# Patient Record
Sex: FEMALE | Race: WHITE | NOT HISPANIC OR LATINO | Employment: OTHER | URBAN - METROPOLITAN AREA
[De-identification: names, ages, dates, MRNs, and addresses within clinical notes are randomized per-mention and may not be internally consistent; named-entity substitution may affect disease eponyms.]

---

## 2017-04-28 ENCOUNTER — TELEPHONE (OUTPATIENT)
Dept: OBSTETRICS AND GYNECOLOGY | Facility: CLINIC | Age: 72
End: 2017-04-28

## 2017-04-28 DIAGNOSIS — Z12.31 SCREENING MAMMOGRAM, ENCOUNTER FOR: Primary | ICD-10-CM

## 2017-04-28 NOTE — TELEPHONE ENCOUNTER
----- Message from Dorie Clemons sent at 4/28/2017 10:40 AM CDT -----  Contact: SAI LUCIANO [[id number]]  x_  1st Request  _  2nd Request  _  3rd Request        Who: SAI LUCIANO [[id number]]    Why: Pt states she would like to get her MMG orders in. Thanks.     What Number to Call Back: [phone]    When to Expect a call back: (Before the end of the day)   -- if call after 3:00 call back will be tomorrow.

## 2017-05-01 ENCOUNTER — TELEPHONE (OUTPATIENT)
Dept: OBSTETRICS AND GYNECOLOGY | Facility: CLINIC | Age: 72
End: 2017-05-01

## 2017-05-01 NOTE — TELEPHONE ENCOUNTER
Rescheduled pt's 6-9-2017 appt from 9:45 AM to 2:15 PM due to changes in Dr. Gaviria's surgery schedule. Pt communicated understanding.

## 2017-05-29 ENCOUNTER — HOSPITAL ENCOUNTER (OUTPATIENT)
Dept: RADIOLOGY | Facility: OTHER | Age: 72
Discharge: HOME | End: 2017-05-29
Attending: OBSTETRICS & GYNECOLOGY
Payer: MEDICARE

## 2017-05-29 DIAGNOSIS — Z12.31 SCREENING MAMMOGRAM, ENCOUNTER FOR: ICD-10-CM

## 2017-05-29 PROCEDURE — 77067 SCR MAMMO BI INCL CAD: CPT | Mod: 26,,, | Performed by: RADIOLOGY

## 2017-05-29 PROCEDURE — 77067 SCR MAMMO BI INCL CAD: CPT | Mod: TC

## 2017-06-09 ENCOUNTER — OFFICE VISIT (OUTPATIENT)
Dept: OBSTETRICS AND GYNECOLOGY | Facility: CLINIC | Age: 72
End: 2017-06-09
Attending: OBSTETRICS & GYNECOLOGY
Payer: MEDICARE

## 2017-06-09 VITALS
HEIGHT: 65 IN | BODY MASS INDEX: 20.83 KG/M2 | SYSTOLIC BLOOD PRESSURE: 110 MMHG | DIASTOLIC BLOOD PRESSURE: 70 MMHG | WEIGHT: 125 LBS

## 2017-06-09 DIAGNOSIS — Z01.419 ENCOUNTER FOR GYNECOLOGICAL EXAMINATION WITHOUT ABNORMAL FINDING: Primary | ICD-10-CM

## 2017-06-09 DIAGNOSIS — E89.40 POSTSURGICAL MENOPAUSE: ICD-10-CM

## 2017-06-09 DIAGNOSIS — N95.2 POSTMENOPAUSAL ATROPHIC VAGINITIS: ICD-10-CM

## 2017-06-09 DIAGNOSIS — Z12.31 SCREENING MAMMOGRAM FOR HIGH-RISK PATIENT: ICD-10-CM

## 2017-06-09 DIAGNOSIS — Z13.820 SCREENING FOR OSTEOPOROSIS: ICD-10-CM

## 2017-06-09 PROCEDURE — G0101 CA SCREEN;PELVIC/BREAST EXAM: HCPCS | Mod: S$GLB,,, | Performed by: OBSTETRICS & GYNECOLOGY

## 2017-06-09 PROCEDURE — 99999 PR PBB SHADOW E&M-EST. PATIENT-LVL III: CPT | Mod: PBBFAC,,, | Performed by: OBSTETRICS & GYNECOLOGY

## 2017-06-09 RX ORDER — GLUCOSAMINE/CHONDRO SU A 500-400 MG
1 TABLET ORAL 3 TIMES DAILY
COMMUNITY

## 2017-06-09 RX ORDER — ESTRADIOL 0.1 MG/G
0.5 CREAM VAGINAL
Qty: 42 G | Refills: 4 | Status: SHIPPED | OUTPATIENT
Start: 2017-06-12 | End: 2018-07-23

## 2017-06-09 NOTE — PROGRESS NOTES
Subjective:       Patient ID: Marian Durham is a 71 y.o. female.    Chief Complaint:  Annual Exam      History of Present Illness  HPI  Marian Durham is a 71 y.o. female  here for her annual GYN exam.    She describes her periods as stopped with Hysterectomy in   Denies break through bleeding.   Denies vaginal itching or irritation.  Denies vaginal discharge.  She is not sexually active. She has been  since .     History of abnormal pap: No  Last Pap: was normal  Last MMG: normal--routine follow-up in 12 months  Last Colonoscopy:  colonoscopy 6 years ago without abnormalities.  denies domestic violence. She does feel safe at home.     Past Medical History:   Diagnosis Date    Hyperlipidemia     Osteoarthritis     Shingles 2002    Spinal stenosis      Past Surgical History:   Procedure Laterality Date    ANKLE FRACTURE SURGERY      Right ankle    HYSTERECTOMY      HEATHER    KNEE ARTHROSCOPY W/ DEBRIDEMENT      Bilateral    KNEE ARTHROSCOPY W/ DEBRIDEMENT      Left knee    OPEN PATELLA REEFING PROCEDURE  age 19    Left knee    Tonsilectomy  as a child    TUBAL LIGATION       Social History     Social History    Marital status:      Spouse name: N/A    Number of children: N/A    Years of education: N/A     Occupational History    Not on file.     Social History Main Topics    Smoking status: Never Smoker    Smokeless tobacco: Never Used    Alcohol use 1.8 oz/week     3 Glasses of wine per week      Comment: Drinks a glass of wine 3 times weekly    Drug use: No    Sexual activity: Not Currently     Birth control/ protection: Post-menopausal      Comment: Spouse  of kidney cancer : 2015     Other Topics Concern    Not on file     Social History Narrative    No narrative on file     Family History   Problem Relation Age of Onset    Hypertension Father     Hypertension Mother     Diabetes Brother     Colon cancer Paternal Aunt      "Breast cancer Paternal Aunt      75    Coronary artery disease Brother     Ovarian cancer Neg Hx      OB History      Para Term  AB Living    2 2 2   2    SAB TAB Ectopic Multiple Live Births        2          /70   Ht 5' 5" (1.651 m)   Wt 56.7 kg (125 lb)   BMI 20.80 kg/m²         GYN & OB History  No LMP recorded. Patient has had a hysterectomy.   Date of Last Pap: No result found    OB History    Para Term  AB Living   2 2 2   2   SAB TAB Ectopic Multiple Live Births       2      # Outcome Date GA Lbr Otto/2nd Weight Sex Delivery Anes PTL Lv   2 Term         KIRA   1 Term         KIRA          Review of Systems  Review of Systems   Constitutional: Negative for activity change, appetite change, fatigue and unexpected weight change.   HENT: Negative.    Eyes: Negative for visual disturbance.   Respiratory: Negative for shortness of breath and wheezing.    Cardiovascular: Negative for chest pain, palpitations and leg swelling.   Gastrointestinal: Positive for bloating. Negative for abdominal pain and blood in stool.   Endocrine: Negative for diabetes and hair loss.   Genitourinary: Negative for decreased libido, dyspareunia and postmenopausal bleeding.   Musculoskeletal: Negative for back pain and joint swelling.   Skin:  Negative for no acne and hair changes.   Neurological: Negative for headaches.   Hematological: Does not bruise/bleed easily.   Psychiatric/Behavioral: Positive for depression. The patient is not nervous/anxious.    Breast: Negative for breast pain and nipple discharge          Objective:    Physical Exam:   Constitutional: She is oriented to person, place, and time. She appears well-developed and well-nourished.    HENT:   Head: Normocephalic and atraumatic.    Eyes: EOM are normal. Pupils are equal, round, and reactive to light.    Neck: Normal range of motion. Neck supple.    Cardiovascular: Normal rate and regular rhythm.     Pulmonary/Chest: Effort normal " and breath sounds normal.   BREASTS: Symmetrical, no skin changes or visible lesions.  No palpable masses, nipple discharge bilaterally.          Abdominal: Soft. Bowel sounds are normal.     Genitourinary: Pelvic exam was performed with patient supine.   Genitourinary Comments: PELVIC: Normal external female genitalia without lesions. Normal hair distribution. Adequate perineal body, normal urethral meatus. Vagina atrophic without lesions or discharge. No significant cystocele or rectocele. Bimanual exam shows uterus and cervix to be surgically absent. Adnexa without masses or tenderness.  RECTAL: Deferred             Musculoskeletal: Normal range of motion and moves all extremeties.       Neurological: She is alert and oriented to person, place, and time.    Skin: Skin is warm and dry.    Psychiatric: She has a normal mood and affect.          Assessment:        1. Encounter for gynecological examination without abnormal finding    2. Postsurgical menopause    3. Screening for osteoporosis    4. Postmenopausal atrophic vaginitis    5. Screening mammogram for high-risk patient                Plan:      1. Encounter for gynecological examination without abnormal finding  COUNSELING:  The patient was counseled today on osteoporosis prevention, calcium supplementation, and regular weight bearing exercise. The patient was also counseled today on ACS PAP guidelines, with recommendations for yearly pelvic exams unless their uterus, cervix, and ovaries were removed for benign reasons; in that case, examinations every 3-5 years are recommended. The patient was also counseled regarding monthly breast self-examination, routine STD screening for at-risk populations, prophylactic immunizations for transmitted infections such as HPV, Pertussis, or Influenza as appropriate, and yearly mammograms when indicated by ACS guidelines. She was advised to see her primary care physician for all other health maintenance.   FOLLOW-UP with  me for next routine visit.         2. Postsurgical menopause    - DXA Bone Density Spine And Hip; Future    3. Screening for osteoporosis    - DXA Bone Density Spine And Hip; Future    4. Postmenopausal atrophic vaginitis    - estradiol (ESTRACE) 0.01 % (0.1 mg/gram) vaginal cream; Place 0.5 g vaginally twice a week. Insert 0.5grams intravaginally twice weekly  Dispense: 42 g; Refill: 4    5. Screening mammogram for high-risk patient    - Mammo Digital Screening Bilat with Tomosynthesis CAD; Future       Return in about 1 year (around 6/9/2018).

## 2017-08-01 ENCOUNTER — HOSPITAL ENCOUNTER (OUTPATIENT)
Dept: RADIOLOGY | Facility: OTHER | Age: 72
Discharge: HOME | End: 2017-08-01
Attending: OBSTETRICS & GYNECOLOGY
Payer: MEDICARE

## 2017-08-01 DIAGNOSIS — Z13.820 SCREENING FOR OSTEOPOROSIS: ICD-10-CM

## 2017-08-01 DIAGNOSIS — E89.40 POSTSURGICAL MENOPAUSE: ICD-10-CM

## 2017-08-01 PROCEDURE — 77080 DXA BONE DENSITY AXIAL: CPT | Mod: TC

## 2017-08-01 PROCEDURE — 77080 DXA BONE DENSITY AXIAL: CPT | Mod: 26,,, | Performed by: RADIOLOGY

## 2018-05-10 ENCOUNTER — HOSPITAL ENCOUNTER (OUTPATIENT)
Dept: RADIOLOGY | Facility: OTHER | Age: 73
Discharge: HOME | End: 2018-05-10
Attending: INTERNAL MEDICINE
Payer: MEDICARE

## 2018-05-10 DIAGNOSIS — M79.674 PAIN IN TOE OF RIGHT FOOT: ICD-10-CM

## 2018-05-10 DIAGNOSIS — M79.674 PAIN IN TOE OF RIGHT FOOT: Primary | ICD-10-CM

## 2018-05-10 DIAGNOSIS — Z12.31 ENCOUNTER FOR SCREENING MAMMOGRAM FOR BREAST CANCER: ICD-10-CM

## 2018-05-10 PROCEDURE — 73660 X-RAY EXAM OF TOE(S): CPT | Mod: TC,FY,RT

## 2018-05-10 PROCEDURE — 73660 X-RAY EXAM OF TOE(S): CPT | Mod: 26,RT,, | Performed by: RADIOLOGY

## 2018-06-06 ENCOUNTER — HOSPITAL ENCOUNTER (OUTPATIENT)
Dept: RADIOLOGY | Facility: OTHER | Age: 73
Discharge: HOME | End: 2018-06-06
Attending: INTERNAL MEDICINE
Payer: MEDICARE

## 2018-06-06 VITALS — HEIGHT: 65 IN | WEIGHT: 125 LBS | BODY MASS INDEX: 20.83 KG/M2

## 2018-06-06 DIAGNOSIS — Z12.31 ENCOUNTER FOR SCREENING MAMMOGRAM FOR BREAST CANCER: ICD-10-CM

## 2018-06-06 PROCEDURE — 77067 SCR MAMMO BI INCL CAD: CPT | Mod: TC

## 2018-06-06 PROCEDURE — 77063 BREAST TOMOSYNTHESIS BI: CPT | Mod: 26,,, | Performed by: RADIOLOGY

## 2018-06-06 PROCEDURE — 77067 SCR MAMMO BI INCL CAD: CPT | Mod: 26,,, | Performed by: RADIOLOGY

## 2018-07-23 ENCOUNTER — OFFICE VISIT (OUTPATIENT)
Dept: OBSTETRICS AND GYNECOLOGY | Facility: CLINIC | Age: 73
End: 2018-07-23
Attending: OBSTETRICS & GYNECOLOGY
Payer: MEDICARE

## 2018-07-23 VITALS
DIASTOLIC BLOOD PRESSURE: 70 MMHG | SYSTOLIC BLOOD PRESSURE: 120 MMHG | HEIGHT: 65 IN | WEIGHT: 123.44 LBS | BODY MASS INDEX: 20.57 KG/M2

## 2018-07-23 DIAGNOSIS — Z12.31 SCREENING MAMMOGRAM, ENCOUNTER FOR: ICD-10-CM

## 2018-07-23 DIAGNOSIS — Z01.411 ENCOUNTER FOR GYNECOLOGICAL EXAMINATION WITH ABNORMAL FINDING: Primary | ICD-10-CM

## 2018-07-23 DIAGNOSIS — N95.2 POSTMENOPAUSAL ATROPHIC VAGINITIS: ICD-10-CM

## 2018-07-23 PROCEDURE — G0101 CA SCREEN;PELVIC/BREAST EXAM: HCPCS | Mod: S$GLB,,, | Performed by: OBSTETRICS & GYNECOLOGY

## 2018-07-23 PROCEDURE — 99999 PR PBB SHADOW E&M-EST. PATIENT-LVL III: CPT | Mod: PBBFAC,,, | Performed by: OBSTETRICS & GYNECOLOGY

## 2018-07-23 NOTE — PROGRESS NOTES
Subjective:       Patient ID: Marian Durham is a 73 y.o. female.    Chief Complaint:  Well Woman      History of Present Illness  HPI  Marian Durham is a 73 y.o. female  here for her annual GYN exam.     denies vaginal itching or irritation.  Denies vaginal discharge.  She is not sexually active. She has been  for several years   History of abnormal pap: No  Last Pap: was normal  Last MMG: normal--routine follow-up in 12 months  Last Colonoscopy:  colonoscopy several years ago without abnormalities.  denies domestic violence. She does feel safe at home.     Past Medical History:   Diagnosis Date    Hyperlipidemia     Osteoarthritis     Shingles 2002    Spinal stenosis      Past Surgical History:   Procedure Laterality Date    ANKLE FRACTURE SURGERY  2007    Right ankle    HYSTERECTOMY  1985    HEATHER    KNEE ARTHROSCOPY W/ DEBRIDEMENT      Bilateral    KNEE ARTHROSCOPY W/ DEBRIDEMENT      Left knee    OPEN PATELLA REEFING PROCEDURE  age 19    Left knee    Tonsilectomy  as a child    TUBAL LIGATION       Social History     Social History    Marital status:      Spouse name: N/A    Number of children: N/A    Years of education: N/A     Occupational History    Not on file.     Social History Main Topics    Smoking status: Never Smoker    Smokeless tobacco: Never Used    Alcohol use 1.8 oz/week     3 Glasses of wine per week      Comment: Drinks a glass of wine 3 times weekly    Drug use: No    Sexual activity: Not Currently     Birth control/ protection: Post-menopausal      Comment: Spouse  of kidney cancer : 2015     Other Topics Concern    Not on file     Social History Narrative    No narrative on file     Family History   Problem Relation Age of Onset    Hypertension Father     Hypertension Mother     Diabetes Brother     Colon cancer Paternal Aunt     Breast cancer Paternal Aunt         75    Coronary artery disease Brother     Ovarian  "cancer Neg Hx      OB History      Para Term  AB Living    2 2 2     2    SAB TAB Ectopic Multiple Live Births            2          /70   Ht 5' 5" (1.651 m)   Wt 56 kg (123 lb 7.3 oz)   BMI 20.54 kg/m²         GYN & OB History    Date of Last Pap: No result found    OB History    Para Term  AB Living   2 2 2     2   SAB TAB Ectopic Multiple Live Births           2      # Outcome Date GA Lbr Otto/2nd Weight Sex Delivery Anes PTL Lv   2 Term         KIRA   1 Term         KIRA          Review of Systems  Review of Systems   Constitutional: Negative for activity change, appetite change, fatigue and unexpected weight change.   HENT: Negative.    Eyes: Negative for visual disturbance.   Respiratory: Negative for shortness of breath and wheezing.    Cardiovascular: Negative for chest pain, palpitations and leg swelling.   Gastrointestinal: Negative for abdominal pain, bloating and blood in stool.   Endocrine: Negative for diabetes, hair loss and hot flashes.   Genitourinary: Positive for frequency. Negative for decreased libido and dyspareunia.   Musculoskeletal: Negative for back pain and joint swelling.   Skin:  Negative for no acne and hair changes.   Neurological: Negative for headaches.   Hematological: Does not bruise/bleed easily.   Psychiatric/Behavioral: Negative for depression and sleep disturbance. The patient is not nervous/anxious.    Breast: Negative for breast pain and nipple discharge          Objective:    Physical Exam:   Constitutional: She is oriented to person, place, and time. She appears well-developed and well-nourished.    HENT:   Head: Normocephalic and atraumatic.    Eyes: EOM are normal. Pupils are equal, round, and reactive to light.    Neck: Normal range of motion. Neck supple.    Cardiovascular: Normal rate and regular rhythm.     Pulmonary/Chest: Effort normal and breath sounds normal.   BREASTS:  no mass, no tenderness, no deformity and no retraction. " Right breast exhibits no inverted nipple, no mass, no nipple discharge, no skin change, no tenderness, no bleeding and no swelling. Left breast exhibits no inverted nipple, no mass, no nipple discharge, no skin change, no tenderness, no bleeding and no swelling. Breasts are symmetrical.              Abdominal: Soft. Bowel sounds are normal.     Genitourinary: Pelvic exam was performed with patient supine.   Genitourinary Comments: PELVIC: Normal external female genitalia without lesions. Normal hair distribution. Adequate perineal body, normal urethral meatus. Vagina atrophic without lesions or discharge. No significant cystocele or rectocele. Bimanual exam shows uterus and cervix to be surgically absent. Adnexa without masses or tenderness.  RECTAL: Deferred             Musculoskeletal: Normal range of motion and moves all extremeties.       Neurological: She is alert and oriented to person, place, and time.    Skin: Skin is warm and dry.    Psychiatric: She has a normal mood and affect.          Assessment:        1. Encounter for gynecological examination with abnormal finding    2. Postmenopausal atrophic vaginitis    3. Screening mammogram, encounter for                Plan:      1. Encounter for gynecological examination with abnormal finding  COUNSELING:  The patient was counseled today on osteoporosis prevention, calcium supplementation, and regular weight bearing exercise. The patient was also counseled today on ACS PAP guidelines, with recommendations for yearly pelvic exams unless their uterus, cervix, and ovaries were removed for benign reasons; in that case, examinations every 3-5 years are recommended. The patient was also counseled regarding monthly breast self-examination, routine STD screening for at-risk populations, prophylactic immunizations for transmitted infections such as HPV, Pertussis, or Influenza as appropriate, and yearly mammograms when indicated by ACS guidelines. She was advised to  see her primary care physician for all other health maintenance.   FOLLOW-UP with me for next routine visit.         2. Postmenopausal atrophic vaginitis    - conjugated estrogens (PREMARIN) vaginal cream; Place 0.5 g vaginally twice a week. Insert into vagina as directed  Dispense: 30 g; Refill: 6    3. Screening mammogram, encounter for    - Mammo Digital Screening Bilat with Tomosynthesis CAD; Future       Follow-up in about 2 years (around 7/23/2020).

## 2018-11-08 ENCOUNTER — HOSPITAL ENCOUNTER (OUTPATIENT)
Dept: PREADMISSION TESTING | Facility: OTHER | Age: 73
Discharge: HOME | End: 2018-11-08
Attending: SPECIALIST
Payer: MEDICARE

## 2018-11-08 ENCOUNTER — ANESTHESIA EVENT (OUTPATIENT)
Dept: SURGERY | Facility: OTHER | Age: 73
End: 2018-11-08
Payer: MEDICARE

## 2018-11-08 VITALS
DIASTOLIC BLOOD PRESSURE: 57 MMHG | OXYGEN SATURATION: 95 % | HEIGHT: 65 IN | HEART RATE: 80 BPM | WEIGHT: 122 LBS | SYSTOLIC BLOOD PRESSURE: 123 MMHG | TEMPERATURE: 99 F | BODY MASS INDEX: 20.33 KG/M2

## 2018-11-08 RX ORDER — LIDOCAINE HYDROCHLORIDE 10 MG/ML
0.5 INJECTION, SOLUTION EPIDURAL; INFILTRATION; INTRACAUDAL; PERINEURAL ONCE
Status: CANCELLED | OUTPATIENT
Start: 2018-11-08 | End: 2018-11-08

## 2018-11-08 RX ORDER — SODIUM CHLORIDE, SODIUM LACTATE, POTASSIUM CHLORIDE, CALCIUM CHLORIDE 600; 310; 30; 20 MG/100ML; MG/100ML; MG/100ML; MG/100ML
INJECTION, SOLUTION INTRAVENOUS CONTINUOUS
Status: CANCELLED | OUTPATIENT
Start: 2018-11-08

## 2018-11-08 NOTE — DISCHARGE INSTRUCTIONS
PRE-ADMIT TESTING -  [phone]    [address]  [place] [facility]          Your surgery has been scheduled at [facility]. We are pleased to have the opportunity to serve you. For Further Information please call [phone].    On the day of surgery please report to the Information Desk on the 1st floor.    · CONTACT YOUR PHYSICIAN'S OFFICE THE DAY PRIOR TO YOUR SURGERY TO OBTAIN YOUR ARRIVAL TIME.     · The evening before surgery do not eat anything after 9 p.m. ( this includes hard candy, chewing gum and mints).  You may only have GATORADE, POWERADE AND WATER  from 9 p.m. until you leave your home.   DO NOT DRINK ANY LIQUIDS ON THE WAY TO THE HOSPITAL.      SPECIAL MEDICATION INSTRUCTIONS: TAKE medications checked off by the Anesthesiologist on your Medication List.    Angiogram Patients: Take medications as instructed by your physician, including aspirin.     Surgery Patients:    If you take ASPIRIN - Your PHYSICIAN/SURGEON will need to inform you IF/OR when you need to stop taking aspirin prior to your surgery.     Do Not take any medications containing IBUPROFEN.  Do Not Wear any make-up or dark nail polish   (especially eye make-up) to surgery. If you come to surgery with makeup on you will be required to remove the makeup or nail polish.    Do not shave your surgical area at least 5 days prior to your surgery. The surgical prep will be performed at the hospital according to Infection Control regulations.    Leave all valuables at home.   Do Not wear any jewelry or watches, including any metal in body piercings.  Contact Lens must be removed before surgery. Either do not wear the contact lens or bring a case and solution for storage.  Please bring a container for eyeglasses or dentures as required.  Bring any paperwork your physician has provided, such as consent forms,  history and physicals, doctor's orders, etc.   Bring comfortable clothes that are loose fitting to wear upon  discharge. Take into consideration the type of surgery being performed.  Maintain your diet as advised per your physician the day prior to surgery.      Adequate rest the night before surgery is advised.   Park in the Parking lot behind the hospital or in the [place] [facility] across the street from the parking lot. Parking is complimentary.  If you will be discharged the same day as your procedure, please arrange for a responsible adult to drive you home or to accompany you if traveling by taxi.   YOU WILL NOT BE PERMITTED TO DRIVE OR TO LEAVE THE HOSPITAL ALONE AFTER SURGERY.   It is strongly recommended that you arrange for someone to remain with you for the first 24 hrs following your surgery.       Thank you for your cooperation.  The Staff of [facility].        Bathing Instructions                                                                 Please shower the evening before and morning of your procedure with    ANTIBACTERIAL SOAP. ( DIAL, etc )  Concentrate on the surgical area   for at least 3 minutes and rinse completely. Dry off as usual.   Do not use any deodorant, powder, body lotions, perfume, after shave or    cologne.

## 2018-11-08 NOTE — ANESTHESIA PREPROCEDURE EVALUATION
11/08/2018  Marian Durham is a 73 y.o., female.    Anesthesia Evaluation    I have reviewed the Patient Summary Reports.    I have reviewed the Nursing Notes.   I have reviewed the Medications.     Review of Systems  Anesthesia Hx:  Denies Family Hx of Anesthesia complications.   Denies Personal Hx of Anesthesia complications.   Hematology/Oncology:  Hematology Normal   Oncology Normal     EENT/Dental:EENT/Dental Normal   Cardiovascular:   Exercise tolerance: good hyperlipidemia    Pulmonary:  Pulmonary Normal    Renal/:  Renal/ Normal     Hepatic/GI:  Hepatic/GI Normal    Musculoskeletal:   Arthritis     Neurological:  Neurology Normal    Endocrine:  Endocrine Normal    Dermatological:  Skin Normal    Psych:  Psychiatric Normal           Physical Exam  General:  Well nourished    Airway/Jaw/Neck:  Airway Findings: Mouth Opening: Normal Tongue: Normal  General Airway Assessment: Adult  Mallampati: II  TM Distance: Normal, at least 6 cm      Dental:  Dental Findings: molar caps, In tact        Mental Status:  Mental Status Findings:  Alert and Oriented, Cooperative         Anesthesia Plan  Type of Anesthesia, risks & benefits discussed:  Anesthesia Type:  general  Patient's Preference:   Intra-op Monitoring Plan: standard ASA monitors  Intra-op Monitoring Plan Comments:   Post Op Pain Control Plan: multimodal analgesia  Post Op Pain Control Plan Comments:   Induction:   IV  Beta Blocker:         Informed Consent: Patient understands risks and agrees with Anesthesia plan.  Questions answered. Anesthesia consent signed with patient.  ASA Score: 2     Day of Surgery Review of History & Physical:    H&P update referred to the surgeon.         Ready For Surgery From Anesthesia Perspective.

## 2018-11-16 ENCOUNTER — HOSPITAL ENCOUNTER (OUTPATIENT)
Facility: OTHER | Age: 73
Discharge: HOME | End: 2018-11-16
Attending: SPECIALIST | Admitting: SPECIALIST
Payer: MEDICARE

## 2018-11-16 ENCOUNTER — ANESTHESIA (OUTPATIENT)
Dept: SURGERY | Facility: OTHER | Age: 73
End: 2018-11-16
Payer: MEDICARE

## 2018-11-16 VITALS
TEMPERATURE: 98 F | DIASTOLIC BLOOD PRESSURE: 68 MMHG | HEIGHT: 65 IN | SYSTOLIC BLOOD PRESSURE: 128 MMHG | OXYGEN SATURATION: 97 % | BODY MASS INDEX: 20.33 KG/M2 | HEART RATE: 76 BPM | RESPIRATION RATE: 18 BRPM | WEIGHT: 122 LBS

## 2018-11-16 DIAGNOSIS — D17.79 LIPOMA OF OTHER SPECIFIED SITES: Primary | ICD-10-CM

## 2018-11-16 PROBLEM — D17.9 LIPOMA: Status: ACTIVE | Noted: 2018-11-16

## 2018-11-16 PROCEDURE — 63600175 PHARM REV CODE 636 W HCPCS: Performed by: NURSE ANESTHETIST, CERTIFIED REGISTERED

## 2018-11-16 PROCEDURE — 88304 TISSUE EXAM BY PATHOLOGIST: CPT | Mod: 26,,, | Performed by: PATHOLOGY

## 2018-11-16 PROCEDURE — 37000009 HC ANESTHESIA EA ADD 15 MINS: Performed by: SPECIALIST

## 2018-11-16 PROCEDURE — 25000242 PHARM REV CODE 250 ALT 637 W/ HCPCS: Performed by: SPECIALIST

## 2018-11-16 PROCEDURE — 25000003 PHARM REV CODE 250: Performed by: ANESTHESIOLOGY

## 2018-11-16 PROCEDURE — 63600175 PHARM REV CODE 636 W HCPCS: Performed by: SPECIALIST

## 2018-11-16 PROCEDURE — 36000706: Performed by: SPECIALIST

## 2018-11-16 PROCEDURE — 36000707: Performed by: SPECIALIST

## 2018-11-16 PROCEDURE — 71000039 HC RECOVERY, EACH ADD'L HOUR: Performed by: SPECIALIST

## 2018-11-16 PROCEDURE — 37000008 HC ANESTHESIA 1ST 15 MINUTES: Performed by: SPECIALIST

## 2018-11-16 PROCEDURE — 71000033 HC RECOVERY, INTIAL HOUR: Performed by: SPECIALIST

## 2018-11-16 PROCEDURE — 71000015 HC POSTOP RECOV 1ST HR: Performed by: SPECIALIST

## 2018-11-16 PROCEDURE — 88304 TISSUE EXAM BY PATHOLOGIST: CPT | Performed by: PATHOLOGY

## 2018-11-16 PROCEDURE — 71000016 HC POSTOP RECOV ADDL HR: Performed by: SPECIALIST

## 2018-11-16 PROCEDURE — 25000003 PHARM REV CODE 250: Performed by: NURSE ANESTHETIST, CERTIFIED REGISTERED

## 2018-11-16 RX ORDER — ROCURONIUM BROMIDE 10 MG/ML
INJECTION, SOLUTION INTRAVENOUS
Status: DISCONTINUED | OUTPATIENT
Start: 2018-11-16 | End: 2018-11-16

## 2018-11-16 RX ORDER — NEOSTIGMINE METHYLSULFATE 1 MG/ML
INJECTION, SOLUTION INTRAVENOUS
Status: DISCONTINUED | OUTPATIENT
Start: 2018-11-16 | End: 2018-11-16

## 2018-11-16 RX ORDER — SODIUM CHLORIDE 9 MG/ML
INJECTION, SOLUTION INTRAVENOUS CONTINUOUS
Status: DISCONTINUED | OUTPATIENT
Start: 2018-11-16 | End: 2018-11-16 | Stop reason: HOSPADM

## 2018-11-16 RX ORDER — SODIUM CHLORIDE 0.9 % (FLUSH) 0.9 %
3 SYRINGE (ML) INJECTION
Status: DISCONTINUED | OUTPATIENT
Start: 2018-11-16 | End: 2018-11-16 | Stop reason: HOSPADM

## 2018-11-16 RX ORDER — LIDOCAINE HYDROCHLORIDE 10 MG/ML
0.5 INJECTION, SOLUTION EPIDURAL; INFILTRATION; INTRACAUDAL; PERINEURAL ONCE
Status: DISCONTINUED | OUTPATIENT
Start: 2018-11-16 | End: 2018-11-16 | Stop reason: HOSPADM

## 2018-11-16 RX ORDER — OXYCODONE HYDROCHLORIDE 5 MG/1
5 TABLET ORAL
Status: DISCONTINUED | OUTPATIENT
Start: 2018-11-16 | End: 2018-11-16 | Stop reason: HOSPADM

## 2018-11-16 RX ORDER — ACETAMINOPHEN 10 MG/ML
INJECTION, SOLUTION INTRAVENOUS
Status: DISCONTINUED | OUTPATIENT
Start: 2018-11-16 | End: 2018-11-16

## 2018-11-16 RX ORDER — LIDOCAINE HCL/PF 100 MG/5ML
SYRINGE (ML) INTRAVENOUS
Status: DISCONTINUED | OUTPATIENT
Start: 2018-11-16 | End: 2018-11-16

## 2018-11-16 RX ORDER — ONDANSETRON 2 MG/ML
4 INJECTION INTRAMUSCULAR; INTRAVENOUS DAILY PRN
Status: DISCONTINUED | OUTPATIENT
Start: 2018-11-16 | End: 2018-11-16 | Stop reason: HOSPADM

## 2018-11-16 RX ORDER — HYDROCODONE BITARTRATE AND ACETAMINOPHEN 5; 325 MG/1; MG/1
TABLET ORAL
Qty: 20 TABLET | Refills: 0 | Status: SHIPPED | OUTPATIENT
Start: 2018-11-16 | End: 2021-08-16

## 2018-11-16 RX ORDER — CEFAZOLIN SODIUM 1 G/3ML
1 INJECTION, POWDER, FOR SOLUTION INTRAMUSCULAR; INTRAVENOUS
Status: COMPLETED | OUTPATIENT
Start: 2018-11-16 | End: 2018-11-16

## 2018-11-16 RX ORDER — DEXAMETHASONE SODIUM PHOSPHATE 4 MG/ML
INJECTION, SOLUTION INTRA-ARTICULAR; INTRALESIONAL; INTRAMUSCULAR; INTRAVENOUS; SOFT TISSUE
Status: DISCONTINUED | OUTPATIENT
Start: 2018-11-16 | End: 2018-11-16

## 2018-11-16 RX ORDER — FENTANYL CITRATE 50 UG/ML
INJECTION, SOLUTION INTRAMUSCULAR; INTRAVENOUS
Status: DISCONTINUED | OUTPATIENT
Start: 2018-11-16 | End: 2018-11-16

## 2018-11-16 RX ORDER — MEPERIDINE HYDROCHLORIDE 50 MG/ML
12.5 INJECTION INTRAMUSCULAR; INTRAVENOUS; SUBCUTANEOUS ONCE AS NEEDED
Status: DISCONTINUED | OUTPATIENT
Start: 2018-11-16 | End: 2018-11-16 | Stop reason: HOSPADM

## 2018-11-16 RX ORDER — GLYCOPYRROLATE 0.2 MG/ML
INJECTION INTRAMUSCULAR; INTRAVENOUS
Status: DISCONTINUED | OUTPATIENT
Start: 2018-11-16 | End: 2018-11-16

## 2018-11-16 RX ORDER — FENTANYL CITRATE 50 UG/ML
25 INJECTION, SOLUTION INTRAMUSCULAR; INTRAVENOUS EVERY 5 MIN PRN
Status: DISCONTINUED | OUTPATIENT
Start: 2018-11-16 | End: 2018-11-16 | Stop reason: HOSPADM

## 2018-11-16 RX ORDER — ONDANSETRON HYDROCHLORIDE 2 MG/ML
INJECTION, SOLUTION INTRAMUSCULAR; INTRAVENOUS
Status: DISCONTINUED | OUTPATIENT
Start: 2018-11-16 | End: 2018-11-16

## 2018-11-16 RX ORDER — SODIUM CHLORIDE, SODIUM LACTATE, POTASSIUM CHLORIDE, CALCIUM CHLORIDE 600; 310; 30; 20 MG/100ML; MG/100ML; MG/100ML; MG/100ML
INJECTION, SOLUTION INTRAVENOUS CONTINUOUS
Status: DISCONTINUED | OUTPATIENT
Start: 2018-11-16 | End: 2018-11-16 | Stop reason: HOSPADM

## 2018-11-16 RX ORDER — PROPOFOL 10 MG/ML
VIAL (ML) INTRAVENOUS
Status: DISCONTINUED | OUTPATIENT
Start: 2018-11-16 | End: 2018-11-16

## 2018-11-16 RX ORDER — ALBUTEROL SULFATE 0.83 MG/ML
2.5 SOLUTION RESPIRATORY (INHALATION) ONCE
Status: COMPLETED | OUTPATIENT
Start: 2018-11-16 | End: 2018-11-16

## 2018-11-16 RX ADMIN — PROPOFOL 50 MG: 10 INJECTION, EMULSION INTRAVENOUS at 10:11

## 2018-11-16 RX ADMIN — FENTANYL CITRATE 50 MCG: 50 INJECTION, SOLUTION INTRAMUSCULAR; INTRAVENOUS at 10:11

## 2018-11-16 RX ADMIN — ROCURONIUM BROMIDE 30 MG: 10 INJECTION, SOLUTION INTRAVENOUS at 10:11

## 2018-11-16 RX ADMIN — ONDANSETRON 4 MG: 2 INJECTION, SOLUTION INTRAMUSCULAR; INTRAVENOUS at 10:11

## 2018-11-16 RX ADMIN — SODIUM CHLORIDE, SODIUM LACTATE, POTASSIUM CHLORIDE, AND CALCIUM CHLORIDE: 600; 310; 30; 20 INJECTION, SOLUTION INTRAVENOUS at 10:11

## 2018-11-16 RX ADMIN — PROPOFOL 150 MG: 10 INJECTION, EMULSION INTRAVENOUS at 10:11

## 2018-11-16 RX ADMIN — PROPOFOL 20 MG: 10 INJECTION, EMULSION INTRAVENOUS at 10:11

## 2018-11-16 RX ADMIN — NEOSTIGMINE METHYLSULFATE 5 MG: 1 INJECTION INTRAVENOUS at 11:11

## 2018-11-16 RX ADMIN — GLYCOPYRROLATE 0.8 MG: 0.2 INJECTION, SOLUTION INTRAMUSCULAR; INTRAVENOUS at 11:11

## 2018-11-16 RX ADMIN — LIDOCAINE HYDROCHLORIDE 50 MG: 20 INJECTION, SOLUTION INTRAVENOUS at 10:11

## 2018-11-16 RX ADMIN — OXYCODONE HYDROCHLORIDE 5 MG: 5 TABLET ORAL at 11:11

## 2018-11-16 RX ADMIN — ACETAMINOPHEN 1000 MG: 10 INJECTION, SOLUTION INTRAVENOUS at 10:11

## 2018-11-16 RX ADMIN — ALBUTEROL SULFATE 2.5 MG: 2.5 SOLUTION RESPIRATORY (INHALATION) at 11:11

## 2018-11-16 RX ADMIN — DEXAMETHASONE SODIUM PHOSPHATE 4 MG: 4 INJECTION, SOLUTION INTRAMUSCULAR; INTRAVENOUS at 10:11

## 2018-11-16 RX ADMIN — CEFAZOLIN 1 G: 330 INJECTION, POWDER, FOR SOLUTION INTRAMUSCULAR; INTRAVENOUS at 10:11

## 2018-11-16 RX ADMIN — CARBOXYMETHYLCELLULOSE SODIUM 2 DROP: 2.5 SOLUTION/ DROPS OPHTHALMIC at 10:11

## 2018-11-16 NOTE — OR NURSING
Assisted up to bathroom, voided without difficulty, assisted to dress for discharge, tolerated well, awaiting pharmacy to deliver take home medication for discharge.

## 2018-11-16 NOTE — BRIEF OP NOTE
[facility]  Brief Operative Note     SUMMARY     Surgery Date: 11/16/2018     Surgeon(s) and Role:     * Fran Magaña MD - Primary    Assisting Surgeon: None    Pre-op Diagnosis:  Lipoma of other specified sites [D17.79]    Post-op Diagnosis:  Post-Op Diagnosis Codes:     * Lipoma of other specified sites [D17.79]    Procedure(s) (LRB):  EXCISION, MASS, BACK (N/A)    Anesthesia: General    Description of the findings of the procedure: Large lipoma 7 x5 cm    Findings/Key Components:     Estimated Blood Loss: * No values recorded between 11/16/2018 10:43 AM and 11/16/2018 11:05 AM *min       Specimens:   Specimen (12h ago, onward)    Start     Ordered    11/16/18 1049  Specimen to Pathology - Surgery  Once     Comments:  1. LIPOMA-BACK     Start Status   11/16/18 1049 Collected (11/16/18 1058)       11/16/18 1058          Discharge Note    SUMMARY     Admit Date: 11/16/2018    Discharge Date and Time:  11/16/2018 11:06 AM    Hospital Course (synopsis of major diagnoses, care, treatment, and services provided during the course of the hospital stay): treated     Final Diagnosis: Post-Op Diagnosis Codes:     * Lipoma of other specified sites [D17.79]    Disposition: Home or Self Care    Follow Up/Patient Instructions:     Medications:  Reconciled Home Medications:      Medication List      START taking these medications    HYDROcodone-acetaminophen 5-325 mg per tablet  Commonly known as:  NORCO  Take 1 or 2 tabs every 4 hours as needed.        CONTINUE taking these medications    CALCIUM 500 + D 500 mg(1,250mg) -200 unit per tablet  Generic drug:  calcium-vitamin D3  Take 1 tablet by mouth 2 (two) times daily with meals.     celecoxib 200 MG capsule  Commonly known as:  CeleBREX  take 1 capsule (200MG) by oral route every day as needed     conjugated estrogens vaginal cream  Commonly known as:  PREMARIN  Place 0.5 g vaginally twice a week. Insert into vagina as directed      glucosamine-chondroitin 500-400 mg tablet  Take 1 tablet by mouth 3 (three) times daily.     MIRALAX ORAL  Take by mouth.     OSTEO BI-FLEX ORAL  Take 2 tablets by mouth once daily.     simvastatin 40 MG tablet  Commonly known as:  ZOCOR          Discharge Procedure Orders   Diet general     Call MD for:  redness, tenderness, or signs of infection (pain, swelling, redness, odor or green/yellow discharge around incision site)     Remove dressing in 48 hours     Shower on day dressing removed (No bath)   Order Comments: You may shower on the 2nd day following your surgery.     Follow-up Information     Fran Magaña MD In 2 weeks.    Specialty:  General Surgery  Contact information:  [address]  [facility] [address] [phone]

## 2018-11-16 NOTE — OP NOTE
DATE OF PROCEDURE:  11/16/2018.    PREOPERATIVE DIAGNOSIS:  Back lipoma.    POSTOPERATIVE DIAGNOSIS:  Back lipoma.    PROCEDURE:  Excision of back lipoma.    PROCEDURE IN DETAIL:  The patient was taken to the Operating Room, placed on the   operating table in the supine position.  After smooth induction with general   anesthesia, the patient was placed in the prone position.  This lipoma was   located off of the midline to the right side.  This was easily palpated.  An   incision was made over the lipoma and carried down to the mass.  The lipoma was   then carefully dissected out circumferentially, had multiple pods off of the   main lesion, which were carefully dissected out.  Hemostasis was obtained using   electrocautery.  The entire mass was removed in toto without difficulty.    Specimen was sent to pathology for appropriate studies.  Hemostasis was obtained   using electrocautery.  The wound was irrigated.  The wound was then closed   using 3-0 Vicryl followed by 4-0 Vicryl.  Glue and Steri-Strips were applied.    Sterile dressing was placed.  Blood loss was minimal.  The patient tolerated the   procedure and left the Operating Room in stable condition.      JENNIFER/TAYLOR  dd: 11/16/2018 11:09:44 (CST)  td: 11/16/2018 11:27:17 (CST)  Doc ID   #[id number]  Job ID #[id number]    CC:

## 2018-11-16 NOTE — OR NURSING
Pt coughing  Incessantly upon arrival to pacu. Non productive. Breath sounds  Very diminished throughout. Dr owens here pt exam. Albuterol neb treatment ordered

## 2018-11-16 NOTE — TRANSFER OF CARE
"Anesthesia Transfer of Care Note    Patient: Marian Durham    Procedure(s) Performed: Procedure(s) (LRB):  EXCISION, MASS, BACK (N/A)    Patient location: PACU    Anesthesia Type: general    Transport from OR: Transported from OR on 2-3 L/min O2 by NC with adequate spontaneous ventilation    Post pain: adequate analgesia    Post assessment: no apparent anesthetic complications    Post vital signs: stable    Level of consciousness: awake    Nausea/Vomiting: no nausea/vomiting    Complications: none    Transfer of care protocol was followed      Last vitals:   Visit Vitals  /71 (BP Location: Left arm, Patient Position: Lying)   Pulse 75   Temp 36.5 °C (97.7 °F) (Oral)   Resp 16   Ht 5' 5" (1.651 m)   Wt 55.3 kg (122 lb 0 oz)   SpO2 100%   Breastfeeding? No   BMI 20.30 kg/m²     "

## 2018-11-16 NOTE — ANESTHESIA POSTPROCEDURE EVALUATION
"Anesthesia Post Evaluation    Patient: Marian Durham    Procedure(s) Performed: Procedure(s) (LRB):  EXCISION, MASS, BACK (N/A)    Final Anesthesia Type: general  Patient location during evaluation: PACU  Patient participation: Yes- Able to Participate  Level of consciousness: awake and alert  Post-procedure vital signs: reviewed and stable  Pain management: adequate  Airway patency: patent  PONV status at discharge: No PONV  Anesthetic complications: no      Cardiovascular status: blood pressure returned to baseline  Respiratory status: unassisted, spontaneous ventilation and room air  Hydration status: euvolemic  Follow-up not needed.        Visit Vitals  BP (!) 145/63   Pulse 61   Temp 36.6 °C (97.8 °F) (Oral)   Resp 18   Ht 5' 5" (1.651 m)   Wt 55.3 kg (122 lb 0 oz)   SpO2 100%   Breastfeeding? No   BMI 20.30 kg/m²       Pain/Renee Score: Pain Assessment Performed: Yes (11/16/2018 11:45 AM)  Presence of Pain: complains of pain/discomfort (11/16/2018 11:42 AM)  Pain Rating Prior to Med Admin: 3 (11/16/2018 11:42 AM)  Renee Score: 10 (11/16/2018 12:00 PM)        "

## 2018-11-16 NOTE — INTERVAL H&P NOTE
The patient has been examined and the H&P has been reviewed:    I concur with the findings and no changes have occurred since H&P was written.    Anesthesia/Surgery risks, benefits and alternative options discussed and understood by patient/family.          Active Hospital Problems    Diagnosis  POA    Lipoma [D17.9]  Yes      Resolved Hospital Problems   No resolved problems to display.

## 2019-06-07 ENCOUNTER — HOSPITAL ENCOUNTER (OUTPATIENT)
Dept: RADIOLOGY | Facility: OTHER | Age: 74
Discharge: HOME | End: 2019-06-07
Attending: OBSTETRICS & GYNECOLOGY
Payer: MEDICARE

## 2019-06-07 VITALS — BODY MASS INDEX: 20.33 KG/M2 | WEIGHT: 122 LBS | HEIGHT: 65 IN

## 2019-06-07 DIAGNOSIS — Z12.31 SCREENING MAMMOGRAM, ENCOUNTER FOR: ICD-10-CM

## 2019-06-07 PROCEDURE — 77067 MAMMO DIGITAL SCREENING BILAT WITH TOMOSYNTHESIS_CAD: ICD-10-PCS | Mod: 26,,, | Performed by: RADIOLOGY

## 2019-06-07 PROCEDURE — 77063 BREAST TOMOSYNTHESIS BI: CPT | Mod: 26,,, | Performed by: RADIOLOGY

## 2019-06-07 PROCEDURE — 77067 SCR MAMMO BI INCL CAD: CPT | Mod: 26,,, | Performed by: RADIOLOGY

## 2019-06-07 PROCEDURE — 77067 SCR MAMMO BI INCL CAD: CPT | Mod: TC

## 2019-06-07 PROCEDURE — 77063 MAMMO DIGITAL SCREENING BILAT WITH TOMOSYNTHESIS_CAD: ICD-10-PCS | Mod: 26,,, | Performed by: RADIOLOGY

## 2019-07-29 ENCOUNTER — OFFICE VISIT (OUTPATIENT)
Dept: OBSTETRICS AND GYNECOLOGY | Facility: CLINIC | Age: 74
End: 2019-07-29
Attending: OBSTETRICS & GYNECOLOGY
Payer: MEDICARE

## 2019-07-29 VITALS
SYSTOLIC BLOOD PRESSURE: 120 MMHG | WEIGHT: 122.13 LBS | DIASTOLIC BLOOD PRESSURE: 76 MMHG | HEIGHT: 65 IN | BODY MASS INDEX: 20.35 KG/M2

## 2019-07-29 DIAGNOSIS — Z12.31 SCREENING MAMMOGRAM, ENCOUNTER FOR: ICD-10-CM

## 2019-07-29 DIAGNOSIS — Z13.820 SCREENING FOR OSTEOPOROSIS: ICD-10-CM

## 2019-07-29 DIAGNOSIS — Z01.419 ENCOUNTER FOR GYNECOLOGICAL EXAMINATION WITHOUT ABNORMAL FINDING: Primary | ICD-10-CM

## 2019-07-29 DIAGNOSIS — N95.2 POSTMENOPAUSAL ATROPHIC VAGINITIS: ICD-10-CM

## 2019-07-29 DIAGNOSIS — E89.40 POSTSURGICAL MENOPAUSE: ICD-10-CM

## 2019-07-29 PROCEDURE — G0101 PR CA SCREEN;PELVIC/BREAST EXAM: ICD-10-PCS | Mod: S$GLB,,, | Performed by: OBSTETRICS & GYNECOLOGY

## 2019-07-29 PROCEDURE — 99999 PR PBB SHADOW E&M-EST. PATIENT-LVL III: CPT | Mod: PBBFAC,,, | Performed by: OBSTETRICS & GYNECOLOGY

## 2019-07-29 PROCEDURE — 99999 PR PBB SHADOW E&M-EST. PATIENT-LVL III: ICD-10-PCS | Mod: PBBFAC,,, | Performed by: OBSTETRICS & GYNECOLOGY

## 2019-07-29 PROCEDURE — G0101 CA SCREEN;PELVIC/BREAST EXAM: HCPCS | Mod: S$GLB,,, | Performed by: OBSTETRICS & GYNECOLOGY

## 2019-07-29 RX ORDER — ESTRADIOL 0.1 MG/G
1 CREAM VAGINAL
Qty: 42.5 G | Refills: 3 | Status: SHIPPED | OUTPATIENT
Start: 2019-07-29 | End: 2020-08-11 | Stop reason: SDUPTHER

## 2019-07-29 NOTE — PROGRESS NOTES
Subjective:       Patient ID: Marian Durham is a 74 y.o. female.    Chief Complaint:  Well Woman (pt wants to discuss estrace meds, pt had fall on May 10, landed on her entire right side)      History of Present Illness  HPI  Marian Durham is a 74 y.o. female  here for her annual GYN exam.     denies vaginal itching or irritation.  Denies vaginal discharge.  She is not sexually active.    History of abnormal pap: No  Last Pap: was normal  Last MMG: normal--routine follow-up in 12 months  Last Colonoscopy:  colonoscopy 1 year ago without abnormalities.  denies domestic violence. She does feel safe at home.     Past Medical History:   Diagnosis Date    Hyperlipidemia     Osteoarthritis     Shingles 2002    Spinal stenosis      Past Surgical History:   Procedure Laterality Date    ANKLE FRACTURE SURGERY  2007    Right ankle    EXCISION, MASS, BACK N/A 2018    Performed by Fran [facility] at [facility] [place]    EYE SURGERY Bilateral 2016    [facility]    KNEE ARTHROSCOPY W/ DEBRIDEMENT      Bilateral    KNEE ARTHROSCOPY W/ DEBRIDEMENT      Left knee    OPEN PATELLA REEFING PROCEDURE  age 19    Left knee    Tonsilectomy  as a child    TONSILLECTOMY      TUBAL LIGATION       Social History     Socioeconomic History    Marital status:      Spouse name: Not on file    Number of children: Not on file    Years of education: Not on file    Highest education level: Not on file   Occupational History    Not on file   Social Needs    Financial resource strain: Not on file    Food insecurity:     Worry: Not on file     Inability: Not on file    Transportation needs:     Medical: Not on file     Non-medical: Not on file   Tobacco Use    Smoking status: Never Smoker    Smokeless tobacco: Never Used   Substance and Sexual Activity    Alcohol use: Yes     Alcohol/week: 1.8 oz     Types: 3 Glasses of wine per week     Comment: Drinks a glass of wine 3 times  "weekly    Drug use: No    Sexual activity: Not Currently     Birth control/protection: Post-menopausal     Comment: Spouse  of kidney cancer : 2015   Lifestyle    Physical activity:     Days per week: Not on file     Minutes per session: Not on file    Stress: Not on file   Relationships    Social connections:     Talks on phone: Not on file     Gets together: Not on file     Attends Yarsani service: Not on file     Active member of club or organization: Not on file     Attends meetings of clubs or organizations: Not on file     Relationship status: Not on file   Other Topics Concern    Not on file   Social History Narrative    Not on file     Family History   Problem Relation Age of Onset    Hypertension Father     Hypertension Mother     Diabetes Brother     Colon cancer Paternal Aunt     Breast cancer Paternal Aunt         75    Coronary artery disease Brother     Ovarian cancer Neg Hx      OB History        2    Para   2    Term   2            AB        Living   2       SAB        TAB        Ectopic        Multiple        Live Births   2                 /76   Ht 5' 5" (1.651 m)   Wt 55.4 kg (122 lb 2.2 oz)   BMI 20.32 kg/m²         GYN & OB History    Date of Last Pap: No result found    OB History    Para Term  AB Living   2 2 2     2   SAB TAB Ectopic Multiple Live Births           2      # Outcome Date GA Lbr Otto/2nd Weight Sex Delivery Anes PTL Lv   2 Term         KIRA   1 Term         KIRA       Review of Systems  Review of Systems   Constitutional: Negative for activity change, appetite change, fatigue and unexpected weight change.   HENT: Negative.    Eyes: Negative for visual disturbance.   Respiratory: Negative for shortness of breath and wheezing.    Cardiovascular: Negative for chest pain, palpitations and leg swelling.   Gastrointestinal: Negative for abdominal pain, bloating and blood in stool.   Endocrine: Negative for diabetes and " hair loss.   Genitourinary: Positive for vaginal dryness. Negative for decreased libido and dyspareunia.   Musculoskeletal: Negative for back pain and joint swelling.   Integumentary:  Negative for acne, hair changes and nipple discharge.   Neurological: Negative for headaches.   Hematological: Does not bruise/bleed easily.   Psychiatric/Behavioral: Negative for depression and sleep disturbance. The patient is not nervous/anxious.    Breast: Negative for mastodynia and nipple discharge          Objective:      Physical Exam:   Constitutional: She is oriented to person, place, and time. She appears well-developed and well-nourished.    HENT:   Head: Normocephalic and atraumatic.    Eyes: Pupils are equal, round, and reactive to light. EOM are normal.    Neck: Normal range of motion. Neck supple.    Cardiovascular: Normal rate and regular rhythm.     Pulmonary/Chest: Effort normal and breath sounds normal.   BREASTS:  no mass, no tenderness, no deformity and no retraction. Right breast exhibits no inverted nipple, no mass, no nipple discharge, no skin change, no tenderness, no bleeding and no swelling. Left breast exhibits no inverted nipple, no mass, no nipple discharge, no skin change, no tenderness, no bleeding and no swelling. Breasts are symmetrical.              Abdominal: Soft. Bowel sounds are normal.     Genitourinary: Pelvic exam was performed with patient supine.   Genitourinary Comments: PELVIC: Normal external female genitalia without lesions. Normal hair distribution. Adequate perineal body, normal urethral meatus. Vagina atrophic without lesions or discharge. No significant cystocele or rectocele. Bimanual exam shows uterus and cervix to be surgically absent. Adnexa without masses or tenderness.  RECTAL: Deferred             Musculoskeletal: Normal range of motion and moves all extremeties.       Neurological: She is alert and oriented to person, place, and time.    Skin: Skin is warm and dry.     Psychiatric: She has a normal mood and affect.              Assessment:        1. Encounter for gynecological examination without abnormal finding    2. Postsurgical menopause    3. Screening mammogram, encounter for    4. Screening for osteoporosis    5. Postmenopausal atrophic vaginitis                Plan:      1. Encounter for gynecological examination without abnormal finding    COUNSELING:  The patient was counseled today on regular weight bearing exercise. Patient was counseled today on the new ACS guidelines for cervical cytology screening as well as the current recommendations for breast cancer screening. Counseling session lasted approximately 10 minutes, and all her questions were answered. She was advised to see her primary care physician for all other health maintenance.   FOLLOW-UP with me for next routine visit.     2. Postsurgical menopause    - DXA Bone Density Spine And Hip; Future    3. Screening mammogram, encounter for    - Mammo Digital Screening Bilat w/ Tom; Future    4. Screening for osteoporosis    - DXA Bone Density Spine And Hip; Future       Follow up in about 2 years (around 7/29/2021).

## 2019-08-22 ENCOUNTER — HOSPITAL ENCOUNTER (OUTPATIENT)
Dept: RADIOLOGY | Facility: OTHER | Age: 74
Discharge: HOME | End: 2019-08-22
Attending: OBSTETRICS & GYNECOLOGY
Payer: MEDICARE

## 2019-08-22 DIAGNOSIS — Z13.820 SCREENING FOR OSTEOPOROSIS: ICD-10-CM

## 2019-08-22 DIAGNOSIS — E89.40 POSTSURGICAL MENOPAUSE: ICD-10-CM

## 2019-08-22 PROCEDURE — 77080 DXA BONE DENSITY AXIAL: CPT | Mod: TC

## 2019-08-22 PROCEDURE — 77080 DXA BONE DENSITY AXIAL: CPT | Mod: 26,,, | Performed by: RADIOLOGY

## 2019-08-22 PROCEDURE — 77080 DEXA BONE DENSITY SPINE HIP: ICD-10-PCS | Mod: 26,,, | Performed by: RADIOLOGY

## 2020-06-09 ENCOUNTER — TELEPHONE (OUTPATIENT)
Dept: OBSTETRICS AND GYNECOLOGY | Facility: CLINIC | Age: 75
End: 2020-06-09

## 2020-06-09 DIAGNOSIS — Z12.31 SCREENING MAMMOGRAM, ENCOUNTER FOR: Primary | ICD-10-CM

## 2020-06-09 NOTE — TELEPHONE ENCOUNTER
----- Message from Monica Kelly sent at 6/9/2020  2:23 PM CDT -----  Contact: pt  Pt is requesting for a mammogram order to be placed as she is due for one    Pt call back- [phone]

## 2020-06-19 ENCOUNTER — HOSPITAL ENCOUNTER (OUTPATIENT)
Dept: RADIOLOGY | Facility: OTHER | Age: 75
Discharge: HOME | End: 2020-06-19
Attending: OBSTETRICS & GYNECOLOGY
Payer: MEDICARE

## 2020-06-19 DIAGNOSIS — Z12.31 SCREENING MAMMOGRAM, ENCOUNTER FOR: ICD-10-CM

## 2020-06-19 PROCEDURE — 77067 MAMMO DIGITAL SCREENING BILAT WITH TOMOSYNTHESIS_CAD: ICD-10-PCS | Mod: 26,,, | Performed by: RADIOLOGY

## 2020-06-19 PROCEDURE — 77063 MAMMO DIGITAL SCREENING BILAT WITH TOMOSYNTHESIS_CAD: ICD-10-PCS | Mod: 26,,, | Performed by: RADIOLOGY

## 2020-06-19 PROCEDURE — 77063 BREAST TOMOSYNTHESIS BI: CPT | Mod: 26,,, | Performed by: RADIOLOGY

## 2020-06-19 PROCEDURE — 77067 SCR MAMMO BI INCL CAD: CPT | Mod: 26,,, | Performed by: RADIOLOGY

## 2020-06-19 PROCEDURE — 77067 SCR MAMMO BI INCL CAD: CPT | Mod: TC

## 2020-08-11 ENCOUNTER — OFFICE VISIT (OUTPATIENT)
Dept: OBSTETRICS AND GYNECOLOGY | Facility: CLINIC | Age: 75
End: 2020-08-11
Attending: OBSTETRICS & GYNECOLOGY
Payer: MEDICARE

## 2020-08-11 VITALS
SYSTOLIC BLOOD PRESSURE: 122 MMHG | BODY MASS INDEX: 20.79 KG/M2 | HEIGHT: 65 IN | WEIGHT: 124.75 LBS | DIASTOLIC BLOOD PRESSURE: 64 MMHG

## 2020-08-11 DIAGNOSIS — Z12.31 OTHER SCREENING MAMMOGRAM: ICD-10-CM

## 2020-08-11 DIAGNOSIS — Z01.419 ENCOUNTER FOR GYNECOLOGICAL EXAMINATION WITHOUT ABNORMAL FINDING: Primary | ICD-10-CM

## 2020-08-11 DIAGNOSIS — N95.2 POSTMENOPAUSAL ATROPHIC VAGINITIS: ICD-10-CM

## 2020-08-11 PROCEDURE — 99999 PR PBB SHADOW E&M-EST. PATIENT-LVL III: CPT | Mod: PBBFAC,,, | Performed by: OBSTETRICS & GYNECOLOGY

## 2020-08-11 PROCEDURE — G0101 CA SCREEN;PELVIC/BREAST EXAM: HCPCS | Mod: S$GLB,,, | Performed by: OBSTETRICS & GYNECOLOGY

## 2020-08-11 PROCEDURE — G0101 PR CA SCREEN;PELVIC/BREAST EXAM: ICD-10-PCS | Mod: S$GLB,,, | Performed by: OBSTETRICS & GYNECOLOGY

## 2020-08-11 PROCEDURE — 99999 PR PBB SHADOW E&M-EST. PATIENT-LVL III: ICD-10-PCS | Mod: PBBFAC,,, | Performed by: OBSTETRICS & GYNECOLOGY

## 2020-08-11 RX ORDER — ESTRADIOL 0.1 MG/G
1 CREAM VAGINAL
Qty: 42.5 G | Refills: 3 | Status: SHIPPED | OUTPATIENT
Start: 2020-08-13 | End: 2021-08-16 | Stop reason: SDUPTHER

## 2020-08-11 NOTE — PROGRESS NOTES
Subjective:       Patient ID: Marian Durham is a 75 y.o. female.    Chief Complaint:  Well Woman      History of Present Illness  HPI  Marian Durham is a 75 y.o. female  here for her annual GYN exam.   She needs a refill on her estrogen vaginal cream.  denies vaginal itching or irritation.  Denies vaginal discharge.  She is not sexually active.    History of abnormal pap: No  Last Pap: was normal  Last MMG: normal--routine follow-up in 12 months  Last Colonoscopy:  colonoscopy 2 years ago without abnormalities.  denies domestic violence. She does feel safe at home.     Past Medical History:   Diagnosis Date    Hyperlipidemia     Osteoarthritis     Shingles 2002    Spinal stenosis      Past Surgical History:   Procedure Laterality Date    ANKLE FRACTURE SURGERY      Right ankle    EXCISION OF MASS OF BACK N/A 2018    Procedure: EXCISION, MASS, BACK;  Surgeon: Fran Magaña MD;  Location: [facility];  Service: General;  Laterality: N/A;    EYE SURGERY Bilateral 2016    HYSTERECTOMY  1985    [facility]    KNEE ARTHROSCOPY W/ DEBRIDEMENT      Bilateral    KNEE ARTHROSCOPY W/ DEBRIDEMENT      Left knee    OPEN PATELLA REEFING PROCEDURE  age 19    Left knee    Tonsilectomy  as a child    TONSILLECTOMY      TUBAL LIGATION       Social History     Socioeconomic History    Marital status:      Spouse name: Not on file    Number of children: Not on file    Years of education: Not on file    Highest education level: Not on file   Occupational History    Not on file   Social Needs    Financial resource strain: Not on file    Food insecurity     Worry: Not on file     Inability: Not on file    Transportation needs     Medical: Not on file     Non-medical: Not on file   Tobacco Use    Smoking status: Never Smoker    Smokeless tobacco: Never Used   Substance and Sexual Activity    Alcohol use: Yes     Alcohol/week: 3.0 standard drinks     Types: 3 Glasses of wine per  "week     Comment: Drinks a glass of wine 3 times weekly    Drug use: No    Sexual activity: Not Currently     Birth control/protection: Post-menopausal     Comment: Spouse  of kidney cancer : 2015   Lifestyle    Physical activity     Days per week: Not on file     Minutes per session: Not on file    Stress: Not on file   Relationships    Social connections     Talks on phone: Not on file     Gets together: Not on file     Attends Jainism service: Not on file     Active member of club or organization: Not on file     Attends meetings of clubs or organizations: Not on file     Relationship status: Not on file   Other Topics Concern    Not on file   Social History Narrative    Not on file     Family History   Problem Relation Age of Onset    Hypertension Father     Hypertension Mother     Diabetes Brother     Colon cancer Paternal Aunt     Breast cancer Paternal Aunt         75    Coronary artery disease Brother     Ovarian cancer Neg Hx      OB History        2    Para   2    Term   2            AB        Living   2       SAB        TAB        Ectopic        Multiple        Live Births   2                 /64   Ht 5' 5" (1.651 m)   Wt 56.6 kg (124 lb 12.5 oz)   BMI 20.76 kg/m²         GYN & OB History    Date of Last Pap: No result found    OB History    Para Term  AB Living   2 2 2     2   SAB TAB Ectopic Multiple Live Births           2      # Outcome Date GA Lbr Otto/2nd Weight Sex Delivery Anes PTL Lv   2 Term         KIRA   1 Term         KIRA       Review of Systems  Review of Systems   Constitutional: Negative for activity change, appetite change, fatigue and unexpected weight change.   HENT: Negative.    Eyes: Negative for visual disturbance.   Respiratory: Negative for shortness of breath and wheezing.    Cardiovascular: Negative for chest pain, palpitations and leg swelling.   Gastrointestinal: Negative for abdominal pain, bloating and blood in " stool.   Endocrine: Negative for diabetes and hair loss.   Genitourinary: Positive for urgency and vaginal dryness. Negative for decreased libido, dyspareunia and pelvic pain.   Musculoskeletal: Negative for back pain and joint swelling.   Integumentary:  Negative for acne, hair changes and nipple discharge.   Neurological: Negative for headaches.   Hematological: Does not bruise/bleed easily.   Psychiatric/Behavioral: Negative for depression and sleep disturbance. The patient is not nervous/anxious.    Breast: Negative for mastodynia and nipple discharge          Objective:      Physical Exam:   Constitutional: She is oriented to person, place, and time. She appears well-developed and well-nourished.    HENT:   Head: Normocephalic and atraumatic.    Eyes: Pupils are equal, round, and reactive to light. EOM are normal.    Neck: Normal range of motion. Neck supple.    Cardiovascular: Normal rate and regular rhythm.     Pulmonary/Chest: Effort normal and breath sounds normal.   BREASTS:  no mass, no tenderness, no deformity and no retraction. Right breast exhibits + inverted nipple, no mass, no nipple discharge, no skin change, no tenderness, no bleeding and no swelling. Left breast exhibits +inverted nipple, no mass, no nipple discharge, no skin change, no tenderness, no bleeding and no swelling. Breasts are symmetrical.              Abdominal: Soft. Bowel sounds are normal.             Musculoskeletal: Normal range of motion and moves all extremeties.       Neurological: She is alert and oriented to person, place, and time.    Skin: Skin is warm and dry.    Psychiatric: She has a normal mood and affect.              Assessment:        1. Encounter for gynecological examination without abnormal finding    2. Postmenopausal atrophic vaginitis    3. Other screening mammogram                Plan:      1. Encounter for gynecological examination without abnormal finding  COUNSELING:  The patient was counseled today on  regular weight bearing exercise. Patient was counseled today on the new ACS guidelines for cervical cytology screening as well as the current recommendations for breast cancer screening. Counseling session lasted approximately 10 minutes, and all her questions were answered. She was advised to see her primary care physician for all other health maintenance.   FOLLOW-UP with me for next routine visit.         2. Postmenopausal atrophic vaginitis      - estradioL (ESTRACE) 0.01 % (0.1 mg/gram) vaginal cream; Place 1 g vaginally twice a week.  Dispense: 42.5 g; Refill: 3    3. Other screening mammogram           Follow up in about 1 year (around 8/11/2021).

## 2021-06-25 ENCOUNTER — HOSPITAL ENCOUNTER (OUTPATIENT)
Dept: RADIOLOGY | Facility: OTHER | Age: 76
Discharge: HOME | End: 2021-06-25
Attending: OBSTETRICS & GYNECOLOGY
Payer: MEDICARE

## 2021-06-25 DIAGNOSIS — Z12.31 OTHER SCREENING MAMMOGRAM: ICD-10-CM

## 2021-06-25 PROCEDURE — 77063 BREAST TOMOSYNTHESIS BI: CPT | Mod: 26,,, | Performed by: RADIOLOGY

## 2021-06-25 PROCEDURE — 77067 MAMMO DIGITAL SCREENING BILAT WITH TOMOSYNTHESIS_CAD: ICD-10-PCS | Mod: 26,,, | Performed by: RADIOLOGY

## 2021-06-25 PROCEDURE — 77067 SCR MAMMO BI INCL CAD: CPT | Mod: TC

## 2021-06-25 PROCEDURE — 77067 SCR MAMMO BI INCL CAD: CPT | Mod: 26,,, | Performed by: RADIOLOGY

## 2021-06-25 PROCEDURE — 77063 MAMMO DIGITAL SCREENING BILAT WITH TOMOSYNTHESIS_CAD: ICD-10-PCS | Mod: 26,,, | Performed by: RADIOLOGY

## 2021-07-12 ENCOUNTER — HOSPITAL ENCOUNTER (OUTPATIENT)
Dept: RADIOLOGY | Facility: OTHER | Age: 76
Discharge: HOME | End: 2021-07-12
Attending: OBSTETRICS & GYNECOLOGY
Payer: MEDICARE

## 2021-07-12 DIAGNOSIS — R92.8 ABNORMAL MAMMOGRAM: ICD-10-CM

## 2021-07-12 PROCEDURE — 77061 MAMMO DIGITAL DIAGNOSTIC LEFT WITH TOMO: ICD-10-PCS | Mod: 26,LT,, | Performed by: RADIOLOGY

## 2021-07-12 PROCEDURE — 77065 DX MAMMO INCL CAD UNI: CPT | Mod: 26,LT,, | Performed by: RADIOLOGY

## 2021-07-12 PROCEDURE — 77061 BREAST TOMOSYNTHESIS UNI: CPT | Mod: 26,LT,, | Performed by: RADIOLOGY

## 2021-07-12 PROCEDURE — 77065 MAMMO DIGITAL DIAGNOSTIC LEFT WITH TOMO: ICD-10-PCS | Mod: 26,LT,, | Performed by: RADIOLOGY

## 2021-07-12 PROCEDURE — 77061 BREAST TOMOSYNTHESIS UNI: CPT | Mod: TC,LT

## 2021-08-16 ENCOUNTER — OFFICE VISIT (OUTPATIENT)
Dept: OBSTETRICS AND GYNECOLOGY | Facility: CLINIC | Age: 76
End: 2021-08-16
Attending: OBSTETRICS & GYNECOLOGY
Payer: MEDICARE

## 2021-08-16 VITALS — HEIGHT: 65 IN | WEIGHT: 125 LBS | BODY MASS INDEX: 20.83 KG/M2

## 2021-08-16 DIAGNOSIS — N95.2 POSTMENOPAUSAL ATROPHIC VAGINITIS: ICD-10-CM

## 2021-08-16 DIAGNOSIS — Z12.31 SCREENING MAMMOGRAM, ENCOUNTER FOR: ICD-10-CM

## 2021-08-16 DIAGNOSIS — Z01.419 ENCOUNTER FOR GYNECOLOGICAL EXAMINATION WITHOUT ABNORMAL FINDING: Primary | ICD-10-CM

## 2021-08-16 PROCEDURE — G0101 CA SCREEN;PELVIC/BREAST EXAM: HCPCS | Mod: S$GLB,,, | Performed by: OBSTETRICS & GYNECOLOGY

## 2021-08-16 PROCEDURE — 1101F PT FALLS ASSESS-DOCD LE1/YR: CPT | Mod: CPTII,S$GLB,, | Performed by: OBSTETRICS & GYNECOLOGY

## 2021-08-16 PROCEDURE — 1160F PR REVIEW ALL MEDS BY PRESCRIBER/CLIN PHARMACIST DOCUMENTED: ICD-10-PCS | Mod: CPTII,S$GLB,, | Performed by: OBSTETRICS & GYNECOLOGY

## 2021-08-16 PROCEDURE — 1126F AMNT PAIN NOTED NONE PRSNT: CPT | Mod: CPTII,S$GLB,, | Performed by: OBSTETRICS & GYNECOLOGY

## 2021-08-16 PROCEDURE — 1101F PR PT FALLS ASSESS DOC 0-1 FALLS W/OUT INJ PAST YR: ICD-10-PCS | Mod: CPTII,S$GLB,, | Performed by: OBSTETRICS & GYNECOLOGY

## 2021-08-16 PROCEDURE — 1159F MED LIST DOCD IN RCRD: CPT | Mod: CPTII,S$GLB,, | Performed by: OBSTETRICS & GYNECOLOGY

## 2021-08-16 PROCEDURE — 3288F PR FALLS RISK ASSESSMENT DOCUMENTED: ICD-10-PCS | Mod: CPTII,S$GLB,, | Performed by: OBSTETRICS & GYNECOLOGY

## 2021-08-16 PROCEDURE — 1160F RVW MEDS BY RX/DR IN RCRD: CPT | Mod: CPTII,S$GLB,, | Performed by: OBSTETRICS & GYNECOLOGY

## 2021-08-16 PROCEDURE — G0101 PR CA SCREEN;PELVIC/BREAST EXAM: ICD-10-PCS | Mod: S$GLB,,, | Performed by: OBSTETRICS & GYNECOLOGY

## 2021-08-16 PROCEDURE — 1157F PR ADVANCE CARE PLAN OR EQUIV PRESENT IN MEDICAL RECORD: ICD-10-PCS | Mod: CPTII,S$GLB,, | Performed by: OBSTETRICS & GYNECOLOGY

## 2021-08-16 PROCEDURE — 3288F FALL RISK ASSESSMENT DOCD: CPT | Mod: CPTII,S$GLB,, | Performed by: OBSTETRICS & GYNECOLOGY

## 2021-08-16 PROCEDURE — 1126F PR PAIN SEVERITY QUANTIFIED, NO PAIN PRESENT: ICD-10-PCS | Mod: CPTII,S$GLB,, | Performed by: OBSTETRICS & GYNECOLOGY

## 2021-08-16 PROCEDURE — 1157F ADVNC CARE PLAN IN RCRD: CPT | Mod: CPTII,S$GLB,, | Performed by: OBSTETRICS & GYNECOLOGY

## 2021-08-16 PROCEDURE — 1159F PR MEDICATION LIST DOCUMENTED IN MEDICAL RECORD: ICD-10-PCS | Mod: CPTII,S$GLB,, | Performed by: OBSTETRICS & GYNECOLOGY

## 2021-08-16 RX ORDER — ESTRADIOL 0.1 MG/G
1 CREAM VAGINAL
Qty: 42.5 G | Refills: 3 | Status: SHIPPED | OUTPATIENT
Start: 2021-08-16 | End: 2022-08-29 | Stop reason: SDUPTHER

## 2022-04-22 ENCOUNTER — HOSPITAL ENCOUNTER (INPATIENT)
Facility: OTHER | Age: 77
LOS: 7 days | Discharge: HOME | DRG: 389 | End: 2022-04-29
Attending: EMERGENCY MEDICINE | Admitting: INTERNAL MEDICINE
Payer: MEDICARE

## 2022-04-22 DIAGNOSIS — K56.609 SBO (SMALL BOWEL OBSTRUCTION): Primary | ICD-10-CM

## 2022-04-22 DIAGNOSIS — R14.0 ABDOMINAL DISTENSION: ICD-10-CM

## 2022-04-22 LAB
ALBUMIN SERPL BCP-MCNC: 4.8 G/DL (ref 3.5–5.2)
ALP SERPL-CCNC: 80 U/L (ref 55–135)
ALT SERPL W/O P-5'-P-CCNC: 18 U/L (ref 10–44)
ANION GAP SERPL CALC-SCNC: 17 MMOL/L (ref 8–16)
AST SERPL-CCNC: 20 U/L (ref 10–40)
BASOPHILS # BLD AUTO: 0.06 K/UL (ref 0–0.2)
BASOPHILS NFR BLD: 0.3 % (ref 0–1.9)
BILIRUB SERPL-MCNC: 1.1 MG/DL (ref 0.1–1)
BILIRUB UR QL STRIP: NEGATIVE
BUN SERPL-MCNC: 25 MG/DL (ref 8–23)
CALCIUM SERPL-MCNC: 11.2 MG/DL (ref 8.7–10.5)
CHLORIDE SERPL-SCNC: 101 MMOL/L (ref 95–110)
CLARITY UR: CLEAR
CO2 SERPL-SCNC: 20 MMOL/L (ref 23–29)
COLOR UR: YELLOW
CREAT SERPL-MCNC: 1 MG/DL (ref 0.5–1.4)
CREAT SERPL-MCNC: 1.1 MG/DL (ref 0.5–1.4)
DIFFERENTIAL METHOD: ABNORMAL
EOSINOPHIL # BLD AUTO: 0 K/UL (ref 0–0.5)
EOSINOPHIL NFR BLD: 0 % (ref 0–8)
ERYTHROCYTE [DISTWIDTH] IN BLOOD BY AUTOMATED COUNT: 12.2 % (ref 11.5–14.5)
EST. GFR  (AFRICAN AMERICAN): 56 ML/MIN/1.73 M^2
EST. GFR  (NON AFRICAN AMERICAN): 49 ML/MIN/1.73 M^2
GLUCOSE SERPL-MCNC: 142 MG/DL (ref 70–110)
GLUCOSE UR QL STRIP: NEGATIVE
HCT VFR BLD AUTO: 50.7 % (ref 37–48.5)
HGB BLD-MCNC: 17.3 G/DL (ref 12–16)
HGB UR QL STRIP: NEGATIVE
IMM GRANULOCYTES # BLD AUTO: 0.12 K/UL (ref 0–0.04)
IMM GRANULOCYTES NFR BLD AUTO: 0.6 % (ref 0–0.5)
KETONES UR QL STRIP: ABNORMAL
LACTATE SERPL-SCNC: 2.7 MMOL/L (ref 0.5–2.2)
LEUKOCYTE ESTERASE UR QL STRIP: NEGATIVE
LIPASE SERPL-CCNC: 12 U/L (ref 4–60)
LYMPHOCYTES # BLD AUTO: 1.4 K/UL (ref 1–4.8)
LYMPHOCYTES NFR BLD: 6.6 % (ref 18–48)
MCH RBC QN AUTO: 30.5 PG (ref 27–31)
MCHC RBC AUTO-ENTMCNC: 34.1 G/DL (ref 32–36)
MCV RBC AUTO: 89 FL (ref 82–98)
MONOCYTES # BLD AUTO: 1 K/UL (ref 0.3–1)
MONOCYTES NFR BLD: 4.7 % (ref 4–15)
NEUTROPHILS # BLD AUTO: 18.3 K/UL (ref 1.8–7.7)
NEUTROPHILS NFR BLD: 87.8 % (ref 38–73)
NITRITE UR QL STRIP: NEGATIVE
NRBC BLD-RTO: 0 /100 WBC
PH UR STRIP: 8.5 [PH] (ref 5–8)
PLATELET # BLD AUTO: 354 K/UL (ref 150–450)
PMV BLD AUTO: 8.9 FL (ref 9.2–12.9)
POTASSIUM SERPL-SCNC: 4.1 MMOL/L (ref 3.5–5.1)
PROT SERPL-MCNC: 7.6 G/DL (ref 6–8.4)
PROT UR QL STRIP: NEGATIVE
RBC # BLD AUTO: 5.67 M/UL (ref 4–5.4)
SAMPLE: NORMAL
SODIUM SERPL-SCNC: 138 MMOL/L (ref 136–145)
SP GR UR STRIP: <=1.005 (ref 1–1.03)
TROPONIN I SERPL DL<=0.01 NG/ML-MCNC: <0.006 NG/ML (ref 0–0.03)
URN SPEC COLLECT METH UR: ABNORMAL
UROBILINOGEN UR STRIP-ACNC: NEGATIVE EU/DL
WBC # BLD AUTO: 20.87 K/UL (ref 3.9–12.7)

## 2022-04-22 PROCEDURE — 85025 COMPLETE CBC W/AUTO DIFF WBC: CPT | Performed by: NURSE PRACTITIONER

## 2022-04-22 PROCEDURE — 93010 EKG 12-LEAD: ICD-10-PCS | Mod: ,,, | Performed by: INTERNAL MEDICINE

## 2022-04-22 PROCEDURE — 84484 ASSAY OF TROPONIN QUANT: CPT | Performed by: NURSE PRACTITIONER

## 2022-04-22 PROCEDURE — 83690 ASSAY OF LIPASE: CPT | Performed by: NURSE PRACTITIONER

## 2022-04-22 PROCEDURE — S5010 5% DEXTROSE AND 0.45% SALINE: HCPCS | Performed by: EMERGENCY MEDICINE

## 2022-04-22 PROCEDURE — 80053 COMPREHEN METABOLIC PANEL: CPT | Performed by: NURSE PRACTITIONER

## 2022-04-22 PROCEDURE — 99285 EMERGENCY DEPT VISIT HI MDM: CPT | Mod: 25

## 2022-04-22 PROCEDURE — 99223 1ST HOSP IP/OBS HIGH 75: CPT | Mod: GC,,, | Performed by: STUDENT IN AN ORGANIZED HEALTH CARE EDUCATION/TRAINING PROGRAM

## 2022-04-22 PROCEDURE — 99223 1ST HOSP IP/OBS HIGH 75: CPT | Mod: ,,, | Performed by: NURSE PRACTITIONER

## 2022-04-22 PROCEDURE — 25500020 PHARM REV CODE 255: Performed by: EMERGENCY MEDICINE

## 2022-04-22 PROCEDURE — 99223 PR INITIAL HOSPITAL CARE,LEVL III: ICD-10-PCS | Mod: ,,, | Performed by: NURSE PRACTITIONER

## 2022-04-22 PROCEDURE — 11000001 HC ACUTE MED/SURG PRIVATE ROOM

## 2022-04-22 PROCEDURE — 25000003 PHARM REV CODE 250: Performed by: EMERGENCY MEDICINE

## 2022-04-22 PROCEDURE — 25000003 PHARM REV CODE 250: Performed by: NURSE PRACTITIONER

## 2022-04-22 PROCEDURE — 99900035 HC TECH TIME PER 15 MIN (STAT)

## 2022-04-22 PROCEDURE — 36415 COLL VENOUS BLD VENIPUNCTURE: CPT | Performed by: EMERGENCY MEDICINE

## 2022-04-22 PROCEDURE — 63600175 PHARM REV CODE 636 W HCPCS: Performed by: NURSE PRACTITIONER

## 2022-04-22 PROCEDURE — 81003 URINALYSIS AUTO W/O SCOPE: CPT | Performed by: NURSE PRACTITIONER

## 2022-04-22 PROCEDURE — 82565 ASSAY OF CREATININE: CPT

## 2022-04-22 PROCEDURE — 99223 PR INITIAL HOSPITAL CARE,LEVL III: ICD-10-PCS | Mod: GC,,, | Performed by: STUDENT IN AN ORGANIZED HEALTH CARE EDUCATION/TRAINING PROGRAM

## 2022-04-22 PROCEDURE — 93005 ELECTROCARDIOGRAM TRACING: CPT

## 2022-04-22 PROCEDURE — 96374 THER/PROPH/DIAG INJ IV PUSH: CPT

## 2022-04-22 PROCEDURE — 93010 ELECTROCARDIOGRAM REPORT: CPT | Mod: ,,, | Performed by: INTERNAL MEDICINE

## 2022-04-22 PROCEDURE — 96361 HYDRATE IV INFUSION ADD-ON: CPT

## 2022-04-22 PROCEDURE — 83605 ASSAY OF LACTIC ACID: CPT | Performed by: EMERGENCY MEDICINE

## 2022-04-22 RX ORDER — DEXTROSE MONOHYDRATE AND SODIUM CHLORIDE 5; .45 G/100ML; G/100ML
1000 INJECTION, SOLUTION INTRAVENOUS
Status: COMPLETED | OUTPATIENT
Start: 2022-04-22 | End: 2022-04-22

## 2022-04-22 RX ORDER — MORPHINE SULFATE 4 MG/ML
2 INJECTION, SOLUTION INTRAMUSCULAR; INTRAVENOUS EVERY 4 HOURS PRN
Status: DISCONTINUED | OUTPATIENT
Start: 2022-04-22 | End: 2022-04-23

## 2022-04-22 RX ORDER — ONDANSETRON 2 MG/ML
4 INJECTION INTRAMUSCULAR; INTRAVENOUS EVERY 8 HOURS PRN
Status: DISCONTINUED | OUTPATIENT
Start: 2022-04-22 | End: 2022-04-23

## 2022-04-22 RX ORDER — ONDANSETRON 8 MG/1
8 TABLET, ORALLY DISINTEGRATING ORAL EVERY 8 HOURS PRN
Status: DISCONTINUED | OUTPATIENT
Start: 2022-04-22 | End: 2022-04-22

## 2022-04-22 RX ORDER — SODIUM CHLORIDE 0.9 % (FLUSH) 0.9 %
10 SYRINGE (ML) INJECTION EVERY 8 HOURS PRN
Status: DISCONTINUED | OUTPATIENT
Start: 2022-04-22 | End: 2022-04-29 | Stop reason: HOSPADM

## 2022-04-22 RX ORDER — SODIUM CHLORIDE, SODIUM LACTATE, POTASSIUM CHLORIDE, CALCIUM CHLORIDE 600; 310; 30; 20 MG/100ML; MG/100ML; MG/100ML; MG/100ML
INJECTION, SOLUTION INTRAVENOUS CONTINUOUS
Status: DISCONTINUED | OUTPATIENT
Start: 2022-04-22 | End: 2022-04-28

## 2022-04-22 RX ORDER — NALOXONE HCL 0.4 MG/ML
0.02 VIAL (ML) INJECTION
Status: DISCONTINUED | OUTPATIENT
Start: 2022-04-22 | End: 2022-04-29 | Stop reason: HOSPADM

## 2022-04-22 RX ADMIN — DEXTROSE AND SODIUM CHLORIDE 1000 ML: 5; .45 INJECTION, SOLUTION INTRAVENOUS at 08:04

## 2022-04-22 RX ADMIN — SODIUM CHLORIDE 1000 ML: 0.9 INJECTION, SOLUTION INTRAVENOUS at 06:04

## 2022-04-22 RX ADMIN — PIPERACILLIN AND TAZOBACTAM 4.5 G: 4; .5 INJECTION, POWDER, LYOPHILIZED, FOR SOLUTION INTRAVENOUS; PARENTERAL at 11:04

## 2022-04-22 RX ADMIN — ONDANSETRON 4 MG: 2 INJECTION INTRAMUSCULAR; INTRAVENOUS at 09:04

## 2022-04-22 RX ADMIN — IOHEXOL 75 ML: 350 INJECTION, SOLUTION INTRAVENOUS at 05:04

## 2022-04-22 RX ADMIN — SODIUM CHLORIDE, SODIUM LACTATE, POTASSIUM CHLORIDE, AND CALCIUM CHLORIDE: .6; .31; .03; .02 INJECTION, SOLUTION INTRAVENOUS at 11:04

## 2022-04-22 RX ADMIN — MORPHINE SULFATE 2 MG: 4 INJECTION INTRAVENOUS at 10:04

## 2022-04-22 NOTE — ED PROVIDER NOTES
"Encounter Date: 4/22/2022    SCRIBE #1 NOTE: I, Sylwia Horton, am scribing for, and in the presence of, Leo Vasquez MD .       History     Chief Complaint   Patient presents with    Bloated     Pt presents to the ER with complaints of abdominal bloating with nausea, belching, weakness, clamminess, and lightheadedness since this morning. Pt reports three normal bowel movements this morning.       Time seen by provider: 5:10 PM    This is a 76 y.o. female with a PMHx of high cholesterol and osteoarthritis who presents with complaint of bloating that began 2 days ago. She notes that she had COVID-19 2 weeks ago and had fever, cough, postnasal drip and fatigue at the time. She explains that her symptoms resolved by this week except for mild postnasal drip.The patient reports that today at 10 AM the bloating worsened and was followed by increased belching, gagging, some DEWITT. She states that she denies any vomiting, fever, or blood in stool.  She is chronically constipated and takes MiraLax daily for BMs, and had 3 BMs today.  This is the extent of the patient's complaints at this time.    The history is provided by the patient.     Review of patient's allergies indicates:   Allergen Reactions    Demerol [meperidine] Other (See Comments)     "Becomes Agitated and Combative"     Past Medical History:   Diagnosis Date    Hyperlipidemia     Osteoarthritis     Shingles 2002    Spinal stenosis      Past Surgical History:   Procedure Laterality Date    ANKLE FRACTURE SURGERY  2007    Right ankle    EXCISION OF MASS OF BACK N/A 11/16/2018    Procedure: EXCISION, MASS, BACK;  Surgeon: Fran Magaña MD;  Location: [facility];  Service: General;  Laterality: N/A;    EYE SURGERY Bilateral 2016    HYSTERECTOMY  1985    HEATHER    KNEE ARTHROSCOPY W/ DEBRIDEMENT  1994    Bilateral    KNEE ARTHROSCOPY W/ DEBRIDEMENT  2003    Left knee    OPEN PATELLA REEFING PROCEDURE  age 19    Left knee    Tonsilectomy  as a " child    TONSILLECTOMY      TUBAL LIGATION       Family History   Problem Relation Age of Onset    Hypertension Father     Hypertension Mother     Diabetes Brother     Colon cancer Paternal Aunt     Breast cancer Paternal Aunt         75    Coronary artery disease Brother     Ovarian cancer Neg Hx      Social History     Tobacco Use    Smoking status: Never Smoker    Smokeless tobacco: Never Used   Substance Use Topics    Alcohol use: Yes     Alcohol/week: 3.0 standard drinks     Types: 3 Glasses of wine per week     Comment: Drinks a glass of wine 3 times weekly    Drug use: No     Review of Systems   Constitutional: Negative for fever.   HENT: Positive for postnasal drip. Negative for sore throat.    Respiratory: Positive for shortness of breath.    Cardiovascular: Negative for chest pain.   Gastrointestinal: Positive for abdominal distention and abdominal pain. Negative for nausea and vomiting.        Positive for gagging.   Genitourinary: Negative for dysuria.   Musculoskeletal: Negative for back pain.   Skin: Negative for rash.   Neurological: Negative for weakness.   Hematological: Does not bruise/bleed easily.       Physical Exam     Initial Vitals [04/22/22 1551]   BP Pulse Resp Temp SpO2   (!) 197/75 88 16 97.7 °F (36.5 °C) 96 %      MAP       --         Physical Exam    Nursing note and vitals reviewed.  Constitutional: She is not diaphoretic. No distress.   HENT:   Head: Normocephalic and atraumatic.   Eyes: Conjunctivae and EOM are normal.   Neck: Neck supple.   Normal range of motion.  Cardiovascular: Normal rate, regular rhythm, normal heart sounds and intact distal pulses.   No murmur heard.  Pulmonary/Chest: Breath sounds normal. No respiratory distress. She has no wheezes. She has no rhonchi. She has no rales.   Abdominal: Abdomen is soft. She exhibits distension.   Diffuse abdominal distention and non focal discomfort with palpation. There is no rebound and no guarding.    Musculoskeletal:         General: No edema. Normal range of motion.      Cervical back: Normal range of motion and neck supple.     Neurological: She is alert and oriented to person, place, and time.   Skin: Skin is warm and dry.         ED Course   Procedures  Labs Reviewed   CBC W/ AUTO DIFFERENTIAL - Abnormal; Notable for the following components:       Result Value    WBC 20.87 (*)     RBC 5.67 (*)     Hemoglobin 17.3 (*)     Hematocrit 50.7 (*)     MPV 8.9 (*)     Immature Granulocytes 0.6 (*)     Gran # (ANC) 18.3 (*)     Immature Grans (Abs) 0.12 (*)     Gran % 87.8 (*)     Lymph % 6.6 (*)     All other components within normal limits   COMPREHENSIVE METABOLIC PANEL - Abnormal; Notable for the following components:    CO2 20 (*)     Glucose 142 (*)     BUN 25 (*)     Calcium 11.2 (*)     Total Bilirubin 1.1 (*)     Anion Gap 17 (*)     eGFR if  56 (*)     eGFR if non  49 (*)     All other components within normal limits   URINALYSIS, REFLEX TO URINE CULTURE - Abnormal; Notable for the following components:    Specific Gravity, UA <=1.005 (*)     Ketones, UA 1+ (*)     All other components within normal limits    Narrative:     Specimen Source->Urine   LIPASE   TROPONIN I   LACTIC ACID, PLASMA   ISTAT CREATININE     EKG Readings: (Independently Interpreted)   Rhythm: Normal Sinus Rhythm. Heart Rate: 85.   No acute ischemia.       Imaging Results           CT Abdomen Pelvis With Contrast (Final result)  Result time 04/22/22 18:39:47    Final result by Jerzy Krishna MD (04/22/22 18:39:47)                 Impression:      1. Small-bowel obstruction with transition point at the midline, mid abdomen at the level the umbilicus.  See above comments.  This could be associated with an internal hernia, mild volvulus or adhesions.  Recommend surgical consultation and follow-up.  2. Mild hepatomegaly.  3. Trace pericardial effusion.  4. Mild wall thickening of the colon most prominent  the transverse colon could be associated with mild inflammatory, infectious or early ischemic process.  Surgical follow-up recommended.  5. Diverticulosis of the colon.  6.  This report was flagged in Epic as abnormal.      Electronically signed by: Jerzy Vences  Date:    04/22/2022  Time:    18:39             Narrative:    EXAMINATION:  CT ABDOMEN PELVIS WITH CONTRAST    CLINICAL HISTORY:  Abdominal abscess/infection suspected;Abdominal bloating, no focal tenderness, WBC 20;    TECHNIQUE:  Low dose axial images, sagittal and coronal reformations were obtained from the lung bases to the pubic symphysis following the IV administration of 75 mL of Omnipaque 350 .  Oral contrast was not administered.    COMPARISON:  None.    FINDINGS:  Abdomen:    - Lower thorax:Trace pericardial effusion.    - Lung bases: No infiltrates and no nodules.    - Liver: The liver is mildly enlarged.  Probable tiny subcentimeter cyst in the hepatic dome left lobe on axial 15.  Slight elongation of the right lobe may be Riedel's lobe configuration.    Ovoid hyperdense stomach contents may be associated with medication.  Recommend clinical correlation.  Air-fluid level in the stomach.    - Gallbladder: Probable noncalcified gallstone present.  No wall thickening or pericholecystic fluid.    - Bile Ducts: No evidence of intra or extra hepatic biliary ductal dilation.    - Spleen: Negative.    - Kidneys: No mass or hydronephrosis.    - Adrenals: Unremarkable.    - Pancreas: No mass or peripancreatic fat stranding.    - Retroperitoneum:  No significant adenopathy.    - Vascular: No abdominal aortic aneurysm.    - Abdominal wall:  Unremarkable.    Pelvis:    No focal abnormality of the urinary bladder.    Status post hysterectomy.    Bowel/Mesentery:    There multiple dilated loops of small bowel with air-fluid levels consistent with a small bowel obstruction.  Mild small bowel wall thickening.  Transition point is mid abdomen at the midline at  the level of the umbilicus.  Slight distortion and minimal twisting of the small bowel and mesentery with slight generalized anterior protrusion of bowel contents in the mid abdomen.  This is seen on axial 82-86, coronal 39-41 and sagittal 77-84.  The findings could be associated with an internal hernia, mild volvulus or adhesions.  Recommend surgical consultation and follow-up.    Distal small bowel and colon are normal in caliber.    There is mild wall thickening noted to the colon most prominent in the transverse colon.  There is diverticulosis of the colon.    Bones:  No acute osseous abnormality and no suspicious lytic or blastic lesion.                                 Medications   sodium chloride 0.9% flush 10 mL (has no administration in time range)   naloxone 0.4 mg/mL injection 0.02 mg (has no administration in time range)   lactated ringers infusion (has no administration in time range)   morphine injection 2 mg (has no administration in time range)   ondansetron injection 4 mg (4 mg Intravenous Given 4/22/22 2103)   piperacillin-tazobactam 4.5 g in dextrose 5 % 100 mL IVPB (ready to mix system) (has no administration in time range)   iohexoL (OMNIPAQUE 350) injection 75 mL (75 mLs Intravenous Given 4/22/22 1750)   sodium chloride 0.9% bolus 1,000 mL (0 mLs Intravenous Stopped 4/22/22 1920)   dextrose 5 % and 0.45 % NaCl infusion (1,000 mLs Intravenous New Bag 4/22/22 2001)     Medical Decision Making:   History:   Old Medical Records: I decided to obtain old medical records.  Initial Assessment:       76-year-old female with history of high cholesterol, osteoarthritis presents for evaluation of abdominal distension and discomfort.  Patient states she was diagnosed with COVID about 2 weeks ago, and her URI symptoms resolved in the past few days.  However she developed some mild abdominal bloating and increased belching in the past 3 days, but became significantly worse today.  She is chronically  constipated and takes MiraLax daily for it with 3 BMs today, no blood or pain associated.  She has not eat anything since breakfast due to associated bloating and mild nausea, has spit up mucus a few times.  She denies any fevers, cough, SOB, or other symptoms.  No history of similar previous episodes.  She went to urgent care and was given ODT Zofran, sent here for further workup.  On exam patient with moderate abdominal distension but no focal tenderness.  She does have surgical scar that she states is from hysterectomy, and is unsure but thinks they removed her appendix as well.  She has no focal tenderness or other concerning exam findings.  Differential includes SBO, ileus, gastritis, cholecystitis.    Initial labs concerning for WBC 20.9, with signs of dehydration and slightly elevated anion gap on CMP, normal lipase and troponin.  CT done for further evaluation and shows SBO with transition point at midline, which could be from internal hernia, mild volvulus or adhesions.  Patient has had hysterectomy over 30 years ago, no other known surgical history.  CT also shows mild colon wall thickening which could be associated with mild inflammatory or infectious process.  Will give antibiotics, send lactate cover this finding.  On reassessment patient remains resting comfortably, required no pain medications while in ED, afebrile with no focal tenderness.  Surgery Dr. Alvarez consulted and will see patient and determine whether she needs to be taken to OR for management, patient admitted to hospitalist.       Independently Interpreted Test(s):   I have ordered and independently interpreted EKG Reading(s) - see prior notes  Clinical Tests:   Lab Tests: Reviewed and Ordered  Radiological Study: Ordered and Reviewed  Medical Tests: Ordered and Reviewed          Scribe Attestation:   Scribe #1: I performed the above scribed service and the documentation accurately describes the services I performed. I attest to the  accuracy of the note.               I, Dr. Leo Vasquez, personally performed the services described in this documentation. All medical record entries made by the scribe were at my direction and in my presence.  I have reviewed the chart and agree that the record reflects my personal performance and is accurate and complete. Leo Vasquez MD.       Clinical Impression:   Final diagnoses:  [K56.609] SBO (small bowel obstruction) (Primary)  [R14.0] Abdominal distension          ED Disposition Condition    Admit               Leo Vasquez MD  04/22/22 7456

## 2022-04-22 NOTE — FIRST PROVIDER EVALUATION
" Emergency Department TeleTriage Encounter Note      CHIEF COMPLAINT    Chief Complaint   Patient presents with    Bloated     Pt presents to the ER with complaints of abdominal bloating with nausea, belching, weakness, clamminess, and lightheadedness since this morning. Pt reports three normal bowel movements this morning.         VITAL SIGNS   Initial Vitals [04/22/22 1551]   BP Pulse Resp Temp SpO2   (!) 197/75 88 16 97.7 °F (36.5 °C) 96 %      MAP       --            ALLERGIES    Review of patient's allergies indicates:   Allergen Reactions    Demerol [meperidine] Other (See Comments)     "Becomes Agitated and Combative"       PROVIDER TRIAGE NOTE  This is a teletriage evaluation of a 76 y.o. female presenting to the ED complaining of abd "bloating" with nausea today. Reports dx with COVID about 2 weeks ago.  Reports feeling "clammy and light-headed today."  Pt has had several BMs today.     Initial orders will be placed and care will be transferred to an alternate provider when patient is roomed for a full evaluation. Any additional orders and the final disposition will be determined by that provider.           ORDERS  Labs Reviewed   CBC W/ AUTO DIFFERENTIAL   COMPREHENSIVE METABOLIC PANEL   LIPASE   URINALYSIS, REFLEX TO URINE CULTURE   TROPONIN I       ED Orders (720h ago, onward)    Start Ordered     Status Ordering Provider    04/22/22 1558 04/22/22 1557  Vital signs  Every 2 hours         Ordered CODEY SINGH N.    04/22/22 1558 04/22/22 1557  Diet NPO  Diet effective now         Ordered CODEY SINGH N.    04/22/22 1558 04/22/22 1557  Insert peripheral IV  Once         Ordered CODEY SINGH N.    04/22/22 1558 04/22/22 1557  CBC W/ AUTO DIFFERENTIAL  STAT         Ordered CODEY SINGH N.    04/22/22 1558 04/22/22 1557  Comp. Metabolic Panel  STAT         Ordered CODEY SINGH N.    04/22/22 1558 04/22/22 1557  POCT Venous Blood Gas Once  Once      "   Comments: This test should be used for VBGs.  If using this order for other tests (K, creatinine, HCT, PT/INR, lactate etc)  ONLY do so in the case of an emergency or rapid response.Notify Physician if: see parameters below.      Ordered CODEY SINGH N.    04/22/22 1558 04/22/22 1557  Lipase  STAT         Ordered CODEY SINGH N.    04/22/22 1558 04/22/22 1557  Urinalysis, Reflex to Urine Culture Urine, Clean Catch  STAT         Ordered CODEY SINGH N.    04/22/22 1558 04/22/22 1557  EKG 12-lead  Once         Ordered CODEY SINGH N.    04/22/22 1558 04/22/22 1557  Troponin I  STAT         Ordered CODEY SINGH.            Virtual Visit Note: The provider triage portion of this emergency department evaluation and documentation was performed via [employer], a HIPAA-compliant telemedicine application, in concert with a tele-presenter in the room. A face to face patient evaluation with one of my colleagues will occur once the patient is placed in an emergency department room.      DISCLAIMER: This note was prepared with [employer] voice recognition transcription software. Garbled syntax, mangled pronouns, and other bizarre constructions may be attributed to that software system.

## 2022-04-22 NOTE — ED NOTES
Pt. Complains of being bloated making her uncomfortable with no pain. Pt. States she is lightheaded from not eating all day.

## 2022-04-23 PROBLEM — M19.91 PRIMARY OSTEOARTHRITIS: Status: ACTIVE | Noted: 2022-04-23

## 2022-04-23 PROBLEM — E87.20 LACTIC ACIDOSIS: Status: ACTIVE | Noted: 2022-04-23

## 2022-04-23 PROBLEM — E86.9 VOLUME DEPLETION: Status: ACTIVE | Noted: 2022-04-23

## 2022-04-23 LAB
ALBUMIN SERPL BCP-MCNC: 3.6 G/DL (ref 3.5–5.2)
ALP SERPL-CCNC: 58 U/L (ref 55–135)
ALT SERPL W/O P-5'-P-CCNC: 10 U/L (ref 10–44)
ANION GAP SERPL CALC-SCNC: 12 MMOL/L (ref 8–16)
AST SERPL-CCNC: 16 U/L (ref 10–40)
BASOPHILS # BLD AUTO: 0.03 K/UL (ref 0–0.2)
BASOPHILS NFR BLD: 0.2 % (ref 0–1.9)
BILIRUB SERPL-MCNC: 0.9 MG/DL (ref 0.1–1)
BUN SERPL-MCNC: 23 MG/DL (ref 8–23)
CALCIUM SERPL-MCNC: 9.2 MG/DL (ref 8.7–10.5)
CHLORIDE SERPL-SCNC: 106 MMOL/L (ref 95–110)
CO2 SERPL-SCNC: 21 MMOL/L (ref 23–29)
CREAT SERPL-MCNC: 1 MG/DL (ref 0.5–1.4)
DIFFERENTIAL METHOD: ABNORMAL
EOSINOPHIL # BLD AUTO: 0 K/UL (ref 0–0.5)
EOSINOPHIL NFR BLD: 0.1 % (ref 0–8)
ERYTHROCYTE [DISTWIDTH] IN BLOOD BY AUTOMATED COUNT: 12.4 % (ref 11.5–14.5)
EST. GFR  (AFRICAN AMERICAN): >60 ML/MIN/1.73 M^2
EST. GFR  (NON AFRICAN AMERICAN): 55 ML/MIN/1.73 M^2
GLUCOSE SERPL-MCNC: 126 MG/DL (ref 70–110)
HCT VFR BLD AUTO: 43.6 % (ref 37–48.5)
HGB BLD-MCNC: 14.8 G/DL (ref 12–16)
IMM GRANULOCYTES # BLD AUTO: 0.05 K/UL (ref 0–0.04)
IMM GRANULOCYTES NFR BLD AUTO: 0.4 % (ref 0–0.5)
LACTATE SERPL-SCNC: 0.9 MMOL/L (ref 0.5–2.2)
LYMPHOCYTES # BLD AUTO: 1.6 K/UL (ref 1–4.8)
LYMPHOCYTES NFR BLD: 11.5 % (ref 18–48)
MAGNESIUM SERPL-MCNC: 2.1 MG/DL (ref 1.6–2.6)
MCH RBC QN AUTO: 30.4 PG (ref 27–31)
MCHC RBC AUTO-ENTMCNC: 33.9 G/DL (ref 32–36)
MCV RBC AUTO: 90 FL (ref 82–98)
MONOCYTES # BLD AUTO: 1.3 K/UL (ref 0.3–1)
MONOCYTES NFR BLD: 9.1 % (ref 4–15)
NEUTROPHILS # BLD AUTO: 11.1 K/UL (ref 1.8–7.7)
NEUTROPHILS NFR BLD: 78.7 % (ref 38–73)
NRBC BLD-RTO: 0 /100 WBC
PHOSPHATE SERPL-MCNC: 4.1 MG/DL (ref 2.7–4.5)
PLATELET # BLD AUTO: 305 K/UL (ref 150–450)
PMV BLD AUTO: 9.2 FL (ref 9.2–12.9)
POTASSIUM SERPL-SCNC: 3.9 MMOL/L (ref 3.5–5.1)
PROT SERPL-MCNC: 5.7 G/DL (ref 6–8.4)
RBC # BLD AUTO: 4.87 M/UL (ref 4–5.4)
SODIUM SERPL-SCNC: 139 MMOL/L (ref 136–145)
WBC # BLD AUTO: 14.12 K/UL (ref 3.9–12.7)

## 2022-04-23 PROCEDURE — 25000003 PHARM REV CODE 250: Performed by: NURSE PRACTITIONER

## 2022-04-23 PROCEDURE — 97162 PT EVAL MOD COMPLEX 30 MIN: CPT

## 2022-04-23 PROCEDURE — 63600175 PHARM REV CODE 636 W HCPCS: Performed by: NURSE PRACTITIONER

## 2022-04-23 PROCEDURE — 83605 ASSAY OF LACTIC ACID: CPT | Performed by: NURSE PRACTITIONER

## 2022-04-23 PROCEDURE — 63600175 PHARM REV CODE 636 W HCPCS: Performed by: INTERNAL MEDICINE

## 2022-04-23 PROCEDURE — 84100 ASSAY OF PHOSPHORUS: CPT | Performed by: NURSE PRACTITIONER

## 2022-04-23 PROCEDURE — 85025 COMPLETE CBC W/AUTO DIFF WBC: CPT | Performed by: NURSE PRACTITIONER

## 2022-04-23 PROCEDURE — 11000001 HC ACUTE MED/SURG PRIVATE ROOM

## 2022-04-23 PROCEDURE — 97116 GAIT TRAINING THERAPY: CPT

## 2022-04-23 PROCEDURE — 99232 PR SUBSEQUENT HOSPITAL CARE,LEVL II: ICD-10-PCS | Mod: ,,, | Performed by: INTERNAL MEDICINE

## 2022-04-23 PROCEDURE — 80053 COMPREHEN METABOLIC PANEL: CPT | Performed by: NURSE PRACTITIONER

## 2022-04-23 PROCEDURE — 83735 ASSAY OF MAGNESIUM: CPT | Performed by: NURSE PRACTITIONER

## 2022-04-23 PROCEDURE — 99232 SBSQ HOSP IP/OBS MODERATE 35: CPT | Mod: ,,, | Performed by: INTERNAL MEDICINE

## 2022-04-23 RX ORDER — MORPHINE SULFATE 4 MG/ML
2 INJECTION, SOLUTION INTRAMUSCULAR; INTRAVENOUS EVERY 4 HOURS PRN
Status: DISCONTINUED | OUTPATIENT
Start: 2022-04-23 | End: 2022-04-29 | Stop reason: HOSPADM

## 2022-04-23 RX ORDER — ONDANSETRON 2 MG/ML
4 INJECTION INTRAMUSCULAR; INTRAVENOUS EVERY 6 HOURS PRN
Status: DISCONTINUED | OUTPATIENT
Start: 2022-04-23 | End: 2022-04-29 | Stop reason: HOSPADM

## 2022-04-23 RX ORDER — MORPHINE SULFATE 4 MG/ML
4 INJECTION, SOLUTION INTRAMUSCULAR; INTRAVENOUS EVERY 6 HOURS PRN
Status: DISCONTINUED | OUTPATIENT
Start: 2022-04-23 | End: 2022-04-24

## 2022-04-23 RX ADMIN — MORPHINE SULFATE 2 MG: 4 INJECTION INTRAVENOUS at 08:04

## 2022-04-23 RX ADMIN — MORPHINE SULFATE 4 MG: 4 INJECTION, SOLUTION INTRAMUSCULAR; INTRAVENOUS at 03:04

## 2022-04-23 RX ADMIN — ONDANSETRON 4 MG: 2 INJECTION INTRAMUSCULAR; INTRAVENOUS at 03:04

## 2022-04-23 RX ADMIN — MORPHINE SULFATE 2 MG: 4 INJECTION, SOLUTION INTRAMUSCULAR; INTRAVENOUS at 09:04

## 2022-04-23 RX ADMIN — ONDANSETRON 4 MG: 2 INJECTION INTRAMUSCULAR; INTRAVENOUS at 08:04

## 2022-04-23 RX ADMIN — SODIUM CHLORIDE, SODIUM LACTATE, POTASSIUM CHLORIDE, AND CALCIUM CHLORIDE: .6; .31; .03; .02 INJECTION, SOLUTION INTRAVENOUS at 10:04

## 2022-04-23 RX ADMIN — SODIUM CHLORIDE, SODIUM LACTATE, POTASSIUM CHLORIDE, AND CALCIUM CHLORIDE: .6; .31; .03; .02 INJECTION, SOLUTION INTRAVENOUS at 06:04

## 2022-04-23 RX ADMIN — PIPERACILLIN AND TAZOBACTAM 4.5 G: 4; .5 INJECTION, POWDER, LYOPHILIZED, FOR SOLUTION INTRAVENOUS; PARENTERAL at 02:04

## 2022-04-23 RX ADMIN — MORPHINE SULFATE 2 MG: 4 INJECTION INTRAVENOUS at 02:04

## 2022-04-23 RX ADMIN — SODIUM CHLORIDE, SODIUM LACTATE, POTASSIUM CHLORIDE, AND CALCIUM CHLORIDE: .6; .31; .03; .02 INJECTION, SOLUTION INTRAVENOUS at 02:04

## 2022-04-23 RX ADMIN — PIPERACILLIN AND TAZOBACTAM 4.5 G: 4; .5 INJECTION, POWDER, LYOPHILIZED, FOR SOLUTION INTRAVENOUS; PARENTERAL at 11:04

## 2022-04-23 RX ADMIN — PIPERACILLIN AND TAZOBACTAM 4.5 G: 4; .5 INJECTION, POWDER, LYOPHILIZED, FOR SOLUTION INTRAVENOUS; PARENTERAL at 06:04

## 2022-04-23 NOTE — SUBJECTIVE & OBJECTIVE
"Interval History: Feeling better after NG tube placement.  Abdominal distention improved.  Still having abdominal discomfort but no pain.  Belching still.  No nausea or vomiting, no fevers, chills. No flatus or bowel movements. Voiding.     Medications:  Continuous Infusions:   lactated ringers 125 mL/hr at 04/23/22 0616     Scheduled Meds:   piperacillin-tazobactam (ZOSYN) IVPB  4.5 g Intravenous Q8H     PRN Meds:morphine, morphine, naloxone, ondansetron, promethazine (PHENERGAN) IVPB, sodium chloride 0.9%     Review of patient's allergies indicates:   Allergen Reactions    Demerol [meperidine] Other (See Comments)     "Becomes Agitated and Combative"     Objective:     Vital Signs (Most Recent):  Temp: 98.1 °F (36.7 °C) (04/23/22 1139)  Pulse: 81 (04/23/22 1139)  Resp: 18 (04/23/22 1139)  BP: 136/64 (04/23/22 1139)  SpO2: 96 % (04/23/22 1139) Vital Signs (24h Range):  Temp:  [97 °F (36.1 °C)-98.8 °F (37.1 °C)] 98.1 °F (36.7 °C)  Pulse:  [81-96] 81  Resp:  [16-20] 18  SpO2:  [96 %-98 %] 96 %  BP: (129-197)/(64-84) 136/64     Weight: 54.4 kg (119 lb 14.9 oz)  Body mass index is 19.96 kg/m².    Intake/Output - Last 3 Shifts         04/21 0700  04/22 0659 04/22 0700  04/23 0659 04/23 0700  04/24 0659    P.O.  0     I.V. (mL/kg)  671 (12.3)     IV Piggyback  7.3     Total Intake(mL/kg)  678.3 (12.5)     Urine (mL/kg/hr)  0     Drains  150     Total Output  150     Net  +528.3            Urine Occurrence  2 x             Physical Exam  Constitutional:       General: She is not in acute distress.  HENT:      Nose:      Comments: NG tube to low wall suction, clear output  Cardiovascular:      Rate and Rhythm: Normal rate.   Pulmonary:      Effort: Pulmonary effort is normal. No respiratory distress.   Abdominal:      General: There is distension.      Palpations: Abdomen is soft.      Comments: Mild diffuse tenderness; tympany; no rebound, guarding or peritonitis   Neurological:      Mental Status: She is alert. "       Significant Labs:  I have reviewed all pertinent lab results within the past 24 hours.  CBC:   Recent Labs   Lab 04/23/22  0520   WBC 14.12*   RBC 4.87   HGB 14.8   HCT 43.6      MCV 90   MCH 30.4   MCHC 33.9     CMP:   Recent Labs   Lab 04/23/22  0520   *   CALCIUM 9.2   ALBUMIN 3.6   PROT 5.7*      K 3.9   CO2 21*      BUN 23   CREATININE 1.0   ALKPHOS 58   ALT 10   AST 16   BILITOT 0.9     Recent Labs   Lab 04/22/22  1904   COLORU Yellow   SPECGRAV <=1.005*   PHUR 8.5   PROTEINUA Negative   NITRITE Negative   LEUKOCYTESUR Negative   UROBILINOGEN Negative       Significant Diagnostics:  I have reviewed all pertinent imaging results/findings within the past 24 hours.  CXR: I have reviewed all pertinent results/findings within the past 24 hours and my personal findings are:  NG tube in adequate position

## 2022-04-23 NOTE — PLAN OF CARE
Problem: Physical Therapy  Goal: Physical Therapy Goal  Outcome: Met     Patient evaluated and no acute care PT goals identified. Gait training performed with RW and no AD including cueing for line management/safety with NG tube to suction. Pt demo's appropriate progress with awareness and safe to ambulate with family or staff supervision in [place] if allowed for brief periods off suction.     During this hospitalization, patient does not require further acute PT services.  Please re-consult if situation changes.  Recommendation for d/c to home with no further PT indicated.

## 2022-04-23 NOTE — ASSESSMENT & PLAN NOTE
Small bowel obstruction associated with nausea, belching and abdominal distention.  Abdomen soft and distended without peritonitis or tenderness.      - NPO, strict  - NG tube for decompression, keep to intermittent low wall suction; monitor and record output  - Strict I/O  - IV fluid resuscitation   - Close monitoring of abdominal examination  - Low threshold for operative intervention if no improvement with decompression and bowel rest  - Ambulate, out of bed  - Optimize and replete electrolytes; goal K >4, Phos >3, Mg >2

## 2022-04-23 NOTE — PROGRESS NOTES
[facility] Surg [facility] Medicine  Progress Note    Patient Name: Marian Durham  MRN: [id number]  Patient Class: IP- Inpatient   Admission Date: 4/22/2022  Length of Stay: 1 days  Attending Physician: Keshia Garcia MD  Primary Care Provider: Suzanna Duran MD        Subjective:     Principal Problem:SBO (small bowel obstruction)        HPI:  The patient is a 76 y.o. female with a past medical history of hyperlipidemia who presents with complaint of bloating that began 2 days ago. She notes that she had COVID-19 1.5 weeks ago and had fever, cough, postnasal drip and fatigue at the time. She explains that she has gone to urgent care 3 times this week for treatment.The patient reports that today at 10 AM the bloating worsened and was followed by cramping, gagging, SOB and postnasal drip. She states that she denies any vomiting, fever, or diarrhea.  On CT, the patient has a small bowel obstruction.  She will be admitted for further management of her SBO and General Surgery consult.      Overview/Hospital Course:  No notes on file    Interval History: No acute events overnight. Pt feels somewhat improved after NGT placement but with continued abdominal distension, belching, and discomfort. No flatus    Review of Systems   Constitutional:  Negative for chills and fever.   HENT:  Positive for sore throat (due to NGT).    Respiratory:  Negative for shortness of breath.    Cardiovascular:  Negative for leg swelling.   Gastrointestinal:  Positive for abdominal distention and abdominal pain. Negative for nausea and vomiting.   Objective:     Vital Signs (Most Recent):  Temp: 98.2 °F (36.8 °C) (04/23/22 0723)  Pulse: 92 (04/23/22 0723)  Resp: 16 (04/23/22 0826)  BP: 129/68 (04/23/22 0723)  SpO2: 97 % (04/23/22 0723) Vital Signs (24h Range):  Temp:  [97 °F (36.1 °C)-98.8 °F (37.1 °C)] 98.2 °F (36.8 °C)  Pulse:  [88-96] 92  Resp:  [16-20] 16  SpO2:  [96 %-98 %] 97 %  BP: (129-197)/(68-84) 129/68      Weight: 54.4 kg (119 lb 14.9 oz)  Body mass index is 19.96 kg/m².    Intake/Output Summary (Last 24 hours) at 4/23/2022 1112  Last data filed at 4/23/2022 0630  Gross per 24 hour   Intake 678.27 ml   Output 150 ml   Net 528.27 ml      Physical Exam  Vitals and nursing note reviewed.   Constitutional:       General: She is not in acute distress.     Appearance: Normal appearance. She is well-developed.   HENT:      Nose:      Comments: NG tube in place  Cardiovascular:      Rate and Rhythm: Normal rate and regular rhythm.      Pulses: Normal pulses.   Pulmonary:      Effort: Pulmonary effort is normal.      Breath sounds: Normal breath sounds.   Abdominal:      General: There is distension.      Tenderness: There is abdominal tenderness.      Comments: No bowel sounds auscultated. Abdomen moderately distended with tenderness   Musculoskeletal:      Right lower leg: No edema.      Left lower leg: No edema.   Skin:     General: Skin is warm and dry.   Neurological:      Mental Status: She is alert and oriented to person, place, and time. Mental status is at baseline.   Psychiatric:         Behavior: Behavior normal.       Significant Labs: All pertinent labs within the past 24 hours have been reviewed.  BMP:   Recent Labs   Lab 04/23/22  0520   *      K 3.9      CO2 21*   BUN 23   CREATININE 1.0   CALCIUM 9.2   MG 2.1     CBC:   Recent Labs   Lab 04/22/22  1624 04/23/22  0520   WBC 20.87* 14.12*   HGB 17.3* 14.8   HCT 50.7* 43.6    305       Significant Imaging: I have reviewed all pertinent imaging results/findings within the past 24 hours.      Assessment/Plan:      * SBO (small bowel obstruction)  Volume depletion / Lactic acidosis / AGMA  - With decreased PO intake, hemoconcentration noted on labs with Hgb 17  - CT abd shows SBO with transition point at level at umbilicus, mild wall thickening of transverse colon  - NGT placed. Continue LIWS  - Lactic acidosis improved with IVF  -  General surgery following. Discussed with Dr. Alvarez  - Continue IVF, NPO, pain control  - Given possible colitis and leukocytosis, continue empiric Zosyn for now    Primary osteoarthritis  - Having increased joint pain without celebrex  - PT ordered    VTE Risk Mitigation (From admission, onward)         Ordered     Reason for No Pharmacological VTE Prophylaxis  Once        Question:  Reasons:  Answer:  Risk of Bleeding    04/22/22 2041     IP VTE HIGH RISK PATIENT  Once         04/22/22 2041     Place sequential compression device  Until discontinued         04/22/22 2041                  Keshia Garcia MD  Department of Hospital Medicine   [facility] Surg ([facility])

## 2022-04-23 NOTE — SUBJECTIVE & OBJECTIVE
"No current facility-administered medications on file prior to encounter.     Current Outpatient Medications on File Prior to Encounter   Medication Sig    calcium-vitamin D3 (CALCIUM 500 + D) 500 mg(1,250mg) -200 unit per tablet Take 1 tablet by mouth 2 (two) times daily with meals.    celecoxib (CELEBREX) 200 MG capsule take 1 capsule (200MG) by oral route every day as needed    estradioL (ESTRACE) 0.01 % (0.1 mg/gram) vaginal cream Place 1 g vaginally twice a week.    glucosamine-chondroitin 500-400 mg tablet Take 1 tablet by mouth 3 (three) times daily.    neomycin-polymyxin-dexamethasone (MAXITROL) 3.5mg/mL-10,000 unit/mL-0.1 % DrpS Place 1 drop into the right eye 3 (three) times daily.    polyethylene glycol 3350 (MIRALAX ORAL) Take by mouth as needed.     simvastatin (ZOCOR) 40 MG tablet Take 40 mg by mouth every evening.        Review of patient's allergies indicates:   Allergen Reactions    Demerol [meperidine] Other (See Comments)     "Becomes Agitated and Combative"       Past Medical History:   Diagnosis Date    Hyperlipidemia     Osteoarthritis     Shingles 2002    Spinal stenosis      Past Surgical History:   Procedure Laterality Date    ANKLE FRACTURE SURGERY  2007    Right ankle    EXCISION OF MASS OF BACK N/A 11/16/2018    Procedure: EXCISION, MASS, BACK;  Surgeon: Fran Magaña MD;  Location: [facility];  Service: General;  Laterality: N/A;    EYE SURGERY Bilateral 2016    HYSTERECTOMY  1985    HEATHER    KNEE ARTHROSCOPY W/ DEBRIDEMENT  1994    Bilateral    KNEE ARTHROSCOPY W/ DEBRIDEMENT  2003    Left knee    OPEN PATELLA REEFING PROCEDURE  age 19    Left knee    Tonsilectomy  as a child    TONSILLECTOMY      TUBAL LIGATION       Family History       Problem Relation (Age of Onset)    Breast cancer Paternal Aunt    Colon cancer Paternal Aunt    Coronary artery disease Brother    Diabetes Brother    Hypertension Father, Mother          Tobacco Use    Smoking status: Never Smoker    Smokeless " tobacco: Never Used   Substance and Sexual Activity    Alcohol use: Yes     Alcohol/week: 3.0 standard drinks     Types: 3 Glasses of wine per week     Comment: Drinks a glass of wine 3 times weekly    Drug use: No    Sexual activity: Not Currently     Birth control/protection: Post-menopausal     Comment: Spouse  of kidney cancer : 2015     Review of Systems   Constitutional:  Positive for appetite change and fatigue.   Gastrointestinal:  Positive for abdominal distention. Negative for nausea and vomiting.        Abdominal discomfort, bloating, dry heaving, belching   Neurological:  Positive for weakness.   All other systems reviewed and are negative.  Objective:     Vital Signs (Most Recent):  Temp: 97 °F (36.1 °C) (22)  Pulse: 88 (22)  Resp: 20 (22)  BP: (!) 173/81 (22)  SpO2: 98 % (22)   Vital Signs (24h Range):  Temp:  [97 °F (36.1 °C)-98.7 °F (37.1 °C)] 97 °F (36.1 °C)  Pulse:  [88-89] 88  Resp:  [16-20] 20  SpO2:  [96 %-98 %] 98 %  BP: (130-197)/(75-84) 173/81     Weight: 54.4 kg (120 lb)  Body mass index is 19.97 kg/m².    Physical Exam  Constitutional:       General: She is not in acute distress.     Comments: uncomfortable   HENT:      Head: Normocephalic.      Nose: No rhinorrhea.      Mouth/Throat:      Mouth: Mucous membranes are moist.   Eyes:      General: No scleral icterus.  Cardiovascular:      Rate and Rhythm: Normal rate.   Pulmonary:      Effort: Pulmonary effort is normal. No respiratory distress.   Abdominal:      General: There is distension.      Palpations: Abdomen is soft.      Tenderness: There is no abdominal tenderness. There is no guarding or rebound.   Musculoskeletal:         General: No swelling.   Skin:     General: Skin is warm and dry.   Neurological:      General: No focal deficit present.      Mental Status: She is alert.   Psychiatric:         Mood and Affect: Mood normal.       Significant Labs:  I have  reviewed all pertinent lab results within the past 24 hours.  CBC:   Recent Labs   Lab 04/22/22  1624   WBC 20.87*   RBC 5.67*   HGB 17.3*   HCT 50.7*      MCV 89   MCH 30.5   MCHC 34.1     CMP:   Recent Labs   Lab 04/22/22  1624   *   CALCIUM 11.2*   ALBUMIN 4.8   PROT 7.6      K 4.1   CO2 20*      BUN 25*   CREATININE 1.1   ALKPHOS 80   ALT 18   AST 20   BILITOT 1.1*     LFTs:   Recent Labs   Lab 04/22/22  1624   ALT 18   AST 20   ALKPHOS 80   BILITOT 1.1*   PROT 7.6   ALBUMIN 4.8     Recent Labs   Lab 04/22/22  1904   COLORU Yellow   SPECGRAV <=1.005*   PHUR 8.5   PROTEINUA Negative   NITRITE Negative   LEUKOCYTESUR Negative   UROBILINOGEN Negative       Significant Diagnostics:  I have reviewed all pertinent imaging results/findings within the past 24 hours.  I have reviewed and interpreted all pertinent imaging results/findings within the past 24 hours.  CT: I have reviewed all pertinent results/findings within the past 24 hours and my personal findings are:  distended loops of proximal small bowel with a transition point and decompressed distal small bowel loops, partial mesenteric twist without closed loop obstruction, no free air or free fluid, no pneumatosis

## 2022-04-23 NOTE — HPI
The patient is a 76 y.o. female with a past medical history of hyperlipidemia who presents with complaint of bloating that began 2 days ago. She notes that she had COVID-19 1.5 weeks ago and had fever, cough, postnasal drip and fatigue at the time. She explains that she has gone to urgent care 3 times this week for treatment.The patient reports that today at 10 AM the bloating worsened and was followed by cramping, gagging, SOB and postnasal drip. She states that she denies any vomiting, fever, or diarrhea.  On CT, the patient has a small bowel obstruction.  She will be admitted for further management of her SBO and General Surgery consult.

## 2022-04-23 NOTE — HPI
76 y.o. woman with a history of constipation and a prior hysterectomy and tubal ligation in the 80s presenting with abdominal discomfort associated with belching, abdominal distention, abdominal discomfort and weakness.  CT scan demonstrates dilated loops of small intestine with a possible transition point in the mid abdomen without pneumoperitoneum, free fluid or pneumatosis.  NG tube placed on day of admission.

## 2022-04-23 NOTE — ASSESSMENT & PLAN NOTE
Small bowel obstruction associated with nausea, belching and abdominal distention.  Abdomen soft and distended without peritonitis or tenderness.  Leukocytosis improving, lactate normalized.     - NPO, strict  - NG tube for decompression, keep to intermittent low wall suction; monitor and record output  - Strict I/O  - IV fluid resuscitation   - Close monitoring of abdominal examination  - Low threshold for operative intervention if no improvement with decompression and bowel rest; will consider gastrografin SBO challenge protocol after 24h decompression  - Ambulate, out of bed with assistance as needed; PT consultation for evaluation and treatment appropriate   - Optimize and replete electrolytes; goal K >4, Phos >3, Mg >2

## 2022-04-23 NOTE — H&P
[facility], [place], operated by [facility] Medicine  History & Physical    Patient Name: Marian Durham  MRN: [id number]  Patient Class: IP- Inpatient  Admission Date: 4/22/2022  Attending Physician: Keshia Garcia MD   Primary Care Provider: Suzanna Duran MD         Patient information was obtained from patient, past medical records and ER records.     Subjective:     Principal Problem:SBO (small bowel obstruction)    Chief Complaint:   Chief Complaint   Patient presents with    Bloated     Pt presents to the ER with complaints of abdominal bloating with nausea, belching, weakness, clamminess, and lightheadedness since this morning. Pt reports three normal bowel movements this morning.          HPI: The patient is a 76 y.o. female with a past medical history of hyperlipidemia who presents with complaint of bloating that began 2 days ago. She notes that she had COVID-19 1.5 weeks ago and had fever, cough, postnasal drip and fatigue at the time. She explains that she has gone to urgent care 3 times this week for treatment.The patient reports that today at 10 AM the bloating worsened and was followed by cramping, gagging, SOB and postnasal drip. She states that she denies any vomiting, fever, or diarrhea.  On CT, the patient has a small bowel obstruction.  She will be admitted for further management of her SBO and General Surgery consult.      Past Medical History:   Diagnosis Date    Hyperlipidemia     Osteoarthritis     Shingles 2002    Spinal stenosis        Past Surgical History:   Procedure Laterality Date    ANKLE FRACTURE SURGERY  2007    Right ankle    EXCISION OF MASS OF BACK N/A 11/16/2018    Procedure: EXCISION, MASS, BACK;  Surgeon: Fran Magaña MD;  Location: [facility];  Service: General;  Laterality: N/A;    EYE SURGERY Bilateral 2016    HYSTERECTOMY  1985    HEATHER    KNEE ARTHROSCOPY W/ DEBRIDEMENT  1994    Bilateral    KNEE ARTHROSCOPY W/ DEBRIDEMENT  2003    Left knee    OPEN  "PATELLA REEFING PROCEDURE  age 19    Left knee    Tonsilectomy  as a child    TONSILLECTOMY      TUBAL LIGATION         Review of patient's allergies indicates:   Allergen Reactions    Demerol [meperidine] Other (See Comments)     "Becomes Agitated and Combative"       No current facility-administered medications on file prior to encounter.     Current Outpatient Medications on File Prior to Encounter   Medication Sig    calcium-vitamin D3 (CALCIUM 500 + D) 500 mg(1,250mg) -200 unit per tablet Take 1 tablet by mouth 2 (two) times daily with meals.    celecoxib (CELEBREX) 200 MG capsule take 1 capsule (200MG) by oral route every day as needed    estradioL (ESTRACE) 0.01 % (0.1 mg/gram) vaginal cream Place 1 g vaginally twice a week.    glucosamine-chondroitin 500-400 mg tablet Take 1 tablet by mouth 3 (three) times daily.    neomycin-polymyxin-dexamethasone (MAXITROL) 3.5mg/mL-10,000 unit/mL-0.1 % DrpS Place 1 drop into the right eye 3 (three) times daily.    polyethylene glycol 3350 (MIRALAX ORAL) Take by mouth as needed.     simvastatin (ZOCOR) 40 MG tablet Take 40 mg by mouth every evening.      Family History       Problem Relation (Age of Onset)    Breast cancer Paternal Aunt    Colon cancer Paternal Aunt    Coronary artery disease Brother    Diabetes Brother    Hypertension Father, Mother          Tobacco Use    Smoking status: Never Smoker    Smokeless tobacco: Never Used   Substance and Sexual Activity    Alcohol use: Yes     Alcohol/week: 3.0 standard drinks     Types: 3 Glasses of wine per week     Comment: Drinks a glass of wine 3 times weekly    Drug use: No    Sexual activity: Not Currently     Birth control/protection: Post-menopausal     Comment: Spouse  of kidney cancer : 2015     Review of Systems   Constitutional:  Positive for activity change, appetite change and fatigue. Negative for fever.   HENT:  Negative for congestion, ear pain, rhinorrhea and sinus pressure.  "   Eyes:  Negative for pain and discharge.   Respiratory:  Negative for cough, chest tightness, shortness of breath and wheezing.    Cardiovascular:  Negative for chest pain and leg swelling.   Gastrointestinal:  Positive for abdominal distention, abdominal pain, nausea and vomiting. Negative for diarrhea.   Endocrine: Negative for cold intolerance and heat intolerance.   Genitourinary:  Negative for difficulty urinating, flank pain, frequency, hematuria and urgency.   Musculoskeletal:  Negative for arthralgias, joint swelling and myalgias.   Allergic/Immunologic: Negative for environmental allergies and food allergies.   Neurological:  Negative for dizziness, weakness, light-headedness and headaches.   Hematological:  Does not bruise/bleed easily.   Psychiatric/Behavioral:  Negative for agitation, behavioral problems and decreased concentration.    Objective:     Vital Signs (Most Recent):  Temp: 97 °F (36.1 °C) (04/22/22 2142)  Pulse: 88 (04/22/22 2142)  Resp: 20 (04/22/22 2238)  BP: (!) 173/81 (04/22/22 2142)  SpO2: 98 % (04/22/22 2142)   Vital Signs (24h Range):  Temp:  [97 °F (36.1 °C)-98.7 °F (37.1 °C)] 97 °F (36.1 °C)  Pulse:  [88-89] 88  Resp:  [16-20] 20  SpO2:  [96 %-98 %] 98 %  BP: (130-197)/(75-84) 173/81     Weight: 54.4 kg (120 lb)  Body mass index is 19.97 kg/m².    Physical Exam  Constitutional:       Appearance: Normal appearance. She is well-developed.   HENT:      Head: Normocephalic.   Eyes:      General:         Right eye: No discharge.         Left eye: No discharge.      Conjunctiva/sclera: Conjunctivae normal.   Cardiovascular:      Rate and Rhythm: Regular rhythm. Tachycardia present.      Pulses:           Radial pulses are 2+ on the right side and 2+ on the left side.      Heart sounds: Normal heart sounds.   Pulmonary:      Effort: Pulmonary effort is normal. Tachypnea present. No respiratory distress.      Breath sounds: Normal breath sounds.   Abdominal:      General: Bowel sounds are  normal. There is no distension.      Palpations: Abdomen is soft.      Tenderness: There is no abdominal tenderness.   Musculoskeletal:         General: Normal range of motion.      Cervical back: Normal range of motion and neck supple.   Skin:     General: Skin is warm and dry.   Neurological:      Mental Status: She is alert and oriented to person, place, and time.      GCS: GCS eye subscore is 4. GCS verbal subscore is 5. GCS motor subscore is 6.   Psychiatric:         Mood and Affect: Mood normal.         Speech: Speech normal.         Behavior: Behavior normal.           Significant Labs: All pertinent labs within the past 24 hours have been reviewed.  CBC:   Recent Labs   Lab 04/22/22  1624   WBC 20.87*   HGB 17.3*   HCT 50.7*        CMP:   Recent Labs   Lab 04/22/22  1624      K 4.1      CO2 20*   *   BUN 25*   CREATININE 1.1   CALCIUM 11.2*   PROT 7.6   ALBUMIN 4.8   BILITOT 1.1*   ALKPHOS 80   AST 20   ALT 18   ANIONGAP 17*   EGFRNONAA 49*       Significant Imaging: I have reviewed all pertinent imaging results/findings within the past 24 hours.    Assessment/Plan:     * SBO (small bowel obstruction)  CT-  Small-bowel obstruction with transition point at the midline, mid abdomen at the level the umbilicus.  See above comments.  This could be associated with an internal hernia, mild volvulus or adhesions.  Recommend surgical consultation and follow-up.  Mild hepatomegaly.  Trace pericardial effusion.  Mild wall thickening of the colon most prominent the transverse colon could be associated with mild inflammatory, infectious or early ischemic process.  Surgical follow-up recommended.  Diverticulosis of the colon.    IVF  NPO  Morphine  Zofran/Phenergan  Consult General Surgery  NGT      VTE Risk Mitigation (From admission, onward)         Ordered     Reason for No Pharmacological VTE Prophylaxis  Once        Question:  Reasons:  Answer:  Risk of Bleeding    04/22/22 2041     IP VTE  HIGH RISK PATIENT  Once         04/22/22 2041     Place sequential compression device  Until discontinued         04/22/22 2041                   Johnathan Johnson NP  Department of Hospital Medicine   [facility] S[facility]

## 2022-04-23 NOTE — CONSULTS
[facility]  General Surgery  Consult Note    Patient Name: Marian Durham  MRN: [id number]  Code Status: Full Code  Admission Date: 4/22/2022  Hospital Length of Stay: 0 days  Attending Physician: Keshia Garcia MD  Primary Care Provider: Suzanna Duran MD    Patient information was obtained from patient, caregiver / friend and ER records.     Inpatient consult to General surgery  Consult performed by: Latonia Alvarez MD  Consult ordered by: Johnathan Johnson NP  Reason for consult: small bowel obstruction        Subjective:     Principal Problem: SBO (small bowel obstruction)    History of Present Illness: 76 y.o. woman with a history of constipation and a prior hysterectomy and tubal ligation in the 80s presenting with abdominal discomfort associated with belching, distention and weakness.  She describes having bloating for the past few days and had been passing gas and flatus up until this morning. She has a history of constipation and takes miralax which usually keeps her quite regular.  She had breakfast this morning and had the urge to have a bowel movement or pass flatus but ended up having three large bowel movements.  She had worsening symptoms this morning including abdominal distention and discomfort.  She began heaving but denies nausea and felt weak, clammy and bloated and in the afternoon presented to urgent care and was told she needed to go to the ER.      No current facility-administered medications on file prior to encounter.     Current Outpatient Medications on File Prior to Encounter   Medication Sig    calcium-vitamin D3 (CALCIUM 500 + D) 500 mg(1,250mg) -200 unit per tablet Take 1 tablet by mouth 2 (two) times daily with meals.    celecoxib (CELEBREX) 200 MG capsule take 1 capsule (200MG) by oral route every day as needed    estradioL (ESTRACE) 0.01 % (0.1 mg/gram) vaginal cream Place 1 g vaginally twice a week.    glucosamine-chondroitin 500-400 mg tablet  "Take 1 tablet by mouth 3 (three) times daily.    neomycin-polymyxin-dexamethasone (MAXITROL) 3.5mg/mL-10,000 unit/mL-0.1 % DrpS Place 1 drop into the right eye 3 (three) times daily.    polyethylene glycol 3350 (MIRALAX ORAL) Take by mouth as needed.     simvastatin (ZOCOR) 40 MG tablet Take 40 mg by mouth every evening.        Review of patient's allergies indicates:   Allergen Reactions    Demerol [meperidine] Other (See Comments)     "Becomes Agitated and Combative"       Past Medical History:   Diagnosis Date    Hyperlipidemia     Osteoarthritis     Shingles 2002    Spinal stenosis      Past Surgical History:   Procedure Laterality Date    ANKLE FRACTURE SURGERY      Right ankle    EXCISION OF MASS OF BACK N/A 2018    Procedure: EXCISION, MASS, BACK;  Surgeon: Fran Magaña MD;  Location: [facility];  Service: General;  Laterality: N/A;    EYE SURGERY Bilateral 2016    HYSTERECTOMY  1985    HEATHER    KNEE ARTHROSCOPY W/ DEBRIDEMENT      Bilateral    KNEE ARTHROSCOPY W/ DEBRIDEMENT      Left knee    OPEN PATELLA REEFING PROCEDURE  age 19    Left knee    Tonsilectomy  as a child    TONSILLECTOMY      TUBAL LIGATION       Family History       Problem Relation (Age of Onset)    Breast cancer Paternal Aunt    Colon cancer Paternal Aunt    Coronary artery disease Brother    Diabetes Brother    Hypertension Father, Mother          Tobacco Use    Smoking status: Never Smoker    Smokeless tobacco: Never Used   Substance and Sexual Activity    Alcohol use: Yes     Alcohol/week: 3.0 standard drinks     Types: 3 Glasses of wine per week     Comment: Drinks a glass of wine 3 times weekly    Drug use: No    Sexual activity: Not Currently     Birth control/protection: Post-menopausal     Comment: Spouse  of kidney cancer : 2015     Review of Systems   Constitutional:  Positive for appetite change and fatigue.   Gastrointestinal:  Positive for abdominal distention. " Negative for nausea and vomiting.        Abdominal discomfort, bloating, dry heaving, belching   Neurological:  Positive for weakness.   All other systems reviewed and are negative.  Objective:     Vital Signs (Most Recent):  Temp: 97 °F (36.1 °C) (04/22/22 2142)  Pulse: 88 (04/22/22 2142)  Resp: 20 (04/22/22 2238)  BP: (!) 173/81 (04/22/22 2142)  SpO2: 98 % (04/22/22 2142)   Vital Signs (24h Range):  Temp:  [97 °F (36.1 °C)-98.7 °F (37.1 °C)] 97 °F (36.1 °C)  Pulse:  [88-89] 88  Resp:  [16-20] 20  SpO2:  [96 %-98 %] 98 %  BP: (130-197)/(75-84) 173/81     Weight: 54.4 kg (120 lb)  Body mass index is 19.97 kg/m².    Physical Exam  Constitutional:       General: She is not in acute distress.     Comments: uncomfortable   HENT:      Head: Normocephalic.      Nose: No rhinorrhea.      Mouth/Throat:      Mouth: Mucous membranes are moist.   Eyes:      General: No scleral icterus.  Cardiovascular:      Rate and Rhythm: Normal rate.   Pulmonary:      Effort: Pulmonary effort is normal. No respiratory distress.   Abdominal:      General: There is distension.      Palpations: Abdomen is soft.      Tenderness: There is no abdominal tenderness. There is no guarding or rebound.   Musculoskeletal:         General: No swelling.   Skin:     General: Skin is warm and dry.   Neurological:      General: No focal deficit present.      Mental Status: She is alert.   Psychiatric:         Mood and Affect: Mood normal.       Significant Labs:  I have reviewed all pertinent lab results within the past 24 hours.  CBC:   Recent Labs   Lab 04/22/22  1624   WBC 20.87*   RBC 5.67*   HGB 17.3*   HCT 50.7*      MCV 89   MCH 30.5   MCHC 34.1     CMP:   Recent Labs   Lab 04/22/22  1624   *   CALCIUM 11.2*   ALBUMIN 4.8   PROT 7.6      K 4.1   CO2 20*      BUN 25*   CREATININE 1.1   ALKPHOS 80   ALT 18   AST 20   BILITOT 1.1*     LFTs:   Recent Labs   Lab 04/22/22  1624   ALT 18   AST 20   ALKPHOS 80   BILITOT 1.1*   PROT  7.6   ALBUMIN 4.8     Recent Labs   Lab 04/22/22  1904   COLORU Yellow   SPECGRAV <=1.005*   PHUR 8.5   PROTEINUA Negative   NITRITE Negative   LEUKOCYTESUR Negative   UROBILINOGEN Negative       Significant Diagnostics:  I have reviewed all pertinent imaging results/findings within the past 24 hours.  I have reviewed and interpreted all pertinent imaging results/findings within the past 24 hours.  CT: I have reviewed all pertinent results/findings within the past 24 hours and my personal findings are:  distended loops of proximal small bowel with a transition point and decompressed distal small bowel loops, partial mesenteric twist without closed loop obstruction, no free air or free fluid, no pneumatosis       Assessment/Plan:     * SBO (small bowel obstruction)  Small bowel obstruction associated with nausea, belching and abdominal distention.  Abdomen soft and distended without peritonitis or tenderness.      - NPO, strict  - NG tube for decompression, keep to intermittent low wall suction; monitor and record output  - Strict I/O  - IV fluid resuscitation   - Close monitoring of abdominal examination  - Low threshold for operative intervention if no improvement with decompression and bowel rest  - Ambulate, out of bed  - Optimize and replete electrolytes; goal K >4, Phos >3, Mg >2      VTE Risk Mitigation (From admission, onward)         Ordered     Reason for No Pharmacological VTE Prophylaxis  Once        Question:  Reasons:  Answer:  Risk of Bleeding    04/22/22 2041     IP VTE HIGH RISK PATIENT  Once         04/22/22 2041     Place sequential compression device  Until discontinued         04/22/22 2041                Thank you for your consult.      Latonia Alvarez MD  General Surgery  [facility]

## 2022-04-23 NOTE — SUBJECTIVE & OBJECTIVE
"Past Medical History:   Diagnosis Date    Hyperlipidemia     Osteoarthritis     Shingles 2002    Spinal stenosis        Past Surgical History:   Procedure Laterality Date    ANKLE FRACTURE SURGERY  2007    Right ankle    EXCISION OF MASS OF BACK N/A 11/16/2018    Procedure: EXCISION, MASS, BACK;  Surgeon: Fran Magaña MD;  Location: [facility];  Service: General;  Laterality: N/A;    EYE SURGERY Bilateral 2016    HYSTERECTOMY  1985    HEATHER    KNEE ARTHROSCOPY W/ DEBRIDEMENT  1994    Bilateral    KNEE ARTHROSCOPY W/ DEBRIDEMENT  2003    Left knee    OPEN PATELLA REEFING PROCEDURE  age 19    Left knee    Tonsilectomy  as a child    TONSILLECTOMY      TUBAL LIGATION         Review of patient's allergies indicates:   Allergen Reactions    Demerol [meperidine] Other (See Comments)     "Becomes Agitated and Combative"       No current facility-administered medications on file prior to encounter.     Current Outpatient Medications on File Prior to Encounter   Medication Sig    calcium-vitamin D3 (CALCIUM 500 + D) 500 mg(1,250mg) -200 unit per tablet Take 1 tablet by mouth 2 (two) times daily with meals.    celecoxib (CELEBREX) 200 MG capsule take 1 capsule (200MG) by oral route every day as needed    estradioL (ESTRACE) 0.01 % (0.1 mg/gram) vaginal cream Place 1 g vaginally twice a week.    glucosamine-chondroitin 500-400 mg tablet Take 1 tablet by mouth 3 (three) times daily.    neomycin-polymyxin-dexamethasone (MAXITROL) 3.5mg/mL-10,000 unit/mL-0.1 % DrpS Place 1 drop into the right eye 3 (three) times daily.    polyethylene glycol 3350 (MIRALAX ORAL) Take by mouth as needed.     simvastatin (ZOCOR) 40 MG tablet Take 40 mg by mouth every evening.      Family History       Problem Relation (Age of Onset)    Breast cancer Paternal Aunt    Colon cancer Paternal Aunt    Coronary artery disease Brother    Diabetes Brother    Hypertension Father, Mother          Tobacco Use    Smoking status: Never Smoker    Smokeless " tobacco: Never Used   Substance and Sexual Activity    Alcohol use: Yes     Alcohol/week: 3.0 standard drinks     Types: 3 Glasses of wine per week     Comment: Drinks a glass of wine 3 times weekly    Drug use: No    Sexual activity: Not Currently     Birth control/protection: Post-menopausal     Comment: Spouse  of kidney cancer : 2015     Review of Systems   Constitutional:  Positive for activity change, appetite change and fatigue. Negative for fever.   HENT:  Negative for congestion, ear pain, rhinorrhea and sinus pressure.    Eyes:  Negative for pain and discharge.   Respiratory:  Negative for cough, chest tightness, shortness of breath and wheezing.    Cardiovascular:  Negative for chest pain and leg swelling.   Gastrointestinal:  Positive for abdominal distention, abdominal pain, nausea and vomiting. Negative for diarrhea.   Endocrine: Negative for cold intolerance and heat intolerance.   Genitourinary:  Negative for difficulty urinating, flank pain, frequency, hematuria and urgency.   Musculoskeletal:  Negative for arthralgias, joint swelling and myalgias.   Allergic/Immunologic: Negative for environmental allergies and food allergies.   Neurological:  Negative for dizziness, weakness, light-headedness and headaches.   Hematological:  Does not bruise/bleed easily.   Psychiatric/Behavioral:  Negative for agitation, behavioral problems and decreased concentration.    Objective:     Vital Signs (Most Recent):  Temp: 97 °F (36.1 °C) (22)  Pulse: 88 (22)  Resp: 20 (22)  BP: (!) 173/81 (22)  SpO2: 98 % (22)   Vital Signs (24h Range):  Temp:  [97 °F (36.1 °C)-98.7 °F (37.1 °C)] 97 °F (36.1 °C)  Pulse:  [88-89] 88  Resp:  [16-20] 20  SpO2:  [96 %-98 %] 98 %  BP: (130-197)/(75-84) 173/81     Weight: 54.4 kg (120 lb)  Body mass index is 19.97 kg/m².    Physical Exam  Constitutional:       Appearance: Normal appearance. She is well-developed.   HENT:       Head: Normocephalic.   Eyes:      General:         Right eye: No discharge.         Left eye: No discharge.      Conjunctiva/sclera: Conjunctivae normal.   Cardiovascular:      Rate and Rhythm: Regular rhythm. Tachycardia present.      Pulses:           Radial pulses are 2+ on the right side and 2+ on the left side.      Heart sounds: Normal heart sounds.   Pulmonary:      Effort: Pulmonary effort is normal. Tachypnea present. No respiratory distress.      Breath sounds: Normal breath sounds.   Abdominal:      General: Bowel sounds are normal. There is no distension.      Palpations: Abdomen is soft.      Tenderness: There is no abdominal tenderness.   Musculoskeletal:         General: Normal range of motion.      Cervical back: Normal range of motion and neck supple.   Skin:     General: Skin is warm and dry.   Neurological:      Mental Status: She is alert and oriented to person, place, and time.      GCS: GCS eye subscore is 4. GCS verbal subscore is 5. GCS motor subscore is 6.   Psychiatric:         Mood and Affect: Mood normal.         Speech: Speech normal.         Behavior: Behavior normal.           Significant Labs: All pertinent labs within the past 24 hours have been reviewed.  CBC:   Recent Labs   Lab 04/22/22  1624   WBC 20.87*   HGB 17.3*   HCT 50.7*        CMP:   Recent Labs   Lab 04/22/22  1624      K 4.1      CO2 20*   *   BUN 25*   CREATININE 1.1   CALCIUM 11.2*   PROT 7.6   ALBUMIN 4.8   BILITOT 1.1*   ALKPHOS 80   AST 20   ALT 18   ANIONGAP 17*   EGFRNONAA 49*       Significant Imaging: I have reviewed all pertinent imaging results/findings within the past 24 hours.

## 2022-04-23 NOTE — PT/OT/SLP EVAL
Physical Therapy Evaluation, Treatment, and Discharge Note    Patient Name:  Marian Durham   MRN:  [id number]    Recommendations:     Discharge Recommendations:  home (discussed rec for hallway amb 2x per day if able to be off suction for brief periods per MD)   Discharge Equipment Recommendations: none (has RW if needed)   Barriers to discharge: None    Assessment:     Marian Durham is a 76 y.o. female admitted with a medical diagnosis of SBO (small bowel obstruction). She presents with the following impairments/functional limitations:  impaired endurance, impaired balance, decreased coordination.    Patient evaluated and no acute care PT goals identified. Gait training performed with RW and no AD including cueing for line management/safety with NG tube to suction. Pt demo's appropriate progress with awareness and safe to ambulate with family or staff supervision in [place] if allowed for brief periods off suction.      During this hospitalization, patient does not require further acute PT services.  Please re-consult if situation changes.  Recommendation for d/c to home with no further PT indicated.    Recent Surgery: * No surgery found *      Plan:     During this hospitalization, patient does not require further acute PT services.  Please re-consult if situation changes.      Subjective     Chief Complaint: Increased pain in joints d/t not being able to have celebrex (PO for arthritis)  Patient/Family Comments/goals: Goal to keep her OA from being out of control; Patient agreeable to evaluation.  Pain/Comfort:  · Pain Rating 1: 0/10  · Pain Rating Post-Intervention 1: 0/10    Patients cultural, spiritual, Y[employer]ism conflicts given the current situation: no    Living Environment:  · Pt lives alone in a single story home with flight steps to enter and chair lift available.   · Upon discharge, patient will have assistance from her family PRN.  Prior level of function:  · Ambulation: Indep  · ADL's:  Indep  · IADLs: Indep  · Falls: None reported  · Equipment used at home: none.  DME owned (not currently used): rolling walker.     Objective:     Attempted to communicate with RN prior to session.  Patient found HOB elevated with peripheral IV, NG tube upon PT entry to room.    General Precautions: Standard, NPO   Orthopedic Precautions:N/A   Braces: N/A   Respiratory Status: Room air    Patient donned shoes for OOB mobility.     Exams:  · Cognition:   · Patient is oriented x4.  · Pt follows approximately 100% of one and two step commands.    · Mood: Pleasant and cooperative.   · Safety Awareness: Good  · Musculoskeletal:  · BMI: 19.96 - notable decreased muscle mass of L thigh vs R thigh (knee surgery when pt was 18 yo)  · Posture:  Forward head  · LE ROM/Strength:   · R ROM: WFL  · L ROM: WFL, catch in knee with extension/flexion  · R Strength: WFL  · L Strength: WFL, knee extension 4/5  · Neuromuscular:  · Coordination/Tone/Reflexes: No impairments identified with functional mobility. No formal testing performed.  · Balance:   · Static sitting: Good  · Static standing: Good  · Dynamic standing: Good  · Visual-vestibular: No impairments identified with functional mobility. No formal testing performed.  · Integument: Visible skin intact  · Cardiopulmonary:  · Edema: None noted    Functional Mobility:  · Bed Mobility:     · Supine to Sit: modified independence  · Transfers:     · Sit to Stand:  independence and modified independence with no AD and rolling walker  · Gait: 1x15 ft with no AD and SBA, 2x20 ft with IV pole for uni UE support and supervision, 1x40 ft with RW and supervision. No overt LOB or knee buckling noted.     AM-PAC 6 CLICK MOBILITY  Total Score:22       Therapeutic Activities and Exercises:  ·  Gait:  · Discussed indication for RW trina if knee pain increases   · Encouraged gait bouts in room and in hallway (if allowed off suction for brief periods) both to limit stiffness of knees and to  facilitate bowel function  · Cueing for line management    PT educated patient and son re:   PT plan of care/role of PT  Safety with OOB mobility  Use of RW vs no AD  Discharge disposition    Pt verbalized understanding       AM-PAC 6 CLICK MOBILITY  Total Score:22     Patient left up in chair with all lines intact, call button in reach, RN notified, son present and white board updated.    GOALS:   Multidisciplinary Problems     Physical Therapy Goals     Not on file          Multidisciplinary Problems (Resolved)        Problem: Physical Therapy    Goal Priority Disciplines Outcome Goal Variances Interventions   Physical Therapy Goal   (Resolved)     PT, PT/OT Met                     History:     Past Medical History:   Diagnosis Date    Hyperlipidemia     Osteoarthritis     Shingles 2002    Spinal stenosis        Past Surgical History:   Procedure Laterality Date    ANKLE FRACTURE SURGERY  2007    Right ankle    EXCISION OF MASS OF BACK N/A 11/16/2018    Procedure: EXCISION, MASS, BACK;  Surgeon: Fran Magaña MD;  Location: [facility];  Service: General;  Laterality: N/A;    EYE SURGERY Bilateral 2016    HYSTERECTOMY  1985    HEATHER    KNEE ARTHROSCOPY W/ DEBRIDEMENT  1994    Bilateral    KNEE ARTHROSCOPY W/ DEBRIDEMENT  2003    Left knee    OPEN PATELLA REEFING PROCEDURE  age 19    Left knee    Tonsilectomy  as a child    TONSILLECTOMY      TUBAL LIGATION         Time Tracking:     PT Received On: 04/23/22  PT Start Time: 1050     PT Stop Time: 1117  PT Total Time (min): 27 min     Billable Minutes: Evaluation 15 and Gait Training 12      04/23/2022

## 2022-04-23 NOTE — ASSESSMENT & PLAN NOTE
CT-  Small-bowel obstruction with transition point at the midline, mid abdomen at the level the umbilicus.  See above comments.  This could be associated with an internal hernia, mild volvulus or adhesions.  Recommend surgical consultation and follow-up.  Mild hepatomegaly.  Trace pericardial effusion.  Mild wall thickening of the colon most prominent the transverse colon could be associated with mild inflammatory, infectious or early ischemic process.  Surgical follow-up recommended.  Diverticulosis of the colon.    IVF  NPO  Morphine  Zofran/Phenergan  Consult General Surgery  NGT

## 2022-04-23 NOTE — PROGRESS NOTES
"[facility]  General Surgery  Progress Note    Subjective:     History of Present Illness:  76 y.o. woman with a history of constipation and a prior hysterectomy and tubal ligation in the 80s presenting with abdominal discomfort associated with belching, abdominal distention, abdominal discomfort and weakness.  CT scan demonstrates dilated loops of small intestine with a possible transition point in the mid abdomen without pneumoperitoneum, free fluid or pneumatosis.  NG tube placed on day of admission.        Post-Op Info:  * No surgery found *         Interval History: Feeling better after NG tube placement.  Abdominal distention improved.  Still having abdominal discomfort but no pain.  Belching still.  No nausea or vomiting, no fevers, chills. No flatus or bowel movements. Voiding.     Medications:  Continuous Infusions:   lactated ringers 125 mL/hr at 04/23/22 0616     Scheduled Meds:   piperacillin-tazobactam (ZOSYN) IVPB  4.5 g Intravenous Q8H     PRN Meds:morphine, morphine, naloxone, ondansetron, promethazine (PHENERGAN) IVPB, sodium chloride 0.9%     Review of patient's allergies indicates:   Allergen Reactions    Demerol [meperidine] Other (See Comments)     "Becomes Agitated and Combative"     Objective:     Vital Signs (Most Recent):  Temp: 98.1 °F (36.7 °C) (04/23/22 1139)  Pulse: 81 (04/23/22 1139)  Resp: 18 (04/23/22 1139)  BP: 136/64 (04/23/22 1139)  SpO2: 96 % (04/23/22 1139) Vital Signs (24h Range):  Temp:  [97 °F (36.1 °C)-98.8 °F (37.1 °C)] 98.1 °F (36.7 °C)  Pulse:  [81-96] 81  Resp:  [16-20] 18  SpO2:  [96 %-98 %] 96 %  BP: (129-197)/(64-84) 136/64     Weight: 54.4 kg (119 lb 14.9 oz)  Body mass index is 19.96 kg/m².    Intake/Output - Last 3 Shifts         04/21 0700  04/22 0659 04/22 0700  04/23 0659 04/23 0700 04/24 0659    P.O.  0     I.V. (mL/kg)  671 (12.3)     IV Piggyback  7.3     Total Intake(mL/kg)  678.3 (12.5)     Urine (mL/kg/hr)  0     Drains  150     " Total Output  150     Net  +528.3            Urine Occurrence  2 x             Physical Exam  Constitutional:       General: She is not in acute distress.  HENT:      Nose:      Comments: NG tube to low wall suction, clear output  Cardiovascular:      Rate and Rhythm: Normal rate.   Pulmonary:      Effort: Pulmonary effort is normal. No respiratory distress.   Abdominal:      General: There is distension.      Palpations: Abdomen is soft.      Comments: Mild diffuse tenderness; tympany; no rebound, guarding or peritonitis   Neurological:      Mental Status: She is alert.       Significant Labs:  I have reviewed all pertinent lab results within the past 24 hours.  CBC:   Recent Labs   Lab 04/23/22  0520   WBC 14.12*   RBC 4.87   HGB 14.8   HCT 43.6      MCV 90   MCH 30.4   MCHC 33.9     CMP:   Recent Labs   Lab 04/23/22  0520   *   CALCIUM 9.2   ALBUMIN 3.6   PROT 5.7*      K 3.9   CO2 21*      BUN 23   CREATININE 1.0   ALKPHOS 58   ALT 10   AST 16   BILITOT 0.9     Recent Labs   Lab 04/22/22  1904   COLORU Yellow   SPECGRAV <=1.005*   PHUR 8.5   PROTEINUA Negative   NITRITE Negative   LEUKOCYTESUR Negative   UROBILINOGEN Negative       Significant Diagnostics:  I have reviewed all pertinent imaging results/findings within the past 24 hours.  CXR: I have reviewed all pertinent results/findings within the past 24 hours and my personal findings are:  NG tube in adequate position    Assessment/Plan:     * SBO (small bowel obstruction)  Small bowel obstruction associated with nausea, belching and abdominal distention.  Abdomen soft and distended without peritonitis or tenderness.  Leukocytosis improving, lactate normalized.     - NPO, strict  - NG tube for decompression, keep to intermittent low wall suction; monitor and record output  - Strict I/O  - IV fluid resuscitation   - Close monitoring of abdominal examination  - Low threshold for operative intervention if no improvement with  decompression and bowel rest; will consider gastrografin SBO challenge protocol after 24h decompression  - Ambulate, out of bed with assistance as needed; PT consultation for evaluation and treatment appropriate   - Optimize and replete electrolytes; goal K >4, Phos >3, Mg >2        Latonia Alvarez MD  General Surgery  [facility] ([facility])

## 2022-04-23 NOTE — ASSESSMENT & PLAN NOTE
Volume depletion / Lactic acidosis / AGMA  - With decreased PO intake, hemoconcentration noted on labs with Hgb 17  - CT abd shows SBO with transition point at level at umbilicus, mild wall thickening of transverse colon  - NGT placed. Continue LIWS  - Lactic acidosis improved with IVF  - General surgery following. Discussed with Dr. Alvarez  - Continue IVF, NPO, pain control  - Given possible colitis and leukocytosis, continue empiric Zosyn for now

## 2022-04-23 NOTE — SUBJECTIVE & OBJECTIVE
Interval History: No acute events overnight. Pt feels somewhat improved after NGT placement but with continued abdominal distension, belching, and discomfort. No flatus    Review of Systems   Constitutional:  Negative for chills and fever.   HENT:  Positive for sore throat (due to NGT).    Respiratory:  Negative for shortness of breath.    Cardiovascular:  Negative for leg swelling.   Gastrointestinal:  Positive for abdominal distention and abdominal pain. Negative for nausea and vomiting.   Objective:     Vital Signs (Most Recent):  Temp: 98.2 °F (36.8 °C) (04/23/22 0723)  Pulse: 92 (04/23/22 0723)  Resp: 16 (04/23/22 0826)  BP: 129/68 (04/23/22 0723)  SpO2: 97 % (04/23/22 0723) Vital Signs (24h Range):  Temp:  [97 °F (36.1 °C)-98.8 °F (37.1 °C)] 98.2 °F (36.8 °C)  Pulse:  [88-96] 92  Resp:  [16-20] 16  SpO2:  [96 %-98 %] 97 %  BP: (129-197)/(68-84) 129/68     Weight: 54.4 kg (119 lb 14.9 oz)  Body mass index is 19.96 kg/m².    Intake/Output Summary (Last 24 hours) at 4/23/2022 1112  Last data filed at 4/23/2022 0630  Gross per 24 hour   Intake 678.27 ml   Output 150 ml   Net 528.27 ml      Physical Exam  Vitals and nursing note reviewed.   Constitutional:       General: She is not in acute distress.     Appearance: Normal appearance. She is well-developed.   HENT:      Nose:      Comments: NG tube in place  Cardiovascular:      Rate and Rhythm: Normal rate and regular rhythm.      Pulses: Normal pulses.   Pulmonary:      Effort: Pulmonary effort is normal.      Breath sounds: Normal breath sounds.   Abdominal:      General: There is distension.      Tenderness: There is abdominal tenderness.      Comments: No bowel sounds auscultated. Abdomen moderately distended with tenderness   Musculoskeletal:      Right lower leg: No edema.      Left lower leg: No edema.   Skin:     General: Skin is warm and dry.   Neurological:      Mental Status: She is alert and oriented to person, place, and time. Mental status is at  baseline.   Psychiatric:         Behavior: Behavior normal.       Significant Labs: All pertinent labs within the past 24 hours have been reviewed.  BMP:   Recent Labs   Lab 04/23/22  0520   *      K 3.9      CO2 21*   BUN 23   CREATININE 1.0   CALCIUM 9.2   MG 2.1     CBC:   Recent Labs   Lab 04/22/22  1624 04/23/22  0520   WBC 20.87* 14.12*   HGB 17.3* 14.8   HCT 50.7* 43.6    305       Significant Imaging: I have reviewed all pertinent imaging results/findings within the past 24 hours.

## 2022-04-24 LAB
ALBUMIN SERPL BCP-MCNC: 3.4 G/DL (ref 3.5–5.2)
ALP SERPL-CCNC: 56 U/L (ref 55–135)
ALT SERPL W/O P-5'-P-CCNC: 16 U/L (ref 10–44)
ANION GAP SERPL CALC-SCNC: 11 MMOL/L (ref 8–16)
AST SERPL-CCNC: 20 U/L (ref 10–40)
BASOPHILS # BLD AUTO: 0.02 K/UL (ref 0–0.2)
BASOPHILS NFR BLD: 0.3 % (ref 0–1.9)
BILIRUB SERPL-MCNC: 1.3 MG/DL (ref 0.1–1)
BUN SERPL-MCNC: 15 MG/DL (ref 8–23)
CALCIUM SERPL-MCNC: 9.2 MG/DL (ref 8.7–10.5)
CHLORIDE SERPL-SCNC: 106 MMOL/L (ref 95–110)
CO2 SERPL-SCNC: 24 MMOL/L (ref 23–29)
CREAT SERPL-MCNC: 1 MG/DL (ref 0.5–1.4)
DIFFERENTIAL METHOD: ABNORMAL
EOSINOPHIL # BLD AUTO: 0.1 K/UL (ref 0–0.5)
EOSINOPHIL NFR BLD: 0.9 % (ref 0–8)
ERYTHROCYTE [DISTWIDTH] IN BLOOD BY AUTOMATED COUNT: 12.8 % (ref 11.5–14.5)
EST. GFR  (AFRICAN AMERICAN): >60 ML/MIN/1.73 M^2
EST. GFR  (NON AFRICAN AMERICAN): 55 ML/MIN/1.73 M^2
GLUCOSE SERPL-MCNC: 112 MG/DL (ref 70–110)
HCT VFR BLD AUTO: 44.4 % (ref 37–48.5)
HGB BLD-MCNC: 14.5 G/DL (ref 12–16)
IMM GRANULOCYTES # BLD AUTO: 0.01 K/UL (ref 0–0.04)
IMM GRANULOCYTES NFR BLD AUTO: 0.2 % (ref 0–0.5)
LYMPHOCYTES # BLD AUTO: 1.2 K/UL (ref 1–4.8)
LYMPHOCYTES NFR BLD: 19.4 % (ref 18–48)
MAGNESIUM SERPL-MCNC: 2 MG/DL (ref 1.6–2.6)
MCH RBC QN AUTO: 30.5 PG (ref 27–31)
MCHC RBC AUTO-ENTMCNC: 32.7 G/DL (ref 32–36)
MCV RBC AUTO: 93 FL (ref 82–98)
MONOCYTES # BLD AUTO: 0.9 K/UL (ref 0.3–1)
MONOCYTES NFR BLD: 13.3 % (ref 4–15)
NEUTROPHILS # BLD AUTO: 4.2 K/UL (ref 1.8–7.7)
NEUTROPHILS NFR BLD: 65.9 % (ref 38–73)
NRBC BLD-RTO: 0 /100 WBC
PHOSPHATE SERPL-MCNC: 2.9 MG/DL (ref 2.7–4.5)
PLATELET # BLD AUTO: 240 K/UL (ref 150–450)
PMV BLD AUTO: 8.9 FL (ref 9.2–12.9)
POTASSIUM SERPL-SCNC: 3.6 MMOL/L (ref 3.5–5.1)
PROT SERPL-MCNC: 5.9 G/DL (ref 6–8.4)
RBC # BLD AUTO: 4.76 M/UL (ref 4–5.4)
SODIUM SERPL-SCNC: 141 MMOL/L (ref 136–145)
WBC # BLD AUTO: 6.38 K/UL (ref 3.9–12.7)

## 2022-04-24 PROCEDURE — 84100 ASSAY OF PHOSPHORUS: CPT | Performed by: NURSE PRACTITIONER

## 2022-04-24 PROCEDURE — 63600175 PHARM REV CODE 636 W HCPCS: Performed by: NURSE PRACTITIONER

## 2022-04-24 PROCEDURE — 63600175 PHARM REV CODE 636 W HCPCS: Performed by: INTERNAL MEDICINE

## 2022-04-24 PROCEDURE — 25000003 PHARM REV CODE 250: Performed by: NURSE PRACTITIONER

## 2022-04-24 PROCEDURE — 99233 SBSQ HOSP IP/OBS HIGH 50: CPT | Mod: ,,, | Performed by: STUDENT IN AN ORGANIZED HEALTH CARE EDUCATION/TRAINING PROGRAM

## 2022-04-24 PROCEDURE — 11000001 HC ACUTE MED/SURG PRIVATE ROOM

## 2022-04-24 PROCEDURE — 99233 PR SUBSEQUENT HOSPITAL CARE,LEVL III: ICD-10-PCS | Mod: ,,, | Performed by: STUDENT IN AN ORGANIZED HEALTH CARE EDUCATION/TRAINING PROGRAM

## 2022-04-24 PROCEDURE — 99232 SBSQ HOSP IP/OBS MODERATE 35: CPT | Mod: ,,, | Performed by: INTERNAL MEDICINE

## 2022-04-24 PROCEDURE — 36415 COLL VENOUS BLD VENIPUNCTURE: CPT | Performed by: NURSE PRACTITIONER

## 2022-04-24 PROCEDURE — 80053 COMPREHEN METABOLIC PANEL: CPT | Performed by: NURSE PRACTITIONER

## 2022-04-24 PROCEDURE — 99232 PR SUBSEQUENT HOSPITAL CARE,LEVL II: ICD-10-PCS | Mod: ,,, | Performed by: INTERNAL MEDICINE

## 2022-04-24 PROCEDURE — 85025 COMPLETE CBC W/AUTO DIFF WBC: CPT | Performed by: NURSE PRACTITIONER

## 2022-04-24 PROCEDURE — 83735 ASSAY OF MAGNESIUM: CPT | Performed by: NURSE PRACTITIONER

## 2022-04-24 PROCEDURE — 25500020 PHARM REV CODE 255: Performed by: STUDENT IN AN ORGANIZED HEALTH CARE EDUCATION/TRAINING PROGRAM

## 2022-04-24 RX ORDER — MORPHINE SULFATE 4 MG/ML
3 INJECTION, SOLUTION INTRAMUSCULAR; INTRAVENOUS EVERY 6 HOURS PRN
Status: DISCONTINUED | OUTPATIENT
Start: 2022-04-24 | End: 2022-04-29 | Stop reason: HOSPADM

## 2022-04-24 RX ADMIN — PIPERACILLIN AND TAZOBACTAM 4.5 G: 4; .5 INJECTION, POWDER, LYOPHILIZED, FOR SOLUTION INTRAVENOUS; PARENTERAL at 10:04

## 2022-04-24 RX ADMIN — SODIUM CHLORIDE, SODIUM LACTATE, POTASSIUM CHLORIDE, AND CALCIUM CHLORIDE: .6; .31; .03; .02 INJECTION, SOLUTION INTRAVENOUS at 06:04

## 2022-04-24 RX ADMIN — PIPERACILLIN AND TAZOBACTAM 4.5 G: 4; .5 INJECTION, POWDER, LYOPHILIZED, FOR SOLUTION INTRAVENOUS; PARENTERAL at 02:04

## 2022-04-24 RX ADMIN — SODIUM CHLORIDE, SODIUM LACTATE, POTASSIUM CHLORIDE, AND CALCIUM CHLORIDE: .6; .31; .03; .02 INJECTION, SOLUTION INTRAVENOUS at 08:04

## 2022-04-24 RX ADMIN — MORPHINE SULFATE 2 MG: 4 INJECTION, SOLUTION INTRAMUSCULAR; INTRAVENOUS at 10:04

## 2022-04-24 RX ADMIN — PIPERACILLIN AND TAZOBACTAM 4.5 G: 4; .5 INJECTION, POWDER, LYOPHILIZED, FOR SOLUTION INTRAVENOUS; PARENTERAL at 06:04

## 2022-04-24 RX ADMIN — DIATRIZOATE MEGLUMINE AND DIATRIZOATE SODIUM 90 ML: 660; 100 LIQUID ORAL; RECTAL at 04:04

## 2022-04-24 RX ADMIN — ONDANSETRON 4 MG: 2 INJECTION INTRAMUSCULAR; INTRAVENOUS at 10:04

## 2022-04-24 RX ADMIN — MORPHINE SULFATE 2 MG: 4 INJECTION, SOLUTION INTRAMUSCULAR; INTRAVENOUS at 08:04

## 2022-04-24 NOTE — SUBJECTIVE & OBJECTIVE
Interval History: No acute events overnight. Pt complains of poor sleep, post-nasal drip, and joint pains since she cannot take Celebrex. + Flatus x 4 and small BM. Abdomen still distended.    Review of Systems   Constitutional:  Negative for chills and fever.   HENT:  Positive for postnasal drip and sore throat (due to NGT).    Respiratory:  Negative for shortness of breath.    Cardiovascular:  Negative for leg swelling.   Gastrointestinal:  Positive for abdominal distention and abdominal pain. Negative for nausea and vomiting.   Musculoskeletal:  Positive for arthralgias.   Objective:     Vital Signs (Most Recent):  Temp: 98 °F (36.7 °C) (04/24/22 0717)  Pulse: 90 (04/24/22 0717)  Resp: 16 (04/24/22 0717)  BP: (!) 162/79 (04/24/22 0717)  SpO2: 96 % (04/24/22 0717) Vital Signs (24h Range):  Temp:  [97.9 °F (36.6 °C)-98.9 °F (37.2 °C)] 98 °F (36.7 °C)  Pulse:  [] 90  Resp:  [16-20] 16  SpO2:  [96 %-98 %] 96 %  BP: (136-181)/(64-81) 162/79     Weight: 54.4 kg (119 lb 14.9 oz)  Body mass index is 19.96 kg/m².    Intake/Output Summary (Last 24 hours) at 4/24/2022 1015  Last data filed at 4/24/2022 0609  Gross per 24 hour   Intake 3418.98 ml   Output 50 ml   Net 3368.98 ml        Physical Exam  Vitals and nursing note reviewed.   Constitutional:       General: She is not in acute distress.     Appearance: Normal appearance. She is well-developed.   HENT:      Nose:      Comments: NG tube in place  Cardiovascular:      Rate and Rhythm: Normal rate and regular rhythm.      Pulses: Normal pulses.   Pulmonary:      Effort: Pulmonary effort is normal.      Breath sounds: Normal breath sounds.   Abdominal:      General: Bowel sounds are normal. There is distension.      Comments: Bowel sounds present today. Abdomen remains distended but less tenderness   Musculoskeletal:      Right lower leg: No edema.      Left lower leg: No edema.   Skin:     General: Skin is warm and dry.   Neurological:      Mental Status: She is  alert and oriented to person, place, and time. Mental status is at baseline.   Psychiatric:         Behavior: Behavior normal.       Significant Labs: All pertinent labs within the past 24 hours have been reviewed.  BMP:   Recent Labs   Lab 04/24/22  0500   *      K 3.6      CO2 24   BUN 15   CREATININE 1.0   CALCIUM 9.2   MG 2.0       CBC:   Recent Labs   Lab 04/22/22  1624 04/23/22  0520 04/24/22  0500   WBC 20.87* 14.12* 6.38   HGB 17.3* 14.8 14.5   HCT 50.7* 43.6 44.4    305 240         Significant Imaging: I have reviewed all pertinent imaging results/findings within the past 24 hours.

## 2022-04-24 NOTE — SUBJECTIVE & OBJECTIVE
"Interval History: Passing flatus, had diarrhea yesterday and today.  Some improvement of distention.  Denies abdominal pain.  Tolerating the NG tube with minimal discomfort and belching, no nausea, though there was leaking via the sump port and this was clamped off with white end of antireflux valve.     Medications:  Continuous Infusions:   lactated ringers 125 mL/hr at 04/24/22 0609     Scheduled Meds:   diatrizoate meglumineand-diatrizoate sodium  100 mL Per NG tube Once    piperacillin-tazobactam (ZOSYN) IVPB  4.5 g Intravenous Q8H     PRN Meds:morphine, morphine, naloxone, ondansetron, promethazine (PHENERGAN) IVPB, sodium chloride 0.9%     Review of patient's allergies indicates:   Allergen Reactions    Demerol [meperidine] Other (See Comments)     "Becomes Agitated and Combative"     Objective:     Vital Signs (Most Recent):  Temp: 98 °F (36.7 °C) (04/24/22 1221)  Pulse: 96 (04/24/22 1221)  Resp: 19 (04/24/22 1221)  BP: (!) 181/84 (04/24/22 1221)  SpO2: 97 % (04/24/22 1221)   Vital Signs (24h Range):  Temp:  [97.9 °F (36.6 °C)-98.9 °F (37.2 °C)] 98 °F (36.7 °C)  Pulse:  [] 96  Resp:  [16-20] 19  SpO2:  [96 %-98 %] 97 %  BP: (139-181)/(67-84) 181/84     Weight: 54.4 kg (119 lb 14.9 oz)  Body mass index is 19.96 kg/m².    Intake/Output - Last 3 Shifts         04/22 0700 04/23 0659 04/23 0700 04/24 0659 04/24 0700 04/25 0659    P.O. 0 0     I.V. (mL/kg) 671 (12.3) 3116.4 (57.3)     IV Piggyback 7.3 302.6     Total Intake(mL/kg) 678.3 (12.5) 3419 (62.8)     Urine (mL/kg/hr) 0 0 (0)     Drains 150 50     Total Output 150 50     Net +528.3 +3369            Urine Occurrence 2 x 8 x             Physical Exam  Constitutional:       General: She is not in acute distress.  HENT:      Nose:      Comments: NGT to intermittent low wall suction. Antireflux valve adjusted to properly vent sump port.  Thin serous output.  Eyes:      General: No scleral icterus.  Cardiovascular:      Rate and Rhythm: Normal rate. "   Pulmonary:      Effort: Pulmonary effort is normal. No respiratory distress.   Abdominal:      General: There is distension.      Palpations: Abdomen is soft.      Tenderness: There is no abdominal tenderness.   Skin:     General: Skin is warm and dry.   Neurological:      Mental Status: She is alert.   Psychiatric:         Mood and Affect: Mood normal.         Behavior: Behavior normal.       Significant Labs:  I have reviewed all pertinent lab results within the past 24 hours.  CBC:   Recent Labs   Lab 04/24/22  0500   WBC 6.38   RBC 4.76   HGB 14.5   HCT 44.4      MCV 93   MCH 30.5   MCHC 32.7     CMP:   Recent Labs   Lab 04/24/22  0500   *   CALCIUM 9.2   ALBUMIN 3.4*   PROT 5.9*      K 3.6   CO2 24      BUN 15   CREATININE 1.0   ALKPHOS 56   ALT 16   AST 20   BILITOT 1.3*       Significant Diagnostics:  I have reviewed all pertinent imaging results/findings within the past 24 hours.

## 2022-04-24 NOTE — HOSPITAL COURSE
Patient admitted with small bowel obstruction. CT abd showed SBO with transition point at the level the umbilicus and mild wall thickening of the colon, most prominent the transverse colon, could be associated with mild inflammatory, infectious or early ischemic process. She had leukocytosis as well. Started on empiric zosyn. General surgery consulted. NGT placed for decompression and she was treated with IVF, NPO and pain control.

## 2022-04-24 NOTE — PLAN OF CARE
Pt AAO x 4. HOB elevated 45 degrees. Ng tube patent to LIWS with tan drainage noted. Pt tolerated gastrographin to n g tube well. No c/o nausea noted. Ng tube clamped off x 2hrs and then will put back to suction. Pain controlled with pain medications as ordered. Pt up with assist. Pt free from falls or injury. Voiding clear yellow urine. Safety maintained. Call light in reach.   Problem: Adult Inpatient Plan of Care  Goal: Plan of Care Review  Outcome: Ongoing, Progressing  Goal: Patient-Specific Goal (Individualized)  Outcome: Ongoing, Progressing  Goal: Absence of Hospital-Acquired Illness or Injury  Outcome: Ongoing, Progressing  Goal: Optimal Comfort and Wellbeing  Outcome: Ongoing, Progressing  Goal: Readiness for Transition of Care  Outcome: Ongoing, Progressing     Problem: Fluid Deficit (Intestinal Obstruction)  Goal: Fluid Balance  Outcome: Ongoing, Progressing     Problem: Infection (Intestinal Obstruction)  Goal: Absence of Infection Signs and Symptoms  Outcome: Ongoing, Progressing     Problem: Nausea and Vomiting (Intestinal Obstruction)  Goal: Nausea and Vomiting Relief  Outcome: Ongoing, Progressing     Problem: Pain (Intestinal Obstruction)  Goal: Acceptable Pain Control  Outcome: Ongoing, Progressing     Problem: Fall Injury Risk  Goal: Absence of Fall and Fall-Related Injury  Outcome: Ongoing, Progressing     Problem: Bariatric Environmental Safety  Goal: Safety Maintained with Care  Outcome: Ongoing, Progressing

## 2022-04-24 NOTE — PLAN OF CARE
AAOX4. VSS. NPO. NGT @ right nare. Low intermittent wall suctioning. Able to make needs known. One person assist per adls and transfers, voiding per bsc. Tolerating medications and IVF well. HOB 30 degrees with call bell and bedside table within reach, bed in lowest position with hourly purposeful rounding. Will continue to monitor.   Problem: Adult Inpatient Plan of Care  Goal: Plan of Care Review  Outcome: Ongoing, Progressing  Goal: Patient-Specific Goal (Individualized)  Outcome: Ongoing, Progressing  Goal: Absence of Hospital-Acquired Illness or Injury  Outcome: Ongoing, Progressing  Goal: Optimal Comfort and Wellbeing  Outcome: Ongoing, Progressing  Goal: Readiness for Transition of Care  Outcome: Ongoing, Progressing     Problem: Fluid Deficit (Intestinal Obstruction)  Goal: Fluid Balance  Outcome: Ongoing, Progressing     Problem: Infection (Intestinal Obstruction)  Goal: Absence of Infection Signs and Symptoms  Outcome: Ongoing, Progressing     Problem: Nausea and Vomiting (Intestinal Obstruction)  Goal: Nausea and Vomiting Relief  Outcome: Ongoing, Progressing     Problem: Pain (Intestinal Obstruction)  Goal: Acceptable Pain Control  Outcome: Ongoing, Progressing     Problem: Fall Injury Risk  Goal: Absence of Fall and Fall-Related Injury  Outcome: Ongoing, Progressing     Problem: Bariatric Environmental Safety  Goal: Safety Maintained with Care  Outcome: Ongoing, Progressing

## 2022-04-24 NOTE — PROGRESS NOTES
[facility] Medicine  Progress Note    Patient Name: Marian Durham  MRN: [id number]  Patient Class: IP- Inpatient   Admission Date: 4/22/2022  Length of Stay: 2 days  Attending Physician: Keshia Garcia MD  Primary Care Provider: Suzanna Duran MD        Subjective:     Principal Problem:SBO (small bowel obstruction)        HPI:  The patient is a 76 y.o. female with a past medical history of hyperlipidemia who presents with complaint of bloating that began 2 days ago. She notes that she had COVID-19 1.5 weeks ago and had fever, cough, postnasal drip and fatigue at the time. She explains that she has gone to urgent care 3 times this week for treatment.The patient reports that today at 10 AM the bloating worsened and was followed by cramping, gagging, SOB and postnasal drip. She states that she denies any vomiting, fever, or diarrhea.  On CT, the patient has a small bowel obstruction.  She will be admitted for further management of her SBO and General Surgery consult.      Overview/Hospital Course:  Patient admitted with small bowel obstruction. CT abd showed SBO with transition point at the level the umbilicus and mild wall thickening of the colon, most prominent the transverse colon, could be associated with mild inflammatory, infectious or early ischemic process. She had leukocytosis as well. Started on empiric zosyn. General surgery consulted. NGT placed for decompression and she was treated with IVF, NPO and pain control.      Interval History: No acute events overnight. Pt complains of poor sleep, post-nasal drip, and joint pains since she cannot take Celebrex. + Flatus x 4 and small BM. Abdomen still distended.    Review of Systems   Constitutional:  Negative for chills and fever.   HENT:  Positive for postnasal drip and sore throat (due to NGT).    Respiratory:  Negative for shortness of breath.    Cardiovascular:  Negative for leg swelling.   Gastrointestinal:  Positive  for abdominal distention and abdominal pain. Negative for nausea and vomiting.   Musculoskeletal:  Positive for arthralgias.   Objective:     Vital Signs (Most Recent):  Temp: 98 °F (36.7 °C) (04/24/22 0717)  Pulse: 90 (04/24/22 0717)  Resp: 16 (04/24/22 0717)  BP: (!) 162/79 (04/24/22 0717)  SpO2: 96 % (04/24/22 0717) Vital Signs (24h Range):  Temp:  [97.9 °F (36.6 °C)-98.9 °F (37.2 °C)] 98 °F (36.7 °C)  Pulse:  [] 90  Resp:  [16-20] 16  SpO2:  [96 %-98 %] 96 %  BP: (136-181)/(64-81) 162/79     Weight: 54.4 kg (119 lb 14.9 oz)  Body mass index is 19.96 kg/m².    Intake/Output Summary (Last 24 hours) at 4/24/2022 1015  Last data filed at 4/24/2022 0609  Gross per 24 hour   Intake 3418.98 ml   Output 50 ml   Net 3368.98 ml        Physical Exam  Vitals and nursing note reviewed.   Constitutional:       General: She is not in acute distress.     Appearance: Normal appearance. She is well-developed.   HENT:      Nose:      Comments: NG tube in place  Cardiovascular:      Rate and Rhythm: Normal rate and regular rhythm.      Pulses: Normal pulses.   Pulmonary:      Effort: Pulmonary effort is normal.      Breath sounds: Normal breath sounds.   Abdominal:      General: Bowel sounds are normal. There is distension.      Comments: Bowel sounds present today. Abdomen remains distended but less tenderness   Musculoskeletal:      Right lower leg: No edema.      Left lower leg: No edema.   Skin:     General: Skin is warm and dry.   Neurological:      Mental Status: She is alert and oriented to person, place, and time. Mental status is at baseline.   Psychiatric:         Behavior: Behavior normal.       Significant Labs: All pertinent labs within the past 24 hours have been reviewed.  BMP:   Recent Labs   Lab 04/24/22  0500   *      K 3.6      CO2 24   BUN 15   CREATININE 1.0   CALCIUM 9.2   MG 2.0       CBC:   Recent Labs   Lab 04/22/22  1624 04/23/22  0520 04/24/22  0500   WBC 20.87* 14.12* 6.38   HGB  17.3* 14.8 14.5   HCT 50.7* 43.6 44.4    305 240         Significant Imaging: I have reviewed all pertinent imaging results/findings within the past 24 hours.      Assessment/Plan:      * SBO (small bowel obstruction)  Volume depletion / Lactic acidosis / AGMA - resolved  - With decreased PO intake, hemoconcentration noted on labs with Hgb 17  - CT abd shows SBO with transition point at level at umbilicus, mild wall thickening of transverse colon  - NGT placed. Continue LIWS  - Flatus and small BM today but remains distended  - General surgery following. Discussed with Dr. Alvarez  - Continue IVF, strict NPO, pain control  - Given possible colitis, continue empiric Zosyn for now. Leukocytosis resolved    Primary osteoarthritis  - Having increased joint pain without celebrex  - PT eval complete    VTE Risk Mitigation (From admission, onward)         Ordered     Reason for No Pharmacological VTE Prophylaxis  Once        Question:  Reasons:  Answer:  Risk of Bleeding    04/22/22 2041     IP VTE HIGH RISK PATIENT  Once         04/22/22 2041     Place sequential compression device  Until discontinued         04/22/22 2041                    Keshia Garcia MD  Department of Hospital Medicine   [facility])

## 2022-04-24 NOTE — ASSESSMENT & PLAN NOTE
Volume depletion / Lactic acidosis / AGMA - resolved  - With decreased PO intake, hemoconcentration noted on labs with Hgb 17  - CT abd shows SBO with transition point at level at umbilicus, mild wall thickening of transverse colon  - NGT placed. Continue LIWS  - Flatus and small BM today but remains distended  - General surgery following. Discussed with Dr. Alvarez  - Continue IVF, strict NPO, pain control  - Given possible colitis, continue empiric Zosyn for now. Leukocytosis resolved

## 2022-04-24 NOTE — ASSESSMENT & PLAN NOTE
Small bowel obstruction associated with nausea, belching and abdominal distention.  Abdomen soft and distended without peritonitis or tenderness.  Leukocytosis and lactate normalized.  Passing some flatus and has had some improvement of the distention.  Will proceed with gastrografin small bowel obstruction protocol    - NPO, strict  - NGT to low wall suction, ensure patency by gently instilling water into clear NG suction port and gently pushing air into blue sump port.  If a reflux valve is used, ensure the blue side is inserted into the blue sump port.  It is normal and a sign of a properly functioning NG tube to have a whooshing air sound coming from the blue sump port.  Suction should be intermittent low wall suction.  Please avoid haleigh valves (opening is small and can obstruct easily)  - Gastrografin SBO protocol as below.  - Strict I/O  - IV fluid resuscitation   - Close monitoring of abdominal examination  - Ambulate, out of bed with assistance as needed; PT consultation for evaluation and treatment appropriate   - Optimize and replete electrolytes; goal K >4, Phos >3, Mg >2    Gastrografin Small Bowel Obstruction Protocol:   - Obtain 100 mL Gastrografin (ordered)  - Mix Gastrografin with 50 mL sterile water and administer via NG tube  - Clamp NG tube for two hours after administration of Gastrografin   - Place NG tube back to suction prior to the one hour clamp if patient experiences intractable nausea not controlled by anti-emetics or if patient vomits  - Notify MD if NG tube is placed back to suction early  - Obtain KUB studies at 8 hours and again at 24 hours after administration of Gastrografin (xrays ordered)  - Please coordinate with radiology to obtain Gastrografin and time KUB studies appropriately

## 2022-04-24 NOTE — PROGRESS NOTES
"[facility]  General Surgery  Progress Note    Subjective:     History of Present Illness:  76 y.o. woman with a history of constipation and a prior hysterectomy and tubal ligation in the 80s presenting with abdominal discomfort associated with belching, abdominal distention, abdominal discomfort and weakness.  CT scan demonstrates dilated loops of small intestine with a possible transition point in the mid abdomen without pneumoperitoneum, free fluid or pneumatosis.  NG tube placed on day of admission.        Post-Op Info:  * No surgery found *         Interval History: Passing flatus, had diarrhea yesterday and today.  Some improvement of distention.  Denies abdominal pain.  Tolerating the NG tube with minimal discomfort and belching, no nausea, though there was leaking via the sump port and this was clamped off with white end of antireflux valve.     Medications:  Continuous Infusions:   lactated ringers 125 mL/hr at 04/24/22 0609     Scheduled Meds:   diatrizoate meglumineand-diatrizoate sodium  100 mL Per NG tube Once    piperacillin-tazobactam (ZOSYN) IVPB  4.5 g Intravenous Q8H     PRN Meds:morphine, morphine, naloxone, ondansetron, promethazine (PHENERGAN) IVPB, sodium chloride 0.9%     Review of patient's allergies indicates:   Allergen Reactions    Demerol [meperidine] Other (See Comments)     "Becomes Agitated and Combative"     Objective:     Vital Signs (Most Recent):  Temp: 98 °F (36.7 °C) (04/24/22 1221)  Pulse: 96 (04/24/22 1221)  Resp: 19 (04/24/22 1221)  BP: (!) 181/84 (04/24/22 1221)  SpO2: 97 % (04/24/22 1221)   Vital Signs (24h Range):  Temp:  [97.9 °F (36.6 °C)-98.9 °F (37.2 °C)] 98 °F (36.7 °C)  Pulse:  [] 96  Resp:  [16-20] 19  SpO2:  [96 %-98 %] 97 %  BP: (139-181)/(67-84) 181/84     Weight: 54.4 kg (119 lb 14.9 oz)  Body mass index is 19.96 kg/m².    Intake/Output - Last 3 Shifts         04/22 0700 04/23 0659 04/23 0700 04/24 0659 04/24 0700 04/25 0659    " P.O. 0 0     I.V. (mL/kg) 671 (12.3) 3116.4 (57.3)     IV Piggyback 7.3 302.6     Total Intake(mL/kg) 678.3 (12.5) 3419 (62.8)     Urine (mL/kg/hr) 0 0 (0)     Drains 150 50     Total Output 150 50     Net +528.3 +3369            Urine Occurrence 2 x 8 x             Physical Exam  Constitutional:       General: She is not in acute distress.  HENT:      Nose:      Comments: NGT to intermittent low wall suction. Antireflux valve adjusted to properly vent sump port.  Thin serous output.  Eyes:      General: No scleral icterus.  Cardiovascular:      Rate and Rhythm: Normal rate.   Pulmonary:      Effort: Pulmonary effort is normal. No respiratory distress.   Abdominal:      General: There is distension.      Palpations: Abdomen is soft.      Tenderness: There is no abdominal tenderness.   Skin:     General: Skin is warm and dry.   Neurological:      Mental Status: She is alert.   Psychiatric:         Mood and Affect: Mood normal.         Behavior: Behavior normal.       Significant Labs:  I have reviewed all pertinent lab results within the past 24 hours.  CBC:   Recent Labs   Lab 04/24/22  0500   WBC 6.38   RBC 4.76   HGB 14.5   HCT 44.4      MCV 93   MCH 30.5   MCHC 32.7     CMP:   Recent Labs   Lab 04/24/22  0500   *   CALCIUM 9.2   ALBUMIN 3.4*   PROT 5.9*      K 3.6   CO2 24      BUN 15   CREATININE 1.0   ALKPHOS 56   ALT 16   AST 20   BILITOT 1.3*       Significant Diagnostics:  I have reviewed all pertinent imaging results/findings within the past 24 hours.    Assessment/Plan:     * SBO (small bowel obstruction)  Small bowel obstruction associated with nausea, belching and abdominal distention.  Abdomen soft and distended without peritonitis or tenderness.  Leukocytosis and lactate normalized.  Passing some flatus and has had some improvement of the distention.  Will proceed with gastrografin small bowel obstruction protocol    - NPO, strict  - NGT to low wall suction, ensure patency by  gently instilling water into clear NG suction port and gently pushing air into blue sump port.  If a reflux valve is used, ensure the blue side is inserted into the blue sump port.  It is normal and a sign of a properly functioning NG tube to have a whooshing air sound coming from the blue sump port.  Suction should be intermittent low wall suction.  Please avoid haleigh valves (opening is small and can obstruct easily)  - Gastrografin SBO protocol as below.  - Strict I/O  - IV fluid resuscitation   - Close monitoring of abdominal examination  - Ambulate, out of bed with assistance as needed; PT consultation for evaluation and treatment appropriate   - Optimize and replete electrolytes; goal K >4, Phos >3, Mg >2    Gastrografin Small Bowel Obstruction Protocol:   - Obtain 100 mL Gastrografin (ordered)  - Mix Gastrografin with 50 mL sterile water and administer via NG tube  - Clamp NG tube for two hours after administration of Gastrografin   - Place NG tube back to suction prior to the one hour clamp if patient experiences intractable nausea not controlled by anti-emetics or if patient vomits  - Notify MD if NG tube is placed back to suction early  - Obtain KUB studies at 8 hours and again at 24 hours after administration of Gastrografin (xrays ordered)  - Please coordinate with radiology to obtain Gastrografin and time KUB studies appropriately          Latonia Alvarez MD  General Surgery  [facility] ([facility])

## 2022-04-24 NOTE — PLAN OF CARE
"   04/23/22 1117   Discharge Assessment   Confirmed/corrected address, phone number and insurance Yes   Confirmed Demographics Correct on Facesheet   Source of Information patient   Lives With alone   Do you expect to return to your current living situation? Yes   Do you have help at home or someone to help you manage your care at home? No   Prior to hospitilization cognitive status: Alert/Oriented   Current cognitive status: Alert/Oriented   Walking or Climbing Stairs Difficulty none   Dressing/Bathing Difficulty none   Stairs, Within Home, Primary Patient has a "stair chair" left from her late .   Stairs Comment, Within Home, Primary See above   Equipment Currently Used at Home none   Readmission within 30 days? No   Patient currently being followed by outpatient case management? No   Do you currently have service(s) that help you manage your care at home? No   Do you have prescription coverage? Yes   Coverage HU[employer] MANAGED MEDICARE - HU[employer]NA MEDICARE HMO   Do you have any problems affording any of your prescribed medications? No   Is the patient taking medications as prescribed? yes   Who is going to help you get home at discharge? Bryon Durham [facility] - [phone]   How do you get to doctors appointments? car, drives self   Are you on dialysis? No   Do you take coumadin? No   Discharge Plan A Home   DME Needed Upon Discharge    (TBD)   Discharge Plan discussed with: Patient   Discharge Barriers Identified None   [facility] ([facility])  Initial Discharge Assessment       Primary Care Provider: Suzanna Duran MD    Admission Diagnosis: Abdominal distension [R14.0]  SBO (small bowel obstruction) [K56.609]    Admission Date: 4/22/2022  Expected Discharge Date:     Discharge Barriers Identified: None    Payor: [employer] MEDICARE / Plan: HUMANA MEDICARE HMO / Product Type: Capitation /     Extended Emergency Contact Information  Primary Emergency Contact: Blaine Giles  Address: " "[address] [place] [address] [facility]  Home Phone: [phone]  Work Phone: [phone]  Mobile Phone: [phone]  Relation: Spouse  Secondary Emergency Contact: Duane Durham   [facility]  Home Phone: [phone]  Relation: Son    Discharge Plan A: Home         [facility] - [place], [place] - [address] [place] [address]  [address] [place] [address]  Phone: [phone] Fax: [phone]      Initial Assessment (most recent)       Adult Discharge Assessment - 04/23/22 1855          Discharge Assessment    Assessment Type Discharge Planning Assessment     Confirmed/corrected address, phone number and insurance Yes     Source of Information patient     Communicated WILLIAN with patient/caregiver Yes     Reason For Admission SBO (small bowel obstruction)     Lives With alone     Do you expect to return to your current living situation? Yes     Do you have help at home or someone to help you manage your care at home? No     Prior to hospitilization cognitive status: Alert/Oriented     Current cognitive status: Alert/Oriented     Walking or Climbing Stairs Difficulty none     Dressing/Bathing Difficulty none     Home Accessibility stairs within home     Stairs, Within Home, Primary She has a "stairs chair" from when her late  was ill.     Stairs Comment, Within Home, Primary See above     Equipment Currently Used at Home none     Readmission within 30 days? No     Patient currently being followed by outpatient case management? No     Do you currently have service(s) that help you manage your care at home? No     Do you take prescription medications? Yes     Do you have prescription coverage? Yes     Coverage HU[employer] MANAGED MEDICARE - HUMANA MEDICARE O     Do you have any problems affording any of your prescribed medications? No     Is the patient taking medications as prescribed? yes     Who is going to help you get home at discharge? Bill " Herminio briscoe-[phone]     How do you get to doctors appointments? car, drives self     Are you on dialysis? No     Do you take coumadin? No     Discharge Plan A Home     DME Needed Upon Discharge  --   TBD    Discharge Plan discussed with: Patient     Discharge Barriers Identified None

## 2022-04-25 LAB
ALBUMIN SERPL BCP-MCNC: 3 G/DL (ref 3.5–5.2)
ALP SERPL-CCNC: 55 U/L (ref 55–135)
ALT SERPL W/O P-5'-P-CCNC: 9 U/L (ref 10–44)
ANION GAP SERPL CALC-SCNC: 14 MMOL/L (ref 8–16)
AST SERPL-CCNC: 19 U/L (ref 10–40)
BILIRUB SERPL-MCNC: 1.1 MG/DL (ref 0.1–1)
BUN SERPL-MCNC: 9 MG/DL (ref 8–23)
CALCIUM SERPL-MCNC: 9 MG/DL (ref 8.7–10.5)
CHLORIDE SERPL-SCNC: 101 MMOL/L (ref 95–110)
CO2 SERPL-SCNC: 25 MMOL/L (ref 23–29)
CREAT SERPL-MCNC: 0.8 MG/DL (ref 0.5–1.4)
EST. GFR  (AFRICAN AMERICAN): >60 ML/MIN/1.73 M^2
EST. GFR  (NON AFRICAN AMERICAN): >60 ML/MIN/1.73 M^2
GLUCOSE SERPL-MCNC: 101 MG/DL (ref 70–110)
MAGNESIUM SERPL-MCNC: 1.9 MG/DL (ref 1.6–2.6)
PHOSPHATE SERPL-MCNC: 2.8 MG/DL (ref 2.7–4.5)
POTASSIUM SERPL-SCNC: 3.4 MMOL/L (ref 3.5–5.1)
PROT SERPL-MCNC: 5.4 G/DL (ref 6–8.4)
SODIUM SERPL-SCNC: 140 MMOL/L (ref 136–145)

## 2022-04-25 PROCEDURE — 99232 SBSQ HOSP IP/OBS MODERATE 35: CPT | Mod: ,,, | Performed by: INTERNAL MEDICINE

## 2022-04-25 PROCEDURE — 99232 PR SUBSEQUENT HOSPITAL CARE,LEVL II: ICD-10-PCS | Mod: ,,, | Performed by: INTERNAL MEDICINE

## 2022-04-25 PROCEDURE — 84100 ASSAY OF PHOSPHORUS: CPT | Performed by: NURSE PRACTITIONER

## 2022-04-25 PROCEDURE — 25000003 PHARM REV CODE 250: Performed by: NURSE PRACTITIONER

## 2022-04-25 PROCEDURE — 99233 PR SUBSEQUENT HOSPITAL CARE,LEVL III: ICD-10-PCS | Mod: ,,, | Performed by: STUDENT IN AN ORGANIZED HEALTH CARE EDUCATION/TRAINING PROGRAM

## 2022-04-25 PROCEDURE — 99233 SBSQ HOSP IP/OBS HIGH 50: CPT | Mod: ,,, | Performed by: STUDENT IN AN ORGANIZED HEALTH CARE EDUCATION/TRAINING PROGRAM

## 2022-04-25 PROCEDURE — 80053 COMPREHEN METABOLIC PANEL: CPT | Performed by: NURSE PRACTITIONER

## 2022-04-25 PROCEDURE — 11000001 HC ACUTE MED/SURG PRIVATE ROOM

## 2022-04-25 PROCEDURE — 36415 COLL VENOUS BLD VENIPUNCTURE: CPT | Performed by: NURSE PRACTITIONER

## 2022-04-25 PROCEDURE — 83735 ASSAY OF MAGNESIUM: CPT | Performed by: NURSE PRACTITIONER

## 2022-04-25 PROCEDURE — 25500020 PHARM REV CODE 255: Performed by: INTERNAL MEDICINE

## 2022-04-25 PROCEDURE — 63600175 PHARM REV CODE 636 W HCPCS: Performed by: NURSE PRACTITIONER

## 2022-04-25 PROCEDURE — 63600175 PHARM REV CODE 636 W HCPCS: Performed by: INTERNAL MEDICINE

## 2022-04-25 RX ORDER — POTASSIUM CHLORIDE 7.45 MG/ML
10 INJECTION INTRAVENOUS ONCE
Status: COMPLETED | OUTPATIENT
Start: 2022-04-25 | End: 2022-04-25

## 2022-04-25 RX ADMIN — POTASSIUM CHLORIDE 10 MEQ: 10 INJECTION, SOLUTION INTRAVENOUS at 11:04

## 2022-04-25 RX ADMIN — PIPERACILLIN AND TAZOBACTAM 4.5 G: 4; .5 INJECTION, POWDER, LYOPHILIZED, FOR SOLUTION INTRAVENOUS; PARENTERAL at 03:04

## 2022-04-25 RX ADMIN — PIPERACILLIN AND TAZOBACTAM 4.5 G: 4; .5 INJECTION, POWDER, LYOPHILIZED, FOR SOLUTION INTRAVENOUS; PARENTERAL at 06:04

## 2022-04-25 RX ADMIN — DIATRIZOATE MEGLUMINE AND DIATRIZOATE SODIUM 90 ML: 660; 100 LIQUID ORAL; RECTAL at 02:04

## 2022-04-25 RX ADMIN — PIPERACILLIN AND TAZOBACTAM 4.5 G: 4; .5 INJECTION, POWDER, LYOPHILIZED, FOR SOLUTION INTRAVENOUS; PARENTERAL at 11:04

## 2022-04-25 RX ADMIN — SODIUM CHLORIDE, SODIUM LACTATE, POTASSIUM CHLORIDE, AND CALCIUM CHLORIDE: .6; .31; .03; .02 INJECTION, SOLUTION INTRAVENOUS at 04:04

## 2022-04-25 RX ADMIN — SODIUM CHLORIDE, SODIUM LACTATE, POTASSIUM CHLORIDE, AND CALCIUM CHLORIDE: .6; .31; .03; .02 INJECTION, SOLUTION INTRAVENOUS at 12:04

## 2022-04-25 RX ADMIN — ONDANSETRON 4 MG: 2 INJECTION INTRAMUSCULAR; INTRAVENOUS at 01:04

## 2022-04-25 NOTE — PROGRESS NOTES
[facility] Medicine  Progress Note    Patient Name: Marian Durham  MRN: [id number]  Patient Class: IP- Inpatient   Admission Date: 4/22/2022  Length of Stay: 3 days  Attending Physician: Keshia Garcia MD  Primary Care Provider: Suzanna Duran MD        Subjective:     Principal Problem:SBO (small bowel obstruction)        HPI:  The patient is a 76 y.o. female with a past medical history of hyperlipidemia who presents with complaint of bloating that began 2 days ago. She notes that she had COVID-19 1.5 weeks ago and had fever, cough, postnasal drip and fatigue at the time. She explains that she has gone to urgent care 3 times this week for treatment.The patient reports that today at 10 AM the bloating worsened and was followed by cramping, gagging, SOB and postnasal drip. She states that she denies any vomiting, fever, or diarrhea.  On CT, the patient has a small bowel obstruction.  She will be admitted for further management of her SBO and General Surgery consult.      Overview/Hospital Course:  Patient admitted with small bowel obstruction. CT abd showed SBO with transition point at the level the umbilicus and mild wall thickening of the colon, most prominent the transverse colon, could be associated with mild inflammatory, infectious or early ischemic process. She had leukocytosis as well. Started on empiric zosyn. General surgery consulted. NGT placed for decompression and she was treated with IVF, NPO and pain control.      Interval History: No acute events overnight. Pt with continued improvement in symptoms. She has less abdominal pain and distension. + flatus and BMs    Review of Systems   Constitutional:  Negative for chills and fever.   Respiratory:  Negative for shortness of breath.    Cardiovascular:  Negative for leg swelling.   Gastrointestinal:  Positive for abdominal distention and abdominal pain. Negative for nausea and vomiting.   Musculoskeletal:  Positive for  arthralgias.   Objective:     Vital Signs (Most Recent):  Temp: 98.7 °F (37.1 °C) (04/25/22 1148)  Pulse: 82 (04/25/22 1148)  Resp: 18 (04/25/22 1148)  BP: (!) 159/72 (04/25/22 1148)  SpO2: (!) 94 % (04/25/22 1148) Vital Signs (24h Range):  Temp:  [97.9 °F (36.6 °C)-99.1 °F (37.3 °C)] 98.7 °F (37.1 °C)  Pulse:  [] 82  Resp:  [16-20] 18  SpO2:  [94 %-97 %] 94 %  BP: (147-181)/(69-86) 159/72     Weight: 54.4 kg (119 lb 14.9 oz)  Body mass index is 19.96 kg/m².    Intake/Output Summary (Last 24 hours) at 4/25/2022 1157  Last data filed at 4/25/2022 0900  Gross per 24 hour   Intake 3284.62 ml   Output 1053 ml   Net 2231.62 ml        Physical Exam  Vitals and nursing note reviewed.   Constitutional:       General: She is not in acute distress.     Appearance: Normal appearance. She is well-developed.   HENT:      Nose:      Comments: NG tube in place  Cardiovascular:      Rate and Rhythm: Normal rate and regular rhythm.      Pulses: Normal pulses.   Pulmonary:      Effort: Pulmonary effort is normal.      Breath sounds: Normal breath sounds.   Abdominal:      General: Bowel sounds are normal. There is distension.      Comments: Bowel sounds present today. Abdomen remains distended but less tenderness   Musculoskeletal:      Right lower leg: No edema.      Left lower leg: No edema.   Skin:     General: Skin is warm and dry.   Neurological:      Mental Status: She is alert and oriented to person, place, and time. Mental status is at baseline.   Psychiatric:         Behavior: Behavior normal.       Significant Labs: All pertinent labs within the past 24 hours have been reviewed.  BMP:   Recent Labs   Lab 04/25/22  0434         K 3.4*      CO2 25   BUN 9   CREATININE 0.8   CALCIUM 9.0   MG 1.9       CBC:   Recent Labs   Lab 04/24/22  0500   WBC 6.38   HGB 14.5   HCT 44.4            Significant Imaging: I have reviewed all pertinent imaging results/findings within the past 24  hours.      Assessment/Plan:      * SBO (small bowel obstruction)  Hypokalemia  - With decreased PO intake, hemoconcentration noted on labs with Hgb 17  - CT abd shows SBO with transition point at level at umbilicus, mild wall thickening of transverse colon  - NGT placed. Continue LIWS  - General surgery following. Discussed with Dr. Alvarez  - Continue IVF, strict NPO, pain control  - Given possible colitis, continue empiric Zosyn for now. Leukocytosis resolved  - X-ray abd shows contrast progression to colon but small bowel remains dilated suggestive of partial SBO vs ileus  - Repeat x-ray today  - Monitor electrolytes, replace as needed  - Continue ambulation    Primary osteoarthritis  - Having increased joint pain without celebrex  - PT eval complete    VTE Risk Mitigation (From admission, onward)         Ordered     Reason for No Pharmacological VTE Prophylaxis  Once        Question:  Reasons:  Answer:  Risk of Bleeding    04/22/22 2041     IP VTE HIGH RISK PATIENT  Once         04/22/22 2041     Place sequential compression device  Until discontinued         04/22/22 2041                    Keshia Garcia MD  Department of Hospital Medicine   [facility]

## 2022-04-25 NOTE — SUBJECTIVE & OBJECTIVE
"Interval History: Reports small volume diarrhea but denies flatus.  Small bowel obstruction gastrografin challenge performed and demonstrates contrast in colon and rectum.  Patient denies nausea, vomiting, fevers, chills.    Medications:  Continuous Infusions:   lactated ringers 125 mL/hr at 04/25/22 0620     Scheduled Meds:   piperacillin-tazobactam (ZOSYN) IVPB  4.5 g Intravenous Q8H    potassium chloride  10 mEq Intravenous Once     PRN Meds:morphine, morphine, naloxone, ondansetron, promethazine (PHENERGAN) IVPB, sodium chloride 0.9%     Review of patient's allergies indicates:   Allergen Reactions    Demerol [meperidine] Other (See Comments)     "Becomes Agitated and Combative"     Objective:     Vital Signs (Most Recent):  Temp: 98.9 °F (37.2 °C) (04/25/22 0822)  Pulse: 92 (04/25/22 0822)  Resp: 20 (04/25/22 0822)  BP: (!) 153/69 (04/25/22 0822)  SpO2: 97 % (04/25/22 0822)   Vital Signs (24h Range):  Temp:  [97.9 °F (36.6 °C)-99.1 °F (37.3 °C)] 98.9 °F (37.2 °C)  Pulse:  [] 92  Resp:  [16-20] 20  SpO2:  [96 %-97 %] 97 %  BP: (147-181)/(69-86) 153/69     Weight: 54.4 kg (119 lb 14.9 oz)  Body mass index is 19.96 kg/m².    Intake/Output - Last 3 Shifts         04/23 0700 04/24 0659 04/24 0700 04/25 0659 04/25 0700 04/26 0659    P.O. 0 0     I.V. (mL/kg) 3116.4 (57.3) 2987.7 (54.9)     IV Piggyback 302.6 297     Total Intake(mL/kg) 3419 (62.8) 3284.6 (60.4)     Urine (mL/kg/hr) 0 (0) 0 (0)     Drains 50 1050     Stool  0 3    Total Output 50 1050 3    Net +3369 +2234.6 -3           Urine Occurrence 8 x 8 x     Stool Occurrence  2 x             Physical Exam  Constitutional:       General: She is not in acute distress.  HENT:      Nose:      Comments: NG tube to intermittent low wall suction, sumping well, thin brown output  Cardiovascular:      Rate and Rhythm: Normal rate.   Pulmonary:      Effort: Pulmonary effort is normal. No respiratory distress.   Abdominal:      Palpations: Abdomen is soft.      " Tenderness: There is no abdominal tenderness.      Comments: Typanic, distended but improved    Skin:     General: Skin is warm and dry.   Neurological:      Mental Status: She is alert.       Significant Labs:  I have reviewed all pertinent lab results within the past 24 hours.  CBC:   Recent Labs   Lab 04/24/22  0500   WBC 6.38   RBC 4.76   HGB 14.5   HCT 44.4      MCV 93   MCH 30.5   MCHC 32.7     CMP:   Recent Labs   Lab 04/25/22  0434      CALCIUM 9.0   ALBUMIN 3.0*   PROT 5.4*      K 3.4*   CO2 25      BUN 9   CREATININE 0.8   ALKPHOS 55   ALT 9*   AST 19   BILITOT 1.1*       Significant Diagnostics:  I have reviewed all pertinent imaging results/findings within the past 24 hours.  I have reviewed and interpreted all pertinent imaging results/findings within the past 24 hours. The abdominal xrays completed after gastrografin administration via the NG tube demonstrate contrast in colon, there is some persistent dilation of the small bowel.

## 2022-04-25 NOTE — SUBJECTIVE & OBJECTIVE
Interval History: No acute events overnight. Pt with continued improvement in symptoms. She has less abdominal pain and distension. + flatus and BMs    Review of Systems   Constitutional:  Negative for chills and fever.   Respiratory:  Negative for shortness of breath.    Cardiovascular:  Negative for leg swelling.   Gastrointestinal:  Positive for abdominal distention and abdominal pain. Negative for nausea and vomiting.   Musculoskeletal:  Positive for arthralgias.   Objective:     Vital Signs (Most Recent):  Temp: 98.7 °F (37.1 °C) (04/25/22 1148)  Pulse: 82 (04/25/22 1148)  Resp: 18 (04/25/22 1148)  BP: (!) 159/72 (04/25/22 1148)  SpO2: (!) 94 % (04/25/22 1148) Vital Signs (24h Range):  Temp:  [97.9 °F (36.6 °C)-99.1 °F (37.3 °C)] 98.7 °F (37.1 °C)  Pulse:  [] 82  Resp:  [16-20] 18  SpO2:  [94 %-97 %] 94 %  BP: (147-181)/(69-86) 159/72     Weight: 54.4 kg (119 lb 14.9 oz)  Body mass index is 19.96 kg/m².    Intake/Output Summary (Last 24 hours) at 4/25/2022 1157  Last data filed at 4/25/2022 0900  Gross per 24 hour   Intake 3284.62 ml   Output 1053 ml   Net 2231.62 ml        Physical Exam  Vitals and nursing note reviewed.   Constitutional:       General: She is not in acute distress.     Appearance: Normal appearance. She is well-developed.   HENT:      Nose:      Comments: NG tube in place  Cardiovascular:      Rate and Rhythm: Normal rate and regular rhythm.      Pulses: Normal pulses.   Pulmonary:      Effort: Pulmonary effort is normal.      Breath sounds: Normal breath sounds.   Abdominal:      General: Bowel sounds are normal. There is distension.      Comments: Bowel sounds present today. Abdomen remains distended but less tenderness   Musculoskeletal:      Right lower leg: No edema.      Left lower leg: No edema.   Skin:     General: Skin is warm and dry.   Neurological:      Mental Status: She is alert and oriented to person, place, and time. Mental status is at baseline.   Psychiatric:          Behavior: Behavior normal.       Significant Labs: All pertinent labs within the past 24 hours have been reviewed.  BMP:   Recent Labs   Lab 04/25/22  0434         K 3.4*      CO2 25   BUN 9   CREATININE 0.8   CALCIUM 9.0   MG 1.9       CBC:   Recent Labs   Lab 04/24/22  0500   WBC 6.38   HGB 14.5   HCT 44.4            Significant Imaging: I have reviewed all pertinent imaging results/findings within the past 24 hours.

## 2022-04-25 NOTE — PLAN OF CARE
AAOX4. VSS. NPO. NGT @ low wall suction intermittent. Producing brown liquid, approximately 400 mL. Patient is having bowel sounds, voiding clear yellow urine and produced semi-soft light brown stool around 2000. Patient is able to ambulate. Skin warm to touch with no skin break down noted. Tolerating medications and IVF well. Able to make needs known. HOB 30 degrees with call bell and bedside table within reach, bed in lowest position with hourly purposeful rounding. Will continue to monitor.   Problem: Adult Inpatient Plan of Care  Goal: Plan of Care Review  Outcome: Ongoing, Progressing  Goal: Patient-Specific Goal (Individualized)  Outcome: Ongoing, Progressing  Goal: Absence of Hospital-Acquired Illness or Injury  Outcome: Ongoing, Progressing  Goal: Optimal Comfort and Wellbeing  Outcome: Ongoing, Progressing  Goal: Readiness for Transition of Care  Outcome: Ongoing, Progressing     Problem: Fluid Deficit (Intestinal Obstruction)  Goal: Fluid Balance  Outcome: Ongoing, Progressing     Problem: Infection (Intestinal Obstruction)  Goal: Absence of Infection Signs and Symptoms  Outcome: Ongoing, Progressing     Problem: Nausea and Vomiting (Intestinal Obstruction)  Goal: Nausea and Vomiting Relief  Outcome: Ongoing, Progressing     Problem: Pain (Intestinal Obstruction)  Goal: Acceptable Pain Control  Outcome: Ongoing, Progressing     Problem: Fall Injury Risk  Goal: Absence of Fall and Fall-Related Injury  Outcome: Ongoing, Progressing     Problem: Bariatric Environmental Safety  Goal: Safety Maintained with Care  Outcome: Ongoing, Progressing

## 2022-04-25 NOTE — PROGRESS NOTES
"[facility] Surg [facility]  General Surgery  Progress Note    Subjective:     History of Present Illness:  76 y.o. woman with a history of constipation and a prior hysterectomy and tubal ligation in the 80s presenting with abdominal discomfort associated with belching, abdominal distention, abdominal discomfort and weakness.  CT scan demonstrates dilated loops of small intestine with a possible transition point in the mid abdomen without pneumoperitoneum, free fluid or pneumatosis.  NG tube placed on day of admission.        Post-Op Info:  * No surgery found *         Interval History: Reports small volume diarrhea but denies flatus.  Small bowel obstruction gastrografin challenge performed and demonstrates contrast in colon and rectum.  Patient denies nausea, vomiting, fevers, chills.    Medications:  Continuous Infusions:   lactated ringers 125 mL/hr at 04/25/22 0620     Scheduled Meds:   piperacillin-tazobactam (ZOSYN) IVPB  4.5 g Intravenous Q8H    potassium chloride  10 mEq Intravenous Once     PRN Meds:morphine, morphine, naloxone, ondansetron, promethazine (PHENERGAN) IVPB, sodium chloride 0.9%     Review of patient's allergies indicates:   Allergen Reactions    Demerol [meperidine] Other (See Comments)     "Becomes Agitated and Combative"     Objective:     Vital Signs (Most Recent):  Temp: 98.9 °F (37.2 °C) (04/25/22 0822)  Pulse: 92 (04/25/22 0822)  Resp: 20 (04/25/22 0822)  BP: (!) 153/69 (04/25/22 0822)  SpO2: 97 % (04/25/22 0822)   Vital Signs (24h Range):  Temp:  [97.9 °F (36.6 °C)-99.1 °F (37.3 °C)] 98.9 °F (37.2 °C)  Pulse:  [] 92  Resp:  [16-20] 20  SpO2:  [96 %-97 %] 97 %  BP: (147-181)/(69-86) 153/69     Weight: 54.4 kg (119 lb 14.9 oz)  Body mass index is 19.96 kg/m².    Intake/Output - Last 3 Shifts         04/23 0700 04/24 0659 04/24 0700 04/25 0659 04/25 0700 04/26 0659    P.O. 0 0     I.V. (mL/kg) 3116.4 (57.3) 2987.7 (54.9)     IV Piggyback 302.6 297     Total " Intake(mL/kg) 3419 (62.8) 3284.6 (60.4)     Urine (mL/kg/hr) 0 (0) 0 (0)     Drains 50 1050     Stool  0 3    Total Output 50 1050 3    Net +3369 +2234.6 -3           Urine Occurrence 8 x 8 x     Stool Occurrence  2 x             Physical Exam  Constitutional:       General: She is not in acute distress.  HENT:      Nose:      Comments: NG tube to intermittent low wall suction, sumping well, thin brown output  Cardiovascular:      Rate and Rhythm: Normal rate.   Pulmonary:      Effort: Pulmonary effort is normal. No respiratory distress.   Abdominal:      Palpations: Abdomen is soft.      Tenderness: There is no abdominal tenderness.      Comments: Typanic, distended but improved    Skin:     General: Skin is warm and dry.   Neurological:      Mental Status: She is alert.       Significant Labs:  I have reviewed all pertinent lab results within the past 24 hours.  CBC:   Recent Labs   Lab 04/24/22  0500   WBC 6.38   RBC 4.76   HGB 14.5   HCT 44.4      MCV 93   MCH 30.5   MCHC 32.7     CMP:   Recent Labs   Lab 04/25/22  0434      CALCIUM 9.0   ALBUMIN 3.0*   PROT 5.4*      K 3.4*   CO2 25      BUN 9   CREATININE 0.8   ALKPHOS 55   ALT 9*   AST 19   BILITOT 1.1*       Significant Diagnostics:  I have reviewed all pertinent imaging results/findings within the past 24 hours.  I have reviewed and interpreted all pertinent imaging results/findings within the past 24 hours. The abdominal xrays completed after gastrografin administration via the NG tube demonstrate contrast in colon, there is some persistent dilation of the small bowel.    Assessment/Plan:     * SBO (small bowel obstruction)  Small bowel obstruction associated with nausea, belching and abdominal distention.  Improvement of distention, minimal NG tube output, contrast progression to colon/rectum though dilated loops of small bowel suggestive of ileus vs partial SBO.  Will obtain another KUB today to assess.    - NPO, strict  - NGT  to low wall suction, ensure patency by gently instilling water into clear NG suction port and gently pushing air into blue sump port.  If a reflux valve is used, ensure the blue side is inserted into the blue sump port.  It is normal and a sign of a properly functioning NG tube to have a whooshing air sound coming from the blue sump port.  Suction should be intermittent low wall suction.  Please avoid haleigh valves (opening is small and can obstruct easily)  - Repeat AXR this afternoon for Gastrografin SBO protocol to assess small bowel dilation and contrast progression  - Strict I/O  - IV fluid resuscitation   - Close monitoring of abdominal examination  - Ambulate, out of bed with assistance as needed; PT consultation for evaluation and treatment appropriate   - Optimize and replete electrolytes; goal K >4, Phos >3, Mg >2        Latonia Alvarez MD  General Surgery  [facility] ([facility]

## 2022-04-25 NOTE — ASSESSMENT & PLAN NOTE
Hypokalemia  - With decreased PO intake, hemoconcentration noted on labs with Hgb 17  - CT abd shows SBO with transition point at level at umbilicus, mild wall thickening of transverse colon  - NGT placed. Continue LIWS  - General surgery following. Discussed with Dr. Alvarez  - Continue IVF, strict NPO, pain control  - Given possible colitis, continue empiric Zosyn for now. Leukocytosis resolved  - X-ray abd shows contrast progression to colon but small bowel remains dilated suggestive of partial SBO vs ileus  - Repeat x-ray today  - Monitor electrolytes, replace as needed  - Continue ambulation

## 2022-04-25 NOTE — ASSESSMENT & PLAN NOTE
Small bowel obstruction associated with nausea, belching and abdominal distention.  Improvement of distention, minimal NG tube output, contrast progression to colon/rectum though dilated loops of small bowel suggestive of ileus vs partial SBO.  Will obtain another KUB today to assess.    - NPO, strict  - NGT to low wall suction, ensure patency by gently instilling water into clear NG suction port and gently pushing air into blue sump port.  If a reflux valve is used, ensure the blue side is inserted into the blue sump port.  It is normal and a sign of a properly functioning NG tube to have a whooshing air sound coming from the blue sump port.  Suction should be intermittent low wall suction.  Please avoid haleigh valves (opening is small and can obstruct easily)  - Repeat AXR this afternoon for Gastrografin SBO protocol to assess small bowel dilation and contrast progression  - Strict I/O  - IV fluid resuscitation   - Close monitoring of abdominal examination  - Ambulate, out of bed with assistance as needed; PT consultation for evaluation and treatment appropriate   - Optimize and replete electrolytes; goal K >4, Phos >3, Mg >2

## 2022-04-25 NOTE — PROGRESS NOTES
Surgery Update Note    24h post gastrografin administration KUB reviewed personally and my findings are of contrast in the colon and rectum and improvement of dilated intestinal loops.    Patient having bowel function, diarrhea, flatus.    NG tube removed at bedside.   Diet advanced to clear liquids.  RN and primary team updated.    Latonia Alvarez MD  04/25/2022  6:09 PM

## 2022-04-26 LAB
ANION GAP SERPL CALC-SCNC: 13 MMOL/L (ref 8–16)
BUN SERPL-MCNC: 11 MG/DL (ref 8–23)
CALCIUM SERPL-MCNC: 8.9 MG/DL (ref 8.7–10.5)
CHLORIDE SERPL-SCNC: 98 MMOL/L (ref 95–110)
CO2 SERPL-SCNC: 25 MMOL/L (ref 23–29)
CREAT SERPL-MCNC: 0.9 MG/DL (ref 0.5–1.4)
EST. GFR  (AFRICAN AMERICAN): >60 ML/MIN/1.73 M^2
EST. GFR  (NON AFRICAN AMERICAN): >60 ML/MIN/1.73 M^2
GLUCOSE SERPL-MCNC: 212 MG/DL (ref 70–110)
POTASSIUM SERPL-SCNC: 3.5 MMOL/L (ref 3.5–5.1)
SODIUM SERPL-SCNC: 136 MMOL/L (ref 136–145)

## 2022-04-26 PROCEDURE — 99233 SBSQ HOSP IP/OBS HIGH 50: CPT | Mod: ,,, | Performed by: INTERNAL MEDICINE

## 2022-04-26 PROCEDURE — 80048 BASIC METABOLIC PNL TOTAL CA: CPT | Performed by: INTERNAL MEDICINE

## 2022-04-26 PROCEDURE — 63600175 PHARM REV CODE 636 W HCPCS: Performed by: INTERNAL MEDICINE

## 2022-04-26 PROCEDURE — 63600175 PHARM REV CODE 636 W HCPCS: Performed by: NURSE PRACTITIONER

## 2022-04-26 PROCEDURE — 99233 PR SUBSEQUENT HOSPITAL CARE,LEVL III: ICD-10-PCS | Mod: ,,, | Performed by: INTERNAL MEDICINE

## 2022-04-26 PROCEDURE — 11000001 HC ACUTE MED/SURG PRIVATE ROOM

## 2022-04-26 PROCEDURE — 94761 N-INVAS EAR/PLS OXIMETRY MLT: CPT

## 2022-04-26 PROCEDURE — 25000003 PHARM REV CODE 250: Performed by: INTERNAL MEDICINE

## 2022-04-26 PROCEDURE — 36415 COLL VENOUS BLD VENIPUNCTURE: CPT | Performed by: INTERNAL MEDICINE

## 2022-04-26 RX ADMIN — SODIUM CHLORIDE, SODIUM LACTATE, POTASSIUM CHLORIDE, AND CALCIUM CHLORIDE: .6; .31; .03; .02 INJECTION, SOLUTION INTRAVENOUS at 01:04

## 2022-04-26 RX ADMIN — PIPERACILLIN AND TAZOBACTAM 4.5 G: 4; .5 INJECTION, POWDER, LYOPHILIZED, FOR SOLUTION INTRAVENOUS; PARENTERAL at 07:04

## 2022-04-26 RX ADMIN — PIPERACILLIN AND TAZOBACTAM 4.5 G: 4; .5 INJECTION, POWDER, LYOPHILIZED, FOR SOLUTION INTRAVENOUS; PARENTERAL at 04:04

## 2022-04-26 RX ADMIN — PIPERACILLIN AND TAZOBACTAM 4.5 G: 4; .5 INJECTION, POWDER, LYOPHILIZED, FOR SOLUTION INTRAVENOUS; PARENTERAL at 11:04

## 2022-04-26 NOTE — PROGRESS NOTES
[facility] Medicine  Progress Note    Patient Name: Marian Durham  MRN: [id number]  Patient Class: IP- Inpatient   Admission Date: 4/22/2022  Length of Stay: 4 days  Attending Physician: SHERIN Pearson MD  Primary Care Provider: Suzanna Duran MD        Subjective:     Principal Problem:SBO (small bowel obstruction)        HPI:  The patient is a 76 y.o. female with a past medical history of hyperlipidemia who presents with complaint of bloating that began 2 days ago. She notes that she had COVID-19 1.5 weeks ago and had fever, cough, postnasal drip and fatigue at the time. She explains that she has gone to urgent care 3 times this week for treatment.The patient reports that today at 10 AM the bloating worsened and was followed by cramping, gagging, SOB and postnasal drip. She states that she denies any vomiting, fever, or diarrhea.  On CT, the patient has a small bowel obstruction.  She will be admitted for further management of her SBO and General Surgery consult.      Overview/Hospital Course:  Patient admitted with small bowel obstruction. CT abd showed SBO with transition point at the level the umbilicus and mild wall thickening of the colon, most prominent the transverse colon, could be associated with mild inflammatory, infectious or early ischemic process. She had leukocytosis as well. Started on empiric zosyn. General surgery consulted. NGT placed for decompression and she was treated with IVF, NPO and pain control.      Interval History: No acute events overnight. Improved distention and discomfort. +flatus. No other concerns at this time.    Review of Systems   Constitutional:  Negative for chills and fever.   Respiratory:  Negative for cough and shortness of breath.    Cardiovascular:  Negative for chest pain and palpitations.   Gastrointestinal:  Positive for abdominal distention. Negative for abdominal pain, nausea and vomiting.   Objective:     Vital Signs (Most  Recent):  Temp: 98.3 °F (36.8 °C) (04/26/22 1930)  Pulse: 77 (04/26/22 1930)  Resp: 18 (04/26/22 1930)  BP: (!) 177/74 (04/26/22 1930)  SpO2: 97 % (04/26/22 1930)   Vital Signs (24h Range):  Temp:  [96.4 °F (35.8 °C)-99.5 °F (37.5 °C)] 98.3 °F (36.8 °C)  Pulse:  [71-85] 77  Resp:  [16-20] 18  SpO2:  [96 %-97 %] 97 %  BP: (142-177)/(55-74) 177/74     Weight: 54.4 kg (119 lb 14.9 oz)  Body mass index is 19.96 kg/m².  No intake or output data in the 24 hours ending 04/26/22 2059   Physical Exam  Vitals and nursing note reviewed.   Constitutional:       General: She is not in acute distress.     Appearance: She is well-developed.   HENT:      Head: Normocephalic and atraumatic.   Eyes:      General:         Right eye: No discharge.         Left eye: No discharge.      Conjunctiva/sclera: Conjunctivae normal.   Cardiovascular:      Rate and Rhythm: Normal rate.      Pulses: Normal pulses.   Pulmonary:      Effort: Pulmonary effort is normal. No respiratory distress.   Abdominal:      Palpations: Abdomen is soft.      Tenderness: There is abdominal tenderness.   Musculoskeletal:         General: Normal range of motion.      Right lower leg: No edema.      Left lower leg: No edema.   Skin:     General: Skin is warm and dry.   Neurological:      Mental Status: She is alert and oriented to person, place, and time.       Significant Labs:   CBC:  Recent Labs   Lab 04/24/22  0500   WBC 6.38   HGB 14.5   HCT 44.4      GRAN 65.9  4.2   LYMPH 19.4  1.2   MONO 13.3  0.9   EOS 0.1   BASO 0.02   BMP:  Recent Labs   Lab 04/24/22  0500 04/25/22  0434 04/26/22  0853    140 136   K 3.6 3.4* 3.5    101 98   CO2 24 25 25   BUN 15 9 11   CREATININE 1.0 0.8 0.9   * 101 212*   CALCIUM 9.2 9.0 8.9   MG 2.0 1.9  --    PHOS 2.9 2.8  --        Significant Imaging:   EXAMINATION:  XR ABDOMEN AP 1 VIEW     CLINICAL HISTORY:  Small bowel obstruction;     TECHNIQUE:  AP View(s) of the abdomen was performed.      COMPARISON:  04/24/2022.     FINDINGS:  Two radiographs were obtained, 1 labeled  and 1 labeled 0 minutes.     On the  radiograph, there is an enteric tube with the tip within the proximal stomach.  There appears to be contrast material within the bladder and distal colon and rectum.  There are mildly dilated air-filled loops of small bowel present with normal caliber colon.  Findings could be related to either ileus or partial small-bowel obstruction.     On the radiograph labeled 0 minutes, there is contrast material identified within the stomach and proximal small bowel.  The visualized small bowel appears normal in caliber with a normal mucosal pattern.     Impression:     Enteric tube with tip within the proximal stomach.     Mildly dilated air-filled loops of small bowel are noted on the  radiograph suggesting either partial small bowel obstruction or ileus.  However, the mild small bowel dilatation is not clearly delineated on the radiograph labeled 0 minutes.        Electronically signed by: Hector Enrique MD  Date:                                            04/26/2022  Time:                                           08:16      Assessment/Plan:      * SBO (small bowel obstruction)  Hypokalemia  - With decreased PO intake, hemoconcentration noted on labs with Hgb 17  - CT abd shows SBO with transition point at level at umbilicus, mild wall thickening of transverse colon  - General surgery following. Discussed with Dr. Alvarez  - Continue IVF, strict NPO, pain control  - Given possible colitis, complete 5 day course of empiric Zosyn.  - X-ray abd shows contrast progression to colon but small bowel remains dilated suggestive of partial SBO vs ileus  - Advance diet as tolerated. Monitor for flatus.  - Monitor electrolytes, replace as needed  - Continue ambulation    Primary osteoarthritis  - Having increased joint pain without celebrex  - PT eval complete    VTE Risk Mitigation (From admission,  onward)         Ordered     Reason for No Pharmacological VTE Prophylaxis  Once        Question:  Reasons:  Answer:  Risk of Bleeding    04/22/22 2041     IP VTE HIGH RISK PATIENT  Once         04/22/22 2041     Place sequential compression device  Until discontinued         04/22/22 2041                Discharge Planning   WILLIAN:      Code Status: Full Code   Is the patient medically ready for discharge?:     Reason for patient still in hospital (select all that apply): Treatment  Discharge Plan A: Home                  D Favian Pearson MD  Department of Hospital Medicine   [facility]

## 2022-04-26 NOTE — PLAN OF CARE
Problem: Adult Inpatient Plan of Care  Goal: Plan of Care Review  Outcome: Ongoing, Progressing  Goal: Patient-Specific Goal (Individualized)  Outcome: Ongoing, Progressing  Goal: Absence of Hospital-Acquired Illness or Injury  Outcome: Ongoing, Progressing  Goal: Optimal Comfort and Wellbeing  Outcome: Ongoing, Progressing  Goal: Readiness for Transition of Care  Outcome: Ongoing, Progressing     Problem: Fluid Deficit (Intestinal Obstruction)  Goal: Fluid Balance  Outcome: Ongoing, Progressing     Problem: Infection (Intestinal Obstruction)  Goal: Absence of Infection Signs and Symptoms  Outcome: Ongoing, Progressing     Problem: Fall Injury Risk  Goal: Absence of Fall and Fall-Related Injury  Outcome: Ongoing, Progressing     Problem: Bariatric Environmental Safety  Goal: Safety Maintained with Care  Outcome: Ongoing, Progressing

## 2022-04-26 NOTE — PROGRESS NOTES
"[facility] Surg [facility]  General Surgery  Progress Note    Subjective:     History of Present Illness:  76 y.o. woman with a history of constipation and a prior hysterectomy and tubal ligation in the 80s presenting with abdominal discomfort associated with belching, abdominal distention, abdominal discomfort and weakness.  CT scan demonstrates dilated loops of small intestine with a possible transition point in the mid abdomen without pneumoperitoneum, free fluid or pneumatosis.  NG tube placed on day of admission.        Post-Op Info:  * No surgery found *         Interval History: Having several liquid bowel movements and passing significant volume of flatus.  No nausea, vomiting, fevers, chills.  Still having some distention but it is improved.     Medications:  Continuous Infusions:   lactated ringers 125 mL/hr at 04/26/22 1319     Scheduled Meds:   piperacillin-tazobactam (ZOSYN) IVPB  4.5 g Intravenous Q8H     PRN Meds:morphine, morphine, naloxone, ondansetron, promethazine (PHENERGAN) IVPB, sodium chloride 0.9%     Review of patient's allergies indicates:   Allergen Reactions    Demerol [meperidine] Other (See Comments)     "Becomes Agitated and Combative"     Objective:     Vital Signs (Most Recent):  Temp: 96.4 °F (35.8 °C) (04/26/22 1603)  Pulse: 71 (04/26/22 1603)  Resp: 16 (04/26/22 1603)  BP: (!) 150/55 (04/26/22 1603)  SpO2: 96 % (04/26/22 1603)   Vital Signs (24h Range):  Temp:  [96.4 °F (35.8 °C)-99.5 °F (37.5 °C)] 96.4 °F (35.8 °C)  Pulse:  [71-85] 71  Resp:  [16-20] 16  SpO2:  [96 %-98 %] 96 %  BP: (142-163)/(55-74) 150/55     Weight: 54.4 kg (119 lb 14.9 oz)  Body mass index is 19.96 kg/m².    Intake/Output - Last 3 Shifts         04/24 0700 04/25 0659 04/25 0700 04/26 0659 04/26 0700 04/27 0659    P.O. 0      I.V. (mL/kg) 2987.7 (54.9) 1709.7 (31.4)     IV Piggyback 297 296.6     Total Intake(mL/kg) 3284.6 (60.4) 2006.3 (36.9)     Urine (mL/kg/hr) 0 (0) 0 (0)     Drains 8199 615  "    Stool 0 4     Total Output 1050 629     Net +2234.6 +1377.3            Urine Occurrence 8 x 7 x     Stool Occurrence 2 x 11 x             Physical Exam  Constitutional:       General: She is not in acute distress.  HENT:      Mouth/Throat:      Mouth: Mucous membranes are moist.   Cardiovascular:      Rate and Rhythm: Normal rate.   Pulmonary:      Effort: Pulmonary effort is normal. No respiratory distress.   Abdominal:      Palpations: Abdomen is soft.      Tenderness: There is no abdominal tenderness. There is no rebound.      Comments: Mild distention   Skin:     General: Skin is warm and dry.   Neurological:      Mental Status: She is alert.       Significant Labs:  I have reviewed all pertinent lab results within the past 24 hours.  CBC:   Recent Labs   Lab 04/24/22  0500   WBC 6.38   RBC 4.76   HGB 14.5   HCT 44.4      MCV 93   MCH 30.5   MCHC 32.7     CMP:   Recent Labs   Lab 04/25/22  0434 04/26/22  0853    212*   CALCIUM 9.0 8.9   ALBUMIN 3.0*  --    PROT 5.4*  --     136   K 3.4* 3.5   CO2 25 25    98   BUN 9 11   CREATININE 0.8 0.9   ALKPHOS 55  --    ALT 9*  --    AST 19  --    BILITOT 1.1*  --        Significant Diagnostics:  I have reviewed all pertinent imaging results/findings within the past 24 hours.    Assessment/Plan:     * SBO (small bowel obstruction)  Small bowel obstruction associated with nausea, belching and abdominal distention.  Has had return of bowel function after passing gastrografin SBO challenge.    - Full liquids, advance as tolerated  - IV fluid hydration until tolerating adequate PO  - Close monitoring of abdominal examination  - Ambulate, out of bed with assistance as needed; PT consultation for evaluation and treatment appropriate   - Optimize and replete electrolytes; goal K >4, Phos >3, Mg >2          Latonia Alvarez MD  General Surgery  [facility] Surg ([facility])

## 2022-04-26 NOTE — SUBJECTIVE & OBJECTIVE
"Interval History: Having several liquid bowel movements and passing significant volume of flatus.  No nausea, vomiting, fevers, chills.  Still having some distention but it is improved.     Medications:  Continuous Infusions:   lactated ringers 125 mL/hr at 04/26/22 1319     Scheduled Meds:   piperacillin-tazobactam (ZOSYN) IVPB  4.5 g Intravenous Q8H     PRN Meds:morphine, morphine, naloxone, ondansetron, promethazine (PHENERGAN) IVPB, sodium chloride 0.9%     Review of patient's allergies indicates:   Allergen Reactions    Demerol [meperidine] Other (See Comments)     "Becomes Agitated and Combative"     Objective:     Vital Signs (Most Recent):  Temp: 96.4 °F (35.8 °C) (04/26/22 1603)  Pulse: 71 (04/26/22 1603)  Resp: 16 (04/26/22 1603)  BP: (!) 150/55 (04/26/22 1603)  SpO2: 96 % (04/26/22 1603)   Vital Signs (24h Range):  Temp:  [96.4 °F (35.8 °C)-99.5 °F (37.5 °C)] 96.4 °F (35.8 °C)  Pulse:  [71-85] 71  Resp:  [16-20] 16  SpO2:  [96 %-98 %] 96 %  BP: (142-163)/(55-74) 150/55     Weight: 54.4 kg (119 lb 14.9 oz)  Body mass index is 19.96 kg/m².    Intake/Output - Last 3 Shifts         04/24 0700 04/25 0659 04/25 0700 04/26 0659 04/26 0700 04/27 0659    P.O. 0      I.V. (mL/kg) 2987.7 (54.9) 1709.7 (31.4)     IV Piggyback 297 296.6     Total Intake(mL/kg) 3284.6 (60.4) 2006.3 (36.9)     Urine (mL/kg/hr) 0 (0) 0 (0)     Drains 1050 625     Stool 0 4     Total Output 1050 629     Net +2234.6 +1377.3            Urine Occurrence 8 x 7 x     Stool Occurrence 2 x 11 x             Physical Exam  Constitutional:       General: She is not in acute distress.  HENT:      Mouth/Throat:      Mouth: Mucous membranes are moist.   Cardiovascular:      Rate and Rhythm: Normal rate.   Pulmonary:      Effort: Pulmonary effort is normal. No respiratory distress.   Abdominal:      Palpations: Abdomen is soft.      Tenderness: There is no abdominal tenderness. There is no rebound.      Comments: Mild distention   Skin:     General: " Skin is warm and dry.   Neurological:      Mental Status: She is alert.       Significant Labs:  I have reviewed all pertinent lab results within the past 24 hours.  CBC:   Recent Labs   Lab 04/24/22  0500   WBC 6.38   RBC 4.76   HGB 14.5   HCT 44.4      MCV 93   MCH 30.5   MCHC 32.7     CMP:   Recent Labs   Lab 04/25/22  0434 04/26/22  0853    212*   CALCIUM 9.0 8.9   ALBUMIN 3.0*  --    PROT 5.4*  --     136   K 3.4* 3.5   CO2 25 25    98   BUN 9 11   CREATININE 0.8 0.9   ALKPHOS 55  --    ALT 9*  --    AST 19  --    BILITOT 1.1*  --        Significant Diagnostics:  I have reviewed all pertinent imaging results/findings within the past 24 hours.

## 2022-04-26 NOTE — PHYSICIAN QUERY
PT Name: Marian Durham  MR #: [id number]     DOCUMENTATION CLARIFICATION     CDS: Salina MONTALVO RN  Contact information: [email or web address]    This form is a permanent document in the medical record.     Query Date: April 26, 2022    By submitting this query, we are merely seeking further clarification of documentation to reflect the severity of illness of your patient. Please utilize your independent clinical judgment when addressing the question(s) below.    The medical record reflects the following:     Indicators   Supporting Clinical Findings Location in Medical Record   X Bowel obstruction, intestinal obstruction, LBO or SBO documented Principal Problem: SBO (small bowel obstruction)  History of Present Illness: 76 y.o. woman with a history of constipation and a prior hysterectomy and tubal ligation in the 80s presenting with abdominal discomfort associated with belching, distention and weakness.      SBO (small bowel obstruction)  Small bowel obstruction associated with nausea, belching and abdominal distention.  Improvement of distention, minimal NG tube output, contrast progression to colon/rectum though dilated loops of small bowel suggestive of ileus vs partial SBO.  Will obtain another KUB today to assess. Consult Gen Surg 4/22/2022          PN Gen Surg 4/25/2022   X Radiology findings 1. Small-bowel obstruction with transition point at the midline, mid abdomen at the level the umbilicus.  See above comments.  This could be associated with an internal hernia, mild volvulus or adhesions.  Recommend surgical consultation and follow-up.  2. Mild hepatomegaly.  3. Trace pericardial effusion.  4. Mild wall thickening of the colon most prominent the transverse colon could be associated with mild inflammatory, infectious or early ischemic process.  Surgical follow-up recommended.  5. Diverticulosis of the colon.    Mildly dilated air-filled loops of small bowel are noted on the  radiograph  suggesting either partial small bowel obstruction or ileus. CT Abd 4/22/2022                      XR Abd 4/25/2022   X Treatment/Medication - Repeat AXR this afternoon for Gastrografin SBO protocol to assess small bowel dilation and contrast progression  - Strict I/O  - IV fluid resuscitation   - Close monitoring of abdominal examination   PN Gen Surg 4/25/2022    Procedure/Surgery      Other       Provider, please further specify the bowel obstruction diagnosis:    [   ] Partial or incomplete intestinal obstruction, due to adhesions   [   ] Partial or incomplete intestinal obstruction, due to other (please specify): ____________   [   ] Partial or incomplete intestinal obstruction, unknown or unspecified etiology   [   ] Other intestinal condition (please specify): _____________________   [ x  ]  Clinically Undetermined         Please document in your progress notes daily for the duration of treatment until resolved, and include in your discharge summary.

## 2022-04-26 NOTE — ASSESSMENT & PLAN NOTE
Small bowel obstruction associated with nausea, belching and abdominal distention.  Has had return of bowel function after passing gastrografin SBO challenge.    - Full liquids, advance as tolerated  - IV fluid hydration until tolerating adequate PO  - Close monitoring of abdominal examination  - Ambulate, out of bed with assistance as needed; PT consultation for evaluation and treatment appropriate   - Optimize and replete electrolytes; goal K >4, Phos >3, Mg >2

## 2022-04-27 LAB
ANION GAP SERPL CALC-SCNC: 11 MMOL/L (ref 8–16)
BUN SERPL-MCNC: 7 MG/DL (ref 8–23)
CALCIUM SERPL-MCNC: 9.5 MG/DL (ref 8.7–10.5)
CHLORIDE SERPL-SCNC: 101 MMOL/L (ref 95–110)
CO2 SERPL-SCNC: 27 MMOL/L (ref 23–29)
CREAT SERPL-MCNC: 0.8 MG/DL (ref 0.5–1.4)
EST. GFR  (AFRICAN AMERICAN): >60 ML/MIN/1.73 M^2
EST. GFR  (NON AFRICAN AMERICAN): >60 ML/MIN/1.73 M^2
GLUCOSE SERPL-MCNC: 119 MG/DL (ref 70–110)
POTASSIUM SERPL-SCNC: 3 MMOL/L (ref 3.5–5.1)
SODIUM SERPL-SCNC: 139 MMOL/L (ref 136–145)

## 2022-04-27 PROCEDURE — 80048 BASIC METABOLIC PNL TOTAL CA: CPT | Performed by: INTERNAL MEDICINE

## 2022-04-27 PROCEDURE — 94761 N-INVAS EAR/PLS OXIMETRY MLT: CPT

## 2022-04-27 PROCEDURE — 99233 SBSQ HOSP IP/OBS HIGH 50: CPT | Mod: ,,, | Performed by: INTERNAL MEDICINE

## 2022-04-27 PROCEDURE — 99233 PR SUBSEQUENT HOSPITAL CARE,LEVL III: ICD-10-PCS | Mod: ,,, | Performed by: INTERNAL MEDICINE

## 2022-04-27 PROCEDURE — 11000001 HC ACUTE MED/SURG PRIVATE ROOM

## 2022-04-27 PROCEDURE — 36415 COLL VENOUS BLD VENIPUNCTURE: CPT | Performed by: INTERNAL MEDICINE

## 2022-04-27 PROCEDURE — 25000003 PHARM REV CODE 250: Performed by: INTERNAL MEDICINE

## 2022-04-27 PROCEDURE — 63600175 PHARM REV CODE 636 W HCPCS: Performed by: NURSE PRACTITIONER

## 2022-04-27 RX ORDER — POTASSIUM CHLORIDE 20 MEQ/1
40 TABLET, EXTENDED RELEASE ORAL ONCE
Status: COMPLETED | OUTPATIENT
Start: 2022-04-27 | End: 2022-04-27

## 2022-04-27 RX ADMIN — POTASSIUM CHLORIDE 40 MEQ: 1500 TABLET, EXTENDED RELEASE ORAL at 05:04

## 2022-04-27 RX ADMIN — SODIUM CHLORIDE, SODIUM LACTATE, POTASSIUM CHLORIDE, AND CALCIUM CHLORIDE: .6; .31; .03; .02 INJECTION, SOLUTION INTRAVENOUS at 06:04

## 2022-04-27 RX ADMIN — POTASSIUM BICARBONATE 40 MEQ: 391 TABLET, EFFERVESCENT ORAL at 10:04

## 2022-04-27 RX ADMIN — SODIUM CHLORIDE, SODIUM LACTATE, POTASSIUM CHLORIDE, AND CALCIUM CHLORIDE: .6; .31; .03; .02 INJECTION, SOLUTION INTRAVENOUS at 05:04

## 2022-04-27 NOTE — PROGRESS NOTES
[facility] Medicine  Progress Note    Patient Name: Marian Durham  MRN: [id number]  Patient Class: IP- Inpatient   Admission Date: 4/22/2022  Length of Stay: 5 days  Attending Physician: SHERIN Pearson MD  Primary Care Provider: Suzanna Duran MD        Subjective:     Principal Problem:SBO (small bowel obstruction)    HPI:  The patient is a 76 y.o. female with a past medical history of hyperlipidemia who presents with complaint of bloating that began 2 days ago. She notes that she had COVID-19 1.5 weeks ago and had fever, cough, postnasal drip and fatigue at the time. She explains that she has gone to urgent care 3 times this week for treatment.The patient reports that today at 10 AM the bloating worsened and was followed by cramping, gagging, SOB and postnasal drip. She states that she denies any vomiting, fever, or diarrhea.  On CT, the patient has a small bowel obstruction.  She will be admitted for further management of her SBO and General Surgery consult.      Overview/Hospital Course:  Patient admitted with small bowel obstruction. CT abd showed SBO with transition point at the level the umbilicus and mild wall thickening of the colon, most prominent the transverse colon, could be associated with mild inflammatory, infectious or early ischemic process. She had leukocytosis as well. Started on empiric zosyn. General surgery consulted. NGT placed for decompression and she was treated with IVF, NPO and pain control.      Interval History: No acute events overnight. Loose stool overnight and slept poorly, but otherwise feeling improved. No other concerns at this time.    Review of Systems   Constitutional:  Negative for chills and fever.   Respiratory:  Negative for cough and shortness of breath.    Cardiovascular:  Negative for chest pain and palpitations.   Gastrointestinal:  Negative for abdominal pain, nausea and vomiting.   Objective:     Vital Signs (Most  Recent):  Temp: 98.3 °F (36.8 °C) (04/27/22 2001)  Pulse: 89 (04/27/22 2001)  Resp: 18 (04/27/22 2001)  BP: (!) 147/66 (04/27/22 1729)  SpO2: 96 % (04/27/22 2001)   Vital Signs (24h Range):  Temp:  [97.7 °F (36.5 °C)-98.5 °F (36.9 °C)] 98.3 °F (36.8 °C)  Pulse:  [73-89] 89  Resp:  [15-18] 18  SpO2:  [95 %-99 %] 96 %  BP: (147-176)/(66-81) 147/66     Weight: 54.4 kg (119 lb 14.9 oz)  Body mass index is 19.96 kg/m².    Intake/Output Summary (Last 24 hours) at 4/27/2022 2034  Last data filed at 4/27/2022 0500  Gross per 24 hour   Intake --   Output 1 ml   Net -1 ml      Physical Exam  Vitals and nursing note reviewed.   Constitutional:       General: She is not in acute distress.     Appearance: She is well-developed.   HENT:      Head: Normocephalic and atraumatic.   Eyes:      General:         Right eye: No discharge.         Left eye: No discharge.      Conjunctiva/sclera: Conjunctivae normal.   Cardiovascular:      Rate and Rhythm: Normal rate.      Pulses: Normal pulses.   Pulmonary:      Effort: Pulmonary effort is normal. No respiratory distress.   Abdominal:      Palpations: Abdomen is soft.      Tenderness: There is abdominal tenderness.   Musculoskeletal:         General: Normal range of motion.      Right lower leg: No edema.      Left lower leg: No edema.   Skin:     General: Skin is warm and dry.   Neurological:      Mental Status: She is alert and oriented to person, place, and time.     Significant Labs:   BMP:  Recent Labs   Lab 04/25/22  0434 04/26/22  0853 04/27/22  0856    136 139   K 3.4* 3.5 3.0*    98 101   CO2 25 25 27   BUN 9 11 7*   CREATININE 0.8 0.9 0.8    212* 119*   CALCIUM 9.0 8.9 9.5   MG 1.9  --   --    PHOS 2.8  --   --      Significant Imaging:   No new imaging this morning.      Assessment/Plan:      * SBO (small bowel obstruction)  Hypokalemia  - With decreased PO intake, hemoconcentration noted on labs with Hgb 17  - CT abd shows SBO with transition point at level  at umbilicus, mild wall thickening of transverse colon  - General surgery following. Discussed with Dr. Alvarez  - Continue IVF, strict NPO, pain control  - Given possible colitis, complete 5 day course of empiric Zosyn.  - X-ray abd shows contrast progression to colon but small bowel remains dilated suggestive of partial SBO vs ileus  - Continue to advance diet as tolerated.  - Monitor electrolytes, replace as needed   - Continue ambulation    Primary osteoarthritis  - Having increased joint pain without celebrex  - PT    VTE Risk Mitigation (From admission, onward)         Ordered     Reason for No Pharmacological VTE Prophylaxis  Once        Question:  Reasons:  Answer:  Risk of Bleeding    04/22/22 2041     IP VTE HIGH RISK PATIENT  Once         04/22/22 2041     Place sequential compression device  Until discontinued         04/22/22 2041                Discharge Planning   WILLIAN:      Code Status: Full Code   Is the patient medically ready for discharge?:     Reason for patient still in hospital (select all that apply): Treatment  Discharge Plan A: Home                  D Favian Pearson MD  Department of Hospital Medicine   [facility]

## 2022-04-27 NOTE — SUBJECTIVE & OBJECTIVE
Interval History: No acute events overnight. Improved distention and discomfort. +flatus. No other concerns at this time.    Review of Systems   Constitutional:  Negative for chills and fever.   Respiratory:  Negative for cough and shortness of breath.    Cardiovascular:  Negative for chest pain and palpitations.   Gastrointestinal:  Positive for abdominal distention. Negative for abdominal pain, nausea and vomiting.   Objective:     Vital Signs (Most Recent):  Temp: 98.3 °F (36.8 °C) (04/26/22 1930)  Pulse: 77 (04/26/22 1930)  Resp: 18 (04/26/22 1930)  BP: (!) 177/74 (04/26/22 1930)  SpO2: 97 % (04/26/22 1930)   Vital Signs (24h Range):  Temp:  [96.4 °F (35.8 °C)-99.5 °F (37.5 °C)] 98.3 °F (36.8 °C)  Pulse:  [71-85] 77  Resp:  [16-20] 18  SpO2:  [96 %-97 %] 97 %  BP: (142-177)/(55-74) 177/74     Weight: 54.4 kg (119 lb 14.9 oz)  Body mass index is 19.96 kg/m².  No intake or output data in the 24 hours ending 04/26/22 2059   Physical Exam  Vitals and nursing note reviewed.   Constitutional:       General: She is not in acute distress.     Appearance: She is well-developed.   HENT:      Head: Normocephalic and atraumatic.   Eyes:      General:         Right eye: No discharge.         Left eye: No discharge.      Conjunctiva/sclera: Conjunctivae normal.   Cardiovascular:      Rate and Rhythm: Normal rate.      Pulses: Normal pulses.   Pulmonary:      Effort: Pulmonary effort is normal. No respiratory distress.   Abdominal:      Palpations: Abdomen is soft.      Tenderness: There is abdominal tenderness.   Musculoskeletal:         General: Normal range of motion.      Right lower leg: No edema.      Left lower leg: No edema.   Skin:     General: Skin is warm and dry.   Neurological:      Mental Status: She is alert and oriented to person, place, and time.       Significant Labs:   CBC:  Recent Labs   Lab 04/24/22  0500   WBC 6.38   HGB 14.5   HCT 44.4      GRAN 65.9  4.2   LYMPH 19.4  1.2   MONO 13.3  0.9    EOS 0.1   BASO 0.02   BMP:  Recent Labs   Lab 04/24/22  0500 04/25/22  0434 04/26/22  0853    140 136   K 3.6 3.4* 3.5    101 98   CO2 24 25 25   BUN 15 9 11   CREATININE 1.0 0.8 0.9   * 101 212*   CALCIUM 9.2 9.0 8.9   MG 2.0 1.9  --    PHOS 2.9 2.8  --        Significant Imaging:   EXAMINATION:  XR ABDOMEN AP 1 VIEW     CLINICAL HISTORY:  Small bowel obstruction;     TECHNIQUE:  AP View(s) of the abdomen was performed.     COMPARISON:  04/24/2022.     FINDINGS:  Two radiographs were obtained, 1 labeled  and 1 labeled 0 minutes.     On the  radiograph, there is an enteric tube with the tip within the proximal stomach.  There appears to be contrast material within the bladder and distal colon and rectum.  There are mildly dilated air-filled loops of small bowel present with normal caliber colon.  Findings could be related to either ileus or partial small-bowel obstruction.     On the radiograph labeled 0 minutes, there is contrast material identified within the stomach and proximal small bowel.  The visualized small bowel appears normal in caliber with a normal mucosal pattern.     Impression:     Enteric tube with tip within the proximal stomach.     Mildly dilated air-filled loops of small bowel are noted on the  radiograph suggesting either partial small bowel obstruction or ileus.  However, the mild small bowel dilatation is not clearly delineated on the radiograph labeled 0 minutes.        Electronically signed by: Hector Enrique MD  Date:                                            04/26/2022  Time:                                           08:16

## 2022-04-27 NOTE — PLAN OF CARE
Patient A&Ox4. Patient without complaints of pain. Patient up to BSC tolerated well. Patient ambulated in room and [place] tolerated well. Purposeful rounding completed. Call light with in reach. Side railsX2.       Problem: Adult Inpatient Plan of Care  Goal: Plan of Care Review  Outcome: Ongoing, Progressing  Goal: Patient-Specific Goal (Individualized)  Outcome: Ongoing, Progressing     Problem: Nausea and Vomiting (Intestinal Obstruction)  Goal: Nausea and Vomiting Relief  Outcome: Ongoing, Progressing     Problem: Pain (Intestinal Obstruction)  Goal: Acceptable Pain Control  Outcome: Ongoing, Progressing

## 2022-04-27 NOTE — NURSING
VSS and afebrile, AAOx4. No pain reported by patient. Ordered diet tolerated well. IV fluids intact. No significant events occurred. Plan of care reviewed with patient. Purposeful rounding done, call light at bed side, bed at lowest position, brakes on, non-skid socks on, will continue to monitor.

## 2022-04-27 NOTE — ASSESSMENT & PLAN NOTE
Hypokalemia  - With decreased PO intake, hemoconcentration noted on labs with Hgb 17  - CT abd shows SBO with transition point at level at umbilicus, mild wall thickening of transverse colon  - General surgery following. Discussed with Dr. Alvarez  - Continue IVF, strict NPO, pain control  - Given possible colitis, complete 5 day course of empiric Zosyn.  - X-ray abd shows contrast progression to colon but small bowel remains dilated suggestive of partial SBO vs ileus  - Advance diet as tolerated. Monitor for flatus.  - Monitor electrolytes, replace as needed  - Continue ambulation

## 2022-04-28 LAB
ANION GAP SERPL CALC-SCNC: 10 MMOL/L (ref 8–16)
BUN SERPL-MCNC: 5 MG/DL (ref 8–23)
CALCIUM SERPL-MCNC: 9.1 MG/DL (ref 8.7–10.5)
CHLORIDE SERPL-SCNC: 106 MMOL/L (ref 95–110)
CO2 SERPL-SCNC: 26 MMOL/L (ref 23–29)
CREAT SERPL-MCNC: 0.7 MG/DL (ref 0.5–1.4)
EST. GFR  (AFRICAN AMERICAN): >60 ML/MIN/1.73 M^2
EST. GFR  (NON AFRICAN AMERICAN): >60 ML/MIN/1.73 M^2
GLUCOSE SERPL-MCNC: 100 MG/DL (ref 70–110)
POTASSIUM SERPL-SCNC: 4 MMOL/L (ref 3.5–5.1)
SODIUM SERPL-SCNC: 142 MMOL/L (ref 136–145)

## 2022-04-28 PROCEDURE — 36415 COLL VENOUS BLD VENIPUNCTURE: CPT | Performed by: INTERNAL MEDICINE

## 2022-04-28 PROCEDURE — 99231 SBSQ HOSP IP/OBS SF/LOW 25: CPT | Mod: ,,, | Performed by: STUDENT IN AN ORGANIZED HEALTH CARE EDUCATION/TRAINING PROGRAM

## 2022-04-28 PROCEDURE — 99231 PR SUBSEQUENT HOSPITAL CARE,LEVL I: ICD-10-PCS | Mod: ,,, | Performed by: STUDENT IN AN ORGANIZED HEALTH CARE EDUCATION/TRAINING PROGRAM

## 2022-04-28 PROCEDURE — 99232 PR SUBSEQUENT HOSPITAL CARE,LEVL II: ICD-10-PCS | Mod: ,,, | Performed by: INTERNAL MEDICINE

## 2022-04-28 PROCEDURE — 11000001 HC ACUTE MED/SURG PRIVATE ROOM

## 2022-04-28 PROCEDURE — 80048 BASIC METABOLIC PNL TOTAL CA: CPT | Performed by: INTERNAL MEDICINE

## 2022-04-28 PROCEDURE — 99232 SBSQ HOSP IP/OBS MODERATE 35: CPT | Mod: ,,, | Performed by: INTERNAL MEDICINE

## 2022-04-28 NOTE — SUBJECTIVE & OBJECTIVE
Interval History: No acute events overnight. Loose stool overnight and slept poorly, but otherwise feeling improved. No other concerns at this time.    Review of Systems   Constitutional:  Negative for chills and fever.   Respiratory:  Negative for cough and shortness of breath.    Cardiovascular:  Negative for chest pain and palpitations.   Gastrointestinal:  Negative for abdominal pain, nausea and vomiting.   Objective:     Vital Signs (Most Recent):  Temp: 98.3 °F (36.8 °C) (04/27/22 2001)  Pulse: 89 (04/27/22 2001)  Resp: 18 (04/27/22 2001)  BP: (!) 147/66 (04/27/22 1729)  SpO2: 96 % (04/27/22 2001)   Vital Signs (24h Range):  Temp:  [97.7 °F (36.5 °C)-98.5 °F (36.9 °C)] 98.3 °F (36.8 °C)  Pulse:  [73-89] 89  Resp:  [15-18] 18  SpO2:  [95 %-99 %] 96 %  BP: (147-176)/(66-81) 147/66     Weight: 54.4 kg (119 lb 14.9 oz)  Body mass index is 19.96 kg/m².    Intake/Output Summary (Last 24 hours) at 4/27/2022 2034  Last data filed at 4/27/2022 0500  Gross per 24 hour   Intake --   Output 1 ml   Net -1 ml      Physical Exam  Vitals and nursing note reviewed.   Constitutional:       General: She is not in acute distress.     Appearance: She is well-developed.   HENT:      Head: Normocephalic and atraumatic.   Eyes:      General:         Right eye: No discharge.         Left eye: No discharge.      Conjunctiva/sclera: Conjunctivae normal.   Cardiovascular:      Rate and Rhythm: Normal rate.      Pulses: Normal pulses.   Pulmonary:      Effort: Pulmonary effort is normal. No respiratory distress.   Abdominal:      Palpations: Abdomen is soft.      Tenderness: There is abdominal tenderness.   Musculoskeletal:         General: Normal range of motion.      Right lower leg: No edema.      Left lower leg: No edema.   Skin:     General: Skin is warm and dry.   Neurological:      Mental Status: She is alert and oriented to person, place, and time.     Significant Labs:   BMP:  Recent Labs   Lab 04/25/22  0434 04/26/22  0833  04/27/22  0856    136 139   K 3.4* 3.5 3.0*    98 101   CO2 25 25 27   BUN 9 11 7*   CREATININE 0.8 0.9 0.8    212* 119*   CALCIUM 9.0 8.9 9.5   MG 1.9  --   --    PHOS 2.8  --   --      Significant Imaging:   No new imaging this morning.

## 2022-04-28 NOTE — SUBJECTIVE & OBJECTIVE
"Interval History: Tolerating some solid food, diarrhea subsided. Reports some abdominal distention that persists.  No nausea or vomiting.  Passing minimal flatus.    Medications:  Continuous Infusions:   lactated ringers 125 mL/hr at 04/27/22 1743     Scheduled Meds:  PRN Meds:morphine, morphine, naloxone, ondansetron, promethazine (PHENERGAN) IVPB, sodium chloride 0.9%     Review of patient's allergies indicates:   Allergen Reactions    Demerol [meperidine] Other (See Comments)     "Becomes Agitated and Combative"     Objective:     Vital Signs (Most Recent):  Temp: 98.2 °F (36.8 °C) (04/27/22 2345)  Pulse: 79 (04/27/22 2345)  Resp: 17 (04/27/22 2345)  BP: 135/62 (04/27/22 2345)  SpO2: 97 % (04/27/22 2345) Vital Signs (24h Range):  Temp:  [97.7 °F (36.5 °C)-98.5 °F (36.9 °C)] 98.2 °F (36.8 °C)  Pulse:  [73-89] 79  Resp:  [15-18] 17  SpO2:  [95 %-99 %] 97 %  BP: (135-176)/(62-81) 135/62     Weight: 54.4 kg (119 lb 14.9 oz)  Body mass index is 19.96 kg/m².    Intake/Output - Last 3 Shifts         04/26 0700  04/27 0659 04/27 0700  04/28 0659    I.V. (mL/kg)      IV Piggyback      Total Intake(mL/kg)      Urine (mL/kg/hr)      Drains      Stool 1     Total Output 1     Net -1           Urine Occurrence 1 x     Stool Occurrence 1 x 3 x            Physical Exam  Constitutional:       General: She is not in acute distress.  Cardiovascular:      Rate and Rhythm: Normal rate.   Pulmonary:      Effort: Pulmonary effort is normal. No respiratory distress.   Abdominal:      Palpations: Abdomen is soft.      Tenderness: There is no abdominal tenderness. There is no guarding or rebound.      Comments: Mild distention, tympanitic   Skin:     General: Skin is warm and dry.   Neurological:      Mental Status: She is alert.   Psychiatric:         Mood and Affect: Mood normal.         Behavior: Behavior normal.       Significant Labs:  I have reviewed all pertinent lab results within the past 24 hours.  CBC:   Recent Labs   Lab " 04/24/22  0500   WBC 6.38   RBC 4.76   HGB 14.5   HCT 44.4      MCV 93   MCH 30.5   MCHC 32.7     CMP:   Recent Labs   Lab 04/25/22  0434 04/26/22  0853 04/27/22  0856      < > 119*   CALCIUM 9.0   < > 9.5   ALBUMIN 3.0*  --   --    PROT 5.4*  --   --       < > 139   K 3.4*   < > 3.0*   CO2 25   < > 27      < > 101   BUN 9   < > 7*   CREATININE 0.8   < > 0.8   ALKPHOS 55  --   --    ALT 9*  --   --    AST 19  --   --    BILITOT 1.1*  --   --     < > = values in this interval not displayed.       Significant Diagnostics:  I have reviewed all pertinent imaging results/findings within the past 24 hours.

## 2022-04-28 NOTE — ASSESSMENT & PLAN NOTE
Small bowel obstruction associated with nausea, belching and abdominal distention.  Has had return of bowel function after passing gastrografin SBO challenge.  Residual distention likely due to ileus, she is passing flatus and is not persistently obstructed.    - Diet as tolerated  - Ambulate, out of bed with assistance as needed  - Expect discharge within 24 hours pending tolerance of diet

## 2022-04-28 NOTE — PROGRESS NOTES
"[facility]  General Surgery  Progress Note    Subjective:     History of Present Illness:  76 y.o. woman with a history of constipation and a prior hysterectomy and tubal ligation in the 80s presenting with abdominal discomfort associated with belching, abdominal distention, abdominal discomfort and weakness.  CT scan demonstrates dilated loops of small intestine with a possible transition point in the mid abdomen without pneumoperitoneum, free fluid or pneumatosis.  NG tube placed on day of admission.        Post-Op Info:  * No surgery found *         Interval History: Tolerating some solid food, diarrhea subsided. Reports some abdominal distention that persists.  No nausea or vomiting.  Passing minimal flatus.    Medications:  Continuous Infusions:   lactated ringers 125 mL/hr at 04/27/22 1743     Scheduled Meds:  PRN Meds:morphine, morphine, naloxone, ondansetron, promethazine (PHENERGAN) IVPB, sodium chloride 0.9%     Review of patient's allergies indicates:   Allergen Reactions    Demerol [meperidine] Other (See Comments)     "Becomes Agitated and Combative"     Objective:     Vital Signs (Most Recent):  Temp: 98.2 °F (36.8 °C) (04/27/22 2345)  Pulse: 79 (04/27/22 2345)  Resp: 17 (04/27/22 2345)  BP: 135/62 (04/27/22 2345)  SpO2: 97 % (04/27/22 2345) Vital Signs (24h Range):  Temp:  [97.7 °F (36.5 °C)-98.5 °F (36.9 °C)] 98.2 °F (36.8 °C)  Pulse:  [73-89] 79  Resp:  [15-18] 17  SpO2:  [95 %-99 %] 97 %  BP: (135-176)/(62-81) 135/62     Weight: 54.4 kg (119 lb 14.9 oz)  Body mass index is 19.96 kg/m².    Intake/Output - Last 3 Shifts         04/26 0700  04/27 0659 04/27 0700  04/28 0659    I.V. (mL/kg)      IV Piggyback      Total Intake(mL/kg)      Urine (mL/kg/hr)      Drains      Stool 1     Total Output 1     Net -1           Urine Occurrence 1 x     Stool Occurrence 1 x 3 x            Physical Exam  Constitutional:       General: She is not in acute distress.  Cardiovascular:      Rate " and Rhythm: Normal rate.   Pulmonary:      Effort: Pulmonary effort is normal. No respiratory distress.   Abdominal:      Palpations: Abdomen is soft.      Tenderness: There is no abdominal tenderness. There is no guarding or rebound.      Comments: Mild distention, tympanitic   Skin:     General: Skin is warm and dry.   Neurological:      Mental Status: She is alert.   Psychiatric:         Mood and Affect: Mood normal.         Behavior: Behavior normal.       Significant Labs:  I have reviewed all pertinent lab results within the past 24 hours.  CBC:   Recent Labs   Lab 04/24/22  0500   WBC 6.38   RBC 4.76   HGB 14.5   HCT 44.4      MCV 93   MCH 30.5   MCHC 32.7     CMP:   Recent Labs   Lab 04/25/22  0434 04/26/22  0853 04/27/22  0856      < > 119*   CALCIUM 9.0   < > 9.5   ALBUMIN 3.0*  --   --    PROT 5.4*  --   --       < > 139   K 3.4*   < > 3.0*   CO2 25   < > 27      < > 101   BUN 9   < > 7*   CREATININE 0.8   < > 0.8   ALKPHOS 55  --   --    ALT 9*  --   --    AST 19  --   --    BILITOT 1.1*  --   --     < > = values in this interval not displayed.       Significant Diagnostics:  I have reviewed all pertinent imaging results/findings within the past 24 hours.    Assessment/Plan:     * SBO (small bowel obstruction)  Small bowel obstruction associated with nausea, belching and abdominal distention.  Has had return of bowel function after passing gastrografin SBO challenge.    - Diet as tolerated  - IV fluid hydration until tolerating adequate PO  - Close monitoring of abdominal examination - monitor distention  - Ambulate, out of bed with assistance as needed; PT consultation for evaluation and treatment appropriate   - Optimize and replete electrolytes; goal K >4, Phos >3, Mg >2          Latonia Alvarez MD  General Surgery  [facility] ([facility])

## 2022-04-28 NOTE — ASSESSMENT & PLAN NOTE
Small bowel obstruction associated with nausea, belching and abdominal distention.  Has had return of bowel function after passing gastrografin SBO challenge.    - Diet as tolerated  - IV fluid hydration until tolerating adequate PO  - Close monitoring of abdominal examination - monitor distention  - Ambulate, out of bed with assistance as needed; PT consultation for evaluation and treatment appropriate   - Optimize and replete electrolytes; goal K >4, Phos >3, Mg >2

## 2022-04-28 NOTE — ASSESSMENT & PLAN NOTE
Hypokalemia  - With decreased PO intake, hemoconcentration noted on labs with Hgb 17  - CT abd shows SBO with transition point at level at umbilicus, mild wall thickening of transverse colon  - General surgery following. Discussed with Dr. Alvarez  - Continue IVF, strict NPO, pain control  - Given possible colitis, complete 5 day course of empiric Zosyn.  - X-ray abd shows contrast progression to colon but small bowel remains dilated suggestive of partial SBO vs ileus  - Continue to advance diet as tolerated.  - Monitor electrolytes, replace as needed   - Continue ambulation

## 2022-04-28 NOTE — PLAN OF CARE
Patient A&Ox4. Patient without complaint of pain. Patient ambulated in [address] way tolerated well. Purposeful rounding completed .Call light within reach. Side rails X2.   Problem: Adult Inpatient Plan of Care  Goal: Plan of Care Review  Outcome: Ongoing, Progressing  Goal: Patient-Specific Goal (Individualized)  Outcome: Ongoing, Progressing     Problem: Fluid Deficit (Intestinal Obstruction)  Goal: Fluid Balance  Outcome: Ongoing, Progressing     Problem: Pain (Intestinal Obstruction)  Goal: Acceptable Pain Control  Outcome: Ongoing, Progressing

## 2022-04-28 NOTE — PROGRESS NOTES
"[facility], [place], operated by [facility] Surg [facility]  General Surgery  Progress Note    Subjective:     History of Present Illness:  76 y.o. woman with a history of constipation and a prior hysterectomy and tubal ligation in the 80s presenting with abdominal discomfort associated with belching, abdominal distention, abdominal discomfort and weakness.  CT scan demonstrates dilated loops of small intestine with a possible transition point in the mid abdomen without pneumoperitoneum, free fluid or pneumatosis.  NG tube placed on day of admission.        Post-Op Info:  * No surgery found *         Interval History: Tolerating solid food, though limiting her intake.  Denies nausea, vomiting.  Denies abdominal pain or discomfort.  Still having some bloating.    Medications:  Continuous Infusions:  Scheduled Meds:  PRN Meds:morphine, morphine, naloxone, ondansetron, promethazine (PHENERGAN) IVPB, sodium chloride 0.9%     Review of patient's allergies indicates:   Allergen Reactions    Demerol [meperidine] Other (See Comments)     "Becomes Agitated and Combative"     Objective:     Vital Signs (Most Recent):  Temp: 98.2 °F (36.8 °C) (04/28/22 1634)  Pulse: 91 (04/28/22 1634)  Resp: 18 (04/28/22 1634)  BP: (!) 159/73 (04/28/22 1634)  SpO2: (!) 94 % (04/28/22 1634)   Vital Signs (24h Range):  Temp:  [97.8 °F (36.6 °C)-98.7 °F (37.1 °C)] 98.2 °F (36.8 °C)  Pulse:  [74-91] 91  Resp:  [17-18] 18  SpO2:  [93 %-98 %] 94 %  BP: (128-172)/(61-78) 159/73     Weight: 54.4 kg (119 lb 14.9 oz)  Body mass index is 19.96 kg/m².    Intake/Output - Last 3 Shifts         04/26 0700 04/27 0659 04/27 0700 04/28 0659 04/28 0700 04/29 0659    P.O.   360    I.V. (mL/kg)  1551 (28.5)     IV Piggyback       Total Intake(mL/kg)  1551 (28.5) 360 (6.6)    Urine (mL/kg/hr)       Drains       Stool 1      Total Output 1      Net -1 +1551 +360           Urine Occurrence 1 x 4 x 5 x    Stool Occurrence 1 x 4 x             Physical Exam  Constitutional:       General: She " is not in acute distress.  Cardiovascular:      Rate and Rhythm: Normal rate.   Pulmonary:      Effort: Pulmonary effort is normal. No respiratory distress.   Abdominal:      Palpations: Abdomen is soft.      Tenderness: There is no abdominal tenderness. There is no guarding.      Comments: Mild distention   Musculoskeletal:         General: No swelling.   Skin:     General: Skin is warm and dry.   Neurological:      Mental Status: She is alert.       Significant Labs:  I have reviewed all pertinent lab results within the past 24 hours.  CBC:   Recent Labs   Lab 04/24/22  0500   WBC 6.38   RBC 4.76   HGB 14.5   HCT 44.4      MCV 93   MCH 30.5   MCHC 32.7     CMP:   Recent Labs   Lab 04/25/22  0434 04/26/22  0853 04/28/22  0430      < > 100   CALCIUM 9.0   < > 9.1   ALBUMIN 3.0*  --   --    PROT 5.4*  --   --       < > 142   K 3.4*   < > 4.0   CO2 25   < > 26      < > 106   BUN 9   < > 5*   CREATININE 0.8   < > 0.7   ALKPHOS 55  --   --    ALT 9*  --   --    AST 19  --   --    BILITOT 1.1*  --   --     < > = values in this interval not displayed.       Significant Diagnostics:  I have reviewed all pertinent imaging results/findings within the past 24 hours.    Assessment/Plan:     * SBO (small bowel obstruction)  Small bowel obstruction associated with nausea, belching and abdominal distention.  Has had return of bowel function after passing gastrografin SBO challenge.  Residual distention likely due to ileus, she is passing flatus and is not persistently obstructed.    - Diet as tolerated  - Ambulate, out of bed with assistance as needed  - Expect discharge within 24 hours pending tolerance of diet        Latonia Alvarez MD  General Surgery  [facility] ([facility])

## 2022-04-28 NOTE — ASSESSMENT & PLAN NOTE
Hypokalemia  - With decreased PO intake, hemoconcentration noted on admit labs with Hgb 17.  - CT abd shows SBO with transition point at level at umbilicus, mild wall thickening of transverse colon.  - Hold further IVFs. Continue regular diet. Close to discharge- likely can leave tomorrow AM if tolerating and no pain med requirements.

## 2022-04-28 NOTE — SUBJECTIVE & OBJECTIVE
Interval History: No acute events overnight. Tolerating diet. Reports scant flatus, no BM overnight. No other concerns at this time.    Review of Systems   Constitutional:  Negative for chills and fever.   Respiratory:  Negative for cough and shortness of breath.    Cardiovascular:  Negative for chest pain and palpitations.   Gastrointestinal:  Negative for abdominal pain, nausea and vomiting.   Objective:     Vital Signs (Most Recent):  Temp: 98.4 °F (36.9 °C) (04/28/22 1234)  Pulse: 87 (04/28/22 1234)  Resp: 18 (04/28/22 1234)  BP: 128/67 (04/28/22 1234)  SpO2: (!) 93 % (04/28/22 1234)   Vital Signs (24h Range):  Temp:  [97.8 °F (36.6 °C)-98.7 °F (37.1 °C)] 98.4 °F (36.9 °C)  Pulse:  [74-89] 87  Resp:  [17-18] 18  SpO2:  [93 %-98 %] 93 %  BP: (128-172)/(61-78) 128/67     Weight: 54.4 kg (119 lb 14.9 oz)  Body mass index is 19.96 kg/m².    Intake/Output Summary (Last 24 hours) at 4/28/2022 1611  Last data filed at 4/28/2022 1000  Gross per 24 hour   Intake 1911 ml   Output --   Net 1911 ml      Physical Exam  Vitals and nursing note reviewed.   Constitutional:       General: She is not in acute distress.     Appearance: She is well-developed.   HENT:      Head: Normocephalic and atraumatic.   Eyes:      General:         Right eye: No discharge.         Left eye: No discharge.      Conjunctiva/sclera: Conjunctivae normal.   Cardiovascular:      Rate and Rhythm: Normal rate.      Pulses: Normal pulses.   Pulmonary:      Effort: Pulmonary effort is normal. No respiratory distress.   Abdominal:      General: There is distension (mild).      Palpations: Abdomen is soft.      Tenderness: There is no abdominal tenderness.   Musculoskeletal:         General: Normal range of motion.      Right lower leg: No edema.      Left lower leg: No edema.   Skin:     General: Skin is warm and dry.   Neurological:      Mental Status: She is alert and oriented to person, place, and time.       Significant Labs:   BMP:  Recent Labs   Lab  04/26/22  0853 04/27/22  0856 04/28/22  0430    139 142   K 3.5 3.0* 4.0   CL 98 101 106   CO2 25 27 26   BUN 11 7* 5*   CREATININE 0.9 0.8 0.7   * 119* 100   CALCIUM 8.9 9.5 9.1       Significant Imaging:   No new imaging this morning.

## 2022-04-28 NOTE — SUBJECTIVE & OBJECTIVE
"Interval History: Tolerating solid food, though limiting her intake.  Denies nausea, vomiting.  Denies abdominal pain or discomfort.  Still having some bloating.    Medications:  Continuous Infusions:  Scheduled Meds:  PRN Meds:morphine, morphine, naloxone, ondansetron, promethazine (PHENERGAN) IVPB, sodium chloride 0.9%     Review of patient's allergies indicates:   Allergen Reactions    Demerol [meperidine] Other (See Comments)     "Becomes Agitated and Combative"     Objective:     Vital Signs (Most Recent):  Temp: 98.2 °F (36.8 °C) (04/28/22 1634)  Pulse: 91 (04/28/22 1634)  Resp: 18 (04/28/22 1634)  BP: (!) 159/73 (04/28/22 1634)  SpO2: (!) 94 % (04/28/22 1634)   Vital Signs (24h Range):  Temp:  [97.8 °F (36.6 °C)-98.7 °F (37.1 °C)] 98.2 °F (36.8 °C)  Pulse:  [74-91] 91  Resp:  [17-18] 18  SpO2:  [93 %-98 %] 94 %  BP: (128-172)/(61-78) 159/73     Weight: 54.4 kg (119 lb 14.9 oz)  Body mass index is 19.96 kg/m².    Intake/Output - Last 3 Shifts         04/26 0700  04/27 0659 04/27 0700  04/28 0659 04/28 0700  04/29 0659    P.O.   360    I.V. (mL/kg)  1551 (28.5)     IV Piggyback       Total Intake(mL/kg)  1551 (28.5) 360 (6.6)    Urine (mL/kg/hr)       Drains       Stool 1      Total Output 1      Net -1 +1551 +360           Urine Occurrence 1 x 4 x 5 x    Stool Occurrence 1 x 4 x             Physical Exam  Constitutional:       General: She is not in acute distress.  Cardiovascular:      Rate and Rhythm: Normal rate.   Pulmonary:      Effort: Pulmonary effort is normal. No respiratory distress.   Abdominal:      Palpations: Abdomen is soft.      Tenderness: There is no abdominal tenderness. There is no guarding.      Comments: Mild distention   Musculoskeletal:         General: No swelling.   Skin:     General: Skin is warm and dry.   Neurological:      Mental Status: She is alert.       Significant Labs:  I have reviewed all pertinent lab results within the past 24 hours.  CBC:   Recent Labs   Lab " 04/24/22  0500   WBC 6.38   RBC 4.76   HGB 14.5   HCT 44.4      MCV 93   MCH 30.5   MCHC 32.7     CMP:   Recent Labs   Lab 04/25/22  0434 04/26/22  0853 04/28/22  0430      < > 100   CALCIUM 9.0   < > 9.1   ALBUMIN 3.0*  --   --    PROT 5.4*  --   --       < > 142   K 3.4*   < > 4.0   CO2 25   < > 26      < > 106   BUN 9   < > 5*   CREATININE 0.8   < > 0.7   ALKPHOS 55  --   --    ALT 9*  --   --    AST 19  --   --    BILITOT 1.1*  --   --     < > = values in this interval not displayed.       Significant Diagnostics:  I have reviewed all pertinent imaging results/findings within the past 24 hours.

## 2022-04-29 VITALS
HEIGHT: 65 IN | WEIGHT: 119.94 LBS | DIASTOLIC BLOOD PRESSURE: 66 MMHG | TEMPERATURE: 98 F | SYSTOLIC BLOOD PRESSURE: 140 MMHG | BODY MASS INDEX: 19.98 KG/M2 | OXYGEN SATURATION: 96 % | HEART RATE: 82 BPM | RESPIRATION RATE: 18 BRPM

## 2022-04-29 LAB
ANION GAP SERPL CALC-SCNC: 9 MMOL/L (ref 8–16)
BUN SERPL-MCNC: 7 MG/DL (ref 8–23)
CALCIUM SERPL-MCNC: 9.7 MG/DL (ref 8.7–10.5)
CHLORIDE SERPL-SCNC: 107 MMOL/L (ref 95–110)
CO2 SERPL-SCNC: 26 MMOL/L (ref 23–29)
CREAT SERPL-MCNC: 0.8 MG/DL (ref 0.5–1.4)
EST. GFR  (AFRICAN AMERICAN): >60 ML/MIN/1.73 M^2
EST. GFR  (NON AFRICAN AMERICAN): >60 ML/MIN/1.73 M^2
GLUCOSE SERPL-MCNC: 101 MG/DL (ref 70–110)
POTASSIUM SERPL-SCNC: 4.6 MMOL/L (ref 3.5–5.1)
SODIUM SERPL-SCNC: 142 MMOL/L (ref 136–145)

## 2022-04-29 PROCEDURE — 80048 BASIC METABOLIC PNL TOTAL CA: CPT | Performed by: INTERNAL MEDICINE

## 2022-04-29 PROCEDURE — 99231 SBSQ HOSP IP/OBS SF/LOW 25: CPT | Mod: ,,, | Performed by: STUDENT IN AN ORGANIZED HEALTH CARE EDUCATION/TRAINING PROGRAM

## 2022-04-29 PROCEDURE — 1111F PR DISCHARGE MEDS RECONCILED W/ CURRENT OUTPATIENT MED LIST: ICD-10-PCS | Mod: CPTII,,, | Performed by: INTERNAL MEDICINE

## 2022-04-29 PROCEDURE — 99239 PR HOSPITAL DISCHARGE DAY,>30 MIN: ICD-10-PCS | Mod: ,,, | Performed by: INTERNAL MEDICINE

## 2022-04-29 PROCEDURE — 99239 HOSP IP/OBS DSCHRG MGMT >30: CPT | Mod: ,,, | Performed by: INTERNAL MEDICINE

## 2022-04-29 PROCEDURE — 99231 PR SUBSEQUENT HOSPITAL CARE,LEVL I: ICD-10-PCS | Mod: ,,, | Performed by: STUDENT IN AN ORGANIZED HEALTH CARE EDUCATION/TRAINING PROGRAM

## 2022-04-29 PROCEDURE — 36415 COLL VENOUS BLD VENIPUNCTURE: CPT | Performed by: INTERNAL MEDICINE

## 2022-04-29 PROCEDURE — 1111F DSCHRG MED/CURRENT MED MERGE: CPT | Mod: CPTII,,, | Performed by: INTERNAL MEDICINE

## 2022-04-29 RX ORDER — ONDANSETRON 4 MG/1
4 TABLET, FILM COATED ORAL EVERY 6 HOURS PRN
Qty: 30 TABLET | Refills: 0 | Status: SHIPPED | OUTPATIENT
Start: 2022-04-29 | End: 2022-08-29

## 2022-04-29 NOTE — DISCHARGE SUMMARY
[facility] Medicine  Discharge Summary      Patient Name: Marian Durham  MRN: [id number]  Patient Class: IP- Inpatient  Admission Date: 4/22/2022  Hospital Length of Stay: 7 days  Discharge Date and Time: 4/29/2022 11:44 AM  Attending Physician: No att. providers found   Discharging Provider: ADILENE Pearson MD  Primary Care Provider: Suzanna Duran MD      HPI:   The patient is a 76 y.o. female with a past medical history of hyperlipidemia who presents with complaint of bloating that began 2 days ago. She notes that she had COVID-19 1.5 weeks ago and had fever, cough, postnasal drip and fatigue at the time. She explains that she has gone to urgent care 3 times this week for treatment.The patient reports that today at 10 AM the bloating worsened and was followed by cramping, gagging, SOB and postnasal drip. She states that she denies any vomiting, fever, or diarrhea.  On CT, the patient has a small bowel obstruction.  She will be admitted for further management of her SBO and General Surgery consult.      * No surgery found *      Hospital Course:   Patient admitted with small bowel obstruction. CT abd showed SBO with transition point at the level the umbilicus and mild wall thickening of the colon, most prominent the transverse colon, could be associated with mild inflammatory, infectious or early ischemic process. She had leukocytosis as well. Started on empiric zosyn. General surgery consulted. NGT placed for decompression and she was treated with IVF, NPO and pain control. After upper GI series began passing stool and symptoms improved. Diet advanced and tolerated well. With clinical improvement and vital stability, she was prepared for discharge home.       Goals of Care Treatment Preferences:  Code Status: Full Code    Living Will: Yes              Consults:   Consults (From admission, onward)        Status Ordering Provider     Inpatient consult to General surgery  Once         Provider:  Latonia Alvarez MD    Completed YOUSIF LEBRON          No new Assessment & Plan notes have been filed under this hospital service since the last note was generated.  Service: Hospital Medicine    Final Active Diagnoses:    Diagnosis Date Noted POA    PRINCIPAL PROBLEM:  SBO (small bowel obstruction) [K56.609] 04/22/2022 Yes    Volume depletion [E86.9] 04/23/2022 Yes    Lactic acidosis [E87.2] 04/23/2022 Yes    Primary osteoarthritis [M19.91] 04/23/2022 Yes      Problems Resolved During this Admission:       Discharged Condition: good    Disposition: Home or Self Care    Follow Up:   Follow-up Information     Suzanna Duran MD Follow up on 5/4/2022.    Specialty: Internal Medicine  Why: post-hospital follow-up 1:30 pm  Contact information:  [facility] [facility] [address]  [phone]                       Patient Instructions:      Diet Adult Regular     Notify your health care provider if you experience any of the following:  persistent nausea and vomiting or diarrhea     Notify your health care provider if you experience any of the following:  severe uncontrolled pain     Notify your health care provider if you experience any of the following:  increased confusion or weakness     Notify your health care provider if you experience any of the following:  persistent dizziness, light-headedness, or visual disturbances     Notify your health care provider if you experience any of the following:  temperature >100.4     Activity as tolerated       Significant Diagnostic Studies:   CBC:  Recent Labs   Lab 04/22/22  1624 04/23/22  0520 04/24/22  0500   WBC 20.87* 14.12* 6.38   HGB 17.3* 14.8 14.5   HCT 50.7* 43.6 44.4    305 240   GRAN 87.8*  18.3* 78.7*  11.1* 65.9  4.2   LYMPH 6.6*  1.4 11.5*  1.6 19.4  1.2   MONO 4.7  1.0 9.1  1.3* 13.3  0.9   EOS 0.0 0.0 0.1   BASO 0.06 0.03 0.02     CMP:  Recent Labs   Lab 04/23/22  0520 04/24/22  0500 04/25/22  0434  04/26/22  0853 04/27/22  0856 04/28/22  0430 04/29/22  0431    141 140   < > 139 142 142   K 3.9 3.6 3.4*   < > 3.0* 4.0 4.6    106 101   < > 101 106 107   CO2 21* 24 25   < > 27 26 26   BUN 23 15 9   < > 7* 5* 7*   CREATININE 1.0 1.0 0.8   < > 0.8 0.7 0.8   * 112* 101   < > 119* 100 101   CALCIUM 9.2 9.2 9.0   < > 9.5 9.1 9.7   MG 2.1 2.0 1.9  --   --   --   --    PHOS 4.1 2.9 2.8  --   --   --   --    ALKPHOS 58 56 55  --   --   --   --    AST 16 20 19  --   --   --   --    ALT 10 16 9*  --   --   --   --    BILITOT 0.9 1.3* 1.1*  --   --   --   --    PROT 5.7* 5.9* 5.4*  --   --   --   --    ALBUMIN 3.6 3.4* 3.0*  --   --   --   --    ANIONGAP 12 11 14   < > 11 10 9    < > = values in this interval not displayed.     Imaging Results           CT Abdomen Pelvis With Contrast (Final result)  Result time 04/22/22 18:39:47    Final result by Jerzy Krishna MD (04/22/22 18:39:47)                 Impression:      1. Small-bowel obstruction with transition point at the midline, mid abdomen at the level the umbilicus.  See above comments.  This could be associated with an internal hernia, mild volvulus or adhesions.  Recommend surgical consultation and follow-up.  2. Mild hepatomegaly.  3. Trace pericardial effusion.  4. Mild wall thickening of the colon most prominent the transverse colon could be associated with mild inflammatory, infectious or early ischemic process.  Surgical follow-up recommended.  5. Diverticulosis of the colon.  6.  This report was flagged in Epic as abnormal.      Electronically signed by: Jerzy Krishna  Date:    04/22/2022  Time:    18:39             Narrative:    EXAMINATION:  CT ABDOMEN PELVIS WITH CONTRAST    CLINICAL HISTORY:  Abdominal abscess/infection suspected;Abdominal bloating, no focal tenderness, WBC 20;    TECHNIQUE:  Low dose axial images, sagittal and coronal reformations were obtained from the lung bases to the pubic symphysis following the IV administration  of 75 mL of Omnipaque 350 .  Oral contrast was not administered.    COMPARISON:  None.    FINDINGS:  Abdomen:    - Lower thorax:Trace pericardial effusion.    - Lung bases: No infiltrates and no nodules.    - Liver: The liver is mildly enlarged.  Probable tiny subcentimeter cyst in the hepatic dome left lobe on axial 15.  Slight elongation of the right lobe may be Riedel's lobe configuration.    Ovoid hyperdense stomach contents may be associated with medication.  Recommend clinical correlation.  Air-fluid level in the stomach.    - Gallbladder: Probable noncalcified gallstone present.  No wall thickening or pericholecystic fluid.    - Bile Ducts: No evidence of intra or extra hepatic biliary ductal dilation.    - Spleen: Negative.    - Kidneys: No mass or hydronephrosis.    - Adrenals: Unremarkable.    - Pancreas: No mass or peripancreatic fat stranding.    - Retroperitoneum:  No significant adenopathy.    - Vascular: No abdominal aortic aneurysm.    - Abdominal wall:  Unremarkable.    Pelvis:    No focal abnormality of the urinary bladder.    Status post hysterectomy.    Bowel/Mesentery:    There multiple dilated loops of small bowel with air-fluid levels consistent with a small bowel obstruction.  Mild small bowel wall thickening.  Transition point is mid abdomen at the midline at the level of the umbilicus.  Slight distortion and minimal twisting of the small bowel and mesentery with slight generalized anterior protrusion of bowel contents in the mid abdomen.  This is seen on axial 82-86, coronal 39-41 and sagittal 77-84.  The findings could be associated with an internal hernia, mild volvulus or adhesions.  Recommend surgical consultation and follow-up.    Distal small bowel and colon are normal in caliber.    There is mild wall thickening noted to the colon most prominent in the transverse colon.  There is diverticulosis of the colon.    Bones:  No acute osseous abnormality and no suspicious lytic or blastic  lesion.                                Pending Diagnostic Studies:     None         Medications:  Reconciled Home Medications:      Medication List      START taking these medications    ondansetron 4 MG tablet  Commonly known as: ZOFRAN  Take 1 tablet (4 mg total) by mouth every 6 (six) hours as needed for Nausea.        CONTINUE taking these medications    CALCIUM 500 + D 500 mg-5 mcg (200 unit) per tablet  Generic drug: calcium-vitamin D3  Take 1 tablet by mouth 2 (two) times daily with meals.     celecoxib 200 MG capsule  Commonly known as: CeleBREX  take 1 capsule (200MG) by oral route every day as needed     estradioL 0.01 % (0.1 mg/gram) vaginal cream  Commonly known as: ESTRACE  Place 1 g vaginally twice a week.     glucosamine-chondroitin 500-400 mg tablet  Take 1 tablet by mouth 3 (three) times daily.     MIRALAX ORAL  Take by mouth as needed.     neomycin-polymyxin-dexamethasone 3.5mg/mL-10,000 unit/mL-0.1 % Drps  Commonly known as: MAXITROL  Place 1 drop into the right eye 3 (three) times daily.     simvastatin 40 MG tablet  Commonly known as: ZOCOR  Take 40 mg by mouth every evening.            Indwelling Lines/Drains at time of discharge:   Lines/Drains/Airways     Drain  Duration                NG/OG Tube 04/22/22 2230 Right nostril 6 days                Time spent on the discharge of patient: 35 minutes         ADILENE Pearson MD  Department of Hospital Medicine  [facility]

## 2022-04-29 NOTE — DISCHARGE INSTRUCTIONS
AVOID sitting on toilet for >5 minutes at a time.  Do not read or use your phone on the toilet  Drink at least 8 glasses of water per day  Get at least 30min of exercise three times per day    Start a daily fiber supplement:  [facility]  High Fiber Diet    15 grams of fiber per day is recommended  Fiber cereal = 5 grams (Raisin Bran, Shredded Wheat, Grape Nuts)  Konsyl 1 teaspoon = 6 grams  Metamucil 1 tablespoon = 3 grams  Citrucel 1 tablespoon = 2 grams  Fiber Choice = 3-4 per day    This diet furnishes adequate amounts of all the essential nutrients needed by the body and a very liberal fiber or roughage content. Roughage is indigestible fiber found in fruits, vegetables and whole grain cereals. It provides bulk to the large intestine and, accompanied by an adequate fluid intake, is a stimulant to elimination. Regular eating and elimination habits are vital to good health.     How to Increase Your Fiber Intake    Drink at least 4-5 glasses of liquids per day or the fiber can be constipating rather then stimulating to your gut.  Boil and bake potatoes in their skin. Eat the skin, too.  Include fresh fruits and raw vegetables in your daily diet. Raw fruits and vegetables have more useful fiber than those that have been peeled, cooked, pureed, or otherwise processed.  Eat a wide variety of fibrous foods in reasonable amounts. Increase fiber intake slowly especially if you have been on a low-fiber diet.  Eat more legumes-peas, beans, soybeans.  For snacks, try dried fruit, whole wheat and rye crackers.  Avoid instant-cook hot cereals. Use the longer cooking cereals.  Use bran whenever possible. Sprinkle it on top of cereal, mix it into mashed potatoes or hamburger meat, or use it in combination dishes such as meat loaf.   Substitute whole grain, whole wheat and bran products for white flour products.  Eat slowly and chew your food thoroughly.      If you are still straining despite the fiber, you can  start MiraLax 1 capful daily in the morning, and increase to twice per day if needed.

## 2022-04-29 NOTE — PLAN OF CARE
Free from falls, injury, or skin breakdown this hospital admission.  Pt eager & in agreement w/ DC. VU of DC instructions and the need to attend follow-up appointments--paperwork  passed & explained. Per pt, Zofran to be filled via [facility].IV removed w/ cath tip intact, WNL. Voiding, ambulating, & tolerating PO well. To be DCd home --will be escorted downstairs via  transport team once dressed, ready & ride arrives.

## 2022-04-29 NOTE — SUBJECTIVE & OBJECTIVE
"Interval History: Feeling much better this morning, had several bowel movements overnight and bloating is significantly improved.  Looking forward to pancakes this morning for breakfast.    Medications:  Continuous Infusions:  Scheduled Meds:  PRN Meds:morphine, morphine, naloxone, ondansetron, promethazine (PHENERGAN) IVPB, sodium chloride 0.9%     Review of patient's allergies indicates:   Allergen Reactions    Demerol [meperidine] Other (See Comments)     "Becomes Agitated and Combative"     Objective:     Vital Signs (Most Recent):  Temp: 98.4 °F (36.9 °C) (04/29/22 0745)  Pulse: 82 (04/29/22 0745)  Resp: 18 (04/29/22 0745)  BP: (!) 140/66 (04/29/22 0745)  SpO2: 96 % (04/29/22 0745)   Vital Signs (24h Range):  Temp:  [97.9 °F (36.6 °C)-99.5 °F (37.5 °C)] 98.4 °F (36.9 °C)  Pulse:  [81-91] 82  Resp:  [18] 18  SpO2:  [93 %-98 %] 96 %  BP: (128-176)/(61-79) 140/66     Weight: 54.4 kg (119 lb 14.9 oz)  Body mass index is 19.96 kg/m².    Intake/Output - Last 3 Shifts         04/27 0700  04/28 0659 04/28 0700  04/29 0659 04/29 0700  04/30 0659    P.O.  840     I.V. (mL/kg) 1551 (28.5)      Total Intake(mL/kg) 1551 (28.5) 840 (15.4)     Stool       Total Output       Net +1551 +840            Urine Occurrence 4 x 9 x     Stool Occurrence 4 x              Physical Exam  Constitutional:       General: She is not in acute distress.  Cardiovascular:      Rate and Rhythm: Normal rate.   Pulmonary:      Effort: Pulmonary effort is normal. No respiratory distress.   Abdominal:      General: There is no distension.      Palpations: Abdomen is soft.      Tenderness: There is no abdominal tenderness.   Skin:     General: Skin is warm and dry.   Neurological:      Mental Status: She is alert.   Psychiatric:         Mood and Affect: Mood normal.         Behavior: Behavior normal.       Significant Labs:  I have reviewed all pertinent lab results within the past 24 hours.  CMP:   Recent Labs   Lab 04/25/22  0434 04/26/22  0853 " 04/29/22  0431      < > 101   CALCIUM 9.0   < > 9.7   ALBUMIN 3.0*  --   --    PROT 5.4*  --   --       < > 142   K 3.4*   < > 4.6   CO2 25   < > 26      < > 107   BUN 9   < > 7*   CREATININE 0.8   < > 0.8   ALKPHOS 55  --   --    ALT 9*  --   --    AST 19  --   --    BILITOT 1.1*  --   --     < > = values in this interval not displayed.       Significant Diagnostics:  I have reviewed all pertinent imaging results/findings within the past 24 hours.

## 2022-04-29 NOTE — ASSESSMENT & PLAN NOTE
Small bowel obstruction associated with nausea, belching and abdominal distention.  Has had return of bowel function after passing gastrografin SBO challenge.  Resolved obstruction with medical management.    - Diet as tolerated  - Ambulate, out of bed with assistance as needed  - OK for discharge from surgery perspective  - Discussed use of miralax vs fiber supplement, patient would like to trial daily fiber supplement - have added recommendations for fiber supplementation to the discharge instructions

## 2022-04-29 NOTE — PLAN OF CARE
Patient is AAOx4. Ambulates independently. Patient denied pain overnight. VSS on RA. Patient resting at present. Bed locked in lowest position with side rails up x 2. Call light within reach of patient. Will continue purposeful rounding.

## 2022-08-04 ENCOUNTER — HOSPITAL ENCOUNTER (OUTPATIENT)
Dept: RADIOLOGY | Facility: OTHER | Age: 77
Discharge: HOME | End: 2022-08-04
Attending: OBSTETRICS & GYNECOLOGY
Payer: MEDICARE

## 2022-08-04 DIAGNOSIS — Z12.31 SCREENING MAMMOGRAM, ENCOUNTER FOR: ICD-10-CM

## 2022-08-04 PROCEDURE — 77067 MAMMO DIGITAL SCREENING BILAT WITH TOMO: ICD-10-PCS | Mod: 26,,, | Performed by: RADIOLOGY

## 2022-08-04 PROCEDURE — 77067 SCR MAMMO BI INCL CAD: CPT | Mod: 26,,, | Performed by: RADIOLOGY

## 2022-08-04 PROCEDURE — 77063 BREAST TOMOSYNTHESIS BI: CPT | Mod: 26,,, | Performed by: RADIOLOGY

## 2022-08-04 PROCEDURE — 77063 MAMMO DIGITAL SCREENING BILAT WITH TOMO: ICD-10-PCS | Mod: 26,,, | Performed by: RADIOLOGY

## 2022-08-04 PROCEDURE — 77063 BREAST TOMOSYNTHESIS BI: CPT | Mod: TC

## 2022-08-29 ENCOUNTER — OFFICE VISIT (OUTPATIENT)
Dept: OBSTETRICS AND GYNECOLOGY | Facility: CLINIC | Age: 77
End: 2022-08-29
Attending: OBSTETRICS & GYNECOLOGY
Payer: MEDICARE

## 2022-08-29 VITALS
WEIGHT: 118.38 LBS | HEIGHT: 65 IN | BODY MASS INDEX: 19.72 KG/M2 | DIASTOLIC BLOOD PRESSURE: 72 MMHG | SYSTOLIC BLOOD PRESSURE: 117 MMHG

## 2022-08-29 DIAGNOSIS — Z01.419 ENCOUNTER FOR GYNECOLOGICAL EXAMINATION WITHOUT ABNORMAL FINDING: Primary | ICD-10-CM

## 2022-08-29 DIAGNOSIS — N95.2 POSTMENOPAUSAL ATROPHIC VAGINITIS: ICD-10-CM

## 2022-08-29 DIAGNOSIS — Z12.31 SCREENING MAMMOGRAM, ENCOUNTER FOR: ICD-10-CM

## 2022-08-29 DIAGNOSIS — Z78.0 MENOPAUSE PRESENT: ICD-10-CM

## 2022-08-29 PROCEDURE — G0101 PR CA SCREEN;PELVIC/BREAST EXAM: ICD-10-PCS | Mod: S$GLB,,, | Performed by: OBSTETRICS & GYNECOLOGY

## 2022-08-29 PROCEDURE — 1157F ADVNC CARE PLAN IN RCRD: CPT | Mod: CPTII,S$GLB,, | Performed by: OBSTETRICS & GYNECOLOGY

## 2022-08-29 PROCEDURE — 1126F AMNT PAIN NOTED NONE PRSNT: CPT | Mod: CPTII,S$GLB,, | Performed by: OBSTETRICS & GYNECOLOGY

## 2022-08-29 PROCEDURE — 1101F PR PT FALLS ASSESS DOC 0-1 FALLS W/OUT INJ PAST YR: ICD-10-PCS | Mod: CPTII,S$GLB,, | Performed by: OBSTETRICS & GYNECOLOGY

## 2022-08-29 PROCEDURE — 1101F PT FALLS ASSESS-DOCD LE1/YR: CPT | Mod: CPTII,S$GLB,, | Performed by: OBSTETRICS & GYNECOLOGY

## 2022-08-29 PROCEDURE — 1159F PR MEDICATION LIST DOCUMENTED IN MEDICAL RECORD: ICD-10-PCS | Mod: CPTII,S$GLB,, | Performed by: OBSTETRICS & GYNECOLOGY

## 2022-08-29 PROCEDURE — 1157F PR ADVANCE CARE PLAN OR EQUIV PRESENT IN MEDICAL RECORD: ICD-10-PCS | Mod: CPTII,S$GLB,, | Performed by: OBSTETRICS & GYNECOLOGY

## 2022-08-29 PROCEDURE — 3078F DIAST BP <80 MM HG: CPT | Mod: CPTII,S$GLB,, | Performed by: OBSTETRICS & GYNECOLOGY

## 2022-08-29 PROCEDURE — G0101 CA SCREEN;PELVIC/BREAST EXAM: HCPCS | Mod: S$GLB,,, | Performed by: OBSTETRICS & GYNECOLOGY

## 2022-08-29 PROCEDURE — 1159F MED LIST DOCD IN RCRD: CPT | Mod: CPTII,S$GLB,, | Performed by: OBSTETRICS & GYNECOLOGY

## 2022-08-29 PROCEDURE — 3288F PR FALLS RISK ASSESSMENT DOCUMENTED: ICD-10-PCS | Mod: CPTII,S$GLB,, | Performed by: OBSTETRICS & GYNECOLOGY

## 2022-08-29 PROCEDURE — 3074F SYST BP LT 130 MM HG: CPT | Mod: CPTII,S$GLB,, | Performed by: OBSTETRICS & GYNECOLOGY

## 2022-08-29 PROCEDURE — 1126F PR PAIN SEVERITY QUANTIFIED, NO PAIN PRESENT: ICD-10-PCS | Mod: CPTII,S$GLB,, | Performed by: OBSTETRICS & GYNECOLOGY

## 2022-08-29 PROCEDURE — 3288F FALL RISK ASSESSMENT DOCD: CPT | Mod: CPTII,S$GLB,, | Performed by: OBSTETRICS & GYNECOLOGY

## 2022-08-29 PROCEDURE — 99999 PR PBB SHADOW E&M-EST. PATIENT-LVL III: ICD-10-PCS | Mod: PBBFAC,,, | Performed by: OBSTETRICS & GYNECOLOGY

## 2022-08-29 PROCEDURE — 3074F PR MOST RECENT SYSTOLIC BLOOD PRESSURE < 130 MM HG: ICD-10-PCS | Mod: CPTII,S$GLB,, | Performed by: OBSTETRICS & GYNECOLOGY

## 2022-08-29 PROCEDURE — 99999 PR PBB SHADOW E&M-EST. PATIENT-LVL III: CPT | Mod: PBBFAC,,, | Performed by: OBSTETRICS & GYNECOLOGY

## 2022-08-29 PROCEDURE — 3078F PR MOST RECENT DIASTOLIC BLOOD PRESSURE < 80 MM HG: ICD-10-PCS | Mod: CPTII,S$GLB,, | Performed by: OBSTETRICS & GYNECOLOGY

## 2022-08-29 RX ORDER — ESTRADIOL 0.1 MG/G
1 CREAM VAGINAL
Qty: 42.5 G | Refills: 3 | Status: SHIPPED | OUTPATIENT
Start: 2022-08-29 | End: 2023-09-20 | Stop reason: SDUPTHER

## 2022-08-29 NOTE — PROGRESS NOTES
Subjective:       Patient ID: Marian Durham is a 77 y.o. female.    Chief Complaint:  Annual Exam and Gynecologic Exam      History of Present Illness  HPI  Marian Durham is a 77 y.o. female  here for her annual GYN exam.   She was recently hospitalized with a SBO, resolved with NG suction and bowel rest.   denies vaginal itching or irritation.  Denies vaginal discharge.  She is not sexually active. She has been  since .   History of abnormal pap: No  Last Pap: was normal(had a hysterectomy)  Last MMG: normal-2022-routine follow-up in 12 months  Last Colonoscopy:  N/A      Past Medical History:   Diagnosis Date    Hyperlipidemia     Osteoarthritis     Shingles 2002    Spinal stenosis      Past Surgical History:   Procedure Laterality Date    ANKLE FRACTURE SURGERY      Right ankle    EXCISION OF MASS OF BACK N/A 2018    Procedure: EXCISION, MASS, BACK;  Surgeon: Fran Magaña MD;  Location: [facility];  Service: General;  Laterality: N/A;    EYE SURGERY Bilateral 2016    HYSTERECTOMY  1985    HEATHER    KNEE ARTHROSCOPY W/ DEBRIDEMENT      Bilateral    KNEE ARTHROSCOPY W/ DEBRIDEMENT      Left knee    OPEN PATELLA REEFING PROCEDURE  age 19    Left knee    Tonsilectomy  as a child    TONSILLECTOMY      TUBAL LIGATION       Social History     Socioeconomic History    Marital status:    Tobacco Use    Smoking status: Never    Smokeless tobacco: Never   Substance and Sexual Activity    Alcohol use: Yes     Alcohol/week: 3.0 standard drinks     Types: 3 Glasses of wine per week     Comment: Drinks a glass of wine 3 times weekly    Drug use: No    Sexual activity: Not Currently     Birth control/protection: Post-menopausal     Comment: Spouse  of kidney cancer : 2015     Family History   Problem Relation Age of Onset    Hypertension Father     Hypertension Mother     Diabetes Brother     Colon cancer Paternal Aunt     Breast cancer Paternal Aunt         " 75    Coronary artery disease Brother     Ovarian cancer Neg Hx      OB History          2    Para   2    Term   2            AB        Living   2         SAB        IAB        Ectopic        Multiple        Live Births   2                 /72   Ht 5' 5" (1.651 m)   Wt 53.7 kg (118 lb 6.4 oz)   BMI 19.70 kg/m²         GYN & OB History    Date of Last Pap: No result found    OB History    Para Term  AB Living   2 2 2     2   SAB IAB Ectopic Multiple Live Births           2      # Outcome Date GA Lbr Otto/2nd Weight Sex Delivery Anes PTL Lv   2 Term         KIRA   1 Term         KIRA       Review of Systems  Review of Systems   Constitutional:  Negative for activity change, appetite change, fatigue and unexpected weight change.   HENT: Negative.     Eyes:  Negative for visual disturbance.   Respiratory:  Negative for shortness of breath and wheezing.    Cardiovascular:  Negative for chest pain, palpitations and leg swelling.   Gastrointestinal:  Negative for abdominal pain, bloating and blood in stool.   Endocrine: Negative for diabetes and hair loss.   Genitourinary:  Negative for decreased libido, hot flashes and vaginal dryness.   Musculoskeletal:  Negative for back pain and joint swelling.   Integumentary:  Negative for acne, hair changes and nipple discharge.   Neurological:  Negative for headaches.   Hematological:  Does not bruise/bleed easily.   Psychiatric/Behavioral:  Negative for depression and sleep disturbance. The patient is not nervous/anxious.    Breast: Negative for mastodynia and nipple discharge        Objective:      Physical Exam:   Constitutional: She is oriented to person, place, and time. She appears well-developed and well-nourished.    HENT:   Head: Normocephalic and atraumatic.    Eyes: Pupils are equal, round, and reactive to light. EOM are normal.     Cardiovascular:  Normal rate and regular rhythm.             Pulmonary/Chest: Effort normal and breath " sounds normal.   BREASTS:  no mass, no tenderness, no deformity and no retraction. Right breast exhibits no inverted nipple, no mass, no nipple discharge, no skin change, no tenderness, no bleeding and no swelling. Left breast exhibits no inverted nipple, no mass, no nipple discharge, no skin change, no tenderness, no bleeding and no swelling. Breasts are symmetrical.              Abdominal: Soft. Bowel sounds are normal.     Genitourinary:    Pelvic exam was performed with patient supine.      Genitourinary Comments: PELVIC: Normal external female genitalia without lesions. Normal hair distribution. Adequate perineal body, normal urethral meatus. Vagina Moderately rugated  without lesions or discharge. No significant cystocele or rectocele. Bimanual exam shows uterus and cervix to be surgically absent. Adnexa without masses or tenderness.  RECTAL: Deferred                 Musculoskeletal: Normal range of motion and moves all extremeties.       Neurological: She is alert and oriented to person, place, and time.    Skin: Skin is warm and dry.    Psychiatric: She has a normal mood and affect.            Assessment:        1. Encounter for gynecological examination without abnormal finding    2. Postmenopausal atrophic vaginitis    3. Menopause present    4. Screening mammogram, encounter for                  Plan:        1. Encounter for gynecological examination without abnormal finding    COUNSELING:  The patient was counseled today on regular weight bearing exercise. Patient was counseled today on the new ACS guidelines for cervical cytology screening as well as the current recommendations for breast cancer screening. Counseling session lasted approximately 10 minutes, and all her questions were answered. She was advised to see her primary care physician for all other health maintenance.   FOLLOW-UP with me for next routine visit.       2. Postmenopausal atrophic vaginitis      - estradioL (ESTRACE) 0.01 % (0.1  mg/gram) vaginal cream; Place 1 g vaginally twice a week.  Dispense: 42.5 g; Refill: 3    3. Menopause present      - DXA Bone Density Spine And Hip; Future    4. Screening mammogram, encounter for      - Mammo Digital Screening Bilat w/ Tom; Future       Follow up in about 2 years (around 8/29/2024).

## 2022-09-16 ENCOUNTER — HOSPITAL ENCOUNTER (OUTPATIENT)
Dept: RADIOLOGY | Facility: OTHER | Age: 77
Discharge: HOME | End: 2022-09-16
Attending: OBSTETRICS & GYNECOLOGY
Payer: MEDICARE

## 2022-09-16 DIAGNOSIS — Z78.0 MENOPAUSE PRESENT: ICD-10-CM

## 2022-09-16 PROCEDURE — 77080 DXA BONE DENSITY AXIAL: CPT | Mod: 26,,, | Performed by: RADIOLOGY

## 2022-09-16 PROCEDURE — 77080 DXA BONE DENSITY AXIAL: CPT | Mod: TC

## 2022-09-16 PROCEDURE — 77080 DEXA BONE DENSITY SPINE HIP: ICD-10-PCS | Mod: 26,,, | Performed by: RADIOLOGY

## 2023-03-30 ENCOUNTER — HOSPITAL ENCOUNTER (OUTPATIENT)
Dept: RADIOLOGY | Facility: OTHER | Age: 78
Discharge: HOME | End: 2023-03-30
Attending: NURSE PRACTITIONER
Payer: MEDICARE

## 2023-03-30 DIAGNOSIS — M25.511 RIGHT SHOULDER PAIN, UNSPECIFIED CHRONICITY: ICD-10-CM

## 2023-03-30 PROCEDURE — 73030 XR SHOULDER COMPLETE 2 OR MORE VIEWS RIGHT: ICD-10-PCS | Mod: 26,RT,, | Performed by: RADIOLOGY

## 2023-03-30 PROCEDURE — 73030 X-RAY EXAM OF SHOULDER: CPT | Mod: TC,FY,RT

## 2023-03-30 PROCEDURE — 73030 X-RAY EXAM OF SHOULDER: CPT | Mod: 26,RT,, | Performed by: RADIOLOGY

## 2023-06-24 ENCOUNTER — HOSPITAL ENCOUNTER (INPATIENT)
Facility: OTHER | Age: 78
LOS: 11 days | Discharge: NURSING, REHAB OR LONG-TERM CARE | DRG: 389 | End: 2023-07-05
Attending: EMERGENCY MEDICINE | Admitting: INTERNAL MEDICINE
Payer: MEDICARE

## 2023-06-24 DIAGNOSIS — R10.9 ABDOMINAL PAIN: ICD-10-CM

## 2023-06-24 DIAGNOSIS — K56.609 SMALL BOWEL OBSTRUCTION: Primary | ICD-10-CM

## 2023-06-24 DIAGNOSIS — K56.609 SBO (SMALL BOWEL OBSTRUCTION): ICD-10-CM

## 2023-06-24 DIAGNOSIS — R00.0 TACHYCARDIA: ICD-10-CM

## 2023-06-24 DIAGNOSIS — M15.9 PRIMARY OSTEOARTHRITIS INVOLVING MULTIPLE JOINTS: ICD-10-CM

## 2023-06-24 DIAGNOSIS — I48.91 A-FIB: ICD-10-CM

## 2023-06-24 LAB
ALBUMIN SERPL BCP-MCNC: 4.9 G/DL (ref 3.5–5.2)
ALP SERPL-CCNC: 67 U/L (ref 55–135)
ALT SERPL W/O P-5'-P-CCNC: 20 U/L (ref 10–44)
ANION GAP SERPL CALC-SCNC: 18 MMOL/L (ref 8–16)
AST SERPL-CCNC: 19 U/L (ref 10–40)
BASOPHILS # BLD AUTO: 0.04 K/UL (ref 0–0.2)
BASOPHILS NFR BLD: 0.2 % (ref 0–1.9)
BILIRUB SERPL-MCNC: 1 MG/DL (ref 0.1–1)
BUN SERPL-MCNC: 27 MG/DL (ref 8–23)
CALCIUM SERPL-MCNC: 11.5 MG/DL (ref 8.7–10.5)
CHLORIDE SERPL-SCNC: 101 MMOL/L (ref 95–110)
CO2 SERPL-SCNC: 16 MMOL/L (ref 23–29)
CREAT SERPL-MCNC: 1.1 MG/DL (ref 0.5–1.4)
DIFFERENTIAL METHOD: ABNORMAL
EOSINOPHIL # BLD AUTO: 0.1 K/UL (ref 0–0.5)
EOSINOPHIL NFR BLD: 0.3 % (ref 0–8)
ERYTHROCYTE [DISTWIDTH] IN BLOOD BY AUTOMATED COUNT: 12.5 % (ref 11.5–14.5)
EST. GFR  (NO RACE VARIABLE): 52 ML/MIN/1.73 M^2
GLUCOSE SERPL-MCNC: 146 MG/DL (ref 70–110)
HCT VFR BLD AUTO: 49.7 % (ref 37–48.5)
HGB BLD-MCNC: 17.1 G/DL (ref 12–16)
IMM GRANULOCYTES # BLD AUTO: 0.05 K/UL (ref 0–0.04)
IMM GRANULOCYTES NFR BLD AUTO: 0.3 % (ref 0–0.5)
LACTATE SERPL-SCNC: 1.6 MMOL/L (ref 0.5–2.2)
LIPASE SERPL-CCNC: 9 U/L (ref 4–60)
LYMPHOCYTES # BLD AUTO: 2.6 K/UL (ref 1–4.8)
LYMPHOCYTES NFR BLD: 14.8 % (ref 18–48)
MCH RBC QN AUTO: 30.5 PG (ref 27–31)
MCHC RBC AUTO-ENTMCNC: 34.4 G/DL (ref 32–36)
MCV RBC AUTO: 89 FL (ref 82–98)
MONOCYTES # BLD AUTO: 1 K/UL (ref 0.3–1)
MONOCYTES NFR BLD: 5.5 % (ref 4–15)
NEUTROPHILS # BLD AUTO: 13.6 K/UL (ref 1.8–7.7)
NEUTROPHILS NFR BLD: 78.9 % (ref 38–73)
NRBC BLD-RTO: 0 /100 WBC
PLATELET # BLD AUTO: 287 K/UL (ref 150–450)
PMV BLD AUTO: 9 FL (ref 9.2–12.9)
POTASSIUM SERPL-SCNC: 4.1 MMOL/L (ref 3.5–5.1)
PROT SERPL-MCNC: 7.8 G/DL (ref 6–8.4)
RBC # BLD AUTO: 5.6 M/UL (ref 4–5.4)
SODIUM SERPL-SCNC: 135 MMOL/L (ref 136–145)
WBC # BLD AUTO: 17.21 K/UL (ref 3.9–12.7)

## 2023-06-24 PROCEDURE — 25500020 PHARM REV CODE 255: Performed by: EMERGENCY MEDICINE

## 2023-06-24 PROCEDURE — 96361 HYDRATE IV INFUSION ADD-ON: CPT

## 2023-06-24 PROCEDURE — 99285 EMERGENCY DEPT VISIT HI MDM: CPT | Mod: 25

## 2023-06-24 PROCEDURE — 63600175 PHARM REV CODE 636 W HCPCS: Performed by: NURSE PRACTITIONER

## 2023-06-24 PROCEDURE — 96375 TX/PRO/DX INJ NEW DRUG ADDON: CPT

## 2023-06-24 PROCEDURE — 12000002 HC ACUTE/MED SURGE SEMI-PRIVATE ROOM

## 2023-06-24 PROCEDURE — 80053 COMPREHEN METABOLIC PANEL: CPT | Performed by: NURSE PRACTITIONER

## 2023-06-24 PROCEDURE — 81003 URINALYSIS AUTO W/O SCOPE: CPT | Performed by: NURSE PRACTITIONER

## 2023-06-24 PROCEDURE — 63600175 PHARM REV CODE 636 W HCPCS: Performed by: EMERGENCY MEDICINE

## 2023-06-24 PROCEDURE — 83605 ASSAY OF LACTIC ACID: CPT | Performed by: NURSE PRACTITIONER

## 2023-06-24 PROCEDURE — 85025 COMPLETE CBC W/AUTO DIFF WBC: CPT | Performed by: NURSE PRACTITIONER

## 2023-06-24 PROCEDURE — 96374 THER/PROPH/DIAG INJ IV PUSH: CPT

## 2023-06-24 PROCEDURE — 83690 ASSAY OF LIPASE: CPT | Performed by: NURSE PRACTITIONER

## 2023-06-24 PROCEDURE — 25000003 PHARM REV CODE 250: Performed by: NURSE PRACTITIONER

## 2023-06-24 RX ORDER — SODIUM CHLORIDE 9 MG/ML
1000 INJECTION, SOLUTION INTRAVENOUS
Status: COMPLETED | OUTPATIENT
Start: 2023-06-24 | End: 2023-06-25

## 2023-06-24 RX ORDER — MORPHINE SULFATE 4 MG/ML
4 INJECTION, SOLUTION INTRAMUSCULAR; INTRAVENOUS
Status: COMPLETED | OUTPATIENT
Start: 2023-06-24 | End: 2023-06-24

## 2023-06-24 RX ORDER — ONDANSETRON 2 MG/ML
4 INJECTION INTRAMUSCULAR; INTRAVENOUS
Status: COMPLETED | OUTPATIENT
Start: 2023-06-24 | End: 2023-06-24

## 2023-06-24 RX ORDER — LORAZEPAM 2 MG/ML
0.5 INJECTION INTRAMUSCULAR
Status: COMPLETED | OUTPATIENT
Start: 2023-06-24 | End: 2023-06-24

## 2023-06-24 RX ADMIN — SODIUM CHLORIDE 1000 ML: 9 INJECTION, SOLUTION INTRAVENOUS at 10:06

## 2023-06-24 RX ADMIN — MORPHINE SULFATE 4 MG: 4 INJECTION, SOLUTION INTRAMUSCULAR; INTRAVENOUS at 10:06

## 2023-06-24 RX ADMIN — ONDANSETRON 4 MG: 2 INJECTION INTRAMUSCULAR; INTRAVENOUS at 10:06

## 2023-06-24 RX ADMIN — LORAZEPAM 0.5 MG: 2 INJECTION INTRAMUSCULAR; INTRAVENOUS at 11:06

## 2023-06-24 RX ADMIN — IOHEXOL 75 ML: 350 INJECTION, SOLUTION INTRAVENOUS at 10:06

## 2023-06-25 LAB
ANION GAP SERPL CALC-SCNC: 10 MMOL/L (ref 8–16)
BASOPHILS # BLD AUTO: 0.07 K/UL (ref 0–0.2)
BASOPHILS NFR BLD: 0.4 % (ref 0–1.9)
BILIRUB UR QL STRIP: NEGATIVE
BUN SERPL-MCNC: 25 MG/DL (ref 8–23)
CALCIUM SERPL-MCNC: 9.3 MG/DL (ref 8.7–10.5)
CHLORIDE SERPL-SCNC: 108 MMOL/L (ref 95–110)
CLARITY UR: CLEAR
CO2 SERPL-SCNC: 19 MMOL/L (ref 23–29)
COLOR UR: YELLOW
CREAT SERPL-MCNC: 0.8 MG/DL (ref 0.5–1.4)
DIFFERENTIAL METHOD: ABNORMAL
EOSINOPHIL # BLD AUTO: 0 K/UL (ref 0–0.5)
EOSINOPHIL NFR BLD: 0.2 % (ref 0–8)
ERYTHROCYTE [DISTWIDTH] IN BLOOD BY AUTOMATED COUNT: 12.7 % (ref 11.5–14.5)
EST. GFR  (NO RACE VARIABLE): >60 ML/MIN/1.73 M^2
GLUCOSE SERPL-MCNC: 122 MG/DL (ref 70–110)
GLUCOSE UR QL STRIP: NEGATIVE
HCT VFR BLD AUTO: 45.8 % (ref 37–48.5)
HGB BLD-MCNC: 15.3 G/DL (ref 12–16)
HGB UR QL STRIP: ABNORMAL
IMM GRANULOCYTES # BLD AUTO: 0.06 K/UL (ref 0–0.04)
IMM GRANULOCYTES NFR BLD AUTO: 0.3 % (ref 0–0.5)
KETONES UR QL STRIP: ABNORMAL
LEUKOCYTE ESTERASE UR QL STRIP: NEGATIVE
LYMPHOCYTES # BLD AUTO: 1.3 K/UL (ref 1–4.8)
LYMPHOCYTES NFR BLD: 7.4 % (ref 18–48)
MAGNESIUM SERPL-MCNC: 2.1 MG/DL (ref 1.6–2.6)
MCH RBC QN AUTO: 30.4 PG (ref 27–31)
MCHC RBC AUTO-ENTMCNC: 33.4 G/DL (ref 32–36)
MCV RBC AUTO: 91 FL (ref 82–98)
MONOCYTES # BLD AUTO: 1.3 K/UL (ref 0.3–1)
MONOCYTES NFR BLD: 7.7 % (ref 4–15)
NEUTROPHILS # BLD AUTO: 14.5 K/UL (ref 1.8–7.7)
NEUTROPHILS NFR BLD: 84 % (ref 38–73)
NITRITE UR QL STRIP: NEGATIVE
NRBC BLD-RTO: 0 /100 WBC
PH UR STRIP: 8 [PH] (ref 5–8)
PLATELET # BLD AUTO: 238 K/UL (ref 150–450)
PMV BLD AUTO: 9 FL (ref 9.2–12.9)
POTASSIUM SERPL-SCNC: 4 MMOL/L (ref 3.5–5.1)
PROT UR QL STRIP: NEGATIVE
RBC # BLD AUTO: 5.03 M/UL (ref 4–5.4)
SODIUM SERPL-SCNC: 137 MMOL/L (ref 136–145)
SP GR UR STRIP: <=1.005 (ref 1–1.03)
URN SPEC COLLECT METH UR: ABNORMAL
UROBILINOGEN UR STRIP-ACNC: NEGATIVE EU/DL
WBC # BLD AUTO: 17.22 K/UL (ref 3.9–12.7)

## 2023-06-25 PROCEDURE — 25000003 PHARM REV CODE 250: Performed by: PHYSICIAN ASSISTANT

## 2023-06-25 PROCEDURE — 36415 COLL VENOUS BLD VENIPUNCTURE: CPT | Performed by: PHYSICIAN ASSISTANT

## 2023-06-25 PROCEDURE — 80048 BASIC METABOLIC PNL TOTAL CA: CPT | Performed by: PHYSICIAN ASSISTANT

## 2023-06-25 PROCEDURE — 99223 PR INITIAL HOSPITAL CARE,LEVL III: ICD-10-PCS | Mod: ,,, | Performed by: PHYSICIAN ASSISTANT

## 2023-06-25 PROCEDURE — 85025 COMPLETE CBC W/AUTO DIFF WBC: CPT | Performed by: PHYSICIAN ASSISTANT

## 2023-06-25 PROCEDURE — 63600175 PHARM REV CODE 636 W HCPCS: Performed by: INTERNAL MEDICINE

## 2023-06-25 PROCEDURE — 99233 SBSQ HOSP IP/OBS HIGH 50: CPT | Mod: ,,, | Performed by: SURGERY

## 2023-06-25 PROCEDURE — 11000001 HC ACUTE MED/SURG PRIVATE ROOM

## 2023-06-25 PROCEDURE — A4216 STERILE WATER/SALINE, 10 ML: HCPCS | Performed by: PHYSICIAN ASSISTANT

## 2023-06-25 PROCEDURE — 99223 1ST HOSP IP/OBS HIGH 75: CPT | Mod: ,,, | Performed by: PHYSICIAN ASSISTANT

## 2023-06-25 PROCEDURE — 63600175 PHARM REV CODE 636 W HCPCS: Performed by: PHYSICIAN ASSISTANT

## 2023-06-25 PROCEDURE — 99233 PR SUBSEQUENT HOSPITAL CARE,LEVL III: ICD-10-PCS | Mod: ,,, | Performed by: SURGERY

## 2023-06-25 PROCEDURE — 83735 ASSAY OF MAGNESIUM: CPT | Performed by: PHYSICIAN ASSISTANT

## 2023-06-25 RX ORDER — MORPHINE SULFATE 2 MG/ML
2 INJECTION, SOLUTION INTRAMUSCULAR; INTRAVENOUS EVERY 4 HOURS PRN
Status: DISCONTINUED | OUTPATIENT
Start: 2023-06-25 | End: 2023-07-05 | Stop reason: HOSPADM

## 2023-06-25 RX ORDER — HYDRALAZINE HYDROCHLORIDE 20 MG/ML
10 INJECTION INTRAMUSCULAR; INTRAVENOUS EVERY 6 HOURS PRN
Status: DISCONTINUED | OUTPATIENT
Start: 2023-06-25 | End: 2023-07-05 | Stop reason: HOSPADM

## 2023-06-25 RX ORDER — SODIUM CHLORIDE 0.9 % (FLUSH) 0.9 %
10 SYRINGE (ML) INJECTION
Status: DISCONTINUED | OUTPATIENT
Start: 2023-06-25 | End: 2023-07-05 | Stop reason: HOSPADM

## 2023-06-25 RX ORDER — SODIUM CHLORIDE 9 MG/ML
INJECTION, SOLUTION INTRAVENOUS CONTINUOUS
Status: DISCONTINUED | OUTPATIENT
Start: 2023-06-25 | End: 2023-06-29

## 2023-06-25 RX ORDER — ONDANSETRON 2 MG/ML
8 INJECTION INTRAMUSCULAR; INTRAVENOUS EVERY 6 HOURS PRN
Status: DISCONTINUED | OUTPATIENT
Start: 2023-06-25 | End: 2023-07-05 | Stop reason: HOSPADM

## 2023-06-25 RX ORDER — DEXTROMETHORPHAN/PSEUDOEPHED 2.5-7.5/.8
40 DROPS ORAL 4 TIMES DAILY PRN
Status: DISCONTINUED | OUTPATIENT
Start: 2023-06-25 | End: 2023-07-05 | Stop reason: HOSPADM

## 2023-06-25 RX ORDER — ACETAMINOPHEN 325 MG/1
650 TABLET ORAL EVERY 6 HOURS PRN
Status: DISCONTINUED | OUTPATIENT
Start: 2023-06-25 | End: 2023-07-05 | Stop reason: HOSPADM

## 2023-06-25 RX ORDER — SODIUM CHLORIDE 0.9 % (FLUSH) 0.9 %
10 SYRINGE (ML) INJECTION EVERY 8 HOURS
Status: DISCONTINUED | OUTPATIENT
Start: 2023-06-25 | End: 2023-06-25

## 2023-06-25 RX ORDER — NALOXONE HCL 0.4 MG/ML
0.02 VIAL (ML) INJECTION
Status: DISCONTINUED | OUTPATIENT
Start: 2023-06-25 | End: 2023-07-05 | Stop reason: HOSPADM

## 2023-06-25 RX ORDER — TALC
6 POWDER (GRAM) TOPICAL NIGHTLY PRN
Status: DISCONTINUED | OUTPATIENT
Start: 2023-06-25 | End: 2023-07-05 | Stop reason: HOSPADM

## 2023-06-25 RX ADMIN — SODIUM CHLORIDE: 9 INJECTION, SOLUTION INTRAVENOUS at 05:06

## 2023-06-25 RX ADMIN — SODIUM CHLORIDE: 9 INJECTION, SOLUTION INTRAVENOUS at 01:06

## 2023-06-25 RX ADMIN — ONDANSETRON 8 MG: 2 INJECTION INTRAMUSCULAR; INTRAVENOUS at 07:06

## 2023-06-25 RX ADMIN — HYDRALAZINE HYDROCHLORIDE 10 MG: 20 INJECTION INTRAMUSCULAR; INTRAVENOUS at 07:06

## 2023-06-25 RX ADMIN — SODIUM CHLORIDE 10 ML: 9 INJECTION, SOLUTION INTRAMUSCULAR; INTRAVENOUS; SUBCUTANEOUS at 06:06

## 2023-06-25 RX ADMIN — SODIUM CHLORIDE: 9 INJECTION, SOLUTION INTRAVENOUS at 09:06

## 2023-06-25 RX ADMIN — ACETAMINOPHEN 650 MG: 325 TABLET, FILM COATED ORAL at 07:06

## 2023-06-25 NOTE — PLAN OF CARE
[facility] ([place])  Initial Discharge Assessment       Primary Care Provider: Suzanna Duran MD    Admission Diagnosis: Small bowel obstruction [K56.609]  Abdominal pain [R10.9]    Admission Date: 6/24/2023  Expected Discharge Date:     Transition of Care Barriers: None    Payor: HU[employer] MANAGED MEDICARE / Plan: HU[employer]NA MEDICARE HMO / Product Type: Capitation /     Extended Emergency Contact Information  Primary Emergency Contact: LatasalmaBlaine Faizan  Address: [address] [place] [address] [facility]  Home Phone: [phone]  Work Phone: [phone]  Mobile Phone: [phone]  Relation: Spouse  Secondary Emergency Contact: Duane Durham   [facility]  Home Phone: [phone]  Relation: Son    Discharge Plan A: Home Health  Discharge Plan B: Home      [id number] [place], [place] - [address] [facility]  [address] [facility] [address]  Phone: [phone] Fax: [phone]      Initial Assessment (most recent)       Adult Discharge Assessment - 06/25/23 0918          Discharge Assessment    Assessment Type Discharge Planning Assessment     Confirmed/corrected address, phone number and insurance Yes     Confirmed Demographics Correct on Facesheet     Source of Information patient     Reason For Admission SBO     People in Home alone     Do you expect to return to your current living situation? Yes     Prior to hospitilization cognitive status: Alert/Oriented     Current cognitive status: Alert/Oriented     Walking or Climbing Stairs ambulation difficulty, requires equipment;transferring difficulty, requires equipment     Dressing/Bathing bathing difficulty, requires equipment     Equipment Currently Used at Home cane, quad;walker, rolling;shower chair   stair chair lift    Readmission within 30 days? No     Do you currently have service(s) that help you manage your care at home? No     Do you  take prescription medications? Yes     Do you have prescription coverage? Yes     Do you have any problems affording any of your prescribed medications? No     Is the patient taking medications as prescribed? yes     Who is going to help you get home at discharge? friend     How do you get to doctors appointments? car, drives self;family or friend will provide     Are you on dialysis? No     Discharge Plan A Home Health     Discharge Plan B Home     Discharge Plan discussed with: Patient     Transition of Care Barriers None        Physical Activity    On average, how many days per week do you engage in moderate to strenuous exercise (like a brisk walk)? 5 days     On average, how many minutes do you engage in exercise at this level? 30 min        Financial Resource Strain    How hard is it for you to pay for the very basics like food, housing, medical care, and heating? Not hard at all        Housing Stability    In the last 12 months, was there a time when you were not able to pay the mortgage or rent on time? No     In the last 12 months, how many places have you lived? 1     In the last 12 months, was there a time when you did not have a steady place to sleep or slept in a shelter (including now)? No        Transportation Needs    In the past 12 months, has lack of transportation kept you from medical appointments or from getting medications? No     In the past 12 months, has lack of transportation kept you from meetings, work, or from getting things needed for daily living? No        Food Insecurity    Within the past 12 months, you worried that your food would run out before you got the money to buy more. Never true     Within the past 12 months, the food you bought just didn't last and you didn't have money to get more. Never true        Stress    Do you feel stress - tense, restless, nervous, or anxious, or unable to sleep at night because your mind is troubled all the time - these days? Very much         Social Connections    In a typical week, how many times do you talk on the phone with family, friends, or neighbors? More than three times a week     How often do you get together with friends or relatives? More than three times a week     How often do you attend Sabianism or Shinto services? More than 4 times per year     Do you belong to any clubs or organizations such as Sabianism groups, unions, fraternal or athletic groups, or school groups? Yes     How often do you attend meetings of the clubs or organizations you belong to? More than 4 times per year     Are you , , , , never , or living with a partner?         Alcohol Use    Q1: How often do you have a drink containing alcohol? Monthly or less     Q2: How many drinks containing alcohol do you have on a typical day when you are drinking? 1 or 2     Q3: How often do you have six or more drinks on one occasion? Never

## 2023-06-25 NOTE — ASSESSMENT & PLAN NOTE
NG tube decompression   NPO   Possible Gastrografin small bowel study tomorrow.    Otherwise patient had bowel movement yesterday so encouraging she may resolve this obstruction on her own.

## 2023-06-25 NOTE — FIRST PROVIDER EVALUATION
" Emergency Department TeleTriage Encounter Note      CHIEF COMPLAINT    Chief Complaint   Patient presents with    Abdominal Pain     Pt is having diffuse abd pain for the past 4 hours - pt states this pain feels similar to the last time she had a " twisted bowel "        VITAL SIGNS   Initial Vitals [06/24/23 1950]   BP Pulse Resp Temp SpO2   129/76 100 18 97.9 °F (36.6 °C) 99 %      MAP       --            ALLERGIES    Review of patient's allergies indicates:   Allergen Reactions    Demerol [meperidine] Other (See Comments)     "Becomes Agitated and Combative"       PROVIDER TRIAGE NOTE  This is a teletriage evaluation of a 77 y.o. female presenting to the ED complaining of abd pain for four hours. PMhx of SBO and this pain feels similar. No vomiting but has had nausea.  Able to pass very little stool and no gas.     Pt appears uncomfortable.  Possible SBO.     Initial orders will be placed and care will be transferred to an alternate provider when patient is roomed for a full evaluation. Any additional orders and the final disposition will be determined by that provider.         ORDERS  Labs Reviewed   CBC W/ AUTO DIFFERENTIAL   COMPREHENSIVE METABOLIC PANEL   LIPASE   URINALYSIS, REFLEX TO URINE CULTURE       ED Orders (720h ago, onward)      Start Ordered     Status Ordering Provider    06/24/23 2000 06/24/23 1955  0.9%  NaCl infusion  ED 1 Time         Ordered CODEY SINGH N.    06/24/23 2000 06/24/23 1956  ondansetron injection 4 mg  ED 1 Time         Ordered CODEY SINGH N.    06/24/23 1956 06/24/23 1955  Vital signs  Every 2 hours         Ordered CODEY SINGH N.    06/24/23 1956 06/24/23 1955  Diet NPO  Diet effective now         Ordered CODEY SINGH N.    06/24/23 1956 06/24/23 1955  Insert peripheral IV  Once         Ordered CODEY SINGH N.    06/24/23 1956 06/24/23 1955  CBC W/ AUTO DIFFERENTIAL  STAT         Ordered CODEY SINGH N.    06/24/23 " 1956 06/24/23 1955  Comp. Metabolic Panel  STAT         Ordered CODEY SINGH N.    06/24/23 1956 06/24/23 1955  POCT Venous Blood Gas (Creatinine Only)  Once        Comments: This test should be used for VBGs.  If using this order for other tests (K, creatinine, HCT, PT/INR, lactate etc)  ONLY do so in the case of an emergency or rapid response.Notify Physician if: see parameters below.      Ordered CODEY SINGH N.    06/24/23 1956 06/24/23 1955  Lipase  STAT         Ordered CODEY SINGH N.    06/24/23 1956 06/24/23 1955  Urinalysis, Reflex to Urine Culture Urine, Clean Catch  STAT         Ordered CODEY SINGH N.    06/24/23 1956 06/24/23 1955  X-Ray Abdomen Flat And Erect  1 time imaging         Ordered CODEY SINGH              Virtual Visit Note: The provider triage portion of this emergency department evaluation and documentation was performed via [employer], a HIPAA-compliant telemedicine application, in concert with a tele-presenter in the room. A face to face patient evaluation with one of my colleagues will occur once the patient is placed in an emergency department room.      DISCLAIMER: This note was prepared with [employer] voice recognition transcription software. Garbled syntax, mangled pronouns, and other bizarre constructions may be attributed to that software system.

## 2023-06-25 NOTE — CONSULTS
"[facility] - [facility]ensive [facility] ([place])  General Surgery  Consult Note    Patient Name: Marian Durham  MRN: [id number]  Code Status: Full Code  Admission Date: 6/24/2023  Hospital Length of Stay: 1 days  Attending Physician: SHERIN Pearson MD  Primary Care Provider: Suzanna Duran MD    Patient information was obtained from patient and ER records.     Inpatient consult to General Surgery  Consult performed by: Girma Milian MD  Consult ordered by: Karmen Avila PA-C  Reason for consult: Small-bowel obstruction  Assessment/Recommendations: NPO  NG tube in place   Possible Gastrografin study tomorrow.          Subjective:     Principal Problem: SBO (small bowel obstruction)    History of Present Illness: 77 y.o. female, with PMH of prior SBO, hysterectomy, who presented to [facility] ED on 6/24/23 due to diffuse upper abdominal pain x 4 hours. She notes associated nausea, chills, bloating. She states her last BM was earlier this morning. She states this feels like last time when she had a "twisted bowel" ten months ago. She states her last bowel obstruction resolved 8 days after having an NG tube placed. She was evaluated in the ED with labs that showed leukocytosis of 17 K, with left shift.  She had concentrated H&H.  A metabolic panel showed elevated anion gap of 18, with CO2 of 16, and glucose of 146.  BUN was elevated at 27 creatinine was 1.1.  UA was dilute with 3+ ketones.  CT of the abdomen and pelvis showed small-bowel obstruction with transition point in the left lower abdomen, and trace right pleural effusion.  An NG-tube was placed.        No current facility-administered medications on file prior to encounter.     Current Outpatient Medications on File Prior to Encounter   Medication Sig    calcium-vitamin D3 (OS- + D3) 500 mg-5 mcg (200 unit) per tablet Take 1 tablet by mouth 2 (two) times daily with meals.    celecoxib (CELEBREX) 200 MG capsule take 1 capsule (200MG) by oral route " "every day as needed    estradioL (ESTRACE) 0.01 % (0.1 mg/gram) vaginal cream Place 1 g vaginally twice a week.    glucosamine-chondroitin 500-400 mg tablet Take 1 tablet by mouth 3 (three) times daily.    polyethylene glycol 3350 (MIRALAX ORAL) Take by mouth as needed.     simvastatin (ZOCOR) 40 MG tablet Take 40 mg by mouth every evening.        Review of patient's allergies indicates:   Allergen Reactions    Demerol [meperidine] Other (See Comments)     "Becomes Agitated and Combative"       Past Medical History:   Diagnosis Date    Hyperlipidemia     Osteoarthritis     Shingles 2002    Spinal stenosis      Past Surgical History:   Procedure Laterality Date    ANKLE FRACTURE SURGERY      Right ankle    EXCISION OF MASS OF BACK N/A 2018    Procedure: EXCISION, MASS, BACK;  Surgeon: Fran Magaña MD;  Location: [facility];  Service: General;  Laterality: N/A;    EYE SURGERY Bilateral 2016    HYSTERECTOMY  1985    HEATHER    KNEE ARTHROSCOPY W/ DEBRIDEMENT      Bilateral    KNEE ARTHROSCOPY W/ DEBRIDEMENT      Left knee    OPEN PATELLA REEFING PROCEDURE  age 19    Left knee    Tonsilectomy  as a child    TONSILLECTOMY      TUBAL LIGATION       Family History       Problem Relation (Age of Onset)    Breast cancer Paternal Aunt    Colon cancer Paternal Aunt    Coronary artery disease Brother    Diabetes Brother    Hypertension Father, Mother          Tobacco Use    Smoking status: Never    Smokeless tobacco: Never   Substance and Sexual Activity    Alcohol use: Yes     Alcohol/week: 3.0 standard drinks     Types: 3 Glasses of wine per week     Comment: Drinks a glass of wine 3 times weekly    Drug use: No    Sexual activity: Not Currently     Birth control/protection: Post-menopausal     Comment: Spouse  of kidney cancer : 2015     Review of Systems   Constitutional:  Negative for appetite change, fatigue, fever and unexpected weight change.   HENT:  Negative for " sore throat and trouble swallowing.    Eyes: Negative.    Respiratory:  Negative for cough, shortness of breath and wheezing.    Cardiovascular:  Negative for chest pain and leg swelling.   Gastrointestinal:  Positive for abdominal distention and abdominal pain (Moderate). Negative for blood in stool, constipation, diarrhea, nausea and vomiting. Anal bleeding: mild.  Endocrine: Negative.    Genitourinary: Negative.    Musculoskeletal:  Negative for back pain.   Skin: Negative.  Negative for rash.   Allergic/Immunologic: Negative.    Neurological: Negative.    Hematological: Negative.    Psychiatric/Behavioral:  Negative for confusion.    Objective:     Vital Signs (Most Recent):  Temp: 98 °F (36.7 °C) (06/25/23 0755)  Pulse: 92 (06/25/23 0755)  Resp: (!) 23 (06/25/23 0755)  BP: (!) 156/65 (06/25/23 0755)  SpO2: 97 % (06/25/23 0755) Vital Signs (24h Range):  Temp:  [97.9 °F (36.6 °C)-98.2 °F (36.8 °C)] 98 °F (36.7 °C)  Pulse:  [] 92  Resp:  [12-23] 23  SpO2:  [95 %-100 %] 97 %  BP: (129-187)/(65-87) 156/65     Weight: 53.2 kg (117 lb 4.6 oz)  Body mass index is 19.52 kg/m².     Physical Exam  Vitals and nursing note reviewed.   Constitutional:       Appearance: She is well-developed.   HENT:      Head: Normocephalic and atraumatic.   Cardiovascular:      Rate and Rhythm: Normal rate.      Heart sounds: Normal heart sounds.   Pulmonary:      Effort: Pulmonary effort is normal.   Abdominal:      General: Bowel sounds are normal. There is distension.      Palpations: Abdomen is soft.      Tenderness: There is no abdominal tenderness.      Comments: NG tube in place with mostly clear output   Musculoskeletal:         General: Normal range of motion.      Cervical back: Normal range of motion.   Skin:     General: Skin is warm and dry.      Capillary Refill: Capillary refill takes less than 2 seconds.   Neurological:      Mental Status: She is alert and oriented to person, place, and time.   Psychiatric:          Behavior: Behavior normal.          I have reviewed all pertinent lab results within the past 24 hours.  CBC:   Recent Labs   Lab 06/25/23 0523   WBC 17.22*   RBC 5.03   HGB 15.3   HCT 45.8      MCV 91   MCH 30.4   MCHC 33.4     CMP:   Recent Labs   Lab 06/24/23 2010 06/25/23 0523   * 122*   CALCIUM 11.5* 9.3   ALBUMIN 4.9  --    PROT 7.8  --    * 137   K 4.1 4.0   CO2 16* 19*    108   BUN 27* 25*   CREATININE 1.1 0.8   ALKPHOS 67  --    ALT 20  --    AST 19  --    BILITOT 1.0  --        Significant Diagnostics:  I have reviewed all pertinent imaging results/findings within the past 24 hours.  CT: I have reviewed all pertinent results/findings within the past 24 hours and my personal findings are:  Concern for small bowel obstruction      Assessment/Plan:     * SBO (small bowel obstruction)  NG tube decompression   NPO   Possible Gastrografin small bowel study tomorrow.    Otherwise patient had bowel movement yesterday so encouraging she may resolve this obstruction on her own.          VTE Risk Mitigation (From admission, onward)         Ordered     Reason for No Pharmacological VTE Prophylaxis  Once        Question:  Reasons:  Answer:  Physician Provided (leave comment)    06/25/23 0028     IP VTE HIGH RISK PATIENT  Once         06/25/23 0028     Place sequential compression device  Until discontinued         06/25/23 0028                Thank you for your consult. I will follow-up with patient. Please contact us if you have any additional questions.    Girma Milian MD  General Surgery  [facility] - [facility]ensive [facility] ([place])

## 2023-06-25 NOTE — ED PROVIDER NOTES
"     Source of History:  Patient    Chief complaint:  Abdominal Pain (Pt is having diffuse abd pain for the past 4 hours - pt states this pain feels similar to the last time she had a " twisted bowel " )      HPI:  Marian Durham is a 77 y.o. female presenting with complain of diffuse abdominal pain and bloating over the last 4 hours.  States it feels similar to when she would a bowel obstruction for 10 months ago.  Previous history of hysterectomy.  Her previous bowel obstruction was treated non operatively with an NG tube and improved after 8 it days.  She states she has upper abdominal pain that is tight and mid to lower abdominal pain that is cramping and 6/10.  She is associated nausea but has not thrown up.  She denies any fevers but feels chills.  She did have a normal bowel movement earlier this morning.  Within the last 2 hours she had 2 very small bowel movements which she stated helped with the pain a little bit but not with the bloating.    This is the extent to the patients complaints today here in the emergency department.    ROS:   See HPI.    Review of patient's allergies indicates:   Allergen Reactions    Demerol [meperidine] Other (See Comments)     "Becomes Agitated and Combative"       PMH:  As per HPI and below:  Past Medical History:   Diagnosis Date    Hyperlipidemia     Osteoarthritis     Shingles 2002    Spinal stenosis      Past Surgical History:   Procedure Laterality Date    ANKLE FRACTURE SURGERY  2007    Right ankle    EXCISION OF MASS OF BACK N/A 11/16/2018    Procedure: EXCISION, MASS, BACK;  Surgeon: Fran Magaña MD;  Location: [facility];  Service: General;  Laterality: N/A;    EYE SURGERY Bilateral 2016    HYSTERECTOMY  1985    HEATHER    KNEE ARTHROSCOPY W/ DEBRIDEMENT  1994    Bilateral    KNEE ARTHROSCOPY W/ DEBRIDEMENT  2003    Left knee    OPEN PATELLA REEFING PROCEDURE  age 19    Left knee    Tonsilectomy  as a child    TONSILLECTOMY      TUBAL LIGATION         Social " "History     Tobacco Use    Smoking status: Never    Smokeless tobacco: Never   Substance Use Topics    Alcohol use: Yes     Alcohol/week: 3.0 standard drinks     Types: 3 Glasses of wine per week     Comment: Drinks a glass of wine 3 times weekly    Drug use: No       Physical Exam:    BP (!) 175/74   Pulse 83   Temp 97.9 °F (36.6 °C) (Oral)   Resp 18   Ht 5' 5" (1.651 m)   Wt 52.2 kg (115 lb)   SpO2 100%   Breastfeeding No   BMI 19.14 kg/m²   Nursing note and vital signs reviewed.  Constitutional: No acute distress.  Nontoxic  ENT:  Dry tacky mucous membranes  Cardiovascular: Regular rate and rhythm.  No murmurs. No gallops. No rubs  Respiratory: Clear to auscultation bilaterally.  Good air movement.  No wheezes.  No rhonchi. No rales. No accessory muscle use.  Abdomen/:  Diffuse nonspecific abdominal pain with abdominal distention.  Negative Bedoya sign.  No pain at McBurney's point.  Neuro: Alert. No focal deficits.  Skin: No rashes seen.     I decided to obtain the patient's medical records.  Summary of Medical Records:  Reviewed hospital stay 7 days in April of 2022 in which she was admitted for bowel obstruction with transition point the level of the umbilicus and mild wall thickening of the colon.  Was started on empiric Zosyn.  NG tube was placed for decompression was treated with IV fluids, NPO and pain control.    MDM/ Differential Dx:   77-year-old female with diffuse abdominal pain and distention.  I suspect most likely reoccurrence of her bowel obstruction likely secondary to scar tissue from previous hysterectomy.  I have also considered but have a lower suspicion for cholecystitis, diverticulitis or appendicitis.  Labs were initiated by the initial provider.  Will be getting a CT abdomen pelvis.  Patient is nauseated and complaining of pain so will give antiemetics, analgesia as well as IV fluids.      ED Course as of 06/24/23 2339   Sat Jun 24, 2023   2154 Lipase: 9 [SM]   2154 Lactate, " Rob: 1.6 [SM]   2154 Anion Gap(!): 18 [SM]   2154 Creatinine: 1.1 [SM]   2154 BUN(!): 27 [SM]   2154 Sodium(!): 135 [SM]   2154 Potassium: 4.1 [SM]   2154 WBC(!): 17.21 [SM]   2154 Hemoglobin(!): 17.1  Likely secondary to dehydration hemo concentration [SM]   2154 Platelets: 287 [SM]   2255 CT Abdomen Pelvis With Contrast  CT abdomen pelvis independently interpreted by myself pending formal radiology read shows significant distention of the bowel and stomach with air-fluid levels consistent with small-bowel obstruction. [SM]   2332 CT Abdomen Pelvis With Contrast  Confirmed bowel obstruction with transition point in the left lower abdomen. []   2338 Discussed with Hospital Medicine will admit to Dr. Pearson.  Also discussed with the patient updated her on the plan of care and answered all questions.  Will place NG tube for initial nonoperative management. []      ED Course User Index  [] Favian Cuellar DO               Diagnostic Impression:    1. Small bowel obstruction    2. Abdominal pain         ED Disposition Condition    Admit                   Favian Cuellar DO  06/24/23 6931

## 2023-06-25 NOTE — HPI
"Ms. Marian Durham is a 77 y.o. female, with PMH of prior SBO, hysterectomy, who presented to [facility] ED on 6/24/23 due to diffuse upper abdominal pain x 4 hours. She notes associated nausea, chills, bloating. She states her last BM was earlier this morning. She states this feels like last time when she had a "twisted bowel" ten months ago. She states her last bowel obstruction resolved 8 days after having an NG tube placed. She was evaluated in the ED with labs that showed leukocytosis of 17 K, with left shift.  She had concentrated H&H.  A metabolic panel showed elevated anion gap of 18, with CO2 of 16, and glucose of 146.  BUN was elevated at 27 creatinine was 1.1.  UA was dilute with 3+ ketones.  CT of the abdomen and pelvis showed small-bowel obstruction with transition point in the left lower abdomen, and trace right pleural effusion.  An NG-tube was placed.  She is admitted to inpatient status.  "

## 2023-06-25 NOTE — ASSESSMENT & PLAN NOTE
- Ms. Marain Durham presents with abdominal pain x4 hours prior to arrival  - she has history of hysterectomy, but no further abdominal surgeries  - she previously had a small bowel obstruction which resolved with placement of NG tube and 8 days of bowel rest  - CT today shows a SBO with transition point left lower abdomen  - NG tube placed ED  - NPO  - IVF fr hydration  - General surgery consulted

## 2023-06-25 NOTE — HOSPITAL COURSE
Patient states she had bowel movements even last night.    No bowel movements since then.    Distension improved after bowel movement.    NG tube putting out mostly clear liquid.    Patient has some nausea.      Discussed possible Gastrografin challenge tomorrow.

## 2023-06-25 NOTE — H&P
"[facility] Emergency [facility] Medicine  History & Physical    Patient Name: Marian Durham  MRN: [id number]  Patient Class: IP- Inpatient  Admission Date: 6/24/2023  Attending Physician: SHERIN Pearson MD   Primary Care Provider: Suzanna Duran MD         Patient information was obtained from patient, past medical records and ER records.     Subjective:     Principal Problem:SBO (small bowel obstruction)    Chief Complaint:   Chief Complaint   Patient presents with    Abdominal Pain     Pt is having diffuse abd pain for the past 4 hours - pt states this pain feels similar to the last time she had a " twisted bowel "         HPI: Ms. Marian Durham is a 77 y.o. female, with PMH of prior SBO, hysterectomy, who presented to [facility] ED on 6/24/23 due to diffuse upper abdominal pain x 4 hours. She notes associated nausea, chills, bloating. She states her last BM was earlier this morning. She states this feels like last time when she had a "twisted bowel" ten months ago. She states her last bowel obstruction resolved 8 days after having an NG tube placed. She was evaluated in the ED with labs that showed leukocytosis of 17 K, with left shift.  She had concentrated H&H.  A metabolic panel showed elevated anion gap of 18, with CO2 of 16, and glucose of 146.  BUN was elevated at 27 creatinine was 1.1.  UA was dilute with 3+ ketones.  CT of the abdomen and pelvis showed small-bowel obstruction with transition point in the left lower abdomen, and trace right pleural effusion.  An NG-tube was placed.  She is admitted to inpatient status.      Past Medical History:   Diagnosis Date    Hyperlipidemia     Osteoarthritis     Shingles 2002    Spinal stenosis        Past Surgical History:   Procedure Laterality Date    ANKLE FRACTURE SURGERY  2007    Right ankle    EXCISION OF MASS OF BACK N/A 11/16/2018    Procedure: EXCISION, MASS, BACK;  Surgeon: Fran Magaña MD;  Location: [facility];  Service: General;  " "Laterality: N/A;    EYE SURGERY Bilateral 2016    HYSTERECTOMY  1985    HEATHER    KNEE ARTHROSCOPY W/ DEBRIDEMENT      Bilateral    KNEE ARTHROSCOPY W/ DEBRIDEMENT      Left knee    OPEN PATELLA REEFING PROCEDURE  age 19    Left knee    Tonsilectomy  as a child    TONSILLECTOMY      TUBAL LIGATION         Review of patient's allergies indicates:   Allergen Reactions    Demerol [meperidine] Other (See Comments)     "Becomes Agitated and Combative"       No current facility-administered medications on file prior to encounter.     Current Outpatient Medications on File Prior to Encounter   Medication Sig    calcium-vitamin D3 (OS- + D3) 500 mg-5 mcg (200 unit) per tablet Take 1 tablet by mouth 2 (two) times daily with meals.    celecoxib (CELEBREX) 200 MG capsule take 1 capsule (200MG) by oral route every day as needed    estradioL (ESTRACE) 0.01 % (0.1 mg/gram) vaginal cream Place 1 g vaginally twice a week.    glucosamine-chondroitin 500-400 mg tablet Take 1 tablet by mouth 3 (three) times daily.    polyethylene glycol 3350 (MIRALAX ORAL) Take by mouth as needed.     simvastatin (ZOCOR) 40 MG tablet Take 40 mg by mouth every evening.      Family History       Problem Relation (Age of Onset)    Breast cancer Paternal Aunt    Colon cancer Paternal Aunt    Coronary artery disease Brother    Diabetes Brother    Hypertension Father, Mother          Tobacco Use    Smoking status: Never    Smokeless tobacco: Never   Substance and Sexual Activity    Alcohol use: Yes     Alcohol/week: 3.0 standard drinks     Types: 3 Glasses of wine per week     Comment: Drinks a glass of wine 3 times weekly    Drug use: No    Sexual activity: Not Currently     Birth control/protection: Post-menopausal     Comment: Spouse  of kidney cancer : 2015     Review of Systems   Constitutional:  Positive for chills. Negative for diaphoresis and fever.   Respiratory:  Negative for cough, shortness of breath " and wheezing.    Cardiovascular:  Negative for chest pain and palpitations.   Gastrointestinal:  Positive for abdominal distention, abdominal pain and nausea. Negative for constipation, diarrhea and vomiting.   Genitourinary:  Negative for decreased urine volume, difficulty urinating, dysuria, flank pain, frequency, hematuria and urgency.   Musculoskeletal:  Negative for back pain, neck pain and neck stiffness.   Skin:  Negative for color change and pallor.   Neurological:  Negative for dizziness, syncope, weakness, light-headedness and headaches.   Psychiatric/Behavioral:  Negative for agitation and confusion.    Objective:     Vital Signs (Most Recent):  Temp: 98.2 °F (36.8 °C) (06/25/23 0600)  Pulse: 92 (06/25/23 0600)  Resp: 16 (06/25/23 0600)  BP: (!) 171/77 (06/25/23 0600)  SpO2: 95 % (06/25/23 0600) Vital Signs (24h Range):  Temp:  [97.9 °F (36.6 °C)-98.2 °F (36.8 °C)] 98.2 °F (36.8 °C)  Pulse:  [] 92  Resp:  [12-20] 16  SpO2:  [95 %-100 %] 95 %  BP: (129-187)/(73-87) 171/77     Weight: 53.2 kg (117 lb 4.6 oz)  Body mass index is 19.52 kg/m².     Physical Exam  Vitals and nursing note reviewed.   Constitutional:       General: She is not in acute distress.     Appearance: She is well-developed and normal weight. She is not ill-appearing, toxic-appearing or diaphoretic.   HENT:      Head: Normocephalic and atraumatic.   Eyes:      General: No scleral icterus.        Right eye: No discharge.         Left eye: No discharge.      Conjunctiva/sclera: Conjunctivae normal.   Neck:      Trachea: No tracheal deviation.   Cardiovascular:      Rate and Rhythm: Normal rate and regular rhythm.      Heart sounds: Normal heart sounds. No murmur heard.    No gallop.   Pulmonary:      Effort: Pulmonary effort is normal. No respiratory distress.      Breath sounds: Normal breath sounds. No stridor. No wheezing or rales.   Abdominal:      General: Bowel sounds are normal. There is distension.      Palpations: Abdomen is  soft. There is no mass.      Tenderness: There is no abdominal tenderness. There is no guarding.   Musculoskeletal:         General: No deformity. Normal range of motion.      Cervical back: Normal range of motion and neck supple.   Skin:     General: Skin is warm and dry.      Coloration: Skin is not pale.      Findings: No erythema or rash.   Neurological:      General: No focal deficit present.      Mental Status: She is alert and oriented to person, place, and time.      Cranial Nerves: No cranial nerve deficit.      Motor: No abnormal muscle tone.   Psychiatric:         Mood and Affect: Mood normal.         Behavior: Behavior normal.         Thought Content: Thought content normal.         Judgment: Judgment normal.              Significant Labs: All pertinent labs within the past 24 hours have been reviewed.  BMP:   Recent Labs   Lab 06/24/23 2010   *   *   K 4.1      CO2 16*   BUN 27*   CREATININE 1.1   CALCIUM 11.5*     CBC:   Recent Labs   Lab 06/24/23 2010 06/25/23  0523   WBC 17.21* 17.22*   HGB 17.1* 15.3   HCT 49.7* 45.8    238     CMP:   Recent Labs   Lab 06/24/23 2010   *   K 4.1      CO2 16*   *   BUN 27*   CREATININE 1.1   CALCIUM 11.5*   PROT 7.8   ALBUMIN 4.9   BILITOT 1.0   ALKPHOS 67   AST 19   ALT 20   ANIONGAP 18*     Urine Culture: No results for input(s): LABURIN in the last 48 hours.  Urine Studies:   Recent Labs   Lab 06/24/23  2347   COLORU Yellow   APPEARANCEUA Clear   PHUR 8.0   SPECGRAV <=1.005*   PROTEINUA Negative   GLUCUA Negative   KETONESU 3+*   BILIRUBINUA Negative   OCCULTUA Trace*   NITRITE Negative   UROBILINOGEN Negative   LEUKOCYTESUR Negative       Significant Imaging: I have reviewed all pertinent imaging results/findings within the past 24 hours.  Imaging Results              XR NG/OG tube placement check, non-radiologist performed (Final result)  Result time 06/25/23 00:47:47      Final result by Sally Ojeda MD  (06/25/23 00:47:47)                   Impression:      As above described.      Electronically signed by: Sally Ojeda  Date:    06/25/2023  Time:    00:47               Narrative:    EXAMINATION:  XR NG/OG TUBE PLACEMENT CHECK NON RADIOLOGIST PERFORMED    CLINICAL HISTORY:  NG tube placement;    TECHNIQUE:  AP view of the abdomen.    COMPARISON:  Abdomen radiograph 04/25/2022 and CT abdomen pelvis 06/24/2023    FINDINGS:  The tip of the feeding tube is seen in the gastric fundus near the GE junction.  Consider advancing.  There are dilated small bowel loops.  Residual contrast are seen in bilateral renal collecting systems from recent CT abdomen pelvis.                                       CT Abdomen Pelvis With Contrast (Final result)  Result time 06/24/23 23:23:02      Final result by Sally Ojeda MD (06/24/23 23:23:02)                   Impression:      Significant small-bowel obstruction with the transition point in the left lower abdomen.    Probable tiny subcentimeter left hepatic lobe cyst.    Trace right pleural effusion.      Electronically signed by: Sally Ojeda  Date:    06/24/2023  Time:    23:23               Narrative:    EXAMINATION:  CT OF ABDOMEN PELVIS WITH    CLINICAL HISTORY:  Diffuse abdominal pain and bloating over the last 4 hours.    TECHNIQUE:  5 mm enhanced axial images were obtained from the lung bases through the greater trochanters.   mL of Omnipaque 350 was injected.    COMPARISON:  None.    FINDINGS:  There are some distended bowel loops containing fluid and air fluid levels.  The transition point is in the left lower abdomen 6.2 x 4.4 x 3.7    A too small to characterize low attenuation lesion is seen within the left lobe liver, which is probably a tiny cyst.  Spleen, pancreas, kidneys, and adrenal glands are unremarkable. The gallbladder contains no calcified gallstones.  There are dilated loops of bowel containing fluid and air fluid levels.  The degree of dilatation  changes in the left lower abdomen (series 2 axial image 285 and series 602 sagittal image 87).    There is no definite evidence for abdominal adenopathy or ascites.    In the pelvis there are no pelvic masses or adenopathy.    There is trace right pleural effusion. There is mild bibasilar atelectasis                                       Assessment/Plan:     * SBO (small bowel obstruction)  - Ms. Marian Durham presents with abdominal pain x4 hours prior to arrival  - she has history of hysterectomy, but no further abdominal surgeries  - she previously had a small bowel obstruction which resolved with placement of NG tube and 8 days of bowel rest  - CT today shows a SBO with transition point left lower abdomen  - NG tube placed ED  - NPO  - IVF fr hydration  - General surgery consulted     VTE Risk Mitigation (From admission, onward)         Ordered     Reason for No Pharmacological VTE Prophylaxis  Once        Question:  Reasons:  Answer:  Physician Provided (leave comment)    06/25/23 0028     IP VTE HIGH RISK PATIENT  Once         06/25/23 0028     Place sequential compression device  Until discontinued         06/25/23 0028                           Karmen Avila PA-C  Department of Hospital Medicine  [facility] Emergency Dept

## 2023-06-25 NOTE — SUBJECTIVE & OBJECTIVE
"No current facility-administered medications on file prior to encounter.     Current Outpatient Medications on File Prior to Encounter   Medication Sig    calcium-vitamin D3 (OS- + D3) 500 mg-5 mcg (200 unit) per tablet Take 1 tablet by mouth 2 (two) times daily with meals.    celecoxib (CELEBREX) 200 MG capsule take 1 capsule (200MG) by oral route every day as needed    estradioL (ESTRACE) 0.01 % (0.1 mg/gram) vaginal cream Place 1 g vaginally twice a week.    glucosamine-chondroitin 500-400 mg tablet Take 1 tablet by mouth 3 (three) times daily.    polyethylene glycol 3350 (MIRALAX ORAL) Take by mouth as needed.     simvastatin (ZOCOR) 40 MG tablet Take 40 mg by mouth every evening.        Review of patient's allergies indicates:   Allergen Reactions    Demerol [meperidine] Other (See Comments)     "Becomes Agitated and Combative"       Past Medical History:   Diagnosis Date    Hyperlipidemia     Osteoarthritis     Shingles 2002    Spinal stenosis      Past Surgical History:   Procedure Laterality Date    ANKLE FRACTURE SURGERY  2007    Right ankle    EXCISION OF MASS OF BACK N/A 11/16/2018    Procedure: EXCISION, MASS, BACK;  Surgeon: Fran Magaña MD;  Location: [facility];  Service: General;  Laterality: N/A;    EYE SURGERY Bilateral 2016    HYSTERECTOMY  1985    HEATHER    KNEE ARTHROSCOPY W/ DEBRIDEMENT  1994    Bilateral    KNEE ARTHROSCOPY W/ DEBRIDEMENT  2003    Left knee    OPEN PATELLA REEFING PROCEDURE  age 19    Left knee    Tonsilectomy  as a child    TONSILLECTOMY      TUBAL LIGATION       Family History       Problem Relation (Age of Onset)    Breast cancer Paternal Aunt    Colon cancer Paternal Aunt    Coronary artery disease Brother    Diabetes Brother    Hypertension Father, Mother          Tobacco Use    Smoking status: Never    Smokeless tobacco: Never   Substance and Sexual Activity    Alcohol use: Yes     Alcohol/week: 3.0 standard drinks     Types: 3 Glasses of wine per week     " Comment: Drinks a glass of wine 3 times weekly    Drug use: No    Sexual activity: Not Currently     Birth control/protection: Post-menopausal     Comment: Spouse  of kidney cancer : 2015     Review of Systems   Constitutional:  Negative for appetite change, fatigue, fever and unexpected weight change.   HENT:  Negative for sore throat and trouble swallowing.    Eyes: Negative.    Respiratory:  Negative for cough, shortness of breath and wheezing.    Cardiovascular:  Negative for chest pain and leg swelling.   Gastrointestinal:  Positive for abdominal distention and abdominal pain (Moderate). Negative for blood in stool, constipation, diarrhea, nausea and vomiting. Anal bleeding: mild.  Endocrine: Negative.    Genitourinary: Negative.    Musculoskeletal:  Negative for back pain.   Skin: Negative.  Negative for rash.   Allergic/Immunologic: Negative.    Neurological: Negative.    Hematological: Negative.    Psychiatric/Behavioral:  Negative for confusion.    Objective:     Vital Signs (Most Recent):  Temp: 98 °F (36.7 °C) (23 075)  Pulse: 92 (23 075)  Resp: (!) 23 (23 075)  BP: (!) 156/65 (23 075)  SpO2: 97 % (23 075) Vital Signs (24h Range):  Temp:  [97.9 °F (36.6 °C)-98.2 °F (36.8 °C)] 98 °F (36.7 °C)  Pulse:  [] 92  Resp:  [12-23] 23  SpO2:  [95 %-100 %] 97 %  BP: (129-187)/(65-87) 156/65     Weight: 53.2 kg (117 lb 4.6 oz)  Body mass index is 19.52 kg/m².     Physical Exam  Vitals and nursing note reviewed.   Constitutional:       Appearance: She is well-developed.   HENT:      Head: Normocephalic and atraumatic.   Cardiovascular:      Rate and Rhythm: Normal rate.      Heart sounds: Normal heart sounds.   Pulmonary:      Effort: Pulmonary effort is normal.   Abdominal:      General: Bowel sounds are normal. There is distension.      Palpations: Abdomen is soft.      Tenderness: There is no abdominal tenderness.      Comments: NG tube in place with mostly  clear output   Musculoskeletal:         General: Normal range of motion.      Cervical back: Normal range of motion.   Skin:     General: Skin is warm and dry.      Capillary Refill: Capillary refill takes less than 2 seconds.   Neurological:      Mental Status: She is alert and oriented to person, place, and time.   Psychiatric:         Behavior: Behavior normal.          I have reviewed all pertinent lab results within the past 24 hours.  CBC:   Recent Labs   Lab 06/25/23 0523   WBC 17.22*   RBC 5.03   HGB 15.3   HCT 45.8      MCV 91   MCH 30.4   MCHC 33.4     CMP:   Recent Labs   Lab 06/24/23 2010 06/25/23  0523   * 122*   CALCIUM 11.5* 9.3   ALBUMIN 4.9  --    PROT 7.8  --    * 137   K 4.1 4.0   CO2 16* 19*    108   BUN 27* 25*   CREATININE 1.1 0.8   ALKPHOS 67  --    ALT 20  --    AST 19  --    BILITOT 1.0  --        Significant Diagnostics:  I have reviewed all pertinent imaging results/findings within the past 24 hours.  CT: I have reviewed all pertinent results/findings within the past 24 hours and my personal findings are:  Concern for small bowel obstruction

## 2023-06-25 NOTE — ED TRIAGE NOTES
Patient states she was brought to ER by friend for abdominal pain and abdominal bloating patient states symptoms started today at 1600, patient states she has a history of bowel obstruction and it feels the same , pain in all 4 quadrants nausea vomiting pain cramping tightness on scale 6, last bowel movement today last meal noon

## 2023-06-25 NOTE — HPI
"77 y.o. female, with PMH of prior SBO, hysterectomy, who presented to [facility] ED on 6/24/23 due to diffuse upper abdominal pain x 4 hours. She notes associated nausea, chills, bloating. She states her last BM was earlier this morning. She states this feels like last time when she had a "twisted bowel" ten months ago. She states her last bowel obstruction resolved 8 days after having an NG tube placed. She was evaluated in the ED with labs that showed leukocytosis of 17 K, with left shift.  She had concentrated H&H.  A metabolic panel showed elevated anion gap of 18, with CO2 of 16, and glucose of 146.  BUN was elevated at 27 creatinine was 1.1.  UA was dilute with 3+ ketones.  CT of the abdomen and pelvis showed small-bowel obstruction with transition point in the left lower abdomen, and trace right pleural effusion.  An NG-tube was placed.    "

## 2023-06-25 NOTE — SUBJECTIVE & OBJECTIVE
"Past Medical History:   Diagnosis Date    Hyperlipidemia     Osteoarthritis     Shingles 2002    Spinal stenosis        Past Surgical History:   Procedure Laterality Date    ANKLE FRACTURE SURGERY  2007    Right ankle    EXCISION OF MASS OF BACK N/A 11/16/2018    Procedure: EXCISION, MASS, BACK;  Surgeon: Fran Magaña MD;  Location: [facility];  Service: General;  Laterality: N/A;    EYE SURGERY Bilateral 2016    HYSTERECTOMY  1985    HEATHER    KNEE ARTHROSCOPY W/ DEBRIDEMENT  1994    Bilateral    KNEE ARTHROSCOPY W/ DEBRIDEMENT  2003    Left knee    OPEN PATELLA REEFING PROCEDURE  age 19    Left knee    Tonsilectomy  as a child    TONSILLECTOMY      TUBAL LIGATION         Review of patient's allergies indicates:   Allergen Reactions    Demerol [meperidine] Other (See Comments)     "Becomes Agitated and Combative"       No current facility-administered medications on file prior to encounter.     Current Outpatient Medications on File Prior to Encounter   Medication Sig    calcium-vitamin D3 (OS- + D3) 500 mg-5 mcg (200 unit) per tablet Take 1 tablet by mouth 2 (two) times daily with meals.    celecoxib (CELEBREX) 200 MG capsule take 1 capsule (200MG) by oral route every day as needed    estradioL (ESTRACE) 0.01 % (0.1 mg/gram) vaginal cream Place 1 g vaginally twice a week.    glucosamine-chondroitin 500-400 mg tablet Take 1 tablet by mouth 3 (three) times daily.    polyethylene glycol 3350 (MIRALAX ORAL) Take by mouth as needed.     simvastatin (ZOCOR) 40 MG tablet Take 40 mg by mouth every evening.      Family History       Problem Relation (Age of Onset)    Breast cancer Paternal Aunt    Colon cancer Paternal Aunt    Coronary artery disease Brother    Diabetes Brother    Hypertension Father, Mother          Tobacco Use    Smoking status: Never    Smokeless tobacco: Never   Substance and Sexual Activity    Alcohol use: Yes     Alcohol/week: 3.0 standard drinks     Types: 3 Glasses of wine per week     " Comment: Drinks a glass of wine 3 times weekly    Drug use: No    Sexual activity: Not Currently     Birth control/protection: Post-menopausal     Comment: Spouse  of kidney cancer : 2015     Review of Systems   Constitutional:  Positive for chills. Negative for diaphoresis and fever.   Respiratory:  Negative for cough, shortness of breath and wheezing.    Cardiovascular:  Negative for chest pain and palpitations.   Gastrointestinal:  Positive for abdominal distention, abdominal pain and nausea. Negative for constipation, diarrhea and vomiting.   Genitourinary:  Negative for decreased urine volume, difficulty urinating, dysuria, flank pain, frequency, hematuria and urgency.   Musculoskeletal:  Negative for back pain, neck pain and neck stiffness.   Skin:  Negative for color change and pallor.   Neurological:  Negative for dizziness, syncope, weakness, light-headedness and headaches.   Psychiatric/Behavioral:  Negative for agitation and confusion.    Objective:     Vital Signs (Most Recent):  Temp: 98.2 °F (36.8 °C) (23 06)  Pulse: 92 (23)  Resp: 16 (23)  BP: (!) 171/77 (23)  SpO2: 95 % (23) Vital Signs (24h Range):  Temp:  [97.9 °F (36.6 °C)-98.2 °F (36.8 °C)] 98.2 °F (36.8 °C)  Pulse:  [] 92  Resp:  [12-20] 16  SpO2:  [95 %-100 %] 95 %  BP: (129-187)/(73-87) 171/77     Weight: 53.2 kg (117 lb 4.6 oz)  Body mass index is 19.52 kg/m².     Physical Exam  Vitals and nursing note reviewed.   Constitutional:       General: She is not in acute distress.     Appearance: She is well-developed and normal weight. She is not ill-appearing, toxic-appearing or diaphoretic.   HENT:      Head: Normocephalic and atraumatic.   Eyes:      General: No scleral icterus.        Right eye: No discharge.         Left eye: No discharge.      Conjunctiva/sclera: Conjunctivae normal.   Neck:      Trachea: No tracheal deviation.   Cardiovascular:      Rate and Rhythm:  Normal rate and regular rhythm.      Heart sounds: Normal heart sounds. No murmur heard.    No gallop.   Pulmonary:      Effort: Pulmonary effort is normal. No respiratory distress.      Breath sounds: Normal breath sounds. No stridor. No wheezing or rales.   Abdominal:      General: Bowel sounds are normal. There is distension.      Palpations: Abdomen is soft. There is no mass.      Tenderness: There is no abdominal tenderness. There is no guarding.   Musculoskeletal:         General: No deformity. Normal range of motion.      Cervical back: Normal range of motion and neck supple.   Skin:     General: Skin is warm and dry.      Coloration: Skin is not pale.      Findings: No erythema or rash.   Neurological:      General: No focal deficit present.      Mental Status: She is alert and oriented to person, place, and time.      Cranial Nerves: No cranial nerve deficit.      Motor: No abnormal muscle tone.   Psychiatric:         Mood and Affect: Mood normal.         Behavior: Behavior normal.         Thought Content: Thought content normal.         Judgment: Judgment normal.              Significant Labs: All pertinent labs within the past 24 hours have been reviewed.  BMP:   Recent Labs   Lab 06/24/23 2010   *   *   K 4.1      CO2 16*   BUN 27*   CREATININE 1.1   CALCIUM 11.5*     CBC:   Recent Labs   Lab 06/24/23 2010 06/25/23  0523   WBC 17.21* 17.22*   HGB 17.1* 15.3   HCT 49.7* 45.8    238     CMP:   Recent Labs   Lab 06/24/23 2010   *   K 4.1      CO2 16*   *   BUN 27*   CREATININE 1.1   CALCIUM 11.5*   PROT 7.8   ALBUMIN 4.9   BILITOT 1.0   ALKPHOS 67   AST 19   ALT 20   ANIONGAP 18*     Urine Culture: No results for input(s): LABURIN in the last 48 hours.  Urine Studies:   Recent Labs   Lab 06/24/23  2347   COLORU Yellow   APPEARANCEUA Clear   PHUR 8.0   SPECGRAV <=1.005*   PROTEINUA Negative   GLUCUA Negative   KETONESU 3+*   BILIRUBINUA Negative   OCCULTUA  Trace*   NITRITE Negative   UROBILINOGEN Negative   LEUKOCYTESUR Negative       Significant Imaging: I have reviewed all pertinent imaging results/findings within the past 24 hours.  Imaging Results              XR NG/OG tube placement check, non-radiologist performed (Final result)  Result time 06/25/23 00:47:47      Final result by Sally Ojeda MD (06/25/23 00:47:47)                   Impression:      As above described.      Electronically signed by: Sally Ojeda  Date:    06/25/2023  Time:    00:47               Narrative:    EXAMINATION:  XR NG/OG TUBE PLACEMENT CHECK NON RADIOLOGIST PERFORMED    CLINICAL HISTORY:  NG tube placement;    TECHNIQUE:  AP view of the abdomen.    COMPARISON:  Abdomen radiograph 04/25/2022 and CT abdomen pelvis 06/24/2023    FINDINGS:  The tip of the feeding tube is seen in the gastric fundus near the GE junction.  Consider advancing.  There are dilated small bowel loops.  Residual contrast are seen in bilateral renal collecting systems from recent CT abdomen pelvis.                                       CT Abdomen Pelvis With Contrast (Final result)  Result time 06/24/23 23:23:02      Final result by Sally Ojeda MD (06/24/23 23:23:02)                   Impression:      Significant small-bowel obstruction with the transition point in the left lower abdomen.    Probable tiny subcentimeter left hepatic lobe cyst.    Trace right pleural effusion.      Electronically signed by: Sally Ojeda  Date:    06/24/2023  Time:    23:23               Narrative:    EXAMINATION:  CT OF ABDOMEN PELVIS WITH    CLINICAL HISTORY:  Diffuse abdominal pain and bloating over the last 4 hours.    TECHNIQUE:  5 mm enhanced axial images were obtained from the lung bases through the greater trochanters.   mL of Omnipaque 350 was injected.    COMPARISON:  None.    FINDINGS:  There are some distended bowel loops containing fluid and air fluid levels.  The transition point is in the left lower  abdomen 6.2 x 4.4 x 3.7    A too small to characterize low attenuation lesion is seen within the left lobe liver, which is probably a tiny cyst.  Spleen, pancreas, kidneys, and adrenal glands are unremarkable. The gallbladder contains no calcified gallstones.  There are dilated loops of bowel containing fluid and air fluid levels.  The degree of dilatation changes in the left lower abdomen (series 2 axial image 285 and series 602 sagittal image 87).    There is no definite evidence for abdominal adenopathy or ascites.    In the pelvis there are no pelvic masses or adenopathy.    There is trace right pleural effusion. There is mild bibasilar atelectasis

## 2023-06-25 NOTE — PLAN OF CARE
Pt c/o nausea. Zofran given. Full relief obtained. Pt able to get up to bedside commode. Pt educated to call staff to for mobility. Pt had 2 creamy BMs. Adequate UOP. NG tube to low intermittent suction. Purposeful rounding done. Plan of care reviewed with pt. Pt voiced understanding. NSR on monitor. No acute distress noted. Pt free from fall and injury. Side rails X2, bed in lowest position, call bell within reach, pt advised to call for assistance.       Problem: Adult Inpatient Plan of Care  Goal: Plan of Care Review  Outcome: Ongoing, Progressing     Problem: Adult Inpatient Plan of Care  Goal: Patient-Specific Goal (Individualized)  Outcome: Ongoing, Progressing     Problem: Adult Inpatient Plan of Care  Goal: Optimal Comfort and Wellbeing  Outcome: Ongoing, Progressing     Problem: Adult Inpatient Plan of Care  Goal: Readiness for Transition of Care  Outcome: Ongoing, Progressing

## 2023-06-26 PROBLEM — E87.6 HYPOKALEMIA: Status: ACTIVE | Noted: 2023-06-26

## 2023-06-26 PROBLEM — R03.0 ELEVATED BLOOD PRESSURE READING: Status: ACTIVE | Noted: 2023-06-26

## 2023-06-26 LAB
ANION GAP SERPL CALC-SCNC: 10 MMOL/L (ref 8–16)
BASOPHILS # BLD AUTO: 0.02 K/UL (ref 0–0.2)
BASOPHILS NFR BLD: 0.2 % (ref 0–1.9)
BUN SERPL-MCNC: 13 MG/DL (ref 8–23)
CALCIUM SERPL-MCNC: 8.9 MG/DL (ref 8.7–10.5)
CHLORIDE SERPL-SCNC: 107 MMOL/L (ref 95–110)
CO2 SERPL-SCNC: 24 MMOL/L (ref 23–29)
CREAT SERPL-MCNC: 0.7 MG/DL (ref 0.5–1.4)
DIFFERENTIAL METHOD: ABNORMAL
EOSINOPHIL # BLD AUTO: 0 K/UL (ref 0–0.5)
EOSINOPHIL NFR BLD: 0.5 % (ref 0–8)
ERYTHROCYTE [DISTWIDTH] IN BLOOD BY AUTOMATED COUNT: 13 % (ref 11.5–14.5)
EST. GFR  (NO RACE VARIABLE): >60 ML/MIN/1.73 M^2
GLUCOSE SERPL-MCNC: 119 MG/DL (ref 70–110)
HCT VFR BLD AUTO: 44.6 % (ref 37–48.5)
HGB BLD-MCNC: 14.7 G/DL (ref 12–16)
IMM GRANULOCYTES # BLD AUTO: 0.01 K/UL (ref 0–0.04)
IMM GRANULOCYTES NFR BLD AUTO: 0.1 % (ref 0–0.5)
LYMPHOCYTES # BLD AUTO: 1 K/UL (ref 1–4.8)
LYMPHOCYTES NFR BLD: 11.6 % (ref 18–48)
MAGNESIUM SERPL-MCNC: 1.9 MG/DL (ref 1.6–2.6)
MCH RBC QN AUTO: 30 PG (ref 27–31)
MCHC RBC AUTO-ENTMCNC: 33 G/DL (ref 32–36)
MCV RBC AUTO: 91 FL (ref 82–98)
MONOCYTES # BLD AUTO: 0.8 K/UL (ref 0.3–1)
MONOCYTES NFR BLD: 9.3 % (ref 4–15)
NEUTROPHILS # BLD AUTO: 6.9 K/UL (ref 1.8–7.7)
NEUTROPHILS NFR BLD: 78.3 % (ref 38–73)
NRBC BLD-RTO: 0 /100 WBC
PLATELET # BLD AUTO: 216 K/UL (ref 150–450)
PMV BLD AUTO: 8.9 FL (ref 9.2–12.9)
POTASSIUM SERPL-SCNC: 3.3 MMOL/L (ref 3.5–5.1)
RBC # BLD AUTO: 4.9 M/UL (ref 4–5.4)
SODIUM SERPL-SCNC: 141 MMOL/L (ref 136–145)
WBC # BLD AUTO: 8.83 K/UL (ref 3.9–12.7)

## 2023-06-26 PROCEDURE — 83735 ASSAY OF MAGNESIUM: CPT | Performed by: PHYSICIAN ASSISTANT

## 2023-06-26 PROCEDURE — 85025 COMPLETE CBC W/AUTO DIFF WBC: CPT | Performed by: PHYSICIAN ASSISTANT

## 2023-06-26 PROCEDURE — 99233 PR SUBSEQUENT HOSPITAL CARE,LEVL III: ICD-10-PCS | Mod: ,,, | Performed by: INTERNAL MEDICINE

## 2023-06-26 PROCEDURE — 97530 THERAPEUTIC ACTIVITIES: CPT

## 2023-06-26 PROCEDURE — 97166 OT EVAL MOD COMPLEX 45 MIN: CPT

## 2023-06-26 PROCEDURE — 63600175 PHARM REV CODE 636 W HCPCS: Performed by: PHYSICIAN ASSISTANT

## 2023-06-26 PROCEDURE — 99231 SBSQ HOSP IP/OBS SF/LOW 25: CPT | Mod: ,,, | Performed by: SPECIALIST

## 2023-06-26 PROCEDURE — 25000242 PHARM REV CODE 250 ALT 637 W/ HCPCS: Performed by: INTERNAL MEDICINE

## 2023-06-26 PROCEDURE — 80048 BASIC METABOLIC PNL TOTAL CA: CPT | Performed by: PHYSICIAN ASSISTANT

## 2023-06-26 PROCEDURE — 99233 SBSQ HOSP IP/OBS HIGH 50: CPT | Mod: ,,, | Performed by: INTERNAL MEDICINE

## 2023-06-26 PROCEDURE — 99231 PR SUBSEQUENT HOSPITAL CARE,LEVL I: ICD-10-PCS | Mod: ,,, | Performed by: SPECIALIST

## 2023-06-26 PROCEDURE — 97116 GAIT TRAINING THERAPY: CPT

## 2023-06-26 PROCEDURE — 11000001 HC ACUTE MED/SURG PRIVATE ROOM

## 2023-06-26 PROCEDURE — 25000003 PHARM REV CODE 250: Performed by: PHYSICIAN ASSISTANT

## 2023-06-26 PROCEDURE — 94761 N-INVAS EAR/PLS OXIMETRY MLT: CPT

## 2023-06-26 PROCEDURE — 25000003 PHARM REV CODE 250: Performed by: INTERNAL MEDICINE

## 2023-06-26 PROCEDURE — 36415 COLL VENOUS BLD VENIPUNCTURE: CPT | Performed by: PHYSICIAN ASSISTANT

## 2023-06-26 PROCEDURE — 63600175 PHARM REV CODE 636 W HCPCS: Performed by: INTERNAL MEDICINE

## 2023-06-26 PROCEDURE — 97162 PT EVAL MOD COMPLEX 30 MIN: CPT

## 2023-06-26 RX ORDER — POTASSIUM CHLORIDE 7.45 MG/ML
10 INJECTION INTRAVENOUS
Status: COMPLETED | OUTPATIENT
Start: 2023-06-26 | End: 2023-06-26

## 2023-06-26 RX ORDER — ENOXAPARIN SODIUM 100 MG/ML
40 INJECTION SUBCUTANEOUS EVERY 24 HOURS
Status: DISCONTINUED | OUTPATIENT
Start: 2023-06-26 | End: 2023-06-27

## 2023-06-26 RX ORDER — CETIRIZINE HYDROCHLORIDE 10 MG/1
10 TABLET ORAL DAILY
Status: DISCONTINUED | OUTPATIENT
Start: 2023-06-26 | End: 2023-07-05 | Stop reason: HOSPADM

## 2023-06-26 RX ORDER — FLUTICASONE PROPIONATE 50 MCG
2 SPRAY, SUSPENSION (ML) NASAL DAILY
Status: DISCONTINUED | OUTPATIENT
Start: 2023-06-26 | End: 2023-07-05 | Stop reason: HOSPADM

## 2023-06-26 RX ORDER — MUPIROCIN 20 MG/G
OINTMENT TOPICAL 2 TIMES DAILY
Status: COMPLETED | OUTPATIENT
Start: 2023-06-26 | End: 2023-06-30

## 2023-06-26 RX ORDER — HYDROXYZINE HYDROCHLORIDE 25 MG/1
50 TABLET, FILM COATED ORAL 3 TIMES DAILY PRN
Status: DISCONTINUED | OUTPATIENT
Start: 2023-06-26 | End: 2023-07-05 | Stop reason: HOSPADM

## 2023-06-26 RX ADMIN — ENOXAPARIN SODIUM 40 MG: 40 INJECTION SUBCUTANEOUS at 04:06

## 2023-06-26 RX ADMIN — ONDANSETRON 8 MG: 2 INJECTION INTRAMUSCULAR; INTRAVENOUS at 09:06

## 2023-06-26 RX ADMIN — HYDRALAZINE HYDROCHLORIDE 10 MG: 20 INJECTION INTRAMUSCULAR; INTRAVENOUS at 04:06

## 2023-06-26 RX ADMIN — ACETAMINOPHEN 650 MG: 325 TABLET, FILM COATED ORAL at 09:06

## 2023-06-26 RX ADMIN — MUPIROCIN: 20 OINTMENT TOPICAL at 09:06

## 2023-06-26 RX ADMIN — FLUTICASONE PROPIONATE 100 MCG: 50 SPRAY, METERED NASAL at 10:06

## 2023-06-26 RX ADMIN — POTASSIUM CHLORIDE 10 MEQ: 7.46 INJECTION, SOLUTION INTRAVENOUS at 10:06

## 2023-06-26 RX ADMIN — MUPIROCIN: 20 OINTMENT TOPICAL at 08:06

## 2023-06-26 RX ADMIN — HYDROXYZINE HYDROCHLORIDE 50 MG: 25 TABLET ORAL at 09:06

## 2023-06-26 RX ADMIN — Medication 6 MG: at 09:06

## 2023-06-26 RX ADMIN — POTASSIUM CHLORIDE 10 MEQ: 7.46 INJECTION, SOLUTION INTRAVENOUS at 07:06

## 2023-06-26 RX ADMIN — POTASSIUM CHLORIDE 10 MEQ: 7.46 INJECTION, SOLUTION INTRAVENOUS at 09:06

## 2023-06-26 RX ADMIN — CETIRIZINE HYDROCHLORIDE 10 MG: 10 TABLET, FILM COATED ORAL at 10:06

## 2023-06-26 RX ADMIN — POTASSIUM CHLORIDE 10 MEQ: 7.46 INJECTION, SOLUTION INTRAVENOUS at 08:06

## 2023-06-26 RX ADMIN — SODIUM CHLORIDE: 9 INJECTION, SOLUTION INTRAVENOUS at 08:06

## 2023-06-26 RX ADMIN — SODIUM CHLORIDE: 9 INJECTION, SOLUTION INTRAVENOUS at 04:06

## 2023-06-26 RX ADMIN — SODIUM CHLORIDE: 9 INJECTION, SOLUTION INTRAVENOUS at 12:06

## 2023-06-26 RX ADMIN — MORPHINE SULFATE 2 MG: 2 INJECTION, SOLUTION INTRAMUSCULAR; INTRAVENOUS at 10:06

## 2023-06-26 NOTE — ASSESSMENT & PLAN NOTE
- SBO with h/o same in 04/2022, CT with transition point in LLQ.  - NGT in place to low intermittent suction; NPO.  - Continue IVFs, pain, nausea control.  - Surgery consulted; appreciate assistance.

## 2023-06-26 NOTE — ASSESSMENT & PLAN NOTE
- Likely related to pain with SBO.  - Continue hydralazine 10mg IV q6hr PRN for SBP > 180, DBP > 100.  - Monitor.

## 2023-06-26 NOTE — PT/OT/SLP EVAL
Physical Therapy Evaluation and Treatment    Patient Name:  Marian Durham   MRN:  [id number]    Recommendations:     Discharge Recommendations: outpatient PT   Discharge Equipment Recommendations: none   Barriers to discharge: Decreased caregiver support    Assessment:     Marian Durham is a 77 y.o. female admitted with a medical diagnosis of SBO (small bowel obstruction). Pmhx significant for B knee OA with h/o spinal stenosis, R rotator cuff tear/weakness, and previous SBO April, 2022 requiring hospitalization. She presents with the following impairments/functional limitations: weakness, gait instability, decreased coordination, decreased lower extremity function, decreased upper extremity function, pain.    Patient evaluated by PT and goals established. Patient known to rehab services with SBO requiring hospitalization 14 months prior, reports in interim has had significant decline in knee and shoulder health and is pending TKAs and undergoing PT to maximize function prior to surgery. Currently ambulatory with RW without physical assistance. PT will continue to follow and progress as tolerated - patient close to baseline but at increased risk for decreased LE functioning d/t pre morbid status.     Rehab Prognosis: Good; patient would benefit from acute skilled PT services to address these deficits and reach maximum level of function.    Recent Surgery: * No surgery found *      Plan:     During this hospitalization, patient to be seen 2 x/week to address the identified rehab impairments via gait training, therapeutic activities, therapeutic exercises and progress toward the following goals:    Plan of Care Expires:  07/10/23    Subjective     Chief Complaint: Pain in back and neck from laying in bed, worried about losing strength in LE and UE (has been going to OP PT for multiple orthopedic issues, pre-hab for B TKA)  Patient/Family Comments/goals: Goal to stay strong and avoid significant delay in getting  TKAs (planning for surgery with Dr. Mitchell); Patient agreeable to evaluation.  Pain/Comfort:  Pain Rating 1:  (knee and shoulder pain, back and shoulder pain)  Pain Rating Post-Intervention 1:  (reports improved after activity)    Patients cultural, spiritual, Latter-day conflicts given the current situation: no    Living Environment:  Pt lives alone in a single story home with flight steps to enter and chair lift available.   Upon discharge, patient will have assistance from her family PRN.  Prior level of function:  Ambulation: Indep - recently was requiring RW d/t L knee issues, but has progressed off RW  ADL's: Indep  IADLs: Indep  Falls: None reported  Equipment used at home: cane, quad, walker, rolling, shower chair (stair lift).      Objective:     Communicated with LISSETTE Louis prior to session.  Patient found HOB elevated with peripheral IV, NG tube, telemetry, pulse ox (continuous), blood pressure cuff  upon PT entry to room.    General Precautions: Standard, fall  Orthopedic Precautions:N/A   Braces: N/A  Respiratory Status: Room air    Patient donned non slip socks and gait belt for OOB mobility. RN disconnected pt from suction during PT session.    Exams:  Cognition:   Patient is oriented x4.  Pt follows approximately 100% of one and two step commands.    Mood: Pleasant and cooperative, anxious affect regarding lines  Safety Awareness: Good  Musculoskeletal:  BMI: 19.44  Posture:  Militant posture with sway back  LE ROM/Strength:   R ROM: WFL  L ROM: WFL  R Strength:   Hip flexion: 3/5  Knee extension: 3/5  Dorsiflexion: 4/5   L Strength:   Hip flexion: 3/5  Knee extension: 2/5  Dorsiflexion: 4/5   Neuromuscular:  Coordination/Tone/Reflexes: No impairments identified with functional mobility. No formal testing performed.   Balance:   Static sitting: Normal  Static standing: Good  Dynamic standing: Fair  Visual-vestibular: No impairments identified with functional mobility. No formal testing  performed.  Integument: Visible skin intact  Cardiopulmonary:  Vital signs:   Maintained BP, HR max 118 bpm during initial standing  Edema: None noted    Functional Mobility:  Bed Mobility:     Supine to Sit: modified independence  Transfers:     Sit to Stand:  modified independence and supervision with rolling walker  From EOB and chair  Gait: x140 ft with RW and CGA progressing to SBA.   Noted genu valgus R>L with wide step width and supination during toe off (B) and maintains knee extension during swing  No overt knee buckling or difficulty with RW management      AM-PAC 6 CLICK MOBILITY  Total Score:20       Treatment & Education:  TA:  Seated and standing stretches for shoulders and neck  Demo'd seated therEx for rotator cuff  Sitting EOB x5 min prior to ambulation d/t initial dizziness - no change in BP  Gait:  Cueing for use of RW and proper height  Encouraged increased ambulation and monitoring of symptoms during ambulation about  PT educated patient re:   PT plan of care/role of PT  Safety with OOB mobility  Use of RW, HB features  Discharge disposition    Pt verbalized understanding       Patient left up in chair with all lines intact, call button in reach, RN Missy notified, and white board updated .    GOALS:   Multidisciplinary Problems       Physical Therapy Goals          Problem: Physical Therapy    Goal Priority Disciplines Outcome Goal Variances Interventions   Physical Therapy Goal     PT, PT/OT Ongoing, Progressing     Description: Goals to be met by: 7/10/23    Patient will increase functional independence with mobility by performin. Sit<>stand with supervision with LRAD.  2. Gait x 300 feet with supervision with LRAD.  3. Ascend/descend 5 step(s) with least restrictive assistive device and uni HR with SBA.   4. Standing HEP with supervision with appropriate UE support and no LOB x3 min.                       History:     Past Medical History:   Diagnosis Date    Hyperlipidemia      Osteoarthritis     Shingles 2002    Spinal stenosis        Past Surgical History:   Procedure Laterality Date    ANKLE FRACTURE SURGERY  2007    Right ankle    EXCISION OF MASS OF BACK N/A 11/16/2018    Procedure: EXCISION, MASS, BACK;  Surgeon: Fran Magaña MD;  Location: [facility];  Service: General;  Laterality: N/A;    EYE SURGERY Bilateral 2016    HYSTERECTOMY  1985    HEATHER    KNEE ARTHROSCOPY W/ DEBRIDEMENT  1994    Bilateral    KNEE ARTHROSCOPY W/ DEBRIDEMENT  2003    Left knee    OPEN PATELLA REEFING PROCEDURE  age 19    Left knee    Tonsilectomy  as a child    TONSILLECTOMY      TUBAL LIGATION         Time Tracking:     PT Received On: 06/26/23  PT Start Time: 0952     PT Stop Time: 1025  PT Total Time (min): 33 min     Billable Minutes: Evaluation 10, Gait Training 10, and Therapeutic Activity 13      06/26/2023

## 2023-06-26 NOTE — PROGRESS NOTES
Hospital day 3.   Diagnosis-small-bowel obstruction    Subjective   Passing small amounts of flatus  No bowel    PE   HEENT-NG in place  Chest-clear  Heart-regular rate and rhythm  Abdomen-soft, nondistended, positive bowel sounds, no tenderness, no guarding  Extremities-no tenderness    Imaging  Nonspecific small bowel pattern, air in colon, few scattered dilated loops of small bowel    Impression/plan   Small-bowel obstruction, partially resolved   Continue supportive care  Advance diet when abdomen improved

## 2023-06-26 NOTE — PROGRESS NOTES
"[facility] Medicine  Progress Note    Patient Name: Marian Durham  MRN: [id number]  Patient Class: IP- Inpatient   Admission Date: 6/24/2023  Length of Stay: 2 days  Attending Physician: SHERIN Pearson MD  Primary Care Provider: Suzanna Duran MD        Subjective:     Principal Problem:SBO (small bowel obstruction)        HPI:  Ms. Marian Durham is a 77 y.o. female, with PMH of prior SBO, hysterectomy, who presented to [facility] ED on 6/24/23 due to diffuse upper abdominal pain x 4 hours. She notes associated nausea, chills, bloating. She states her last BM was earlier this morning. She states this feels like last time when she had a "twisted bowel" ten months ago. She states her last bowel obstruction resolved 8 days after having an NG tube placed. She was evaluated in the ED with labs that showed leukocytosis of 17 K, with left shift.  She had concentrated H&H.  A metabolic panel showed elevated anion gap of 18, with CO2 of 16, and glucose of 146.  BUN was elevated at 27 creatinine was 1.1.  UA was dilute with 3+ ketones.  CT of the abdomen and pelvis showed small-bowel obstruction with transition point in the left lower abdomen, and trace right pleural effusion.  An NG-tube was placed.  She is admitted to inpatient status.      Overview/Hospital Course:  Admitted with SBO. NGT placed and surgery consulted. Started IVFs and symptom control.    Interval History: No acute events overnight. Some blood in NGT. Mild improvement in pain. Reports postnasal drip and L ear fullness. No other concerns at this time.    Review of Systems   Constitutional:  Negative for chills and fever.   Respiratory:  Negative for cough and shortness of breath.    Cardiovascular:  Negative for chest pain and palpitations.   Gastrointestinal:  Positive for abdominal pain. Negative for nausea and vomiting.   Objective:     Vital Signs (Most Recent):  Temp: 98.6 °F (37 °C) (06/26/23 2000)  Pulse: (!) " 118 (06/26/23 2000)  Resp: (!) 44 (06/26/23 2000)  BP: (!) 178/79 (06/26/23 2000)  SpO2: 95 % (06/26/23 2000) Vital Signs (24h Range):  Temp:  [98 °F (36.7 °C)-99 °F (37.2 °C)] 98.6 °F (37 °C)  Pulse:  [] 118  Resp:  [13-44] 44  SpO2:  [95 %-96 %] 95 %  BP: (131-182)/(63-82) 178/79     Weight: 53 kg (116 lb 13.5 oz)  Body mass index is 19.44 kg/m².    Intake/Output Summary (Last 24 hours) at 6/26/2023 2255  Last data filed at 6/26/2023 2000  Gross per 24 hour   Intake 3123.45 ml   Output 3100 ml   Net 23.45 ml         Physical Exam  Vitals and nursing note reviewed.   Constitutional:       General: She is not in acute distress.     Appearance: She is well-developed.   HENT:      Head: Normocephalic and atraumatic.   Eyes:      General:         Right eye: No discharge.         Left eye: No discharge.      Conjunctiva/sclera: Conjunctivae normal.   Cardiovascular:      Rate and Rhythm: Normal rate.      Pulses: Normal pulses.   Pulmonary:      Effort: Pulmonary effort is normal. No respiratory distress.   Abdominal:      General: There is distension.      Palpations: Abdomen is soft.      Tenderness: There is abdominal tenderness.   Musculoskeletal:         General: Normal range of motion.      Right lower leg: No edema.      Left lower leg: No edema.   Skin:     General: Skin is warm and dry.   Neurological:      Mental Status: She is alert and oriented to person, place, and time.         Significant Labs:   CBC:  Recent Labs   Lab 06/24/23 2010 06/25/23  0523 06/26/23  0437   WBC 17.21* 17.22* 8.83   HGB 17.1* 15.3 14.7   HCT 49.7* 45.8 44.6    238 216   GRAN 78.9*  13.6* 84.0*  14.5* 78.3*  6.9   LYMPH 14.8*  2.6 7.4*  1.3 11.6*  1.0   MONO 5.5  1.0 7.7  1.3* 9.3  0.8   EOS 0.1 0.0 0.0   BASO 0.04 0.07 0.02   BMP:  Recent Labs   Lab 06/24/23 2010 06/25/23 0523 06/26/23  0437   * 137 141   K 4.1 4.0 3.3*    108 107   CO2 16* 19* 24   BUN 27* 25* 13   CREATININE 1.1 0.8 0.7   GLU  146* 122* 119*   CALCIUM 11.5* 9.3 8.9   MG  --  2.1 1.9     Significant Imaging: I have reviewed and interpreted all pertinent imaging results/findings within the past 24 hours.      Assessment/Plan:      * SBO (small bowel obstruction)  - SBO with h/o same in 04/2022, CT with transition point in LLQ.  - NGT in place to low intermittent suction; NPO.  - Continue IVFs, pain, nausea control.  - Surgery consulted; appreciate assistance.    Elevated blood pressure reading  - Likely related to pain with SBO.  - Continue hydralazine 10mg IV q6hr PRN for SBP > 180, DBP > 100.  - Monitor.    Hypokalemia  - Replete PRN.      VTE Risk Mitigation (From admission, onward)           Ordered     enoxaparin injection 40 mg  Every 24 hours         06/26/23 1010     IP VTE HIGH RISK PATIENT  Once         06/25/23 0028     Place sequential compression device  Until discontinued         06/25/23 0028                    Discharge Planning   WILLIAN:      Code Status: Full Code   Is the patient medically ready for discharge?:     Reason for patient still in hospital (select all that apply): Treatment  Discharge Plan A: Home Health                  D Favian Pearson MD  Department of Hospital Medicine   [facility] - [facility] ([place])

## 2023-06-26 NOTE — PLAN OF CARE
Problem: Physical Therapy  Goal: Physical Therapy Goal  Description: Goals to be met by: 7/10/23    Patient will increase functional independence with mobility by performin. Sit<>stand with supervision with LRAD.  2. Gait x 300 feet with supervision with LRAD.  3. Ascend/descend 5 step(s) with least restrictive assistive device and uni HR with SBA.   4. Standing HEP with supervision with appropriate UE support and no LOB x3 min.  Outcome: Ongoing, Progressing     Patient evaluated by PT and goals established. Patient known to rehab services with SBO requiring hospitalization 14 months prior, reports in interim has had significant decline in knee and shoulder health and is pending TKAs and undergoing PT to maximize function prior to surgery. Currently ambulatory with RW without physical assistance. PT will continue to follow and progress as tolerated - patient close to baseline but at increased risk for decreased LE functioning d/t pre morbid status. Please see progress note for detailed plan of care and recommendations.

## 2023-06-26 NOTE — PLAN OF CARE
A: Awake    RASS: Goal -    Actual - RASS (Mcpherson Agitation-Sedation Scale): alert and calm   Restraint necessity:    B: Breathe   SBT: Not intubated   C: Coordinate A & B, analgesics/sedatives   Pain: managed    SAT: Not intubated  D: Delirium   CAM-ICU: Overall CAM-ICU: Negative  E: Early(intubated/ Progressive (non-intubated) Mobility   MOVE Screen: Pass   Activity: Activity Management: Rolling - L1  FAS: Feeding/Nutrition   Diet order: Diet/Nutrition Received: NPO,    T: Thrombus   DVT prophylaxis: VTE Required Core Measure: Per order contraindicated for SCDs/Anticoagulants  H: HOB Elevation   Head of Bed (HOB) Positioning: HOB elevated  U: Ulcer Prophylaxis   GI: yes  G: Glucose control   managed    S: Skin   Bathing/Skin Care: bath, complete, linen changed, dressed/undressed  Device Skin Pressure Protection: absorbent pad utilized/changed  Pressure Reduction Devices: pressure-redistributing mattress utilized  Pressure Reduction Techniques: frequent weight shift encouraged  Skin Protection: adhesive use limited, pulse oximeter probe site changed, tubing/devices free from skin contact  B: Bowel Function   no issues   I: Indwelling Catheters   Pederson necessity:     CVC necessity: n/a  D: De-escalation Antibiotics: n/a       Family/Goals of care/Code Status   Code Status: Full Code    24H Vital Sign Range  Temp:  [98.1 °F (36.7 °C)-99 °F (37.2 °C)]   Pulse:  []   Resp:  [13-35]   BP: (131-184)/(63-84)   SpO2:  [95 %-96 %]      Shift Events   No acute events throughout shift    VS and assessment per flow sheet, patient progressing towards goals as tolerated, plan of care reviewed with patient, all concerns addressed, will continue to monitor.    Missy Martinez

## 2023-06-27 LAB
ANION GAP SERPL CALC-SCNC: 12 MMOL/L (ref 8–16)
BASOPHILS # BLD AUTO: 0.02 K/UL (ref 0–0.2)
BASOPHILS # BLD AUTO: 0.04 K/UL (ref 0–0.2)
BASOPHILS NFR BLD: 0.1 % (ref 0–1.9)
BASOPHILS NFR BLD: 0.3 % (ref 0–1.9)
BUN SERPL-MCNC: 10 MG/DL (ref 8–23)
CALCIUM SERPL-MCNC: 9.1 MG/DL (ref 8.7–10.5)
CHLORIDE SERPL-SCNC: 105 MMOL/L (ref 95–110)
CO2 SERPL-SCNC: 22 MMOL/L (ref 23–29)
CREAT SERPL-MCNC: 0.7 MG/DL (ref 0.5–1.4)
DIFFERENTIAL METHOD: ABNORMAL
DIFFERENTIAL METHOD: ABNORMAL
EOSINOPHIL # BLD AUTO: 0 K/UL (ref 0–0.5)
EOSINOPHIL # BLD AUTO: 0 K/UL (ref 0–0.5)
EOSINOPHIL NFR BLD: 0.1 % (ref 0–8)
EOSINOPHIL NFR BLD: 0.1 % (ref 0–8)
ERYTHROCYTE [DISTWIDTH] IN BLOOD BY AUTOMATED COUNT: 13 % (ref 11.5–14.5)
ERYTHROCYTE [DISTWIDTH] IN BLOOD BY AUTOMATED COUNT: 13.1 % (ref 11.5–14.5)
EST. GFR  (NO RACE VARIABLE): >60 ML/MIN/1.73 M^2
GLUCOSE SERPL-MCNC: 94 MG/DL (ref 70–110)
HCT VFR BLD AUTO: 41.6 % (ref 37–48.5)
HCT VFR BLD AUTO: 42.9 % (ref 37–48.5)
HGB BLD-MCNC: 13.5 G/DL (ref 12–16)
HGB BLD-MCNC: 14.3 G/DL (ref 12–16)
IMM GRANULOCYTES # BLD AUTO: 0.04 K/UL (ref 0–0.04)
IMM GRANULOCYTES # BLD AUTO: 0.06 K/UL (ref 0–0.04)
IMM GRANULOCYTES NFR BLD AUTO: 0.3 % (ref 0–0.5)
IMM GRANULOCYTES NFR BLD AUTO: 0.4 % (ref 0–0.5)
LYMPHOCYTES # BLD AUTO: 1.3 K/UL (ref 1–4.8)
LYMPHOCYTES # BLD AUTO: 1.5 K/UL (ref 1–4.8)
LYMPHOCYTES NFR BLD: 10.8 % (ref 18–48)
LYMPHOCYTES NFR BLD: 8.7 % (ref 18–48)
MAGNESIUM SERPL-MCNC: 1.9 MG/DL (ref 1.6–2.6)
MCH RBC QN AUTO: 30.3 PG (ref 27–31)
MCH RBC QN AUTO: 31 PG (ref 27–31)
MCHC RBC AUTO-ENTMCNC: 32.5 G/DL (ref 32–36)
MCHC RBC AUTO-ENTMCNC: 33.3 G/DL (ref 32–36)
MCV RBC AUTO: 93 FL (ref 82–98)
MCV RBC AUTO: 93 FL (ref 82–98)
MONOCYTES # BLD AUTO: 1 K/UL (ref 0.3–1)
MONOCYTES # BLD AUTO: 1.2 K/UL (ref 0.3–1)
MONOCYTES NFR BLD: 7.5 % (ref 4–15)
MONOCYTES NFR BLD: 8.3 % (ref 4–15)
NEUTROPHILS # BLD AUTO: 11.2 K/UL (ref 1.8–7.7)
NEUTROPHILS # BLD AUTO: 12.3 K/UL (ref 1.8–7.7)
NEUTROPHILS NFR BLD: 81.2 % (ref 38–73)
NEUTROPHILS NFR BLD: 82.2 % (ref 38–73)
NRBC BLD-RTO: 0 /100 WBC
NRBC BLD-RTO: 0 /100 WBC
PLATELET # BLD AUTO: 195 K/UL (ref 150–450)
PLATELET # BLD AUTO: 202 K/UL (ref 150–450)
PMV BLD AUTO: 9 FL (ref 9.2–12.9)
PMV BLD AUTO: 9.2 FL (ref 9.2–12.9)
POTASSIUM SERPL-SCNC: 3.4 MMOL/L (ref 3.5–5.1)
RBC # BLD AUTO: 4.46 M/UL (ref 4–5.4)
RBC # BLD AUTO: 4.62 M/UL (ref 4–5.4)
SODIUM SERPL-SCNC: 139 MMOL/L (ref 136–145)
WBC # BLD AUTO: 13.83 K/UL (ref 3.9–12.7)
WBC # BLD AUTO: 14.89 K/UL (ref 3.9–12.7)

## 2023-06-27 PROCEDURE — 99233 PR SUBSEQUENT HOSPITAL CARE,LEVL III: ICD-10-PCS | Mod: ,,, | Performed by: HOSPITALIST

## 2023-06-27 PROCEDURE — C9113 INJ PANTOPRAZOLE SODIUM, VIA: HCPCS | Performed by: HOSPITALIST

## 2023-06-27 PROCEDURE — 25000003 PHARM REV CODE 250: Performed by: PHYSICIAN ASSISTANT

## 2023-06-27 PROCEDURE — 80048 BASIC METABOLIC PNL TOTAL CA: CPT | Performed by: PHYSICIAN ASSISTANT

## 2023-06-27 PROCEDURE — 63600175 PHARM REV CODE 636 W HCPCS: Performed by: INTERNAL MEDICINE

## 2023-06-27 PROCEDURE — 25000242 PHARM REV CODE 250 ALT 637 W/ HCPCS: Performed by: INTERNAL MEDICINE

## 2023-06-27 PROCEDURE — 94761 N-INVAS EAR/PLS OXIMETRY MLT: CPT

## 2023-06-27 PROCEDURE — 85025 COMPLETE CBC W/AUTO DIFF WBC: CPT | Mod: 91 | Performed by: HOSPITALIST

## 2023-06-27 PROCEDURE — 36415 COLL VENOUS BLD VENIPUNCTURE: CPT | Performed by: HOSPITALIST

## 2023-06-27 PROCEDURE — 25000003 PHARM REV CODE 250: Performed by: INTERNAL MEDICINE

## 2023-06-27 PROCEDURE — 83735 ASSAY OF MAGNESIUM: CPT | Performed by: PHYSICIAN ASSISTANT

## 2023-06-27 PROCEDURE — 85025 COMPLETE CBC W/AUTO DIFF WBC: CPT | Performed by: PHYSICIAN ASSISTANT

## 2023-06-27 PROCEDURE — 36415 COLL VENOUS BLD VENIPUNCTURE: CPT | Performed by: PHYSICIAN ASSISTANT

## 2023-06-27 PROCEDURE — 63600175 PHARM REV CODE 636 W HCPCS: Performed by: HOSPITALIST

## 2023-06-27 PROCEDURE — 99233 SBSQ HOSP IP/OBS HIGH 50: CPT | Mod: ,,, | Performed by: HOSPITALIST

## 2023-06-27 PROCEDURE — 21400001 HC TELEMETRY ROOM

## 2023-06-27 RX ORDER — PANTOPRAZOLE SODIUM 40 MG/10ML
40 INJECTION, POWDER, LYOPHILIZED, FOR SOLUTION INTRAVENOUS 2 TIMES DAILY
Status: DISCONTINUED | OUTPATIENT
Start: 2023-06-27 | End: 2023-07-01

## 2023-06-27 RX ORDER — POTASSIUM CHLORIDE 7.45 MG/ML
10 INJECTION INTRAVENOUS ONCE
Status: COMPLETED | OUTPATIENT
Start: 2023-06-27 | End: 2023-06-27

## 2023-06-27 RX ADMIN — SODIUM CHLORIDE: 9 INJECTION, SOLUTION INTRAVENOUS at 12:06

## 2023-06-27 RX ADMIN — POTASSIUM CHLORIDE 10 MEQ: 7.46 INJECTION, SOLUTION INTRAVENOUS at 10:06

## 2023-06-27 RX ADMIN — MUPIROCIN: 20 OINTMENT TOPICAL at 10:06

## 2023-06-27 RX ADMIN — PANTOPRAZOLE SODIUM 40 MG: 40 INJECTION, POWDER, LYOPHILIZED, FOR SOLUTION INTRAVENOUS at 11:06

## 2023-06-27 RX ADMIN — SODIUM CHLORIDE: 9 INJECTION, SOLUTION INTRAVENOUS at 02:06

## 2023-06-27 RX ADMIN — PANTOPRAZOLE SODIUM 40 MG: 40 INJECTION, POWDER, LYOPHILIZED, FOR SOLUTION INTRAVENOUS at 09:06

## 2023-06-27 RX ADMIN — MORPHINE SULFATE 2 MG: 2 INJECTION, SOLUTION INTRAMUSCULAR; INTRAVENOUS at 09:06

## 2023-06-27 RX ADMIN — MUPIROCIN: 20 OINTMENT TOPICAL at 09:06

## 2023-06-27 RX ADMIN — CETIRIZINE HYDROCHLORIDE 10 MG: 10 TABLET, FILM COATED ORAL at 10:06

## 2023-06-27 RX ADMIN — MORPHINE SULFATE 2 MG: 2 INJECTION, SOLUTION INTRAMUSCULAR; INTRAVENOUS at 04:06

## 2023-06-27 RX ADMIN — FLUTICASONE PROPIONATE 100 MCG: 50 SPRAY, METERED NASAL at 10:06

## 2023-06-27 NOTE — PLAN OF CARE
Problem: Occupational Therapy  Goal: Occupational Therapy Goal  Description: Goals to be met by: 7/15/23     Patient will increase functional independence with ADLs by performing:    UB dressing at Supervision level.  LB dressing at Supervision level.  Toileting at Supervision level.  Toilet/Chair transfers at Supervision level.  Grooming at sink at Supervision level.  Sponge bathing at Set up/Supervision level.    Outcome: Ongoing, Progressing

## 2023-06-27 NOTE — ASSESSMENT & PLAN NOTE
Possible GIB  - SBO with h/o same in 04/2022, CT with transition point in LLQ.  - NGT in place to low intermittent suction; NPO.  - Continue IVFs, pain, nausea control.  - Surgery consulted; appreciate assistance.  - Patient with continued coffee ground noted in NG that was initially seen yesterday, but H/H is stable  - Will start IV PPI Q12 and repeat CBC later today  - Repeat KUB today

## 2023-06-27 NOTE — PROGRESS NOTES
"[facility] Surg [address] Medicine  Progress Note    Patient Name: Marian Durham  MRN: [id number]  Patient Class: IP- Inpatient   Admission Date: 6/24/2023  Length of Stay: 3 days  Attending Physician: Yandy Horne MD  Primary Care Provider: Suzanna Duran MD        Subjective:     Principal Problem:SBO (small bowel obstruction)        HPI:  Ms. Marian Durham is a 77 y.o. female, with PMH of prior SBO, hysterectomy, who presented to [facility] ED on 6/24/23 due to diffuse upper abdominal pain x 4 hours. She notes associated nausea, chills, bloating. She states her last BM was earlier this morning. She states this feels like last time when she had a "twisted bowel" ten months ago. She states her last bowel obstruction resolved 8 days after having an NG tube placed. She was evaluated in the ED with labs that showed leukocytosis of 17 K, with left shift.  She had concentrated H&H.  A metabolic panel showed elevated anion gap of 18, with CO2 of 16, and glucose of 146.  BUN was elevated at 27 creatinine was 1.1.  UA was dilute with 3+ ketones.  CT of the abdomen and pelvis showed small-bowel obstruction with transition point in the left lower abdomen, and trace right pleural effusion.  An NG-tube was placed.  She is admitted to inpatient status.      Overview/Hospital Course:  Admitted with SBO. NGT placed and surgery consulted. Started IVFs and symptom control.      Interval History: No acute events overnight. Some blood/coffee grounds in NGT continues which was noted yesterday. Mild improvement in pain. Passed flatus but no BM, no N/V. No other concerns at this time.    Review of Systems   Constitutional:  Negative for chills and fever.   Respiratory:  Negative for cough and shortness of breath.    Cardiovascular:  Negative for chest pain and palpitations.   Gastrointestinal:  Positive for abdominal pain. Negative for nausea and vomiting.   Objective:     Vital Signs (Most Recent):  Temp: 98.3 °F " (36.8 °C) (06/27/23 1145)  Pulse: 96 (06/27/23 1159)  Resp: 20 (06/27/23 1145)  BP: (!) 172/78 (06/27/23 1145)  SpO2: (!) 94 % (06/27/23 1145) Vital Signs (24h Range):  Temp:  [98 °F (36.7 °C)-100.5 °F (38.1 °C)] 98.3 °F (36.8 °C)  Pulse:  [] 96  Resp:  [13-44] 20  SpO2:  [93 %-97 %] 94 %  BP: (133-182)/(62-82) 172/78     Weight: 34 kg (74 lb 15.3 oz)  Body mass index is 12.47 kg/m².    Intake/Output Summary (Last 24 hours) at 6/27/2023 1222  Last data filed at 6/27/2023 1133  Gross per 24 hour   Intake 2807.41 ml   Output 3075 ml   Net -267.59 ml           Physical Exam  Vitals and nursing note reviewed.   Constitutional:       General: She is not in acute distress.     Appearance: She is well-developed.   HENT:      Head: Normocephalic and atraumatic.   Eyes:      General:         Right eye: No discharge.         Left eye: No discharge.      Conjunctiva/sclera: Conjunctivae normal.   Cardiovascular:      Rate and Rhythm: Normal rate.      Pulses: Normal pulses.   Pulmonary:      Effort: Pulmonary effort is normal. No respiratory distress.   Abdominal:      General: There is distension.      Palpations: Abdomen is soft.      Tenderness: There is abdominal tenderness.   Musculoskeletal:         General: Normal range of motion.      Right lower leg: No edema.      Left lower leg: No edema.   Skin:     General: Skin is warm and dry.   Neurological:      Mental Status: She is alert and oriented to person, place, and time.         Significant Labs:   CBC:  Recent Labs   Lab 06/25/23  0523 06/26/23  0437 06/27/23  0445   WBC 17.22* 8.83 13.83*   HGB 15.3 14.7 14.3   HCT 45.8 44.6 42.9    216 195   GRAN 84.0*  14.5* 78.3*  6.9 81.2*  11.2*   LYMPH 7.4*  1.3 11.6*  1.0 10.8*  1.5   MONO 7.7  1.3* 9.3  0.8 7.5  1.0   EOS 0.0 0.0 0.0   BASO 0.07 0.02 0.02     BMP:  Recent Labs   Lab 06/25/23  0523 06/26/23  0437 06/27/23  0445    141 139   K 4.0 3.3* 3.4*    107 105   CO2 19* 24 22*   BUN  25* 13 10   CREATININE 0.8 0.7 0.7   * 119* 94   CALCIUM 9.3 8.9 9.1   MG 2.1 1.9 1.9       Significant Imaging: I have reviewed and interpreted all pertinent imaging results/findings within the past 24 hours.      Assessment/Plan:      * SBO (small bowel obstruction)  Possible GIB  - SBO with h/o same in 04/2022, CT with transition point in LLQ.  - NGT in place to low intermittent suction; NPO.  - Continue IVFs, pain, nausea control.  - Surgery consulted; appreciate assistance.  - Patient with continued coffee ground noted in NG that was initially seen yesterday, but H/H is stable  - Will start IV PPI Q12 and repeat CBC later today  - Repeat KUB today    Elevated blood pressure reading  - Likely related to pain with SBO.  - Continue hydralazine 10mg IV q6hr PRN for SBP > 180, DBP > 100.  - Monitor.    Hypokalemia  - Replete PRN.      VTE Risk Mitigation (From admission, onward)         Ordered     IP VTE HIGH RISK PATIENT  Once         06/25/23 0028     Place sequential compression device  Until discontinued         06/25/23 0028                      Yandy Horne MD  Department of Hospital Medicine   [facility] Surg ([address])

## 2023-06-27 NOTE — NURSING TRANSFER
Nursing Transfer Note      6/27/2023     Reason patient is being transferred: Med-Surg Patient    Transfer To: Med-Surg room 376A    Transfer via bed    Transfer with cardiac monitoring    Transported by BARBARA Hernandez RN    Telemetry: Rate 90 and Rhythm NSR    Medicines sent: Mupirocin    Any special needs or follow-up needed: 1-person assist needed for BSC usage.    Chart send with patient: EHR    Notified: Patient states she will notify her son in the morning.    Patient reassessed at: 06/27/23 2301    Upon arrival to floor: cardiac monitor applied, patient oriented to room, call bell in reach, and bed in lowest position. Nurse Li at the bedside.

## 2023-06-27 NOTE — HOSPITAL COURSE
Admitted with SBO. NGT placed and surgery consulted. Started IVFs and symptom control. Patient with clinical improvement and NG removed and diet slowly advanced. Developed Afib with RVR on 6/30 and Cardiology consulted. Had worsening pain/weakness to hip/knees; orthopedics consulted and imaging repeated with chronic changes. Therapy recommended further inpatient therapy; SNF placement pursued. SBO symptoms resolved and having regular BMs. With clinical improvement and vital stability, she was prepared for discharge to SNF; referred to cardiology outpatient and anticoagulation initiated with apixaban.

## 2023-06-27 NOTE — CARE UPDATE
KURAFY with improvement today. Will clamp NG and monitor. Discussed with Surgery. Will consider removing NG tomorrow if she continues to do well.    LEELA Horne MD

## 2023-06-27 NOTE — PT/OT/SLP EVAL
Occupational Therapy   Evaluation and Treatment    Name: Marian Durham  MRN: [id number]  Admitting Diagnosis: SBO (small bowel obstruction)  Recent Surgery: * No surgery found *      Recommendations:     Discharge Recommendations:  (Pending progress)  Discharge Equipment Recommendations:  none  Barriers to discharge:  Decreased caregiver support, Inaccessible home environment (Current functional leve)    Assessment:     Marian Durham is a 77 y.o. female with a medical diagnosis of SBO (small bowel obstruction).  She presents in ICU with NG tube placed.  Pt is grossly overall at Min A level with ADL and ADL mobility. Performance deficits affecting function: weakness, impaired endurance, impaired self care skills, impaired functional mobility, gait instability, impaired balance, decreased lower extremity function, pain, decreased upper extremity function, decreased ROM.      Rehab Prognosis: Good; patient would benefit from acute skilled OT services to address these deficits and reach maximum level of function.       Plan:     Patient to be seen  4x week to address the above listed problems via self-care/home management, therapeutic activities, therapeutic exercises  Plan of Care Expires: 07/26/23  Plan of Care Reviewed with: patient    Subjective     Chief Complaint: Pt having several complaints including pain, being cold, not wanting to leave ICU, among other complaints.  Charge nurse, Cyndi, in room prior to OT evaluation to address pt complaints.    Patient/Family Comments/goals:     Occupational Profile:  Living Environment: Lives alone in 2 story home, flight of stairs to enter and lift chair available   Previous level of function: Mod I, limited by knee pain  Equipment Used at Home: cane, quad, walker, rolling, shower chair (Stair lift)  Assistance upon Discharge: No one    Pain/Comfort:  Pain Rating 1:  (upper back and bilateral knee, did not rate pain, numbness and tingling bilateral hands due to CTS but  has not had CTR.)  Pain Addressed 1: Reposition, Distraction, Cessation of Activity (Gentle stretching.)    Patients cultural, spiritual, Restoration conflicts given the current situation: no    Objective:     Communicated with: nurse and charge nurse prior to session.  Patient found HOB elevated with blood pressure cuff, NG tube, pulse ox (continuous), peripheral IV upon OT entry to room.    General Precautions: Standard, fall  Orthopedic Precautions: N/A  Braces: N/A  Respiratory Status: Room air    Occupational Performance:    Bed Mobility:    Supine to sit: Mod I  Sit to supine: Mod I  Scooting to HOB: Mod I     Functional Mobility/Transfers:  Sit to stand: SBA with RW, cues for hand placement  Bed <>BSC transfer: CGA with RW  Functional Mobility: No LOB    Activities of Daily Living:  Toileting: Min A  LB Dressing: Set up/SBA to doff/don socks sitting EOB  Grooming: Set up while seated    Cognitive/Visual Perceptual:  Cognitive/Psychosocial Skills:     -       Oriented to: Person, Place, Time, and Situation   -       Follows Commands/attention:Follows one-step commands  -       Communication: clear/fluent  -       Memory: No Deficits noted  -       Safety awareness/insight to disability: impaired   -       Mood/Affect/Coping skills/emotional control: Cooperative and Anxious    Physical Exam:  Right UE: Limited shoulder abduction/flexion to 90 degrees, elbow <> hand WFL AROM;  is 3+/5  Left UE: AROM is WFL, Strength is 4-/5 at shoulder, 4-/5 elbow flexion/extension; 3+/5  strength  Sitting Balance: SBA sitting EOB  Standing Balance: Fair; use of RW    Seated therapeutic exercises/stretches and activities for back and shoulder pain and bilateral hand pain.   Scapular retraction with isometric hold  Wrist and finger extension gentle stretching    AMPAC 6 Click ADL:  AMPAC Total Score: 19    Treatment & Education:  Role of OT, POC, ADL and ADL mobility training, UB therapeutic exercises for stretching  wrists/hands and upper back due to pain/soreness and CTS, safety and use of call button    Patient left HOB elevated with all lines intact, call button in reach, bed alarm on, and nurse notified. Bed alarm set due to pt stating that she is not going to wait for a nurse to assist her to BSC if she has to urinate immediately.     GOALS:   Multidisciplinary Problems       Occupational Therapy Goals          Problem: Occupational Therapy    Goal Priority Disciplines Outcome Interventions   Occupational Therapy Goal     OT, PT/OT Ongoing, Progressing    Description: Goals to be met by: 7/15/23     Patient will increase functional independence with ADLs by performing:    UB dressing at Supervision level.  LB dressing at Supervision level.  Toileting at Supervision level.  Toilet/Chair transfers at Supervision level.  Grooming at sink at Supervision level.  Sponge bathing at Set up/Supervision level.                         History:     Past Medical History:   Diagnosis Date    Hyperlipidemia     Osteoarthritis     Shingles 2002    Spinal stenosis          Past Surgical History:   Procedure Laterality Date    ANKLE FRACTURE SURGERY  2007    Right ankle    EXCISION OF MASS OF BACK N/A 11/16/2018    Procedure: EXCISION, MASS, BACK;  Surgeon: Fran Magaña MD;  Location: [facility];  Service: General;  Laterality: N/A;    EYE SURGERY Bilateral 2016    HYSTERECTOMY  1985    HEATHER    KNEE ARTHROSCOPY W/ DEBRIDEMENT  1994    Bilateral    KNEE ARTHROSCOPY W/ DEBRIDEMENT  2003    Left knee    OPEN PATELLA REEFING PROCEDURE  age 19    Left knee    Tonsilectomy  as a child    TONSILLECTOMY      TUBAL LIGATION         Time Tracking:     OT Date of Treatment: 06/26/23  OT Start Time: 1738  OT Stop Time: 1804  OT Total Time (min): 26 min    Billable Minutes:Evaluation 10  Therapeutic Activity 16    6/26/2023

## 2023-06-27 NOTE — PLAN OF CARE
AAOX4. VSS. NG to suction with dark green thick output. Tolerated medications well and flushed without difficulty. Clamped and flushed NG around 1630. NPO. Ambulated to bedside commode without assistance just stand by. Went to CT of abdomin. Call light in reach, bed alarm set, non slip socks on, and educated to call staff for stand by assistance. Tele monitor on and IV fluids. No injuries on shift. Safety maintained throughout shift.         Problem: Adult Inpatient Plan of Care  Goal: Plan of Care Review  Outcome: Ongoing, Progressing  Goal: Patient-Specific Goal (Individualized)  Outcome: Ongoing, Progressing  Goal: Absence of Hospital-Acquired Illness or Injury  Outcome: Ongoing, Progressing  Goal: Optimal Comfort and Wellbeing  Outcome: Ongoing, Progressing  Goal: Readiness for Transition of Care  Outcome: Ongoing, Progressing

## 2023-06-27 NOTE — SUBJECTIVE & OBJECTIVE
Interval History: No acute events overnight. Some blood in NGT. Mild improvement in pain. Reports postnasal drip and L ear fullness. No other concerns at this time.    Review of Systems   Constitutional:  Negative for chills and fever.   Respiratory:  Negative for cough and shortness of breath.    Cardiovascular:  Negative for chest pain and palpitations.   Gastrointestinal:  Positive for abdominal pain. Negative for nausea and vomiting.   Objective:     Vital Signs (Most Recent):  Temp: 98.6 °F (37 °C) (06/26/23 2000)  Pulse: (!) 118 (06/26/23 2000)  Resp: (!) 44 (06/26/23 2000)  BP: (!) 178/79 (06/26/23 2000)  SpO2: 95 % (06/26/23 2000) Vital Signs (24h Range):  Temp:  [98 °F (36.7 °C)-99 °F (37.2 °C)] 98.6 °F (37 °C)  Pulse:  [] 118  Resp:  [13-44] 44  SpO2:  [95 %-96 %] 95 %  BP: (131-182)/(63-82) 178/79     Weight: 53 kg (116 lb 13.5 oz)  Body mass index is 19.44 kg/m².    Intake/Output Summary (Last 24 hours) at 6/26/2023 2255  Last data filed at 6/26/2023 2000  Gross per 24 hour   Intake 3123.45 ml   Output 3100 ml   Net 23.45 ml         Physical Exam  Vitals and nursing note reviewed.   Constitutional:       General: She is not in acute distress.     Appearance: She is well-developed.   HENT:      Head: Normocephalic and atraumatic.   Eyes:      General:         Right eye: No discharge.         Left eye: No discharge.      Conjunctiva/sclera: Conjunctivae normal.   Cardiovascular:      Rate and Rhythm: Normal rate.      Pulses: Normal pulses.   Pulmonary:      Effort: Pulmonary effort is normal. No respiratory distress.   Abdominal:      General: There is distension.      Palpations: Abdomen is soft.      Tenderness: There is abdominal tenderness.   Musculoskeletal:         General: Normal range of motion.      Right lower leg: No edema.      Left lower leg: No edema.   Skin:     General: Skin is warm and dry.   Neurological:      Mental Status: She is alert and oriented to person, place, and time.          Significant Labs:   CBC:  Recent Labs   Lab 06/24/23 2010 06/25/23 0523 06/26/23  0437   WBC 17.21* 17.22* 8.83   HGB 17.1* 15.3 14.7   HCT 49.7* 45.8 44.6    238 216   GRAN 78.9*  13.6* 84.0*  14.5* 78.3*  6.9   LYMPH 14.8*  2.6 7.4*  1.3 11.6*  1.0   MONO 5.5  1.0 7.7  1.3* 9.3  0.8   EOS 0.1 0.0 0.0   BASO 0.04 0.07 0.02   BMP:  Recent Labs   Lab 06/24/23 2010 06/25/23 0523 06/26/23  0437   * 137 141   K 4.1 4.0 3.3*    108 107   CO2 16* 19* 24   BUN 27* 25* 13   CREATININE 1.1 0.8 0.7   * 122* 119*   CALCIUM 11.5* 9.3 8.9   MG  --  2.1 1.9     Significant Imaging: I have reviewed and interpreted all pertinent imaging results/findings within the past 24 hours.

## 2023-06-27 NOTE — NURSING
Resumed care of pt to Rm 376. Pt AAOX 4. VSS/unchanged. Pt NGT connected to low intermittent suction. Pt placed on tele running NSR / ST.  New PIV placed and IVF infusing. Medication offered and declined by pt. POC discussed. Pt agreeable. All questions answered. Communication board updated. All safety measures I place. Bed locked and in lowest position with 2/4 side rails up. Bedside commode at bedside. Pt instructed on use of call bell and placed within reach of pt. . Pt verbalizes understanding. Belongings within reach.     Nurses Note -- 4 Eyes      6/27/2023   2:57 AM      Skin assessed during: Transfer      [x] No Altered Skin Integrity Present    []Prevention Measures Documented      [] Yes- Altered Skin Integrity Present or Discovered   [] LDA Added if Not in Epic (Describe Wound)   [] New Altered Skin Integrity was Present on Admit and Documented in LDA   [] Wound Image Taken    Wound Care Consulted? No    Attending Nurse:  Li Cheema RN     Second RN/Staff Member:  NARCISO Reynolds

## 2023-06-27 NOTE — SUBJECTIVE & OBJECTIVE
Interval History: No acute events overnight. Some blood/coffee grounds in NGT continues which was noted yesterday. Mild improvement in pain. Passed flatus but no BM, no N/V. No other concerns at this time.    Review of Systems   Constitutional:  Negative for chills and fever.   Respiratory:  Negative for cough and shortness of breath.    Cardiovascular:  Negative for chest pain and palpitations.   Gastrointestinal:  Positive for abdominal pain. Negative for nausea and vomiting.   Objective:     Vital Signs (Most Recent):  Temp: 98.3 °F (36.8 °C) (06/27/23 1145)  Pulse: 96 (06/27/23 1159)  Resp: 20 (06/27/23 1145)  BP: (!) 172/78 (06/27/23 1145)  SpO2: (!) 94 % (06/27/23 1145) Vital Signs (24h Range):  Temp:  [98 °F (36.7 °C)-100.5 °F (38.1 °C)] 98.3 °F (36.8 °C)  Pulse:  [] 96  Resp:  [13-44] 20  SpO2:  [93 %-97 %] 94 %  BP: (133-182)/(62-82) 172/78     Weight: 34 kg (74 lb 15.3 oz)  Body mass index is 12.47 kg/m².    Intake/Output Summary (Last 24 hours) at 6/27/2023 1222  Last data filed at 6/27/2023 1133  Gross per 24 hour   Intake 2807.41 ml   Output 3075 ml   Net -267.59 ml           Physical Exam  Vitals and nursing note reviewed.   Constitutional:       General: She is not in acute distress.     Appearance: She is well-developed.   HENT:      Head: Normocephalic and atraumatic.   Eyes:      General:         Right eye: No discharge.         Left eye: No discharge.      Conjunctiva/sclera: Conjunctivae normal.   Cardiovascular:      Rate and Rhythm: Normal rate.      Pulses: Normal pulses.   Pulmonary:      Effort: Pulmonary effort is normal. No respiratory distress.   Abdominal:      General: There is distension.      Palpations: Abdomen is soft.      Tenderness: There is abdominal tenderness.   Musculoskeletal:         General: Normal range of motion.      Right lower leg: No edema.      Left lower leg: No edema.   Skin:     General: Skin is warm and dry.   Neurological:      Mental Status: She is alert  and oriented to person, place, and time.         Significant Labs:   CBC:  Recent Labs   Lab 06/25/23  0523 06/26/23  0437 06/27/23  0445   WBC 17.22* 8.83 13.83*   HGB 15.3 14.7 14.3   HCT 45.8 44.6 42.9    216 195   GRAN 84.0*  14.5* 78.3*  6.9 81.2*  11.2*   LYMPH 7.4*  1.3 11.6*  1.0 10.8*  1.5   MONO 7.7  1.3* 9.3  0.8 7.5  1.0   EOS 0.0 0.0 0.0   BASO 0.07 0.02 0.02     BMP:  Recent Labs   Lab 06/25/23 0523 06/26/23 0437 06/27/23  0445    141 139   K 4.0 3.3* 3.4*    107 105   CO2 19* 24 22*   BUN 25* 13 10   CREATININE 0.8 0.7 0.7   * 119* 94   CALCIUM 9.3 8.9 9.1   MG 2.1 1.9 1.9       Significant Imaging: I have reviewed and interpreted all pertinent imaging results/findings within the past 24 hours.

## 2023-06-28 LAB
ANION GAP SERPL CALC-SCNC: 11 MMOL/L (ref 8–16)
BASOPHILS # BLD AUTO: 0.03 K/UL (ref 0–0.2)
BASOPHILS NFR BLD: 0.2 % (ref 0–1.9)
BUN SERPL-MCNC: 10 MG/DL (ref 8–23)
CALCIUM SERPL-MCNC: 8.9 MG/DL (ref 8.7–10.5)
CHLORIDE SERPL-SCNC: 105 MMOL/L (ref 95–110)
CO2 SERPL-SCNC: 21 MMOL/L (ref 23–29)
CREAT SERPL-MCNC: 0.7 MG/DL (ref 0.5–1.4)
DIFFERENTIAL METHOD: ABNORMAL
EOSINOPHIL # BLD AUTO: 0 K/UL (ref 0–0.5)
EOSINOPHIL NFR BLD: 0.2 % (ref 0–8)
ERYTHROCYTE [DISTWIDTH] IN BLOOD BY AUTOMATED COUNT: 12.8 % (ref 11.5–14.5)
EST. GFR  (NO RACE VARIABLE): >60 ML/MIN/1.73 M^2
GLUCOSE SERPL-MCNC: 85 MG/DL (ref 70–110)
HCT VFR BLD AUTO: 40 % (ref 37–48.5)
HGB BLD-MCNC: 12.9 G/DL (ref 12–16)
IMM GRANULOCYTES # BLD AUTO: 0.06 K/UL (ref 0–0.04)
IMM GRANULOCYTES NFR BLD AUTO: 0.5 % (ref 0–0.5)
LYMPHOCYTES # BLD AUTO: 1.1 K/UL (ref 1–4.8)
LYMPHOCYTES NFR BLD: 8.6 % (ref 18–48)
MAGNESIUM SERPL-MCNC: 2 MG/DL (ref 1.6–2.6)
MCH RBC QN AUTO: 30.2 PG (ref 27–31)
MCHC RBC AUTO-ENTMCNC: 32.3 G/DL (ref 32–36)
MCV RBC AUTO: 94 FL (ref 82–98)
MONOCYTES # BLD AUTO: 1 K/UL (ref 0.3–1)
MONOCYTES NFR BLD: 8.1 % (ref 4–15)
NEUTROPHILS # BLD AUTO: 10.2 K/UL (ref 1.8–7.7)
NEUTROPHILS NFR BLD: 82.4 % (ref 38–73)
NRBC BLD-RTO: 0 /100 WBC
PLATELET # BLD AUTO: 188 K/UL (ref 150–450)
PMV BLD AUTO: 9.1 FL (ref 9.2–12.9)
POTASSIUM SERPL-SCNC: 4.2 MMOL/L (ref 3.5–5.1)
RBC # BLD AUTO: 4.27 M/UL (ref 4–5.4)
SODIUM SERPL-SCNC: 137 MMOL/L (ref 136–145)
WBC # BLD AUTO: 12.39 K/UL (ref 3.9–12.7)

## 2023-06-28 PROCEDURE — 99231 PR SUBSEQUENT HOSPITAL CARE,LEVL I: ICD-10-PCS | Mod: ,,, | Performed by: SPECIALIST

## 2023-06-28 PROCEDURE — 97535 SELF CARE MNGMENT TRAINING: CPT

## 2023-06-28 PROCEDURE — 97530 THERAPEUTIC ACTIVITIES: CPT

## 2023-06-28 PROCEDURE — 85025 COMPLETE CBC W/AUTO DIFF WBC: CPT | Performed by: HOSPITALIST

## 2023-06-28 PROCEDURE — 94761 N-INVAS EAR/PLS OXIMETRY MLT: CPT

## 2023-06-28 PROCEDURE — 97116 GAIT TRAINING THERAPY: CPT

## 2023-06-28 PROCEDURE — 25000003 PHARM REV CODE 250: Performed by: PHYSICIAN ASSISTANT

## 2023-06-28 PROCEDURE — 25000003 PHARM REV CODE 250: Performed by: INTERNAL MEDICINE

## 2023-06-28 PROCEDURE — 99231 SBSQ HOSP IP/OBS SF/LOW 25: CPT | Mod: ,,, | Performed by: SPECIALIST

## 2023-06-28 PROCEDURE — 21400001 HC TELEMETRY ROOM

## 2023-06-28 PROCEDURE — 97110 THERAPEUTIC EXERCISES: CPT

## 2023-06-28 PROCEDURE — 99233 PR SUBSEQUENT HOSPITAL CARE,LEVL III: ICD-10-PCS | Mod: ,,, | Performed by: HOSPITALIST

## 2023-06-28 PROCEDURE — 80048 BASIC METABOLIC PNL TOTAL CA: CPT | Performed by: HOSPITALIST

## 2023-06-28 PROCEDURE — 99233 SBSQ HOSP IP/OBS HIGH 50: CPT | Mod: ,,, | Performed by: HOSPITALIST

## 2023-06-28 PROCEDURE — 83735 ASSAY OF MAGNESIUM: CPT | Performed by: HOSPITALIST

## 2023-06-28 PROCEDURE — 63600175 PHARM REV CODE 636 W HCPCS: Performed by: HOSPITALIST

## 2023-06-28 PROCEDURE — 63600175 PHARM REV CODE 636 W HCPCS: Performed by: INTERNAL MEDICINE

## 2023-06-28 PROCEDURE — 36415 COLL VENOUS BLD VENIPUNCTURE: CPT | Performed by: HOSPITALIST

## 2023-06-28 PROCEDURE — C9113 INJ PANTOPRAZOLE SODIUM, VIA: HCPCS | Performed by: HOSPITALIST

## 2023-06-28 RX ADMIN — MUPIROCIN: 20 OINTMENT TOPICAL at 09:06

## 2023-06-28 RX ADMIN — MORPHINE SULFATE 2 MG: 2 INJECTION, SOLUTION INTRAMUSCULAR; INTRAVENOUS at 02:06

## 2023-06-28 RX ADMIN — PANTOPRAZOLE SODIUM 40 MG: 40 INJECTION, POWDER, LYOPHILIZED, FOR SOLUTION INTRAVENOUS at 09:06

## 2023-06-28 RX ADMIN — FLUTICASONE PROPIONATE 100 MCG: 50 SPRAY, METERED NASAL at 09:06

## 2023-06-28 RX ADMIN — MORPHINE SULFATE 2 MG: 2 INJECTION, SOLUTION INTRAMUSCULAR; INTRAVENOUS at 09:06

## 2023-06-28 RX ADMIN — SODIUM CHLORIDE: 9 INJECTION, SOLUTION INTRAVENOUS at 03:06

## 2023-06-28 RX ADMIN — CETIRIZINE HYDROCHLORIDE 10 MG: 10 TABLET, FILM COATED ORAL at 09:06

## 2023-06-28 RX ADMIN — MORPHINE SULFATE 2 MG: 2 INJECTION, SOLUTION INTRAMUSCULAR; INTRAVENOUS at 12:06

## 2023-06-28 NOTE — NURSING
Removed NG at 1520. Pt tolerated well and educated by nurse and MD about only ice chips and sips of water.

## 2023-06-28 NOTE — PHYSICIAN QUERY
"PT Name: Marian Durham  MR #: [id number]     DOCUMENTATION CLARIFICATION     CDS/: Mary Horton RN, CDS               Contact information: [email or web address]    This form is a permanent document in the medical record.     Query Date: June 28, 2023    By submitting this query, we are merely seeking further clarification of documentation to reflect the severity of illness of your patient. Please utilize your independent clinical judgment when addressing the question(s) below.    The medical record reflects the following:     Indicators   Supporting Clinical Findings Location in Medical Record   x Bowel obstruction, intestinal obstruction, LBO or SBO documented Principal Problem:SBO (small bowel obstruction) H&P 6/25    x Radiology findings Impression:     Significant small-bowel obstruction with the transition point in the left lower abdomen.   CT Abdomen 6/24   x Treatment/Medication NG tube placed ED  NPO    patient had bowel movement yesterday so encouraging she may resolve this obstruction on her own    KUB with improvement today. Will clamp NG and monitor H&P 6/25       Surgery Consult 6/25      Care Update 6/27     Procedure/Surgery     x Other 77 y.o. female, with PMH of prior SBO, hysterectomy, who presented to [facility] ED on 6/24/23 due to diffuse upper abdominal pain x 4 hours. She notes associated nausea, chills, bloating. She states her last BM was earlier this morning. She states this feels like last time when she had a "twisted bowel" ten months ago.    Abdominal:      General: Bowel sounds are normal. There is distension.      Palpations: Abdomen is soft. There is no mass.      Tenderness: There is no abdominal tenderness. There is no guarding.     Past Surgical History:  HYSTERECTOMY   1985    HEATHER       H&P 6/25             H&P 6/25             Surgery Consult 6/25     Provider, please further specify the bowel obstruction diagnosis:  [   ] Partial or incomplete intestinal obstruction, " due to known or suspected etiology (please specify): ____________   [ x  ] Partial or incomplete intestinal obstruction, unknown or unspecified etiology   [   ] Complete intestinal obstruction, due to known or suspected etiology (please specify): ____________   [   ] Complete intestinal obstruction, unknown or unspecified etiology   [   ] Other intestinal condition (please specify): _____________________   [   ] Clinically Undetermined           Please document in your progress notes daily for the duration of treatment until resolved, and include in your discharge summary.

## 2023-06-28 NOTE — PLAN OF CARE
AAOX4. VSS. NPO. NG tube removed and tolerated well. Allowed ice chips. Given pain medication twice on shift. Up to bedside commode with stand by assist and walker. Up with PT and walked around room. Ice packs for knees. Up in chair. No BM but passing gas. Pt concerns with abdominal pain and nasal congestion with ears clogged up. MD aware and spoke with pt. PT cathi came and helped with swollen knee with repositioning and ice with ace wrap. On room air. Call light in reach, tolerated medications well, non slip socks on, and instructed to call staff for mobility stand by assist. No falls or injuries on shift. Safety maintained throughout shift. Continue to monitor.         Problem: Adult Inpatient Plan of Care  Goal: Plan of Care Review  Outcome: Ongoing, Progressing  Goal: Patient-Specific Goal (Individualized)  Outcome: Ongoing, Progressing  Goal: Absence of Hospital-Acquired Illness or Injury  Outcome: Ongoing, Progressing  Goal: Optimal Comfort and Wellbeing  Outcome: Ongoing, Progressing  Goal: Readiness for Transition of Care  Outcome: Ongoing, Progressing

## 2023-06-28 NOTE — SUBJECTIVE & OBJECTIVE
Interval History: No acute events overnight. Tolerated clamping of NG, Mild improvement in pain. Passed flatus but no BM, no N/V. No other concerns at this time.    Review of Systems   Constitutional:  Negative for chills and fever.   Respiratory:  Negative for cough and shortness of breath.    Cardiovascular:  Negative for chest pain and palpitations.   Gastrointestinal:  Positive for abdominal pain. Negative for nausea and vomiting.   Objective:     Vital Signs (Most Recent):  Temp: 98.4 °F (36.9 °C) (06/28/23 1115)  Pulse: 96 (06/28/23 1200)  Resp: 16 (06/28/23 1115)  BP: (!) 146/73 (06/28/23 1115)  SpO2: 96 % (06/28/23 1115) Vital Signs (24h Range):  Temp:  [97.8 °F (36.6 °C)-99.6 °F (37.6 °C)] 98.4 °F (36.9 °C)  Pulse:  [] 96  Resp:  [14-20] 16  SpO2:  [95 %-96 %] 96 %  BP: (146-175)/(73-85) 146/73     Weight: 54.6 kg (120 lb 5.9 oz)  Body mass index is 20.03 kg/m².    Intake/Output Summary (Last 24 hours) at 6/28/2023 1356  Last data filed at 6/28/2023 1314  Gross per 24 hour   Intake 1250 ml   Output 1120 ml   Net 130 ml           Physical Exam  Vitals and nursing note reviewed.   Constitutional:       General: She is not in acute distress.     Appearance: She is well-developed.   HENT:      Head: Normocephalic and atraumatic.   Eyes:      General:         Right eye: No discharge.         Left eye: No discharge.      Conjunctiva/sclera: Conjunctivae normal.   Cardiovascular:      Rate and Rhythm: Normal rate.      Pulses: Normal pulses.   Pulmonary:      Effort: Pulmonary effort is normal. No respiratory distress.   Abdominal:      General: There is distension.      Palpations: Abdomen is soft.      Tenderness: There is abdominal tenderness.   Musculoskeletal:         General: Normal range of motion.      Right lower leg: No edema.      Left lower leg: No edema.   Skin:     General: Skin is warm and dry.   Neurological:      Mental Status: She is alert and oriented to person, place, and time.          Significant Labs:   CBC:  Recent Labs   Lab 06/27/23  0445 06/27/23  1400 06/28/23  0747   WBC 13.83* 14.89* 12.39   HGB 14.3 13.5 12.9   HCT 42.9 41.6 40.0    202 188   GRAN 81.2*  11.2* 82.2*  12.3* 82.4*  10.2*   LYMPH 10.8*  1.5 8.7*  1.3 8.6*  1.1   MONO 7.5  1.0 8.3  1.2* 8.1  1.0   EOS 0.0 0.0 0.0   BASO 0.02 0.04 0.03     BMP:  Recent Labs   Lab 06/26/23  0437 06/27/23  0445 06/28/23  0747    139 137   K 3.3* 3.4* 4.2    105 105   CO2 24 22* 21*   BUN 13 10 10   CREATININE 0.7 0.7 0.7   * 94 85   CALCIUM 8.9 9.1 8.9   MG 1.9 1.9 2.0       Significant Imaging: I have reviewed and interpreted all pertinent imaging results/findings within the past 24 hours.

## 2023-06-28 NOTE — PROGRESS NOTES
House day 5.  Diagnosis-small-bowel obstruction, status post hysterectomy in the distant past.    Subjective   Passing small amount of flatus  No bowel movement  Denies pain, nausea, bloating    PE  HEENT-NG in place, clamped for 24 hours  Abdomen-soft, nontender, mild distention, no guarding, no rebound    KUB   Improvement in dilatation of small-bowel loops, air in proximal colon        Impression/plan   Continues to improve, not yet ready to eat  DC NG tube  Serial exam

## 2023-06-28 NOTE — PT/OT/SLP PROGRESS
Physical Therapy Treatment    Patient Name:  Marian Durham   MRN:  [id number]    Recommendations:     Discharge Recommendations: outpatient PT  Discharge Equipment Recommendations: none  Barriers to discharge:  medical treatment    Assessment:     Marian Durham is a 77 y.o. female admitted with a medical diagnosis of SBO (small bowel obstruction).  She presents with the following impairments/functional limitations: weakness, impaired endurance, impaired functional mobility, gait instability, impaired balance, impaired self care skills, decreased safety awareness, decreased lower extremity function, pain, impaired joint extensibility.    Pt tolerated treatment well with no adverse reactions. Pt is progressing toward meeting goals. Pt performed hallway ambulation with RW and assistance. Demonstrated improved tolerance to out of bed mobility. Pt continues to be limited by bilateral knee pain and left hamstring tightness. PT will continue to follow and progress goals as tolerated. Recommend discharge to home with outpatient PT.     Rehab Prognosis: Good; patient would benefit from acute skilled PT services to address these deficits and reach maximum level of function.    Recent Surgery: * No surgery found *      Plan:     During this hospitalization, patient to be seen 2 x/week to address the identified rehab impairments via gait training, therapeutic activities, therapeutic exercises, neuromuscular re-education and progress toward the following goals:    Plan of Care Expires:  07/10/23    Subjective     Chief Complaint: left hamstring tightness / weakness  Patient/Family Comments/goals: Pt agreeable to therapy session; pt noting she feels like she is getting weaker and weaker  Pain/Comfort:  Pain Rating 1:  (bilateral knee pain, R shoulder pain - did not rate)  Pain Addressed 1: Distraction, Reposition (cryotherapy to bilateral knees)  Pain Rating Post-Intervention 1:  (reported improvement in pain after  ambulation)      Objective:     Communicated with CARLOS Anderson prior to session.  Patient found HOB elevated with peripheral IV, NG tube, telemetry upon PT entry to room.     General Precautions: Standard, fall  Orthopedic Precautions: N/A  Braces: N/A  Respiratory Status: Room air     Functional Mobility:  Bed Mobility:     Supine to Sit: modified independence  Transfers:     Sit to Stand:  stand by assistance with rolling walker   Three with stand by assistance - one from EOB, one from couch and one from BSC.   Once with modified independence with rolling walker while PT was out of room.   Gait: x750 feet with contact guard assistance with rolling walker; pt eventually progressing to stand by assistance with rolling walker. Pt with decreased speed, gonzalo, and bilateral step length. Decreased stability, however, no LOB noted. Pt self-adjusting posture multiple times during ambulation trial. Two standing rest breaks taken. Pt with genu-valgus noted to bilateral lower extremities.      AM-PAC 6 CLICK MOBILITY  Turning over in bed (including adjusting bedclothes, sheets and blankets)?: 3  Sitting down on and standing up from a chair with arms (e.g., wheelchair, bedside commode, etc.): 3  Moving from lying on back to sitting on the side of the bed?: 3  Moving to and from a bed to a chair (including a wheelchair)?: 3  Need to walk in hospital room?: 3  Climbing 3-5 steps with a railing?: 3  Basic Mobility Total Score: 18     Treatment & Education:  Bed mobility, transfer, and gait training as described above.  Upon standing, pt performed left hamstring stretches while standing with RW.   Pt ambulated from bedside recliner to bedside commode with modified independence while PT was out of room secondary to urinary urgency.  PT entered room and pt was seated on [facility] with floor covered in urine. Pt's gown changed and floor cleaned appropriately.   Pt stood with SBA with RW while PT provided perineal hygiene with total  assistance.   Pt then ambulated back to recliner with modified independence with RW.     PT educated patient re:   PT plan of care/role of PT  Use of RW  Fall and safety precautions  Gait deviations  Use of call light (don't get up without assistance)  Pt verbalized understanding     The patient is safe to transfer with RN assist  Patient encouraged to sit up in chair throughout the day to prevent deconditioning.     Patient left up in chair with all lines intact, call button in reach, and PCT notified.    GOALS:   Multidisciplinary Problems       Physical Therapy Goals          Problem: Physical Therapy    Goal Priority Disciplines Outcome Goal Variances Interventions   Physical Therapy Goal     PT, PT/OT Ongoing, Progressing     Description: Goals to be met by: 7/10/23    Patient will increase functional independence with mobility by performin. Sit<>stand with supervision with LRAD.  2. Gait x 300 feet with supervision with LRAD.  3. Ascend/descend 5 step(s) with least restrictive assistive device and uni HR with SBA.   4. Standing HEP with supervision with appropriate UE support and no LOB x3 min.                       Time Tracking:     PT Received On: 23  PT Start Time: 930     PT Stop Time: 1009  PT Total Time (min): 39 min     Billable Minutes: Gait Training 29 and Therapeutic Activity 10    Treatment Type: Treatment  PT/PTA: PT     Number of PTA visits since last PT visit: 0     2023

## 2023-06-28 NOTE — PT/OT/SLP PROGRESS
Physical Therapy Treatment    Patient Name:  Marian Durham   MRN:  [id number]    Recommendations:     Discharge Recommendations: outpatient PT  Discharge Equipment Recommendations: none  Barriers to discharge: None    Assessment:     Marian Durham is a 77 y.o. female admitted with a medical diagnosis of SBO (small bowel obstruction).  She presents with the following impairments/functional limitations: impaired self care skills, impaired functional mobility, gait instability, impaired balance, weakness, pain, impaired joint extensibility, decreased lower extremity function, decreased ROM.    Pt had new c/o of L knee pain. Performed some TE and mobilizations to L knee to facilitate continued ambulation with nurses. Pt reported less pain and increased functional ability during ambulation post interventions. Also applied ace wrap and ice to L knee. Recommend pt d/c to OP PT to further address impairments listed above.     Rehab Prognosis: Good; patient would benefit from acute skilled PT services to address these deficits and reach maximum level of function.    Recent Surgery: * No surgery found *      Plan:     During this hospitalization, patient to be seen 2 x/week to address the identified rehab impairments via gait training, therapeutic activities, therapeutic exercises, neuromuscular re-education and progress toward the following goals:    Plan of Care Expires:  07/10/23    Subjective     Chief Complaint: L knee pain  Patient/Family Comments/goals: Pt consented to treatment. Patient's goal was to walk with less   Pain/Comfort:  Pain Rating 1: other (see comments) (c/o L knee pain)      Objective:     Communicated with nurse prior to session.  Patient found up in chair with peripheral IV, telemetry upon PT entry to room.     General Precautions: Standard, fall  Orthopedic Precautions: N/A  Braces: N/A  Respiratory Status: Room air     Patient donned shoes and gait belt for OOB mobility.      Functional  "Mobility:  Transfers:     Sit to Stand:  modified independence and  with rolling walker  Gait: Pt ambulated around the room 20 feet with supervision after some TE and mobilizations to knee to reassess pain after interventions. Pt walked with out-toeing gait and decreased step length but reported less pain and walking to be "easier."       AM-PAC 6 CLICK MOBILITY  Turning over in bed (including adjusting bedclothes, sheets and blankets)?: 4  Sitting down on and standing up from a chair with arms (e.g., wheelchair, bedside commode, etc.): 4  Moving from lying on back to sitting on the side of the bed?: 4  Moving to and from a bed to a chair (including a wheelchair)?: 4  Need to walk in hospital room?: 4  Climbing 3-5 steps with a railing?: 3  Basic Mobility Total Score: 23       Treatment & Education:  PT educated patient:   PT plan of care/role of PT  Safety with OOB mobility  Pt verbalized understanding   TA: See transfers and gait above.  TE:   Performed patellar mobilizations for 5 minutes.  LAQs of BLE for 1x10.   Heel slides of BLE for 1x10      Patient left up in chair with all lines intact, call button in reach, and nurse notified..    GOALS:   Multidisciplinary Problems       Physical Therapy Goals          Problem: Physical Therapy    Goal Priority Disciplines Outcome Goal Variances Interventions   Physical Therapy Goal     PT, PT/OT Ongoing, Progressing     Description: Goals to be met by: 7/10/23    Patient will increase functional independence with mobility by performin. Sit<>stand with supervision with LRAD.  2. Gait x 300 feet with supervision with LRAD.  3. Ascend/descend 5 step(s) with least restrictive assistive device and uni HR with SBA.   4. Standing HEP with supervision with appropriate UE support and no LOB x3 min.                       Time Tracking:     PT Received On: 23  PT Start Time: 172     PT Stop Time: 174  PT Total Time (min): 23 min     Billable Minutes: Therapeutic " Activity 10 and Therapeutic Exercise 13       PT/PTA: PT     Number of PTA visits since last PT visit: 0     06/28/2023

## 2023-06-28 NOTE — PROGRESS NOTES
"[facility] [address] Medicine  Progress Note    Patient Name: Marian Durham  MRN: [id number]  Patient Class: IP- Inpatient   Admission Date: 6/24/2023  Length of Stay: 4 days  Attending Physician: Yandy Horne MD  Primary Care Provider: Suzanna Duran MD        Subjective:     Principal Problem:SBO (small bowel obstruction)        HPI:  Ms. Marian Durham is a 77 y.o. female, with PMH of prior SBO, hysterectomy, who presented to [facility] ED on 6/24/23 due to diffuse upper abdominal pain x 4 hours. She notes associated nausea, chills, bloating. She states her last BM was earlier this morning. She states this feels like last time when she had a "twisted bowel" ten months ago. She states her last bowel obstruction resolved 8 days after having an NG tube placed. She was evaluated in the ED with labs that showed leukocytosis of 17 K, with left shift.  She had concentrated H&H.  A metabolic panel showed elevated anion gap of 18, with CO2 of 16, and glucose of 146.  BUN was elevated at 27 creatinine was 1.1.  UA was dilute with 3+ ketones.  CT of the abdomen and pelvis showed small-bowel obstruction with transition point in the left lower abdomen, and trace right pleural effusion.  An NG-tube was placed.  She is admitted to inpatient status.      Overview/Hospital Course:  Admitted with SBO. NGT placed and surgery consulted. Started IVFs and symptom control.      Interval History: No acute events overnight. Tolerated clamping of NG, Mild improvement in pain. Passed flatus but no BM, no N/V. No other concerns at this time.    Review of Systems   Constitutional:  Negative for chills and fever.   Respiratory:  Negative for cough and shortness of breath.    Cardiovascular:  Negative for chest pain and palpitations.   Gastrointestinal:  Positive for abdominal pain. Negative for nausea and vomiting.   Objective:     Vital Signs (Most Recent):  Temp: 98.4 °F (36.9 °C) (06/28/23 1115)  Pulse: 96 " (06/28/23 1200)  Resp: 16 (06/28/23 1115)  BP: (!) 146/73 (06/28/23 1115)  SpO2: 96 % (06/28/23 1115) Vital Signs (24h Range):  Temp:  [97.8 °F (36.6 °C)-99.6 °F (37.6 °C)] 98.4 °F (36.9 °C)  Pulse:  [] 96  Resp:  [14-20] 16  SpO2:  [95 %-96 %] 96 %  BP: (146-175)/(73-85) 146/73     Weight: 54.6 kg (120 lb 5.9 oz)  Body mass index is 20.03 kg/m².    Intake/Output Summary (Last 24 hours) at 6/28/2023 1356  Last data filed at 6/28/2023 1314  Gross per 24 hour   Intake 1250 ml   Output 1120 ml   Net 130 ml           Physical Exam  Vitals and nursing note reviewed.   Constitutional:       General: She is not in acute distress.     Appearance: She is well-developed.   HENT:      Head: Normocephalic and atraumatic.   Eyes:      General:         Right eye: No discharge.         Left eye: No discharge.      Conjunctiva/sclera: Conjunctivae normal.   Cardiovascular:      Rate and Rhythm: Normal rate.      Pulses: Normal pulses.   Pulmonary:      Effort: Pulmonary effort is normal. No respiratory distress.   Abdominal:      General: There is distension.      Palpations: Abdomen is soft.      Tenderness: There is abdominal tenderness.   Musculoskeletal:         General: Normal range of motion.      Right lower leg: No edema.      Left lower leg: No edema.   Skin:     General: Skin is warm and dry.   Neurological:      Mental Status: She is alert and oriented to person, place, and time.         Significant Labs:   CBC:  Recent Labs   Lab 06/27/23  0445 06/27/23  1400 06/28/23  0747   WBC 13.83* 14.89* 12.39   HGB 14.3 13.5 12.9   HCT 42.9 41.6 40.0    202 188   GRAN 81.2*  11.2* 82.2*  12.3* 82.4*  10.2*   LYMPH 10.8*  1.5 8.7*  1.3 8.6*  1.1   MONO 7.5  1.0 8.3  1.2* 8.1  1.0   EOS 0.0 0.0 0.0   BASO 0.02 0.04 0.03     BMP:  Recent Labs   Lab 06/26/23  0437 06/27/23  0445 06/28/23  0747    139 137   K 3.3* 3.4* 4.2    105 105   CO2 24 22* 21*   BUN 13 10 10   CREATININE 0.7 0.7 0.7   *  94 85   CALCIUM 8.9 9.1 8.9   MG 1.9 1.9 2.0       Significant Imaging: I have reviewed and interpreted all pertinent imaging results/findings within the past 24 hours.      Assessment/Plan:      * SBO (small bowel obstruction)  Possible GIB  - SBO with h/o same in 04/2022, CT with transition point in LLQ.  - NGT in place to low intermittent suction; NPO.  - Continue IVFs, pain, nausea control.  - Surgery consulted; appreciate assistance.  - Patient with continued coffee ground noted in NG that was initially seen 2 days ago, but H/H without significant drop  - Continue IV PPI Q12   - Repeat KUB better today and patient tolerated clamping of NG yesterday  - Discussed with Surgery, will d/c NG and monitor    Elevated blood pressure reading  - Likely related to pain with SBO.  - Continue hydralazine 10mg IV q6hr PRN for SBP > 180, DBP > 100.  - Monitor.    Hypokalemia  - Replete PRN.      VTE Risk Mitigation (From admission, onward)         Ordered     IP VTE HIGH RISK PATIENT  Once         06/25/23 0028     Place sequential compression device  Until discontinued         06/25/23 0028                  Yandy Horne MD  Department of Hospital Medicine   [facility] ([address])

## 2023-06-28 NOTE — ASSESSMENT & PLAN NOTE
Possible GIB  - SBO with h/o same in 04/2022, CT with transition point in LLQ.  - NGT in place to low intermittent suction; NPO.  - Continue IVFs, pain, nausea control.  - Surgery consulted; appreciate assistance.  - Patient with continued coffee ground noted in NG that was initially seen 2 days ago, but H/H without significant drop  - Continue IV PPI Q12   - Repeat KUB better today and patient tolerated clamping of NG yesterday  - Discussed with Surgery, will d/c NG and monitor

## 2023-06-28 NOTE — PT/OT/SLP PROGRESS
Occupational Therapy   Treatment    Name: Marian Durham  MRN: [id number]  Admitting Diagnosis:  SBO (small bowel obstruction)       Recommendations:     Discharge Recommendations: outpatient PT  Discharge Equipment Recommendations:  none  Barriers to discharge:  Decreased caregiver support (Current functional level)    Assessment:     Marian Durham is a 77 y.o. female with a medical diagnosis of SBO (small bowel obstruction).  Performance deficits affecting function are impaired endurance, impaired self care skills, impaired functional mobility, gait instability, impaired balance, weakness, pain, impaired joint extensibility, decreased lower extremity function.     Rehab Prognosis:  Good; patient would benefit from acute skilled OT services to address these deficits and reach maximum level of function.       Plan:     Patient to be seen 3 x/week to address the above listed problems via self-care/home management, therapeutic activities  Plan of Care Expires: 07/26/23  Plan of Care Reviewed with: patient    Subjective     Chief Complaint: Pt had several complaints mostly to do with her knees.  Patient/Family Comments/goals: Pt stating she will have friends to assist her at home.  Pain/Comfort:  Pain Rating 1:  (Bilateral Knee pain, shoulder pain. Pt did not rate pain but using ice packs on bilateral knees for assist with pain management.)    Objective:     Communicated with: nurse prior to session.  Patient found up in chair with peripheral IV, NG tube, telemetry upon OT entry to room.    General Precautions: Standard, fall    Orthopedic Precautions:N/A  Braces: N/A  Respiratory Status: Room air     Occupational Performance:     Bed Mobility:    NT     Functional Mobility/Transfers:  Sit to stand: Mod Indep  with RW  Chair transfers: Supervision with RW  Functional Mobility: No LOB, pt needs cues to slow pace, pt reporting she is walking with her toes pointed outward and taking smaller steps due to it being less  painful and easier for her to walk    Activities of Daily Living:  Grooming: Supervision standing at sink  LB dressing: Supervision  Toileting: Supervision using [facility]      AMPA 6 Click ADL: 19    Treatment & Education:  Role of OT, POC, safety with ADL mobility    Patient left up in chair with all lines intact, call button in reach, and nurse notified    GOALS:   Multidisciplinary Problems       Occupational Therapy Goals          Problem: Occupational Therapy    Goal Priority Disciplines Outcome Interventions   Occupational Therapy Goal     OT, PT/OT Ongoing, Progressing    Description: Goals to be met by: 7/15/23     Patient will increase functional independence with ADLs by performing:    UB dressing at Supervision level.  LB dressing at Supervision level.  Toileting at Supervision level.  MET 6/28/2023   Toilet/Chair transfers at Supervision level.  MET 6/28/2023  Grooming at sink at Supervision level.  MET 6/28/2023   Sponge bathing at Set up/Supervision level.                         Time Tracking:     OT Date of Treatment: 06/28/23  OT Start Time: 1133  OT Stop Time: 1150  OT Total Time (min): 17 min    Billable Minutes:Self Care/Home Management 17    OT/VIVIAN: OT          6/28/2023

## 2023-06-29 LAB
ANION GAP SERPL CALC-SCNC: 13 MMOL/L (ref 8–16)
BASOPHILS # BLD AUTO: 0.04 K/UL (ref 0–0.2)
BASOPHILS NFR BLD: 0.4 % (ref 0–1.9)
BUN SERPL-MCNC: 10 MG/DL (ref 8–23)
CALCIUM SERPL-MCNC: 8.6 MG/DL (ref 8.7–10.5)
CHLORIDE SERPL-SCNC: 106 MMOL/L (ref 95–110)
CO2 SERPL-SCNC: 18 MMOL/L (ref 23–29)
CREAT SERPL-MCNC: 0.7 MG/DL (ref 0.5–1.4)
DIFFERENTIAL METHOD: ABNORMAL
EOSINOPHIL # BLD AUTO: 0.1 K/UL (ref 0–0.5)
EOSINOPHIL NFR BLD: 0.9 % (ref 0–8)
ERYTHROCYTE [DISTWIDTH] IN BLOOD BY AUTOMATED COUNT: 12.7 % (ref 11.5–14.5)
EST. GFR  (NO RACE VARIABLE): >60 ML/MIN/1.73 M^2
GLUCOSE SERPL-MCNC: 80 MG/DL (ref 70–110)
HCT VFR BLD AUTO: 35.5 % (ref 37–48.5)
HGB BLD-MCNC: 11.6 G/DL (ref 12–16)
IMM GRANULOCYTES # BLD AUTO: 0.04 K/UL (ref 0–0.04)
IMM GRANULOCYTES NFR BLD AUTO: 0.4 % (ref 0–0.5)
LYMPHOCYTES # BLD AUTO: 1.3 K/UL (ref 1–4.8)
LYMPHOCYTES NFR BLD: 13.5 % (ref 18–48)
MAGNESIUM SERPL-MCNC: 1.9 MG/DL (ref 1.6–2.6)
MCH RBC QN AUTO: 30.1 PG (ref 27–31)
MCHC RBC AUTO-ENTMCNC: 32.7 G/DL (ref 32–36)
MCV RBC AUTO: 92 FL (ref 82–98)
MONOCYTES # BLD AUTO: 1.2 K/UL (ref 0.3–1)
MONOCYTES NFR BLD: 12.1 % (ref 4–15)
NEUTROPHILS # BLD AUTO: 7.1 K/UL (ref 1.8–7.7)
NEUTROPHILS NFR BLD: 72.7 % (ref 38–73)
NRBC BLD-RTO: 0 /100 WBC
PHOSPHATE SERPL-MCNC: 2.2 MG/DL (ref 2.7–4.5)
PLATELET # BLD AUTO: 175 K/UL (ref 150–450)
PMV BLD AUTO: 9.1 FL (ref 9.2–12.9)
POTASSIUM SERPL-SCNC: 3.3 MMOL/L (ref 3.5–5.1)
RBC # BLD AUTO: 3.85 M/UL (ref 4–5.4)
SODIUM SERPL-SCNC: 137 MMOL/L (ref 136–145)
WBC # BLD AUTO: 9.75 K/UL (ref 3.9–12.7)

## 2023-06-29 PROCEDURE — C9113 INJ PANTOPRAZOLE SODIUM, VIA: HCPCS | Performed by: HOSPITALIST

## 2023-06-29 PROCEDURE — 99233 SBSQ HOSP IP/OBS HIGH 50: CPT | Mod: ,,, | Performed by: HOSPITALIST

## 2023-06-29 PROCEDURE — 80048 BASIC METABOLIC PNL TOTAL CA: CPT | Performed by: HOSPITALIST

## 2023-06-29 PROCEDURE — 94761 N-INVAS EAR/PLS OXIMETRY MLT: CPT

## 2023-06-29 PROCEDURE — 97535 SELF CARE MNGMENT TRAINING: CPT | Mod: CO

## 2023-06-29 PROCEDURE — A4216 STERILE WATER/SALINE, 10 ML: HCPCS | Performed by: INTERNAL MEDICINE

## 2023-06-29 PROCEDURE — 25000003 PHARM REV CODE 250: Performed by: INTERNAL MEDICINE

## 2023-06-29 PROCEDURE — 21400001 HC TELEMETRY ROOM

## 2023-06-29 PROCEDURE — 99233 PR SUBSEQUENT HOSPITAL CARE,LEVL III: ICD-10-PCS | Mod: ,,, | Performed by: HOSPITALIST

## 2023-06-29 PROCEDURE — 85025 COMPLETE CBC W/AUTO DIFF WBC: CPT | Performed by: HOSPITALIST

## 2023-06-29 PROCEDURE — 84100 ASSAY OF PHOSPHORUS: CPT | Performed by: HOSPITALIST

## 2023-06-29 PROCEDURE — 25000003 PHARM REV CODE 250: Performed by: PHYSICIAN ASSISTANT

## 2023-06-29 PROCEDURE — 36415 COLL VENOUS BLD VENIPUNCTURE: CPT | Performed by: HOSPITALIST

## 2023-06-29 PROCEDURE — 83735 ASSAY OF MAGNESIUM: CPT | Performed by: HOSPITALIST

## 2023-06-29 PROCEDURE — 63600175 PHARM REV CODE 636 W HCPCS: Performed by: HOSPITALIST

## 2023-06-29 RX ORDER — POTASSIUM CHLORIDE 7.45 MG/ML
10 INJECTION INTRAVENOUS ONCE
Status: COMPLETED | OUTPATIENT
Start: 2023-06-29 | End: 2023-06-29

## 2023-06-29 RX ORDER — POTASSIUM CHLORIDE 7.45 MG/ML
10 INJECTION INTRAVENOUS
Status: DISPENSED | OUTPATIENT
Start: 2023-06-29 | End: 2023-06-29

## 2023-06-29 RX ADMIN — FLUTICASONE PROPIONATE 100 MCG: 50 SPRAY, METERED NASAL at 08:06

## 2023-06-29 RX ADMIN — Medication 6 MG: at 08:06

## 2023-06-29 RX ADMIN — MUPIROCIN: 20 OINTMENT TOPICAL at 08:06

## 2023-06-29 RX ADMIN — POTASSIUM CHLORIDE 10 MEQ: 7.46 INJECTION, SOLUTION INTRAVENOUS at 01:06

## 2023-06-29 RX ADMIN — SODIUM CHLORIDE: 9 INJECTION, SOLUTION INTRAVENOUS at 05:06

## 2023-06-29 RX ADMIN — PANTOPRAZOLE SODIUM 40 MG: 40 INJECTION, POWDER, LYOPHILIZED, FOR SOLUTION INTRAVENOUS at 08:06

## 2023-06-29 RX ADMIN — POTASSIUM CHLORIDE 10 MEQ: 7.46 INJECTION, SOLUTION INTRAVENOUS at 08:06

## 2023-06-29 RX ADMIN — CETIRIZINE HYDROCHLORIDE 10 MG: 10 TABLET, FILM COATED ORAL at 08:06

## 2023-06-29 RX ADMIN — Medication 10 ML: at 01:06

## 2023-06-29 NOTE — PROGRESS NOTES
"[facility] Surg [address] Medicine  Progress Note    Patient Name: Marian Durham  MRN: [id number]  Patient Class: IP- Inpatient   Admission Date: 6/24/2023  Length of Stay: 5 days  Attending Physician: Yandy Horne MD  Primary Care Provider: Suzanna Duran MD        Subjective:     Principal Problem:SBO (small bowel obstruction)        HPI:  Ms. Marian Durham is a 77 y.o. female, with PMH of prior SBO, hysterectomy, who presented to [facility] ED on 6/24/23 due to diffuse upper abdominal pain x 4 hours. She notes associated nausea, chills, bloating. She states her last BM was earlier this morning. She states this feels like last time when she had a "twisted bowel" ten months ago. She states her last bowel obstruction resolved 8 days after having an NG tube placed. She was evaluated in the ED with labs that showed leukocytosis of 17 K, with left shift.  She had concentrated H&H.  A metabolic panel showed elevated anion gap of 18, with CO2 of 16, and glucose of 146.  BUN was elevated at 27 creatinine was 1.1.  UA was dilute with 3+ ketones.  CT of the abdomen and pelvis showed small-bowel obstruction with transition point in the left lower abdomen, and trace right pleural effusion.  An NG-tube was placed.  She is admitted to inpatient status.      Overview/Hospital Course:  Admitted with SBO. NGT placed and surgery consulted. Started IVFs and symptom control.      Interval History: No acute events overnight. Tolerated removal of NG, improvement in midepigastric pain/cramping after NG removed. Passed flatus but no BM, no N/V.  Patient concerned about decreased mobility and pain in in her knees after stopping Celebrex.    Review of Systems   Constitutional:  Negative for chills and fever.   Respiratory:  Negative for cough and shortness of breath.    Cardiovascular:  Negative for chest pain and palpitations.   Gastrointestinal:  Positive for abdominal pain. Negative for nausea and vomiting. "   Objective:     Vital Signs (Most Recent):  Temp: 98.8 °F (37.1 °C) (06/29/23 0748)  Pulse: 96 (06/29/23 0748)  Resp: 18 (06/29/23 0748)  BP: (!) 173/79 (06/29/23 0748)  SpO2: (!) 93 % (06/29/23 0748) Vital Signs (24h Range):  Temp:  [97.6 °F (36.4 °C)-98.8 °F (37.1 °C)] 98.8 °F (37.1 °C)  Pulse:  [] 96  Resp:  [16-20] 18  SpO2:  [93 %-98 %] 93 %  BP: (146-173)/(72-79) 173/79     Weight: 51.6 kg (113 lb 12.1 oz)  Body mass index is 18.93 kg/m².    Intake/Output Summary (Last 24 hours) at 6/29/2023 0826  Last data filed at 6/29/2023 0816  Gross per 24 hour   Intake 120 ml   Output 1420 ml   Net -1300 ml           Physical Exam  Vitals and nursing note reviewed.   Constitutional:       General: She is not in acute distress.     Appearance: She is well-developed.   HENT:      Head: Normocephalic and atraumatic.   Eyes:      General:         Right eye: No discharge.         Left eye: No discharge.      Conjunctiva/sclera: Conjunctivae normal.   Cardiovascular:      Rate and Rhythm: Normal rate.      Pulses: Normal pulses.   Pulmonary:      Effort: Pulmonary effort is normal. No respiratory distress.   Abdominal:      General: Bowel sounds are normal. There is no distension.      Palpations: Abdomen is soft.      Tenderness: There is abdominal tenderness. There is no guarding or rebound.   Musculoskeletal:         General: Normal range of motion.      Right lower leg: No edema.      Left lower leg: No edema.   Skin:     General: Skin is warm and dry.   Neurological:      Mental Status: She is alert and oriented to person, place, and time.         Significant Labs:   CBC:  Recent Labs   Lab 06/27/23  1400 06/28/23  0747 06/29/23  0425   WBC 14.89* 12.39 9.75   HGB 13.5 12.9 11.6*   HCT 41.6 40.0 35.5*    188 175   GRAN 82.2*  12.3* 82.4*  10.2* 72.7  7.1   LYMPH 8.7*  1.3 8.6*  1.1 13.5*  1.3   MONO 8.3  1.2* 8.1  1.0 12.1  1.2*   EOS 0.0 0.0 0.1   BASO 0.04 0.03 0.04     BMP:  Recent Labs   Lab  06/27/23  0445 06/28/23  0747 06/29/23  0424    137 137   K 3.4* 4.2 3.3*    105 106   CO2 22* 21* 18*   BUN 10 10 10   CREATININE 0.7 0.7 0.7   GLU 94 85 80   CALCIUM 9.1 8.9 8.6*   MG 1.9 2.0 1.9   PHOS  --   --  2.2*       Significant Imaging: I have reviewed and interpreted all pertinent imaging results/findings within the past 24 hours.      Assessment/Plan:      * SBO (small bowel obstruction)  Possible GIB  - SBO with h/o same in 04/2022, CT with transition point in LLQ.  - NGT in place to low intermittent suction; NPO.  - Continue IVFs, pain, nausea control.  - Surgery consulted; appreciate assistance.  - Patient with continued coffee ground noted in NG that was initially seen 2 days ago, but H/H without significant drop  - Continue IV PPI Q12   - NG clamped on 6/27, and then removed on 6/28  - Repeat KUB pending, passing flatus but without bowel movement yet  - Will follow up on KUB, discuss finding with surgery, and will consider advance to clear liquids later today    Elevated blood pressure reading  - Likely related to pain with SBO.  - Continue hydralazine 10mg IV q6hr PRN for SBP > 180, DBP > 100.  - Monitor.    Hypokalemia  - Replete PRN.      VTE Risk Mitigation (From admission, onward)         Ordered     IP VTE HIGH RISK PATIENT  Once         06/25/23 0028     Place sequential compression device  Until discontinued         06/25/23 0028                      Yandy Horne MD  Department of Hospital Medicine   [facility] Surg ([address])

## 2023-06-29 NOTE — CARE UPDATE
06/28/23 2055   Patient Assessment/Suction   Level of Consciousness (AVPU) alert   Respiratory Effort Normal;Unlabored   All Lung Fields Breath Sounds diminished   Rhythm/Pattern, Respiratory pattern regular;unlabored;depth regular   PRE-TX-O2   Device (Oxygen Therapy) room air   SpO2 95 %   Pulse Oximetry Type Intermittent   $ Pulse Oximetry - Multiple Charge Pulse Oximetry - Multiple

## 2023-06-29 NOTE — ASSESSMENT & PLAN NOTE
Possible GIB  - SBO with h/o same in 04/2022, CT with transition point in LLQ.  - NGT in place to low intermittent suction; NPO.  - Continue IVFs, pain, nausea control.  - Surgery consulted; appreciate assistance.  - Patient with continued coffee ground noted in NG that was initially seen 2 days ago, but H/H without significant drop  - Continue IV PPI Q12   - NG clamped on 6/27, and then removed on 6/28  - Repeat KUB pending, passing flatus but without bowel movement yet  - Will follow up on KUB, discuss finding with surgery, and will consider advance to clear liquids later today

## 2023-06-29 NOTE — SUBJECTIVE & OBJECTIVE
Interval History: No acute events overnight. Tolerated removal of NG, improvement in midepigastric pain/cramping after NG removed. Passed flatus but no BM, no N/V.  Patient concerned about decreased mobility and pain in in her knees after stopping Celebrex.    Review of Systems   Constitutional:  Negative for chills and fever.   Respiratory:  Negative for cough and shortness of breath.    Cardiovascular:  Negative for chest pain and palpitations.   Gastrointestinal:  Positive for abdominal pain. Negative for nausea and vomiting.   Objective:     Vital Signs (Most Recent):  Temp: 98.8 °F (37.1 °C) (06/29/23 0748)  Pulse: 96 (06/29/23 0748)  Resp: 18 (06/29/23 0748)  BP: (!) 173/79 (06/29/23 0748)  SpO2: (!) 93 % (06/29/23 0748) Vital Signs (24h Range):  Temp:  [97.6 °F (36.4 °C)-98.8 °F (37.1 °C)] 98.8 °F (37.1 °C)  Pulse:  [] 96  Resp:  [16-20] 18  SpO2:  [93 %-98 %] 93 %  BP: (146-173)/(72-79) 173/79     Weight: 51.6 kg (113 lb 12.1 oz)  Body mass index is 18.93 kg/m².    Intake/Output Summary (Last 24 hours) at 6/29/2023 0826  Last data filed at 6/29/2023 0816  Gross per 24 hour   Intake 120 ml   Output 1420 ml   Net -1300 ml           Physical Exam  Vitals and nursing note reviewed.   Constitutional:       General: She is not in acute distress.     Appearance: She is well-developed.   HENT:      Head: Normocephalic and atraumatic.   Eyes:      General:         Right eye: No discharge.         Left eye: No discharge.      Conjunctiva/sclera: Conjunctivae normal.   Cardiovascular:      Rate and Rhythm: Normal rate.      Pulses: Normal pulses.   Pulmonary:      Effort: Pulmonary effort is normal. No respiratory distress.   Abdominal:      General: Bowel sounds are normal. There is no distension.      Palpations: Abdomen is soft.      Tenderness: There is abdominal tenderness. There is no guarding or rebound.   Musculoskeletal:         General: Normal range of motion.      Right lower leg: No edema.      Left  lower leg: No edema.   Skin:     General: Skin is warm and dry.   Neurological:      Mental Status: She is alert and oriented to person, place, and time.         Significant Labs:   CBC:  Recent Labs   Lab 06/27/23  1400 06/28/23  0747 06/29/23  0425   WBC 14.89* 12.39 9.75   HGB 13.5 12.9 11.6*   HCT 41.6 40.0 35.5*    188 175   GRAN 82.2*  12.3* 82.4*  10.2* 72.7  7.1   LYMPH 8.7*  1.3 8.6*  1.1 13.5*  1.3   MONO 8.3  1.2* 8.1  1.0 12.1  1.2*   EOS 0.0 0.0 0.1   BASO 0.04 0.03 0.04     BMP:  Recent Labs   Lab 06/27/23  0445 06/28/23  0747 06/29/23  0424    137 137   K 3.4* 4.2 3.3*    105 106   CO2 22* 21* 18*   BUN 10 10 10   CREATININE 0.7 0.7 0.7   GLU 94 85 80   CALCIUM 9.1 8.9 8.6*   MG 1.9 2.0 1.9   PHOS  --   --  2.2*       Significant Imaging: I have reviewed and interpreted all pertinent imaging results/findings within the past 24 hours.

## 2023-06-29 NOTE — PLAN OF CARE
Problem: Adult Inpatient Plan of Care  Goal: Plan of Care Review  Outcome: Ongoing, Progressing     POC reviewed with patient this shift.  A/O x4.  Respirations unlabored.  Skin w/d.  Continent of b/b.  Up to bedside commode without difficulty.  No N/V or c/o ABD pain this shift.  NPO  status maintained.  IVF continue as ordered.  Ambulates in room without difficulty.  VSS.  See flowsheet for full assessment.  Able to verbalize wants/needs.  No s/s of distress.  Fall/safety precautions maintained.

## 2023-06-29 NOTE — PLAN OF CARE
06/29/23 1344   Discharge Reassessment   Assessment Type Discharge Planning Reassessment   Did the patient's condition or plan change since previous assessment? No   Discharge Plan discussed with: Patient   Communicated WILLIAN with patient/caregiver Date not available/Unable to determine   Discharge Plan A Home Health   DME Needed Upon Discharge  none   Transition of Care Barriers None   Why the patient remains in the hospital Requires continued medical care   Post-Acute Status   Discharge Delays (!) Diet Not Ready for Discharge

## 2023-06-29 NOTE — PT/OT/SLP PROGRESS
Occupational Therapy   Treatment    Name: Marian Durham  MRN: [id number]  Admitting Diagnosis:  SBO (small bowel obstruction)       Recommendations:     Discharge Recommendations: outpatient PT  Discharge Equipment Recommendations:  none  Barriers to discharge:       Assessment:     Marian Durham is a 77 y.o. female with a medical diagnosis of SBO (small bowel obstruction).  She presents with BLE knee pain during session. Performance deficits affecting function are weakness, impaired self care skills, gait instability, impaired functional mobility, impaired balance, decreased coordination, decreased lower extremity function, decreased safety awareness, pain, decreased ROM. Patient with limited activity tolerance due to bilateral knee pain. Patient agreeable to sponge bathing tasks seated in chair.    Rehab Prognosis:  Good; patient would benefit from acute skilled OT services to address these deficits and reach maximum level of function.       Plan:     Patient to be seen 3 x/week to address the above listed problems via self-care/home management, therapeutic activities, therapeutic exercises  Plan of Care Expires: 07/26/23  Plan of Care Reviewed with: patient    Subjective     Chief Complaint: Not being able to ambulate due to B Knee pain   Patient/Family Comments/goals: return to PLOF  Pain/Comfort:  Pain Rating 1:  (unrated)  Location - Side 1: Bilateral  Location - Orientation 1: generalized  Location 1: knee  Pain Addressed 1: Reposition, Distraction, Cessation of Activity, Nurse notified  Pain Rating Post-Intervention 1:  (appears comfortable at rest)    Objective:     Communicated with: RN ([place]) prior to session.  Patient found up in chair with telemetry, peripheral IV upon OT entry to room.    General Precautions: Standard, fall    Orthopedic Precautions:N/A  Braces: N/A  Respiratory Status: Room air     Occupational Performance:     Bed Mobility:    NT     Functional Mobility/Transfers:  Sit <>  Stand: CGA and [place] - pt refused using RW for standing balance   BSC Transfer: CGA with stand pivot     Activities of Daily Living:  Sponge Bathe: CGA seated in chair - needing assistance reaching for her back   UBD: CGA seated in chair - needing assistance with IV line   Toileting: SBA for seated hygiene and clothing management   Grooming: Set Up and SBA for hand and facial hygiene seated in chair       AMPAC 6 Click ADL: 19    Treatment & Education:  OT role, plan of care, progression of goals, importance of continued OOB activity, ADL/functional transfer and mobility retraining, discharge recommendation, call don't fall, safety precautions, fall prevention.      Patient left up in chair with all lines intact, call button in reach, and RN notified    GOALS:   Multidisciplinary Problems       Occupational Therapy Goals          Problem: Occupational Therapy    Goal Priority Disciplines Outcome Interventions   Occupational Therapy Goal     OT, PT/OT Ongoing, Progressing    Description: Goals to be met by: 7/15/23     Patient will increase functional independence with ADLs by performing:    UB dressing at Supervision level.  LB dressing at Supervision level.  Toileting at Supervision level.  MET 6/28/2023   Toilet/Chair transfers at Supervision level.  MET 6/28/2023  Grooming at sink at Supervision level.  MET 6/28/2023   Sponge bathing at Set up/Supervision level.                         Time Tracking:     OT Date of Treatment: 06/29/23  OT Start Time: 1200  OT Stop Time: 1230  OT Total Time (min): 30 min    Billable Minutes:Self Care/Home Management 30 min    OT/VIVIAN: VIVIAN     Number of VIVIAN visits since last OT visit: 1    6/29/2023

## 2023-06-30 PROBLEM — I48.91 ATRIAL FIBRILLATION WITH RAPID VENTRICULAR RESPONSE: Status: ACTIVE | Noted: 2023-06-30

## 2023-06-30 LAB
ANION GAP SERPL CALC-SCNC: 13 MMOL/L (ref 8–16)
AV INDEX (PROSTH): 0.99
AV MEAN GRADIENT: 3 MMHG
AV PEAK GRADIENT: 5 MMHG
AV VALVE AREA: 3.05 CM2
AV VELOCITY RATIO: 0.98
BASOPHILS # BLD AUTO: 0.02 K/UL (ref 0–0.2)
BASOPHILS NFR BLD: 0.2 % (ref 0–1.9)
BSA FOR ECHO PROCEDURE: 1.53 M2
BUN SERPL-MCNC: 7 MG/DL (ref 8–23)
CALCIUM SERPL-MCNC: 9.2 MG/DL (ref 8.7–10.5)
CHLORIDE SERPL-SCNC: 104 MMOL/L (ref 95–110)
CO2 SERPL-SCNC: 18 MMOL/L (ref 23–29)
CREAT SERPL-MCNC: 0.7 MG/DL (ref 0.5–1.4)
CV ECHO LV RWT: 0.42 CM
DIFFERENTIAL METHOD: ABNORMAL
DOP CALC AO PEAK VEL: 1.16 M/S
DOP CALC AO VTI: 23.1 CM
DOP CALC LVOT AREA: 3.1 CM2
DOP CALC LVOT DIAMETER: 1.98 CM
DOP CALC LVOT PEAK VEL: 1.14 M/S
DOP CALC LVOT STROKE VOLUME: 70.48 CM3
DOP CALCLVOT PEAK VEL VTI: 22.9 CM
E WAVE DECELERATION TIME: 220.01 MSEC
E/A RATIO: 1.02
E/E' RATIO: 11.82 M/S
ECHO LV POSTERIOR WALL: 0.79 CM (ref 0.6–1.1)
EJECTION FRACTION: 65 %
EOSINOPHIL # BLD AUTO: 0.1 K/UL (ref 0–0.5)
EOSINOPHIL NFR BLD: 0.9 % (ref 0–8)
ERYTHROCYTE [DISTWIDTH] IN BLOOD BY AUTOMATED COUNT: 12.5 % (ref 11.5–14.5)
EST. GFR  (NO RACE VARIABLE): >60 ML/MIN/1.73 M^2
FRACTIONAL SHORTENING: 41 % (ref 28–44)
GLUCOSE SERPL-MCNC: 101 MG/DL (ref 70–110)
HCT VFR BLD AUTO: 38.1 % (ref 37–48.5)
HGB BLD-MCNC: 12.8 G/DL (ref 12–16)
IMM GRANULOCYTES # BLD AUTO: 0.03 K/UL (ref 0–0.04)
IMM GRANULOCYTES NFR BLD AUTO: 0.3 % (ref 0–0.5)
INTERVENTRICULAR SEPTUM: 0.81 CM (ref 0.6–1.1)
IVC DIAMETER: 2.06 CM
IVRT: 82.78 MSEC
LA MAJOR: 3.9 CM
LA MINOR: 4.03 CM
LA WIDTH: 2.8 CM
LEFT ATRIUM SIZE: 2.6 CM
LEFT ATRIUM VOLUME INDEX MOD: 13.5 ML/M2
LEFT ATRIUM VOLUME INDEX: 15.8 ML/M2
LEFT ATRIUM VOLUME MOD: 21 CM3
LEFT ATRIUM VOLUME: 24.53 CM3
LEFT INTERNAL DIMENSION IN SYSTOLE: 2.21 CM (ref 2.1–4)
LEFT VENTRICLE DIASTOLIC VOLUME INDEX: 38.63 ML/M2
LEFT VENTRICLE DIASTOLIC VOLUME: 59.87 ML
LEFT VENTRICLE MASS INDEX: 54 G/M2
LEFT VENTRICLE SYSTOLIC VOLUME INDEX: 10.5 ML/M2
LEFT VENTRICLE SYSTOLIC VOLUME: 16.35 ML
LEFT VENTRICULAR INTERNAL DIMENSION IN DIASTOLE: 3.75 CM (ref 3.5–6)
LEFT VENTRICULAR MASS: 84.13 G
LV LATERAL E/E' RATIO: 10.83 M/S
LV SEPTAL E/E' RATIO: 13 M/S
LVOT MG: 2.89 MMHG
LVOT MV: 0.82 CM/S
LYMPHOCYTES # BLD AUTO: 1.3 K/UL (ref 1–4.8)
LYMPHOCYTES NFR BLD: 13.1 % (ref 18–48)
MAGNESIUM SERPL-MCNC: 2 MG/DL (ref 1.6–2.6)
MCH RBC QN AUTO: 30.4 PG (ref 27–31)
MCHC RBC AUTO-ENTMCNC: 33.6 G/DL (ref 32–36)
MCV RBC AUTO: 91 FL (ref 82–98)
MONOCYTES # BLD AUTO: 1.3 K/UL (ref 0.3–1)
MONOCYTES NFR BLD: 13.2 % (ref 4–15)
MV PEAK A VEL: 0.64 M/S
MV PEAK E VEL: 0.65 M/S
MV STENOSIS PRESSURE HALF TIME: 60.46 MS
MV VALVE AREA P 1/2 METHOD: 3.64 CM2
NEUTROPHILS # BLD AUTO: 7 K/UL (ref 1.8–7.7)
NEUTROPHILS NFR BLD: 72.3 % (ref 38–73)
NRBC BLD-RTO: 0 /100 WBC
PHOSPHATE SERPL-MCNC: 2.2 MG/DL (ref 2.7–4.5)
PISA TR MAX VEL: 1.69 M/S
PLATELET # BLD AUTO: 217 K/UL (ref 150–450)
PMV BLD AUTO: 9.1 FL (ref 9.2–12.9)
POTASSIUM SERPL-SCNC: 3.3 MMOL/L (ref 3.5–5.1)
PULM VEIN S/D RATIO: 1.63
PV PEAK D VEL: 0.32 M/S
PV PEAK S VEL: 0.52 M/S
RA MAJOR: 3.98 CM
RA PRESSURE: 3 MMHG
RA WIDTH: 3 CM
RBC # BLD AUTO: 4.21 M/UL (ref 4–5.4)
SODIUM SERPL-SCNC: 135 MMOL/L (ref 136–145)
TDI LATERAL: 0.06 M/S
TDI SEPTAL: 0.05 M/S
TDI: 0.06 M/S
TR MAX PG: 11 MMHG
TV REST PULMONARY ARTERY PRESSURE: 14 MMHG
WBC # BLD AUTO: 9.72 K/UL (ref 3.9–12.7)

## 2023-06-30 PROCEDURE — 84100 ASSAY OF PHOSPHORUS: CPT | Performed by: HOSPITALIST

## 2023-06-30 PROCEDURE — 25000003 PHARM REV CODE 250: Performed by: INTERNAL MEDICINE

## 2023-06-30 PROCEDURE — 97110 THERAPEUTIC EXERCISES: CPT

## 2023-06-30 PROCEDURE — 25000003 PHARM REV CODE 250: Performed by: PHYSICIAN ASSISTANT

## 2023-06-30 PROCEDURE — 99223 PR INITIAL HOSPITAL CARE,LEVL III: ICD-10-PCS | Mod: ,,, | Performed by: INTERNAL MEDICINE

## 2023-06-30 PROCEDURE — 83735 ASSAY OF MAGNESIUM: CPT | Performed by: HOSPITALIST

## 2023-06-30 PROCEDURE — 25000003 PHARM REV CODE 250: Performed by: HOSPITALIST

## 2023-06-30 PROCEDURE — 94761 N-INVAS EAR/PLS OXIMETRY MLT: CPT

## 2023-06-30 PROCEDURE — 63600175 PHARM REV CODE 636 W HCPCS: Performed by: HOSPITALIST

## 2023-06-30 PROCEDURE — 25000003 PHARM REV CODE 250

## 2023-06-30 PROCEDURE — 63600175 PHARM REV CODE 636 W HCPCS: Performed by: INTERNAL MEDICINE

## 2023-06-30 PROCEDURE — 93005 ELECTROCARDIOGRAM TRACING: CPT

## 2023-06-30 PROCEDURE — 99233 SBSQ HOSP IP/OBS HIGH 50: CPT | Mod: ,,, | Performed by: HOSPITALIST

## 2023-06-30 PROCEDURE — 99223 1ST HOSP IP/OBS HIGH 75: CPT | Mod: ,,, | Performed by: INTERNAL MEDICINE

## 2023-06-30 PROCEDURE — 93010 EKG 12-LEAD: ICD-10-PCS | Mod: ,,, | Performed by: INTERNAL MEDICINE

## 2023-06-30 PROCEDURE — 36415 COLL VENOUS BLD VENIPUNCTURE: CPT | Performed by: HOSPITALIST

## 2023-06-30 PROCEDURE — 85025 COMPLETE CBC W/AUTO DIFF WBC: CPT | Performed by: HOSPITALIST

## 2023-06-30 PROCEDURE — 93010 ELECTROCARDIOGRAM REPORT: CPT | Mod: ,,, | Performed by: INTERNAL MEDICINE

## 2023-06-30 PROCEDURE — 80048 BASIC METABOLIC PNL TOTAL CA: CPT | Performed by: HOSPITALIST

## 2023-06-30 PROCEDURE — 21400001 HC TELEMETRY ROOM

## 2023-06-30 PROCEDURE — 99233 PR SUBSEQUENT HOSPITAL CARE,LEVL III: ICD-10-PCS | Mod: ,,, | Performed by: HOSPITALIST

## 2023-06-30 PROCEDURE — C9113 INJ PANTOPRAZOLE SODIUM, VIA: HCPCS | Performed by: HOSPITALIST

## 2023-06-30 RX ORDER — POTASSIUM CHLORIDE 20 MEQ/1
40 TABLET, EXTENDED RELEASE ORAL ONCE
Status: COMPLETED | OUTPATIENT
Start: 2023-06-30 | End: 2023-06-30

## 2023-06-30 RX ORDER — METOPROLOL TARTRATE 1 MG/ML
5 INJECTION, SOLUTION INTRAVENOUS EVERY 5 MIN PRN
Status: DISCONTINUED | OUTPATIENT
Start: 2023-06-30 | End: 2023-07-05 | Stop reason: HOSPADM

## 2023-06-30 RX ORDER — METOPROLOL TARTRATE 25 MG/1
12.5 TABLET ORAL 2 TIMES DAILY
Status: DISCONTINUED | OUTPATIENT
Start: 2023-06-30 | End: 2023-07-01

## 2023-06-30 RX ORDER — TIZANIDINE 2 MG/1
4 TABLET ORAL ONCE AS NEEDED
Status: COMPLETED | OUTPATIENT
Start: 2023-06-30 | End: 2023-06-30

## 2023-06-30 RX ADMIN — Medication 6 MG: at 08:06

## 2023-06-30 RX ADMIN — METOPROLOL TARTRATE 12.5 MG: 25 TABLET, FILM COATED ORAL at 08:06

## 2023-06-30 RX ADMIN — MUPIROCIN: 20 OINTMENT TOPICAL at 09:06

## 2023-06-30 RX ADMIN — METOROPROLOL TARTRATE 5 MG: 5 INJECTION, SOLUTION INTRAVENOUS at 08:06

## 2023-06-30 RX ADMIN — HYDRALAZINE HYDROCHLORIDE 10 MG: 20 INJECTION INTRAMUSCULAR; INTRAVENOUS at 04:06

## 2023-06-30 RX ADMIN — MUPIROCIN: 20 OINTMENT TOPICAL at 08:06

## 2023-06-30 RX ADMIN — PANTOPRAZOLE SODIUM 40 MG: 40 INJECTION, POWDER, LYOPHILIZED, FOR SOLUTION INTRAVENOUS at 08:06

## 2023-06-30 RX ADMIN — POTASSIUM CHLORIDE 40 MEQ: 1500 TABLET, EXTENDED RELEASE ORAL at 08:06

## 2023-06-30 RX ADMIN — MORPHINE SULFATE 2 MG: 2 INJECTION, SOLUTION INTRAMUSCULAR; INTRAVENOUS at 11:06

## 2023-06-30 RX ADMIN — TIZANIDINE 4 MG: 2 TABLET ORAL at 08:06

## 2023-06-30 RX ADMIN — FLUTICASONE PROPIONATE 100 MCG: 50 SPRAY, METERED NASAL at 08:06

## 2023-06-30 RX ADMIN — CETIRIZINE HYDROCHLORIDE 10 MG: 10 TABLET, FILM COATED ORAL at 08:06

## 2023-06-30 NOTE — PLAN OF CARE
Problem: Adult Inpatient Plan of Care  Goal: Plan of Care Review  Outcome: Ongoing, Progressing  Flowsheets (Taken 6/30/2023 1867)  Plan of Care Reviewed With: patient  Goal: Optimal Comfort and Wellbeing  Outcome: Ongoing, Progressing     Problem: Pain (Intestinal Obstruction)  Goal: Acceptable Pain Control  Outcome: Ongoing, Progressing     Problem: Pain Acute  Goal: Acceptable Pain Control and Functional Ability  Outcome: Ongoing, Progressing     Problem: Fall Injury Risk  Goal: Absence of Fall and Fall-Related Injury  Outcome: Ongoing, Progressing

## 2023-06-30 NOTE — SUBJECTIVE & OBJECTIVE
Interval History: No acute events overnight, this morning during exam patient developed rapid HR into 200s on monitor and later in/out from low 100s to 160s; no reported SOB or CP. Passing flatus but no BM.     Review of Systems   Constitutional:  Negative for chills and fever.   Respiratory:  Negative for cough and shortness of breath.    Cardiovascular:  Negative for chest pain and palpitations.   Gastrointestinal:  Positive for abdominal pain. Negative for nausea and vomiting.   Objective:     Vital Signs (Most Recent):  Temp: 98.6 °F (37 °C) (06/30/23 1151)  Pulse: 104 (06/30/23 1400)  Resp: 18 (06/30/23 1151)  BP: 134/67 (06/30/23 1151)  SpO2: 98 % (06/30/23 1151) Vital Signs (24h Range):  Temp:  [98.1 °F (36.7 °C)-99.2 °F (37.3 °C)] 98.6 °F (37 °C)  Pulse:  [] 104  Resp:  [16-18] 18  SpO2:  [95 %-98 %] 98 %  BP: (112-180)/(61-95) 134/67     Weight: 51.6 kg (113 lb 12.1 oz)  Body mass index is 18.93 kg/m².    Intake/Output Summary (Last 24 hours) at 6/30/2023 1434  Last data filed at 6/30/2023 1341  Gross per 24 hour   Intake 1380 ml   Output 2700 ml   Net -1320 ml           Physical Exam  Vitals and nursing note reviewed.   Constitutional:       General: She is not in acute distress.     Appearance: She is well-developed.   HENT:      Head: Normocephalic and atraumatic.   Eyes:      General:         Right eye: No discharge.         Left eye: No discharge.      Conjunctiva/sclera: Conjunctivae normal.   Cardiovascular:      Rate and Rhythm: Tachycardia present. Rhythm irregular.      Pulses: Normal pulses.   Pulmonary:      Effort: Pulmonary effort is normal. No respiratory distress.   Abdominal:      General: Bowel sounds are normal. There is no distension.      Palpations: Abdomen is soft.      Tenderness: There is abdominal tenderness. There is no guarding or rebound.   Musculoskeletal:         General: Normal range of motion.      Right lower leg: No edema.      Left lower leg: No edema.   Skin:      General: Skin is warm and dry.   Neurological:      Mental Status: She is alert and oriented to person, place, and time.         Significant Labs:   CBC:  Recent Labs   Lab 06/28/23  0747 06/29/23  0425 06/30/23  0420   WBC 12.39 9.75 9.72   HGB 12.9 11.6* 12.8   HCT 40.0 35.5* 38.1    175 217   GRAN 82.4*  10.2* 72.7  7.1 72.3  7.0   LYMPH 8.6*  1.1 13.5*  1.3 13.1*  1.3   MONO 8.1  1.0 12.1  1.2* 13.2  1.3*   EOS 0.0 0.1 0.1   BASO 0.03 0.04 0.02     BMP:  Recent Labs   Lab 06/28/23  0747 06/29/23  0424 06/30/23  0420    137 135*   K 4.2 3.3* 3.3*    106 104   CO2 21* 18* 18*   BUN 10 10 7*   CREATININE 0.7 0.7 0.7   GLU 85 80 101   CALCIUM 8.9 8.6* 9.2   MG 2.0 1.9 2.0   PHOS  --  2.2* 2.2*       Significant Imaging: I have reviewed and interpreted all pertinent imaging results/findings within the past 24 hours.

## 2023-06-30 NOTE — PLAN OF CARE
CM met with patient at length and discussed preparations for discharge. Patient requested need for someone to come to home and provide light house duty care, meal prepping etc. Informed patient this is an out of pocket cost not covered by insurance. Patient stated that would be ok. CM provided patient with senior resource guide book and highlighted two companies who provide such services. Patient was very appreciative and stated that was exactly what she needed.    Patient also request walker and bedside commode. Orders entered.

## 2023-06-30 NOTE — CONSULTS
[facility]    Date of admission:  6/24/2023     Reason for Consult:    Atrial fibrillation    HPI:    This 77-year-old female with no prior cardiac history was admitted 5 days ago with a small-bowel obstruction.  She has improved with conservative, nonsurgical management.  This morning she developed atrial fibrillation with a rapid ventricular response.  It was asymptomatic.  She was hemodynamically stable.  She was treated with metoprolol.  She spontaneously converted and is now back in sinus rhythm.  She has a history of intermittent palpitations at home.  They are very brief.  Have not required prior attention or workup.    Medications  Current Facility-Administered Medications   Medication Dose Route Frequency Provider Last Rate Last Admin    acetaminophen tablet 650 mg  650 mg Oral Q6H PRN Karmen Avila PA-C   650 mg at 06/26/23 0947    cetirizine tablet 10 mg  10 mg Oral Daily SHERIN Pearson MD   10 mg at 06/30/23 0845    fluticasone propionate 50 mcg/actuation nasal spray 100 mcg  2 spray Each Nostril Daily SHERIN Pearson MD   100 mcg at 06/30/23 0845    hydrALAZINE injection 10 mg  10 mg Intravenous Q6H PRN SHERIN Pearson MD   10 mg at 06/30/23 0453    hydrOXYzine HCL tablet 50 mg  50 mg Oral TID PRN SHERIN Pearson MD   50 mg at 06/26/23 2110    melatonin tablet 6 mg  6 mg Oral Nightly PRN Karmen Avila PA-C   6 mg at 06/29/23 2037    metoprolol injection 5 mg  5 mg Intravenous Q5 Min PRN Yandy Horne MD   5 mg at 06/30/23 0842    metoprolol tartrate (LOPRESSOR) split tablet 12.5 mg  12.5 mg Oral BID Yandy Horne MD   12.5 mg at 06/30/23 0851    morphine injection 2 mg  2 mg Intravenous Q4H PRN SHERIN Pearson MD   2 mg at 06/28/23 1430    mupirocin 2 % ointment   Nasal BID SHERIN Pearson MD   Given at 06/30/23 0845    naloxone 0.4 mg/mL injection 0.02 mg  0.02 mg Intravenous PRN Karmen Avila PA-C        ondansetron injection 8 mg  8 mg Intravenous Q6H PRN  Karmen Avila PA-C   8 mg at 06/26/23 2111    pantoprazole injection 40 mg  40 mg Intravenous BID Yandy Horne MD   40 mg at 06/30/23 0850    simethicone 40 mg/0.6 mL drops 40 mg  40 mg Oral QID PRN Karmen Avila PA-C        sodium chloride 0.9% flush 10 mL  10 mL Intravenous PRN SHERIN Pearson MD   10 mL at 06/29/23 1356      Prior to Admission medications    Medication Sig Start Date End Date Taking? Authorizing Provider   calcium-vitamin D3 (OS- + D3) 500 mg-5 mcg (200 unit) per tablet Take 1 tablet by mouth 2 (two) times daily with meals.    Historical Provider   celecoxib (CELEBREX) 200 MG capsule take 1 capsule (200MG) by oral route every day as needed 6/2/16   Historical Provider   estradioL (ESTRACE) 0.01 % (0.1 mg/gram) vaginal cream Place 1 g vaginally twice a week. 8/29/22 8/29/23  Kamryn Gaviria MD   glucosamine-chondroitin 500-400 mg tablet Take 1 tablet by mouth 3 (three) times daily.    Historical Provider   polyethylene glycol 3350 (MIRALAX ORAL) Take by mouth as needed.     Historical Provider   simvastatin (ZOCOR) 40 MG tablet Take 40 mg by mouth every evening.  5/13/13   Historical Provider       History  Past Medical History:   Diagnosis Date    Hyperlipidemia     Osteoarthritis     Shingles 2002    Spinal stenosis      Past Surgical History:   Procedure Laterality Date    ANKLE FRACTURE SURGERY  2007    Right ankle    EXCISION OF MASS OF BACK N/A 11/16/2018    Procedure: EXCISION, MASS, BACK;  Surgeon: Fran Magaña MD;  Location: [facility];  Service: General;  Laterality: N/A;    EYE SURGERY Bilateral 2016    HYSTERECTOMY  1985    HEATHER    KNEE ARTHROSCOPY W/ DEBRIDEMENT  1994    Bilateral    KNEE ARTHROSCOPY W/ DEBRIDEMENT  2003    Left knee    OPEN PATELLA REEFING PROCEDURE  age 19    Left knee    Tonsilectomy  as a child    TONSILLECTOMY      TUBAL LIGATION       Social History     Socioeconomic History    Marital status:    Tobacco Use    Smoking  status: Never    Smokeless tobacco: Never   Substance and Sexual Activity    Alcohol use: Yes     Alcohol/week: 3.0 standard drinks     Types: 3 Glasses of wine per week     Comment: Drinks a glass of wine 3 times weekly    Drug use: No    Sexual activity: Not Currently     Birth control/protection: Post-menopausal     Comment: Spouse  of kidney cancer : 2015     Social Determinants of Health     Financial Resource Strain: Low Risk     Difficulty of Paying Living Expenses: Not hard at all   Food Insecurity: No Food Insecurity    Worried About Running Out of Food in the Last Year: Never true    Ran Out of Food in the Last Year: Never true   Transportation Needs: No Transportation Needs    Lack of Transportation (Medical): No    Lack of Transportation (Non-Medical): No   Physical Activity: Sufficiently Active    Days of Exercise per Week: 5 days    Minutes of Exercise per Session: 30 min   Stress: Stress Concern Present    Feeling of Stress : Very much   Social Connections: Moderately Integrated    Frequency of Communication with Friends and Family: More than three times a week    Frequency of Social Gatherings with Friends and Family: More than three times a week    Attends [employer] Services: More than 4 times per year    Active Member of Clubs or Organizations: Yes    Attends Club or Organization Meetings: More than 4 times per year    Marital Status:    Housing Stability: Low Risk     Unable to Pay for Housing in the Last Year: No    Number of Places Lived in the Last Year: 1    Unstable Housing in the Last Year: No     Family History   Problem Relation Age of Onset    Hypertension Father     Hypertension Mother     Diabetes Brother     Colon cancer Paternal Aunt     Breast cancer Paternal Aunt         75    Coronary artery disease Brother     Ovarian cancer Neg Hx         Allergies  Review of patient's allergies indicates:   Allergen Reactions    Demerol [meperidine] Other (See Comments)      ""Becomes Agitated and Combative"       Review of Systems   Review of Systems   Constitutional: Negative for malaise/fatigue, weight gain and weight loss.   Eyes:  Negative for visual disturbance.   Cardiovascular:  Negative for chest pain, claudication, cyanosis, dyspnea on exertion, irregular heartbeat, leg swelling, near-syncope, orthopnea, palpitations, paroxysmal nocturnal dyspnea and syncope.   Respiratory:  Negative for cough, hemoptysis, shortness of breath, sleep disturbances due to breathing and wheezing.    Hematologic/Lymphatic: Negative for bleeding problem. Does not bruise/bleed easily.   Skin:  Negative for poor wound healing.   Musculoskeletal:  Negative for muscle cramps and myalgias.   Gastrointestinal:  Positive for abdominal pain. Negative for anorexia, diarrhea, heartburn, hematemesis, hematochezia, melena, nausea and vomiting.   Genitourinary:  Negative for hematuria and nocturia.   Neurological:  Negative for excessive daytime sleepiness, dizziness, focal weakness, light-headedness and weakness.     Physical Exam    Temp:  [98.1 °F (36.7 °C)-99 °F (37.2 °C)]   Pulse:  []   Resp:  [16-18]   BP: (134-180)/(61-80)   SpO2:  [95 %-97 %]    Wt Readings from Last 1 Encounters:   06/29/23 51.6 kg (113 lb 12.1 oz)     Physical Exam  Vitals and nursing note reviewed.   Constitutional:       General: She is not in acute distress.     Appearance: She is not toxic-appearing or diaphoretic.   HENT:      Head: Normocephalic and atraumatic.      Mouth/Throat:      Lips: Pink.      Mouth: Mucous membranes are moist.   Eyes:      General: No scleral icterus.     Conjunctiva/sclera: Conjunctivae normal.   Neck:      Thyroid: No thyromegaly.      Vascular: No carotid bruit, hepatojugular reflux or JVD.      Trachea: Trachea normal.   Cardiovascular:      Rate and Rhythm: Normal rate and regular rhythm. No extrasystoles are present.     Chest Wall: PMI is not displaced.      Pulses:           Carotid pulses " are 2+ on the right side and 2+ on the left side.       Radial pulses are 2+ on the right side and 2+ on the left side.        Dorsalis pedis pulses are 2+ on the right side and 2+ on the left side.        Posterior tibial pulses are 2+ on the right side and 2+ on the left side.      Heart sounds: S1 normal and S2 normal. No murmur heard.    No friction rub. No S3 or S4 sounds.   Pulmonary:      Effort: Pulmonary effort is normal. No accessory muscle usage or respiratory distress.      Breath sounds: Normal breath sounds and air entry. No decreased breath sounds, wheezing, rhonchi or rales.   Abdominal:      General: Bowel sounds are normal. There is no distension or abdominal bruit.      Palpations: Abdomen is soft. There is no hepatomegaly, splenomegaly or pulsatile mass.      Tenderness: There is abdominal tenderness.   Musculoskeletal:         General: No tenderness or deformity.      Right lower leg: Edema present.      Left lower leg: Edema present.   Skin:     General: Skin is warm and dry.      Capillary Refill: Capillary refill takes less than 2 seconds.      Coloration: Skin is not cyanotic or pale.      Nails: There is no clubbing.   Neurological:      General: No focal deficit present.      Mental Status: She is alert and oriented to person, place, and time.   Psychiatric:         Attention and Perception: Attention normal.         Mood and Affect: Mood normal.         Speech: Speech normal.         Behavior: Behavior normal. Behavior is cooperative.       Telemetry  Sinus rhythm with a paroxysm of rapid atrial fibrillation    EKG  Atrial fibrillation with a rapid ventricular response.  Nonspecific ST-T abnormality.      Labs  Recent Results (from the past 72 hour(s))   CBC Auto Differential    Collection Time: 06/27/23  2:00 PM   Result Value Ref Range    WBC 14.89 (H) 3.90 - 12.70 K/uL    RBC 4.46 4.00 - 5.40 M/uL    Hemoglobin 13.5 12.0 - 16.0 g/dL    Hematocrit 41.6 37.0 - 48.5 %    MCV 93 82 - 98 fL     MCH 30.3 27.0 - 31.0 pg    MCHC 32.5 32.0 - 36.0 g/dL    RDW 13.0 11.5 - 14.5 %    Platelets 202 150 - 450 K/uL    MPV 9.2 9.2 - 12.9 fL    Immature Granulocytes 0.4 0.0 - 0.5 %    Gran # (ANC) 12.3 (H) 1.8 - 7.7 K/uL    Immature Grans (Abs) 0.06 (H) 0.00 - 0.04 K/uL    Lymph # 1.3 1.0 - 4.8 K/uL    Mono # 1.2 (H) 0.3 - 1.0 K/uL    Eos # 0.0 0.0 - 0.5 K/uL    Baso # 0.04 0.00 - 0.20 K/uL    nRBC 0 0 /100 WBC    Gran % 82.2 (H) 38.0 - 73.0 %    Lymph % 8.7 (L) 18.0 - 48.0 %    Mono % 8.3 4.0 - 15.0 %    Eosinophil % 0.1 0.0 - 8.0 %    Basophil % 0.3 0.0 - 1.9 %    Differential Method Automated    CBC Auto Differential    Collection Time: 06/28/23  7:47 AM   Result Value Ref Range    WBC 12.39 3.90 - 12.70 K/uL    RBC 4.27 4.00 - 5.40 M/uL    Hemoglobin 12.9 12.0 - 16.0 g/dL    Hematocrit 40.0 37.0 - 48.5 %    MCV 94 82 - 98 fL    MCH 30.2 27.0 - 31.0 pg    MCHC 32.3 32.0 - 36.0 g/dL    RDW 12.8 11.5 - 14.5 %    Platelets 188 150 - 450 K/uL    MPV 9.1 (L) 9.2 - 12.9 fL    Immature Granulocytes 0.5 0.0 - 0.5 %    Gran # (ANC) 10.2 (H) 1.8 - 7.7 K/uL    Immature Grans (Abs) 0.06 (H) 0.00 - 0.04 K/uL    Lymph # 1.1 1.0 - 4.8 K/uL    Mono # 1.0 0.3 - 1.0 K/uL    Eos # 0.0 0.0 - 0.5 K/uL    Baso # 0.03 0.00 - 0.20 K/uL    nRBC 0 0 /100 WBC    Gran % 82.4 (H) 38.0 - 73.0 %    Lymph % 8.6 (L) 18.0 - 48.0 %    Mono % 8.1 4.0 - 15.0 %    Eosinophil % 0.2 0.0 - 8.0 %    Basophil % 0.2 0.0 - 1.9 %    Differential Method Automated    Basic Metabolic Panel    Collection Time: 06/28/23  7:47 AM   Result Value Ref Range    Sodium 137 136 - 145 mmol/L    Potassium 4.2 3.5 - 5.1 mmol/L    Chloride 105 95 - 110 mmol/L    CO2 21 (L) 23 - 29 mmol/L    Glucose 85 70 - 110 mg/dL    BUN 10 8 - 23 mg/dL    Creatinine 0.7 0.5 - 1.4 mg/dL    Calcium 8.9 8.7 - 10.5 mg/dL    Anion Gap 11 8 - 16 mmol/L    eGFR >60 >60 mL/min/1.73 m^2   Magnesium    Collection Time: 06/28/23  7:47 AM   Result Value Ref Range    Magnesium 2.0 1.6 - 2.6 mg/dL    Magnesium    Collection Time: 06/29/23  4:24 AM   Result Value Ref Range    Magnesium 1.9 1.6 - 2.6 mg/dL   Phosphorus    Collection Time: 06/29/23  4:24 AM   Result Value Ref Range    Phosphorus 2.2 (L) 2.7 - 4.5 mg/dL   Basic Metabolic Panel    Collection Time: 06/29/23  4:24 AM   Result Value Ref Range    Sodium 137 136 - 145 mmol/L    Potassium 3.3 (L) 3.5 - 5.1 mmol/L    Chloride 106 95 - 110 mmol/L    CO2 18 (L) 23 - 29 mmol/L    Glucose 80 70 - 110 mg/dL    BUN 10 8 - 23 mg/dL    Creatinine 0.7 0.5 - 1.4 mg/dL    Calcium 8.6 (L) 8.7 - 10.5 mg/dL    Anion Gap 13 8 - 16 mmol/L    eGFR >60 >60 mL/min/1.73 m^2   CBC Auto Differential    Collection Time: 06/29/23  4:25 AM   Result Value Ref Range    WBC 9.75 3.90 - 12.70 K/uL    RBC 3.85 (L) 4.00 - 5.40 M/uL    Hemoglobin 11.6 (L) 12.0 - 16.0 g/dL    Hematocrit 35.5 (L) 37.0 - 48.5 %    MCV 92 82 - 98 fL    MCH 30.1 27.0 - 31.0 pg    MCHC 32.7 32.0 - 36.0 g/dL    RDW 12.7 11.5 - 14.5 %    Platelets 175 150 - 450 K/uL    MPV 9.1 (L) 9.2 - 12.9 fL    Immature Granulocytes 0.4 0.0 - 0.5 %    Gran # (ANC) 7.1 1.8 - 7.7 K/uL    Immature Grans (Abs) 0.04 0.00 - 0.04 K/uL    Lymph # 1.3 1.0 - 4.8 K/uL    Mono # 1.2 (H) 0.3 - 1.0 K/uL    Eos # 0.1 0.0 - 0.5 K/uL    Baso # 0.04 0.00 - 0.20 K/uL    nRBC 0 0 /100 WBC    Gran % 72.7 38.0 - 73.0 %    Lymph % 13.5 (L) 18.0 - 48.0 %    Mono % 12.1 4.0 - 15.0 %    Eosinophil % 0.9 0.0 - 8.0 %    Basophil % 0.4 0.0 - 1.9 %    Differential Method Automated    Magnesium    Collection Time: 06/30/23  4:20 AM   Result Value Ref Range    Magnesium 2.0 1.6 - 2.6 mg/dL   Phosphorus    Collection Time: 06/30/23  4:20 AM   Result Value Ref Range    Phosphorus 2.2 (L) 2.7 - 4.5 mg/dL   Basic Metabolic Panel    Collection Time: 06/30/23  4:20 AM   Result Value Ref Range    Sodium 135 (L) 136 - 145 mmol/L    Potassium 3.3 (L) 3.5 - 5.1 mmol/L    Chloride 104 95 - 110 mmol/L    CO2 18 (L) 23 - 29 mmol/L    Glucose 101 70 - 110 mg/dL    BUN 7  (L) 8 - 23 mg/dL    Creatinine 0.7 0.5 - 1.4 mg/dL    Calcium 9.2 8.7 - 10.5 mg/dL    Anion Gap 13 8 - 16 mmol/L    eGFR >60 >60 mL/min/1.73 m^2   CBC Auto Differential    Collection Time: 06/30/23  4:20 AM   Result Value Ref Range    WBC 9.72 3.90 - 12.70 K/uL    RBC 4.21 4.00 - 5.40 M/uL    Hemoglobin 12.8 12.0 - 16.0 g/dL    Hematocrit 38.1 37.0 - 48.5 %    MCV 91 82 - 98 fL    MCH 30.4 27.0 - 31.0 pg    MCHC 33.6 32.0 - 36.0 g/dL    RDW 12.5 11.5 - 14.5 %    Platelets 217 150 - 450 K/uL    MPV 9.1 (L) 9.2 - 12.9 fL    Immature Granulocytes 0.3 0.0 - 0.5 %    Gran # (ANC) 7.0 1.8 - 7.7 K/uL    Immature Grans (Abs) 0.03 0.00 - 0.04 K/uL    Lymph # 1.3 1.0 - 4.8 K/uL    Mono # 1.3 (H) 0.3 - 1.0 K/uL    Eos # 0.1 0.0 - 0.5 K/uL    Baso # 0.02 0.00 - 0.20 K/uL    nRBC 0 0 /100 WBC    Gran % 72.3 38.0 - 73.0 %    Lymph % 13.1 (L) 18.0 - 48.0 %    Mono % 13.2 4.0 - 15.0 %    Eosinophil % 0.9 0.0 - 8.0 %    Basophil % 0.2 0.0 - 1.9 %    Differential Method Automated         Imaging  X-Ray Abdomen Flat And Erect    Result Date: 6/29/2023  EXAMINATION: XR ABDOMEN FLAT AND ERECT CLINICAL HISTORY: abd pain/distention; TECHNIQUE: Flat and erect AP views of the abdomen were performed. COMPARISON: Abdominal radiograph performed 06/20/2023, 09:00 hours. FINDINGS: Enteric tube appears to been discontinued. No definite progressively dilated small bowel or colonic loops.  Air appears to be present in the distal colon rectum.  Overall bowel gas pattern appears nonspecific, but nonobstructive. Otherwise no significant change relative to prior study performed 06/20/2023, 09:00 hours.     As above. Electronically signed by: Jai Gentile Date:    06/29/2023 Time:    08:22    X-Ray Abdomen Flat And Erect    Result Date: 6/28/2023  EXAMINATION: XR ABDOMEN FLAT AND ERECT CLINICAL HISTORY: abd pain/distention; TECHNIQUE: Flat and erect AP views of the abdomen were performed. COMPARISON: Abdominal radiograph 06/27/2023, 12:36 hours.  FINDINGS: Monitoring leads are noted.  Enteric tube is noted, with side port projecting anticipated location lower esophagus.  Advancement by approximately 10-12 cm would place the side port at the anticipated location of stomach, as desired clinically. Nonspecific gaseous distension of colonic loops.  Mildly dilated gas-filled loops of small bowel measure up to 3.6 cm as measured in the left hemiabdomen.  Overall degree of dilated small bowel loops appears improved since 06/27/2023, 12:36 hours.  Relative paucity of air distal colon and rectum. Remainder examination not substantial interval change.     As above. Electronically signed by: Jai Gentile Date:    06/28/2023 Time:    09:26    X-Ray Abdomen Flat And Erect    Result Date: 6/27/2023  EXAMINATION: XR ABDOMEN FLAT AND ERECT CLINICAL HISTORY: abd pain/distention; TECHNIQUE: Flat and erect AP views of the abdomen were performed. COMPARISON: 06/26/2023, CT 06/24/2023 FINDINGS: One upright view, 2 supine views. Nasogastric tube tip projects over the expected location of the gastric lumen, side hole at the level of the gastroesophageal junction, possibly above the gastroesophageal junction.  There are a few scattered air-fluid levels within prominent small bowel loops projected over the mid pelvis.  Air and stool is seen within the large bowel and projected over the rectum noting moderate to large amount of stool throughout the colon.  There are no calcifications to suggest nephrolithiasis or cholelithiasis.  No large volume free air or pneumatosis.  The lower lung zones are clear.     1. Slow flow through a few small bowel loops, the degree of small-bowel distention has decreased since examination 06/24/2023 and 06/26/2023.  Continued follow-up is advised. 2. Moderate to large amount of stool in the colon could reflect developing constipation. Electronically signed by: Jose Rodney MD Date:    06/27/2023 Time:    13:38    CT Abdomen Pelvis With  Contrast    Result Date: 6/24/2023  EXAMINATION: CT OF ABDOMEN PELVIS WITH CLINICAL HISTORY: Diffuse abdominal pain and bloating over the last 4 hours. TECHNIQUE: 5 mm enhanced axial images were obtained from the lung bases through the greater trochanters.   mL of Omnipaque 350 was injected. COMPARISON: None. FINDINGS: There are some distended bowel loops containing fluid and air fluid levels.  The transition point is in the left lower abdomen 6.2 x 4.4 x 3.7 A too small to characterize low attenuation lesion is seen within the left lobe liver, which is probably a tiny cyst.  Spleen, pancreas, kidneys, and adrenal glands are unremarkable. The gallbladder contains no calcified gallstones.  There are dilated loops of bowel containing fluid and air fluid levels.  The degree of dilatation changes in the left lower abdomen (series 2 axial image 285 and series 602 sagittal image 87). There is no definite evidence for abdominal adenopathy or ascites. In the pelvis there are no pelvic masses or adenopathy. There is trace right pleural effusion. There is mild bibasilar atelectasis     Significant small-bowel obstruction with the transition point in the left lower abdomen. Probable tiny subcentimeter left hepatic lobe cyst. Trace right pleural effusion. Electronically signed by: Sally Ojeda Date:    06/24/2023 Time:    23:23    XR NG/OG tube placement check, non-radiologist performed    Result Date: 6/25/2023  EXAMINATION: XR NG/OG TUBE PLACEMENT CHECK NON RADIOLOGIST PERFORMED CLINICAL HISTORY: NG tube placement; TECHNIQUE: AP view of the abdomen. COMPARISON: Abdomen radiograph 04/25/2022 and CT abdomen pelvis 06/24/2023 FINDINGS: The tip of the feeding tube is seen in the gastric fundus near the GE junction.  Consider advancing.  There are dilated small bowel loops.  Residual contrast are seen in bilateral renal collecting systems from recent CT abdomen pelvis.     As above described. Electronically signed by: Sally  Rusty Date:    06/25/2023 Time:    00:47    X-Ray KUB    Result Date: 6/26/2023  EXAMINATION: XR KUB CLINICAL HISTORY: sbo; TECHNIQUE: Single AP supine view of the abdomen (KUB) was performed COMPARISON: 06/25/2023 FINDINGS: Tip of the enteric tube has been advanced into the stomach with the side hole at or just beyond the GE junction. A few dilated small bowel loops in the left abdomen with overall improved bowel gas pattern compared to 06/24/2023. Lung bases are clear. Degenerative change both hips.     Please see above. Electronically signed by: Radha Porter Date:    06/26/2023 Time:    09:49      Assessment    Paroxysmal atrial fibrillation   Likely exacerbated by her acute illness.    Plan and Discussion    Agree with metoprolol.  Discussed her diagnosis of atrial fibrillation.  Discussed her indication for anticoagulation at least in the short term while it is determined whether atrial fibrillation will be a long-term issue.  Would defer institution of anticoagulation until closer to discharge in case surgery becomes necessary for her small-bowel obstruction.  Will review her echocardiogram.      Kai Thompson MD

## 2023-06-30 NOTE — ASSESSMENT & PLAN NOTE
- New onset AFib rapid ventricular response confirmed with EKG today, rate noted in the 160s  - Treated with Lopressor 5 mg IV x1 with rate controlled, and will start metoprolol 12.5 mg p.o. b.i.d.  - Will hold off on anticoagulation at this time given bowel obstruction issues and concern for possible surgical intervention  - Consult Cardiology and check a 2D Echo

## 2023-06-30 NOTE — ASSESSMENT & PLAN NOTE
- Likely related to pain with SBO.  - Started on metoprolol for rate control as discussed above  - Continue hydralazine 10mg IV q6hr PRN for SBP > 180, DBP > 100

## 2023-06-30 NOTE — PT/OT/SLP PROGRESS
"Physical Therapy Treatment    Patient Name:  Marian Durham   MRN:  [id number]    Recommendations:     Discharge Recommendations: other (see comments)OPPT currently but may be revised pending mobility re-assessment   Discharge Equipment Recommendations: none currently but may benefit from a W/C for long distances pending mobility re-assessment  Barriers to discharge:  Worsening knee pain and debility     Assessment:     Marian Durham is a 77 y.o. female admitted with a medical diagnosis of SBO (small bowel obstruction).  She presents with the following impairments/functional limitations: weakness, impaired endurance, impaired functional mobility, impaired balance, decreased lower extremity function, pain, decreased ROM, edema.    Pt reports worsening pain for the past few days and more difficulty with mobility. Pt reports she needed a significant amount of assist to get OOB today and that the pain is limiting her from walking. Mobility assessment deferred today 2/2 pt with episode of SVT requiring metoprolol and supplemental 02, echo performed. Pt did tolerate gentle ROM and therex to R LE to increase ROM as well as ice application to decrease pain and swelling. Plan to re-assess mobility next visit to further address D/C plan.      Rehab Prognosis: Good; patient would benefit from acute skilled PT services to address these deficits and reach maximum level of function.    Recent Surgery: * No surgery found *      Plan:     During this hospitalization, patient to be seen 4 x/week to address the identified rehab impairments via therapeutic activities, gait training, therapeutic exercises, neuromuscular re-education, wheelchair management/training and progress toward the following goals:    Plan of Care Expires:  07/10/23    Subjective     Chief Complaint: "My knee pain has been getting worse and not better. I could barely get out of the bed today"  Patient/Family Comments/goals: Pt c/o R knee pain throughout " session, requesting therex and other interventions to help   Pain/Comfort:  Pain Rating 1: 10/10  Location - Side 1: Right  Location - Orientation 1: anterior  Location 1: knee  Pain Addressed 1: Pre-medicate for activity, Cessation of Activity      Objective:     Communicated with LISSETTE Elizabeth prior to session.  Patient found up in chair with peripheral IV, telemetry, oxygen upon PT entry to room.     General Precautions: Standard, fall  Orthopedic Precautions: N/A  Braces: N/A  Respiratory Status: Nasal cannula, flow 1 L/min     Functional Mobility:  Mobility assessment deferred today 2/2 pt with episode of SVT requiring metoprolol and supplemental 02, echo performed. LISSETTE Elizabeth advised minimal PT today.       AM-PAC 6 CLICK MOBILITY  Turning over in bed (including adjusting bedclothes, sheets and blankets)?: 2  Sitting down on and standing up from a chair with arms (e.g., wheelchair, bedside commode, etc.): 2  Moving from lying on back to sitting on the side of the bed?: 2  Moving to and from a bed to a chair (including a wheelchair)?: 2  Need to walk in hospital room?: 2  Climbing 3-5 steps with a railing?: 1  Basic Mobility Total Score: 11       Treatment & Education:  Performed gentle R LE AAROM therex, 1 set of 10 each as follows: quad sets, heelslides with 3 second hold, SLR, hip abduction, hip ER. Pt also performed long sitting hamstring stretch for 3 reps for 10 second hold each. Applied ice packs to B knees and R shoulder at pt's request to decrease pain and swelling at end of session, instructed to remove after 10 minutes.     Patient left up in chair with  all needs in reach, ice packs in place .    GOALS:   Multidisciplinary Problems       Physical Therapy Goals          Problem: Physical Therapy    Goal Priority Disciplines Outcome Goal Variances Interventions   Physical Therapy Goal     PT, PT/OT Ongoing, Not Progressing     Description: Goals to be met by: 7/10/23    Patient will increase functional  independence with mobility by performin. Sit<>stand with supervision with LRAD.  2. Gait x 300 feet with supervision with LRAD.  3. Ascend/descend 5 step(s) with least restrictive assistive device and uni HR with SBA.   4. Standing HEP with supervision with appropriate UE support and no LOB x3 min.                       Time Tracking:     PT Received On: 23  PT Start Time: 1415     PT Stop Time: 1440  PT Total Time (min): 25 min     Billable Minutes: Therapeutic Exercise 24    Treatment Type: Treatment  PT/PTA: PT     Number of PTA visits since last PT visit: 0     2023

## 2023-06-30 NOTE — PLAN OF CARE
D/C Rec- OPPT but may need to be revised pending mobility re-assessment  DME: Pt may benefit from W/C for long distances pending mobility re-assessment    Pt reports worsening pain for the past few days and more difficulty with mobility. Pt reports she needed a significant amount of assist to get OOB today and that the pain is limiting her from walking. Mobility assessment deferred today 2/2 pt with episode of SVT requiring metoprolol and supplemental 02, echo performed. Pt did tolerate gentle ROM and therex to R LE to increase ROM as well as ice application to decrease pain and swelling. Plan to re-assess mobility next visit to further address D/C plan.     Problem: Physical Therapy  Goal: Physical Therapy Goal  Description: Goals to be met by: 7/10/23    Patient will increase functional independence with mobility by performin. Sit<>stand with supervision with LRAD.  2. Gait x 300 feet with supervision with LRAD.  3. Ascend/descend 5 step(s) with least restrictive assistive device and uni HR with SBA.   4. Standing HEP with supervision with appropriate UE support and no LOB x3 min.  Outcome: Ongoing, Not Progressing

## 2023-06-30 NOTE — ASSESSMENT & PLAN NOTE
Possible GIB  - SBO with h/o same in 04/2022, CT with transition point in LLQ.  - NGT in place to low intermittent suction; NPO.  - Continue IVFs, pain, nausea control.  - Surgery consulted; appreciate assistance.  - Patient with continued coffee ground noted in NG that was initially seen 2 days ago, but H/H without significant drop  - Continue IV PPI Q12   - NG clamped on 6/27, and then removed on 6/28  - Repeat KUB showed, passing flatus but without bowel movement yet  - Advanced to clear liquids on 6/29, will advance to full liquids today

## 2023-06-30 NOTE — PLAN OF CARE
Medicare Message     Important Message from Medicare regarding Discharge Appeal Rights Given to patient/caregiver; Explained to patient/caregiver; Signed/date by patient/caregiver   Date IMM was signed 6/30/2023   Time IMM was signed 1500

## 2023-06-30 NOTE — PROGRESS NOTES
"[facility] Surg [address] Medicine  Progress Note    Patient Name: Marian Durham  MRN: [id number]  Patient Class: IP- Inpatient   Admission Date: 6/24/2023  Length of Stay: 6 days  Attending Physician: Yandy Horne MD  Primary Care Provider: Suzanna Duran MD        Subjective:     Principal Problem:SBO (small bowel obstruction)        HPI:  Ms. Marian Durham is a 77 y.o. female, with PMH of prior SBO, hysterectomy, who presented to [facility] ED on 6/24/23 due to diffuse upper abdominal pain x 4 hours. She notes associated nausea, chills, bloating. She states her last BM was earlier this morning. She states this feels like last time when she had a "twisted bowel" ten months ago. She states her last bowel obstruction resolved 8 days after having an NG tube placed. She was evaluated in the ED with labs that showed leukocytosis of 17 K, with left shift.  She had concentrated H&H.  A metabolic panel showed elevated anion gap of 18, with CO2 of 16, and glucose of 146.  BUN was elevated at 27 creatinine was 1.1.  UA was dilute with 3+ ketones.  CT of the abdomen and pelvis showed small-bowel obstruction with transition point in the left lower abdomen, and trace right pleural effusion.  An NG-tube was placed.  She is admitted to inpatient status.      Overview/Hospital Course:  Admitted with SBO. NGT placed and surgery consulted. Started IVFs and symptom control.      Interval History: No acute events overnight, this morning during exam patient developed rapid HR into 200s on monitor and later in/out from low 100s to 160s; no reported SOB or CP. Passing flatus but no BM.     Review of Systems   Constitutional:  Negative for chills and fever.   Respiratory:  Negative for cough and shortness of breath.    Cardiovascular:  Negative for chest pain and palpitations.   Gastrointestinal:  Positive for abdominal pain. Negative for nausea and vomiting.   Objective:     Vital Signs (Most Recent):  Temp: 98.6 " °F (37 °C) (06/30/23 1151)  Pulse: 104 (06/30/23 1400)  Resp: 18 (06/30/23 1151)  BP: 134/67 (06/30/23 1151)  SpO2: 98 % (06/30/23 1151) Vital Signs (24h Range):  Temp:  [98.1 °F (36.7 °C)-99.2 °F (37.3 °C)] 98.6 °F (37 °C)  Pulse:  [] 104  Resp:  [16-18] 18  SpO2:  [95 %-98 %] 98 %  BP: (112-180)/(61-95) 134/67     Weight: 51.6 kg (113 lb 12.1 oz)  Body mass index is 18.93 kg/m².    Intake/Output Summary (Last 24 hours) at 6/30/2023 1434  Last data filed at 6/30/2023 1341  Gross per 24 hour   Intake 1380 ml   Output 2700 ml   Net -1320 ml           Physical Exam  Vitals and nursing note reviewed.   Constitutional:       General: She is not in acute distress.     Appearance: She is well-developed.   HENT:      Head: Normocephalic and atraumatic.   Eyes:      General:         Right eye: No discharge.         Left eye: No discharge.      Conjunctiva/sclera: Conjunctivae normal.   Cardiovascular:      Rate and Rhythm: Tachycardia present. Rhythm irregular.      Pulses: Normal pulses.   Pulmonary:      Effort: Pulmonary effort is normal. No respiratory distress.   Abdominal:      General: Bowel sounds are normal. There is no distension.      Palpations: Abdomen is soft.      Tenderness: There is abdominal tenderness. There is no guarding or rebound.   Musculoskeletal:         General: Normal range of motion.      Right lower leg: No edema.      Left lower leg: No edema.   Skin:     General: Skin is warm and dry.   Neurological:      Mental Status: She is alert and oriented to person, place, and time.         Significant Labs:   CBC:  Recent Labs   Lab 06/28/23  0747 06/29/23  0425 06/30/23  0420   WBC 12.39 9.75 9.72   HGB 12.9 11.6* 12.8   HCT 40.0 35.5* 38.1    175 217   GRAN 82.4*  10.2* 72.7  7.1 72.3  7.0   LYMPH 8.6*  1.1 13.5*  1.3 13.1*  1.3   MONO 8.1  1.0 12.1  1.2* 13.2  1.3*   EOS 0.0 0.1 0.1   BASO 0.03 0.04 0.02     BMP:  Recent Labs   Lab 06/28/23  0747 06/29/23  0424 06/30/23  0420     137 135*   K 4.2 3.3* 3.3*    106 104   CO2 21* 18* 18*   BUN 10 10 7*   CREATININE 0.7 0.7 0.7   GLU 85 80 101   CALCIUM 8.9 8.6* 9.2   MG 2.0 1.9 2.0   PHOS  --  2.2* 2.2*       Significant Imaging: I have reviewed and interpreted all pertinent imaging results/findings within the past 24 hours.      Assessment/Plan:      * SBO (small bowel obstruction)  Possible GIB  - SBO with h/o same in 04/2022, CT with transition point in LLQ.  - NGT in place to low intermittent suction; NPO.  - Continue IVFs, pain, nausea control.  - Surgery consulted; appreciate assistance.  - Patient with continued coffee ground noted in NG that was initially seen 2 days ago, but H/H without significant drop  - Continue IV PPI Q12   - NG clamped on 6/27, and then removed on 6/28  - Repeat KUB showed, passing flatus but without bowel movement yet  - Advanced to clear liquids on 6/29, will advance to full liquids today    Atrial fibrillation with rapid ventricular response  - New onset AFib rapid ventricular response confirmed with EKG today, rate noted in the 160s  - Treated with Lopressor 5 mg IV x1 with rate controlled, and will start metoprolol 12.5 mg p.o. b.i.d.  - Will hold off on anticoagulation at this time given bowel obstruction issues and concern for possible surgical intervention  - Consult Cardiology and check a 2D Echo    Elevated blood pressure reading  - Likely related to pain with SBO.  - Started on metoprolol for rate control as discussed above  - Continue hydralazine 10mg IV q6hr PRN for SBP > 180, DBP > 100    Hypokalemia  - Replete PRN.    VTE Risk Mitigation (From admission, onward)         Ordered     IP VTE HIGH RISK PATIENT  Once         06/25/23 0028     Place sequential compression device  Until discontinued         06/25/23 0028                    Yandy Horne MD  Department of Hospital Medicine   [facility] Med Surg ([address])

## 2023-06-30 NOTE — PLAN OF CARE
Problem: Adult Inpatient Plan of Care  Goal: Plan of Care Review  Outcome: Ongoing, Progressing     Problem: Fluid Deficit (Intestinal Obstruction)  Goal: Fluid Balance  Outcome: Ongoing, Progressing     Problem: Fall Injury Risk  Goal: Absence of Fall and Fall-Related Injury  Outcome: Ongoing, Progressing

## 2023-07-01 PROBLEM — M19.90 OSTEOARTHRITIS: Status: ACTIVE | Noted: 2022-04-23

## 2023-07-01 LAB
ANION GAP SERPL CALC-SCNC: 10 MMOL/L (ref 8–16)
BASOPHILS # BLD AUTO: 0.03 K/UL (ref 0–0.2)
BASOPHILS NFR BLD: 0.3 % (ref 0–1.9)
BUN SERPL-MCNC: 7 MG/DL (ref 8–23)
CALCIUM SERPL-MCNC: 9.5 MG/DL (ref 8.7–10.5)
CHLORIDE SERPL-SCNC: 103 MMOL/L (ref 95–110)
CO2 SERPL-SCNC: 24 MMOL/L (ref 23–29)
CREAT SERPL-MCNC: 0.7 MG/DL (ref 0.5–1.4)
DIFFERENTIAL METHOD: ABNORMAL
EOSINOPHIL # BLD AUTO: 0.1 K/UL (ref 0–0.5)
EOSINOPHIL NFR BLD: 0.5 % (ref 0–8)
ERYTHROCYTE [DISTWIDTH] IN BLOOD BY AUTOMATED COUNT: 12.4 % (ref 11.5–14.5)
EST. GFR  (NO RACE VARIABLE): >60 ML/MIN/1.73 M^2
GLUCOSE SERPL-MCNC: 121 MG/DL (ref 70–110)
HCT VFR BLD AUTO: 39.5 % (ref 37–48.5)
HGB BLD-MCNC: 13.1 G/DL (ref 12–16)
IMM GRANULOCYTES # BLD AUTO: 0.04 K/UL (ref 0–0.04)
IMM GRANULOCYTES NFR BLD AUTO: 0.4 % (ref 0–0.5)
LYMPHOCYTES # BLD AUTO: 1.7 K/UL (ref 1–4.8)
LYMPHOCYTES NFR BLD: 16.3 % (ref 18–48)
MAGNESIUM SERPL-MCNC: 2 MG/DL (ref 1.6–2.6)
MCH RBC QN AUTO: 30 PG (ref 27–31)
MCHC RBC AUTO-ENTMCNC: 33.2 G/DL (ref 32–36)
MCV RBC AUTO: 91 FL (ref 82–98)
MONOCYTES # BLD AUTO: 1.6 K/UL (ref 0.3–1)
MONOCYTES NFR BLD: 15.6 % (ref 4–15)
NEUTROPHILS # BLD AUTO: 6.8 K/UL (ref 1.8–7.7)
NEUTROPHILS NFR BLD: 66.9 % (ref 38–73)
NRBC BLD-RTO: 0 /100 WBC
PHOSPHATE SERPL-MCNC: 2.3 MG/DL (ref 2.7–4.5)
PLATELET # BLD AUTO: 236 K/UL (ref 150–450)
PMV BLD AUTO: 9.2 FL (ref 9.2–12.9)
POTASSIUM SERPL-SCNC: 3.7 MMOL/L (ref 3.5–5.1)
RBC # BLD AUTO: 4.36 M/UL (ref 4–5.4)
SODIUM SERPL-SCNC: 137 MMOL/L (ref 136–145)
WBC # BLD AUTO: 10.18 K/UL (ref 3.9–12.7)

## 2023-07-01 PROCEDURE — 99232 SBSQ HOSP IP/OBS MODERATE 35: CPT | Mod: ,,, | Performed by: INTERNAL MEDICINE

## 2023-07-01 PROCEDURE — 99232 PR SUBSEQUENT HOSPITAL CARE,LEVL II: ICD-10-PCS | Mod: ,,, | Performed by: INTERNAL MEDICINE

## 2023-07-01 PROCEDURE — 99231 SBSQ HOSP IP/OBS SF/LOW 25: CPT | Mod: ,,, | Performed by: STUDENT IN AN ORGANIZED HEALTH CARE EDUCATION/TRAINING PROGRAM

## 2023-07-01 PROCEDURE — 80048 BASIC METABOLIC PNL TOTAL CA: CPT | Performed by: HOSPITALIST

## 2023-07-01 PROCEDURE — 25000003 PHARM REV CODE 250: Performed by: INTERNAL MEDICINE

## 2023-07-01 PROCEDURE — 21400001 HC TELEMETRY ROOM

## 2023-07-01 PROCEDURE — 25000003 PHARM REV CODE 250: Performed by: HOSPITALIST

## 2023-07-01 PROCEDURE — 97116 GAIT TRAINING THERAPY: CPT

## 2023-07-01 PROCEDURE — 85025 COMPLETE CBC W/AUTO DIFF WBC: CPT | Performed by: HOSPITALIST

## 2023-07-01 PROCEDURE — 84100 ASSAY OF PHOSPHORUS: CPT | Performed by: HOSPITALIST

## 2023-07-01 PROCEDURE — 97110 THERAPEUTIC EXERCISES: CPT

## 2023-07-01 PROCEDURE — 36415 COLL VENOUS BLD VENIPUNCTURE: CPT | Performed by: HOSPITALIST

## 2023-07-01 PROCEDURE — 99233 SBSQ HOSP IP/OBS HIGH 50: CPT | Mod: ,,, | Performed by: HOSPITALIST

## 2023-07-01 PROCEDURE — 99231 PR SUBSEQUENT HOSPITAL CARE,LEVL I: ICD-10-PCS | Mod: ,,, | Performed by: STUDENT IN AN ORGANIZED HEALTH CARE EDUCATION/TRAINING PROGRAM

## 2023-07-01 PROCEDURE — 99233 PR SUBSEQUENT HOSPITAL CARE,LEVL III: ICD-10-PCS | Mod: ,,, | Performed by: HOSPITALIST

## 2023-07-01 PROCEDURE — 83735 ASSAY OF MAGNESIUM: CPT | Performed by: HOSPITALIST

## 2023-07-01 RX ORDER — OXYCODONE AND ACETAMINOPHEN 5; 325 MG/1; MG/1
1 TABLET ORAL EVERY 4 HOURS PRN
Status: DISCONTINUED | OUTPATIENT
Start: 2023-07-01 | End: 2023-07-05 | Stop reason: HOSPADM

## 2023-07-01 RX ORDER — PANTOPRAZOLE SODIUM 40 MG/1
40 TABLET, DELAYED RELEASE ORAL 2 TIMES DAILY
Status: DISCONTINUED | OUTPATIENT
Start: 2023-07-01 | End: 2023-07-04

## 2023-07-01 RX ORDER — CELECOXIB 200 MG/1
200 CAPSULE ORAL DAILY
Status: DISCONTINUED | OUTPATIENT
Start: 2023-07-01 | End: 2023-07-05 | Stop reason: HOSPADM

## 2023-07-01 RX ORDER — METOPROLOL TARTRATE 25 MG/1
25 TABLET, FILM COATED ORAL 2 TIMES DAILY
Status: COMPLETED | OUTPATIENT
Start: 2023-07-01 | End: 2023-07-04

## 2023-07-01 RX ADMIN — PANTOPRAZOLE SODIUM 40 MG: 40 TABLET, DELAYED RELEASE ORAL at 09:07

## 2023-07-01 RX ADMIN — PANTOPRAZOLE SODIUM 40 MG: 40 TABLET, DELAYED RELEASE ORAL at 08:07

## 2023-07-01 RX ADMIN — FLUTICASONE PROPIONATE 100 MCG: 50 SPRAY, METERED NASAL at 09:07

## 2023-07-01 RX ADMIN — CETIRIZINE HYDROCHLORIDE 10 MG: 10 TABLET, FILM COATED ORAL at 09:07

## 2023-07-01 RX ADMIN — METOPROLOL TARTRATE 12.5 MG: 25 TABLET, FILM COATED ORAL at 09:07

## 2023-07-01 RX ADMIN — OXYCODONE HYDROCHLORIDE AND ACETAMINOPHEN 1 TABLET: 5; 325 TABLET ORAL at 09:07

## 2023-07-01 RX ADMIN — CELECOXIB 200 MG: 200 CAPSULE ORAL at 09:07

## 2023-07-01 RX ADMIN — METOPROLOL TARTRATE 25 MG: 25 TABLET, FILM COATED ORAL at 08:07

## 2023-07-01 RX ADMIN — HYDROXYZINE HYDROCHLORIDE 50 MG: 25 TABLET ORAL at 08:07

## 2023-07-01 NOTE — PLAN OF CARE
Problem: Physical Therapy  Goal: Physical Therapy Goal  Description: Goals to be met by: 7/10/23    Patient will increase functional independence with mobility by performin. Sit<>stand with supervision with LRAD.  2. Gait x 300 feet with supervision with LRAD.  3. Ascend/descend 5 step(s) with least restrictive assistive device and uni HR with SBA.   4. Standing HEP with supervision with appropriate UE support and no LOB x3 min. - MET  Outcome: Ongoing, Progressing     Pt able to supine>sit Min A cueing for technique. Stand x 5 min for pericare with SBA and RW, straight into gait trial x 60 ft slow gonzalo, with RW and CGA, stand>sit SBA with cueing, sit>supine SBA patient used BUE to lift legs. While patient could potentially go home with home health vs outpatient, her son is coming in town from [place] to help her, she is self-limiting and would likely need some transitional care before Dcing home, so since she was mod I prior to home she would most likely benefit from rehab.

## 2023-07-01 NOTE — NURSING
Patient  and sustaining. Patient on telemetry.  Patient seated upright In bedside chair with legs elevated. Patient denies SOB and chest pain. MD Coleen notified. Stat EKG ordered. MD Coleen stated patient in new onset Afib. IV metoprolol push ordered (see flow sheet). Metoprolol BID ordered. Patient educated on a-fib risk and treatment plan per MD orders. Cardiology consulted. Echo ordered. [employer] system applied for patient convenience and comfort. Fall precautions in place. Patient instructed to call for assistance. Patient denies needs at this time. Will continue to monitor and support patient.

## 2023-07-01 NOTE — SUBJECTIVE & OBJECTIVE
Interval History: No acute events overnight. Tolerating full liquids, passing flatus but no BM. She reported her abdomen feels much better, but really upset about not being able to walk due to known OA, but it has gotten worse since being in the hospital and not being able to get her normal therapy.     Review of Systems   Constitutional:  Negative for chills and fever.   Respiratory:  Negative for cough and shortness of breath.    Cardiovascular:  Negative for chest pain and palpitations.   Gastrointestinal:  Negative for abdominal pain, nausea and vomiting.   Musculoskeletal:  Positive for arthralgias and gait problem.   Objective:     Vital Signs (Most Recent):  Temp: 97.9 °F (36.6 °C) (07/01/23 1124)  Pulse: 104 (07/01/23 1426)  Resp: 18 (07/01/23 1124)  BP: (!) 123/58 (07/01/23 1124)  SpO2: 96 % (07/01/23 1124) Vital Signs (24h Range):  Temp:  [97.9 °F (36.6 °C)-99.2 °F (37.3 °C)] 97.9 °F (36.6 °C)  Pulse:  [] 104  Resp:  [17-20] 18  SpO2:  [95 %-98 %] 96 %  BP: (109-189)/(58-94) 123/58     Weight: 51.6 kg (113 lb 12.1 oz)  Body mass index is 18.93 kg/m².    Intake/Output Summary (Last 24 hours) at 7/1/2023 1528  Last data filed at 7/1/2023 1448  Gross per 24 hour   Intake 400 ml   Output 800 ml   Net -400 ml           Physical Exam  Vitals and nursing note reviewed.   Constitutional:       General: She is not in acute distress.     Appearance: She is well-developed.   HENT:      Head: Normocephalic and atraumatic.   Eyes:      General:         Right eye: No discharge.         Left eye: No discharge.      Conjunctiva/sclera: Conjunctivae normal.   Cardiovascular:      Rate and Rhythm: Normal rate. Rhythm irregular.      Pulses: Normal pulses.   Pulmonary:      Effort: Pulmonary effort is normal. No respiratory distress.   Abdominal:      General: Bowel sounds are normal. There is no distension.      Palpations: Abdomen is soft.      Tenderness: There is abdominal tenderness. There is no guarding or  rebound.   Musculoskeletal:         General: Normal range of motion.      Right lower leg: No edema.      Left lower leg: No edema.   Skin:     General: Skin is warm and dry.   Neurological:      Mental Status: She is alert and oriented to person, place, and time.         Significant Labs:   CBC:  Recent Labs   Lab 06/29/23  0425 06/30/23  0420 07/01/23  0426   WBC 9.75 9.72 10.18   HGB 11.6* 12.8 13.1   HCT 35.5* 38.1 39.5    217 236   GRAN 72.7  7.1 72.3  7.0 66.9  6.8   LYMPH 13.5*  1.3 13.1*  1.3 16.3*  1.7   MONO 12.1  1.2* 13.2  1.3* 15.6*  1.6*   EOS 0.1 0.1 0.1   BASO 0.04 0.02 0.03     BMP:  Recent Labs   Lab 06/29/23  0424 06/30/23  0420 07/01/23 0426    135* 137   K 3.3* 3.3* 3.7    104 103   CO2 18* 18* 24   BUN 10 7* 7*   CREATININE 0.7 0.7 0.7   GLU 80 101 121*   CALCIUM 8.6* 9.2 9.5   MG 1.9 2.0 2.0   PHOS 2.2* 2.2* 2.3*       Significant Imaging: I have reviewed and interpreted all pertinent imaging results/findings within the past 24 hours.

## 2023-07-01 NOTE — PROGRESS NOTES
"Surgery Inpatient Progress Note    Date: 07/01/2023    Subjective:     Feeling OK, denies abdominal pain, nausea or vomiting.  Tolerating some PO.  Passing flatus, no significant bowel movement.  Concerned about orthopaedic issues, knee pain and PT.    Objective:   BP (!) 123/58 (BP Location: Left arm, Patient Position: Sitting)   Pulse 90   Temp 97.9 °F (36.6 °C) (Oral)   Resp 18   Ht 5' 5" (1.651 m)   Wt 51.6 kg (113 lb 12.1 oz)   SpO2 96%   Breastfeeding No   BMI 18.93 kg/m²     Physical Exam  General: 77 y.o. female in no acute distress   Neuro: grossly intact    HEENT: normocephalic, pupils grossly reactive to light  Respiratory: respirations are even and unlabored  Cardiac: regular rate and rhythm  Abdomen: soft, non-distended, non-tender  Extremities: warm, dry and intact    Output by Drain (mL) 06/29/23 0701 - 06/29/23 1900 06/29/23 1901 - 06/30/23 0700 06/30/23 0701 - 06/30/23 1900 06/30/23 1901 - 07/01/23 0700 07/01/23 0701 - 07/01/23 1343   Patient has no LDAs of requested type attached.       I/O last 3 completed shifts:  In: 600 [P.O.:600]  Out: 2400 [Urine:2400]  No intake/output data recorded.    Lab Results   Component Value Date/Time    WBC 10.18 07/01/2023 04:26 AM    HGB 13.1 07/01/2023 04:26 AM    HCT 39.5 07/01/2023 04:26 AM     07/01/2023 04:26 AM     07/01/2023 04:26 AM    K 3.7 07/01/2023 04:26 AM    BUN 7 (L) 07/01/2023 04:26 AM    CREATININE 0.7 07/01/2023 04:26 AM    PHOS 2.3 (L) 07/01/2023 04:26 AM    MG 2.0 07/01/2023 04:26 AM      X-Ray Abdomen Flat And Erect 07/01/2023    Narrative  EXAMINATION:  XR ABDOMEN FLAT AND ERECT    CLINICAL HISTORY:  abd pain/distention;    TECHNIQUE:  Flat and erect AP radiographs of the abdomen were performed.    COMPARISON:  06/29/2023    FINDINGS:  Bowel gas pattern appears within normal limits.  No dilated loops of bowel or air-fluid levels identified.  No free air.    No evidence of organomegaly or intra-abdominal mass effect.  No " renal or ureteral calculi.    Impression  No acute process identified      Electronically signed by: Theo Burnham MD  Date:    07/01/2023  Time:    09:54      Assessment and Plan:   Marian Durham is a 77 y.o. female who presents with a small bowel obstruction in the setting of prior hysterectomy.  Small bowel obstruction resolving, AXR encouraging.    - Currently on full liquid diet, advance diet as tolerated  - Encourage OOB/ ambulation      Latonia Alvarez MD  Staff Surgeon  General Surgery

## 2023-07-01 NOTE — ASSESSMENT & PLAN NOTE
- Known h/o OA with need for surgical intervention  - Progressive worsening pain and difficulty walking which is a change since admission, reportedly due to having to stop Celebrex due to SBO and lack of her usual therapy  - Will repeat imaging with plain films, consult Ortho (patient request and she is a patient of Dr. Mitchell)  - Restart Celebrex 200 mg daily and continue therapy

## 2023-07-01 NOTE — ASSESSMENT & PLAN NOTE
- New onset AFib rapid ventricular response confirmed with EKG, rate noted in the 160s on 6/30  - Treated with Lopressor 5 mg IV x1 with rate controlled, and started metoprolol 12.5 mg p.o. b.i.d.  - Consulted Cardiology and checked a 2D Echo which showed normal systolic function and grade I diastolic dysfunction  - Rate with fluctuation sometimes >100, will increase to 25 mg PO BID today  - Will hold off on anticoagulation at this time given bowel obstruction issues and concern for possible surgical intervention

## 2023-07-01 NOTE — PROGRESS NOTES
"[facility] [address] Medicine  Progress Note    Patient Name: Marian Durham  MRN: [id number]  Patient Class: IP- Inpatient   Admission Date: 6/24/2023  Length of Stay: 7 days  Attending Physician: Yandy Horne MD  Primary Care Provider: Suzanna Duran MD        Subjective:     Principal Problem:SBO (small bowel obstruction)        HPI:  Ms. Marian Durham is a 77 y.o. female, with PMH of prior SBO, hysterectomy, who presented to [facility] ED on 6/24/23 due to diffuse upper abdominal pain x 4 hours. She notes associated nausea, chills, bloating. She states her last BM was earlier this morning. She states this feels like last time when she had a "twisted bowel" ten months ago. She states her last bowel obstruction resolved 8 days after having an NG tube placed. She was evaluated in the ED with labs that showed leukocytosis of 17 K, with left shift.  She had concentrated H&H.  A metabolic panel showed elevated anion gap of 18, with CO2 of 16, and glucose of 146.  BUN was elevated at 27 creatinine was 1.1.  UA was dilute with 3+ ketones.  CT of the abdomen and pelvis showed small-bowel obstruction with transition point in the left lower abdomen, and trace right pleural effusion.  An NG-tube was placed.  She is admitted to inpatient status.      Overview/Hospital Course:  Admitted with SBO. NGT placed and surgery consulted. Started IVFs and symptom control. Patient with clinical improvement and NG removed and diet slowly advanced. Developed Afib with RVR on 6/30 and Cardiology consulted. She has been working with PT/OT but now with worsening pain/weakness to hip and knees.      Interval History: No acute events overnight. Tolerating full liquids, passing flatus but no BM. She reported her abdomen feels much better, but really upset about not being able to walk due to known OA, but it has gotten worse since being in the hospital and not being able to get her normal therapy.     Review of " Systems   Constitutional:  Negative for chills and fever.   Respiratory:  Negative for cough and shortness of breath.    Cardiovascular:  Negative for chest pain and palpitations.   Gastrointestinal:  Negative for abdominal pain, nausea and vomiting.   Musculoskeletal:  Positive for arthralgias and gait problem.   Objective:     Vital Signs (Most Recent):  Temp: 97.9 °F (36.6 °C) (07/01/23 1124)  Pulse: 104 (07/01/23 1426)  Resp: 18 (07/01/23 1124)  BP: (!) 123/58 (07/01/23 1124)  SpO2: 96 % (07/01/23 1124) Vital Signs (24h Range):  Temp:  [97.9 °F (36.6 °C)-99.2 °F (37.3 °C)] 97.9 °F (36.6 °C)  Pulse:  [] 104  Resp:  [17-20] 18  SpO2:  [95 %-98 %] 96 %  BP: (109-189)/(58-94) 123/58     Weight: 51.6 kg (113 lb 12.1 oz)  Body mass index is 18.93 kg/m².    Intake/Output Summary (Last 24 hours) at 7/1/2023 1528  Last data filed at 7/1/2023 1448  Gross per 24 hour   Intake 400 ml   Output 800 ml   Net -400 ml           Physical Exam  Vitals and nursing note reviewed.   Constitutional:       General: She is not in acute distress.     Appearance: She is well-developed.   HENT:      Head: Normocephalic and atraumatic.   Eyes:      General:         Right eye: No discharge.         Left eye: No discharge.      Conjunctiva/sclera: Conjunctivae normal.   Cardiovascular:      Rate and Rhythm: Normal rate. Rhythm irregular.      Pulses: Normal pulses.   Pulmonary:      Effort: Pulmonary effort is normal. No respiratory distress.   Abdominal:      General: Bowel sounds are normal. There is no distension.      Palpations: Abdomen is soft.      Tenderness: There is abdominal tenderness. There is no guarding or rebound.   Musculoskeletal:         General: Normal range of motion.      Right lower leg: No edema.      Left lower leg: No edema.   Skin:     General: Skin is warm and dry.   Neurological:      Mental Status: She is alert and oriented to person, place, and time.         Significant Labs:   CBC:  Recent Labs   Lab  06/29/23 0425 06/30/23 0420 07/01/23 0426   WBC 9.75 9.72 10.18   HGB 11.6* 12.8 13.1   HCT 35.5* 38.1 39.5    217 236   GRAN 72.7  7.1 72.3  7.0 66.9  6.8   LYMPH 13.5*  1.3 13.1*  1.3 16.3*  1.7   MONO 12.1  1.2* 13.2  1.3* 15.6*  1.6*   EOS 0.1 0.1 0.1   BASO 0.04 0.02 0.03     BMP:  Recent Labs   Lab 06/29/23  0424 06/30/23 0420 07/01/23 0426    135* 137   K 3.3* 3.3* 3.7    104 103   CO2 18* 18* 24   BUN 10 7* 7*   CREATININE 0.7 0.7 0.7   GLU 80 101 121*   CALCIUM 8.6* 9.2 9.5   MG 1.9 2.0 2.0   PHOS 2.2* 2.2* 2.3*       Significant Imaging: I have reviewed and interpreted all pertinent imaging results/findings within the past 24 hours.      Assessment/Plan:      * SBO (small bowel obstruction)  Possible GIB  - SBO with h/o same in 04/2022, CT with transition point in LLQ.  - NGT in place to low intermittent suction; NPO.  - Continue IVFs, pain, nausea control.  - Surgery consulted; appreciate assistance.  - Patient with continued coffee ground noted in NG that was initially seen 2 days ago, but H/H without significant drop  - Continue IV PPI Q12   - NG clamped on 6/27, and then removed on 6/28  - Repeat KUB showed improvement, passing flatus but without bowel movement yet  - Advanced to clear liquids on 6/29, and then to full liquids on 6/30  - Repeat KUB today, and if okay, will advance to bland diet    Atrial fibrillation with rapid ventricular response  - New onset AFib rapid ventricular response confirmed with EKG, rate noted in the 160s on 6/30  - Treated with Lopressor 5 mg IV x1 with rate controlled, and started metoprolol 12.5 mg p.o. b.i.d.  - Consulted Cardiology and checked a 2D Echo which showed normal systolic function and grade I diastolic dysfunction  - Rate with fluctuation sometimes >100, will increase to 25 mg PO BID today  - Will hold off on anticoagulation at this time given bowel obstruction issues and concern for possible surgical intervention    Elevated  blood pressure reading  - Likely related to pain with SBO.  - Started on metoprolol for rate control as discussed above  - Continue hydralazine 10mg IV q6hr PRN for SBP > 180, DBP > 100    Hypokalemia  - Replete PRN.    Osteoarthritis  - Known h/o OA with need for surgical intervention  - Progressive worsening pain and difficulty walking which is a change since admission, reportedly due to having to stop Celebrex due to SBO and lack of her usual therapy  - Will repeat imaging with plain films, consult Ortho (patient request and she is a patient of Dr. Mitchell)  - Restart Celebrex 200 mg daily and continue therapy      VTE Risk Mitigation (From admission, onward)         Ordered     IP VTE HIGH RISK PATIENT  Once         06/25/23 0028     Place sequential compression device  Until discontinued         06/25/23 0028                    Yandy Horne MD  Department of Hospital Medicine   [facility] ([address])

## 2023-07-01 NOTE — ASSESSMENT & PLAN NOTE
Possible GIB  - SBO with h/o same in 04/2022, CT with transition point in LLQ.  - NGT in place to low intermittent suction; NPO.  - Continue IVFs, pain, nausea control.  - Surgery consulted; appreciate assistance.  - Patient with continued coffee ground noted in NG that was initially seen 2 days ago, but H/H without significant drop  - Continue IV PPI Q12   - NG clamped on 6/27, and then removed on 6/28  - Repeat KUB showed improvement, passing flatus but without bowel movement yet  - Advanced to clear liquids on 6/29, and then to full liquids on 6/30  - Repeat KUB today, and if okay, will advance to bland diet

## 2023-07-01 NOTE — PROGRESS NOTES
[facility] PROGRESS NOTE    Date of admission:  6/24/2023 7/1/2023  2:05 PM    Interval history:    The patient is stable but still no bowel movement.  No further paroxysms of atrial fibrillation.    History  Past Medical History:   Diagnosis Date    Hyperlipidemia     Osteoarthritis     Shingles 2002    Spinal stenosis        Medications  Current Facility-Administered Medications   Medication Dose Route Frequency Provider Last Rate Last Admin    acetaminophen tablet 650 mg  650 mg Oral Q6H PRN Karmen Avila PA-C   650 mg at 06/26/23 0947    celecoxib capsule 200 mg  200 mg Oral Daily Yandy Horne MD   200 mg at 07/01/23 0931    cetirizine tablet 10 mg  10 mg Oral Daily SHERIN Pearson MD   10 mg at 07/01/23 0931    fluticasone propionate 50 mcg/actuation nasal spray 100 mcg  2 spray Each Nostril Daily SHERIN Pearson MD   100 mcg at 07/01/23 0940    hydrALAZINE injection 10 mg  10 mg Intravenous Q6H PRN SHERIN Pearson MD   10 mg at 06/30/23 0453    hydrOXYzine HCL tablet 50 mg  50 mg Oral TID PRN SHERIN Pearson MD   50 mg at 07/01/23 0803    melatonin tablet 6 mg  6 mg Oral Nightly PRN Karmen Avila PA-C   6 mg at 06/30/23 2042    metoprolol injection 5 mg  5 mg Intravenous Q5 Min PRN Yandy Horne MD   5 mg at 06/30/23 0842    metoprolol tartrate (LOPRESSOR) tablet 25 mg  25 mg Oral BID Yandy Horne MD        morphine injection 2 mg  2 mg Intravenous Q4H PRN SHERIN Pearson MD   2 mg at 06/30/23 2359    naloxone 0.4 mg/mL injection 0.02 mg  0.02 mg Intravenous PRN Karmen Avila PA-C        ondansetron injection 8 mg  8 mg Intravenous Q6H PRN Karmen Avial PA-C   8 mg at 06/26/23 2111    oxyCODONE-acetaminophen 5-325 mg per tablet 1 tablet  1 tablet Oral Q4H PRN Yandy Horne MD   1 tablet at 07/01/23 0939    pantoprazole EC tablet 40 mg  40 mg Oral BID Yandy Horne MD   40 mg at 07/01/23 0931    simethicone 40 mg/0.6 mL drops 40 mg  40 mg Oral QID PRN Karmen STREETER  PRIYA Avila        sodium chloride 0.9% flush 10 mL  10 mL Intravenous PRN SHERIN Pearson MD   10 mL at 06/29/23 1356        Physical exam    Temp:  [97.9 °F (36.6 °C)-99.2 °F (37.3 °C)]   Pulse:  []   Resp:  [17-20]   BP: (109-189)/(58-94)   SpO2:  [95 %-98 %]    Wt Readings from Last 3 Encounters:   06/29/23 51.6 kg (113 lb 12.1 oz)   06/30/23 51.3 kg (113 lb)   08/29/22 53.7 kg (118 lb 6.4 oz)       Intake/Output Summary (Last 24 hours) at 7/1/2023 1405  Last data filed at 7/1/2023 0514  Gross per 24 hour   Intake --   Output 800 ml   Net -800 ml     Physical Exam  Constitutional:       General: She is not in acute distress.  HENT:      Head: Normocephalic and atraumatic.      Mouth/Throat:      Mouth: Mucous membranes are moist.   Eyes:      Extraocular Movements: Extraocular movements intact.      Pupils: Pupils are equal, round, and reactive to light.   Neck:      Vascular: No carotid bruit or JVD.   Cardiovascular:      Rate and Rhythm: Normal rate and regular rhythm.      Heart sounds: No murmur heard.    No friction rub. No gallop.   Pulmonary:      Effort: Pulmonary effort is normal.      Breath sounds: Normal breath sounds.   Abdominal:      Tenderness: There is no abdominal tenderness. There is no guarding or rebound.   Musculoskeletal:      Right lower leg: No edema.      Left lower leg: No edema.   Skin:     General: Skin is warm and dry.      Capillary Refill: Capillary refill takes less than 2 seconds.   Neurological:      General: No focal deficit present.      Mental Status: She is alert.   Psychiatric:         Mood and Affect: Mood normal.       Labs  Recent Results (from the past 24 hour(s))   Magnesium    Collection Time: 07/01/23  4:26 AM   Result Value Ref Range    Magnesium 2.0 1.6 - 2.6 mg/dL   Phosphorus    Collection Time: 07/01/23  4:26 AM   Result Value Ref Range    Phosphorus 2.3 (L) 2.7 - 4.5 mg/dL   Basic Metabolic Panel    Collection Time: 07/01/23  4:26 AM   Result Value  Ref Range    Sodium 137 136 - 145 mmol/L    Potassium 3.7 3.5 - 5.1 mmol/L    Chloride 103 95 - 110 mmol/L    CO2 24 23 - 29 mmol/L    Glucose 121 (H) 70 - 110 mg/dL    BUN 7 (L) 8 - 23 mg/dL    Creatinine 0.7 0.5 - 1.4 mg/dL    Calcium 9.5 8.7 - 10.5 mg/dL    Anion Gap 10 8 - 16 mmol/L    eGFR >60 >60 mL/min/1.73 m^2   CBC Auto Differential    Collection Time: 07/01/23  4:26 AM   Result Value Ref Range    WBC 10.18 3.90 - 12.70 K/uL    RBC 4.36 4.00 - 5.40 M/uL    Hemoglobin 13.1 12.0 - 16.0 g/dL    Hematocrit 39.5 37.0 - 48.5 %    MCV 91 82 - 98 fL    MCH 30.0 27.0 - 31.0 pg    MCHC 33.2 32.0 - 36.0 g/dL    RDW 12.4 11.5 - 14.5 %    Platelets 236 150 - 450 K/uL    MPV 9.2 9.2 - 12.9 fL    Immature Granulocytes 0.4 0.0 - 0.5 %    Gran # (ANC) 6.8 1.8 - 7.7 K/uL    Immature Grans (Abs) 0.04 0.00 - 0.04 K/uL    Lymph # 1.7 1.0 - 4.8 K/uL    Mono # 1.6 (H) 0.3 - 1.0 K/uL    Eos # 0.1 0.0 - 0.5 K/uL    Baso # 0.03 0.00 - 0.20 K/uL    nRBC 0 0 /100 WBC    Gran % 66.9 38.0 - 73.0 %    Lymph % 16.3 (L) 18.0 - 48.0 %    Mono % 15.6 (H) 4.0 - 15.0 %    Eosinophil % 0.5 0.0 - 8.0 %    Basophil % 0.3 0.0 - 1.9 %    Differential Method Automated          Telemetry  Last episode of atrial fibrillation with rapid ventricular response yesterday morning.  Normal sinus rhythm normal rate currently.    EKG  No new tracing today.    Echocardiogram  Results for orders placed or performed during the hospital encounter of 06/24/23   Echo Saline Bubble? Yes   Result Value Ref Range    BSA 1.53 m2    TDI SEPTAL 0.05 m/s    LV LATERAL E/E' RATIO 10.83 m/s    LV SEPTAL E/E' RATIO 13.00 m/s    LA WIDTH 2.80 cm    IVC diameter 2.06 cm    Left Ventricular Outflow Tract Mean Velocity 0.82 cm/s    Left Ventricular Outflow Tract Mean Gradient 2.89 mmHg    TDI LATERAL 0.06 m/s    LVIDd 3.75 3.5 - 6.0 cm    IVS 0.81 0.6 - 1.1 cm    Posterior Wall 0.79 0.6 - 1.1 cm    LVIDs 2.21 2.1 - 4.0 cm    FS 41 28 - 44 %    LA volume 24.53 cm3    LV mass 84.13  g    LA size 2.60 cm    Left Ventricle Relative Wall Thickness 0.42 cm    AV mean gradient 3 mmHg    AV valve area 3.05 cm2    AV Velocity Ratio 0.98     AV index (prosthetic) 0.99     MV valve area p 1/2 method 3.64 cm2    E/A ratio 1.02     Mean e' 0.06 m/s    E wave deceleration time 220.01 msec    IVRT 82.78 msec    Pulm vein S/D ratio 1.63     LVOT diameter 1.98 cm    LVOT area 3.1 cm2    LVOT peak jermaine 1.14 m/s    LVOT peak VTI 22.90 cm    Ao peak jermaine 1.16 m/s    Ao VTI 23.1 cm    LVOT stroke volume 70.48 cm3    AV peak gradient 5 mmHg    E/E' ratio 11.82 m/s    MV Peak E Jermaine 0.65 m/s    TR Max Jermaine 1.69 m/s    MV stenosis pressure 1/2 time 60.46 ms    MV Peak A Jermaine 0.64 m/s    PV Peak S Jermaine 0.52 m/s    PV Peak D Jermaine 0.32 m/s    LV Systolic Volume 16.35 mL    LV Systolic Volume Index 10.5 mL/m2    LV Diastolic Volume 59.87 mL    LV Diastolic Volume Index 38.63 mL/m2    LA Volume Index 15.8 mL/m2    LV Mass Index 54 g/m2    RA Major Axis 3.98 cm    Left Atrium Minor Axis 4.03 cm    Left Atrium Major Axis 3.90 cm    Triscuspid Valve Regurgitation Peak Gradient 11 mmHg    LA Volume Index (Mod) 13.5 mL/m2    LA volume (mod) 21.00 cm3    RA Width 3.00 cm    Right Atrial Pressure (from IVC) 3 mmHg    EF 65 %    TV rest pulmonary artery pressure 14 mmHg    Narrative    · The left ventricle is normal in size with normal systolic function.  · Grade I left ventricular diastolic dysfunction.  · Normal right ventricular size with normal right ventricular systolic   function.          Imaging   X-Ray Chest 1 View    Result Date: 7/1/2023  EXAMINATION: XR CHEST 1 VIEW CLINICAL HISTORY: cough; TECHNIQUE: Frontal view of the chest was performed. COMPARISON: No priors FINDINGS: Multiple overlying cardiac monitoring leads. The cardiomediastinal silhouette is normal in size and midline. Pulmonary vascularity appears within normal limits. The lungs appear clear without confluent pulmonary parenchymal opacity. No pleural fluid or  pneumothorax.     No evidence of acute cardiopulmonary disease. Electronically signed by: Theo Burnham MD Date:    07/01/2023 Time:    10:13    X-Ray Hip 2 or 3 views Right (with Pelvis when performed)    Result Date: 7/1/2023  EXAMINATION: XR HIP WITH PELVIS WHEN PERFORMED, 2 OR 3  VIEWS RIGHT CLINICAL HISTORY: Pain and inability to walk; TECHNIQUE: AP view of the pelvis and frog leg lateral view of the right hip were performed. COMPARISON: CT of the pelvis 06/24/2023 FINDINGS: Osseous structures appear intact without evidence of fracture or osseous destructive process.  No apparent dislocation. No advanced degenerative change or significant joint space narrowing.     No evidence of a displaced fracture or dislocation. Correlation with cross-sectional imaging if warranted clinically. Electronically signed by: Theo Burnham MD Date:    07/01/2023 Time:    13:59    X-Ray Knee 1 or 2 View Right    Result Date: 7/1/2023  EXAMINATION: XR KNEE 1 OR 2 VIEW RIGHT CLINICAL HISTORY: pain and inability to walk; TECHNIQUE: AP and cross-table lateral views of the right knee COMPARISON: None FINDINGS: No evidence of a displaced fracture, osseous destructive process or dislocation. Tricompartmental osteoarthritis, worst in the lateral tibiofemoral compartment. Suprapatellar joint effusion.  Additional calcifications in the suprapatellar region suggesting ossific intra-articular loose bodies, which are new from prior..     As above Electronically signed by: Theo Burnham MD Date:    07/01/2023 Time:    12:48    X-Ray Abdomen Flat And Erect    Result Date: 7/1/2023  EXAMINATION: XR ABDOMEN FLAT AND ERECT CLINICAL HISTORY: abd pain/distention; TECHNIQUE: Flat and erect AP radiographs of the abdomen were performed. COMPARISON: 06/29/2023 FINDINGS: Bowel gas pattern appears within normal limits.  No dilated loops of bowel or air-fluid levels identified.  No free air. No evidence of organomegaly or intra-abdominal mass effect.  No  renal or ureteral calculi.     No acute process identified Electronically signed by: Theo Burnham MD Date:    07/01/2023 Time:    09:54    X-Ray Abdomen Flat And Erect    Result Date: 6/29/2023  EXAMINATION: XR ABDOMEN FLAT AND ERECT CLINICAL HISTORY: abd pain/distention; TECHNIQUE: Flat and erect AP views of the abdomen were performed. COMPARISON: Abdominal radiograph performed 06/20/2023, 09:00 hours. FINDINGS: Enteric tube appears to been discontinued. No definite progressively dilated small bowel or colonic loops.  Air appears to be present in the distal colon rectum.  Overall bowel gas pattern appears nonspecific, but nonobstructive. Otherwise no significant change relative to prior study performed 06/20/2023, 09:00 hours.     As above. Electronically signed by: Jai Gentile Date:    06/29/2023 Time:    08:22    X-Ray Abdomen Flat And Erect    Result Date: 6/28/2023  EXAMINATION: XR ABDOMEN FLAT AND ERECT CLINICAL HISTORY: abd pain/distention; TECHNIQUE: Flat and erect AP views of the abdomen were performed. COMPARISON: Abdominal radiograph 06/27/2023, 12:36 hours. FINDINGS: Monitoring leads are noted.  Enteric tube is noted, with side port projecting anticipated location lower esophagus.  Advancement by approximately 10-12 cm would place the side port at the anticipated location of stomach, as desired clinically. Nonspecific gaseous distension of colonic loops.  Mildly dilated gas-filled loops of small bowel measure up to 3.6 cm as measured in the left hemiabdomen.  Overall degree of dilated small bowel loops appears improved since 06/27/2023, 12:36 hours.  Relative paucity of air distal colon and rectum. Remainder examination not substantial interval change.     As above. Electronically signed by: Jai Gentile Date:    06/28/2023 Time:    09:26    X-Ray Abdomen Flat And Erect    Result Date: 6/27/2023  EXAMINATION: XR ABDOMEN FLAT AND ERECT CLINICAL HISTORY: abd pain/distention; TECHNIQUE: Flat and  erect AP views of the abdomen were performed. COMPARISON: 06/26/2023, CT 06/24/2023 FINDINGS: One upright view, 2 supine views. Nasogastric tube tip projects over the expected location of the gastric lumen, side hole at the level of the gastroesophageal junction, possibly above the gastroesophageal junction.  There are a few scattered air-fluid levels within prominent small bowel loops projected over the mid pelvis.  Air and stool is seen within the large bowel and projected over the rectum noting moderate to large amount of stool throughout the colon.  There are no calcifications to suggest nephrolithiasis or cholelithiasis.  No large volume free air or pneumatosis.  The lower lung zones are clear.     1. Slow flow through a few small bowel loops, the degree of small-bowel distention has decreased since examination 06/24/2023 and 06/26/2023.  Continued follow-up is advised. 2. Moderate to large amount of stool in the colon could reflect developing constipation. Electronically signed by: Jose Rodney MD Date:    06/27/2023 Time:    13:38    CT Abdomen Pelvis With Contrast    Result Date: 6/24/2023  EXAMINATION: CT OF ABDOMEN PELVIS WITH CLINICAL HISTORY: Diffuse abdominal pain and bloating over the last 4 hours. TECHNIQUE: 5 mm enhanced axial images were obtained from the lung bases through the greater trochanters.   mL of Omnipaque 350 was injected. COMPARISON: None. FINDINGS: There are some distended bowel loops containing fluid and air fluid levels.  The transition point is in the left lower abdomen 6.2 x 4.4 x 3.7 A too small to characterize low attenuation lesion is seen within the left lobe liver, which is probably a tiny cyst.  Spleen, pancreas, kidneys, and adrenal glands are unremarkable. The gallbladder contains no calcified gallstones.  There are dilated loops of bowel containing fluid and air fluid levels.  The degree of dilatation changes in the left lower abdomen (series 2 axial image 285 and series  602 sagittal image 87). There is no definite evidence for abdominal adenopathy or ascites. In the pelvis there are no pelvic masses or adenopathy. There is trace right pleural effusion. There is mild bibasilar atelectasis     Significant small-bowel obstruction with the transition point in the left lower abdomen. Probable tiny subcentimeter left hepatic lobe cyst. Trace right pleural effusion. Electronically signed by: Sally Ojeda Date:    06/24/2023 Time:    23:23    XR NG/OG tube placement check, non-radiologist performed    Result Date: 6/25/2023  EXAMINATION: XR NG/OG TUBE PLACEMENT CHECK NON RADIOLOGIST PERFORMED CLINICAL HISTORY: NG tube placement; TECHNIQUE: AP view of the abdomen. COMPARISON: Abdomen radiograph 04/25/2022 and CT abdomen pelvis 06/24/2023 FINDINGS: The tip of the feeding tube is seen in the gastric fundus near the GE junction.  Consider advancing.  There are dilated small bowel loops.  Residual contrast are seen in bilateral renal collecting systems from recent CT abdomen pelvis.     As above described. Electronically signed by: Sally Ojeda Date:    06/25/2023 Time:    00:47    Echo Saline Bubble? Yes    Result Date: 6/30/2023  · The left ventricle is normal in size with normal systolic function. · Grade I left ventricular diastolic dysfunction. · Normal right ventricular size with normal right ventricular systolic function.      X-Ray KUB    Result Date: 6/26/2023  EXAMINATION: XR KUB CLINICAL HISTORY: sbo; TECHNIQUE: Single AP supine view of the abdomen (KUB) was performed COMPARISON: 06/25/2023 FINDINGS: Tip of the enteric tube has been advanced into the stomach with the side hole at or just beyond the GE junction. A few dilated small bowel loops in the left abdomen with overall improved bowel gas pattern compared to 06/24/2023. Lung bases are clear. Degenerative change both hips.     Please see above. Electronically signed by: Radha Porter Date:    06/26/2023  Time:    09:49      Assessment     Paroxysmal atrial fibrillation   Likely exacerbated by her acute illness.     Plan and Discussion     Agree with metoprolol.  Discussed her diagnosis of atrial fibrillation.  Discussed her indication for anticoagulation at least in the short term while it is determined whether atrial fibrillation will be a long-term issue.  Would defer institution of anticoagulation until closer to discharge in case surgery becomes necessary for her small-bowel obstruction.  Echocardiogram reviewed normal LV systolic function grade 1 diastolic dysfunction normal RV.      Thank you for allowing me to participate in the care of Marian ALINA Durham.    Oscar Leija MD, FACC, VI  Interventional Cardiology, MD

## 2023-07-01 NOTE — PT/OT/SLP PROGRESS
Physical Therapy Treatment    Patient Name:  Marian Durham   MRN:  [id number]    Recommendations:     Discharge Recommendations: rehabilitation facility  Discharge Equipment Recommendations:  (tbd next level of care)  Barriers to discharge: Decreased caregiver support    Assessment:     Marian Durham is a 77 y.o. female admitted with a medical diagnosis of SBO (small bowel obstruction).  She presents with the following impairments/functional limitations: weakness, impaired endurance, impaired functional mobility, impaired balance, decreased lower extremity function, pain, decreased ROM, edema.     Pt able to supine>sit Min A cueing for technique. Stand x 5 min for pericare with SBA and RW, straight into gait trial x 60 ft slow gonzalo, with RW and CGA, stand>sit SBA with cueing, sit>supine SBA patient used BUE to lift legs. While patient could potentially go home with home health vs outpatient, her son is coming in town from [place] to help her, she is self-limiting and would likely need some transitional care before Dcing home, so since she was mod I prior to home she would most likely benefit from rehab.     Prior to admission pt was modified independent with mobility and self-care and there is expectation of returning to prior level of function to maintain independence avoiding readmission. Pt is at high risk of unplanned readmission due to fall risk and lack of 24 hour caregiver in prior setting. The lower level of care cannot provide total interdisciplinary approach needed. Pt is able to tolerate 3 hours of daily therapy. Pt is pleasant and motivated to return to prior level of function.      Rehab Prognosis: Good; patient would benefit from acute skilled PT services to address these deficits and reach maximum level of function.    Recent Surgery: * No surgery found *      Plan:     During this hospitalization, patient to be seen 5 x/week to address the identified rehab impairments via therapeutic  "activities, gait training, therapeutic exercises, neuromuscular re-education, wheelchair management/training and progress toward the following goals:    Plan of Care Expires:  07/10/23    Subjective     Chief Complaint: R>L knee  Patient/Family Comments/goals: "I was walking prior to being in the hospital and now my knees won't move"  Pain/Comfort:  Pain Rating 1: 8/10  Location - Side 1:  (R>L)  Location - Orientation 1: generalized  Location 1: knee  Pain Addressed 1: Pre-medicate for activity, Distraction  Pain Rating Post-Intervention 1: 5/10      Objective:     Communicated with nurse prior to session.  Patient found HOB elevated with PureWick, peripheral IV, telemetry upon PT entry to room.     General Precautions: Standard, fall  Orthopedic Precautions: N/A  Braces: N/A  Respiratory Status: Room air     Functional Mobility:  Bed Mobility:     Scooting: stand by assistance  Supine to Sit: stand by assistance  Sit to Supine: stand by assistance  Transfers:     Sit to Stand:  contact guard assistance with rolling walker  Gait: 60 ft with RW slow gait gonzalo, cueing for right foot clearance   Balance: good with SBA and RW      AM-PAC 6 CLICK MOBILITY  Turning over in bed (including adjusting bedclothes, sheets and blankets)?: 3  Sitting down on and standing up from a chair with arms (e.g., wheelchair, bedside commode, etc.): 3  Moving from lying on back to sitting on the side of the bed?: 3  Moving to and from a bed to a chair (including a wheelchair)?: 3  Need to walk in hospital room?: 3  Climbing 3-5 steps with a railing?: 1  Basic Mobility Total Score: 16       Treatment & Education:  CHANGED GOWN, VASILE PADS, PUREWICK  Patient required encouragement and focus on self positive talk  20 reps x heel raises, LAQ, HS curls, standing hip abd    Patient left HOB elevated with all lines intact, call button in reach, and xray technicians present..    GOALS:   Multidisciplinary Problems       Physical Therapy Goals  "         Problem: Physical Therapy    Goal Priority Disciplines Outcome Goal Variances Interventions   Physical Therapy Goal     PT, PT/OT Ongoing, Progressing     Description: Goals to be met by: 7/10/23    Patient will increase functional independence with mobility by performin. Sit<>stand with supervision with LRAD.  2. Gait x 300 feet with supervision with LRAD.  3. Ascend/descend 5 step(s) with least restrictive assistive device and uni HR with SBA.   4. Standing HEP with supervision with appropriate UE support and no LOB x3 min. - MET                       Time Tracking:     PT Received On: 23  PT Start Time: 820     PT Stop Time: 09  PT Total Time (min): 40 min     Billable Minutes: Gait Training 30 and Therapeutic Exercise 10    Treatment Type: Treatment  PT/PTA: PT     Number of PTA visits since last PT visit: 0     2023

## 2023-07-02 LAB
ANION GAP SERPL CALC-SCNC: 9 MMOL/L (ref 8–16)
BASOPHILS # BLD AUTO: 0.05 K/UL (ref 0–0.2)
BASOPHILS NFR BLD: 0.7 % (ref 0–1.9)
BUN SERPL-MCNC: 9 MG/DL (ref 8–23)
CALCIUM SERPL-MCNC: 9.4 MG/DL (ref 8.7–10.5)
CHLORIDE SERPL-SCNC: 103 MMOL/L (ref 95–110)
CO2 SERPL-SCNC: 26 MMOL/L (ref 23–29)
CREAT SERPL-MCNC: 0.7 MG/DL (ref 0.5–1.4)
DIFFERENTIAL METHOD: ABNORMAL
EOSINOPHIL # BLD AUTO: 0.2 K/UL (ref 0–0.5)
EOSINOPHIL NFR BLD: 2.1 % (ref 0–8)
ERYTHROCYTE [DISTWIDTH] IN BLOOD BY AUTOMATED COUNT: 12.4 % (ref 11.5–14.5)
EST. GFR  (NO RACE VARIABLE): >60 ML/MIN/1.73 M^2
GLUCOSE SERPL-MCNC: 114 MG/DL (ref 70–110)
HCT VFR BLD AUTO: 36.8 % (ref 37–48.5)
HGB BLD-MCNC: 12.2 G/DL (ref 12–16)
IMM GRANULOCYTES # BLD AUTO: 0.03 K/UL (ref 0–0.04)
IMM GRANULOCYTES NFR BLD AUTO: 0.4 % (ref 0–0.5)
LYMPHOCYTES # BLD AUTO: 1.7 K/UL (ref 1–4.8)
LYMPHOCYTES NFR BLD: 24 % (ref 18–48)
MAGNESIUM SERPL-MCNC: 2.1 MG/DL (ref 1.6–2.6)
MCH RBC QN AUTO: 29.8 PG (ref 27–31)
MCHC RBC AUTO-ENTMCNC: 33.2 G/DL (ref 32–36)
MCV RBC AUTO: 90 FL (ref 82–98)
MONOCYTES # BLD AUTO: 1 K/UL (ref 0.3–1)
MONOCYTES NFR BLD: 14.2 % (ref 4–15)
NEUTROPHILS # BLD AUTO: 4.2 K/UL (ref 1.8–7.7)
NEUTROPHILS NFR BLD: 58.6 % (ref 38–73)
NRBC BLD-RTO: 0 /100 WBC
PHOSPHATE SERPL-MCNC: 2.7 MG/DL (ref 2.7–4.5)
PLATELET # BLD AUTO: 262 K/UL (ref 150–450)
PMV BLD AUTO: 8.9 FL (ref 9.2–12.9)
POTASSIUM SERPL-SCNC: 3.4 MMOL/L (ref 3.5–5.1)
RBC # BLD AUTO: 4.1 M/UL (ref 4–5.4)
SODIUM SERPL-SCNC: 138 MMOL/L (ref 136–145)
WBC # BLD AUTO: 7.18 K/UL (ref 3.9–12.7)

## 2023-07-02 PROCEDURE — 99232 SBSQ HOSP IP/OBS MODERATE 35: CPT | Mod: ,,, | Performed by: INTERNAL MEDICINE

## 2023-07-02 PROCEDURE — 94761 N-INVAS EAR/PLS OXIMETRY MLT: CPT

## 2023-07-02 PROCEDURE — 99233 PR SUBSEQUENT HOSPITAL CARE,LEVL III: ICD-10-PCS | Mod: ,,, | Performed by: HOSPITALIST

## 2023-07-02 PROCEDURE — 99232 PR SUBSEQUENT HOSPITAL CARE,LEVL II: ICD-10-PCS | Mod: ,,, | Performed by: INTERNAL MEDICINE

## 2023-07-02 PROCEDURE — 21400001 HC TELEMETRY ROOM

## 2023-07-02 PROCEDURE — 80048 BASIC METABOLIC PNL TOTAL CA: CPT | Performed by: HOSPITALIST

## 2023-07-02 PROCEDURE — 84100 ASSAY OF PHOSPHORUS: CPT | Performed by: HOSPITALIST

## 2023-07-02 PROCEDURE — 85025 COMPLETE CBC W/AUTO DIFF WBC: CPT | Performed by: HOSPITALIST

## 2023-07-02 PROCEDURE — 25000003 PHARM REV CODE 250: Performed by: INTERNAL MEDICINE

## 2023-07-02 PROCEDURE — 99233 SBSQ HOSP IP/OBS HIGH 50: CPT | Mod: ,,, | Performed by: HOSPITALIST

## 2023-07-02 PROCEDURE — 25000003 PHARM REV CODE 250: Performed by: HOSPITALIST

## 2023-07-02 PROCEDURE — 83735 ASSAY OF MAGNESIUM: CPT | Performed by: HOSPITALIST

## 2023-07-02 PROCEDURE — 36415 COLL VENOUS BLD VENIPUNCTURE: CPT | Performed by: HOSPITALIST

## 2023-07-02 RX ADMIN — PANTOPRAZOLE SODIUM 40 MG: 40 TABLET, DELAYED RELEASE ORAL at 08:07

## 2023-07-02 RX ADMIN — POTASSIUM BICARBONATE 40 MEQ: 391 TABLET, EFFERVESCENT ORAL at 11:07

## 2023-07-02 RX ADMIN — CELECOXIB 200 MG: 200 CAPSULE ORAL at 08:07

## 2023-07-02 RX ADMIN — CETIRIZINE HYDROCHLORIDE 10 MG: 10 TABLET, FILM COATED ORAL at 08:07

## 2023-07-02 RX ADMIN — METOPROLOL TARTRATE 25 MG: 25 TABLET, FILM COATED ORAL at 08:07

## 2023-07-02 NOTE — PLAN OF CARE
Patient is AAOX4. Patient reported anxiety and medications were administered per orders. VSS on RA. Telemetry monitor in place. No acute events to note. Patient resting comfortably at present. Bed locked in lowest position with side rails up x 2. Call light within reach of patient. Will continue purposeful rounding.

## 2023-07-02 NOTE — ASSESSMENT & PLAN NOTE
Possible GIB  - SBO with h/o same in 04/2022, CT with transition point in LLQ.  - Treated with NGT to low intermittent suction; NPO, IVFs, pain, nausea control.  - Surgery consulted; appreciate assistance.  - Patient with coffee ground noted in NG, but H/H without significant drop and no reported bleeding and no further problems after NG removed  - NG clamped on 6/27, and then removed on 6/28  - Changed IV PPI Q12 to PO after able to advance diet  - Repeat KUB showed improvement, passing flatus but without bowel movement yet  - Advanced to clear liquids on 6/29, to full liquids on 6/30 and bland diet on 7/1  - Clinically doing better with no N/V and tolerating diet, passing flatus but no BM yet  - Continue expectant management with return of bowel function

## 2023-07-02 NOTE — PROGRESS NOTES
[facility] PROGRESS NOTE    Date of admission:  6/24/2023 7/2/2023  2:05 PM    Interval history:    The patient is stable but still no bowel movement.  The patient has had 2 asymptomatic nonsustained atrial runs since her episodes of atrial fibrillation    History  Past Medical History:   Diagnosis Date    Hyperlipidemia     Osteoarthritis     Shingles 2002    Spinal stenosis        Medications  Current Facility-Administered Medications   Medication Dose Route Frequency Provider Last Rate Last Admin    acetaminophen tablet 650 mg  650 mg Oral Q6H PRN Karmen Avila PA-C   650 mg at 06/26/23 0947    celecoxib capsule 200 mg  200 mg Oral Daily Yandy Horne MD   200 mg at 07/02/23 0840    cetirizine tablet 10 mg  10 mg Oral Daily SHERIN Pearson MD   10 mg at 07/02/23 0840    fluticasone propionate 50 mcg/actuation nasal spray 100 mcg  2 spray Each Nostril Daily SHERIN Pearson MD   100 mcg at 07/01/23 0940    hydrALAZINE injection 10 mg  10 mg Intravenous Q6H PRN SHERIN Pearson MD   10 mg at 06/30/23 0453    hydrOXYzine HCL tablet 50 mg  50 mg Oral TID PRN SHERIN Pearson MD   50 mg at 07/01/23 2027    melatonin tablet 6 mg  6 mg Oral Nightly PRN Karmen Avila PA-C   6 mg at 06/30/23 2042    metoprolol injection 5 mg  5 mg Intravenous Q5 Min PRN Yandy Horne MD   5 mg at 06/30/23 0842    metoprolol tartrate (LOPRESSOR) tablet 25 mg  25 mg Oral BID Yandy Horne MD   25 mg at 07/02/23 0840    morphine injection 2 mg  2 mg Intravenous Q4H PRN SHERIN Pearson MD   2 mg at 06/30/23 2359    naloxone 0.4 mg/mL injection 0.02 mg  0.02 mg Intravenous PRN Karmen Avila PA-C        ondansetron injection 8 mg  8 mg Intravenous Q6H PRN Karmen Avila PA-C   8 mg at 06/26/23 2111    oxyCODONE-acetaminophen 5-325 mg per tablet 1 tablet  1 tablet Oral Q4H PRN Yandy Horne MD   1 tablet at 07/01/23 0939    pantoprazole EC tablet 40 mg  40 mg Oral BID Yandy Horne MD   40 mg at 07/02/23  0840    simethicone 40 mg/0.6 mL drops 40 mg  40 mg Oral QID PRN Karmen Avila PA-C        sodium chloride 0.9% flush 10 mL  10 mL Intravenous PRN SHERIN Pearson MD   10 mL at 06/29/23 1356        Physical exam    Temp:  [97.4 °F (36.3 °C)-99.3 °F (37.4 °C)]   Pulse:  []   Resp:  [16-20]   BP: (129-160)/(62-77)   SpO2:  [94 %-97 %]    Wt Readings from Last 3 Encounters:   06/29/23 51.6 kg (113 lb 12.1 oz)   06/30/23 51.3 kg (113 lb)   08/29/22 53.7 kg (118 lb 6.4 oz)       Intake/Output Summary (Last 24 hours) at 7/2/2023 1358  Last data filed at 7/2/2023 0625  Gross per 24 hour   Intake 200 ml   Output 1100 ml   Net -900 ml       Physical Exam  Constitutional:       General: She is not in acute distress.  HENT:      Head: Normocephalic and atraumatic.      Mouth/Throat:      Mouth: Mucous membranes are moist.   Eyes:      Extraocular Movements: Extraocular movements intact.      Pupils: Pupils are equal, round, and reactive to light.   Neck:      Vascular: No carotid bruit or JVD.   Cardiovascular:      Rate and Rhythm: Normal rate and regular rhythm.      Heart sounds: No murmur heard.    No friction rub. No gallop.   Pulmonary:      Effort: Pulmonary effort is normal.      Breath sounds: Normal breath sounds.   Abdominal:      Tenderness: There is no abdominal tenderness. There is no guarding or rebound.   Musculoskeletal:      Right lower leg: No edema.      Left lower leg: No edema.   Skin:     General: Skin is warm and dry.      Capillary Refill: Capillary refill takes less than 2 seconds.   Neurological:      General: No focal deficit present.      Mental Status: She is alert.   Psychiatric:         Mood and Affect: Mood normal.       Labs  Recent Results (from the past 24 hour(s))   Magnesium    Collection Time: 07/02/23  4:56 AM   Result Value Ref Range    Magnesium 2.1 1.6 - 2.6 mg/dL   Phosphorus    Collection Time: 07/02/23  4:56 AM   Result Value Ref Range    Phosphorus 2.7 2.7 - 4.5  mg/dL   Basic Metabolic Panel    Collection Time: 07/02/23  4:56 AM   Result Value Ref Range    Sodium 138 136 - 145 mmol/L    Potassium 3.4 (L) 3.5 - 5.1 mmol/L    Chloride 103 95 - 110 mmol/L    CO2 26 23 - 29 mmol/L    Glucose 114 (H) 70 - 110 mg/dL    BUN 9 8 - 23 mg/dL    Creatinine 0.7 0.5 - 1.4 mg/dL    Calcium 9.4 8.7 - 10.5 mg/dL    Anion Gap 9 8 - 16 mmol/L    eGFR >60 >60 mL/min/1.73 m^2   CBC Auto Differential    Collection Time: 07/02/23  4:56 AM   Result Value Ref Range    WBC 7.18 3.90 - 12.70 K/uL    RBC 4.10 4.00 - 5.40 M/uL    Hemoglobin 12.2 12.0 - 16.0 g/dL    Hematocrit 36.8 (L) 37.0 - 48.5 %    MCV 90 82 - 98 fL    MCH 29.8 27.0 - 31.0 pg    MCHC 33.2 32.0 - 36.0 g/dL    RDW 12.4 11.5 - 14.5 %    Platelets 262 150 - 450 K/uL    MPV 8.9 (L) 9.2 - 12.9 fL    Immature Granulocytes 0.4 0.0 - 0.5 %    Gran # (ANC) 4.2 1.8 - 7.7 K/uL    Immature Grans (Abs) 0.03 0.00 - 0.04 K/uL    Lymph # 1.7 1.0 - 4.8 K/uL    Mono # 1.0 0.3 - 1.0 K/uL    Eos # 0.2 0.0 - 0.5 K/uL    Baso # 0.05 0.00 - 0.20 K/uL    nRBC 0 0 /100 WBC    Gran % 58.6 38.0 - 73.0 %    Lymph % 24.0 18.0 - 48.0 %    Mono % 14.2 4.0 - 15.0 %    Eosinophil % 2.1 0.0 - 8.0 %    Basophil % 0.7 0.0 - 1.9 %    Differential Method Automated          Telemetry  Nonsustained atrial run    EKG  No new tracing today.    Echocardiogram  Results for orders placed or performed during the hospital encounter of 06/24/23   Echo Saline Bubble? Yes   Result Value Ref Range    BSA 1.53 m2    TDI SEPTAL 0.05 m/s    LV LATERAL E/E' RATIO 10.83 m/s    LV SEPTAL E/E' RATIO 13.00 m/s    LA WIDTH 2.80 cm    IVC diameter 2.06 cm    Left Ventricular Outflow Tract Mean Velocity 0.82 cm/s    Left Ventricular Outflow Tract Mean Gradient 2.89 mmHg    TDI LATERAL 0.06 m/s    LVIDd 3.75 3.5 - 6.0 cm    IVS 0.81 0.6 - 1.1 cm    Posterior Wall 0.79 0.6 - 1.1 cm    LVIDs 2.21 2.1 - 4.0 cm    FS 41 28 - 44 %    LA volume 24.53 cm3    LV mass 84.13 g    LA size 2.60 cm    Left  Ventricle Relative Wall Thickness 0.42 cm    AV mean gradient 3 mmHg    AV valve area 3.05 cm2    AV Velocity Ratio 0.98     AV index (prosthetic) 0.99     MV valve area p 1/2 method 3.64 cm2    E/A ratio 1.02     Mean e' 0.06 m/s    E wave deceleration time 220.01 msec    IVRT 82.78 msec    Pulm vein S/D ratio 1.63     LVOT diameter 1.98 cm    LVOT area 3.1 cm2    LVOT peak jermaine 1.14 m/s    LVOT peak VTI 22.90 cm    Ao peak jermaine 1.16 m/s    Ao VTI 23.1 cm    LVOT stroke volume 70.48 cm3    AV peak gradient 5 mmHg    E/E' ratio 11.82 m/s    MV Peak E Jermaine 0.65 m/s    TR Max Jermaine 1.69 m/s    MV stenosis pressure 1/2 time 60.46 ms    MV Peak A Jermaine 0.64 m/s    PV Peak S Jermaine 0.52 m/s    PV Peak D Jermaine 0.32 m/s    LV Systolic Volume 16.35 mL    LV Systolic Volume Index 10.5 mL/m2    LV Diastolic Volume 59.87 mL    LV Diastolic Volume Index 38.63 mL/m2    LA Volume Index 15.8 mL/m2    LV Mass Index 54 g/m2    RA Major Axis 3.98 cm    Left Atrium Minor Axis 4.03 cm    Left Atrium Major Axis 3.90 cm    Triscuspid Valve Regurgitation Peak Gradient 11 mmHg    LA Volume Index (Mod) 13.5 mL/m2    LA volume (mod) 21.00 cm3    RA Width 3.00 cm    Right Atrial Pressure (from IVC) 3 mmHg    EF 65 %    TV rest pulmonary artery pressure 14 mmHg    Narrative    · The left ventricle is normal in size with normal systolic function.  · Grade I left ventricular diastolic dysfunction.  · Normal right ventricular size with normal right ventricular systolic   function.          Imaging   X-Ray Chest 1 View    Result Date: 7/1/2023  EXAMINATION: XR CHEST 1 VIEW CLINICAL HISTORY: cough; TECHNIQUE: Frontal view of the chest was performed. COMPARISON: No priors FINDINGS: Multiple overlying cardiac monitoring leads. The cardiomediastinal silhouette is normal in size and midline. Pulmonary vascularity appears within normal limits. The lungs appear clear without confluent pulmonary parenchymal opacity. No pleural fluid or pneumothorax.     No evidence  of acute cardiopulmonary disease. Electronically signed by: Theo Burnham MD Date:    07/01/2023 Time:    10:13    X-Ray Hip 2 or 3 views Right (with Pelvis when performed)    Result Date: 7/1/2023  EXAMINATION: XR HIP WITH PELVIS WHEN PERFORMED, 2 OR 3  VIEWS RIGHT CLINICAL HISTORY: Pain and inability to walk; TECHNIQUE: AP view of the pelvis and frog leg lateral view of the right hip were performed. COMPARISON: CT of the pelvis 06/24/2023 FINDINGS: Osseous structures appear intact without evidence of fracture or osseous destructive process.  No apparent dislocation. No advanced degenerative change or significant joint space narrowing.     No evidence of a displaced fracture or dislocation. Correlation with cross-sectional imaging if warranted clinically. Electronically signed by: Theo Burnham MD Date:    07/01/2023 Time:    13:59    X-Ray Knee 1 or 2 View Right    Result Date: 7/1/2023  EXAMINATION: XR KNEE 1 OR 2 VIEW RIGHT CLINICAL HISTORY: pain and inability to walk; TECHNIQUE: AP and cross-table lateral views of the right knee COMPARISON: None FINDINGS: No evidence of a displaced fracture, osseous destructive process or dislocation. Tricompartmental osteoarthritis, worst in the lateral tibiofemoral compartment. Suprapatellar joint effusion.  Additional calcifications in the suprapatellar region suggesting ossific intra-articular loose bodies, which are new from prior..     As above Electronically signed by: Theo Burnham MD Date:    07/01/2023 Time:    12:48    X-Ray Abdomen Flat And Erect    Result Date: 7/1/2023  EXAMINATION: XR ABDOMEN FLAT AND ERECT CLINICAL HISTORY: abd pain/distention; TECHNIQUE: Flat and erect AP radiographs of the abdomen were performed. COMPARISON: 06/29/2023 FINDINGS: Bowel gas pattern appears within normal limits.  No dilated loops of bowel or air-fluid levels identified.  No free air. No evidence of organomegaly or intra-abdominal mass effect.  No renal or ureteral calculi.      No acute process identified Electronically signed by: Theo Burnham MD Date:    07/01/2023 Time:    09:54    X-Ray Abdomen Flat And Erect    Result Date: 6/29/2023  EXAMINATION: XR ABDOMEN FLAT AND ERECT CLINICAL HISTORY: abd pain/distention; TECHNIQUE: Flat and erect AP views of the abdomen were performed. COMPARISON: Abdominal radiograph performed 06/20/2023, 09:00 hours. FINDINGS: Enteric tube appears to been discontinued. No definite progressively dilated small bowel or colonic loops.  Air appears to be present in the distal colon rectum.  Overall bowel gas pattern appears nonspecific, but nonobstructive. Otherwise no significant change relative to prior study performed 06/20/2023, 09:00 hours.     As above. Electronically signed by: Jai Gentile Date:    06/29/2023 Time:    08:22    X-Ray Abdomen Flat And Erect    Result Date: 6/28/2023  EXAMINATION: XR ABDOMEN FLAT AND ERECT CLINICAL HISTORY: abd pain/distention; TECHNIQUE: Flat and erect AP views of the abdomen were performed. COMPARISON: Abdominal radiograph 06/27/2023, 12:36 hours. FINDINGS: Monitoring leads are noted.  Enteric tube is noted, with side port projecting anticipated location lower esophagus.  Advancement by approximately 10-12 cm would place the side port at the anticipated location of stomach, as desired clinically. Nonspecific gaseous distension of colonic loops.  Mildly dilated gas-filled loops of small bowel measure up to 3.6 cm as measured in the left hemiabdomen.  Overall degree of dilated small bowel loops appears improved since 06/27/2023, 12:36 hours.  Relative paucity of air distal colon and rectum. Remainder examination not substantial interval change.     As above. Electronically signed by: Jai Gentile Date:    06/28/2023 Time:    09:26    X-Ray Abdomen Flat And Erect    Result Date: 6/27/2023  EXAMINATION: XR ABDOMEN FLAT AND ERECT CLINICAL HISTORY: abd pain/distention; TECHNIQUE: Flat and erect AP views of the abdomen  were performed. COMPARISON: 06/26/2023, CT 06/24/2023 FINDINGS: One upright view, 2 supine views. Nasogastric tube tip projects over the expected location of the gastric lumen, side hole at the level of the gastroesophageal junction, possibly above the gastroesophageal junction.  There are a few scattered air-fluid levels within prominent small bowel loops projected over the mid pelvis.  Air and stool is seen within the large bowel and projected over the rectum noting moderate to large amount of stool throughout the colon.  There are no calcifications to suggest nephrolithiasis or cholelithiasis.  No large volume free air or pneumatosis.  The lower lung zones are clear.     1. Slow flow through a few small bowel loops, the degree of small-bowel distention has decreased since examination 06/24/2023 and 06/26/2023.  Continued follow-up is advised. 2. Moderate to large amount of stool in the colon could reflect developing constipation. Electronically signed by: Jose Rodney MD Date:    06/27/2023 Time:    13:38    CT Abdomen Pelvis With Contrast    Result Date: 6/24/2023  EXAMINATION: CT OF ABDOMEN PELVIS WITH CLINICAL HISTORY: Diffuse abdominal pain and bloating over the last 4 hours. TECHNIQUE: 5 mm enhanced axial images were obtained from the lung bases through the greater trochanters.   mL of Omnipaque 350 was injected. COMPARISON: None. FINDINGS: There are some distended bowel loops containing fluid and air fluid levels.  The transition point is in the left lower abdomen 6.2 x 4.4 x 3.7 A too small to characterize low attenuation lesion is seen within the left lobe liver, which is probably a tiny cyst.  Spleen, pancreas, kidneys, and adrenal glands are unremarkable. The gallbladder contains no calcified gallstones.  There are dilated loops of bowel containing fluid and air fluid levels.  The degree of dilatation changes in the left lower abdomen (series 2 axial image 285 and series 602 sagittal image 87). There  is no definite evidence for abdominal adenopathy or ascites. In the pelvis there are no pelvic masses or adenopathy. There is trace right pleural effusion. There is mild bibasilar atelectasis     Significant small-bowel obstruction with the transition point in the left lower abdomen. Probable tiny subcentimeter left hepatic lobe cyst. Trace right pleural effusion. Electronically signed by: Sally Ojeda Date:    06/24/2023 Time:    23:23    XR NG/OG tube placement check, non-radiologist performed    Result Date: 6/25/2023  EXAMINATION: XR NG/OG TUBE PLACEMENT CHECK NON RADIOLOGIST PERFORMED CLINICAL HISTORY: NG tube placement; TECHNIQUE: AP view of the abdomen. COMPARISON: Abdomen radiograph 04/25/2022 and CT abdomen pelvis 06/24/2023 FINDINGS: The tip of the feeding tube is seen in the gastric fundus near the GE junction.  Consider advancing.  There are dilated small bowel loops.  Residual contrast are seen in bilateral renal collecting systems from recent CT abdomen pelvis.     As above described. Electronically signed by: Sally Ojeda Date:    06/25/2023 Time:    00:47    Echo Saline Bubble? Yes    Result Date: 6/30/2023  · The left ventricle is normal in size with normal systolic function. · Grade I left ventricular diastolic dysfunction. · Normal right ventricular size with normal right ventricular systolic function.      X-Ray KUB    Result Date: 6/26/2023  EXAMINATION: XR KUB CLINICAL HISTORY: sbo; TECHNIQUE: Single AP supine view of the abdomen (KUB) was performed COMPARISON: 06/25/2023 FINDINGS: Tip of the enteric tube has been advanced into the stomach with the side hole at or just beyond the GE junction. A few dilated small bowel loops in the left abdomen with overall improved bowel gas pattern compared to 06/24/2023. Lung bases are clear. Degenerative change both hips.     Please see above. Electronically signed by: Radha Porter Date:    06/26/2023 Time:    09:49      Assessment     Paroxysmal  atrial fibrillation   Likely exacerbated by her acute illness.     Plan and Discussion     Continue metoprolol.  Discussed her diagnosis of atrial fibrillation.  Discussed her indication for anticoagulation at least in the short term while it is determined whether atrial fibrillation will be a long-term issue.  Would defer institution of anticoagulation until closer to discharge in case surgery becomes necessary for her small-bowel obstruction.  Echocardiogram reviewed normal LV systolic function grade 1 diastolic dysfunction normal RV.      Thank you for allowing me to participate in the care of Marian FULLER Durham.    Oscar Leija MD, FACC, [facility]  Interventional Cardiology, [place]

## 2023-07-02 NOTE — PLAN OF CARE
Problem: Adult Inpatient Plan of Care  Goal: Plan of Care Review  Outcome: Ongoing, Progressing  Goal: Patient-Specific Goal (Individualized)  Outcome: Ongoing, Progressing  Goal: Absence of Hospital-Acquired Illness or Injury  Outcome: Ongoing, Progressing  Goal: Optimal Comfort and Wellbeing  Outcome: Ongoing, Progressing  Goal: Readiness for Transition of Care  Outcome: Ongoing, Progressing     Problem: Fluid Deficit (Intestinal Obstruction)  Goal: Fluid Balance  Outcome: Ongoing, Progressing     Problem: Infection (Intestinal Obstruction)  Goal: Absence of Infection Signs and Symptoms  Outcome: Ongoing, Progressing     Problem: Nausea and Vomiting (Intestinal Obstruction)  Goal: Nausea and Vomiting Relief  Outcome: Ongoing, Progressing     Problem: Pain (Intestinal Obstruction)  Goal: Acceptable Pain Control  Outcome: Ongoing, Progressing     Problem: Pain Acute  Goal: Acceptable Pain Control and Functional Ability  Outcome: Ongoing, Progressing     Problem: Fall Injury Risk  Goal: Absence of Fall and Fall-Related Injury  Outcome: Ongoing, Progressing     Problem: Skin Injury Risk Increased  Goal: Skin Health and Integrity  Outcome: Ongoing, Progressing

## 2023-07-02 NOTE — PROGRESS NOTES
"[facility] Surg [address] Medicine  Progress Note    Patient Name: Marian Durham  MRN: [id number]  Patient Class: IP- Inpatient   Admission Date: 6/24/2023  Length of Stay: 8 days  Attending Physician: Yandy Horne MD  Primary Care Provider: Suzanna Duran MD        Subjective:     Principal Problem:SBO (small bowel obstruction)        HPI:  Ms. Marian Durham is a 77 y.o. female, with PMH of prior SBO, hysterectomy, who [facility]23 due to diffuse upper abdominal pain x 4 hours. She notes associated nausea, chills, bloating. She states her last BM was earlier this morning. She states this feels like last time when she had a "twisted bowel" ten months ago. She states her last bowel obstruction resolved 8 days after having an NG tube placed. She was evaluated in the ED with labs that showed leukocytosis of 17 K, with left shift.  She had concentrated H&H.  A metabolic panel showed elevated anion gap of 18, with CO2 of 16, and glucose of 146.  BUN was elevated at 27 creatinine was 1.1.  UA was dilute with 3+ ketones.  CT of the abdomen and pelvis showed small-bowel obstruction with transition point in the left lower abdomen, and trace right pleural effusion.  An NG-tube was placed.  She is admitted to inpatient status.      Overview/Hospital Course:  Admitted with SBO. NGT placed and surgery consulted. Started IVFs and symptom control. Patient with clinical improvement and NG removed and diet slowly advanced. Developed Afib with RVR on 6/30 and Cardiology consulted. She has been working with PT/OT but now with worsening pain/weakness to hip and knees. Ortho consulted and repeat plain films done.      Interval History: No acute events overnight. Tolerating bland diet, passing flatus but no BM and feeling overall better. No N/V. Report pain to hip and knees better today and she is able to flex at the knees and move them more after restarting her Celebrex " yesterday/    Review of Systems   Constitutional:  Negative for chills and fever.   Respiratory:  Negative for cough and shortness of breath.    Cardiovascular:  Negative for chest pain and palpitations.   Gastrointestinal:  Negative for abdominal pain, nausea and vomiting.   Musculoskeletal:  Positive for arthralgias and gait problem.   Objective:     Vital Signs (Most Recent):  Temp: 97.8 °F (36.6 °C) (07/02/23 0838)  Pulse: 86 (07/02/23 0838)  Resp: 20 (07/02/23 0838)  BP: 134/62 (07/02/23 0838)  SpO2: 95 % (07/02/23 0838) Vital Signs (24h Range):  Temp:  [97.4 °F (36.3 °C)-99.3 °F (37.4 °C)] 97.8 °F (36.6 °C)  Pulse:  [] 86  Resp:  [16-20] 20  SpO2:  [95 %-97 %] 95 %  BP: (123-160)/(58-77) 134/62     Weight: 51.6 kg (113 lb 12.1 oz)  Body mass index is 18.93 kg/m².    Intake/Output Summary (Last 24 hours) at 7/2/2023 1007  Last data filed at 7/2/2023 0625  Gross per 24 hour   Intake 200 ml   Output 1100 ml   Net -900 ml           Physical Exam  Vitals and nursing note reviewed.   Constitutional:       General: She is not in acute distress.     Appearance: She is well-developed.   HENT:      Head: Normocephalic and atraumatic.   Eyes:      General:         Right eye: No discharge.         Left eye: No discharge.      Conjunctiva/sclera: Conjunctivae normal.   Cardiovascular:      Rate and Rhythm: Normal rate. Rhythm irregular.      Pulses: Normal pulses.   Pulmonary:      Effort: Pulmonary effort is normal. No respiratory distress.   Abdominal:      General: Bowel sounds are normal. There is no distension.      Palpations: Abdomen is soft.      Tenderness: There is no abdominal tenderness. There is no guarding or rebound.   Musculoskeletal:      Comments: Bilateral knees with compression in place but now able to flex at the knee today    Skin:     General: Skin is warm and dry.   Neurological:      Mental Status: She is alert and oriented to person, place, and time.         Significant Labs:   CBC:  Recent  Labs   Lab 06/30/23 0420 07/01/23 0426 07/02/23  0456   WBC 9.72 10.18 7.18   HGB 12.8 13.1 12.2   HCT 38.1 39.5 36.8*    236 262   GRAN 72.3  7.0 66.9  6.8 58.6  4.2   LYMPH 13.1*  1.3 16.3*  1.7 24.0  1.7   MONO 13.2  1.3* 15.6*  1.6* 14.2  1.0   EOS 0.1 0.1 0.2   BASO 0.02 0.03 0.05     BMP:  Recent Labs   Lab 06/30/23 0420 07/01/23 0426 07/02/23 0456   * 137 138   K 3.3* 3.7 3.4*    103 103   CO2 18* 24 26   BUN 7* 7* 9   CREATININE 0.7 0.7 0.7    121* 114*   CALCIUM 9.2 9.5 9.4   MG 2.0 2.0 2.1   PHOS 2.2* 2.3* 2.7       Significant Imaging: I have reviewed and interpreted all pertinent imaging results/findings within the past 24 hours.      Assessment/Plan:      * SBO (small bowel obstruction)  Possible GIB  - SBO with h/o same in 04/2022, CT with transition point in LLQ.  - Treated with NGT to low intermittent suction; NPO, IVFs, pain, nausea control.  - Surgery consulted; appreciate assistance.  - Patient with coffee ground noted in NG, but H/H without significant drop and no reported bleeding and no further problems after NG removed  - NG clamped on 6/27, and then removed on 6/28  - Changed IV PPI Q12 to PO after able to advance diet  - Repeat KUB showed improvement, passing flatus but without bowel movement yet  - Advanced to clear liquids on 6/29, to full liquids on 6/30 and bland diet on 7/1  - Clinically doing better with no N/V and tolerating diet, passing flatus but no BM yet  - Continue expectant management with return of bowel function    Atrial fibrillation with rapid ventricular response  - New onset AFib rapid ventricular response confirmed with EKG, rate noted in the 160s on 6/30  - Treated with Lopressor 5 mg IV x1 with rate controlled, and started metoprolol 12.5 mg p.o. b.i.d.  - Consulted Cardiology and checked a 2D Echo which showed normal systolic function and grade I diastolic dysfunction  - Increase metoprolol to 25 mg PO BID on 7/1  - Rate well  controlled on current regimen, will convert to extended release tomorrow if continues to do well  - Will hold off on anticoagulation at this time given bowel obstruction issues and concern for possible surgical intervention    Elevated blood pressure reading  - Likely related to pain with SBO.  - Started on metoprolol for rate control as discussed above  - Continue hydralazine 10mg IV q6hr PRN for SBP > 180, DBP > 100    Hypokalemia  - Replete PRN.    Osteoarthritis  - Known h/o OA with need for surgical intervention  - Progressive worsening pain and difficulty walking which is a change since admission, reportedly due to having to stop Celebrex due to SBO and lack of her usual therapy  - Restarted Celebrex 200 mg daily on 7/1  - Repeated imaging with plain films, consulted Ortho (patient request and she is a patient of Dr. Mitchell) which is pending  - Clinically doing better today   - Continue therapy and await Ortho input      VTE Risk Mitigation (From admission, onward)         Ordered     IP VTE HIGH RISK PATIENT  Once         06/25/23 0028     Place sequential compression device  Until discontinued         06/25/23 0028                    Yandy Horne MD  Department of Hospital Medicine   [facility] ([address])

## 2023-07-02 NOTE — PROGRESS NOTES
Patient is a long-time patient of Dr. Mitchell.  Patient has end-stage osteoarthritis bilateral knees valgus alignment  Patient has plans for future total knee arthroplasties with Dr. Mitchell.  Patient currently hospitalized for bowel obstruction and recent AFib.  Patient now normal sinus rhythm.  Patient has bilateral knee effusions with valgus alignment.  Plan:  Patient is adamant that she wants to be placed in a rehab unit for gait training and strengthening prior to any knee replacement surgery            We will discuss this case with Dr. Mitchell and proceed with future plans.              In the meantime she will continue bed-to-chair transfers she may weight bear as tolerated with a walker.             Continue oral Celebrex.              I did discuss with the patient that she needs to be medically stable prior to any knee replacement surgery.

## 2023-07-02 NOTE — SUBJECTIVE & OBJECTIVE
Interval History: No acute events overnight. Tolerating bland diet, passing flatus but no BM and feeling overall better. No N/V. Report pain to hip and knees better today and she is able to flex at the knees and move them more after restarting her Celebrex yesterday/    Review of Systems   Constitutional:  Negative for chills and fever.   Respiratory:  Negative for cough and shortness of breath.    Cardiovascular:  Negative for chest pain and palpitations.   Gastrointestinal:  Negative for abdominal pain, nausea and vomiting.   Musculoskeletal:  Positive for arthralgias and gait problem.   Objective:     Vital Signs (Most Recent):  Temp: 97.8 °F (36.6 °C) (07/02/23 0838)  Pulse: 86 (07/02/23 0838)  Resp: 20 (07/02/23 0838)  BP: 134/62 (07/02/23 0838)  SpO2: 95 % (07/02/23 0838) Vital Signs (24h Range):  Temp:  [97.4 °F (36.3 °C)-99.3 °F (37.4 °C)] 97.8 °F (36.6 °C)  Pulse:  [] 86  Resp:  [16-20] 20  SpO2:  [95 %-97 %] 95 %  BP: (123-160)/(58-77) 134/62     Weight: 51.6 kg (113 lb 12.1 oz)  Body mass index is 18.93 kg/m².    Intake/Output Summary (Last 24 hours) at 7/2/2023 1007  Last data filed at 7/2/2023 0625  Gross per 24 hour   Intake 200 ml   Output 1100 ml   Net -900 ml           Physical Exam  Vitals and nursing note reviewed.   Constitutional:       General: She is not in acute distress.     Appearance: She is well-developed.   HENT:      Head: Normocephalic and atraumatic.   Eyes:      General:         Right eye: No discharge.         Left eye: No discharge.      Conjunctiva/sclera: Conjunctivae normal.   Cardiovascular:      Rate and Rhythm: Normal rate. Rhythm irregular.      Pulses: Normal pulses.   Pulmonary:      Effort: Pulmonary effort is normal. No respiratory distress.   Abdominal:      General: Bowel sounds are normal. There is no distension.      Palpations: Abdomen is soft.      Tenderness: There is no abdominal tenderness. There is no guarding or rebound.   Musculoskeletal:      Comments:  Bilateral knees with compression in place but now able to flex at the knee today    Skin:     General: Skin is warm and dry.   Neurological:      Mental Status: She is alert and oriented to person, place, and time.         Significant Labs:   CBC:  Recent Labs   Lab 06/30/23 0420 07/01/23 0426 07/02/23 0456   WBC 9.72 10.18 7.18   HGB 12.8 13.1 12.2   HCT 38.1 39.5 36.8*    236 262   GRAN 72.3  7.0 66.9  6.8 58.6  4.2   LYMPH 13.1*  1.3 16.3*  1.7 24.0  1.7   MONO 13.2  1.3* 15.6*  1.6* 14.2  1.0   EOS 0.1 0.1 0.2   BASO 0.02 0.03 0.05     BMP:  Recent Labs   Lab 06/30/23 0420 07/01/23 0426 07/02/23 0456   * 137 138   K 3.3* 3.7 3.4*    103 103   CO2 18* 24 26   BUN 7* 7* 9   CREATININE 0.7 0.7 0.7    121* 114*   CALCIUM 9.2 9.5 9.4   MG 2.0 2.0 2.1   PHOS 2.2* 2.3* 2.7       Significant Imaging: I have reviewed and interpreted all pertinent imaging results/findings within the past 24 hours.

## 2023-07-02 NOTE — ASSESSMENT & PLAN NOTE
- Known h/o OA with need for surgical intervention  - Progressive worsening pain and difficulty walking which is a change since admission, reportedly due to having to stop Celebrex due to SBO and lack of her usual therapy  - Restarted Celebrex 200 mg daily on 7/1  - Repeated imaging with plain films, consulted Ortho (patient request and she is a patient of Dr. Mitchell) which is pending  - Clinically doing better today   - Continue therapy and await Ortho input

## 2023-07-02 NOTE — ASSESSMENT & PLAN NOTE
- New onset AFib rapid ventricular response confirmed with EKG, rate noted in the 160s on 6/30  - Treated with Lopressor 5 mg IV x1 with rate controlled, and started metoprolol 12.5 mg p.o. b.i.d.  - Consulted Cardiology and checked a 2D Echo which showed normal systolic function and grade I diastolic dysfunction  - Increase metoprolol to 25 mg PO BID on 7/1  - Rate well controlled on current regimen, will convert to extended release tomorrow if continues to do well  - Will hold off on anticoagulation at this time given bowel obstruction issues and concern for possible surgical intervention

## 2023-07-03 LAB
ANION GAP SERPL CALC-SCNC: 6 MMOL/L (ref 8–16)
BASOPHILS # BLD AUTO: 0.05 K/UL (ref 0–0.2)
BASOPHILS NFR BLD: 0.6 % (ref 0–1.9)
BUN SERPL-MCNC: 13 MG/DL (ref 8–23)
CALCIUM SERPL-MCNC: 9.5 MG/DL (ref 8.7–10.5)
CHLORIDE SERPL-SCNC: 105 MMOL/L (ref 95–110)
CO2 SERPL-SCNC: 25 MMOL/L (ref 23–29)
CREAT SERPL-MCNC: 0.7 MG/DL (ref 0.5–1.4)
DIFFERENTIAL METHOD: ABNORMAL
EOSINOPHIL # BLD AUTO: 0.2 K/UL (ref 0–0.5)
EOSINOPHIL NFR BLD: 2.8 % (ref 0–8)
ERYTHROCYTE [DISTWIDTH] IN BLOOD BY AUTOMATED COUNT: 12.7 % (ref 11.5–14.5)
EST. GFR  (NO RACE VARIABLE): >60 ML/MIN/1.73 M^2
GLUCOSE SERPL-MCNC: 115 MG/DL (ref 70–110)
HCT VFR BLD AUTO: 37.7 % (ref 37–48.5)
HGB BLD-MCNC: 12.3 G/DL (ref 12–16)
IMM GRANULOCYTES # BLD AUTO: 0.04 K/UL (ref 0–0.04)
IMM GRANULOCYTES NFR BLD AUTO: 0.5 % (ref 0–0.5)
LYMPHOCYTES # BLD AUTO: 1.9 K/UL (ref 1–4.8)
LYMPHOCYTES NFR BLD: 23.9 % (ref 18–48)
MAGNESIUM SERPL-MCNC: 2 MG/DL (ref 1.6–2.6)
MCH RBC QN AUTO: 30.1 PG (ref 27–31)
MCHC RBC AUTO-ENTMCNC: 32.6 G/DL (ref 32–36)
MCV RBC AUTO: 92 FL (ref 82–98)
MONOCYTES # BLD AUTO: 0.9 K/UL (ref 0.3–1)
MONOCYTES NFR BLD: 10.9 % (ref 4–15)
NEUTROPHILS # BLD AUTO: 4.9 K/UL (ref 1.8–7.7)
NEUTROPHILS NFR BLD: 61.3 % (ref 38–73)
NRBC BLD-RTO: 0 /100 WBC
PHOSPHATE SERPL-MCNC: 2.7 MG/DL (ref 2.7–4.5)
PLATELET # BLD AUTO: 292 K/UL (ref 150–450)
PMV BLD AUTO: 8.8 FL (ref 9.2–12.9)
POTASSIUM SERPL-SCNC: 4.2 MMOL/L (ref 3.5–5.1)
RBC # BLD AUTO: 4.09 M/UL (ref 4–5.4)
SODIUM SERPL-SCNC: 136 MMOL/L (ref 136–145)
WBC # BLD AUTO: 7.95 K/UL (ref 3.9–12.7)

## 2023-07-03 PROCEDURE — 99231 PR SUBSEQUENT HOSPITAL CARE,LEVL I: ICD-10-PCS | Mod: ,,, | Performed by: SPECIALIST

## 2023-07-03 PROCEDURE — 99233 PR SUBSEQUENT HOSPITAL CARE,LEVL III: ICD-10-PCS | Mod: ,,, | Performed by: HOSPITALIST

## 2023-07-03 PROCEDURE — 25000003 PHARM REV CODE 250: Performed by: INTERNAL MEDICINE

## 2023-07-03 PROCEDURE — 97530 THERAPEUTIC ACTIVITIES: CPT

## 2023-07-03 PROCEDURE — 80048 BASIC METABOLIC PNL TOTAL CA: CPT | Performed by: HOSPITALIST

## 2023-07-03 PROCEDURE — 99231 SBSQ HOSP IP/OBS SF/LOW 25: CPT | Mod: ,,, | Performed by: SPECIALIST

## 2023-07-03 PROCEDURE — 97110 THERAPEUTIC EXERCISES: CPT | Mod: CQ

## 2023-07-03 PROCEDURE — 99233 SBSQ HOSP IP/OBS HIGH 50: CPT | Mod: ,,, | Performed by: HOSPITALIST

## 2023-07-03 PROCEDURE — 21400001 HC TELEMETRY ROOM

## 2023-07-03 PROCEDURE — 36415 COLL VENOUS BLD VENIPUNCTURE: CPT | Performed by: HOSPITALIST

## 2023-07-03 PROCEDURE — 99232 PR SUBSEQUENT HOSPITAL CARE,LEVL II: ICD-10-PCS | Mod: ,,, | Performed by: INTERNAL MEDICINE

## 2023-07-03 PROCEDURE — 85025 COMPLETE CBC W/AUTO DIFF WBC: CPT | Performed by: HOSPITALIST

## 2023-07-03 PROCEDURE — 83735 ASSAY OF MAGNESIUM: CPT | Performed by: HOSPITALIST

## 2023-07-03 PROCEDURE — 99232 SBSQ HOSP IP/OBS MODERATE 35: CPT | Mod: ,,, | Performed by: INTERNAL MEDICINE

## 2023-07-03 PROCEDURE — 97535 SELF CARE MNGMENT TRAINING: CPT

## 2023-07-03 PROCEDURE — 94761 N-INVAS EAR/PLS OXIMETRY MLT: CPT

## 2023-07-03 PROCEDURE — 25000003 PHARM REV CODE 250: Performed by: HOSPITALIST

## 2023-07-03 PROCEDURE — 84100 ASSAY OF PHOSPHORUS: CPT | Performed by: HOSPITALIST

## 2023-07-03 PROCEDURE — 97116 GAIT TRAINING THERAPY: CPT | Mod: CQ

## 2023-07-03 RX ORDER — POLYETHYLENE GLYCOL 3350 17 G/17G
17 POWDER, FOR SOLUTION ORAL DAILY
Status: DISCONTINUED | OUTPATIENT
Start: 2023-07-03 | End: 2023-07-04

## 2023-07-03 RX ADMIN — METOPROLOL TARTRATE 25 MG: 25 TABLET, FILM COATED ORAL at 09:07

## 2023-07-03 RX ADMIN — POLYETHYLENE GLYCOL 3350 17 G: 17 POWDER, FOR SOLUTION ORAL at 08:07

## 2023-07-03 RX ADMIN — PANTOPRAZOLE SODIUM 40 MG: 40 TABLET, DELAYED RELEASE ORAL at 08:07

## 2023-07-03 RX ADMIN — CETIRIZINE HYDROCHLORIDE 10 MG: 10 TABLET, FILM COATED ORAL at 08:07

## 2023-07-03 RX ADMIN — METOPROLOL TARTRATE 25 MG: 25 TABLET, FILM COATED ORAL at 08:07

## 2023-07-03 RX ADMIN — CELECOXIB 200 MG: 200 CAPSULE ORAL at 08:07

## 2023-07-03 RX ADMIN — PANTOPRAZOLE SODIUM 40 MG: 40 TABLET, DELAYED RELEASE ORAL at 09:07

## 2023-07-03 NOTE — SUBJECTIVE & OBJECTIVE
Interval History: No acute events overnight. Tolerating diet, passing flatus and had 2 large normal consistency bowel movements yesterday and she feels good. Request we start her back on her daily Miralax which she takes at home. Report pain to hip and knees again better today and she is able to flex at the knees and move them more after restarting her Celebrex 2 days ago.    Review of Systems   Constitutional:  Negative for chills and fever.   Respiratory:  Negative for cough and shortness of breath.    Cardiovascular:  Negative for chest pain and palpitations.   Gastrointestinal:  Negative for abdominal pain, nausea and vomiting.   Musculoskeletal:  Positive for arthralgias and gait problem.   Objective:     Vital Signs (Most Recent):  Temp: 97.8 °F (36.6 °C) (07/03/23 1535)  Pulse: 75 (07/03/23 1535)  Resp: 18 (07/03/23 1535)  BP: 132/63 (07/03/23 1535)  SpO2: 98 % (07/03/23 1535) Vital Signs (24h Range):  Temp:  [97.6 °F (36.4 °C)-98.8 °F (37.1 °C)] 97.8 °F (36.6 °C)  Pulse:  [] 75  Resp:  [16-20] 18  SpO2:  [96 %-99 %] 98 %  BP: (123-142)/(60-80) 132/63     Weight: 51.3 kg (113 lb 1.5 oz)  Body mass index is 18.82 kg/m².    Intake/Output Summary (Last 24 hours) at 7/3/2023 1545  Last data filed at 7/3/2023 0902  Gross per 24 hour   Intake --   Output 1350 ml   Net -1350 ml           Physical Exam  Vitals and nursing note reviewed.   Constitutional:       General: She is not in acute distress.     Appearance: She is well-developed.   HENT:      Head: Normocephalic and atraumatic.   Eyes:      General:         Right eye: No discharge.         Left eye: No discharge.      Conjunctiva/sclera: Conjunctivae normal.   Cardiovascular:      Rate and Rhythm: Normal rate. Rhythm irregular.      Pulses: Normal pulses.   Pulmonary:      Effort: Pulmonary effort is normal. No respiratory distress.   Abdominal:      General: Bowel sounds are normal. There is no distension.      Palpations: Abdomen is soft.       Tenderness: There is no abdominal tenderness. There is no guarding or rebound.   Musculoskeletal:      Comments: Bilateral knees with compression in place but now able to flex at the knee today    Skin:     General: Skin is warm and dry.   Neurological:      Mental Status: She is alert and oriented to person, place, and time.         Significant Labs:   CBC:  Recent Labs   Lab 07/01/23 0426 07/02/23 0456 07/03/23  0443   WBC 10.18 7.18 7.95   HGB 13.1 12.2 12.3   HCT 39.5 36.8* 37.7    262 292   GRAN 66.9  6.8 58.6  4.2 61.3  4.9   LYMPH 16.3*  1.7 24.0  1.7 23.9  1.9   MONO 15.6*  1.6* 14.2  1.0 10.9  0.9   EOS 0.1 0.2 0.2   BASO 0.03 0.05 0.05     BMP:  Recent Labs   Lab 07/01/23 0426 07/02/23 0456 07/03/23  0443    138 136   K 3.7 3.4* 4.2    103 105   CO2 24 26 25   BUN 7* 9 13   CREATININE 0.7 0.7 0.7   * 114* 115*   CALCIUM 9.5 9.4 9.5   MG 2.0 2.1 2.0   PHOS 2.3* 2.7 2.7       Significant Imaging: I have reviewed and interpreted all pertinent imaging results/findings within the past 24 hours.

## 2023-07-03 NOTE — ASSESSMENT & PLAN NOTE
- Started on metoprolol for rate control as discussed above  - Continue hydralazine 10mg IV q6hr PRN for SBP > 180, DBP > 100

## 2023-07-03 NOTE — PLAN OF CARE
Recommendations  1) Continue cardiac diet as tolerated    2) Monitor nutrition-related labs     Goals:   Patient will meet >75% intake by RD follow up  Nutrition Goal Status: new  Communication of RD Recs:  (POC)

## 2023-07-03 NOTE — PROGRESS NOTES
[facility]    Admission date:  6/24/2023     Assessment    Paroxysmal atrial fibrillation   Brief paroxysms of atrial tachycardia    Plan and Discussion    Continue short acting metoprolol until we are sure that her gut is working. As she nears discharge and it is clear that surgery will not be needed, start anticoagulation    Subjective    +BM. Getting stronger    Medications  Current Facility-Administered Medications   Medication Dose Route Frequency Provider Last Rate Last Admin    acetaminophen tablet 650 mg  650 mg Oral Q6H PRN Karmen Avila PA-C   650 mg at 06/26/23 0947    celecoxib capsule 200 mg  200 mg Oral Daily Yandy Horne MD   200 mg at 07/03/23 0839    cetirizine tablet 10 mg  10 mg Oral Daily SHERIN Pearson MD   10 mg at 07/03/23 0839    fluticasone propionate 50 mcg/actuation nasal spray 100 mcg  2 spray Each Nostril Daily SHERIN Pearson MD   100 mcg at 07/01/23 0940    hydrALAZINE injection 10 mg  10 mg Intravenous Q6H PRN SHERIN Pearson MD   10 mg at 06/30/23 0453    hydrOXYzine HCL tablet 50 mg  50 mg Oral TID PRN SHERIN Pearson MD   50 mg at 07/01/23 2027    melatonin tablet 6 mg  6 mg Oral Nightly PRN Karmen Avila PA-C   6 mg at 06/30/23 2042    metoprolol injection 5 mg  5 mg Intravenous Q5 Min PRN Yandy Horne MD   5 mg at 06/30/23 0842    metoprolol tartrate (LOPRESSOR) tablet 25 mg  25 mg Oral BID Yandy Horne MD   25 mg at 07/03/23 0839    morphine injection 2 mg  2 mg Intravenous Q4H PRN SHERIN Pearson MD   2 mg at 06/30/23 2359    naloxone 0.4 mg/mL injection 0.02 mg  0.02 mg Intravenous PRN Karmen Avila PA-C        ondansetron injection 8 mg  8 mg Intravenous Q6H PRN Karmen Avila PA-C   8 mg at 06/26/23 2111    oxyCODONE-acetaminophen 5-325 mg per tablet 1 tablet  1 tablet Oral Q4H PRN Yandy Horne MD   1 tablet at 07/01/23 0939    pantoprazole EC tablet 40 mg  40 mg Oral BID Yandy Horne MD   40 mg at 07/03/23 1635     polyethylene glycol packet 17 g  17 g Oral Daily Yandy Horne MD   17 g at 07/03/23 0842    simethicone 40 mg/0.6 mL drops 40 mg  40 mg Oral QID PRN Karmen Avila PA-C        sodium chloride 0.9% flush 10 mL  10 mL Intravenous PRN SHERIN Pearson MD   10 mL at 06/29/23 1356        Physical Exam    Temp:  [97.6 °F (36.4 °C)-98.8 °F (37.1 °C)]   Pulse:  []   Resp:  [16-20]   BP: (123-152)/(60-80)   SpO2:  [94 %-99 %]    Wt Readings from Last 3 Encounters:   06/29/23 51.6 kg (113 lb 12.1 oz)   06/30/23 51.3 kg (113 lb)   08/29/22 53.7 kg (118 lb 6.4 oz)     Physical Exam  Constitutional:       General: She is not in acute distress.  Neck:      Vascular: No hepatojugular reflux or JVD.   Cardiovascular:      Rate and Rhythm: Normal rate and regular rhythm.      Heart sounds: S1 normal and S2 normal. No murmur heard.    No S3 or S4 sounds.   Pulmonary:      Effort: Pulmonary effort is normal.      Breath sounds: Normal breath sounds and air entry.   Abdominal:      General: Bowel sounds are normal.      Palpations: Abdomen is soft. There is no hepatomegaly.      Tenderness: There is no abdominal tenderness.   Musculoskeletal:      Right lower leg: No edema.      Left lower leg: No edema.   Skin:     Coloration: Skin is not pale.   Neurological:      Mental Status: She is alert.   Psychiatric:         Behavior: Behavior is cooperative.       Telemetry  Sinus rhythm with brief AT    Labs  Recent Results (from the past 24 hour(s))   Magnesium    Collection Time: 07/03/23  4:43 AM   Result Value Ref Range    Magnesium 2.0 1.6 - 2.6 mg/dL   Phosphorus    Collection Time: 07/03/23  4:43 AM   Result Value Ref Range    Phosphorus 2.7 2.7 - 4.5 mg/dL   Basic Metabolic Panel    Collection Time: 07/03/23  4:43 AM   Result Value Ref Range    Sodium 136 136 - 145 mmol/L    Potassium 4.2 3.5 - 5.1 mmol/L    Chloride 105 95 - 110 mmol/L    CO2 25 23 - 29 mmol/L    Glucose 115 (H) 70 - 110 mg/dL    BUN 13 8 - 23 mg/dL     Creatinine 0.7 0.5 - 1.4 mg/dL    Calcium 9.5 8.7 - 10.5 mg/dL    Anion Gap 6 (L) 8 - 16 mmol/L    eGFR >60 >60 mL/min/1.73 m^2   CBC Auto Differential    Collection Time: 07/03/23  4:43 AM   Result Value Ref Range    WBC 7.95 3.90 - 12.70 K/uL    RBC 4.09 4.00 - 5.40 M/uL    Hemoglobin 12.3 12.0 - 16.0 g/dL    Hematocrit 37.7 37.0 - 48.5 %    MCV 92 82 - 98 fL    MCH 30.1 27.0 - 31.0 pg    MCHC 32.6 32.0 - 36.0 g/dL    RDW 12.7 11.5 - 14.5 %    Platelets 292 150 - 450 K/uL    MPV 8.8 (L) 9.2 - 12.9 fL    Immature Granulocytes 0.5 0.0 - 0.5 %    Gran # (ANC) 4.9 1.8 - 7.7 K/uL    Immature Grans (Abs) 0.04 0.00 - 0.04 K/uL    Lymph # 1.9 1.0 - 4.8 K/uL    Mono # 0.9 0.3 - 1.0 K/uL    Eos # 0.2 0.0 - 0.5 K/uL    Baso # 0.05 0.00 - 0.20 K/uL    nRBC 0 0 /100 WBC    Gran % 61.3 38.0 - 73.0 %    Lymph % 23.9 18.0 - 48.0 %    Mono % 10.9 4.0 - 15.0 %    Eosinophil % 2.8 0.0 - 8.0 %    Basophil % 0.6 0.0 - 1.9 %    Differential Method Automated              Kai Thompson MD

## 2023-07-03 NOTE — PROGRESS NOTES
[facility] ([address])  Adult Nutrition  Progress Note    SUMMARY       Recommendations  1) Continue cardiac diet as tolerated    2) Monitor nutrition-related labs    Goals:   Patient will meet >75% intake by RD follow up  Nutrition Goal Status: new  Communication of RD Recs:  (POC)    Assessment and Plan  Nutrition Problem  Altered GI function    Related to (etiology):   Small bowel obstruction    Signs and Symptoms (as evidenced by):   Lack of bowel movements x 8 days   Abdominal pain (resolved)    Interventions:  Modified mineral diet  Collaboration with other providers    Nutrition Diagnosis Status:   New      Malnutrition Assessment                 Orbital Region (Subcutaneous Fat Loss): well nourished   Yazdanism Region (Muscle Loss): mild depletion  Clavicle Bone Region (Muscle Loss): mild depletion                 Reason for Assessment  Reason For Assessment: length of stay  Diagnosis:  (SBO (small bowel obstruction))  Relevant Medical History: Osteoarthritis; hypokalemia; atrial fibrillation; lipoma; lactic acidosis  Interdisciplinary Rounds: did not attend  General Information Comments: Patient admitted for SBO. Diet advanced today from bland diet to cardiac diet. Patient reported good appetite; eats until she is full. Patient ordering food from menu. No N/V/D/C reported. Patient reports 2 bowel movements on 7/2. Patient denied commercial beverages. No chewing or swallowing problems reported. Patient stated UBW: 115-118 lbs. NFPE: mild clavicle region wasting noted. Fran score: 16; incision/site right back.  Nutrition Discharge Planning: d/c on cardiac diet    Nutrition Risk Screen  Nutrition Risk Screen: no indicators present    Nutrition/Diet History  Spiritual, Cultural Beliefs, Rastafari Practices, Values that Affect Care: no  Food Allergies: NKFA  Factors Affecting Nutritional Intake: altered gastrointestinal function    Anthropometrics  Temp: 98.4 °F (36.9 °C)  Height Method:  "Stated  Height: 5' 5" (165.1 cm)  Height (inches): 65 in  Weight Method: Bed Scale  Weight: 51.3 kg (113 lb 1.5 oz)  Weight (lb): 113.1 lb  Ideal Body Weight (IBW), Female: 125 lb  % Ideal Body Weight, Female (lb): 90.48 %  BMI (Calculated): 18.8  BMI Grade: 18.5-24.9 - normal       Lab/Procedures/Meds  Pertinent Labs Reviewed: reviewed  CBC:  Recent Labs   Lab 07/03/23  0443   WBC 7.95   HGB 12.3   HCT 37.7        CMP:  Recent Labs   Lab 07/03/23  0443   CALCIUM 9.5      K 4.2   CO2 25      BUN 13   CREATININE 0.7       Pertinent Medications Reviewed: reviewed  Scheduled Meds:   celecoxib  200 mg Oral Daily    cetirizine  10 mg Oral Daily    fluticasone propionate  2 spray Each Nostril Daily    metoprolol tartrate  25 mg Oral BID    pantoprazole  40 mg Oral BID    polyethylene glycol  17 g Oral Daily     Continuous Infusions:  PRN Meds:.acetaminophen, hydrALAZINE, hydrOXYzine HCL, melatonin, metoprolol, morphine, naloxone, ondansetron, oxyCODONE-acetaminophen, simethicone, sodium chloride 0.9%    Estimated/Assessed Needs  Weight Used For Calorie Calculations: 51.3 kg (113 lb 1.5 oz)  Energy Calorie Requirements (kcal): 7240-2253  Energy Need Method: Kcal/kg (25-30 kcal/kg)  Protein Requirements: 51-62 g (1.0-1.2 g/kg)  Weight Used For Protein Calculations: 51.3 kg (113 lb 1.5 oz)  Fluid Requirements (mL): 1 mL/kcal  Estimated Fluid Requirement Method:  (or per MD)  RDA Method (mL): 1282  CHO Requirement: 160 g      Nutrition Prescription Ordered  Current Diet Order: Cardiac    Evaluation of Received Nutrient/Fluid Intake  I/O: -4.8 since admit  Energy Calories Required: meeting needs  Protein Required: meeting needs  Fluid Required: meeting needs  Comments: LBM: 7/2/23  Tolerance: tolerating  % Intake of Estimated Energy Needs: 75 - 100 %  % Meal Intake: 75 - 100 %    Nutrition Risk  Level of Risk/Frequency of Follow-up:  (1-2 x/week)     Monitor and Evaluation  Food and Nutrient Intake: energy " intake, food and beverage intake  Food and Nutrient Adminstration: diet order  Physical Activity and Function: nutrition-related ADLs and IADLs, factors affecting access to physical activity  Anthropometric Measurements: weight  Biochemical Data, Medical Tests and Procedures: electrolyte and renal panel, gastrointestinal profile, glucose/endocrine profile, inflammatory profile, lipid profile  Nutrition-Focused Physical Findings: overall appearance     Nutrition Follow-Up  RD Follow-up?: Yes  [facility]  Oksana Lucas, RDN, LDN

## 2023-07-03 NOTE — ASSESSMENT & PLAN NOTE
Possible GIB  - SBO with h/o same in 04/2022, CT with transition point in LLQ.  - Treated with NGT to low intermittent suction; NPO, IVFs, pain, nausea control.  - Surgery consulted; appreciate assistance.  - Patient with coffee ground noted in NG, but H/H without significant drop and no reported bleeding and no further problems after NG removed  - NG clamped on 6/27, and then removed on 6/28  - Changed IV PPI Q12 to PO after able to advance diet  - Repeat KUB showed improvement  - Advanced to clear liquids on 6/29, to full liquids on 6/30 and bland diet on 7/1  - Clinically doing better with no N/V and tolerating diet, and had bowel movement x 2 on 7/2  - Return of bowel function, resolved SBO  - Restart Miralax 17 g PO daily, her home regimen

## 2023-07-03 NOTE — PT/OT/SLP PROGRESS
Physical Therapy Treatment    Patient Name:  Marian Durham   MRN:  [id number]    Recommendations:     Discharge Recommendations: rehabilitation facility  Discharge Equipment Recommendations:  (tbd next level of care)  Barriers to discharge: Decreased caregiver support    Assessment:     Marian Durham is a 78 y.o. female admitted with a medical diagnosis of SBO (small bowel obstruction).  She presents with the following impairments/functional limitations: weakness, pain, gait instability, impaired balance, impaired functional mobility, impaired self care skills, decreased lower extremity function, decreased ROM, decreased safety awareness.    Sit>stand with RW and SBA  Amb 70' with RW and CGA  Pt performed standing therex with CGA/SBA  Pt with improved mobility, strength deficits remain, pt motivated to progress  Rec Rehab    Prior to admission pt was modified independent with mobility and self-care and there is expectation of returning to prior level of function to maintain independence avoiding readmission. Pt is at high risk of unplanned readmission due to fall risk and lack of 24 hour caregiver in prior setting. The lower level of care cannot provide total interdisciplinary approach needed. Pt is able to tolerate 3 hours of daily therapy. Pt is pleasant and motivated to return to prior level of function.      Rehab Prognosis: Good; patient would benefit from acute skilled PT services to address these deficits and reach maximum level of function.    Recent Surgery: * No surgery found *      Plan:     During this hospitalization, patient to be seen 5 x/week to address the identified rehab impairments via gait training, therapeutic activities, therapeutic exercises, neuromuscular re-education, wheelchair management/training and progress toward the following goals:    Plan of Care Expires:  07/10/23    Subjective     Chief Complaint: weakness  Patient/Family Comments/goals: to go to a facility to rehab and get  stronger  Pain/Comfort:  Pain Rating 1: 0/10  Pain Rating Post-Intervention 1: 0/10      Objective:     Communicated with nurse Shonquell prior to session.  Patient found up in chair with peripheral IV, PureWick, telemetry upon PT entry to room.     General Precautions: Standard, fall  Orthopedic Precautions: N/A  Braces: N/A  Respiratory Status: Room air     Functional Mobility:  Transfers:     Sit to Stand:  stand by assistance with rolling walker  Gait: 70' with RW and CGA      AM-PAC 6 CLICK MOBILITY  Turning over in bed (including adjusting bedclothes, sheets and blankets)?: 3  Sitting down on and standing up from a chair with arms (e.g., wheelchair, bedside commode, etc.): 3  Moving from lying on back to sitting on the side of the bed?: 3  Moving to and from a bed to a chair (including a wheelchair)?: 3  Need to walk in hospital room?: 3  Climbing 3-5 steps with a railing?: 2  Basic Mobility Total Score: 17       Treatment & Education:  Standing therex: heel raises, shoulder flexion, marching in place x 10 reps  Gait training as noted    Patient left up in chair with all lines intact, call button in reach, and nurse Shonquell notified..    GOALS:   Multidisciplinary Problems       Physical Therapy Goals          Problem: Physical Therapy    Goal Priority Disciplines Outcome Goal Variances Interventions   Physical Therapy Goal     PT, PT/OT Ongoing, Progressing     Description: Goals to be met by: 7/10/23    Patient will increase functional independence with mobility by performin. Sit<>stand with supervision with LRAD.  2. Gait x 300 feet with supervision with LRAD.  3. Ascend/descend 5 step(s) with least restrictive assistive device and uni HR with SBA.   4. Standing HEP with supervision with appropriate UE support and no LOB x3 min. - MET                       Time Tracking:     PT Received On: 23  PT Start Time: 1543     PT Stop Time: 1606  PT Total Time (min): 23 min     Billable Minutes: Gait  Training 13 and Therapeutic Exercise 10    Treatment Type: Treatment  PT/PTA: PTA     Number of PTA visits since last PT visit: 1 07/03/2023

## 2023-07-03 NOTE — ASSESSMENT & PLAN NOTE
- Known h/o OA with need for surgical intervention  - Progressive worsening pain and difficulty walking which is a change since admission, reportedly due to having to stop Celebrex due to SBO and lack of her usual therapy  - Restarted Celebrex 200 mg daily on 7/1  - Repeated imaging with plain films, consulted Ortho (patient request and she is a patient of Dr. Mitchell), appreciate input  - Clinically doing better as of 7/2  - Continue therapy, and therapy recommending inpatient rehab

## 2023-07-03 NOTE — PROGRESS NOTES
"[facility] Surg [address] Medicine  Progress Note    Patient Name: Marian Durham  MRN: [id number]  Patient Class: IP- Inpatient   Admission Date: 6/24/2023  Length of Stay: 9 days  Attending Physician: Yandy Horne MD  Primary Care Provider: Suzanna Duran MD        Subjective:     Principal Problem:SBO (small bowel obstruction)        HPI:  Ms. Marian Durham is a 77 y.o. female, with PMH of prior SBO, hysterectomy, who presented to [facility] ED on 6/24/23 due to diffuse upper abdominal pain x 4 hours. She notes associated nausea, chills, bloating. She states her last BM was earlier this morning. She states this feels like last time when she had a "twisted bowel" ten months ago. She states her last bowel obstruction resolved 8 days after having an NG tube placed. She was evaluated in the ED with labs that showed leukocytosis of 17 K, with left shift.  She had concentrated H&H.  A metabolic panel showed elevated anion gap of 18, with CO2 of 16, and glucose of 146.  BUN was elevated at 27 creatinine was 1.1.  UA was dilute with 3+ ketones.  CT of the abdomen and pelvis showed small-bowel obstruction with transition point in the left lower abdomen, and trace right pleural effusion.  An NG-tube was placed.  She is admitted to inpatient status.      Overview/Hospital Course:  Admitted with SBO. NGT placed and surgery consulted. Started IVFs and symptom control. Patient with clinical improvement and NG removed and diet slowly advanced. Developed Afib with RVR on 6/30 and Cardiology consulted. She has been working with PT/OT but now with worsening pain/weakness to hip and knees. Ortho consulted and repeat plain films done. Therapy recommending inpatient rehab.      Interval History: No acute events overnight. Tolerating diet, passing flatus and had 2 large normal consistency bowel movements yesterday and she feels good. Request we start her back on her daily Miralax which she takes at home. Report " pain to hip and knees again better today and she is able to flex at the knees and move them more after restarting her Celebrex 2 days ago.    Review of Systems   Constitutional:  Negative for chills and fever.   Respiratory:  Negative for cough and shortness of breath.    Cardiovascular:  Negative for chest pain and palpitations.   Gastrointestinal:  Negative for abdominal pain, nausea and vomiting.   Musculoskeletal:  Positive for arthralgias and gait problem.   Objective:     Vital Signs (Most Recent):  Temp: 97.8 °F (36.6 °C) (07/03/23 1535)  Pulse: 75 (07/03/23 1535)  Resp: 18 (07/03/23 1535)  BP: 132/63 (07/03/23 1535)  SpO2: 98 % (07/03/23 1535) Vital Signs (24h Range):  Temp:  [97.6 °F (36.4 °C)-98.8 °F (37.1 °C)] 97.8 °F (36.6 °C)  Pulse:  [] 75  Resp:  [16-20] 18  SpO2:  [96 %-99 %] 98 %  BP: (123-142)/(60-80) 132/63     Weight: 51.3 kg (113 lb 1.5 oz)  Body mass index is 18.82 kg/m².    Intake/Output Summary (Last 24 hours) at 7/3/2023 1545  Last data filed at 7/3/2023 0902  Gross per 24 hour   Intake --   Output 1350 ml   Net -1350 ml           Physical Exam  Vitals and nursing note reviewed.   Constitutional:       General: She is not in acute distress.     Appearance: She is well-developed.   HENT:      Head: Normocephalic and atraumatic.   Eyes:      General:         Right eye: No discharge.         Left eye: No discharge.      Conjunctiva/sclera: Conjunctivae normal.   Cardiovascular:      Rate and Rhythm: Normal rate. Rhythm irregular.      Pulses: Normal pulses.   Pulmonary:      Effort: Pulmonary effort is normal. No respiratory distress.   Abdominal:      General: Bowel sounds are normal. There is no distension.      Palpations: Abdomen is soft.      Tenderness: There is no abdominal tenderness. There is no guarding or rebound.   Musculoskeletal:      Comments: Bilateral knees with compression in place but now able to flex at the knee today    Skin:     General: Skin is warm and dry.    Neurological:      Mental Status: She is alert and oriented to person, place, and time.         Significant Labs:   CBC:  Recent Labs   Lab 07/01/23 0426 07/02/23 0456 07/03/23 0443   WBC 10.18 7.18 7.95   HGB 13.1 12.2 12.3   HCT 39.5 36.8* 37.7    262 292   GRAN 66.9  6.8 58.6  4.2 61.3  4.9   LYMPH 16.3*  1.7 24.0  1.7 23.9  1.9   MONO 15.6*  1.6* 14.2  1.0 10.9  0.9   EOS 0.1 0.2 0.2   BASO 0.03 0.05 0.05     BMP:  Recent Labs   Lab 07/01/23 0426 07/02/23 0456 07/03/23 0443    138 136   K 3.7 3.4* 4.2    103 105   CO2 24 26 25   BUN 7* 9 13   CREATININE 0.7 0.7 0.7   * 114* 115*   CALCIUM 9.5 9.4 9.5   MG 2.0 2.1 2.0   PHOS 2.3* 2.7 2.7       Significant Imaging: I have reviewed and interpreted all pertinent imaging results/findings within the past 24 hours.      Assessment/Plan:      * SBO (small bowel obstruction)  Possible GIB  - SBO with h/o same in 04/2022, CT with transition point in LLQ.  - Treated with NGT to low intermittent suction; NPO, IVFs, pain, nausea control.  - Surgery consulted; appreciate assistance.  - Patient with coffee ground noted in NG, but H/H without significant drop and no reported bleeding and no further problems after NG removed  - NG clamped on 6/27, and then removed on 6/28  - Changed IV PPI Q12 to PO after able to advance diet  - Repeat KUB showed improvement  - Advanced to clear liquids on 6/29, to full liquids on 6/30 and bland diet on 7/1  - Clinically doing better with no N/V and tolerating diet, and had bowel movement x 2 on 7/2  - Return of bowel function, resolved SBO  - Restart Miralax 17 g PO daily, her home regimen    Atrial fibrillation with rapid ventricular response  - New onset AFib rapid ventricular response confirmed with EKG, rate noted in the 160s on 6/30  - Treated with Lopressor 5 mg IV x1 with rate controlled, and started metoprolol 12.5 mg p.o. b.i.d.  - Consulted Cardiology and checked a 2D Echo which showed normal  systolic function and grade I diastolic dysfunction  - Increased metoprolol to 25 mg PO BID on 7/1  - Rate well controlled on current regimen, will convert to extended release tomorrow   - Will hold off on anticoagulation for now and defer to Cardiology on initiation when closer to discharge    Elevated blood pressure reading  - Started on metoprolol for rate control as discussed above  - Continue hydralazine 10mg IV q6hr PRN for SBP > 180, DBP > 100    Hypokalemia  - Replete PRN.    Osteoarthritis  - Known h/o OA with need for surgical intervention  - Progressive worsening pain and difficulty walking which is a change since admission, reportedly due to having to stop Celebrex due to SBO and lack of her usual therapy  - Restarted Celebrex 200 mg daily on 7/1  - Repeated imaging with plain films, consulted Ortho (patient request and she is a patient of Dr. Mitchell), appreciate input  - Clinically doing better as of 7/2  - Continue therapy, and therapy recommending inpatient rehab       VTE Risk Mitigation (From admission, onward)         Ordered     IP VTE HIGH RISK PATIENT  Once         06/25/23 0028     Place sequential compression device  Until discontinued         06/25/23 0028                    Yandy Horne MD  Department of Hospital Medicine   [facility] ([address])

## 2023-07-03 NOTE — PT/OT/SLP PROGRESS
Occupational Therapy   Treatment    Name: Marian Durham  MRN: [id number]  Admitting Diagnosis:  SBO (small bowel obstruction)       Recommendations:     Discharge Recommendations: rehabilitation facility  Discharge Equipment Recommendations:  to be determined by next level of care  Barriers to discharge:  Decreased caregiver support (current functional level)    Assessment:     Marian Durham is a 78 y.o. female with a medical diagnosis of SBO (small bowel obstruction).  She presents with stiffness in knees (L > R). Performance deficits affecting function are weakness, impaired endurance, impaired self care skills, impaired functional mobility, decreased lower extremity function, pain, impaired balance, decreased ROM, gait instability, decreased safety awareness.  Pt agreeable to participating in therapy upon arrival to room. Pt able to perform sit <> stand transfer and toilet transfer with SBA.  With RW increased stability noted.  Pt able to perform grooming task standing at sink with SBA.  Pt indicated stiffness in knees during session.      Overall, pt tolerated therapy well; however, decreased safety awareness noted during transfers and functional mobility requiring some cues.  Pt is making progress towards goals and would continue to benefit from skilled OT services to address problems listed above and increase independence with ADLs.  Pt is highly motivated and is able to tolerate three hours of therapy a day.  Rehab is recommended upon d/c from acute care to further address deficits and help pt improve overall functional independence.            Rehab Prognosis:  Good; patient would benefit from acute skilled OT services to address these deficits and reach maximum level of function.       Plan:     Patient to be seen 3 x/week to address the above listed problems via self-care/home management, therapeutic activities, therapeutic exercises  Plan of Care Expires: 07/26/23  Plan of Care Reviewed with:  patient    Subjective     Chief Complaint: stiffness in knees  Patient/Family Comments/goals: resume PLOF; get stronger  Pain/Comfort:  Pain Rating 1: 0/10 in bed; pt indicated some pain in knees when mobilizing, but did not rate  Pain Rating Post-Intervention 1: 0/10    Objective:     Communicated with: RN prior to session.  Patient found HOB elevated with PureWick, peripheral IV, telemetry upon OT entry to room.    General Precautions: Standard, fall    Orthopedic Precautions:N/A  Braces: N/A  Respiratory Status: room air     Occupational Performance:     Bed Mobility:    Supine <> sit: SBA  Scooting: SBA    Functional Mobility/Transfers:  Sit <> stand:   SBA x 1 trial from EOB  SBA, RW x 1 trial from EOB  SBA x 1 trial from BSC holding onto bed rail and BSC  Toilet:  SBA taking step to BSC.  Cues for safety provided  Education provided on how to perform t/f with RW in future  Functional Mobility: Pt ambulated ~50 ft in room with CGA, RW.  Mild impaired balance and postural instability noted; no instances of LOB observed  Decreased safety awareness observed; cues provided    Activities of Daily Living:  Grooming: SBA, RW for washing hands standing at sink.  Cues for RW placement required.  Lower Body Dressing: Total A for doffing socks; Total A for donning socks and shoes while seated at EOB.      [facility] 6 Click ADL: 19    Treatment & Education:  *Session focused on promoting increased endurance, strength, balance, coordination, safety, and ROM needed for ADLs and functional transfers as part of daily routine.   -pt educated on role of OT in acute care setting  -pt ambulated within room to address coordination, endurance, and balance needed for functional mobility; cues for safety provided  -pt performed toilet transfer to BSC; cues for safety and technique required  -pt performed UB exercises and stretches in standing; SBA-CGA, RW  -pt performed grooming task standing at sink; cues for RW placement provided  -POC  reviewed with pt      Patient left up in chair with all lines intact and call button in reach    GOALS:   Multidisciplinary Problems       Occupational Therapy Goals          Problem: Occupational Therapy    Goal Priority Disciplines Outcome Interventions   Occupational Therapy Goal     OT, PT/OT Ongoing, Progressing    Description: Goals to be met by: 7/15/23     Patient will increase functional independence with ADLs by performing:    UB dressing at Supervision level.  LB dressing at Supervision level.  Toileting at Mod I   Toilet/Chair transfers at Mod I    Grooming at sink at Mod I    Sponge bathing at Set up/Supervision level.    Completed Goals:   Toileting at Supervision level.  MET 6/28/2023   Toilet/Chair transfers at Supervision level.  MET 6/28/2023  Grooming at sink at Supervision level.  MET 6/28/2023                        Time Tracking:     OT Date of Treatment: 07/03/23  OT Start Time: 1237  OT Stop Time: 1313  OT Total Time (min): 36 min    Billable Minutes:Self Care/Home Management 15  Therapeutic Activity 21    OT/VIVIAN: OT     Number of VIVIAN visits since last OT visit: 1    SILVER Alex  7/3/2023

## 2023-07-03 NOTE — PLAN OF CARE
07/03/23 1203   Medicare Message   Important Message from Medicare regarding Discharge Appeal Rights Given to patient/caregiver;Explained to patient/caregiver;Signed/date by patient/caregiver   Date IMM was signed 07/03/23   Time IMM was signed 1100

## 2023-07-03 NOTE — ASSESSMENT & PLAN NOTE
- New onset AFib rapid ventricular response confirmed with EKG, rate noted in the 160s on 6/30  - Treated with Lopressor 5 mg IV x1 with rate controlled, and started metoprolol 12.5 mg p.o. b.i.d.  - Consulted Cardiology and checked a 2D Echo which showed normal systolic function and grade I diastolic dysfunction  - Increased metoprolol to 25 mg PO BID on 7/1  - Rate well controlled on current regimen, will convert to extended release tomorrow   - Will hold off on anticoagulation for now and defer to Cardiology on initiation when closer to discharge

## 2023-07-03 NOTE — PLAN OF CARE
I certify I provided patient choice and a list to the patient/family of CMS rated Home Health, SNF, IRF, LTACH, nursing home Nursing Homes  Patient/Family signed Patient's Choice Disclosure Form choosing the following    1.[facility]    2.[facility]       07/03/23 1202   Post-Acute Status   Post-Acute Authorization Placement   Post-Acute Placement Status Referrals Sent   Patient choice form signed by patient/caregiver List with quality metrics by geographic area provided;List from CMS Compare;List from System Post-Acute Care   Discharge Delays (!) Post-Acute Set-up   Discharge Plan   Discharge Plan A Skilled Nursing Facility   Discharge Plan B Home Health     Patient refuses IPR placement would only like SNF placement.

## 2023-07-04 LAB
ANION GAP SERPL CALC-SCNC: 6 MMOL/L (ref 8–16)
BASOPHILS # BLD AUTO: 0.05 K/UL (ref 0–0.2)
BASOPHILS NFR BLD: 0.7 % (ref 0–1.9)
BUN SERPL-MCNC: 18 MG/DL (ref 8–23)
CALCIUM SERPL-MCNC: 9.7 MG/DL (ref 8.7–10.5)
CHLORIDE SERPL-SCNC: 105 MMOL/L (ref 95–110)
CO2 SERPL-SCNC: 27 MMOL/L (ref 23–29)
CREAT SERPL-MCNC: 0.8 MG/DL (ref 0.5–1.4)
DIFFERENTIAL METHOD: ABNORMAL
EOSINOPHIL # BLD AUTO: 0.3 K/UL (ref 0–0.5)
EOSINOPHIL NFR BLD: 4.4 % (ref 0–8)
ERYTHROCYTE [DISTWIDTH] IN BLOOD BY AUTOMATED COUNT: 12.7 % (ref 11.5–14.5)
EST. GFR  (NO RACE VARIABLE): >60 ML/MIN/1.73 M^2
GLUCOSE SERPL-MCNC: 126 MG/DL (ref 70–110)
HCT VFR BLD AUTO: 37.6 % (ref 37–48.5)
HGB BLD-MCNC: 12.4 G/DL (ref 12–16)
IMM GRANULOCYTES # BLD AUTO: 0.06 K/UL (ref 0–0.04)
IMM GRANULOCYTES NFR BLD AUTO: 0.8 % (ref 0–0.5)
LYMPHOCYTES # BLD AUTO: 2.1 K/UL (ref 1–4.8)
LYMPHOCYTES NFR BLD: 28.8 % (ref 18–48)
MAGNESIUM SERPL-MCNC: 2 MG/DL (ref 1.6–2.6)
MCH RBC QN AUTO: 30.1 PG (ref 27–31)
MCHC RBC AUTO-ENTMCNC: 33 G/DL (ref 32–36)
MCV RBC AUTO: 91 FL (ref 82–98)
MONOCYTES # BLD AUTO: 0.7 K/UL (ref 0.3–1)
MONOCYTES NFR BLD: 9.7 % (ref 4–15)
NEUTROPHILS # BLD AUTO: 4 K/UL (ref 1.8–7.7)
NEUTROPHILS NFR BLD: 55.6 % (ref 38–73)
NRBC BLD-RTO: 0 /100 WBC
PHOSPHATE SERPL-MCNC: 3.9 MG/DL (ref 2.7–4.5)
PLATELET # BLD AUTO: 324 K/UL (ref 150–450)
PMV BLD AUTO: 8.8 FL (ref 9.2–12.9)
POTASSIUM SERPL-SCNC: 3.9 MMOL/L (ref 3.5–5.1)
RBC # BLD AUTO: 4.12 M/UL (ref 4–5.4)
SODIUM SERPL-SCNC: 138 MMOL/L (ref 136–145)
WBC # BLD AUTO: 7.22 K/UL (ref 3.9–12.7)

## 2023-07-04 PROCEDURE — 99233 PR SUBSEQUENT HOSPITAL CARE,LEVL III: ICD-10-PCS | Mod: ,,, | Performed by: INTERNAL MEDICINE

## 2023-07-04 PROCEDURE — 99233 SBSQ HOSP IP/OBS HIGH 50: CPT | Mod: ,,, | Performed by: INTERNAL MEDICINE

## 2023-07-04 PROCEDURE — 25000003 PHARM REV CODE 250: Performed by: INTERNAL MEDICINE

## 2023-07-04 PROCEDURE — 80048 BASIC METABOLIC PNL TOTAL CA: CPT | Performed by: HOSPITALIST

## 2023-07-04 PROCEDURE — 99231 PR SUBSEQUENT HOSPITAL CARE,LEVL I: ICD-10-PCS | Mod: ,,, | Performed by: SURGERY

## 2023-07-04 PROCEDURE — 25000003 PHARM REV CODE 250: Performed by: HOSPITALIST

## 2023-07-04 PROCEDURE — 99231 SBSQ HOSP IP/OBS SF/LOW 25: CPT | Mod: ,,, | Performed by: SURGERY

## 2023-07-04 PROCEDURE — 85025 COMPLETE CBC W/AUTO DIFF WBC: CPT | Performed by: HOSPITALIST

## 2023-07-04 PROCEDURE — 83735 ASSAY OF MAGNESIUM: CPT | Performed by: HOSPITALIST

## 2023-07-04 PROCEDURE — 21400001 HC TELEMETRY ROOM

## 2023-07-04 PROCEDURE — 84100 ASSAY OF PHOSPHORUS: CPT | Performed by: HOSPITALIST

## 2023-07-04 PROCEDURE — 36415 COLL VENOUS BLD VENIPUNCTURE: CPT | Performed by: HOSPITALIST

## 2023-07-04 PROCEDURE — 94761 N-INVAS EAR/PLS OXIMETRY MLT: CPT

## 2023-07-04 RX ORDER — PANTOPRAZOLE SODIUM 40 MG/1
40 TABLET, DELAYED RELEASE ORAL DAILY
Status: DISCONTINUED | OUTPATIENT
Start: 2023-07-05 | End: 2023-07-05 | Stop reason: HOSPADM

## 2023-07-04 RX ORDER — METOPROLOL SUCCINATE 50 MG/1
50 TABLET, EXTENDED RELEASE ORAL DAILY
Status: DISCONTINUED | OUTPATIENT
Start: 2023-07-05 | End: 2023-07-05 | Stop reason: HOSPADM

## 2023-07-04 RX ORDER — POLYETHYLENE GLYCOL 3350 17 G/17G
17 POWDER, FOR SOLUTION ORAL 2 TIMES DAILY
Status: DISCONTINUED | OUTPATIENT
Start: 2023-07-04 | End: 2023-07-05 | Stop reason: HOSPADM

## 2023-07-04 RX ADMIN — METOPROLOL TARTRATE 25 MG: 25 TABLET, FILM COATED ORAL at 08:07

## 2023-07-04 RX ADMIN — CELECOXIB 200 MG: 200 CAPSULE ORAL at 08:07

## 2023-07-04 RX ADMIN — PANTOPRAZOLE SODIUM 40 MG: 40 TABLET, DELAYED RELEASE ORAL at 08:07

## 2023-07-04 RX ADMIN — CETIRIZINE HYDROCHLORIDE 10 MG: 10 TABLET, FILM COATED ORAL at 08:07

## 2023-07-04 RX ADMIN — POLYETHYLENE GLYCOL 3350 17 G: 17 POWDER, FOR SOLUTION ORAL at 08:07

## 2023-07-04 NOTE — PROGRESS NOTES
Surgery Inpatient Progress Note    Date: 07/04/2023    Overnight events: tolerating diet, having bowel function.  Feels deconditioned from hospitalization     O:   Vitals:    07/04/23 0839   BP: 120/76   Pulse: 84   Resp: 20   Temp: 97.8 °F (36.6 °C)       Physical Exam   Gen: well developed female, NAD  HEENT: normocephalic, atraumatic, PERRL, EOMI   CV: RRR, no murmurs  Resp: nonlabored, CTAB   Abd: soft, NTND   MSK: no gross deformities    Labs: reviewed and stable    Assessment and plan:   Marian Durham is a 78 y.o. female who presented with SBO     - bowel obstruction resolved  - patient tolerating diet, having bowel function   - no acute surgical intervention required       Wendy Dacosta MD  Staff Surgeon   Colon & Rectal Surgery

## 2023-07-04 NOTE — ASSESSMENT & PLAN NOTE
- SBO with h/o same in 04/2022, CT with transition point in LLQ.  - Treated with NGT to low intermittent suction; NPO, IVFs, pain, nausea control.  - Surgery consulted; appreciate assistance.  - Patient with coffee ground noted in NG but likely related to trauma from insertion, no recurrence since. Removed 06/28.  - Continue pantoprazole 40mg as PO daily.  - Advanced to clear liquids on 6/29, to full liquids on 6/30 and bland diet on 7/1  - Clinically doing better with no N/V and tolerating diet, and had bowel movement x 2 on 7/2. SBO since resolved.  - Continue polyethylene glycol 17 g PO as BID.

## 2023-07-04 NOTE — SUBJECTIVE & OBJECTIVE
Interval History: No acute events overnight. Feeling well. Reports consistent BMs but having to strain mildly. No other concerns at this time.    Review of Systems   Constitutional:  Negative for chills and fever.   Respiratory:  Negative for cough and shortness of breath.    Cardiovascular:  Negative for chest pain and palpitations.   Gastrointestinal:  Negative for abdominal pain, nausea and vomiting.   Musculoskeletal:  Positive for arthralgias.   Objective:     Vital Signs (Most Recent):  Temp: 97.7 °F (36.5 °C) (07/04/23 1212)  Pulse: 84 (07/04/23 1359)  Resp: 20 (07/04/23 1212)  BP: 127/60 (07/04/23 1212)  SpO2: 97 % (07/04/23 1212) Vital Signs (24h Range):  Temp:  [97.7 °F (36.5 °C)-98.2 °F (36.8 °C)] 97.7 °F (36.5 °C)  Pulse:  [63-93] 84  Resp:  [18-20] 20  SpO2:  [94 %-97 %] 97 %  BP: (120-156)/(60-76) 127/60     Weight: 54.8 kg (120 lb 13 oz)  Body mass index is 20.1 kg/m².    Intake/Output Summary (Last 24 hours) at 7/4/2023 1558  Last data filed at 7/4/2023 0511  Gross per 24 hour   Intake --   Output 700 ml   Net -700 ml         Physical Exam  Vitals and nursing note reviewed.   Constitutional:       General: She is not in acute distress.     Appearance: She is well-developed.   HENT:      Head: Normocephalic and atraumatic.   Eyes:      General:         Right eye: No discharge.         Left eye: No discharge.      Conjunctiva/sclera: Conjunctivae normal.   Cardiovascular:      Rate and Rhythm: Normal rate.      Pulses: Normal pulses.   Pulmonary:      Effort: Pulmonary effort is normal. No respiratory distress.   Abdominal:      Palpations: Abdomen is soft.      Tenderness: There is no abdominal tenderness.   Musculoskeletal:         General: Normal range of motion.      Right lower leg: No edema.      Left lower leg: No edema.   Skin:     General: Skin is warm and dry.   Neurological:      Mental Status: She is alert and oriented to person, place, and time.           Significant Labs:   CBC:  Recent  Labs   Lab 07/02/23  0456 07/03/23  0443 07/04/23  0350   WBC 7.18 7.95 7.22   HGB 12.2 12.3 12.4   HCT 36.8* 37.7 37.6    292 324   GRAN 58.6  4.2 61.3  4.9 55.6  4.0   LYMPH 24.0  1.7 23.9  1.9 28.8  2.1   MONO 14.2  1.0 10.9  0.9 9.7  0.7   EOS 0.2 0.2 0.3   BASO 0.05 0.05 0.05   BMP:  Recent Labs   Lab 07/02/23  0456 07/03/23  0443 07/04/23  0350    136 138   K 3.4* 4.2 3.9    105 105   CO2 26 25 27   BUN 9 13 18   CREATININE 0.7 0.7 0.8   * 115* 126*   CALCIUM 9.4 9.5 9.7   MG 2.1 2.0 2.0   PHOS 2.7 2.7 3.9     Significant Imaging: I have reviewed and interpreted all pertinent imaging results/findings within the past 24 hours.

## 2023-07-04 NOTE — ASSESSMENT & PLAN NOTE
- Known h/o OA with need for surgical intervention  - Progressive worsening pain and difficulty walking which is a change since admission, reportedly due to having to stop Celebrex due to SBO and lack of her usual therapy  - Restarted Celebrex 200 mg daily on 7/1  - Repeated imaging with plain films, consulted Ortho (patient request and she is a patient of Dr. Mitchell), appreciate input  - Clinically doing better as of 7/2  - Continue PT/OT; recommending inpatient rehab. Pursuing placement.

## 2023-07-04 NOTE — PLAN OF CARE
Problem: Adult Inpatient Plan of Care  Goal: Plan of Care Review  Outcome: Ongoing, Progressing  Goal: Patient-Specific Goal (Individualized)  Outcome: Ongoing, Progressing  Goal: Absence of Hospital-Acquired Illness or Injury  Outcome: Ongoing, Progressing  Goal: Optimal Comfort and Wellbeing  Outcome: Ongoing, Progressing  Goal: Readiness for Transition of Care  Outcome: Ongoing, Progressing     Problem: Fluid Deficit (Intestinal Obstruction)  Goal: Fluid Balance  Outcome: Ongoing, Progressing     Problem: Infection (Intestinal Obstruction)  Goal: Absence of Infection Signs and Symptoms  Outcome: Ongoing, Progressing     Problem: Skin Injury Risk Increased  Goal: Skin Health and Integrity  Outcome: Ongoing, Progressing     Problem: Fall Injury Risk  Goal: Absence of Fall and Fall-Related Injury  Outcome: Ongoing, Progressing     Problem: Pain Acute  Goal: Acceptable Pain Control and Functional Ability  Outcome: Ongoing, Progressing     Problem: Pain (Intestinal Obstruction)  Goal: Acceptable Pain Control  Outcome: Ongoing, Progressing

## 2023-07-04 NOTE — PROGRESS NOTES
House day 10.   Diagnosis-small-bowel obstruction    Subjective   Feeling much better   No nausea/vomiting   Bowel movement x2  Tolerating solid diet     PE  Afebrile   Vital signs stable   Abdomen-soft, nontender, nondistended, no guarding, no rebound    Impression/plan   SBO resolving   Will follow

## 2023-07-04 NOTE — PROGRESS NOTES
"[facility] [address] Medicine  Progress Note    Patient Name: Marian Durham  MRN: [id number]  Patient Class: IP- Inpatient   Admission Date: 6/24/2023  Length of Stay: 10 days  Attending Physician: SHERIN Pearson MD  Primary Care Provider: Suzanna Duran MD        Subjective:     Principal Problem:SBO (small bowel obstruction)        HPI:  Ms. Marian Durham is a 77 y.o. female, with PMH of prior SBO, hysterectomy, who presented to [facility] ED on 6/24/23 due to diffuse upper abdominal pain x 4 hours. She notes associated nausea, chills, bloating. She states her last BM was earlier this morning. She states this feels like last time when she had a "twisted bowel" ten months ago. She states her last bowel obstruction resolved 8 days after having an NG tube placed. She was evaluated in the ED with labs that showed leukocytosis of 17 K, with left shift.  She had concentrated H&H.  A metabolic panel showed elevated anion gap of 18, with CO2 of 16, and glucose of 146.  BUN was elevated at 27 creatinine was 1.1.  UA was dilute with 3+ ketones.  CT of the abdomen and pelvis showed small-bowel obstruction with transition point in the left lower abdomen, and trace right pleural effusion.  An NG-tube was placed.  She is admitted to inpatient status.      Overview/Hospital Course:  Admitted with SBO. NGT placed and surgery consulted. Started IVFs and symptom control. Patient with clinical improvement and NG removed and diet slowly advanced. Developed Afib with RVR on 6/30 and Cardiology consulted. She has been working with PT/OT but now with worsening pain/weakness to hip and knees. Ortho consulted and repeat plain films done. Therapy recommending inpatient rehab.      Interval History: No acute events overnight. Feeling well. Reports consistent BMs but having to strain mildly. No other concerns at this time.    Review of Systems   Constitutional:  Negative for chills and fever.   Respiratory:  " Negative for cough and shortness of breath.    Cardiovascular:  Negative for chest pain and palpitations.   Gastrointestinal:  Negative for abdominal pain, nausea and vomiting.   Musculoskeletal:  Positive for arthralgias.   Objective:     Vital Signs (Most Recent):  Temp: 97.7 °F (36.5 °C) (07/04/23 1212)  Pulse: 84 (07/04/23 1359)  Resp: 20 (07/04/23 1212)  BP: 127/60 (07/04/23 1212)  SpO2: 97 % (07/04/23 1212) Vital Signs (24h Range):  Temp:  [97.7 °F (36.5 °C)-98.2 °F (36.8 °C)] 97.7 °F (36.5 °C)  Pulse:  [63-93] 84  Resp:  [18-20] 20  SpO2:  [94 %-97 %] 97 %  BP: (120-156)/(60-76) 127/60     Weight: 54.8 kg (120 lb 13 oz)  Body mass index is 20.1 kg/m².    Intake/Output Summary (Last 24 hours) at 7/4/2023 1558  Last data filed at 7/4/2023 0511  Gross per 24 hour   Intake --   Output 700 ml   Net -700 ml         Physical Exam  Vitals and nursing note reviewed.   Constitutional:       General: She is not in acute distress.     Appearance: She is well-developed.   HENT:      Head: Normocephalic and atraumatic.   Eyes:      General:         Right eye: No discharge.         Left eye: No discharge.      Conjunctiva/sclera: Conjunctivae normal.   Cardiovascular:      Rate and Rhythm: Normal rate.      Pulses: Normal pulses.   Pulmonary:      Effort: Pulmonary effort is normal. No respiratory distress.   Abdominal:      Palpations: Abdomen is soft.      Tenderness: There is no abdominal tenderness.   Musculoskeletal:         General: Normal range of motion.      Right lower leg: No edema.      Left lower leg: No edema.   Skin:     General: Skin is warm and dry.   Neurological:      Mental Status: She is alert and oriented to person, place, and time.           Significant Labs:   CBC:  Recent Labs   Lab 07/02/23  0456 07/03/23  0443 07/04/23  0350   WBC 7.18 7.95 7.22   HGB 12.2 12.3 12.4   HCT 36.8* 37.7 37.6    292 324   GRAN 58.6  4.2 61.3  4.9 55.6  4.0   LYMPH 24.0  1.7 23.9  1.9 28.8  2.1   MONO 14.2   1.0 10.9  0.9 9.7  0.7   EOS 0.2 0.2 0.3   BASO 0.05 0.05 0.05   BMP:  Recent Labs   Lab 07/02/23  0456 07/03/23  0443 07/04/23  0350    136 138   K 3.4* 4.2 3.9    105 105   CO2 26 25 27   BUN 9 13 18   CREATININE 0.7 0.7 0.8   * 115* 126*   CALCIUM 9.4 9.5 9.7   MG 2.1 2.0 2.0   PHOS 2.7 2.7 3.9     Significant Imaging: I have reviewed and interpreted all pertinent imaging results/findings within the past 24 hours.      Assessment/Plan:      * SBO (small bowel obstruction)  - SBO with h/o same in 04/2022, CT with transition point in LLQ.  - Treated with NGT to low intermittent suction; NPO, IVFs, pain, nausea control.  - Surgery consulted; appreciate assistance.  - Patient with coffee ground noted in NG but likely related to trauma from insertion, no recurrence since. Removed 06/28.  - Continue pantoprazole 40mg as PO daily.  - Advanced to clear liquids on 6/29, to full liquids on 6/30 and bland diet on 7/1  - Clinically doing better with no N/V and tolerating diet, and had bowel movement x 2 on 7/2. SBO since resolved.  - Continue polyethylene glycol 17 g PO as BID.    Atrial fibrillation with rapid ventricular response  - New onset AFib rapid ventricular response confirmed with EKG, rate noted in the 160s on 6/30  - Consulted Cardiology, echo showed normal systolic function and grade I diastolic dysfunction.  - Rate well controlled on current regimen, convert to metoprolol succinate 50mg PO daily.  - Defer anticoagulation initiation to discharge, likely apixaban 5mg PO BID.    Elevated blood pressure reading  - Started on metoprolol for rate control as discussed above  - Continue hydralazine 10mg IV q6hr PRN for SBP > 180, DBP > 100    Hypokalemia  - Replete PRN.    Osteoarthritis  - Known h/o OA with need for surgical intervention  - Progressive worsening pain and difficulty walking which is a change since admission, reportedly due to having to stop Celebrex due to SBO and lack of her  usual therapy  - Restarted Celebrex 200 mg daily on 7/1  - Repeated imaging with plain films, consulted Ortho (patient request and she is a patient of Dr. Mitchell), appreciate input  - Clinically doing better as of 7/2  - Continue PT/OT; recommending inpatient rehab. Pursuing placement.    VTE Risk Mitigation (From admission, onward)         Ordered     IP VTE HIGH RISK PATIENT  Once         06/25/23 0028     Place sequential compression device  Until discontinued         06/25/23 0028                Discharge Planning   WILLIAN:      Code Status: Full Code   Is the patient medically ready for discharge?:     Reason for patient still in hospital (select all that apply): Pending disposition  Discharge Plan A: Skilled Nursing Facility   Discharge Delays: (!) Post-Acute Set-up              D Favian Pearson MD  Department of Hospital Medicine   [facility] ([address])

## 2023-07-04 NOTE — ASSESSMENT & PLAN NOTE
- New onset AFib rapid ventricular response confirmed with EKG, rate noted in the 160s on 6/30  - Consulted Cardiology, echo showed normal systolic function and grade I diastolic dysfunction.  - Rate well controlled on current regimen, convert to metoprolol succinate 50mg PO daily.  - Defer anticoagulation initiation to discharge, likely apixaban 5mg PO BID.

## 2023-07-05 ENCOUNTER — HOSPITAL ENCOUNTER (INPATIENT)
Facility: HOSPITAL | Age: 78
LOS: 9 days | Discharge: HOME HEALTH | DRG: 309 | End: 2023-07-14
Attending: HOSPITALIST | Admitting: HOSPITALIST
Payer: MEDICARE

## 2023-07-05 VITALS
OXYGEN SATURATION: 98 % | WEIGHT: 108.94 LBS | TEMPERATURE: 98 F | RESPIRATION RATE: 18 BRPM | BODY MASS INDEX: 18.15 KG/M2 | HEIGHT: 65 IN | SYSTOLIC BLOOD PRESSURE: 142 MMHG | HEART RATE: 77 BPM | DIASTOLIC BLOOD PRESSURE: 63 MMHG

## 2023-07-05 DIAGNOSIS — I48.0 PAROXYSMAL ATRIAL FIBRILLATION: ICD-10-CM

## 2023-07-05 DIAGNOSIS — R53.81 DEBILITY: Primary | ICD-10-CM

## 2023-07-05 DIAGNOSIS — K56.609 SBO (SMALL BOWEL OBSTRUCTION): ICD-10-CM

## 2023-07-05 LAB
ANION GAP SERPL CALC-SCNC: 8 MMOL/L (ref 8–16)
BASOPHILS # BLD AUTO: 0.06 K/UL (ref 0–0.2)
BASOPHILS NFR BLD: 0.8 % (ref 0–1.9)
BUN SERPL-MCNC: 19 MG/DL (ref 8–23)
CALCIUM SERPL-MCNC: 9.7 MG/DL (ref 8.7–10.5)
CHLORIDE SERPL-SCNC: 105 MMOL/L (ref 95–110)
CO2 SERPL-SCNC: 24 MMOL/L (ref 23–29)
CREAT SERPL-MCNC: 0.7 MG/DL (ref 0.5–1.4)
DIFFERENTIAL METHOD: ABNORMAL
EOSINOPHIL # BLD AUTO: 0.4 K/UL (ref 0–0.5)
EOSINOPHIL NFR BLD: 5.1 % (ref 0–8)
ERYTHROCYTE [DISTWIDTH] IN BLOOD BY AUTOMATED COUNT: 12.8 % (ref 11.5–14.5)
EST. GFR  (NO RACE VARIABLE): >60 ML/MIN/1.73 M^2
GLUCOSE SERPL-MCNC: 129 MG/DL (ref 70–110)
HCT VFR BLD AUTO: 40.3 % (ref 37–48.5)
HGB BLD-MCNC: 13 G/DL (ref 12–16)
IMM GRANULOCYTES # BLD AUTO: 0.05 K/UL (ref 0–0.04)
IMM GRANULOCYTES NFR BLD AUTO: 0.7 % (ref 0–0.5)
LYMPHOCYTES # BLD AUTO: 1.9 K/UL (ref 1–4.8)
LYMPHOCYTES NFR BLD: 26.5 % (ref 18–48)
MAGNESIUM SERPL-MCNC: 2.2 MG/DL (ref 1.6–2.6)
MCH RBC QN AUTO: 29.7 PG (ref 27–31)
MCHC RBC AUTO-ENTMCNC: 32.3 G/DL (ref 32–36)
MCV RBC AUTO: 92 FL (ref 82–98)
MONOCYTES # BLD AUTO: 0.8 K/UL (ref 0.3–1)
MONOCYTES NFR BLD: 10.3 % (ref 4–15)
NEUTROPHILS # BLD AUTO: 4.1 K/UL (ref 1.8–7.7)
NEUTROPHILS NFR BLD: 56.6 % (ref 38–73)
NRBC BLD-RTO: 0 /100 WBC
PHOSPHATE SERPL-MCNC: 3.8 MG/DL (ref 2.7–4.5)
PLATELET # BLD AUTO: 348 K/UL (ref 150–450)
PMV BLD AUTO: 8.5 FL (ref 9.2–12.9)
POTASSIUM SERPL-SCNC: 4.3 MMOL/L (ref 3.5–5.1)
RBC # BLD AUTO: 4.37 M/UL (ref 4–5.4)
SARS-COV-2 RDRP RESP QL NAA+PROBE: NEGATIVE
SODIUM SERPL-SCNC: 137 MMOL/L (ref 136–145)
WBC # BLD AUTO: 7.25 K/UL (ref 3.9–12.7)

## 2023-07-05 PROCEDURE — 25000003 PHARM REV CODE 250: Performed by: HOSPITALIST

## 2023-07-05 PROCEDURE — 11000004 HC SNF PRIVATE

## 2023-07-05 PROCEDURE — 1111F PR DISCHARGE MEDS RECONCILED W/ CURRENT OUTPATIENT MED LIST: ICD-10-PCS | Mod: CPTII,,, | Performed by: INTERNAL MEDICINE

## 2023-07-05 PROCEDURE — 83735 ASSAY OF MAGNESIUM: CPT | Performed by: HOSPITALIST

## 2023-07-05 PROCEDURE — U0002 COVID-19 LAB TEST NON-CDC: HCPCS | Performed by: INTERNAL MEDICINE

## 2023-07-05 PROCEDURE — 99239 HOSP IP/OBS DSCHRG MGMT >30: CPT | Mod: ,,, | Performed by: INTERNAL MEDICINE

## 2023-07-05 PROCEDURE — 80048 BASIC METABOLIC PNL TOTAL CA: CPT | Performed by: HOSPITALIST

## 2023-07-05 PROCEDURE — 1111F DSCHRG MED/CURRENT MED MERGE: CPT | Mod: CPTII,,, | Performed by: INTERNAL MEDICINE

## 2023-07-05 PROCEDURE — 36415 COLL VENOUS BLD VENIPUNCTURE: CPT | Performed by: HOSPITALIST

## 2023-07-05 PROCEDURE — 84100 ASSAY OF PHOSPHORUS: CPT | Performed by: HOSPITALIST

## 2023-07-05 PROCEDURE — 99239 PR HOSPITAL DISCHARGE DAY,>30 MIN: ICD-10-PCS | Mod: ,,, | Performed by: INTERNAL MEDICINE

## 2023-07-05 PROCEDURE — 85025 COMPLETE CBC W/AUTO DIFF WBC: CPT | Performed by: HOSPITALIST

## 2023-07-05 PROCEDURE — 25000003 PHARM REV CODE 250: Performed by: INTERNAL MEDICINE

## 2023-07-05 PROCEDURE — 94761 N-INVAS EAR/PLS OXIMETRY MLT: CPT

## 2023-07-05 RX ORDER — ACETAMINOPHEN 325 MG/1
650 TABLET ORAL EVERY 6 HOURS PRN
Status: DISCONTINUED | OUTPATIENT
Start: 2023-07-05 | End: 2023-07-14 | Stop reason: HOSPADM

## 2023-07-05 RX ORDER — TALC
6 POWDER (GRAM) TOPICAL NIGHTLY PRN
Status: DISCONTINUED | OUTPATIENT
Start: 2023-07-05 | End: 2023-07-14 | Stop reason: HOSPADM

## 2023-07-05 RX ORDER — AMOXICILLIN 250 MG
1 CAPSULE ORAL 2 TIMES DAILY
Status: DISCONTINUED | OUTPATIENT
Start: 2023-07-05 | End: 2023-07-14 | Stop reason: HOSPADM

## 2023-07-05 RX ORDER — ATORVASTATIN CALCIUM 20 MG/1
20 TABLET, FILM COATED ORAL NIGHTLY
Status: DISCONTINUED | OUTPATIENT
Start: 2023-07-05 | End: 2023-07-14 | Stop reason: HOSPADM

## 2023-07-05 RX ORDER — FLUTICASONE PROPIONATE 50 MCG
2 SPRAY, SUSPENSION (ML) NASAL DAILY
Qty: 16 G | Refills: 0 | Status: SHIPPED | OUTPATIENT
Start: 2023-07-05 | End: 2023-07-05 | Stop reason: SDUPTHER

## 2023-07-05 RX ORDER — HYDROXYZINE HYDROCHLORIDE 25 MG/1
50 TABLET, FILM COATED ORAL 3 TIMES DAILY PRN
Status: DISCONTINUED | OUTPATIENT
Start: 2023-07-05 | End: 2023-07-14 | Stop reason: HOSPADM

## 2023-07-05 RX ORDER — FLUTICASONE PROPIONATE 50 MCG
2 SPRAY, SUSPENSION (ML) NASAL DAILY
Qty: 16 G | Refills: 0 | Status: SHIPPED | OUTPATIENT
Start: 2023-07-05 | End: 2023-08-07

## 2023-07-05 RX ORDER — FERROUS SULFATE, DRIED 160(50) MG
1 TABLET, EXTENDED RELEASE ORAL 2 TIMES DAILY WITH MEALS
Status: DISCONTINUED | OUTPATIENT
Start: 2023-07-05 | End: 2023-07-14 | Stop reason: HOSPADM

## 2023-07-05 RX ORDER — POLYETHYLENE GLYCOL 3350 17 G/17G
17 POWDER, FOR SOLUTION ORAL 3 TIMES DAILY PRN
Qty: 510 G | Refills: 0 | Status: SHIPPED | OUTPATIENT
Start: 2023-07-05

## 2023-07-05 RX ORDER — POLYETHYLENE GLYCOL 3350 17 G/17G
17 POWDER, FOR SOLUTION ORAL 3 TIMES DAILY PRN
Status: DISCONTINUED | OUTPATIENT
Start: 2023-07-05 | End: 2023-07-14 | Stop reason: HOSPADM

## 2023-07-05 RX ORDER — HYDROXYZINE HYDROCHLORIDE 50 MG/1
50 TABLET, FILM COATED ORAL 3 TIMES DAILY PRN
Qty: 60 TABLET | Refills: 0 | Status: SHIPPED | OUTPATIENT
Start: 2023-07-05 | End: 2023-07-05 | Stop reason: SDUPTHER

## 2023-07-05 RX ORDER — PANTOPRAZOLE SODIUM 40 MG/1
40 TABLET, DELAYED RELEASE ORAL DAILY
Qty: 30 TABLET | Refills: 0 | Status: SHIPPED | OUTPATIENT
Start: 2023-07-05 | End: 2023-07-05 | Stop reason: SDUPTHER

## 2023-07-05 RX ORDER — HYDROXYZINE HYDROCHLORIDE 50 MG/1
50 TABLET, FILM COATED ORAL 3 TIMES DAILY PRN
Qty: 60 TABLET | Refills: 0 | Status: SHIPPED | OUTPATIENT
Start: 2023-07-05 | End: 2023-08-07

## 2023-07-05 RX ORDER — PANTOPRAZOLE SODIUM 40 MG/1
40 TABLET, DELAYED RELEASE ORAL DAILY
Status: DISCONTINUED | OUTPATIENT
Start: 2023-07-06 | End: 2023-07-14 | Stop reason: HOSPADM

## 2023-07-05 RX ORDER — METOPROLOL SUCCINATE 50 MG/1
50 TABLET, EXTENDED RELEASE ORAL DAILY
Qty: 30 TABLET | Refills: 0 | Status: SHIPPED | OUTPATIENT
Start: 2023-07-05 | End: 2023-07-05 | Stop reason: SDUPTHER

## 2023-07-05 RX ORDER — CELECOXIB 200 MG/1
200 CAPSULE ORAL DAILY PRN
Status: DISCONTINUED | OUTPATIENT
Start: 2023-07-05 | End: 2023-07-06

## 2023-07-05 RX ORDER — METOPROLOL SUCCINATE 50 MG/1
50 TABLET, EXTENDED RELEASE ORAL DAILY
Status: DISCONTINUED | OUTPATIENT
Start: 2023-07-06 | End: 2023-07-14 | Stop reason: HOSPADM

## 2023-07-05 RX ORDER — FLUTICASONE PROPIONATE 50 MCG
2 SPRAY, SUSPENSION (ML) NASAL DAILY
Status: DISCONTINUED | OUTPATIENT
Start: 2023-07-06 | End: 2023-07-14 | Stop reason: HOSPADM

## 2023-07-05 RX ORDER — CETIRIZINE HYDROCHLORIDE 5 MG/1
10 TABLET ORAL DAILY
Status: CANCELLED | OUTPATIENT
Start: 2023-07-06

## 2023-07-05 RX ORDER — PANTOPRAZOLE SODIUM 40 MG/1
40 TABLET, DELAYED RELEASE ORAL DAILY
Qty: 30 TABLET | Refills: 0 | Status: SHIPPED | OUTPATIENT
Start: 2023-07-05 | End: 2023-08-07

## 2023-07-05 RX ORDER — CETIRIZINE HYDROCHLORIDE 10 MG/1
10 TABLET ORAL DAILY
Qty: 30 TABLET | Refills: 0 | Status: SHIPPED | OUTPATIENT
Start: 2023-07-05 | End: 2023-07-05 | Stop reason: HOSPADM

## 2023-07-05 RX ORDER — METOPROLOL SUCCINATE 50 MG/1
50 TABLET, EXTENDED RELEASE ORAL DAILY
Qty: 30 TABLET | Refills: 0 | Status: SHIPPED | OUTPATIENT
Start: 2023-07-05 | End: 2023-08-11 | Stop reason: SDUPTHER

## 2023-07-05 RX ORDER — CALCIUM CARBONATE 200(500)MG
500 TABLET,CHEWABLE ORAL 2 TIMES DAILY PRN
Status: DISCONTINUED | OUTPATIENT
Start: 2023-07-05 | End: 2023-07-14 | Stop reason: HOSPADM

## 2023-07-05 RX ADMIN — CELECOXIB 200 MG: 200 CAPSULE ORAL at 09:07

## 2023-07-05 RX ADMIN — PANTOPRAZOLE SODIUM 40 MG: 40 TABLET, DELAYED RELEASE ORAL at 10:07

## 2023-07-05 RX ADMIN — CETIRIZINE HYDROCHLORIDE 10 MG: 10 TABLET, FILM COATED ORAL at 09:07

## 2023-07-05 RX ADMIN — POLYETHYLENE GLYCOL 3350 17 G: 17 POWDER, FOR SOLUTION ORAL at 09:07

## 2023-07-05 RX ADMIN — METOPROLOL SUCCINATE 50 MG: 50 TABLET, EXTENDED RELEASE ORAL at 10:07

## 2023-07-05 RX ADMIN — ATORVASTATIN CALCIUM 20 MG: 20 TABLET, FILM COATED ORAL at 09:07

## 2023-07-05 RX ADMIN — APIXABAN 5 MG: 2.5 TABLET, FILM COATED ORAL at 09:07

## 2023-07-05 RX ADMIN — Medication 1 TABLET: at 05:07

## 2023-07-05 NOTE — PLAN OF CARE
Problem: Adult Inpatient Plan of Care  Goal: Plan of Care Review  Outcome: Ongoing, Progressing  Goal: Patient-Specific Goal (Individualized)  Outcome: Ongoing, Progressing  Goal: Absence of Hospital-Acquired Illness or Injury  Outcome: Ongoing, Progressing  Goal: Optimal Comfort and Wellbeing  Outcome: Ongoing, Progressing  Goal: Readiness for Transition of Care  Outcome: Ongoing, Progressing     Problem: Fluid Deficit (Intestinal Obstruction)  Goal: Fluid Balance  Outcome: Ongoing, Progressing     Problem: Skin Injury Risk Increased  Goal: Skin Health and Integrity  Outcome: Ongoing, Progressing     Problem: Fall Injury Risk  Goal: Absence of Fall and Fall-Related Injury  Outcome: Ongoing, Progressing     Problem: Pain Acute  Goal: Acceptable Pain Control and Functional Ability  Outcome: Ongoing, Progressing     Problem: Pain (Intestinal Obstruction)  Goal: Acceptable Pain Control  Outcome: Ongoing, Progressing

## 2023-07-05 NOTE — NURSING
Pt arrived to floor for skilled services via wheel chair. Pt required 1 person assist from wheelchair to bed. Pt AAOx4 and cont B/B. Skin assessment done and remains intact.  Swelling +1 pitting to bilateral ankles and feet. No other skin issues noted. POC reviewed with patient. Denies pain or discomfort at this time. Pt educated to use call light for assistance.

## 2023-07-05 NOTE — CARE UPDATE
07/04/23 2056   Patient Assessment/Suction   Level of Consciousness (AVPU) alert   Respiratory Effort Normal;Unlabored   All Lung Fields Breath Sounds clear;equal bilaterally   Rhythm/Pattern, Respiratory unlabored;pattern regular   PRE-TX-O2   Device (Oxygen Therapy) room air   SpO2 97 %   Pulse Oximetry Type Intermittent   $ Pulse Oximetry - Multiple Charge Pulse Oximetry - Multiple   Pulse 90   Resp 18

## 2023-07-05 NOTE — PT/OT/SLP PROGRESS
Occupational Therapy      Patient Name:  Marian Durham   MRN:  [id number]    Patient not seen today secondary to eating lunch and visiting with son. Will follow-up as scheduling permits.    7/5/2023

## 2023-07-05 NOTE — PLAN OF CARE
Patient has been accepted to [facility]. ADT 30 placed. Son at bedside and updated on status. Patient will transfer by wheelchair van.   07/05/23 1427   Final Note   Assessment Type Final Discharge Note   Anticipated Discharge Disposition Home   Hospital Resources/Appts/Education Provided Provided patient/caregiver with written discharge plan information   Post-Acute Status   Post-Acute Authorization Placement   Post-Acute Placement Status Set-up Complete/Auth obtained   Discharge Delays (!) Ambulance Transport/Facility Transport     [facility] ([address])  Discharge Final Note    Primary Care Provider: Suzanna Duran MD    Expected Discharge Date: 7/5/2023    Final Discharge Note (most recent)       Final Note - 07/05/23 1427          Final Note    Assessment Type Final Discharge Note (P)      Anticipated Discharge Disposition Home or Self Care (P)      Hospital Resources/Appts/Education Provided Provided patient/caregiver with written discharge plan information (P)         Post-Acute Status    Post-Acute Authorization Placement (P)      Post-Acute Placement Status Set-up Complete/Auth obtained (P)      Discharge Delays Ambulance Transport/Facility Transport (P)                      Important Message from Medicare  Important Message from Medicare regarding Discharge Appeal Rights: Given to patient/caregiver, Explained to patient/caregiver, Signed/date by patient/caregiver     Date IMM was signed: 07/03/23  Time IMM was signed: 1100    Contact Info       Suzanna Duran MD   Specialty: Internal Medicine   Relationship: PCP - General    [address] [place] [id number]   Phone: [phone]       Next Steps: Follow up in 2 week(s)    Instructions: post-hospital follow-up    [facility] 3rd Fl   Specialty: Cardiology    [address]  [facility] [address]   Phone: [phone]       Next Steps: Follow up in 2 week(s)    Instructions: post-hospital follow-up     [facility]   Specialty: Skilled Nursing Facility    [address], 3RD FLO[address]  [place] [address]   Phone: [phone]       Next Steps: Follow up

## 2023-07-05 NOTE — PLAN OF CARE
"   NURSING HOME ORDERS    07/05/2023  [facility] ([facility])  [facility] ([address])  [address]  3RD FLOOR  [facility] [address]  Dept: [phone]  Loc: [phone]     Admit to Nursing Home:  Skilled Nursing Facility    Diagnoses:  Active Hospital Problems    Diagnosis  POA    *SBO (small bowel obstruction) [K56.609]  Yes    Atrial fibrillation with rapid ventricular response [I48.91]  No    Hypokalemia [E87.6]  Yes    Elevated blood pressure reading [R03.0]  Yes    Osteoarthritis [M19.90]  Yes      Resolved Hospital Problems   No resolved problems to display.       Patient is homebound due to:  SBO (small bowel obstruction)    Allergies:  Review of patient's allergies indicates:   Allergen Reactions    Demerol [meperidine] Other (See Comments)     "Becomes Agitated and Combative"       Vitals:  Every shift    Diet: regular diet    Activities:   Activity as tolerated    Goals of Care Treatment Preferences:  Code Status: Full Code    Living Will: Yes          Labs:  Per unit routine    Nursing Precautions:  Fall    Consults:   PT to evaluate and treat, OT to evaluate and treat, and Nutrition to evaluate and recommend diet     Miscellaneous Care: N/A      Medications: Discontinue all previous medication orders, if any. See new list below.     Medication List        START taking these medications      apixaban 5 mg Tab  Commonly known as: ELIQUIS  Take 1 tablet (5 mg total) by mouth 2 (two) times daily.     fluticasone propionate 50 mcg/actuation nasal spray  Commonly known as: FLONASE  Instill 2 sprays (100 mcg total) by Each Nostril  once daily.     hydrOXYzine 50 MG tablet  Commonly known as: ATARAX  Take 1 tablet (50 mg total) by mouth 3 (three) times daily as needed for Anxiety.     metoprolol succinate 50 MG 24 hr tablet  Commonly known as: TOPROL-XL  Take 1 tablet (50 mg total) by mouth once daily.     pantoprazole 40 MG tablet  Commonly known as: PROTONIX  Take 1 tablet (40 mg " total) by mouth once daily.            CHANGE how you take these medications      polyethylene glycol 17 gram/dose powder  Commonly known as: MIRALAX  Take 17 g by mouth 3 (three) times daily as needed (Constipation).  What changed:   medication strength  how much to take  when to take this  reasons to take this            CONTINUE taking these medications      calcium-vitamin D3 500 mg-5 mcg (200 unit) per tablet  Commonly known as: OS- + D3  Take 1 tablet by mouth 2 (two) times daily with meals.     celecoxib 200 MG capsule  Commonly known as: CeleBREX  take 1 capsule (200MG) by oral route every day as needed     estradioL 0.01 % (0.1 mg/gram) vaginal cream  Commonly known as: ESTRACE  Place 1 g vaginally twice a week.  Can hold while in SNF. Resume on discharge.   glucosamine-chondroitin 500-400 mg tablet  Take 1 tablet by mouth 3 (three) times daily.  Can hold while in SNF. Resume on discharge.   simvastatin 40 MG tablet  Commonly known as: ZOCOR  Take 40 mg by mouth every evening.            _________________________________  D Favian Pearson MD  07/05/2023

## 2023-07-05 NOTE — PLAN OF CARE
Patient has been medically accepted to [facility]. Insurance auth pending.   07/05/23 0848   Post-Acute Status   Post-Acute Authorization Placement   Post-Acute Placement Status Pending payor review/awaiting authorization (if required)   Discharge Delays (!) Post-Acute Set-up   Discharge Plan   Discharge Plan A Skilled Nursing Facility   Discharge Plan B [facility]

## 2023-07-06 PROBLEM — I48.0 PAROXYSMAL ATRIAL FIBRILLATION: Status: ACTIVE | Noted: 2023-06-30

## 2023-07-06 PROBLEM — R53.81 DEBILITY: Status: ACTIVE | Noted: 2023-07-06

## 2023-07-06 LAB
ANION GAP SERPL CALC-SCNC: 10 MMOL/L (ref 8–16)
BASOPHILS # BLD AUTO: 0.06 K/UL (ref 0–0.2)
BASOPHILS NFR BLD: 0.9 % (ref 0–1.9)
BUN SERPL-MCNC: 16 MG/DL (ref 8–23)
CALCIUM SERPL-MCNC: 9.9 MG/DL (ref 8.7–10.5)
CHLORIDE SERPL-SCNC: 104 MMOL/L (ref 95–110)
CO2 SERPL-SCNC: 25 MMOL/L (ref 23–29)
CREAT SERPL-MCNC: 0.8 MG/DL (ref 0.5–1.4)
DIFFERENTIAL METHOD: ABNORMAL
EOSINOPHIL # BLD AUTO: 0.3 K/UL (ref 0–0.5)
EOSINOPHIL NFR BLD: 3.7 % (ref 0–8)
ERYTHROCYTE [DISTWIDTH] IN BLOOD BY AUTOMATED COUNT: 12.8 % (ref 11.5–14.5)
EST. GFR  (NO RACE VARIABLE): >60 ML/MIN/1.73 M^2
GLUCOSE SERPL-MCNC: 126 MG/DL (ref 70–110)
HCT VFR BLD AUTO: 41.7 % (ref 37–48.5)
HGB BLD-MCNC: 13.6 G/DL (ref 12–16)
IMM GRANULOCYTES # BLD AUTO: 0.05 K/UL (ref 0–0.04)
IMM GRANULOCYTES NFR BLD AUTO: 0.7 % (ref 0–0.5)
LYMPHOCYTES # BLD AUTO: 1.8 K/UL (ref 1–4.8)
LYMPHOCYTES NFR BLD: 26 % (ref 18–48)
MAGNESIUM SERPL-MCNC: 2.4 MG/DL (ref 1.6–2.6)
MCH RBC QN AUTO: 29.6 PG (ref 27–31)
MCHC RBC AUTO-ENTMCNC: 32.6 G/DL (ref 32–36)
MCV RBC AUTO: 91 FL (ref 82–98)
MONOCYTES # BLD AUTO: 0.6 K/UL (ref 0.3–1)
MONOCYTES NFR BLD: 8.8 % (ref 4–15)
NEUTROPHILS # BLD AUTO: 4.2 K/UL (ref 1.8–7.7)
NEUTROPHILS NFR BLD: 59.9 % (ref 38–73)
NRBC BLD-RTO: 0 /100 WBC
PHOSPHATE SERPL-MCNC: 3.4 MG/DL (ref 2.7–4.5)
PLATELET # BLD AUTO: 395 K/UL (ref 150–450)
PMV BLD AUTO: 8.7 FL (ref 9.2–12.9)
POTASSIUM SERPL-SCNC: 4.6 MMOL/L (ref 3.5–5.1)
RBC # BLD AUTO: 4.6 M/UL (ref 4–5.4)
SODIUM SERPL-SCNC: 139 MMOL/L (ref 136–145)
WBC # BLD AUTO: 6.97 K/UL (ref 3.9–12.7)

## 2023-07-06 PROCEDURE — 97535 SELF CARE MNGMENT TRAINING: CPT

## 2023-07-06 PROCEDURE — 85025 COMPLETE CBC W/AUTO DIFF WBC: CPT | Performed by: HOSPITALIST

## 2023-07-06 PROCEDURE — 97110 THERAPEUTIC EXERCISES: CPT

## 2023-07-06 PROCEDURE — 36415 COLL VENOUS BLD VENIPUNCTURE: CPT | Performed by: HOSPITALIST

## 2023-07-06 PROCEDURE — 97116 GAIT TRAINING THERAPY: CPT

## 2023-07-06 PROCEDURE — 83735 ASSAY OF MAGNESIUM: CPT | Performed by: HOSPITALIST

## 2023-07-06 PROCEDURE — 97165 OT EVAL LOW COMPLEX 30 MIN: CPT

## 2023-07-06 PROCEDURE — 97162 PT EVAL MOD COMPLEX 30 MIN: CPT

## 2023-07-06 PROCEDURE — 97530 THERAPEUTIC ACTIVITIES: CPT

## 2023-07-06 PROCEDURE — 25000003 PHARM REV CODE 250: Performed by: HOSPITALIST

## 2023-07-06 PROCEDURE — 84100 ASSAY OF PHOSPHORUS: CPT | Performed by: HOSPITALIST

## 2023-07-06 PROCEDURE — 80048 BASIC METABOLIC PNL TOTAL CA: CPT | Performed by: HOSPITALIST

## 2023-07-06 PROCEDURE — 11000004 HC SNF PRIVATE

## 2023-07-06 PROCEDURE — 25000242 PHARM REV CODE 250 ALT 637 W/ HCPCS: Performed by: HOSPITALIST

## 2023-07-06 RX ORDER — CELECOXIB 200 MG/1
200 CAPSULE ORAL DAILY
Status: DISCONTINUED | OUTPATIENT
Start: 2023-07-07 | End: 2023-07-14 | Stop reason: HOSPADM

## 2023-07-06 RX ADMIN — APIXABAN 5 MG: 2.5 TABLET, FILM COATED ORAL at 09:07

## 2023-07-06 RX ADMIN — ATORVASTATIN CALCIUM 20 MG: 20 TABLET, FILM COATED ORAL at 09:07

## 2023-07-06 RX ADMIN — METOPROLOL SUCCINATE 50 MG: 50 TABLET, EXTENDED RELEASE ORAL at 09:07

## 2023-07-06 RX ADMIN — FLUTICASONE PROPIONATE 100 MCG: 50 SPRAY, METERED NASAL at 09:07

## 2023-07-06 RX ADMIN — Medication 1 TABLET: at 09:07

## 2023-07-06 RX ADMIN — Medication 1 TABLET: at 04:07

## 2023-07-06 RX ADMIN — CELECOXIB 200 MG: 200 CAPSULE ORAL at 09:07

## 2023-07-06 RX ADMIN — PANTOPRAZOLE SODIUM 40 MG: 40 TABLET, DELAYED RELEASE ORAL at 09:07

## 2023-07-06 NOTE — PLAN OF CARE
Evaluation completed. POC established.     Problem: Physical Therapy  Goal: Physical Therapy Goal  Description: Goals to be met by: 23     Patient will increase functional independence with mobility by performin. Supine to sit with [place]  2. Sit to supine with [place]  3. Rolling to Left and Right with [place]  4. Sit to stand transfer with Modified [place]  5. Bed to chair transfer with Modified [place] using Rolling Walker  6. Gait  x 200 feet with Modified [place] using Rolling Walker  7. Wheelchair propulsion x 150 feet with Modified [place] using bilateral upper extremities  8. Ascend/descend 12 steps with 1 Handrail Modified [place] using No Assistive Device  9. Ascend/Descend 4 inch curb step with Modified [place] using Rolling Walker    Outcome: Ongoing, Progressing   2023

## 2023-07-06 NOTE — PT/OT/SLP EVAL
Physical Therapy Evaluation/Treatment Note    Patient Name:  Marian Durham   MRN:  [id number]  Admit Date: 7/5/2023  Admitting Diagnosis: SBO (small bowel obstruction)  Recent Surgeries: N/A    General Precautions: Standard, fall   Orthopedic Precautions: N/A   Braces: N/A    Recommendations:     Discharge Recommendations: home health PT   Level of Assistance Recommended: Intermittent assistance   Discharge Equipment Recommendations: none (Continue to assess)   Barriers to discharge: Inaccessible home environment, Decreased caregiver support    Assessment:     Marian Durham is a 78 y.o. female admitted with a medical diagnosis of SBO (small bowel obstruction). Performance deficits affecting function  weakness, impaired endurance, impaired functional mobility, gait instability, impaired balance, decreased lower extremity function, decreased upper extremity function, decreased safety awareness, decreased ROM. Patient agreeable to PT evaluation and treatment this AM. Patient reports being Independent to Mod I with use of RW as needed for mobility and ADLs prior to current admission. Patient reports that pain and weakness in B knees and shoulders are limiting factors in her overall independence with mobility. Patient currently requiring CGA for ambulation on level and unlevel surfaces and curb step training, [place] for stair training and Supervision for bed mobility. Patient motivated to return to OF. Patient will benefit from continued SNF rehabilitation services to address deficits as well as progress mobility towards increased functional independence for safe transition to home environment at time of discharge.       Rehab Potential is good     Activity Tolerance: Good    Plan:     Patient to be seen 5 x/week to address the above listed problems via gait training, therapeutic activities, therapeutic exercises, neuromuscular re-education, wheelchair management/training     Plan of Care Expires: 08/05/23  Plan of  "Care Reviewed with: patient    Subjective     Chief Complaint: "It's my knees. They are the issue. My shoulders too."  Patient/Family Comments/goals: Improve strength in knees and shoulders for safe mobility.   Pain/Comfort:  Pain Rating 1: 0/10  Pain Rating Post-Intervention 1: 0/10    Living Environment:   Patient resides home alone in elevated house. Patient has a flight of stairs to enter with chair lift access. Patient reports having one threshold JAZMIN. Patient has a tub/shower combo.     Prior to admission, patients level of function was Independent to Mod I. Patient with occasional use of RW as needed with pain/weakness in B kness.  Equipment used at home: RW as needed, chair lift .  DME owned (not currently used): none.  Upon discharge, patient will have intermittent assistance from family.    Patients cultural, spiritual, Anglican conflicts given the current situation: no    Objective:     Communicated with nursing staff prior to session.  Patient found ambulatory in room/[place] with  (no active lines)  upon PT entry to room.    Exams:  Cognitive Exam:  Patient is oriented to Person, Place, Time, and Situation  RLE ROM: Grossly WFL  RLE Strength: Grossly 3+/5 to 4-/5  LLE ROM: Mild limitation in L knee extension AROM  LLE Strength: Grossly 3/5 to 4-/5    Functional Mobility:     07/06/23 0958   Prior Functioning: Everyday Activities   Self Care Independent   Indoor Mobility (Ambulation) Independent   Stairs Independent   Functional Cognition Independent   Prior Device Use Walker   Roll Left and Right   Assistance Needed Set-up / clean-up   Physical Assistance Level No physical assistance   Comment Supervision on level mat without railings   CARE Score - Roll Left and Right 5   Sit to Lying   Assistance Needed Set-up / clean-up   Physical Assistance Level No physical assistance   Comment Supervision on level mat without railings   CARE Score - Sit to Lying 5   Lying to Sitting on Side of Bed   Assistance " Needed Set-up / clean-up   Physical Assistance Level No physical assistance   Comment Supervision on level mat without railings   CARE Score - Lying to Sitting on Side of Bed 5   Sit to Stand   Assistance Needed Incidental touching   Physical Assistance Level No physical assistance   Comment CGA using RW   CARE Score - Sit to Stand 4   Chair/Bed-to-Chair Transfer   Assistance Needed Incidental touching   Physical Assistance Level No physical assistance   Comment CGA using RW/CGA without AD via stand pivot   CARE Score - Chair/Bed-to-Chair Transfer 4   Chair/Bed-to-Chair Transfer Discharge Goal   Discharge Goal 6   Toilet Transfer   Assistance Needed Set-up / clean-up   Physical Assistance Level No physical assistance   Comment Supervision - [facility]   CARE Score - Toilet Transfer 5   Car Transfer   Reason if not Attempted Environmental limitations   CARE Score - Car Transfer 10   Walk 10 Feet   Assistance Needed Incidental touching   Physical Assistance Level No physical assistance   Comment Patient ambulated 219 feet using RW with CGA. No LOB. Patient ambulated 10 feet within room without AD and HHA/Estrada.   CARE Score - Walk 10 Feet 4   Walk 50 Feet with Two Turns   Assistance Needed Incidental touching   Physical Assistance Level No physical assistance   Comment Patient ambulated 219 feet using RW with CGA. No LOB.   CARE Score - Walk 50 Feet with Two Turns 4   Walk 150 Feet   Assistance Needed Incidental touching   Physical Assistance Level No physical assistance   Comment Patient ambulated 219 feet using RW with CGA. No LOB.   CARE Score - Walk 150 Feet 4   Walking 10 Feet on Uneven Surfaces   Assistance Needed Incidental touching   Physical Assistance Level No physical assistance   Comment Patient ambulated 10 feet on unlevel mat using RW with CGA.   CARE Score - Walking 10 Feet on Uneven Surfaces 4   1 Step (Curb)   Assistance Needed Incidental touching   Physical Assistance Level No physical assistance   Comment  Patient ascended/descended 4 inch curb step using RW with CGA.   CARE Score - 1 Step (Curb) 4   4 Steps   Assistance Needed Physical assistance   Physical Assistance Level 25% or less   Comment Patient ascended/descended 8 steps using BHR to ascend and RHR to descend with Estrada.   CARE Score - 4 Steps 3   12 Steps   Comment Patient reporting fatigue in BLE.   Reason if not Attempted Safety concerns   CARE Score - 12 Steps 88   Picking Up Object   Assistance Needed Incidental touching   Physical Assistance Level No physical assistance   Comment Patient retrieved object from floor using RW with CGA.   CARE Score - Picking Up Object 4   Uses a Wheelchair/Scooter?   Uses a Wheelchair/Scooter? Yes   Wheel 50 Feet with Two Turns   Assistance Needed Supervision   Physical Assistance Level No physical assistance   Comment Patient propelled W/C 86 feet using BUE with SBA.   CARE Score - Wheel 50 Feet with Two Turns 4   Type of Wheelchair/Scooter Manual   Wheel 150 Feet   Comment Patient reports UE fatigue after 86 feet of propulsion.   Reason if not Attempted Safety concerns   CARE Score - Wheel 150 Feet 88   Type of Wheelchair/Scooter Manual     Education:  Patient provided with daily orientation and goals of this PT session.  Patient educated to transfer to bedside chair/bedside commode/bathroom with RN/PCT present.   Patient educated on assistive device use, bed mobility training, Fall risk, gait training, home safety, Home exercise program, plan of care, stair training, and transfer training by explanation and demonstration.   Patient Verbalized Understanding and Demonstrated Understanding .  Time provided for therapeutic counseling and discussion of current health disposition. All questions answered to satisfaction, within scope of PT practice; voiced no other concerns. Nursing staff notified of session.      Therapeutic Activities and Exercises:   Supine BLE/UE exercises completed x 15 reps each. ROM in BLE/BUE to  tolerance. Patient with knowledge of exercise regime from prior therapy encounters.     Patient engaged in repeated functional transfers throughout session within room and in therapy gym. PT supervised patient with transfer to [facility] in room at start of session for toileting needs. PT supervised patient in standing to brush teeth at sink prior to transport to gym. Patient educated on safety with in room mobility; addressed use of call system for safety and fall prevention.      AM-PAC 6 CLICK MOBILITY  Total Score:18     Patient left up in chair with call button in reach and nursing staff notified.    GOALS:   Multidisciplinary Problems       Physical Therapy Goals          Problem: Physical Therapy    Goal Priority Disciplines Outcome Goal Variances Interventions   Physical Therapy Goal     PT, PT/OT Ongoing, Progressing     Description: Goals to be met by: 23     Patient will increase functional independence with mobility by performin. Supine to sit with [place]  2. Sit to supine with [place]  3. Rolling to Left and Right with [place]  4. Sit to stand transfer with Modified [place]  5. Bed to chair transfer with Modified [place] using Rolling Walker  6. Gait  x 200 feet with Modified [place] using Rolling Walker  7. Wheelchair propulsion x 150 feet with Modified [place] using bilateral upper extremities  8. Ascend/descend 12 steps with 1 Handrail Modified [place] using No Assistive Device  9. Ascend/Descend 4 inch curb step with Modified [place] using Rolling Walker                         History:     Past Medical History:   Diagnosis Date    Hyperlipidemia     Osteoarthritis     Shingles 2002    Spinal stenosis        Past Surgical History:   Procedure Laterality Date    ANKLE FRACTURE SURGERY      Right ankle    EXCISION OF MASS OF BACK N/A 2018    Procedure: EXCISION, MASS, BACK;  Surgeon: Fran Magaña MD;  Location: [facility];  Service:  General;  Laterality: N/A;    EYE SURGERY Bilateral 2016    HYSTERECTOMY  1985    HEATHER    KNEE ARTHROSCOPY W/ DEBRIDEMENT  1994    Bilateral    KNEE ARTHROSCOPY W/ DEBRIDEMENT  2003    Left knee    OPEN PATELLA REEFING PROCEDURE  age 19    Left knee    Tonsilectomy  as a child    TONSILLECTOMY      TUBAL LIGATION         Time Tracking:     PT Received On: 07/06/23  PT Start Time: 0958     PT Stop Time: 1043  PT Total Time (min): 45 min     Billable Minutes: Evaluation 20, Therapeutic Activity 10, and Therapeutic Exercise 15      07/06/2023

## 2023-07-06 NOTE — DISCHARGE SUMMARY
"[facility] Med [facility] ([address] Medicine  Discharge Summary      Patient Name: Marian Durham  MRN: [id number]  RAJ: [id number]  Patient Class: IP- Inpatient  Admission Date: 6/24/2023  Hospital Length of Stay: 11 days  Discharge Date and Time: 7/5/2023  4:34 PM  Attending Physician: Angelique att. providers found   Discharging Provider: ADILENE Pearson MD  Primary Care Provider: Suzanna Duran MD    Primary Care Team: Networked reference to record PCT     HPI:   Ms. Marian Durham is a 77 y.o. female, with PMH of prior SBO, hysterectomy, who [place] [facility] [place] due to diffuse upper abdominal pain x 4 hours. She notes associated nausea, chills, bloating. She states her last BM was earlier this morning. She states this feels like last time when she had a "twisted bowel" ten months ago. She states her last bowel obstruction resolved 8 days after having an NG tube placed. She was evaluated in the ED with labs that showed leukocytosis of 17 K, with left shift.  She had concentrated H&H.  A metabolic panel showed elevated anion gap of 18, with CO2 of 16, and glucose of 146.  BUN was elevated at 27 creatinine was 1.1.  UA was dilute with 3+ ketones.  CT of the abdomen and pelvis showed small-bowel obstruction with transition point in the left lower abdomen, and trace right pleural effusion.  An NG-tube was placed.  She is admitted to inpatient status.      * No surgery found *      Hospital Course:   Admitted with SBO. NGT placed and surgery consulted. Started IVFs and symptom control. Patient with clinical improvement and NG removed and diet slowly advanced. Developed Afib with RVR on 6/30 and Cardiology consulted. Had worsening pain/weakness to hip/knees; orthopedics consulted and imaging repeated with chronic changes. Therapy recommended further inpatient therapy; SNF placement pursued. SBO symptoms resolved and having regular BMs. With clinical improvement and vital stability, she was " "prepared for discharge to SNF; referred to cardiology outpatient and anticoagulation initiated with apixaban.       Goals of Care Treatment Preferences:  Code Status: Full Code    Living Will: Yes              Consults:   Consults (From admission, onward)        Status Ordering Provider     Inpatient consult to General Surgery  Once        Provider:  Girma Milian MD    Completed DAVID ALBRECHT          No new Assessment & Plan notes have been filed under this hospital service since the last note was generated.  Service: Hospital Medicine    Final Active Diagnoses:    Diagnosis Date Noted POA    PRINCIPAL PROBLEM:  SBO (small bowel obstruction) [K56.609] 04/22/2022 Yes    Atrial fibrillation with rapid ventricular response [I48.91] 06/30/2023 No    Hypokalemia [E87.6] 06/26/2023 Yes    Elevated blood pressure reading [R03.0] 06/26/2023 Yes    Osteoarthritis [M19.90] 04/23/2022 Yes      Problems Resolved During this Admission:       Discharged Condition: good    Disposition: Skilled Nursing Facility    Follow Up:   Follow-up Information     Suzanna Duran MD Follow up in 2 week(s).    Specialty: Internal Medicine  Why: post-hospital follow-up  Contact information:  [facility] [address]  [phone]             [facility] - Cardiology - [facility] Follow up in 2 week(s).    Specialty: Cardiology  Why: post-hospital follow-up  Contact information:  [address]  [facility] [address] [phone]  Additional information:  Cardiology Services Clin[facility] - 3rd floor [facility] Follow up.    Specialty: Skilled Nursing Facility  Contact information:  [address], [address] [place] [address] [id number] [phone]                     Patient Instructions:      WALKER FOR HOME USE     Order Specific Question Answer Comments   Type of Walker: Adult (5'4"-6'6")    With wheels? Yes    Height: 5' 5" (1.651 m)  " "  Weight: 51.6 kg (113 lb 12.1 oz)    Length of need (1-99 months): 99    Does patient have medical equipment at home? cane, quad    Does patient have medical equipment at home? walker, rolling    Does patient have medical equipment at home? shower chair    Please check all that apply: Patient's condition impairs ambulation.    Please check all that apply: Patient needs help to get in and out of chair.    Please check all that apply: Walker will be used for gait training.    Please check all that apply: Patient is unable to safely ambulate without equipment.      COMMODE FOR HOME USE     Order Specific Question Answer Comments   Type: Standard    Height: 5' 5" (1.651 m)    Weight: 51.6 kg (113 lb 12.1 oz)    Does patient have medical equipment at home? cane, quad    Does patient have medical equipment at home? walker, rolling    Does patient have medical equipment at home? shower chair    Length of need (1-99 months): 99      Ambulatory referral/consult to Cardiology   Standing Status: Future   Referral Priority: Routine Referral Type: Consultation   Referral Reason: Specialty Services Required   Requested Specialty: Cardiology   Number of Visits Requested: 1     Diet Cardiac     Notify your health care provider if you experience any of the following:  temperature >100.4     Notify your health care provider if you experience any of the following:  persistent nausea and vomiting or diarrhea     Notify your health care provider if you experience any of the following:  severe uncontrolled pain     Notify your health care provider if you experience any of the following:  difficulty breathing or increased cough     Activity as tolerated       Significant Diagnostic Studies:   CBC:  Recent Labs   Lab 07/03/23  0443 07/04/23  0350 07/05/23  0522   WBC 7.95 7.22 7.25   HGB 12.3 12.4 13.0   HCT 37.7 37.6 40.3    324 348   GRAN 61.3  4.9 55.6  4.0 56.6  4.1   LYMPH 23.9  1.9 28.8  2.1 26.5  1.9   MONO 10.9  0.9 " 9.7  0.7 10.3  0.8   EOS 0.2 0.3 0.4   BASO 0.05 0.05 0.06     BMP:  Recent Labs   Lab 07/03/23  0443 07/04/23  0350 07/05/23  0522    138 137   K 4.2 3.9 4.3    105 105   CO2 25 27 24   BUN 13 18 19   CREATININE 0.7 0.8 0.7   * 126* 129*   CALCIUM 9.5 9.7 9.7   MG 2.0 2.0 2.2   PHOS 2.7 3.9 3.8     Imaging Results          XR NG/OG tube placement check, non-radiologist performed (Final result)  Result time 06/25/23 00:47:47    Final result by Sally Ojeda MD (06/25/23 00:47:47)                 Impression:      As above described.      Electronically signed by: Sally Ojeda  Date:    06/25/2023  Time:    00:47             Narrative:    EXAMINATION:  XR NG/OG TUBE PLACEMENT CHECK NON RADIOLOGIST PERFORMED    CLINICAL HISTORY:  NG tube placement;    TECHNIQUE:  AP view of the abdomen.    COMPARISON:  Abdomen radiograph 04/25/2022 and CT abdomen pelvis 06/24/2023    FINDINGS:  The tip of the feeding tube is seen in the gastric fundus near the GE junction.  Consider advancing.  There are dilated small bowel loops.  Residual contrast are seen in bilateral renal collecting systems from recent CT abdomen pelvis.                               CT Abdomen Pelvis With Contrast (Final result)  Result time 06/24/23 23:23:02    Final result by Sally Ojeda MD (06/24/23 23:23:02)                 Impression:      Significant small-bowel obstruction with the transition point in the left lower abdomen.    Probable tiny subcentimeter left hepatic lobe cyst.    Trace right pleural effusion.      Electronically signed by: Sally Ojeda  Date:    06/24/2023  Time:    23:23             Narrative:    EXAMINATION:  CT OF ABDOMEN PELVIS WITH    CLINICAL HISTORY:  Diffuse abdominal pain and bloating over the last 4 hours.    TECHNIQUE:  5 mm enhanced axial images were obtained from the lung bases through the greater trochanters.   mL of Omnipaque 350 was  injected.    COMPARISON:  None.    FINDINGS:  There are some distended bowel loops containing fluid and air fluid levels.  The transition point is in the left lower abdomen 6.2 x 4.4 x 3.7    A too small to characterize low attenuation lesion is seen within the left lobe liver, which is probably a tiny cyst.  Spleen, pancreas, kidneys, and adrenal glands are unremarkable. The gallbladder contains no calcified gallstones.  There are dilated loops of bowel containing fluid and air fluid levels.  The degree of dilatation changes in the left lower abdomen (series 2 axial image 285 and series 602 sagittal image 87).    There is no definite evidence for abdominal adenopathy or ascites.    In the pelvis there are no pelvic masses or adenopathy.    There is trace right pleural effusion. There is mild bibasilar atelectasis                                Pending Diagnostic Studies:     None         Medications:  Reconciled Home Medications:      Medication List      START taking these medications    apixaban 5 mg Tab  Commonly known as: ELIQUIS  Take 1 tablet (5 mg total) by mouth 2 (two) times daily.     fluticasone propionate 50 mcg/actuation nasal spray  Commonly known as: FLONASE  Instill 2 sprays (100 mcg total) by Each Nostril  once daily.     hydrOXYzine 50 MG tablet  Commonly known as: ATARAX  Take 1 tablet (50 mg total) by mouth 3 (three) times daily as needed for Anxiety.     metoprolol succinate 50 MG 24 hr tablet  Commonly known as: TOPROL-XL  Take 1 tablet (50 mg total) by mouth once daily.     pantoprazole 40 MG tablet  Commonly known as: PROTONIX  Take 1 tablet (40 mg total) by mouth once daily.        CHANGE how you take these medications    polyethylene glycol 17 gram/dose powder  Commonly known as: MIRALAX  Take 17 g by mouth 3 (three) times daily as needed (Constipation).  What changed:   · medication strength  · how much to take  · when to take this  · reasons to take this        CONTINUE taking these  medications    calcium-vitamin D3 500 mg-5 mcg (200 unit) per tablet  Commonly known as: OS- + D3  Take 1 tablet by mouth 2 (two) times daily with meals.     celecoxib 200 MG capsule  Commonly known as: CeleBREX  take 1 capsule (200MG) by oral route every day as needed     estradioL 0.01 % (0.1 mg/gram) vaginal cream  Commonly known as: ESTRACE  Place 1 g vaginally twice a week.     glucosamine-chondroitin 500-400 mg tablet  Take 1 tablet by mouth 3 (three) times daily.     simvastatin 40 MG tablet  Commonly known as: ZOCOR  Take 40 mg by mouth every evening.            Indwelling Lines/Drains at time of discharge:   Lines/Drains/Airways     Drain  Duration           Female External Urinary Catheter 07/01/23 1543 4 days                Time spent on the discharge of patient: 35 minutes         ADILENE Pearson MD  Department of Hospital Medicine  [facility] S[facility] ([address])

## 2023-07-06 NOTE — PLAN OF CARE
Problem: Occupational Therapy  Goal: Occupational Therapy Goal  Description: Goals to be met by: 8/5/2023     Patient will increase functional independence with ADLs by performing:    LE Dressing with [place].  Grooming while standing at sink with Modified [place] and LRAD as needed.   Toileting from toilet with [place] for hygiene and clothing management.   Bathing from  tub bench with Modified [place].  Stand pivot transfers with Modified [place] and LRAD as needed  Toilet transfer to toilet with Modified [place] and LRAD as need  Footwear donning/doffing /c Mod I and AE as needed..  7/6/2023 0852 by Inna Ortiz OT  Outcome: Ongoing, Progressing    Pt evaluated and goals set based on performance at admission.

## 2023-07-06 NOTE — CLINICAL REVIEW
"Clinical Pharmacy Chart Review Note      Admit Date: 7/5/2023   LOS: 1 day       Marian Durham is a 78 y.o. female admitted to SNF for PT/OT after hospitalization for SBO (small bowel obstruction).    Review of patient's allergies indicates:   Allergen Reactions    Demerol [meperidine] Other (See Comments)     "Becomes Agitated and Combative"     Patient Active Problem List    Diagnosis Date Noted    Atrial fibrillation with rapid ventricular response 06/30/2023    Hypokalemia 06/26/2023    Elevated blood pressure reading 06/26/2023    Volume depletion 04/23/2022    Lactic acidosis 04/23/2022    Osteoarthritis 04/23/2022    SBO (small bowel obstruction) 04/22/2022    Lipoma 11/16/2018       Scheduled Meds:    apixaban  5 mg Oral BID    atorvastatin  20 mg Oral QHS    calcium-vitamin D3  1 tablet Oral BID WM    [START ON 7/7/2023] celecoxib  200 mg Oral Daily    fluticasone propionate  2 spray Each Nostril Daily    metoprolol succinate  50 mg Oral Daily    pantoprazole  40 mg Oral Daily    senna-docusate 8.6-50 mg  1 tablet Oral BID     Continuous Infusions:   PRN Meds: acetaminophen, acetaminophen, calcium carbonate, hydrOXYzine HCL, melatonin, polyethylene glycol    OBJECTIVE:     Vital Signs (Last 24H)  Temp:  [96.6 °F (35.9 °C)-98 °F (36.7 °C)]   Pulse:  [77-99]   Resp:  [18]   BP: (129-156)/(66-98)   SpO2:  [95 %-98 %]     Laboratory:  CBC:   Recent Labs   Lab 07/04/23 0350 07/05/23 0522 07/06/23  0747   WBC 7.22 7.25 6.97   RBC 4.12 4.37 4.60   HGB 12.4 13.0 13.6   HCT 37.6 40.3 41.7    348 395   MCV 91 92 91   MCH 30.1 29.7 29.6   MCHC 33.0 32.3 32.6     BMP:   Recent Labs   Lab 07/04/23 0350 07/05/23 0522 07/06/23  0747   * 129* 126*    137 139   K 3.9 4.3 4.6    105 104   CO2 27 24 25   BUN 18 19 16   CREATININE 0.8 0.7 0.8   CALCIUM 9.7 9.7 9.9   MG 2.0 2.2 2.4     CMP:   Recent Labs   Lab 07/04/23  0350 07/05/23  0522 07/06/23  0747   * 129* 126*   CALCIUM 9.7 9.7 " 9.9    137 139   K 3.9 4.3 4.6   CO2 27 24 25    105 104   BUN 18 19 16   CREATININE 0.8 0.7 0.8     Lab Results   Component Value Date    ALT 20 06/24/2023    AST 19 06/24/2023    ALKPHOS 67 06/24/2023    BILITOT 1.0 06/24/2023           ASSESSMENT/PLAN:     I have reviewed the medications in compliance with CMS Regulation F756 of the [place]. Based on information gathered, the following items may need to be addressed:    **According to PMH and home medication list, patient takes the following medications at home. These medications are not currently ordered at SNF:  Estrace cream (on hold while at [facility])  Simvastatin 40 mg every evening (non-formulary, currently taking atorvastatin)    **Hydroxyzine is ordered as needed for anxiety.This medication is a Beers drug and potentially inappropriate in older adult patients. Per CMS guidelines: The timeframe is limited for PRN psychotropic medications, which are not antipsychotic medications, to 14 days, unless a longer timeframe is deemed appropriate by the attending physician or the prescribing practitioner. Monitor use and associated side effects. Consider discontinuation if patient is not using or adverse effects observed.  Beers Criteria: Avoid use of first generation antihistamines in older adults due to strong anticholinergic effects and reduced clearance with advanced age, which increase the risk of anticholinergic effects and toxicity. Tolerance develops when used as a hypnotic. In particular avoid in patients with delirium or at high risk of delirium as it may induce or worsen delirium, in patients with dementia or cognitive impairment because of adverse CNS effects, and in men with lower urinary tract symptoms or benign prostatic hyperplasia as decreased urinary flow and urinary retention may occur       Medications reviewed by PharmD, please re-consult if needed.      Missy Jones, Pharm. D.  Clinical Pharmacist  [facility]

## 2023-07-06 NOTE — PROGRESS NOTES
[facility]                                  Skilled Nursing Facility                   Progress Note     Admit Date: 7/5/2023  WILLIAN   Principal Problem:  SBO (small bowel obstruction)   HPI obtained from patient interview and chart review     Chief Complaint: Establish Care/ Initial Visit     HPI: Ms. Marian Durham is a 77 y.o. female, with PMH of prior SBO, hysterectomy admitted to hospital on 6/24/23 for SBO. Cards consulted for new Afib RVR. Ortho rec therapy for chronic changes and pain / weakness bilateral knees and hips. SBO resolved, per colorectal. Pt having regular BMs at diascharge.    Patient will be treated at [facility] with PT and OT to improve functional status and ability to perform ADLs.     Past Medical History: Patient has a past medical history of Hyperlipidemia, Osteoarthritis, Shingles (2002), and Spinal stenosis.    Past Surgical History: Patient has a past surgical history that includes Hysterectomy (1985); Tubal ligation; Ankle fracture surgery (2007); Tonsilectomy (as a child); Open patella reefing procedure (age 19); Knee arthroscopy w/ debridement (1994); Knee arthroscopy w/ debridement (2003); Tonsillectomy; Eye surgery (Bilateral, 2016); and Excision of mass of back (N/A, 11/16/2018).    Social History: Patient reports that she has never smoked. She has never used smokeless tobacco. She reports current alcohol use of about 3.0 standard drinks per week. She reports that she does not use drugs.    Family History: family history includes Breast cancer in her paternal aunt; Colon cancer in her paternal aunt; Coronary artery disease in her brother; Diabetes in her brother; Hypertension in her father and mother.    Allergies: Patient is allergic to demerol [meperidine].    ROS  Constitutional: Negative for fever   Eyes: Negative for blurred vision, double vision   Respiratory: Negative for cough, shortness of  breath   Cardiovascular: Negative for chest pain, palpitations, and leg swelling.   Gastrointestinal: Negative for abdominal pain, constipation, diarrhea, nausea, vomiting.   Genitourinary: Negative for dysuria, frequency   Musculoskeletal:  + generalized weakness and bilat hip and knee weakness. +arthralgias. Negative for back pain and myalgias.   Skin: Negative for itching and rash.   Neurological: Negative for dizziness, headaches.   Psychiatric/Behavioral: Negative for depression. The patient is not nervous/anxious.      24 hour Vital Sign Range   Temp:  [96.6 °F (35.9 °C)-98 °F (36.7 °C)]   Pulse:  [70-99]   Resp:  [18]   BP: (129-156)/(63-98)   SpO2:  [95 %-98 %]     Current BMI: Body mass index is 19.04 kg/m².    PEx  Constitutional: Patient appears debilitated.  No distress noted  HENT:   Head: Normocephalic and atraumatic.   Eyes: Pupils are equal, round  Neck: Normal range of motion. Neck supple.   Cardiovascular: Normal rate, regular rhythm and normal heart sounds.    Pulmonary/Chest: Effort normal and breath sounds are clear  Abdominal: Soft. Bowel sounds are normal.   Musculoskeletal: bony deformity visualized left knee  Neurological: Alert and oriented to person, place, and time.   Psychiatric: Normal mood and affect. Behavior is normal.   Skin: Skin is pale, intact, warm and dry. Full skin assessment completed by NP on initial exam.       Recent Labs   Lab 07/06/23  0747      K 4.6      CO2 25   BUN 16   CREATININE 0.8   MG 2.4       Recent Labs   Lab 07/06/23  0747   WBC 6.97   RBC 4.60   HGB 13.6   HCT 41.7      MCV 91   MCH 29.6   MCHC 32.6       No results for input(s): POCTGLUCOSE in the last 168 hours.     Assessment and Plan:    SBO (small bowel obstruction)  - SBO with h/o same in 04/2022, CT with transition point in LLQ.  - Continue pantoprazole 40mg as PO daily.  - Tolerating diet  + BM 7/6 am; denies nausea or vomiting - endorses good appetite  - Continue senokot 1 tab  bid and prn polyethylene glycol 17 g  - Monitor closely for N/V/constipation     Atrial fibrillation with rapid ventricular response  - New onset AFib rapid ventricular response on 6/30; asymptomatic   - Echo found normal systolic function and grade I diastolic dysfunction.  - Rate well controlled on current regimen  - Continue metoprolol 50mg PO daily  - Anticoagulation initiated 7/5 w apixaban 5mg PO BID     Osteoarthritis  Debility  - Known h/o OA with need for surgical intervention (follows with Dr. Mitchell in clinic)  - Continue Celebrex 200 mg daily & OsCal BID  - Continue with PT/OT for gait training and strengthening and restoration of ADL's   - Encourage mobility, OOB in chair, and early ambulation as appropriate  - Fall precautions   - Monitor for bowel and bladder dysfunction  - Monitor for and prevent skin breakdown and pressure ulcers  - DVT prophylaxis with eliquis    Hypercholesteremia  Stroke Risk Factor.   - continue statin    GERD    - continue 40 mg protonix daily      Anticipate disposition:  Home with home health      Follow-up needed during SNF stay-    Follow-up needed after discharge from SNF: PCP, Ortho (Dr. Mitchell), Cardiology    No future appointments.      I certify that SNF services are required to be given on an inpatient basis because Marian Durham needs for skilled nursing care and/or skilled rehabilitation are required on a daily basis and such services can only practically be provided in a skilled nursing facility setting and are for an ongoing condition for which she received inpatient care in the hospital.     Total time of the visit 116 minutes   Non physical exam/ non charting time: 97 minutes   Description of non physical exam/non charting time: counseling patient on clinical conditions and therapies provided.  Extensive chart review completed including all consultation notes.  All pertinent laboratory and radiographical images reviewed.        Trixie Guerrero,  NP  Department of Hospital Medicine   [facility]     DOS: 7/6/2023

## 2023-07-06 NOTE — PT/OT/SLP EVAL
"Occupational Therapy   Evaluation    Name: Marian Durham  MRN: [id number]  Admit Date: 7/5/2023  Recent Surgeries: n/a     General Precautions: Standard, fall  Orthopedic Precautions:N/A   Braces: N/A    Recommendations:     Discharge Recommendations: home health OT  Level of Assistance Recommended: Intermittent supervision  Discharge Equipment Recommendations:   (TBD)  Barriers to discharge:  Inaccessible home environment, Decreased caregiver support    Assessment:     Marian Durham is a 78 y.o. female with a medical diagnosis of small bowel obstruction.  Pt tolerated session well and without incident and shows excellent motivation and potential to improve, but the pt continues to require assistance to perform self-care tasks and mobility. Pt strengths include Functional cognition, Following multi-step tasks, and Attention to task and PLOF, Motivation, Coordination, and Willingness to participate.  However, pt would continue to benefit from cont'd OT services in the SNF setting to improve safety and independence /c functional tasks and ADLs upon discharge.    Performance deficits affecting function: impaired endurance, weakness, impaired self care skills, gait instability, impaired balance, impaired fine motor, edema.      Rehab Potential is excellent    Activity Tolerance: Excellent    Plan:     Patient to be seen 5 x/week to address the above listed problems via self-care/home management, community/work re-entry, therapeutic activities, therapeutic exercises  Plan of Care Expires: 08/04/23  Plan of Care Reviewed with: patient    Subjective     Chief Complaint: "My legs and shoulders feel week. I would like to get them stronger. I also have some problems with my fingers."  Patient/Family Comments/goals: "I want to get back to where I was 12 days ago before I was in the hospital."    Occupational Profile:  Living Environment: Pt lives in 2SH /c lift to enter front door. Pt lives alone. Tub/shower combo. "   Previous level of function: Mod I using QC/RW for mobility; I for ADLs.   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     Pain/Comfort:  Pain Rating 1: 0/10  Pain Rating Post-Intervention 1: 0/10    Patients cultural, spiritual, Presybeterian conflicts given the current situation: no    Objective:     Communicated with: NSG prior to session.  Patient found ambulatory in room/[place] with Other (comments) (no lines) upon OT entry to room. Pt getting off on BSC. Pt alert and agreeable to treatment.         Functional Mobility/Transfers:  Patient completed Sit <> Stand Transfer with supervision  with  rolling walker   Patient completed Toilet Transfer Stand Pivot technique with supervision with  no AD  Functional Mobility: Pt able to ambulate household distances (~30 feet) /c SPV and RW.     Activities of Daily Livin/06/23 0846   Prior Functioning: Everyday Activities   Self Care Independent   Indoor Mobility (Ambulation) Needed some help   Stairs Independent   Functional Cognition Independent   Prior Device Use Walker   Eating   Assistance Needed Set-up / clean-up   Physical Assistance Level No physical assistance   Comment A to open packaging   CARE Score - Eating 5   Oral Hygiene   Comment Wanted to wait until after breakfast   Reason if not Attempted Refused to perform   CARE Score - Oral Hygiene 7   Toileting Hygiene   Assistance Needed Set-up / clean-up   Physical Assistance Level No physical assistance   CARE Score - Toileting Hygiene 5   Toileting Hygiene Discharge Goal   Discharge Goal 6   Shower/Bathe Self   Assistance Needed Set-up / clean-up   Physical Assistance Level No physical assistance   Comment seated aydee shower bench   CARE Score - Shower/Bathe Self 5   Upper Body Dressing    Assistance Needed Independent   CARE Score - Upper Body Dressing 6   Lower Body Dressing   Assistance Needed Set-up / clean-up   Physical Assistance Level No physical assistance   CARE Score - Lower Body Dressing 5   Putting On/Taking Off Footwear   Assistance Needed Physical assistance   Physical Assistance Level 25% or less   Comment To tie shoes   CARE Score - Putting On/Taking Off Footwear 3   Toilet Transfer   Assistance Needed Set-up / clean-up   Physical Assistance Level No physical assistance   Comment to BSC   CARE Score - Toilet Transfer 5   Toilet Transfer Discharge Goal   Discharge Goal 6       Cognitive/Visual Perceptual:  Cognitive/Psychosocial Skills:  -       Oriented to: Person, Place, Time, and Situation   -       Follows Commands/attention:Follows multistep  commands  -       Communication: clear/fluent  -       Memory: No Deficits noted  -       Safety awareness/insight to disability: intact   Visual/Perceptual:      -Intact      Physical Exam:  Balance: -       Dynamic standing- SBA /c RW, Static standing- SPV /c RW, Dynamic sitting- SPV, Static sitting- SPV  Skin integrity: Visible skin intact  Edema:  Mild BL feet  Dominant hand: -       R  Upper Extremity Range of Motion:  -       Right Upper Extremity: WFL  -       Left Upper Extremity: WFL  Upper Extremity Strength: -       Right Upper Extremity: WFL except pt noted weakness/impaired endurance in shoulder  -       Left Upper Extremity: WFL except pt noted weakness/impaired endurance in shoulder   Strength: -       Right Upper Extremity: WFL  -       Left Upper Extremity: WFL  Fine Motor Coordination: -       Impaired  Left hand, manipulation of objects   and Right hand, manipulation of objects    Gross motor coordination: WFL    AMPAC 6 Click ADL:  AMPAC Total Score: 21    Treatment & Education:  Pt edu on role of OT, POC, safety when performing self care tasks , benefit of performing OOB activity, and safety when performing  functional transfers and mobility.  - White board updated  - Self care tasks completed-- as noted above       Patient left sitting edge of bed with call button in reach and all needs met    GOALS:   Multidisciplinary Problems       Occupational Therapy Goals          Problem: Occupational Therapy    Goal Priority Disciplines Outcome Interventions   Occupational Therapy Goal     OT, PT/OT Ongoing, Progressing    Description: Goals to be met by: 8/5/2023     Patient will increase functional independence with ADLs by performing:    LE Dressing with [place].  Grooming while standing at sink with Modified [place] and LRAD as needed.   Toileting from toilet with [place] for hygiene and clothing management.   Bathing from  tub bench with Modified [place].  Stand pivot transfers with Modified [place] and LRAD as needed  Toilet transfer to toilet with Modified [place] and LRAD as need  Footwear donning/doffing /c Mod I and AE as needed..                       History:     Past Medical History:   Diagnosis Date    Hyperlipidemia     Osteoarthritis     Shingles 2002    Spinal stenosis          Past Surgical History:   Procedure Laterality Date    ANKLE FRACTURE SURGERY  2007    Right ankle    EXCISION OF MASS OF BACK N/A 11/16/2018    Procedure: EXCISION, MASS, BACK;  Surgeon: Fran Magaña MD;  Location: [facility];  Service: General;  Laterality: N/A;    EYE SURGERY Bilateral 2016    HYSTERECTOMY  1985    HEATHER    KNEE ARTHROSCOPY W/ DEBRIDEMENT  1994    Bilateral    KNEE ARTHROSCOPY W/ DEBRIDEMENT  2003    Left knee    OPEN PATELLA REEFING PROCEDURE  age 19    Left knee    Tonsilectomy  as a child    TONSILLECTOMY      TUBAL LIGATION         Time Tracking:     OT Date of Treatment: 07/06/23  OT Start Time: 0749  OT Stop Time: 0837  OT Total Time (min): 48 min    Billable Minutes:Evaluation 15  Self Care/Home Management 33    7/6/2023

## 2023-07-07 PROCEDURE — 25000003 PHARM REV CODE 250: Performed by: FAMILY MEDICINE

## 2023-07-07 PROCEDURE — 97530 THERAPEUTIC ACTIVITIES: CPT | Mod: CQ

## 2023-07-07 PROCEDURE — 97110 THERAPEUTIC EXERCISES: CPT | Mod: CQ

## 2023-07-07 PROCEDURE — 97530 THERAPEUTIC ACTIVITIES: CPT | Mod: CO

## 2023-07-07 PROCEDURE — 97116 GAIT TRAINING THERAPY: CPT | Mod: CQ

## 2023-07-07 PROCEDURE — 11000004 HC SNF PRIVATE

## 2023-07-07 PROCEDURE — 25000003 PHARM REV CODE 250: Performed by: HOSPITALIST

## 2023-07-07 RX ADMIN — ACETAMINOPHEN 650 MG: 325 TABLET ORAL at 02:07

## 2023-07-07 RX ADMIN — APIXABAN 5 MG: 2.5 TABLET, FILM COATED ORAL at 09:07

## 2023-07-07 RX ADMIN — Medication 1 TABLET: at 04:07

## 2023-07-07 RX ADMIN — APIXABAN 5 MG: 2.5 TABLET, FILM COATED ORAL at 08:07

## 2023-07-07 RX ADMIN — Medication 1 TABLET: at 08:07

## 2023-07-07 RX ADMIN — ATORVASTATIN CALCIUM 20 MG: 20 TABLET, FILM COATED ORAL at 09:07

## 2023-07-07 RX ADMIN — SENNOSIDES AND DOCUSATE SODIUM 1 TABLET: 50; 8.6 TABLET ORAL at 09:07

## 2023-07-07 RX ADMIN — FLUTICASONE PROPIONATE 100 MCG: 50 SPRAY, METERED NASAL at 08:07

## 2023-07-07 RX ADMIN — METOPROLOL SUCCINATE 50 MG: 50 TABLET, EXTENDED RELEASE ORAL at 08:07

## 2023-07-07 RX ADMIN — PANTOPRAZOLE SODIUM 40 MG: 40 TABLET, DELAYED RELEASE ORAL at 08:07

## 2023-07-07 RX ADMIN — CELECOXIB 200 MG: 200 CAPSULE ORAL at 08:07

## 2023-07-07 NOTE — PT/OT/SLP PROGRESS
"Physical Therapy Treatment    Patient Name:  Marian Durham   MRN:  [id number]  Admit Date: 7/5/2023  Admitting Diagnosis: SBO (small bowel obstruction)  Recent Surgeries: N/A    General Precautions: Standard, fall  Orthopedic Precautions: N/A  Braces: N/A    Recommendations:     Discharge Recommendations: home health PT  Level of Assistance Recommended at Discharge: Intermittent assistance   Discharge Equipment Recommendations: none (Continue to assess)  Barriers to discharge: Inaccessible home environment, Decreased caregiver support    Assessment:     Marian Durham is a 78 y.o. female admitted with a medical diagnosis of SBO (small bowel obstruction) .   Pt was agreeable and tolerated therapy session well. Pt requested increase time focusing on exercise and stretch able to complete in room without supervision. Pt educated on only supine and seated activities can be completed without someone present and pt verbalized understanding. Pt completed gait, curb, stairs, and wheelchair mobility with occasional rest breaks and verbal cues. Pt is motivated to improve and is progressing well. Pt continues benefit from therapy to achieve highest level of independence prior to discharge    Performance deficits affecting function: weakness, impaired endurance, impaired functional mobility, gait instability, impaired balance, decreased lower extremity function, decreased upper extremity function, decreased safety awareness, decreased ROM.    Rehab Potential is good    Activity Tolerance: Good    Plan:     Patient to be seen 5 x/week to address the above listed problems via gait training, therapeutic activities, therapeutic exercises, neuromuscular re-education, wheelchair management/training    Plan of Care Expires: 08/05/23  Plan of Care Reviewed with: patient    Subjective     "I can't always control my bowels" and "this is too much sitting for me"    Pain/Comfort:  Pain Rating 1: 0/10  Pain Rating Post-Intervention 1: " 0/10    Patient's cultural, spiritual, Shinto conflicts given the current situation:  no    Objective:     Patient found up in chair with  (no lines) upon PT entry to room.     Therapeutic Activities and Exercises:   Supine BLE therex x5 reps: quad set and SAQ (RLE only), and hamstring stretch  Attempted SAQ on LLE but unable to complete  Seated BLE therex x15 reps: ankle DF/PF, LAQ, marching  1# ankle weight except LAQ on LLE  Self hamstring and calf stretch 2x 15 seconds  Standing BLE therex x10 reps: marching and calf raises   IT band stretch at parallel bars  Pt educated on standing therex/stretch can only be completed within therapy session. Pt verbalized understanding.  Time spent educated on using a ball for massage on LE  Patient educated on role of therapy, goals of session, and benefits of out of bed mobility.   Pt educated on different in focus at SNF vs Outpatient.  Instructed on use of call button and importance of calling nursing staff for assistance with mobility   Questions/concerns addressed within PTA scope of practice  Pt verbalized understanding.    Functional Mobility:  Bed Mobility:   Rolling to Left/Right: supervision  Scooting to EOB: supervision  Supine to Sit: supervision; HOB flat  Sit to Supine: supervision  Transfers:   Sit <> Stand Transfer: stand by assistance with rolling walker   Cues to be aware of RW/bars in front of pt as pt tends to lean forward   Bedside chair <> Chair Transfer: stand by assistance with no assistive device using Stand Pivot and Step Transfer technique   Step transfer to wheelchair   Stand pivot to bedside chair  Gait:  Pt ambulated ~200 ft with stand by assistance and rolling walker.  no rest breaks & no overt LOB  Completed curb and stairs training in-between  Gait Deviation(s):  mostly steady gait with good gonzalo and step length and relaxed shoulders  Verbal/tactile cues for to keep RW close.   Wheelchair Propulsion:    Pt propelled Standard wheelchair x  "80 feet on Level tile with  Bilateral upper extremity with Supervision or Set-up Assistance.   Distance by B shoulder pain  Curb:  Pt ascended/descended 4" curb step with Rolling Walker with Contact Guard Assistance.   Stairs:  Pt ascended/descended  8 stair(s) with BHR/RHR with Contact Guard Assistance.   BHR to ascend and BUE on RHR to descend   cues for sequencing. Noted better with leading with LLE  Pt alternated with step-to/reciprocal step ascending and step-to descending    AM-PAC 6 CLICK MOBILITY  18    Patient left up in chair with call button in reach.    GOALS:   Multidisciplinary Problems       Physical Therapy Goals          Problem: Physical Therapy    Goal Priority Disciplines Outcome Goal Variances Interventions   Physical Therapy Goal     PT, PT/OT Ongoing, Progressing     Description: Goals to be met by: 23     Patient will increase functional independence with mobility by performin. Supine to sit with [place]  2. Sit to supine with [place]  3. Rolling to Left and Right with [place]  4. Sit to stand transfer with Modified [place]  5. Bed to chair transfer with Modified [place] using Rolling Walker  6. Gait  x 200 feet with Modified [place] using Rolling Walker  7. Wheelchair propulsion x 150 feet with Modified [place] using bilateral upper extremities  8. Ascend/descend 12 steps with 1 Handrail Modified [place] using No Assistive Device  9. Ascend/Descend 4 inch curb step with Modified [place] using Rolling Walker                         Time Tracking:     PT Received On: 23  PT Start Time: 1122  PT Stop Time: 1222  PT Total Time (min): 60 min    Billable Minutes: Gait Training 15, Therapeutic Activity 15, and Therapeutic Exercise 30    Treatment Type: Treatment  PT/PTA: PTA     Number of PTA visits since last PT visit: 2     2023  "

## 2023-07-07 NOTE — CONSULTS
[facility] - Skilled Nursing  Adult Nutrition  Consult Note    SUMMARY   Recommendations  Continue general diet, RD following  Goals: PO to meet 85% of EEN/EPN by next RD follow up  Nutrition Goal Status: new  Communication of RD Recs: other (comment) (POC)    Assessment and Plan  No nutrition diagnosis at this time    Malnutrition Assessment 7/7     Neck/Chest (Micronutrient): subcutaneous fat loss, muscle wasting  Musculoskeletal/Lower Extremities: subcutaneous fat loss, muscle wasting       Subcutaneous Fat (Malnutrition): mild depletion  Muscle Mass (Malnutrition): moderate depletion   Orbital Region (Subcutaneous Fat Loss): mild depletion  Upper Arm Region (Subcutaneous Fat Loss): mild depletion  Thoracic and Lumbar Region: mild depletion   [place] Region (Muscle Loss): mild depletion  Clavicle Bone Region (Muscle Loss): moderate depletion  Clavicle and Acromion Bone Region (Muscle Loss): moderate depletion  Patellar Region (Muscle Loss): mild depletion  Anterior Thigh Region (Muscle Loss): mild depletion  Posterior Calf Region (Muscle Loss): mild depletion                 Reason for Assessment    Reason For Assessment: consult  Diagnosis:  (SBO)  Relevant Medical History: GERD, AFIB, SBO, HLD, deblility, OA  Interdisciplinary Rounds: attended  General Information Comments: Patient lives alone, uses walker at home, here to get stronger. Discussed soluble fiber increase in diet with patient. her weight is stable 115-118 #  Enjoys the food, no problems chewing or swallowing, NFPE completed showing age related changes and low BMI.  Nutrition Discharge Planning: DC on general diet,    Nutrition/Diet History    Patient Reported Diet/Restrictions/Preferences: general  Typical Food/Fluid Intake: regular meals  Food Preferences: fruit fresh  Spiritual, Cultural Beliefs, Hindu Practices, Values that Affect Care: no  Food Allergies: NKFA  Factors Affecting Nutritional Intake: None identified at this  "time    Anthropometrics    Temp: 96.3 °F (35.7 °C)  Height Method: Stated  Height: 5' 5" (165.1 cm)  Height (inches): 65 in  Weight Method: Standard Scale  Weight: 51.9 kg (114 lb 6.7 oz)  Weight (lb): 114.42 lb  Ideal Body Weight (IBW), Female: 125 lb  % Ideal Body Weight, Female (lb): 91.54 %  BMI (Calculated): 19  BMI Grade: 18.5-24.9 - normal  Usual Body Weight (UBW), k.6 kg  % Usual Body Weight: 97.03  % Weight Change From Usual Weight: -3.17 %       Lab/Procedures/Meds    Pertinent Labs Reviewed: reviewed  Pertinent Labs Comments: glucose 126,  Pertinent Medications Reviewed: reviewed  Pertinent Medications Comments: Apixaban, Ca/Vit D3, senna-docusate, Abx, pantoprazole, statin,    Estimated/Assessed Needs    Weight Used For Calorie Calculations: 51.9 kg (114 lb 6.7 oz)  Energy Calorie Requirements (kcal): 9770-7279  Energy Need Method: Kcal/kg (25-30 kcal/kg)  Protein Requirements: 62  Weight Used For Protein Calculations: 51.9 kg (114 lb 6.7 oz) (x 1.2 g/kg)  Fluid Requirements (mL): 1297 or per MD  Estimated Fluid Requirement Method: RDA Method  RDA Method (mL): 1297  CHO Requirement: -      Nutrition Prescription Ordered    Current Diet Order: Regular  Nutrition Order Comments: PO %  Oral Nutrition Supplement: declines    Evaluation of Received Nutrient/Fluid Intake    I/O: no data  Energy Calories Required: meeting needs  Protein Required: meeting needs  Fluid Required: meeting needs  Comments: LBM 7/7 stool is still loose and she will not take stool softener for awhile, at home it's daily Miralax as she is constipated generally  Tolerance: tolerating  % Intake of Estimated Energy Needs: 75 - 100 %  % Meal Intake: 75 - 100 %    Nutrition Risk    Level of Risk/Frequency of Follow-up: low (one time per week)       Monitor and Evaluation    Food and Nutrient Intake: food and beverage intake  Food and Nutrient Adminstration: diet order  Knowledge/Beliefs/Attitudes: food and nutrition " knowledge/skill  Anthropometric Measurements: weight change  Biochemical Data, Medical Tests and Procedures: gastrointestinal profile, electrolyte and renal panel  Nutrition-Focused Physical Findings: overall appearance       Nutrition Follow-Up    RD Follow-up?: Yes

## 2023-07-07 NOTE — PT/OT/SLP PROGRESS
Occupational Therapy   Treatment    Name: Marian Durham  MRN: [id number]  Admit Date: 7/5/2023  Admitting Diagnosis:  SBO (small bowel obstruction)    General Precautions: Standard, fall   Orthopedic Precautions: N/A   Braces: N/A    Recommendations:     Discharge Recommendations:  home health OT  Level of Assistance Recommended at Discharge: Intermittent supervision  Discharge Equipment Recommendations:  (TBD)  Barriers to discharge:  Inaccessible home environment, Decreased caregiver support    Assessment:     Marian Durham is a 78 y.o. female with a medical diagnosis of SBO (small bowel obstruction).  She presents with  Performance deficits affecting function are impaired endurance, weakness, impaired self care skills, gait instability, impaired balance, impaired fine motor, edema.     Pt. Was cooperative and participated well with session on this day. Pt  continues to demonstrate levels of physical deficits with  functional indep with daily management activities tasks, selfcare skills with balance,  functional mobility, UB strength and endurance. Pt. Will continue to benefit from continued OT to progress towards goals      Rehab Potential is fair    Activity tolerance:  Fair    Plan:     Patient to be seen 5 x/week to address the above listed problems via self-care/home management, community/work re-entry, therapeutic activities, therapeutic exercises    Plan of Care Expires: 08/04/23  Plan of Care Reviewed with: patient    Subjective     Communicated with: nsg and Pt. prior to session. They told me I can't get up on my own    Pain/Comfort:  Pain Rating 1: 0/10  Pain Rating Post-Intervention 1: 0/10    Patient's cultural, spiritual, Moravian conflicts given the current situation:  no    Objective:     Patient found up in chair    upon OT entry to room.    Bed Mobility:    Not tested     Functional Mobility/Transfers:  Patient completed Sit <> Stand Transfer with stand by assistance and contact guard  assistance  with  rolling walker   Functional Mobility: Pt.with fxl mobility to and from gym approx 105 ft each way with RW for bal     Activities of Daily Living:  Upper Body Dressing: supervision to manage sweater around back    AMPA 6 Click ADL: 21    OT Exercises: UE Ergometer 10 min    Treatment & Education:   Pt. Stand with red thra band and yellow thera band with 2x20 reps with  shd flex, bicep curls horz adb/add and forward flex motion to increase BUE ROM and strength.  Pt. Also with request for ice pack to be applied to L shoulder      Pt. With therex performed to increase ROM, endurance selfcare task and fxl mobility for independence     Pt edu on role of OT, POC, safety when performing self care tasks , benefit of performing OOB activity, and safety when performing functional transfers and mobility management for preparation with goals to progress towards next level of care     Patient left up in chair with all lines intact and call button in reach    GOALS:   Multidisciplinary Problems       Occupational Therapy Goals          Problem: Occupational Therapy    Goal Priority Disciplines Outcome Interventions   Occupational Therapy Goal     OT, PT/OT Ongoing, Progressing    Description: Goals to be met by: 8/5/2023     Patient will increase functional independence with ADLs by performing:    LE Dressing with [place].  Grooming while standing at sink with Modified [place] and LRAD as needed.   Toileting from toilet with [place] for hygiene and clothing management.   Bathing from  tub bench with Modified [place].  Stand pivot transfers with Modified [place] and LRAD as needed  Toilet transfer to toilet with Modified [place] and LRAD as need  Footwear donning/doffing /c Mod I and AE as needed..                       Time Tracking:     OT Date of Treatment: 07/07/23  OT Start Time: 1333    OT Stop Time: 1415  OT Total Time (min): 42 min    Billable Minutes:Therapeutic Activity  42    7/7/2023  A client care conference was performed between the LOTR and PATEL, prior to treatment to discuss the patient's status, treatment plan and established goals.

## 2023-07-07 NOTE — PLAN OF CARE
Problem: Adult Inpatient Plan of Care  Goal: Plan of Care Review  7/7/2023 1112 by Lili Dominique LPN  Outcome: Ongoing, Progressing  7/7/2023 1110 by Lili Dominique LPN  Outcome: Ongoing, Progressing  Goal: Patient-Specific Goal (Individualized)  7/7/2023 1112 by Lili Dominique LPN  Outcome: Ongoing, Progressing  7/7/2023 1110 by Lili Dominique LPN  Outcome: Ongoing, Progressing  Goal: Absence of Hospital-Acquired Illness or Injury  7/7/2023 1112 by Lili Dominique LPN  Outcome: Ongoing, Progressing  7/7/2023 1110 by Lili Dominique LPN  Outcome: Ongoing, Progressing  Goal: Optimal Comfort and Wellbeing  7/7/2023 1112 by Lili Dominique LPN  Outcome: Ongoing, Progressing  7/7/2023 1110 by Lili Dominique LPN  Outcome: Ongoing, Progressing  Goal: Readiness for Transition of Care  7/7/2023 1112 by Lili Dominique LPN  Outcome: Ongoing, Progressing  7/7/2023 1110 by Lili Dominique LPN  Outcome: Ongoing, Progressing

## 2023-07-07 NOTE — PLAN OF CARE
Problem: Occupational Therapy  Goal: Occupational Therapy Goal  Description: Goals to be met by: 8/5/2023     Patient will increase functional independence with ADLs by performing:    LE Dressing with [place].  Grooming while standing at sink with Modified [place] and LRAD as needed.   Toileting from toilet with [place] for hygiene and clothing management.   Bathing from  tub bench with Modified [place].  Stand pivot transfers with Modified [place] and LRAD as needed  Toilet transfer to toilet with Modified [place] and LRAD as need  Footwear donning/doffing /c Mod I and AE as needed..  Outcome: Ongoing, Progressing

## 2023-07-08 PROCEDURE — 97530 THERAPEUTIC ACTIVITIES: CPT | Mod: CO

## 2023-07-08 PROCEDURE — 25000003 PHARM REV CODE 250: Performed by: FAMILY MEDICINE

## 2023-07-08 PROCEDURE — 11000004 HC SNF PRIVATE

## 2023-07-08 PROCEDURE — 25000003 PHARM REV CODE 250: Performed by: HOSPITALIST

## 2023-07-08 PROCEDURE — 97110 THERAPEUTIC EXERCISES: CPT | Mod: CO

## 2023-07-08 RX ADMIN — PANTOPRAZOLE SODIUM 40 MG: 40 TABLET, DELAYED RELEASE ORAL at 09:07

## 2023-07-08 RX ADMIN — Medication 1 TABLET: at 09:07

## 2023-07-08 RX ADMIN — ATORVASTATIN CALCIUM 20 MG: 20 TABLET, FILM COATED ORAL at 09:07

## 2023-07-08 RX ADMIN — CELECOXIB 200 MG: 200 CAPSULE ORAL at 09:07

## 2023-07-08 RX ADMIN — APIXABAN 5 MG: 2.5 TABLET, FILM COATED ORAL at 09:07

## 2023-07-08 RX ADMIN — METOPROLOL SUCCINATE 50 MG: 50 TABLET, EXTENDED RELEASE ORAL at 09:07

## 2023-07-08 RX ADMIN — Medication 1 TABLET: at 04:07

## 2023-07-08 NOTE — PT/OT/SLP PROGRESS
Occupational Therapy   Treatment    Name: Marian Durham  MRN: [id number]  Admit Date: 7/5/2023  Admitting Diagnosis:  SBO (small bowel obstruction)    General Precautions: Standard, fall   Orthopedic Precautions: N/A   Braces: N/A    Recommendations:     Discharge Recommendations:  home with home health  Level of Assistance Recommended at Discharge: Intermittent supervision  Discharge Equipment Recommendations: none  Barriers to discharge:  Inaccessible home environment, Decreased caregiver support    Assessment:     Marian Durham is a 78 y.o. female with a medical diagnosis of SBO (small bowel obstruction) .  She presents with performance deficits affecting function are weakness, impaired endurance, impaired functional mobility, gait instability, impaired balance, decreased lower extremity function, decreased upper extremity function, decreased safety awareness, decreased ROM.     Rehab Potential is fair    Activity tolerance:  Fair    Plan:     Patient to be seen 5 x/week to address the above listed problems via self-care/home management, therapeutic activities, therapeutic exercises    Plan of Care Expires: 08/04/23  Plan of Care Reviewed with: patient    Subjective     Communicated with: pt agreeable to session.      Pain/Comfort:  Pain Rating 1: 0/10  Pain Rating Post-Intervention 1: 0/10    Patient's cultural, spiritual, Anabaptist conflicts given the current situation:  no    Objective:     Patient found up in chair with  (no active lines) upon OT entry to room.    Functional Mobility/Transfers:  Functional Mobility: pt demonstrated good functional mobility room <> therapy gym 406ft using RW with SBA    AMPA 6 Click ADL: 21    OT Exercises:   -Pt performed UE Ergometer 5 mins standing in RW and 5 min seated with Min resistance with SBA to increase BUE strength and endurance for ADLs and functional mobility     -Pt performed BUE EX using 2# dowel 2x15 reps: elbow flex/ext, shoulder protraction/retraction,  shoulder rows with Supervision to increase BUE strength and endurance for ADLs and functional mobility      Treatment & Education:  Functional mobility, BUE strengthening/endurance, OT roles and goals    Patient left up in chair with call button in reach    GOALS:   Multidisciplinary Problems       Occupational Therapy Goals          Problem: Occupational Therapy    Goal Priority Disciplines Outcome Interventions   Occupational Therapy Goal     OT, PT/OT Ongoing, Progressing    Description: Goals to be met by: 8/5/2023     Patient will increase functional independence with ADLs by performing:    LE Dressing with [place].  Grooming while standing at sink with Modified [place] and LRAD as needed.   Toileting from toilet with [place] for hygiene and clothing management.   Bathing from  tub bench with Modified [place].  Stand pivot transfers with Modified [place] and LRAD as needed  Toilet transfer to toilet with Modified [place] and LRAD as need  Footwear donning/doffing /c Mod I and AE as needed..                       Time Tracking:     OT Date of Treatment: 07/08/23  OT Start Time: 1107    OT Stop Time: 1145  OT Total Time (min): 38 min    Billable Minutes:Therapeutic Activity 17  Therapeutic Exercise 21    7/8/2023

## 2023-07-09 PROCEDURE — 25000003 PHARM REV CODE 250: Performed by: HOSPITALIST

## 2023-07-09 PROCEDURE — 25000003 PHARM REV CODE 250: Performed by: FAMILY MEDICINE

## 2023-07-09 PROCEDURE — 11000004 HC SNF PRIVATE

## 2023-07-09 RX ADMIN — POLYETHYLENE GLYCOL 3350 17 G: 17 POWDER, FOR SOLUTION ORAL at 08:07

## 2023-07-09 RX ADMIN — FLUTICASONE PROPIONATE 100 MCG: 50 SPRAY, METERED NASAL at 08:07

## 2023-07-09 RX ADMIN — PANTOPRAZOLE SODIUM 40 MG: 40 TABLET, DELAYED RELEASE ORAL at 08:07

## 2023-07-09 RX ADMIN — ATORVASTATIN CALCIUM 20 MG: 20 TABLET, FILM COATED ORAL at 09:07

## 2023-07-09 RX ADMIN — APIXABAN 5 MG: 2.5 TABLET, FILM COATED ORAL at 08:07

## 2023-07-09 RX ADMIN — APIXABAN 5 MG: 2.5 TABLET, FILM COATED ORAL at 09:07

## 2023-07-09 RX ADMIN — CELECOXIB 200 MG: 200 CAPSULE ORAL at 08:07

## 2023-07-09 RX ADMIN — Medication 1 TABLET: at 04:07

## 2023-07-09 RX ADMIN — Medication 1 TABLET: at 08:07

## 2023-07-09 RX ADMIN — METOPROLOL SUCCINATE 50 MG: 50 TABLET, EXTENDED RELEASE ORAL at 08:07

## 2023-07-10 LAB
ANION GAP SERPL CALC-SCNC: 7 MMOL/L (ref 8–16)
BASOPHILS # BLD AUTO: 0.07 K/UL (ref 0–0.2)
BASOPHILS NFR BLD: 0.9 % (ref 0–1.9)
BUN SERPL-MCNC: 27 MG/DL (ref 8–23)
CALCIUM SERPL-MCNC: 9.9 MG/DL (ref 8.7–10.5)
CHLORIDE SERPL-SCNC: 104 MMOL/L (ref 95–110)
CO2 SERPL-SCNC: 26 MMOL/L (ref 23–29)
CREAT SERPL-MCNC: 0.8 MG/DL (ref 0.5–1.4)
DIFFERENTIAL METHOD: ABNORMAL
EOSINOPHIL # BLD AUTO: 0.2 K/UL (ref 0–0.5)
EOSINOPHIL NFR BLD: 2.3 % (ref 0–8)
ERYTHROCYTE [DISTWIDTH] IN BLOOD BY AUTOMATED COUNT: 12.9 % (ref 11.5–14.5)
EST. GFR  (NO RACE VARIABLE): >60 ML/MIN/1.73 M^2
GLUCOSE SERPL-MCNC: 101 MG/DL (ref 70–110)
HCT VFR BLD AUTO: 38.7 % (ref 37–48.5)
HGB BLD-MCNC: 12.8 G/DL (ref 12–16)
IMM GRANULOCYTES # BLD AUTO: 0.04 K/UL (ref 0–0.04)
IMM GRANULOCYTES NFR BLD AUTO: 0.5 % (ref 0–0.5)
LYMPHOCYTES # BLD AUTO: 2.2 K/UL (ref 1–4.8)
LYMPHOCYTES NFR BLD: 29.5 % (ref 18–48)
MAGNESIUM SERPL-MCNC: 2.3 MG/DL (ref 1.6–2.6)
MCH RBC QN AUTO: 30.2 PG (ref 27–31)
MCHC RBC AUTO-ENTMCNC: 33.1 G/DL (ref 32–36)
MCV RBC AUTO: 91 FL (ref 82–98)
MONOCYTES # BLD AUTO: 0.8 K/UL (ref 0.3–1)
MONOCYTES NFR BLD: 10.4 % (ref 4–15)
NEUTROPHILS # BLD AUTO: 4.3 K/UL (ref 1.8–7.7)
NEUTROPHILS NFR BLD: 56.4 % (ref 38–73)
NRBC BLD-RTO: 0 /100 WBC
PHOSPHATE SERPL-MCNC: 3.8 MG/DL (ref 2.7–4.5)
PLATELET # BLD AUTO: 391 K/UL (ref 150–450)
PMV BLD AUTO: 8.8 FL (ref 9.2–12.9)
POTASSIUM SERPL-SCNC: 4.3 MMOL/L (ref 3.5–5.1)
RBC # BLD AUTO: 4.24 M/UL (ref 4–5.4)
SODIUM SERPL-SCNC: 137 MMOL/L (ref 136–145)
WBC # BLD AUTO: 7.53 K/UL (ref 3.9–12.7)

## 2023-07-10 PROCEDURE — 84100 ASSAY OF PHOSPHORUS: CPT | Performed by: HOSPITALIST

## 2023-07-10 PROCEDURE — 97530 THERAPEUTIC ACTIVITIES: CPT | Mod: CO

## 2023-07-10 PROCEDURE — 80048 BASIC METABOLIC PNL TOTAL CA: CPT | Performed by: HOSPITALIST

## 2023-07-10 PROCEDURE — 36415 COLL VENOUS BLD VENIPUNCTURE: CPT | Performed by: HOSPITALIST

## 2023-07-10 PROCEDURE — 85025 COMPLETE CBC W/AUTO DIFF WBC: CPT | Performed by: HOSPITALIST

## 2023-07-10 PROCEDURE — 83735 ASSAY OF MAGNESIUM: CPT | Performed by: HOSPITALIST

## 2023-07-10 PROCEDURE — 97110 THERAPEUTIC EXERCISES: CPT | Mod: CQ

## 2023-07-10 PROCEDURE — 97116 GAIT TRAINING THERAPY: CPT | Mod: CQ

## 2023-07-10 PROCEDURE — 25000003 PHARM REV CODE 250: Performed by: FAMILY MEDICINE

## 2023-07-10 PROCEDURE — 11000004 HC SNF PRIVATE

## 2023-07-10 PROCEDURE — 25000003 PHARM REV CODE 250: Performed by: HOSPITALIST

## 2023-07-10 RX ADMIN — POLYETHYLENE GLYCOL 3350 17 G: 17 POWDER, FOR SOLUTION ORAL at 09:07

## 2023-07-10 RX ADMIN — Medication 1 TABLET: at 04:07

## 2023-07-10 RX ADMIN — APIXABAN 5 MG: 2.5 TABLET, FILM COATED ORAL at 09:07

## 2023-07-10 RX ADMIN — Medication 1 TABLET: at 09:07

## 2023-07-10 RX ADMIN — PANTOPRAZOLE SODIUM 40 MG: 40 TABLET, DELAYED RELEASE ORAL at 09:07

## 2023-07-10 RX ADMIN — APIXABAN 5 MG: 2.5 TABLET, FILM COATED ORAL at 08:07

## 2023-07-10 RX ADMIN — FLUTICASONE PROPIONATE 100 MCG: 50 SPRAY, METERED NASAL at 09:07

## 2023-07-10 RX ADMIN — CELECOXIB 200 MG: 200 CAPSULE ORAL at 09:07

## 2023-07-10 RX ADMIN — ATORVASTATIN CALCIUM 20 MG: 20 TABLET, FILM COATED ORAL at 08:07

## 2023-07-10 RX ADMIN — METOPROLOL SUCCINATE 50 MG: 50 TABLET, EXTENDED RELEASE ORAL at 09:07

## 2023-07-10 NOTE — PT/OT/SLP PROGRESS
Physical Therapy Treatment    Patient Name:  Marian Durham   MRN:  [id number]  Admit Date: 7/5/2023  Admitting Diagnosis: SBO (small bowel obstruction)  Recent Surgeries:     General Precautions: Standard, fall  Orthopedic Precautions: N/A  Braces: N/A    Recommendations:     Discharge Recommendations: home with home health  Level of Assistance Recommended at Discharge: Intermittent assistance   Discharge Equipment Recommendations: none  Barriers to discharge: Inaccessible home environment, Decreased caregiver support    Assessment:     Marian Durham is a 78 y.o. female admitted with a medical diagnosis of SBO (small bowel obstruction) . Patient present with hyper extension of the R knee and limited terminal knee extension of the L knee while ambulating performing there ex.      Performance deficits affecting function: weakness, impaired endurance, gait instability, decreased lower extremity function, decreased ROM, impaired joint extensibility.    Rehab Potential is good    Activity Tolerance: Good    Plan:     Patient to be seen 5 x/week to address the above listed problems via gait training, therapeutic activities, therapeutic exercises, neuromuscular re-education, wheelchair management/training    Plan of Care Expires: 08/05/23  Plan of Care Reviewed with: patient    Subjective     Patient without any c/o today..     Pain/Comfort:  Pain Rating 1: 0/10  Pain Rating Post-Intervention 1: 0/10    Patient's cultural, spiritual, Rastafarian conflicts given the current situation:  no    Objective:     Communicated with NSG prior to session.  Patient found up in chair with   upon PT entry to room.     Therapeutic Activities and Exercises: Patient transferred from bedside chair<>bedside commode with supervision only. There ex in supine: QUAD SETS, SAQ, HS, SLR AND AP 2X15 REPS B LE. Educated on the rationale of the different there ex.    Functional Mobility:  Transfers:     Sit to Stand:  supervision with rolling  walker  Bed to Chair: supervision with  rolling walker  using  Stand Pivot  Gait: ~160 ft x 2 with RW and SBA.    AM-PAC 6 CLICK MOBILITY  22    Patient left up in chair with call button in reach.    GOALS:   Multidisciplinary Problems       Physical Therapy Goals          Problem: Physical Therapy    Goal Priority Disciplines Outcome Goal Variances Interventions   Physical Therapy Goal     PT, PT/OT Ongoing, Progressing     Description: Goals to be met by: 23     Patient will increase functional independence with mobility by performin. Supine to sit with [place]  2. Sit to supine with [place]  3. Rolling to Left and Right with [place]  4. Sit to stand transfer with Modified [place]  5. Bed to chair transfer with Modified [place] using Rolling Walker  6. Gait  x 200 feet with Modified [place] using Rolling Walker  7. Wheelchair propulsion x 150 feet with Modified [place] using bilateral upper extremities  8. Ascend/descend 12 steps with 1 Handrail Modified [place] using No Assistive Device  9. Ascend/Descend 4 inch curb step with Modified [place] using Rolling Walker                         Time Tracking:     PT Received On: 07/10/23  PT Start Time: 1327  PT Stop Time: 1410  PT Total Time (min): 43 min    Billable Minutes: Gait Training 13 and Therapeutic Exercise 30    Treatment Type: Treatment  PT/PTA: PTA     Number of PTA visits since last PT visit: 3     07/10/2023

## 2023-07-10 NOTE — PLAN OF CARE
Problem: Adult Inpatient Plan of Care  Goal: Plan of Care Review  7/10/2023 1403 by Lili Dominique LPN  Outcome: Ongoing, Progressing  7/10/2023 1403 by Lili Dominique LPN  Outcome: Ongoing, Progressing  Goal: Patient-Specific Goal (Individualized)  7/10/2023 1403 by Lili Dominique LPN  Outcome: Ongoing, Progressing  7/10/2023 1403 by iLli Dominique LPN  Outcome: Ongoing, Progressing  Goal: Absence of Hospital-Acquired Illness or Injury  7/10/2023 1403 by Lili Dominique LPN  Outcome: Ongoing, Progressing  7/10/2023 1403 by Lili Dominique LPN  Outcome: Ongoing, Progressing  Goal: Optimal Comfort and Wellbeing  7/10/2023 1403 by Lili Dominique LPN  Outcome: Ongoing, Progressing  7/10/2023 1403 by Lili Dominique LPN  Outcome: Ongoing, Progressing  Goal: Readiness for Transition of Care  7/10/2023 1403 by Lili Dominique LPN  Outcome: Ongoing, Progressing  7/10/2023 1403 by Lili Dominique LPN  Outcome: Ongoing, Progressing

## 2023-07-10 NOTE — H&P
[facility] Medicine  Skilled Nursing Facility   History and Physical Exam      Date of Service: 2023      Patient Name: Marian Durham  MRN: [id number]  Admission Date: 2023  Attending Physician: David Martinez MD  Primary Care Provider: Suzanna Duran MD  Code Status: Full Code      Principal problem:  SBO (small bowel obstruction)      Chief Complaint:   Chief Complaint   Patient presents with    Abdominal Pain     Admitted to [facility] for rehabilitation following hospital stay for small bowel obstruction treated non-operatively and atrial fibrillation with RVR.           HPI:       Patient admitted with skilled services with PT and OT to improve functional status and ability to perform ADLs.       Past Medical History:   Past Medical History:   Diagnosis Date    Hyperlipidemia     Osteoarthritis     Shingles 2002    Spinal stenosis        Past Surgical History:   Past Surgical History:   Procedure Laterality Date    ANKLE FRACTURE SURGERY      Right ankle    EXCISION OF MASS OF BACK N/A 2018    Procedure: EXCISION, MASS, BACK;  Surgeon: Fran Magaña MD;  Location: [facility];  Service: General;  Laterality: N/A;    EYE SURGERY Bilateral 2016    HYSTERECTOMY  1985    HEATHER    KNEE ARTHROSCOPY W/ DEBRIDEMENT      Bilateral    KNEE ARTHROSCOPY W/ DEBRIDEMENT      Left knee    OPEN PATELLA REEFING PROCEDURE  age 19    Left knee    Tonsilectomy  as a child    TONSILLECTOMY      TUBAL LIGATION         Social History:   Tobacco Use    Smoking status: Never    Smokeless tobacco: Never   Substance and Sexual Activity    Alcohol use: Yes     Alcohol/week: 3.0 standard drinks     Types: 3 Glasses of wine per week     Comment: Drinks a glass of wine 3 times weekly    Drug use: No    Sexual activity: Not Currently     Birth control/protection: Post-menopausal     Comment: Spouse  of kidney cancer : 2015       Family History:   Family History       Problem Relation (Age of Onset)  "   Breast cancer Paternal Aunt    Colon cancer Paternal Aunt    Coronary artery disease Brother    Diabetes Brother    Hypertension Father, Mother            No current facility-administered medications on file prior to encounter.     Current Outpatient Medications on File Prior to Encounter   Medication Sig    calcium-vitamin D3 (OS- + D3) 500 mg-5 mcg (200 unit) per tablet Take 1 tablet by mouth 2 (two) times daily with meals.    celecoxib (CELEBREX) 200 MG capsule take 1 capsule (200MG) by oral route every day as needed    estradioL (ESTRACE) 0.01 % (0.1 mg/gram) vaginal cream Place 1 g vaginally twice a week.    fluticasone propionate (FLONASE) 50 mcg/actuation nasal spray Instill 2 sprays (100 mcg total) by Each Nostril  once daily.    hydrOXYzine (ATARAX) 50 MG tablet Take 1 tablet (50 mg total) by mouth 3 (three) times daily as needed for Anxiety.    metoprolol succinate (TOPROL-XL) 50 MG 24 hr tablet Take 1 tablet (50 mg total) by mouth once daily.    pantoprazole (PROTONIX) 40 MG tablet Take 1 tablet (40 mg total) by mouth once daily.    polyethylene glycol (MIRALAX) 17 gram/dose powder Take 17 g by mouth 3 (three) times daily as needed (Constipation).    simvastatin (ZOCOR) 40 MG tablet Take 40 mg by mouth every evening.     apixaban (ELIQUIS) 5 mg Tab Take 1 tablet (5 mg total) by mouth 2 (two) times daily.    glucosamine-chondroitin 500-400 mg tablet Take 1 tablet by mouth 3 (three) times daily.       Allergies:   Review of patient's allergies indicates:   Allergen Reactions    Demerol [meperidine] Other (See Comments)     "Becomes Agitated and Combative"       ROS:  Review of Systems   Constitutional:  Negative for appetite change and fever.   Respiratory:  Negative for cough and shortness of breath.    Cardiovascular:  Negative for chest pain and palpitations.   Gastrointestinal:  Negative for abdominal pain, nausea and vomiting.   Genitourinary:  Negative for difficulty urinating and dysuria. "   Musculoskeletal:  Positive for arthralgias and gait problem. Negative for back pain.   Neurological:  Positive for weakness. Negative for dizziness and light-headedness.   Psychiatric/Behavioral:  Negative for sleep disturbance. The patient is not nervous/anxious.        Objective:  Temp:  [97.7 °F (36.5 °C)-97.8 °F (36.6 °C)]   Pulse:  [68-69]   Resp:  [17-18]   BP: (116-139)/(55-57)   SpO2:  [95 %-97 %]     Body mass index is 19.04 kg/m².      Physical Exam  Vitals and nursing note reviewed.   Constitutional:       General: She is not in acute distress.     Appearance: She is well-developed.   Cardiovascular:      Rate and Rhythm: Normal rate and regular rhythm.      Heart sounds: S1 normal and S2 normal. No murmur heard.  Pulmonary:      Effort: Pulmonary effort is normal. No respiratory distress.      Breath sounds: Normal breath sounds. No wheezing or rales.   Abdominal:      General: Bowel sounds are normal. There is no distension.      Palpations: Abdomen is soft.      Tenderness: There is no abdominal tenderness.   Musculoskeletal:         General: No tenderness.      Right knee: Decreased range of motion.      Left knee: Deformity present. Decreased range of motion.      Right lower leg: No edema.      Left lower leg: No edema.   Skin:     General: Skin is warm and dry.      Findings: Bruising present.   Neurological:      Mental Status: She is alert and oriented to person, place, and time.   Psychiatric:         Mood and Affect: Mood normal.         Behavior: Behavior normal. Behavior is cooperative.         Thought Content: Thought content normal.       Significant Labs: A1C: No results for input(s): HGBA1C in the last 4320 hours.  TSH: No results for input(s): TSH in the last 4320 hours.  BMP  Lab Results   Component Value Date     07/06/2023    K 4.6 07/06/2023     07/06/2023    CO2 25 07/06/2023    BUN 16 07/06/2023    CREATININE 0.8 07/06/2023    CALCIUM 9.9 07/06/2023    ANIONGAP 10  07/06/2023    EGFRNORACEVR >60.0 07/06/2023       LFTS:  Lab Results   Component Value Date    ALT 20 06/24/2023    AST 19 06/24/2023    ALKPHOS 67 06/24/2023    BILITOT 1.0 06/24/2023       CBC:  Lab Results   Component Value Date    WBC 6.97 07/06/2023    HGB 13.6 07/06/2023    HCT 41.7 07/06/2023    MCV 91 07/06/2023     07/06/2023          Significant Imaging: I have reviewed all pertinent imaging results/findings completed during prior hospitalization.      Assessment and Plan:  Active Diagnoses:    Diagnosis Date Noted POA    PRINCIPAL PROBLEM:  SBO (small bowel obstruction) [K56.609] 04/22/2022 Yes    Debility [R53.81] 07/06/2023 Yes    Paroxysmal atrial fibrillation [I48.0] 06/30/2023 Yes    Osteoarthritis [M19.90] 04/23/2022 Yes      Problems Resolved During this Admission:       SBO (small bowel obstruction)  - SBO with h/o same in 4/2022, CT with transition point in LLQ.  - Continue pantoprazole 40mg daily.  - Tolerating diet  - Having regular BMs.   - endorses good appetite  - Continue senokot 1 tab bid and prn polyethylene glycol 17 g  - Monitor closely for N/V/constipation     Atrial fibrillation with rapid ventricular response  - New onset AFib rapid ventricular response on 6/30; asymptomatic   - Echo found normal systolic function and grade I diastolic dysfunction. Normal LA size.   - Rate well controlled on current regimen  - Continue metoprolol succinate 50 mg daily and apixaban 5mg BID  - Refer to Cardiology/Electrophysiology after discharge.      Osteoarthritis  Debility  - Known h/o OA with need for surgical intervention (follows with Dr. Mitchell in clinic)  - Continue Celebrex 200 mg daily & OsCal BID  - Continue with PT/OT for gait training and strengthening and restoration of ADL's   - Encourage mobility, OOB in chair, and early ambulation as appropriate  - Fall precautions   - Monitor for bowel and bladder dysfunction  - Monitor for and prevent skin breakdown and pressure ulcers  -  DVT prophylaxis with apixaban.     Hypercholesteremia  - continue atorvastatin 20 mg qHS.      GERD  - continue Pantoprazole 40 mg daily      IP [facility] RISK OF UNPLANNED READMISSION Model: Low      Anticipated Disposition:  Home with home health      No future appointments.    I certify that SNF services are required to be given on an inpatient basis because Marian Durham needs for skilled nursing care and/or skilled rehabilitation are required on a daily basis and such services can only practically be provided in a skilled nursing facility setting and are for an ongoing condition for which she received inpatient care in the hospital.       David Martinez MD  Department of Hospital Medicine   [facility] - Skilled Nursing

## 2023-07-10 NOTE — PT/OT/SLP PROGRESS
Occupational Therapy   Treatment    Name: Marian Durham  MRN: [id number]  Admit Date: 7/5/2023  Admitting Diagnosis:  SBO (small bowel obstruction)    General Precautions: Standard, fall   Orthopedic Precautions: N/A   Braces: N/A    Recommendations:     Discharge Recommendations:  home with home health  Level of Assistance Recommended at Discharge: Intermittent supervision  Discharge Equipment Recommendations: none  Barriers to discharge:  Inaccessible home environment, Decreased caregiver support    Assessment:     Marian Durham is a 78 y.o. female with a medical diagnosis of SBO (small bowel obstruction).  She presents with  Performance deficits affecting function are weakness, impaired endurance, impaired functional mobility, gait instability, impaired balance, decreased lower extremity function, decreased upper extremity function, decreased safety awareness, decreased ROM.     Rehab Potential is fair    Activity tolerance:  Fair    Plan:     Patient to be seen 5 x/week to address the above listed problems via self-care/home management, therapeutic activities, therapeutic exercises    Plan of Care Expires: 08/04/23  Plan of Care Reviewed with: patient    Subjective     Communicated with: nsg and Pt. prior to session. I am doing well today    Pain/Comfort:  Pain Rating 1: 0/10  Pain Rating Post-Intervention 1: 0/10    Patient's cultural, spiritual, Oriental orthodox conflicts given the current situation:  no    Objective:     Patient found up in chair    upon OT entry to room.    Bed Mobility:    Not tested     Functional Mobility/Transfers:  Patient completed Sit <> Stand Transfer with stand by assistance  with  rolling walker   Functional Mobility: Pt. With fxl mobility to and from gym approx 210 ft and inside gym with fxl mobility  Pt. Also overall stand for over 40 min during session    Activities of Daily Living:  Not tested Pt. Already dressed    [facility] 6 Click ADL: 21    Treatment & Education:  Pt. Stand with  red thra band and yellow thera band with 2x20 reps with  shd flex, bicep curls horz adb/add and forward flex motion to increase BUE ROM and strength.  Pt. Also with stretches with flex/ext with standing with CGA for balance  Pt. Also with BUE shoulder pulley x 20 reps    Pt. With standing act on this day with task. Pt. With CGA/SBA for balance aspects with task with  AD at raised counter Pt with visual perception task with discrimination of various shapes and sizes x 12:56  min/sec  with standing bal and min cues through out with weight shifting and use of BUE's incorporated and crossing mid line and facilitation with posture in prep for home management .     Pt. With therex performed to increase ROM, endurance selfcare task and fxl mobility for independence     Pt edu on role of OT, POC, safety when performing self care tasks , benefit of performing OOB activity, and safety when performing functional transfers and mobility management for preparation with goals to progress towards next level of care     Patient left up in chair with all lines intact and call button in reach    GOALS:   Multidisciplinary Problems       Occupational Therapy Goals          Problem: Occupational Therapy    Goal Priority Disciplines Outcome Interventions   Occupational Therapy Goal     OT, PT/OT Ongoing, Progressing    Description: Goals to be met by: 8/5/2023     Patient will increase functional independence with ADLs by performing:    LE Dressing with [place].  Grooming while standing at sink with Modified [place] and LRAD as needed.   Toileting from toilet with [place] for hygiene and clothing management.   Bathing from  tub bench with Modified [place].  Stand pivot transfers with Modified [place] and LRAD as needed  Toilet transfer to toilet with Modified [place] and LRAD as need  Footwear donning/doffing /c Mod I and AE as needed..                       Time Tracking:     OT Date of Treatment:  07/10/23  OT Start Time: 1036    OT Stop Time: 1130  OT Total Time (min): 54 min    Billable Minutes:Therapeutic Activity 54    7/10/2023

## 2023-07-10 NOTE — PROGRESS NOTES
[facility]                                  Skilled Nursing Facility                   Progress Note     Admit Date: 7/5/2023  WILLIAN   Principal Problem:  SBO (small bowel obstruction)   HPI obtained from patient interview and chart review     Chief Complaint: Re-evaluation of medical treatment and therapy status: lab review, bowel monitoring    HPI: Ms. Marian Durham is a 77 y.o. female, with PMH of prior SBO, hysterectomy admitted to hospital on 6/24/23 for SBO. Cards consulted for new Afib RVR. Ortho rec therapy for chronic changes and pain / weakness bilateral knees and hips. SBO resolved, per colorectal. Pt having regular BMs at diascharge.  Patient transferred to [facility] for PT and OT to improve functional status and ability to perform ADLs.     Interval History:  24 hour chart review completed. NAEON. NAD.  Afebrile, vital signs stable.   All of today's labs reviewed; listed and addressed below.   Patient reports adequate appetite. Denies N/V.  Most recent BM 7/9/23.  Pain management: acceptable.  Patient is progressing with therapy.   PT notes patient ambulated ~160 ft x 2 with RW and SBA today.   Home health recommended at discharge per PT/OT.      Past Medical History: Patient has a past medical history of Hyperlipidemia, Osteoarthritis, Shingles (2002), and Spinal stenosis.    Past Surgical History: Patient has a past surgical history that includes Hysterectomy (1985); Tubal ligation; Ankle fracture surgery (2007); Tonsilectomy (as a child); Open patella reefing procedure (age 19); Knee arthroscopy w/ debridement (1994); Knee arthroscopy w/ debridement (2003); Tonsillectomy; Eye surgery (Bilateral, 2016); and Excision of mass of back (N/A, 11/16/2018).    Social History: Patient reports that she has never smoked. She has never used smokeless tobacco. She reports current alcohol use of about 3.0 standard drinks per week. She  reports that she does not use drugs.    Family History: family history includes Breast cancer in her paternal aunt; Colon cancer in her paternal aunt; Coronary artery disease in her brother; Diabetes in her brother; Hypertension in her father and mother.    Allergies: Patient is allergic to demerol [meperidine].    ROS  Constitutional: Negative for fever   Eyes: Negative for blurred vision, double vision   Respiratory: Negative for cough, shortness of breath   Cardiovascular: Negative for chest pain, palpitations, and leg swelling.   Gastrointestinal: Negative for abdominal pain, constipation, diarrhea, nausea, vomiting.   Genitourinary: Negative for dysuria, frequency   Musculoskeletal:  + generalized weakness and bilat hip and knee weakness. +arthralgias. Negative for back pain and myalgias.   Skin: Negative for itching and rash.   Neurological: Negative for dizziness, headaches.   Psychiatric/Behavioral: Negative for depression. The patient is not nervous/anxious.      24 hour Vital Sign Range   Temp:  [97.5 °F (36.4 °C)-97.7 °F (36.5 °C)]   Pulse:  [68-89]   Resp:  [17]   BP: (116-135)/(57-60)   SpO2:  [95 %-97 %]     Current BMI: Body mass index is 18.05 kg/m².    PEx  Constitutional: Patient appears debilitated.  No distress noted  HENT:   Head: Normocephalic and atraumatic.   Eyes: Pupils are equal, round  Neck: Normal range of motion. Neck supple.   Cardiovascular: Normal rate, regular rhythm and normal heart sounds.    Pulmonary/Chest: Effort normal and breath sounds are clear  Abdominal: Soft. Bowel sounds are normal.   Musculoskeletal: bony deformity visualized left knee  Neurological: Alert and oriented to person, place, and time.   Psychiatric: Normal mood and affect. Behavior is normal.   Skin: Skin is pale, intact, warm and dry. Full skin assessment completed by NP on initial exam.       Recent Labs   Lab 07/10/23  0444      K 4.3      CO2 26   BUN 27*   CREATININE 0.8   MG 2.3        Recent Labs   Lab 07/10/23  0444   WBC 7.53   RBC 4.24   HGB 12.8   HCT 38.7      MCV 91   MCH 30.2   MCHC 33.1       No results for input(s): POCTGLUCOSE in the last 168 hours.     Assessment and Plan:    SBO (small bowel obstruction)  - SBO with h/o same in 04/2022, CT with transition point in LLQ.  - Continue pantoprazole 40mg as PO daily.  - Tolerating diet  + BM 7/6 am; denies nausea or vomiting - endorses good appetite  - Continue senokot 1 tab bid and prn polyethylene glycol 17 g  - Monitor closely for N/V/constipation     Atrial fibrillation with rapid ventricular response  - New onset AFib rapid ventricular response on 6/30; asymptomatic   - Echo found normal systolic function and grade I diastolic dysfunction.  - Rate well controlled on current regimen  - Continue metoprolol 50mg PO daily  - Anticoagulation initiated 7/5 w apixaban 5mg PO BID     Osteoarthritis  Debility  - Known h/o OA with need for surgical intervention (follows with Dr. Mitchell in clinic)  - Continue Celebrex 200 mg daily & OsCal BID  - Continue with PT/OT for gait training and strengthening and restoration of ADL's   - Encourage mobility, OOB in chair, and early ambulation as appropriate  - Fall precautions   - Monitor for bowel and bladder dysfunction  - Monitor for and prevent skin breakdown and pressure ulcers  - DVT prophylaxis with eliquis    Hypercholesteremia  Stroke Risk Factor.   - continue statin    GERD    - continue 40 mg protonix daily      Anticipate disposition:  Home with home health      Follow-up needed during SNF stay-    Follow-up needed after discharge from SNF: PCP, Ortho (Dr. Mitchell), Cardiology    No future appointments.      I certify that SNF services are required to be given on an inpatient basis because Marian Durham needs for skilled nursing care and/or skilled rehabilitation are required on a daily basis and such services can only practically be provided in a skilled nursing facility  setting and are for an ongoing condition for which she received inpatient care in the hospital.     Extended Visit  Total time spent: > 31 minutes  Description of Time: counseling patient on clinical condition, therapies provided, plan of care, emotional support, coordinating patient care with other care team members, reviewing and interpreting labs and imaging, collaboration with physician, initiating new orders, chart review, and documentation. See interval hx.         Trixie Guerrero NP  Department of Hospital Medicine   [facility]- Skilled Nursing [facility]     DOS: 7/10/2023

## 2023-07-11 PROCEDURE — 25000003 PHARM REV CODE 250: Performed by: HOSPITALIST

## 2023-07-11 PROCEDURE — 11000004 HC SNF PRIVATE

## 2023-07-11 PROCEDURE — 97110 THERAPEUTIC EXERCISES: CPT | Mod: CQ

## 2023-07-11 PROCEDURE — 25000003 PHARM REV CODE 250: Performed by: FAMILY MEDICINE

## 2023-07-11 PROCEDURE — 97530 THERAPEUTIC ACTIVITIES: CPT | Mod: CO

## 2023-07-11 PROCEDURE — 97116 GAIT TRAINING THERAPY: CPT | Mod: CQ

## 2023-07-11 PROCEDURE — 97530 THERAPEUTIC ACTIVITIES: CPT | Mod: CQ

## 2023-07-11 RX ADMIN — METOPROLOL SUCCINATE 50 MG: 50 TABLET, EXTENDED RELEASE ORAL at 09:07

## 2023-07-11 RX ADMIN — APIXABAN 5 MG: 2.5 TABLET, FILM COATED ORAL at 09:07

## 2023-07-11 RX ADMIN — ATORVASTATIN CALCIUM 20 MG: 20 TABLET, FILM COATED ORAL at 08:07

## 2023-07-11 RX ADMIN — POLYETHYLENE GLYCOL 3350 17 G: 17 POWDER, FOR SOLUTION ORAL at 09:07

## 2023-07-11 RX ADMIN — CELECOXIB 200 MG: 200 CAPSULE ORAL at 09:07

## 2023-07-11 RX ADMIN — PANTOPRAZOLE SODIUM 40 MG: 40 TABLET, DELAYED RELEASE ORAL at 09:07

## 2023-07-11 RX ADMIN — FLUTICASONE PROPIONATE 100 MCG: 50 SPRAY, METERED NASAL at 09:07

## 2023-07-11 RX ADMIN — APIXABAN 5 MG: 2.5 TABLET, FILM COATED ORAL at 08:07

## 2023-07-11 RX ADMIN — Medication 1 TABLET: at 09:07

## 2023-07-11 RX ADMIN — Medication 1 TABLET: at 04:07

## 2023-07-11 NOTE — PLAN OF CARE
CHW served per [facility] to patient. Patient signed, a copy was given and faxed to [facility].

## 2023-07-11 NOTE — PT/OT/SLP PROGRESS
Occupational Therapy   Treatment    Name: Marian Durham  MRN: [id number]  Admit Date: 7/5/2023  Admitting Diagnosis:  SBO (small bowel obstruction)    General Precautions: Standard, fall   Orthopedic Precautions: N/A   Braces: N/A    Recommendations:     Discharge Recommendations:  home with home health  Level of Assistance Recommended at Discharge: Intermittent supervision  Discharge Equipment Recommendations: none  Barriers to discharge:  Inaccessible home environment, Decreased caregiver support    Assessment:     Marian Durham is a 78 y.o. female with a medical diagnosis of SBO (small bowel obstruction).  She presents with  Performance deficits affecting function are weakness, impaired endurance, impaired functional mobility, gait instability, impaired balance, decreased lower extremity function, decreased upper extremity function, decreased safety awareness, decreased ROM.     Pt. Was cooperative and participated well with session on this day. Pt  continues to demonstrate levels of physical deficits with  functional indep with daily management activities tasks, selfcare skills with balance,  functional mobility, UB strength and endurance. Pt. Will continue to benefit from continued OT to progress towards goals      Rehab Potential is fair    Activity tolerance:  Fair    Plan:     Patient to be seen 5 x/week to address the above listed problems via self-care/home management, therapeutic activities, therapeutic exercises    Plan of Care Expires: 08/04/23  Plan of Care Reviewed with: patient    Subjective     Communicated with: nsg and Pt. prior to session. I am doing well today    Pain/Comfort:  Pain Rating 1: 0/10  Pain Rating Post-Intervention 1: 0/10    Patient's cultural, spiritual, Faith conflicts given the current situation:  no    Objective:     Patient found up in chair  in activity room  with   upon OT entry to room.    Bed Mobility:    Not tested     Functional  Mobility/Transfers:  Patient completed Sit <> Stand Transfer with stand by assistance  with  rolling walker   Patient completed Toilet Transfer Stand Pivot technique with stand by assistance with  rolling walker  Functional Mobility: Pt. With fxl mobility from activity room to gym approx 185 ft with SBA and RW for bal    Activities of Daily Living:  Grooming: modified independence with grooming needs  Toileting: supervision with cleaning and clothing management       [facility] 6 Click ADL: 21    Treatment & Education:  Pt. Stand with red thra band and yellow thera band with x15  reps with  shd flex, bicep curls horz adb/add and forward flex motion to increase BUE ROM and strength.    Pt. Also with BUE shoulder pulley x 15 reps    Pt. With standing act on this day with task. Pt. With CGA/SBA for balance aspects with task with  AD at raised counter Pt with visual perception task with discrimination of various shapes and sizes x 12:56  min/sec  with standing bal and min cues through out with weight shifting and use of BUE's incorporated and crossing mid line and facilitation with posture in prep for home management .     Pt. With therex performed to increase ROM, endurance selfcare task and fxl mobility for independence     Pt edu on role of OT, POC, safety when performing self care tasks , benefit of performing OOB activity, and safety when performing functional transfers and mobility management for preparation with goals to progress towards next level of care     Patient left up in chair with  in activity room room with activity dr present     GOALS:   Multidisciplinary Problems       Occupational Therapy Goals          Problem: Occupational Therapy    Goal Priority Disciplines Outcome Interventions   Occupational Therapy Goal     OT, PT/OT Ongoing, Progressing    Description: Goals to be met by: 8/5/2023     Patient will increase functional independence with ADLs by performing:    LE Dressing with  [place].  Grooming while standing at sink with Modified [place] and LRAD as needed.   Toileting from toilet with [place] for hygiene and clothing management.   Bathing from  tub bench with Modified [place].  Stand pivot transfers with Modified [place] and LRAD as needed  Toilet transfer to toilet with Modified [place] and LRAD as need  Footwear donning/doffing /c Mod I and AE as needed..                       Time Tracking:     OT Date of Treatment: 07/11/23  OT Start Time: 1358    OT Stop Time: 1437  OT Total Time (min): 39 min    Billable Minutes:Therapeutic Activity 39    7/11/2023

## 2023-07-11 NOTE — PROGRESS NOTES
[facility]                                  Skilled Nursing Facility                   Progress Note     Admit Date: 7/5/2023  WILLIAN 7/14/2023  Principal Problem:  SBO (small bowel obstruction)   HPI obtained from patient interview and chart review     Chief Complaint: Re-evaluation of medical treatment and therapy status: bowel monitoring, therapy progression, discharge planning    HPI: Ms. Marian Durham is a 77 y.o. female, with PMH of prior SBO, hysterectomy admitted to hospital on 6/24/23 for SBO. Cards consulted for new Afib RVR. Ortho rec therapy for chronic changes and pain / weakness bilateral knees and hips. SBO resolved, per colorectal. Pt having regular BMs at diascharge.  Patient transferred to [facility] for PT and OT to improve functional status and ability to perform ADLs.     Interval History:  24 hour chart review completed. NAEON. NAD.  Afebrile, vital signs stable.   Patient reports adequate appetite. Denies N/V.  Most recent BM 7/11/23.  Pain management: acceptable.  Patient is progressing with therapy.   PT notes patient ambulated >300 ft x 2 trials with S and RW today.   Anticipating discharge Fri 7/14 to home with home health.   Home health orders placed.      Past Medical History: Patient has a past medical history of Hyperlipidemia, Osteoarthritis, Shingles (2002), and Spinal stenosis.    Past Surgical History: Patient has a past surgical history that includes Hysterectomy (1985); Tubal ligation; Ankle fracture surgery (2007); Tonsilectomy (as a child); Open patella reefing procedure (age 19); Knee arthroscopy w/ debridement (1994); Knee arthroscopy w/ debridement (2003); Tonsillectomy; Eye surgery (Bilateral, 2016); and Excision of mass of back (N/A, 11/16/2018).    Social History: Patient reports that she has never smoked. She has never used smokeless tobacco. She reports current alcohol use of about 3.0  standard drinks per week. She reports that she does not use drugs.    Family History: family history includes Breast cancer in her paternal aunt; Colon cancer in her paternal aunt; Coronary artery disease in her brother; Diabetes in her brother; Hypertension in her father and mother.    Allergies: Patient is allergic to demerol [meperidine].    ROS  Constitutional: Negative for fever   Eyes: Negative for blurred vision, double vision   Respiratory: Negative for cough, shortness of breath   Cardiovascular: Negative for chest pain, palpitations, and leg swelling.   Gastrointestinal: Negative for abdominal pain, constipation, diarrhea, nausea, vomiting.   Genitourinary: Negative for dysuria, frequency   Musculoskeletal:  + generalized weakness and bilat hip and knee weakness. +arthralgias. Negative for back pain and myalgias.   Skin: Negative for itching and rash.   Neurological: Negative for dizziness, headaches.   Psychiatric/Behavioral: Negative for depression. The patient is not nervous/anxious.      24 hour Vital Sign Range   Temp:  [97.1 °F (36.2 °C)-97.4 °F (36.3 °C)]   Pulse:  [74-90]   Resp:  [17]   BP: (129-131)/(64-71)   SpO2:  [95 %-96 %]     Current BMI: Body mass index is 18.05 kg/m².    PEx  Constitutional: Patient appears debilitated.  No distress noted  HENT:   Head: Normocephalic and atraumatic.   Eyes: Pupils are equal, round  Neck: Normal range of motion. Neck supple.   Cardiovascular: Normal rate, regular rhythm and normal heart sounds.    Pulmonary/Chest: Effort normal and breath sounds are clear  Abdominal: Soft. Bowel sounds are normal.   Musculoskeletal: bony deformity visualized left knee  Neurological: Alert and oriented to person, place, and time.   Psychiatric: Normal mood and affect. Behavior is normal.   Skin: Skin is pale, intact, warm and dry. Full skin assessment completed by NP on initial exam.       No results for input(s): GLUCOSE, NA, K, CL, CO2, BUN, CREATININE, MG in the last 24  hours.    Invalid input(s):  CALCIUM      No results for input(s): WBC, RBC, HGB, HCT, PLT, MCV, MCH, MCHC in the last 24 hours.      No results for input(s): POCTGLUCOSE in the last 168 hours.     Assessment and Plan:    SBO (small bowel obstruction)  - SBO with h/o same in 04/2022, CT with transition point in LLQ.  - Continue pantoprazole 40mg as PO daily.  - Tolerating diet  + BM 7/11 am; denies nausea or vomiting - endorses good appetite  - Continue senokot 1 tab bid and prn polyethylene glycol 17 g  - Monitor closely for N/V/constipation     Atrial fibrillation with rapid ventricular response  - New onset AFib rapid ventricular response on 6/30; asymptomatic   - Echo found normal systolic function and grade I diastolic dysfunction.  - Rate well controlled on current regimen  - Continue metoprolol 50mg PO daily  - Anticoagulation initiated 7/5 w apixaban 5mg PO BID     Osteoarthritis  Debility  - Known h/o OA with need for surgical intervention (follows with Dr. Mithcell in clinic)  - Continue Celebrex 200 mg daily & OsCal BID  - Continue with PT/OT for gait training and strengthening and restoration of ADL's   - Encourage mobility, OOB in chair, and early ambulation as appropriate  - Fall precautions   - Monitor for bowel and bladder dysfunction  - Monitor for and prevent skin breakdown and pressure ulcers  - DVT prophylaxis with eliquis    Hypercholesteremia  Stroke Risk Factor.   - continue statin    GERD    - continue 40 mg protonix daily      Anticipate disposition:  Home with home health      Follow-up needed during SNF stay-    Follow-up needed after discharge from SNF: PCP, Ortho (Dr. Mitchell), Cardiology    No future appointments.      I certify that SNF services are required to be given on an inpatient basis because Marian Durham needs for skilled nursing care and/or skilled rehabilitation are required on a daily basis and such services can only practically be provided in a skilled nursing facility  setting and are for an ongoing condition for which she received inpatient care in the hospital.     Extended Visit  Total time spent: > 31 minutes  Description of Time: counseling patient on clinical condition, therapies provided, plan of care, emotional support, coordinating patient care with other care team members, reviewing and interpreting labs and imaging, collaboration with physician, initiating new orders, chart review, and documentation. See interval hx.         Trixie Guerrero NP  Department of Hospital Medicine   [facility]     DOS: 7/11/2023

## 2023-07-11 NOTE — TREATMENT PLAN
Rehab Services' DME recommendations    Marian Gilmoreer  MRN: [id number]      [x]  No DME needed      [x] Home health PT, OT, and Aide      [x] Outpatient PT and OT         Rachele Pearson, AISHA 7/11/2023

## 2023-07-11 NOTE — PT/OT/SLP PROGRESS
"Physical Therapy Treatment    Patient Name:  Marian Durham   MRN:  [id number]  Admit Date: 7/5/2023  Admitting Diagnosis: SBO (small bowel obstruction)  Recent Surgeries: N/A    General Precautions: Standard, fall  Orthopedic Precautions: N/A  Braces: N/A    Recommendations:     Discharge Recommendations: home with home health  Level of Assistance Recommended at Discharge: Intermittent assistance   Discharge Equipment Recommendations: none  Barriers to discharge: Inaccessible home environment, Decreased caregiver support    Assessment:     Marian Durham is a 78 y.o. female admitted with a medical diagnosis of SBO (small bowel obstruction) . Pt tolerated well, pt able to complete entire session with 2 seated rest breaks and without w/c, pt ambulatory for majority of session. Pt completing all functional mobility with S/SBA. Pt would continue to benefit from skilled PT services to improve overall functional mobility, strength and endurance.      Performance deficits affecting function: weakness, impaired endurance, gait instability, decreased lower extremity function, decreased ROM, impaired joint extensibility.    Rehab Potential is good    Activity Tolerance: Good    Plan:     Patient to be seen 5 x/week to address the above listed problems via gait training, therapeutic activities, therapeutic exercises, neuromuscular re-education, wheelchair management/training    Plan of Care Expires: 08/05/23  Plan of Care Reviewed with: patient    Subjective     Pt agreeable to PT.     Pain/Comfort:  Pain Rating 1: 0/10  Pain Rating Post-Intervention 1: 0/10    Patient's cultural, spiritual, Scientology conflicts given the current situation:  no    Objective:     Patient found ambulatory in room/[place] with  (no lines) upon PT entry to room.     Therapeutic Activities and Exercises: standing TE: hip flex, hip abd/add, heel raises x 20 reps for BLE strengthening     lateral step ups (4" box x 10 reps), step ups (6" step RLE only " "x 10) unable with LLE    Patient educated on role of therapy, goals of session, and benefits of out of bed mobility.   Instructed on use of call button and importance of calling nursing staff for assistance with mobility   Questions/concerns addressed within PTA scope of practice  Pt verbalized understanding.    Functional Mobility:  Transfers:     Sit to Stand:  supervision with rolling walker  Bed to Chair: supervision with  rolling walker  using  Step Transfer  Gait: pt amb >300 ft + >200 ft S/SBA with RW  Stairs:  Pt ascended/descended 16 stair(s) with No Assistive Device with bilateral handrails with Supervision or Set-up Assistance. Vc for technique/ sequencing   4" curb: pt asc/desc curb with S/SBA with RW x 2 trials    AM-PAC 6 CLICK MOBILITY  22    Patient left up in chair with  PT tech in gym .    GOALS:   Multidisciplinary Problems       Physical Therapy Goals          Problem: Physical Therapy    Goal Priority Disciplines Outcome Goal Variances Interventions   Physical Therapy Goal     PT, PT/OT Ongoing, Progressing     Description: Goals to be met by: 23     Patient will increase functional independence with mobility by performin. Supine to sit with [place]  2. Sit to supine with [place]  3. Rolling to Left and Right with [place]  4. Sit to stand transfer with Modified [place]  5. Bed to chair transfer with Modified [place] using Rolling Walker  6. Gait  x 200 feet with Modified [place] using Rolling Walker  7. Wheelchair propulsion x 150 feet with Modified [place] using bilateral upper extremities  8. Ascend/descend 12 steps with 1 Handrail Modified [place] using No Assistive Device  9. Ascend/Descend 4 inch curb step with Modified [place] using Rolling Walker                         Time Tracking:     PT Received On: 23  PT Start Time: 1058  PT Stop Time: 1138  PT Total Time (min): 40 min    Billable Minutes: Gait Training 20, Therapeutic " Activity 10, and Therapeutic Exercise 10    Treatment Type: Treatment  PT/PTA: PTA     Number of PTA visits since last PT visit: 4     07/11/2023

## 2023-07-11 NOTE — PLAN OF CARE
Problem: Adult Inpatient Plan of Care  Goal: Plan of Care Review  Outcome: Ongoing, Progressing  Goal: Patient-Specific Goal (Individualized)  Outcome: Ongoing, Progressing     Problem: Adult Inpatient Plan of Care  Goal: Patient-Specific Goal (Individualized)  Outcome: Ongoing, Progressing

## 2023-07-12 PROCEDURE — 97116 GAIT TRAINING THERAPY: CPT | Mod: CQ

## 2023-07-12 PROCEDURE — 25000003 PHARM REV CODE 250: Performed by: FAMILY MEDICINE

## 2023-07-12 PROCEDURE — 97110 THERAPEUTIC EXERCISES: CPT | Mod: CQ

## 2023-07-12 PROCEDURE — 97530 THERAPEUTIC ACTIVITIES: CPT | Mod: CO

## 2023-07-12 PROCEDURE — 11000004 HC SNF PRIVATE

## 2023-07-12 PROCEDURE — 99305 1ST NF CARE MODERATE MDM 35: CPT | Mod: AI,,, | Performed by: HOSPITALIST

## 2023-07-12 PROCEDURE — 25000003 PHARM REV CODE 250: Performed by: HOSPITALIST

## 2023-07-12 PROCEDURE — 99305 PR NURSING FACILITY CARE, INIT, MOD SEVERITY: ICD-10-PCS | Mod: AI,,, | Performed by: HOSPITALIST

## 2023-07-12 RX ADMIN — POLYETHYLENE GLYCOL 3350 17 G: 17 POWDER, FOR SOLUTION ORAL at 08:07

## 2023-07-12 RX ADMIN — FLUTICASONE PROPIONATE 100 MCG: 50 SPRAY, METERED NASAL at 08:07

## 2023-07-12 RX ADMIN — APIXABAN 5 MG: 2.5 TABLET, FILM COATED ORAL at 08:07

## 2023-07-12 RX ADMIN — APIXABAN 5 MG: 2.5 TABLET, FILM COATED ORAL at 09:07

## 2023-07-12 RX ADMIN — PANTOPRAZOLE SODIUM 40 MG: 40 TABLET, DELAYED RELEASE ORAL at 08:07

## 2023-07-12 RX ADMIN — Medication 1 TABLET: at 08:07

## 2023-07-12 RX ADMIN — ATORVASTATIN CALCIUM 20 MG: 20 TABLET, FILM COATED ORAL at 09:07

## 2023-07-12 RX ADMIN — Medication 1 TABLET: at 05:07

## 2023-07-12 RX ADMIN — CELECOXIB 200 MG: 200 CAPSULE ORAL at 08:07

## 2023-07-12 RX ADMIN — METOPROLOL SUCCINATE 50 MG: 50 TABLET, EXTENDED RELEASE ORAL at 08:07

## 2023-07-12 NOTE — PLAN OF CARE
"[facility] - Skilled Nursing      HOME HEALTH ORDERS  FACE TO FACE ENCOUNTER    Patient Name: Marian Durham  YOB: 1945    PCP: Suzanna Duran MD   PCP Address: [address] [place] [address]  PCP Phone Number: [phone]  PCP Fax: [phone]    Encounter Date:7/12/2023    Admit to Home Health    Diagnoses:  Active Hospital Problems    Diagnosis  POA    *SBO (small bowel obstruction) [K56.609]  Yes    Debility [R53.81]  Yes    Paroxysmal atrial fibrillation [I48.0]  Yes    Osteoarthritis [M19.90]  Yes      Resolved Hospital Problems   No resolved problems to display.       Follow Up Appointments:  No future appointments.    Allergies:  Review of patient's allergies indicates:   Allergen Reactions    Demerol [meperidine] Other (See Comments)     "Becomes Agitated and Combative"       Medications: Review discharge medications with patient and family and provide education.      I have seen and examined this patient within the last 30 days. My clinical findings that support the need for the home health skilled services and home bound status are the following:no   Weakness/numbness causing balance and gait disturbance due to Weakness/Debility making it taxing to leave home.     Referrals/ Consults  Physical Therapy to evaluate and treat. Evaluate for home safety and equipment needs; Establish/upgrade home exercise program. Perform / instruct on therapeutic exercises, gait training, transfer training, and Range of Motion.  Occupational Therapy to evaluate and treat. Evaluate home environment for safety and equipment needs. Perform/Instruct on transfers, ADL training, ROM, and therapeutic exercises.  Aide to provide assistance with personal care, ADLs, and vital signs.    Activities: as tolerated  General Precautions: Standard, fall  Orthopedic Precautions: N/A  Braces: N/A    Nursing:   Agency to admit patient within 24 hours of hospital discharge unless specified on physician " order or at patient request    SN to complete comprehensive assessment including routine vital signs. Instruct on disease process and s/s of complications to report to MD. Review/verify medication list sent home with the patient at time of discharge  and instruct patient/caregiver as needed. Frequency may be adjusted depending on start of care date.     Skilled nurse to perform up to 3 visits PRN for symptoms related to diagnosis    Notify MD if SBP > 160 or < 90; DBP > 90 or < 50; HR > 120 or < 50; Temp > 101; O2 < 88%    Ok to schedule additional visits based on staff availability and patient request on consecutive days within the home health episode.    Home Health Aide:  Nursing Three times weekly, Physical Therapy Three times weekly, Occupational Therapy Three times weekly, and Home Health Aide Three times weekly    Wound Care Orders  no    I certify that this patient is confined to her home and needs intermittent skilled nursing care, physical therapy, and occupational therapy.    Trixie Guerrero NP

## 2023-07-12 NOTE — PROGRESS NOTES
[facility]                                  Skilled Nursing Facility                   Progress Note     Admit Date: 7/5/2023  WILLIAN 7/14/2023  Principal Problem:  SBO (small bowel obstruction)   HPI obtained from patient interview and chart review     Chief Complaint: Re-evaluation of medical treatment and therapy status: bowel monitoring, therapy progression    HPI: Ms. Marian Durham is a 77 y.o. female, with PMH of prior SBO, hysterectomy admitted to hospital on 6/24/23 for SBO. Cards consulted for new Afib RVR. Ortho rec therapy for chronic changes and pain / weakness bilateral knees and hips. SBO resolved, per colorectal. Pt having regular BMs at diascharge.  Patient transferred to [facility] for PT and OT to improve functional status and ability to perform ADLs.     Interval History:  24 hour chart review completed. NAEON. NAD.  Afebrile, vital signs stable.   Patient reports adequate appetite. Denies N/V.  Most recent BM 7/11/23.  Pain management: acceptable.  Patient is progressing well with therapy.   Anticipating discharge home with home health on Fri 7/14.       Past Medical History: Patient has a past medical history of Hyperlipidemia, Osteoarthritis, Shingles (2002), and Spinal stenosis.    Past Surgical History: Patient has a past surgical history that includes Hysterectomy (1985); Tubal ligation; Ankle fracture surgery (2007); Tonsilectomy (as a child); Open patella reefing procedure (age 19); Knee arthroscopy w/ debridement (1994); Knee arthroscopy w/ debridement (2003); Tonsillectomy; Eye surgery (Bilateral, 2016); and Excision of mass of back (N/A, 11/16/2018).    Social History: Patient reports that she has never smoked. She has never used smokeless tobacco. She reports current alcohol use of about 3.0 standard drinks per week. She reports that she does not use drugs.    Family History: family history includes Breast  cancer in her paternal aunt; Colon cancer in her paternal aunt; Coronary artery disease in her brother; Diabetes in her brother; Hypertension in her father and mother.    Allergies: Patient is allergic to demerol [meperidine].    ROS  Constitutional: Negative for fever   Eyes: Negative for blurred vision, double vision   Respiratory: Negative for cough, shortness of breath   Cardiovascular: Negative for chest pain, palpitations, and leg swelling.   Gastrointestinal: Negative for abdominal pain, constipation, diarrhea, nausea, vomiting.   Genitourinary: Negative for dysuria, frequency   Musculoskeletal:  + generalized weakness and bilat hip and knee weakness. +arthralgias. Negative for back pain and myalgias.   Skin: Negative for itching and rash.   Neurological: Negative for dizziness, headaches.   Psychiatric/Behavioral: Negative for depression. The patient is not nervous/anxious.      24 hour Vital Sign Range   Temp:  [96.4 °F (35.8 °C)-98.2 °F (36.8 °C)]   Pulse:  [67-77]   Resp:  [17]   BP: (126-132)/(56-58)   SpO2:  [96 %-97 %]     Current BMI: Body mass index is 18.05 kg/m².    PEx  Constitutional: Patient appears debilitated.  No distress noted  HENT:   Head: Normocephalic and atraumatic.   Eyes: Pupils are equal, round  Neck: Normal range of motion. Neck supple.   Cardiovascular: Normal rate, regular rhythm and normal heart sounds.    Pulmonary/Chest: Effort normal and breath sounds are clear  Abdominal: Soft. Bowel sounds are normal.   Musculoskeletal: bony deformity visualized left knee  Neurological: Alert and oriented to person, place, and time.   Psychiatric: Normal mood and affect. Behavior is normal.   Skin: Skin is pale, intact, warm and dry. Full skin assessment completed by NP on initial exam.       No results for input(s): GLUCOSE, NA, K, CL, CO2, BUN, CREATININE, MG in the last 24 hours.    Invalid input(s):  CALCIUM      No results for input(s): WBC, RBC, HGB, HCT, PLT, MCV, MCH, MCHC in the  last 24 hours.      No results for input(s): POCTGLUCOSE in the last 168 hours.     Assessment and Plan:    SBO (small bowel obstruction)  - SBO with h/o same in 04/2022, CT with transition point in LLQ.  - Continue pantoprazole 40mg as PO daily.  - Tolerating diet  + BM 7/11 am; denies nausea or vomiting - endorses good appetite  - Continue senokot 1 tab bid and prn polyethylene glycol 17 g  - Monitor closely for N/V/constipation     Atrial fibrillation with rapid ventricular response  - New onset AFib rapid ventricular response on 6/30; asymptomatic   - Echo found normal systolic function and grade I diastolic dysfunction.  - Rate well controlled on current regimen  - Continue metoprolol 50mg PO daily  - Anticoagulation initiated 7/5 w apixaban 5mg PO BID     Osteoarthritis  Debility  - Known h/o OA with need for surgical intervention (follows with Dr. Mitchell in clinic)  - Continue Celebrex 200 mg daily & OsCal BID  - Continue with PT/OT for gait training and strengthening and restoration of ADL's   - Encourage mobility, OOB in chair, and early ambulation as appropriate  - Fall precautions   - Monitor for bowel and bladder dysfunction  - Monitor for and prevent skin breakdown and pressure ulcers  - DVT prophylaxis with eliquis    Hypercholesteremia  Stroke Risk Factor.   - continue statin    GERD    - continue 40 mg protonix daily      Anticipate disposition:  Home with home health      Follow-up needed during SNF stay-    Follow-up needed after discharge from SNF: PCP, Ortho (Dr. Mitchell), Cardiology    No future appointments.      I certify that SNF services are required to be given on an inpatient basis because Marian Durham needs for skilled nursing care and/or skilled rehabilitation are required on a daily basis and such services can only practically be provided in a skilled nursing facility setting and are for an ongoing condition for which she received inpatient care in the hospital.     Extended  Visit  Total time spent: 16 minutes  Description of Time: counseling patient on clinical condition, therapies provided, plan of care, emotional support, coordinating patient care with other care team members, reviewing and interpreting labs and imaging, collaboration with physician, initiating new orders, chart review, and documentation. See interval hx.         Trixie Guerrero NP  Department of Hospital Medicine   [facility]     DOS: 7/12/2023

## 2023-07-12 NOTE — NURSING
Patient set up with [facility] with services to begin on 7/18/2023.

## 2023-07-12 NOTE — PT/OT/SLP PROGRESS
Occupational Therapy   Treatment    Name: Marian Durham  MRN: [id number]  Admit Date: 7/5/2023  Admitting Diagnosis:  SBO (small bowel obstruction)    General Precautions: Standard, fall   Orthopedic Precautions: N/A   Braces: N/A    Recommendations:     Discharge Recommendations:  home with home health  Level of Assistance Recommended at Discharge: Intermittent supervision  Discharge Equipment Recommendations: none  Barriers to discharge:  Inaccessible home environment, Decreased caregiver support    Assessment:     Marian Durham is a 78 y.o. female with a medical diagnosis of SBO (small bowel obstruction).  She presents with  Performance deficits affecting function are weakness, impaired endurance, impaired functional mobility, gait instability, impaired balance, decreased lower extremity function, decreased upper extremity function, decreased safety awareness, decreased ROM.     Pt. Was cooperative and participated well with session on this day. Pt  continues to demonstrate levels of physical deficits with  functional indep with daily management activities tasks, selfcare skills with balance,  functional mobility, UB strength and endurance. Pt. Will continue to benefit from continued OT to progress towards goals      Rehab Potential is fair    Activity tolerance:  Fair    Plan:     Patient to be seen 5 x/week to address the above listed problems via self-care/home management, therapeutic activities, therapeutic exercises    Plan of Care Expires: 08/04/23  Plan of Care Reviewed with: patient    Subjective     Communicated with: nsg and Pt. prior to session. I am doing well today    Pain/Comfort:  Pain Rating 1: 0/10  Pain Rating Post-Intervention 1: 0/10    Patient's cultural, spiritual, Tenriism conflicts given the current situation:  no    Objective:     Patient found up in chair    upon OT entry to room.    Bed Mobility:    Not tested     Functional Mobility/Transfers:  Patient completed Sit <> Stand  Transfer with stand by assistance  with  rolling walker   Patient completed Toilet Transfer Stand Pivot technique with stand by assistance with  rolling walker  Functional Mobility: Pt. With fxl mobility to and from inroom bath  and from  room to gym approx 185 ft with SBA and RW for bal    Activities of Daily Living:  Grooming: modified independence with grooming needs standing at sink level  Upper Body Dressing: modified independence to dee sweater around back  Lower Body Dressing: modified independence to dee BLE shoes and tie  Toileting: modified independence with cleaning and clothing management       [facility] 6 Click ADL: 21    OT Exercises: UE Ergometer 10 min     Treatment & Education:    Pt. With 2# dumb bell activity x20 reps with bicep curls  and 2# dowel with shd flex,  horz adb/add and forward flex motion to increase BUE ROM and strength,.     Pt. With therex performed to increase ROM, endurance selfcare task and fxl mobility for independence     Pt edu on role of OT, POC, safety when performing self care tasks , benefit of performing OOB activity, and safety when performing functional transfers and mobility management for preparation with goals to progress towards next level of care     Patient left up in chair with  PTA in gym  present    GOALS:   Multidisciplinary Problems       Occupational Therapy Goals          Problem: Occupational Therapy    Goal Priority Disciplines Outcome Interventions   Occupational Therapy Goal     OT, PT/OT Ongoing, Progressing    Description: Goals to be met by: 8/5/2023     Patient will increase functional independence with ADLs by performing:    LE Dressing with [place].  Grooming while standing at sink with Modified [place] and LRAD as needed.   Toileting from toilet with [place] for hygiene and clothing management.   Bathing from  tub bench with Modified [place].  Stand pivot transfers with Modified [place] and LRAD as needed  Toilet transfer  to toilet with Modified [place] and LRAD as need  Footwear donning/doffing /c Mod I and AE as needed..                       Time Tracking:     OT Date of Treatment: 07/12/23  OT Start Time: 0840    OT Stop Time: 0925  OT Total Time (min): 45 min    Billable Minutes:Therapeutic Activity 45    7/12/2023

## 2023-07-12 NOTE — PT/OT/SLP PROGRESS
Physical Therapy Treatment    Patient Name:  Marian Durham   MRN:  [id number]  Admit Date: 7/5/2023  Admitting Diagnosis: SBO (small bowel obstruction)  Recent Surgeries: N/A    General Precautions: Standard, fall  Orthopedic Precautions: N/A  Braces: N/A    Recommendations:     Discharge Recommendations: home with home health  Level of Assistance Recommended at Discharge: Intermittent assistance   Discharge Equipment Recommendations: none  Barriers to discharge: Inaccessible home environment, Decreased caregiver support    Assessment:     Marian Durham is a 78 y.o. female admitted with a medical diagnosis of SBO (small bowel obstruction) . Pt tolerated well, pt able to complete all functional mobility with S/ Mod I. Pt would continue to benefit from skilled PT services to improve overall functional mobility, strength and endurance.      Performance deficits affecting function: weakness, impaired endurance, gait instability, decreased lower extremity function, decreased ROM, impaired joint extensibility.    Rehab Potential is good    Activity Tolerance: Good    Plan:     Patient to be seen 5 x/week to address the above listed problems via gait training, therapeutic activities, therapeutic exercises, neuromuscular re-education, wheelchair management/training    Plan of Care Expires: 08/05/23  Plan of Care Reviewed with: patient    Subjective     Pt agreeable to PT.     Pain/Comfort:  Pain Rating 1: 0/10  Pain Rating Post-Intervention 1: 0/10    Patient's cultural, spiritual, Worship conflicts given the current situation:  no    Objective:      Patient found up in chair with  (no lines) upon PT entry to room.     Therapeutic Activities and Exercises: standing TE: heel raises, hip flex, hip abd x 15 reps for BLE strengthening    Patient educated on role of therapy, goals of session, and benefits of out of bed mobility.   Instructed on use of call button and importance of calling nursing staff for assistance with  "mobility   Questions/concerns addressed within PTA scope of practice  Pt verbalized understanding.    Functional Mobility:  Transfers:     Sit to Stand:  modified independence and supervision with rolling walker  Gait: pt able to amb >300 ft x 2 trials with S and RW  Stairs:  Pt ascended/descended 8 stair(s) with No Assistive Device with bilateral handrails with Supervision or Set-up Assistance.   4" curb: pt asc/desc curb x 3 trials with Supervision using RW  Uneven mat: pt amb over mat with S using RW  : pt able to retrieve 4 rings from floor with reach using RW and Supervision  Wheelchair Propulsion:  Pt propelled Standard wheelchair x >150 feet on Level tile with  Bilateral upper extremity with Modified Independent.     AM-PAC 6 CLICK MOBILITY  22    Patient left up in chair with call button in reach.    GOALS:   Multidisciplinary Problems       Physical Therapy Goals          Problem: Physical Therapy    Goal Priority Disciplines Outcome Goal Variances Interventions   Physical Therapy Goal     PT, PT/OT Ongoing, Progressing     Description: Goals to be met by: 23     Patient will increase functional independence with mobility by performin. Supine to sit with [place]  2. Sit to supine with [place]  3. Rolling to Left and Right with [place]  4. Sit to stand transfer with Modified [place]  5. Bed to chair transfer with Modified [place] using Rolling Walker  6. Gait  x 200 feet with Modified [place] using Rolling Walker  7. Wheelchair propulsion x 150 feet with Modified [place] using bilateral upper extremities  8. Ascend/descend 12 steps with 1 Handrail Modified [place] using No Assistive Device  9. Ascend/Descend 4 inch curb step with Modified [place] using Rolling Walker                         Time Tracking:     PT Received On: 23  PT Start Time: 926  PT Stop Time: 951  PT Total Time (min): 25 min    Billable Minutes: Gait Training 15 and " Therapeutic Exercise 10    Treatment Type: Treatment  PT/PTA: PTA     Number of PTA visits since last PT visit: 5     07/12/2023

## 2023-07-13 PROCEDURE — 25000003 PHARM REV CODE 250: Performed by: FAMILY MEDICINE

## 2023-07-13 PROCEDURE — 97110 THERAPEUTIC EXERCISES: CPT

## 2023-07-13 PROCEDURE — 25000003 PHARM REV CODE 250: Performed by: HOSPITALIST

## 2023-07-13 PROCEDURE — 97535 SELF CARE MNGMENT TRAINING: CPT

## 2023-07-13 PROCEDURE — 11000004 HC SNF PRIVATE

## 2023-07-13 PROCEDURE — 97116 GAIT TRAINING THERAPY: CPT

## 2023-07-13 PROCEDURE — 97530 THERAPEUTIC ACTIVITIES: CPT

## 2023-07-13 RX ORDER — CALCIUM CARBONATE 200(500)MG
500 TABLET,CHEWABLE ORAL 2 TIMES DAILY PRN
Qty: 60 TABLET | Refills: 0 | Status: SHIPPED | OUTPATIENT
Start: 2023-07-13 | End: 2023-08-07

## 2023-07-13 RX ORDER — METOPROLOL SUCCINATE 50 MG/1
50 TABLET, EXTENDED RELEASE ORAL DAILY
Status: CANCELLED | OUTPATIENT
Start: 2023-07-14

## 2023-07-13 RX ADMIN — APIXABAN 5 MG: 2.5 TABLET, FILM COATED ORAL at 08:07

## 2023-07-13 RX ADMIN — METOPROLOL SUCCINATE 50 MG: 50 TABLET, EXTENDED RELEASE ORAL at 08:07

## 2023-07-13 RX ADMIN — POLYETHYLENE GLYCOL 3350 17 G: 17 POWDER, FOR SOLUTION ORAL at 08:07

## 2023-07-13 RX ADMIN — ATORVASTATIN CALCIUM 20 MG: 20 TABLET, FILM COATED ORAL at 09:07

## 2023-07-13 RX ADMIN — Medication 1 TABLET: at 08:07

## 2023-07-13 RX ADMIN — PANTOPRAZOLE SODIUM 40 MG: 40 TABLET, DELAYED RELEASE ORAL at 08:07

## 2023-07-13 RX ADMIN — Medication 1 TABLET: at 05:07

## 2023-07-13 RX ADMIN — FLUTICASONE PROPIONATE 100 MCG: 50 SPRAY, METERED NASAL at 08:07

## 2023-07-13 RX ADMIN — CELECOXIB 200 MG: 200 CAPSULE ORAL at 08:07

## 2023-07-13 RX ADMIN — APIXABAN 5 MG: 2.5 TABLET, FILM COATED ORAL at 09:07

## 2023-07-13 NOTE — PLAN OF CARE
Problem: Occupational Therapy  Goal: Occupational Therapy Goal  Description: Goals to be met by: 8/5/2023     Patient will increase functional independence with ADLs by performing:    LE Dressing with [place].  -MET  Grooming while standing at sink with Modified [place] and LRAD as needed. - MET  Toileting from toilet with [place] for hygiene and clothing management.  - MET  Bathing from  tub bench with Modified [place].  -Ongoing  Stand pivot transfers with Modified [place] and LRAD as needed.  -MET  Toilet transfer to toilet with Modified [place] and LRAD as need - MET  Footwear donning/doffing /c Mod I and AE as needed..  Outcome: Ongoing, Progressing

## 2023-07-13 NOTE — PLAN OF CARE
Pt discharging tomorrow 7/14 with sister via personal car. Sister will be here at 11am, patient wants to d/c after lunch. No DME required. Patient refused Home Health statung that she is doing out pt therapy and it has been set up with [facility], will follow up before discharge with them.

## 2023-07-13 NOTE — PROGRESS NOTES
[facility]                                  Skilled Nursing Facility                   Progress Note     Admit Date: 7/5/2023  WILLIAN 7/14/2023  Principal Problem:  SBO (small bowel obstruction)   HPI obtained from patient interview and chart review     Chief Complaint: Re-evaluation of medical treatment and therapy status: bowel monitoring, therapy progression    HPI: Ms. Marian Durham is a 77 y.o. female, with PMH of prior SBO, hysterectomy admitted to hospital on 6/24/23 for SBO. Cards consulted for new Afib RVR. Ortho rec therapy for chronic changes and pain / weakness bilateral knees and hips. SBO resolved, per colorectal. Pt having regular BMs at diascharge.  Patient transferred to [facility] for PT and OT to improve functional status and ability to perform ADLs.     Interval History:  24 hour chart review completed. NAEON. NAD.  Afebrile, vital signs stable.   Patient reports adequate appetite. Denies N/V.  Reports regular bowel movements.  Pain management: acceptable.  Patient is progressing well with therapy.   Anticipating discharge home with home health tomorrow, 7/14.   Outpatient pharmacy verified with patient.      Past Medical History: Patient has a past medical history of Hyperlipidemia, Osteoarthritis, Shingles (2002), and Spinal stenosis.    Past Surgical History: Patient has a past surgical history that includes Hysterectomy (1985); Tubal ligation; Ankle fracture surgery (2007); Tonsilectomy (as a child); Open patella reefing procedure (age 19); Knee arthroscopy w/ debridement (1994); Knee arthroscopy w/ debridement (2003); Tonsillectomy; Eye surgery (Bilateral, 2016); and Excision of mass of back (N/A, 11/16/2018).    Social History: Patient reports that she has never smoked. She has never used smokeless tobacco. She reports current alcohol use of about 3.0 standard drinks per week. She reports that she does not use  drugs.    Family History: family history includes Breast cancer in her paternal aunt; Colon cancer in her paternal aunt; Coronary artery disease in her brother; Diabetes in her brother; Hypertension in her father and mother.    Allergies: Patient is allergic to demerol [meperidine].    ROS  Constitutional: Negative for fever   Eyes: Negative for blurred vision, double vision   Respiratory: Negative for cough, shortness of breath   Cardiovascular: Negative for chest pain, palpitations, and leg swelling.   Gastrointestinal: Negative for abdominal pain, constipation, diarrhea, nausea, vomiting.   Genitourinary: Negative for dysuria, frequency   Musculoskeletal:  + generalized weakness and bilat hip and knee weakness. +arthralgias. Negative for back pain and myalgias.   Skin: Negative for itching and rash.   Neurological: Negative for dizziness, headaches.   Psychiatric/Behavioral: Negative for depression. The patient is not nervous/anxious.      24 hour Vital Sign Range   Temp:  [97.2 °F (36.2 °C)-97.8 °F (36.6 °C)]   Pulse:  [63-86]   Resp:  [16-17]   BP: (117-144)/(63-65)   SpO2:  [97 %-99 %]     Current BMI: Body mass index is 18.05 kg/m².    PEx  Constitutional: Patient appears debilitated.  No distress noted  HENT:   Head: Normocephalic and atraumatic.   Eyes: Pupils are equal, round  Neck: Normal range of motion. Neck supple.   Cardiovascular: Normal rate, regular rhythm and normal heart sounds.    Pulmonary/Chest: Effort normal and breath sounds are clear  Abdominal: Soft. Bowel sounds are normal.   Musculoskeletal: bony deformity visualized left knee  Neurological: Alert and oriented to person, place, and time.   Psychiatric: Normal mood and affect. Behavior is normal.   Skin: Skin is pale, intact, warm and dry. Full skin assessment completed by NP on initial exam.       No results for input(s): GLUCOSE, NA, K, CL, CO2, BUN, CREATININE, MG in the last 24 hours.    Invalid input(s):  CALCIUM      No results for  input(s): WBC, RBC, HGB, HCT, PLT, MCV, MCH, MCHC in the last 24 hours.      No results for input(s): POCTGLUCOSE in the last 168 hours.     Assessment and Plan:    SBO (small bowel obstruction)  - SBO with h/o same in 04/2022, CT with transition point in LLQ.  - Continue pantoprazole 40mg as PO daily.  - Tolerating diet  + BM 7/11 am; denies nausea or vomiting - endorses good appetite  - Continue senokot 1 tab bid and prn polyethylene glycol 17 g  - Monitor closely for N/V/constipation     Atrial fibrillation with rapid ventricular response  - New onset AFib rapid ventricular response on 6/30; asymptomatic   - Echo found normal systolic function and grade I diastolic dysfunction.  - Rate well controlled on current regimen  - Continue metoprolol 50mg PO daily  - Anticoagulation initiated 7/5 w apixaban 5mg PO BID     Osteoarthritis  Debility  - Known h/o OA with need for surgical intervention (follows with Dr. Mitchell in clinic)  - Continue Celebrex 200 mg daily & OsCal BID  - Continue with PT/OT for gait training and strengthening and restoration of ADL's   - Encourage mobility, OOB in chair, and early ambulation as appropriate  - Fall precautions   - Monitor for bowel and bladder dysfunction  - Monitor for and prevent skin breakdown and pressure ulcers  - DVT prophylaxis with eliquis    Hypercholesteremia  Stroke Risk Factor.   - continue statin    GERD    - continue 40 mg protonix daily      Anticipate disposition:  Home with home health      Follow-up needed during SNF stay-    Follow-up needed after discharge from SNF: PCP, Ortho (Dr. Mitchell), Cardiology    Future Appointments   Date Time Provider Department Center   7/26/2023  3:30 PM Misty Griffin MD [facility] Garry Carmona PCW         I certify that SNF services are required to be given on an inpatient basis because Marian Durham needs for skilled nursing care and/or skilled rehabilitation are required on a daily basis and such services can only practically be  provided in a skilled nursing facility setting and are for an ongoing condition for which she received inpatient care in the hospital.     Extended Visit  Total time spent: 33 minutes  Description of Time: counseling patient on clinical condition, therapies provided, plan of care, emotional support, coordinating patient care with other care team members, reviewing and interpreting labs and imaging, collaboration with physician, initiating new orders, chart review, and documentation. See interval hx.         Trixie Guerrero NP  Department of Hospital Medicine   [facility]     DOS: 7/13/2023

## 2023-07-13 NOTE — PT/OT/SLP PROGRESS
Occupational Therapy   Treatment    Name: Marian Durham  MRN: [id number]  Admit Date: 7/5/2023  Admitting Diagnosis:  SBO (small bowel obstruction)    General Precautions: Standard, fall   Orthopedic Precautions: N/A   Braces: N/A    Recommendations:     Discharge Recommendations:  home with home health  Level of Assistance Recommended at Discharge: Intermittent supervision  Discharge Equipment Recommendations: none  Barriers to discharge:  Inaccessible home environment, Decreased caregiver support    Assessment:     Marian Durham is a 78 y.o. female with a medical diagnosis of SBO (small bowel obstruction) .  She presents with performance deficits affecting function including weakness, impaired endurance, impaired self care skills, impaired functional mobility, impaired balance, decreased safety awareness, pain.   Pt tolerated Tx without incident and is making progress but continues to require supervision when  performing self care tasks, functional mobility and functional transfers .  She would continue to benefit from OT intervention to further her functional (I)ce and safety.     Rehab Potential is good    Activity tolerance:  Good    Plan:     Patient to be seen 5 x/week to address the above listed problems via self-care/home management, therapeutic activities, therapeutic exercises    Plan of Care Expires: 08/04/23  Plan of Care Reviewed with: patient    Subjective     Communicated with: nurse prior to session.  .    Pain/Comfort:  Pain Rating 1: 0/10  Pain Rating Post-Intervention 1: 0/10    Patient's cultural, spiritual, Gnosticist conflicts given the current situation:  no    Objective:     Patient found up in chair with  (no lines) upon OT entry to room.    Bed Mobility:    Patient completed Rolling/Turning to Left with  modified independence  Patient completed Rolling/Turning to Right with modified independence  Patient completed Supine to Sit with modified independence     Functional  Mobility/Transfers:  Patient completed Sit <> Stand Transfer with modified independence  with  rolling walker   Patient completed Toilet Transfer Stand Pivot technique with stand by assistance with  rolling walker  Functional Mobility: Pt ambulated with RW from her room to therapy gym with (S) a distance of 180 ft , Performed functional standing task and seated therapy activities and concluded session ambulating from therapy gym to Activity room with RW and (S) 200 ft.     Activities of Daily Living:  Upper Body Dressing: modified independence donning/doffing sweater in standing    [facility] 6 Click ADL: 23    OT Exercises: Performed AROM exercises using large ball performing shld flexion/ extension , forward presses, and Horizontal Ab/Adduction all x 10 reps with rest breaks between sets.      Pt worked on functional standing activity consisting of standing with RW while reaching in all planes , and  crossing of midline to facilitate (B) wt shifting and stability in standing in prep for performance of self care tasks and functional ADLS in standing.  Pt tolerated up to 12  Min. in standing with no assist required to steady.     Treatment & Education:  Pt edu on POC, safety when performing self care tasks , benefit of performing OOB activity, and safety when performing functional transfers and mobility.  - White board updated    Patient left up in chair with call button in reach    GOALS:   Multidisciplinary Problems       Occupational Therapy Goals          Problem: Occupational Therapy    Goal Priority Disciplines Outcome Interventions   Occupational Therapy Goal     OT, PT/OT Ongoing, Progressing    Description: Goals to be met by: 8/5/2023     Patient will increase functional independence with ADLs by performing:    LE Dressing with [place].  -MET  Grooming while standing at sink with Modified [place] and LRAD as needed. - MET  Toileting from toilet with [place] for hygiene and clothing management.  -  MET  Bathing from  tub bench with Modified [place].  -Ongoing  Stand pivot transfers with Modified [place] and LRAD as needed.  -MET  Toilet transfer to toilet with Modified [place] and LRAD as need - MET  Footwear donning/doffing /c Mod I and AE as needed..                       Time Tracking:     OT Date of Treatment: 07/13/23  OT Start Time: 0900    OT Stop Time: 0945  OT Total Time (min): 45 min    Billable Minutes:Self Care/Home Management 10  Therapeutic Activity 35    7/13/2023

## 2023-07-14 VITALS
HEART RATE: 78 BPM | WEIGHT: 108.44 LBS | BODY MASS INDEX: 18.07 KG/M2 | HEIGHT: 65 IN | TEMPERATURE: 98 F | RESPIRATION RATE: 16 BRPM | OXYGEN SATURATION: 97 % | DIASTOLIC BLOOD PRESSURE: 64 MMHG | SYSTOLIC BLOOD PRESSURE: 135 MMHG

## 2023-07-14 LAB
ANION GAP SERPL CALC-SCNC: 5 MMOL/L (ref 8–16)
BASOPHILS # BLD AUTO: 0.07 K/UL (ref 0–0.2)
BASOPHILS NFR BLD: 1.3 % (ref 0–1.9)
BUN SERPL-MCNC: 23 MG/DL (ref 8–23)
CALCIUM SERPL-MCNC: 9.5 MG/DL (ref 8.7–10.5)
CHLORIDE SERPL-SCNC: 104 MMOL/L (ref 95–110)
CO2 SERPL-SCNC: 27 MMOL/L (ref 23–29)
CREAT SERPL-MCNC: 0.9 MG/DL (ref 0.5–1.4)
DIFFERENTIAL METHOD: NORMAL
EOSINOPHIL # BLD AUTO: 0.2 K/UL (ref 0–0.5)
EOSINOPHIL NFR BLD: 3.1 % (ref 0–8)
ERYTHROCYTE [DISTWIDTH] IN BLOOD BY AUTOMATED COUNT: 12.8 % (ref 11.5–14.5)
EST. GFR  (NO RACE VARIABLE): >60 ML/MIN/1.73 M^2
GLUCOSE SERPL-MCNC: 112 MG/DL (ref 70–110)
HCT VFR BLD AUTO: 38.3 % (ref 37–48.5)
HGB BLD-MCNC: 12.6 G/DL (ref 12–16)
IMM GRANULOCYTES # BLD AUTO: 0.02 K/UL (ref 0–0.04)
IMM GRANULOCYTES NFR BLD AUTO: 0.4 % (ref 0–0.5)
LYMPHOCYTES # BLD AUTO: 1.9 K/UL (ref 1–4.8)
LYMPHOCYTES NFR BLD: 33.9 % (ref 18–48)
MAGNESIUM SERPL-MCNC: 2.3 MG/DL (ref 1.6–2.6)
MCH RBC QN AUTO: 30 PG (ref 27–31)
MCHC RBC AUTO-ENTMCNC: 32.9 G/DL (ref 32–36)
MCV RBC AUTO: 91 FL (ref 82–98)
MONOCYTES # BLD AUTO: 0.5 K/UL (ref 0.3–1)
MONOCYTES NFR BLD: 9.7 % (ref 4–15)
NEUTROPHILS # BLD AUTO: 2.8 K/UL (ref 1.8–7.7)
NEUTROPHILS NFR BLD: 51.6 % (ref 38–73)
NRBC BLD-RTO: 0 /100 WBC
PHOSPHATE SERPL-MCNC: 3.3 MG/DL (ref 2.7–4.5)
PLATELET # BLD AUTO: 370 K/UL (ref 150–450)
PMV BLD AUTO: 9.5 FL (ref 9.2–12.9)
POTASSIUM SERPL-SCNC: 4.1 MMOL/L (ref 3.5–5.1)
RBC # BLD AUTO: 4.2 M/UL (ref 4–5.4)
SODIUM SERPL-SCNC: 136 MMOL/L (ref 136–145)
WBC # BLD AUTO: 5.49 K/UL (ref 3.9–12.7)

## 2023-07-14 PROCEDURE — 84100 ASSAY OF PHOSPHORUS: CPT | Performed by: FAMILY MEDICINE

## 2023-07-14 PROCEDURE — 85025 COMPLETE CBC W/AUTO DIFF WBC: CPT | Performed by: FAMILY MEDICINE

## 2023-07-14 PROCEDURE — 36415 COLL VENOUS BLD VENIPUNCTURE: CPT | Performed by: FAMILY MEDICINE

## 2023-07-14 PROCEDURE — 25000003 PHARM REV CODE 250: Performed by: HOSPITALIST

## 2023-07-14 PROCEDURE — 25000003 PHARM REV CODE 250: Performed by: FAMILY MEDICINE

## 2023-07-14 PROCEDURE — 80048 BASIC METABOLIC PNL TOTAL CA: CPT | Performed by: FAMILY MEDICINE

## 2023-07-14 PROCEDURE — 83735 ASSAY OF MAGNESIUM: CPT | Performed by: FAMILY MEDICINE

## 2023-07-14 RX ADMIN — PANTOPRAZOLE SODIUM 40 MG: 40 TABLET, DELAYED RELEASE ORAL at 09:07

## 2023-07-14 RX ADMIN — POLYETHYLENE GLYCOL 3350 17 G: 17 POWDER, FOR SOLUTION ORAL at 09:07

## 2023-07-14 RX ADMIN — Medication 1 TABLET: at 09:07

## 2023-07-14 RX ADMIN — FLUTICASONE PROPIONATE 100 MCG: 50 SPRAY, METERED NASAL at 09:07

## 2023-07-14 RX ADMIN — METOPROLOL SUCCINATE 50 MG: 50 TABLET, EXTENDED RELEASE ORAL at 09:07

## 2023-07-14 RX ADMIN — APIXABAN 5 MG: 2.5 TABLET, FILM COATED ORAL at 09:07

## 2023-07-14 RX ADMIN — CELECOXIB 200 MG: 200 CAPSULE ORAL at 09:07

## 2023-07-14 NOTE — NURSING
Unable to print pt discharge instructions did inform patient that she has access to paperwork through [facility].

## 2023-07-14 NOTE — DISCHARGE SUMMARY
Department of Hospital Medicine   [facility]   Discharge Summary    Patient Name: Marian Durham  Room: QULK654/XFOY554 A   MRN: [id number]  Admit Date: 7/5/2023   Date of Discharge:  7/14/2023  Length of Stay:  LOS: 9 days   Attending Physician: No att. providers found  Nurse Practitioner: Trixie Guerrero NP  Code Status: Full Code    Date of Service: 07/14/2023    Principal Problem: SBO (small bowel obstruction)    HPI: Ms. Marian Durham is a 77 y.o. female, with PMH of prior SBO, hysterectomy admitted to hospital on 6/24/23 for SBO. Cards consulted for new Afib RVR. Ortho rec therapy for chronic changes and pain / weakness bilateral knees and hips. SBO resolved, per colorectal. Pt having regular BMs at diascharge.  Patient transferred to [facility] for PT and OT to improve functional status and ability to perform ADLs.     Hospital Course: Patient progressed well with PT and OT- last PT note states that patient ambulated >300ft with RW and Mod I. Patient had no significant events during their stay at SNF. Home health was set up. DME was ordered if needed. Follow up appointment to be made by patient within one week. All prescriptions and discharge instructions were ordered to be given to the patient prior to discharge.     Assessment and Plan:     SBO (small bowel obstruction)  - SBO with h/o same in 04/2022, CT with transition point in LLQ.  - Continue pantoprazole 40mg as PO daily.  - Tolerating diet  + BM 7/11 am; denies nausea or vomiting - endorses good appetite  - Continue senokot 1 tab bid and prn polyethylene glycol 17 g  - Monitor closely for N/V/constipation     Atrial fibrillation with rapid ventricular response  - New onset AFib rapid ventricular response on 6/30; asymptomatic   - Echo found normal systolic function and grade I diastolic dysfunction.  - Rate well controlled on current regimen  - Continue metoprolol 50mg PO daily  - Anticoagulation initiated 7/5 w  apixaban 5mg PO BID     Osteoarthritis  Debility  - Known h/o OA with need for surgical intervention (follows with Dr. Mitchell in clinic)  - Continue Celebrex 200 mg daily & OsCal BID  - Continue with PT/OT for gait training and strengthening and restoration of ADL's   - Encourage mobility, OOB in chair, and early ambulation as appropriate  - Fall precautions   - Monitor for bowel and bladder dysfunction  - Monitor for and prevent skin breakdown and pressure ulcers  - DVT prophylaxis with eliquis     Hypercholesteremia  Stroke Risk Factor.   - continue statin     GERD    - continue 40 mg protonix daily    Physical Exam  Constitutional: Patient appears debilitated.  No distress noted  HENT:   Head: Normocephalic and atraumatic.   Eyes: Pupils are equal, round  Neck: Normal range of motion. Neck supple.   Cardiovascular: Normal rate, regular rhythm and normal heart sounds.    Pulmonary/Chest: Effort normal and breath sounds are clear  Abdominal: Soft. Bowel sounds are normal.   Musculoskeletal: bony deformity visualized left knee  Neurological: Alert and oriented to person, place, and time.   Psychiatric: Normal mood and affect. Behavior is normal.   Skin: Skin is pale, intact, warm and dry. Full skin assessment completed by NP on initial exam.    Recent Labs   Lab 07/10/23  0444 07/14/23  0625   WBC 7.53 5.49   HGB 12.8 12.6   HCT 38.7 38.3    370     Recent Labs   Lab 07/10/23  0444 07/14/23  0625    136   K 4.3 4.1    104   CO2 26 27   BUN 27* 23   CREATININE 0.8 0.9    112*   CALCIUM 9.9 9.5   MG 2.3 2.3   PHOS 3.8 3.3     No results for input(s): ALKPHOS, ALT, AST, ALBUMIN, PROT, BILITOT, INR in the last 168 hours.   No results for input(s): CPK, CPKMB, MB, TROPONINI in the last 72 hours.  No results for input(s): LACTATE in the last 72 hours.    No results for input(s): POCTGLUCOSE in the last 168 hours.   No results found for: HGBA1C      Consultants: PT, OT, RD, CM    Discharge  Medication List as of 7/14/2023  1:09 PM        START taking these medications    Details   calcium carbonate (TUMS) 200 mg calcium (500 mg) chewable tablet Take 1 tablet (500 mg total) by mouth 2 (two) times daily as needed for Heartburn., Starting Thu 7/13/2023, Until Fri 7/12/2024 at 2359, Normal           CONTINUE these medications which have NOT CHANGED    Details   calcium-vitamin D3 (OS- + D3) 500 mg-5 mcg (200 unit) per tablet Take 1 tablet by mouth 2 (two) times daily with meals., Historical Med      celecoxib (CELEBREX) 200 MG capsule take 1 capsule (200MG) by oral route every day as needed, Historical Med      estradioL (ESTRACE) 0.01 % (0.1 mg/gram) vaginal cream Place 1 g vaginally twice a week., Starting Mon 8/29/2022, Until Tue 8/29/2023, Normal      fluticasone propionate (FLONASE) 50 mcg/actuation nasal spray Instill 2 sprays (100 mcg total) by Each Nostril  once daily., Starting Wed 7/5/2023, Normal      hydrOXYzine (ATARAX) 50 MG tablet Take 1 tablet (50 mg total) by mouth 3 (three) times daily as needed for Anxiety., Starting Wed 7/5/2023, Normal      metoprolol succinate (TOPROL-XL) 50 MG 24 hr tablet Take 1 tablet (50 mg total) by mouth once daily., Starting Wed 7/5/2023, Normal      pantoprazole (PROTONIX) 40 MG tablet Take 1 tablet (40 mg total) by mouth once daily., Starting Wed 7/5/2023, Normal      polyethylene glycol (MIRALAX) 17 gram/dose powder Take 17 g by mouth 3 (three) times daily as needed (Constipation)., Starting Wed 7/5/2023, Normal      simvastatin (ZOCOR) 40 MG tablet Take 40 mg by mouth every evening. , Starting Mon 5/13/2013, Historical Med      apixaban (ELIQUIS) 5 mg Tab Take 1 tablet (5 mg total) by mouth 2 (two) times daily., Starting Wed 7/5/2023, Normal      glucosamine-chondroitin 500-400 mg tablet Take 1 tablet by mouth 3 (three) times daily., Historical Med             Discharge Diet:regular diet with Thin liquids    Activity: activity as  tolerated    Discharge Condition: Good    Tests pending at the time of discharge: none      [place]position[place] [facility]  Ambulatory referral placed to Dr. Duenas Cardio EP for hospital follow up of new a-fib and will need eventually need surgical clearance eval for knee replacement.  Future Appointments   Date Time Provider Department Center   8/7/2023 11:00 AM Anshul Duenas MD [facility]       40 minutes total time spent on the discharge of the patient including review of hospital course with the patient, reviewing discharge medications, and arranging follow-up care.      Trixie Guerrero NP  Department of Hospital Medicine   [facility]

## 2023-07-14 NOTE — NURSING
Pt is discharging today from Skilled Nursing Unit to home she is AAOX4 in no apparent distress with no complaints of pain /issues. AM assessment and medications complete. D/C instructions clearly explained to pt regarding medications and follow-up appointments Questions were encouraged and answered. Nothing further.

## 2023-07-14 NOTE — PT/OT/SLP PROGRESS
Physical Therapy Treatment/ Discharge Summary    Patient Name:  Marian Durham   MRN:  [id number]  Admit Date: 7/5/2023  Admitting Diagnosis: SBO (small bowel obstruction)      General Precautions: Standard, fall  Orthopedic Precautions: N/A  Braces: N/A    Recommendations:     Discharge Recommendations: home with home health  Level of Assistance Recommended at Discharge:  (I)  Discharge Equipment Recommendations: none  Barriers to discharge: Inaccessible home environment, Decreased caregiver support    Assessment:     Marian Durham is a 78 y.o. female admitted with a medical diagnosis of SBO (small bowel obstruction) . Pt is appropriate for d/c home.      Performance deficits affecting function: gait instability, impaired cardiopulmonary response to activity, impaired endurance, decreased lower extremity function.    Rehab Potential is good    Activity Tolerance: Good    Plan:     Patient to be seen 5 x/week to address the above listed problems via gait training, therapeutic activities, therapeutic exercises, neuromuscular re-education, wheelchair management/training    Plan of Care Expires: 08/05/23  Plan of Care Reviewed with: patient    Subjective     Pt agreeable to work with PT.     Pain/Comfort:   0/10 before and post treatment    Patient's cultural, spiritual, Confucianist conflicts given the current situation:  no    Objective:     Communicated with pt's nurse prior to session.  Patient found up in chair with   upon PT entry to room.     Therapeutic Activities and Exercises: Standing therex x 20reps with SBA: HR, Hip Abd/Add, Hip flex, Hip ext, knee flexion.  Nustep with resistance set at 4 for 10mins      Functional Mobility:  Transfers:     Sit to Stand:  independence with no AD  Bed to Chair: independence with  no AD  using  Stand Pivot  Gait: >300ft with RW and Mod I  Stairs:  Pt ascended/descended 20 stair(s) with No Assistive Device with bilateral handrails with Modified Independent.     AM-PAC 6  CLICK MOBILITY       Patient left up in chair with call button in reach.    GOALS:   Multidisciplinary Problems       Physical Therapy Goals          Problem: Physical Therapy    Goal Priority Disciplines Outcome Goal Variances Interventions   Physical Therapy Goal     PT, PT/OT Ongoing, Progressing     Description: Goals to be met by: 23     Patient will increase functional independence with mobility by performin. Supine to sit with [place]  2. Sit to supine with [place]  3. Rolling to Left and Right with [place]  4. Sit to stand transfer with Modified [place]  5. Bed to chair transfer with Modified [place] using Rolling Walker  6. Gait  x 200 feet with Modified [place] using Rolling Walker  7. Wheelchair propulsion x 150 feet with Modified [place] using bilateral upper extremities  8. Ascend/descend 12 steps with 1 Handrail Modified [place] using No Assistive Device  9. Ascend/Descend 4 inch curb step with Modified [place] using Rolling Walker                         Time Tracking:     PT Received On: 23  PT Start Time: 1400  PT Stop Time: 1442  PT Total Time (min): 42 min    Billable Minutes: Gait Training 15 and Therapeutic Exercise 27    Treatment Type: Treatment  PT/PTA: PT     Number of PTA visits since last PT visit: 0     2023

## 2023-07-17 ENCOUNTER — PATIENT OUTREACH (OUTPATIENT)
Dept: ADMINISTRATIVE | Facility: CLINIC | Age: 78
End: 2023-07-17
Payer: MEDICARE

## 2023-07-17 NOTE — PROGRESS NOTES
C3 nurse spoke with Marian Durham   for a TCC post hospital discharge follow up call. The patient has a scheduled HOS appointment with Suzanna Duran MD   on 07/18/2023 @ telemed visit .Pt stated she did not feel she should be on Protonix told her to discuss with her PCP also stated she was not taking Toprol xl 50 mg I called the pharmacy they said Dr. Pearson Prescribed this medication on 7/05/2023 and was never picked up, She is going to call pharmacy about picking up the medication today SHe weill discuss with pharmacist and her MD tomorrow .

## 2023-07-17 NOTE — PROGRESS NOTES
C3 nurse attempted to contact Marian Garcia for a TCC post hospital discharge follow up call. No answer at phone number listed. Left Voicemail with call back information.     The patient does not have a scheduled HOSFU appointment. Message sent to PCP's staff to assist with HOSFU appointment scheduling.

## 2023-08-02 NOTE — PROGRESS NOTES
Subjective:     HPI    I had the pleasure of seeing Marian Durham in consultation at your request for the evaluation of AF. She is a 78F whose arrhythmia hx dates back to 6/2023 when she presented to [facility] with an SBO and was found to be in AF with RVR. SBO managed conservatively by surgery team, and eventually resolved. Pt rate controlled, and discharged on toprol xl 50 mg daily, and eliquis.     Ms. Durham states that GI issues date back to 5/2022 when she was initially diagnosed with SBO which was managed conservatively.     Echo in 6/2023 showed EF 65%.    My interpretation of today's ECG is NSR 65 bpm.    Review of Systems   Constitutional: Negative for decreased appetite, malaise/fatigue, weight gain and weight loss.   HENT:  Negative for sore throat.    Eyes:  Negative for blurred vision.   Cardiovascular:  Negative for chest pain, dyspnea on exertion, irregular heartbeat, leg swelling, near-syncope, orthopnea, palpitations, paroxysmal nocturnal dyspnea and syncope.   Respiratory:  Negative for shortness of breath.    Skin:  Negative for rash.   Musculoskeletal:  Positive for joint pain. Negative for arthritis.   Gastrointestinal:  Negative for abdominal pain.   Neurological:  Negative for focal weakness.   Psychiatric/Behavioral:  Negative for altered mental status.        Objective:   Physical Exam  Vitals and nursing note reviewed.   Constitutional:       General: She is not in acute distress.     Appearance: She is well-developed.   HENT:      Head: Normocephalic and atraumatic.   Eyes:      General: No scleral icterus.     Conjunctiva/sclera: Conjunctivae normal.      Pupils: Pupils are equal, round, and reactive to light.   Neck:      Thyroid: No thyromegaly.      Vascular: No JVD.   Cardiovascular:      Rate and Rhythm: Normal rate and regular rhythm.      Pulses: Intact distal pulses.      Heart sounds: Normal heart sounds. No murmur heard.     No friction rub. No gallop.   Pulmonary:      Effort:  Pulmonary effort is normal. No respiratory distress.      Breath sounds: Normal breath sounds.   Abdominal:      General: Bowel sounds are normal. There is no distension.      Palpations: Abdomen is soft.   Musculoskeletal:      Cervical back: Normal range of motion and neck supple.   Skin:     General: Skin is warm and dry.   Neurological:      Mental Status: She is alert and oriented to person, place, and time.   Psychiatric:         Behavior: Behavior normal.         Assessment:      1. Paroxysmal atrial fibrillation        Plan:     In summary, Marian Cali is  78F with a history of AF which occurred in the setting of significant physiologic stress. No other documented episodes of AF.     Plan is for a 2 week Zio monitor. If no AF seen, will stop eliquis and schedule PRN follow-up.    Have instructed pt to monitor pulse/rhythm at home on a regular basis, and call if irregular or inappropriately fast. Pt has [employer] and has told me she will set it up and call me should AF be detected.    Thank you for allowing me to participate in the care of this patient. Please do not hesitate to call me with any questions or concerns.

## 2023-08-04 ENCOUNTER — TELEPHONE (OUTPATIENT)
Dept: ELECTROPHYSIOLOGY | Facility: CLINIC | Age: 78
End: 2023-08-04
Payer: MEDICARE

## 2023-08-04 DIAGNOSIS — I49.8 OTHER SPECIFIED CARDIAC ARRHYTHMIAS: Primary | ICD-10-CM

## 2023-08-04 NOTE — TELEPHONE ENCOUNTER
Called Ms. Durham to ask her a few questions seeing she is a new patient for Dr. Duenas. Could not leave message or call back number.

## 2023-08-07 ENCOUNTER — HOSPITAL ENCOUNTER (OUTPATIENT)
Dept: CARDIOLOGY | Facility: CLINIC | Age: 78
Discharge: HOME | End: 2023-08-07
Payer: MEDICARE

## 2023-08-07 ENCOUNTER — OFFICE VISIT (OUTPATIENT)
Dept: ELECTROPHYSIOLOGY | Facility: CLINIC | Age: 78
End: 2023-08-07
Payer: MEDICARE

## 2023-08-07 VITALS
WEIGHT: 113.13 LBS | HEART RATE: 65 BPM | BODY MASS INDEX: 18.85 KG/M2 | DIASTOLIC BLOOD PRESSURE: 66 MMHG | HEIGHT: 65 IN | SYSTOLIC BLOOD PRESSURE: 100 MMHG

## 2023-08-07 DIAGNOSIS — K56.609 SMALL BOWEL OBSTRUCTION: ICD-10-CM

## 2023-08-07 DIAGNOSIS — I48.0 PAROXYSMAL ATRIAL FIBRILLATION: Primary | ICD-10-CM

## 2023-08-07 DIAGNOSIS — I49.8 OTHER SPECIFIED CARDIAC ARRHYTHMIAS: ICD-10-CM

## 2023-08-07 PROCEDURE — 93010 ELECTROCARDIOGRAM REPORT: CPT | Mod: S$GLB,,, | Performed by: INTERNAL MEDICINE

## 2023-08-07 PROCEDURE — 1101F PT FALLS ASSESS-DOCD LE1/YR: CPT | Mod: CPTII,S$GLB,, | Performed by: INTERNAL MEDICINE

## 2023-08-07 PROCEDURE — 3074F SYST BP LT 130 MM HG: CPT | Mod: CPTII,S$GLB,, | Performed by: INTERNAL MEDICINE

## 2023-08-07 PROCEDURE — 93005 ELECTROCARDIOGRAM TRACING: CPT | Mod: S$GLB,,, | Performed by: INTERNAL MEDICINE

## 2023-08-07 PROCEDURE — 1160F RVW MEDS BY RX/DR IN RCRD: CPT | Mod: CPTII,S$GLB,, | Performed by: INTERNAL MEDICINE

## 2023-08-07 PROCEDURE — 3078F PR MOST RECENT DIASTOLIC BLOOD PRESSURE < 80 MM HG: ICD-10-PCS | Mod: CPTII,S$GLB,, | Performed by: INTERNAL MEDICINE

## 2023-08-07 PROCEDURE — 99999 PR PBB SHADOW E&M-EST. PATIENT-LVL III: ICD-10-PCS | Mod: PBBFAC,,, | Performed by: INTERNAL MEDICINE

## 2023-08-07 PROCEDURE — 1159F PR MEDICATION LIST DOCUMENTED IN MEDICAL RECORD: ICD-10-PCS | Mod: CPTII,S$GLB,, | Performed by: INTERNAL MEDICINE

## 2023-08-07 PROCEDURE — 1157F PR ADVANCE CARE PLAN OR EQUIV PRESENT IN MEDICAL RECORD: ICD-10-PCS | Mod: CPTII,S$GLB,, | Performed by: INTERNAL MEDICINE

## 2023-08-07 PROCEDURE — 1157F ADVNC CARE PLAN IN RCRD: CPT | Mod: CPTII,S$GLB,, | Performed by: INTERNAL MEDICINE

## 2023-08-07 PROCEDURE — 3074F PR MOST RECENT SYSTOLIC BLOOD PRESSURE < 130 MM HG: ICD-10-PCS | Mod: CPTII,S$GLB,, | Performed by: INTERNAL MEDICINE

## 2023-08-07 PROCEDURE — 3078F DIAST BP <80 MM HG: CPT | Mod: CPTII,S$GLB,, | Performed by: INTERNAL MEDICINE

## 2023-08-07 PROCEDURE — 1160F PR REVIEW ALL MEDS BY PRESCRIBER/CLIN PHARMACIST DOCUMENTED: ICD-10-PCS | Mod: CPTII,S$GLB,, | Performed by: INTERNAL MEDICINE

## 2023-08-07 PROCEDURE — 93010 RHYTHM STRIP: ICD-10-PCS | Mod: S$GLB,,, | Performed by: INTERNAL MEDICINE

## 2023-08-07 PROCEDURE — 1101F PR PT FALLS ASSESS DOC 0-1 FALLS W/OUT INJ PAST YR: ICD-10-PCS | Mod: CPTII,S$GLB,, | Performed by: INTERNAL MEDICINE

## 2023-08-07 PROCEDURE — 1111F DSCHRG MED/CURRENT MED MERGE: CPT | Mod: CPTII,S$GLB,, | Performed by: INTERNAL MEDICINE

## 2023-08-07 PROCEDURE — 1159F MED LIST DOCD IN RCRD: CPT | Mod: CPTII,S$GLB,, | Performed by: INTERNAL MEDICINE

## 2023-08-07 PROCEDURE — 1111F PR DISCHARGE MEDS RECONCILED W/ CURRENT OUTPATIENT MED LIST: ICD-10-PCS | Mod: CPTII,S$GLB,, | Performed by: INTERNAL MEDICINE

## 2023-08-07 PROCEDURE — 99999 PR PBB SHADOW E&M-EST. PATIENT-LVL III: CPT | Mod: PBBFAC,,, | Performed by: INTERNAL MEDICINE

## 2023-08-07 PROCEDURE — 99205 PR OFFICE/OUTPT VISIT, NEW, LEVL V, 60-74 MIN: ICD-10-PCS | Mod: S$GLB,,, | Performed by: INTERNAL MEDICINE

## 2023-08-07 PROCEDURE — 99205 OFFICE O/P NEW HI 60 MIN: CPT | Mod: S$GLB,,, | Performed by: INTERNAL MEDICINE

## 2023-08-07 PROCEDURE — 1126F AMNT PAIN NOTED NONE PRSNT: CPT | Mod: CPTII,S$GLB,, | Performed by: INTERNAL MEDICINE

## 2023-08-07 PROCEDURE — 93005 RHYTHM STRIP: ICD-10-PCS | Mod: S$GLB,,, | Performed by: INTERNAL MEDICINE

## 2023-08-07 PROCEDURE — 1126F PR PAIN SEVERITY QUANTIFIED, NO PAIN PRESENT: ICD-10-PCS | Mod: CPTII,S$GLB,, | Performed by: INTERNAL MEDICINE

## 2023-08-07 PROCEDURE — 3288F PR FALLS RISK ASSESSMENT DOCUMENTED: ICD-10-PCS | Mod: CPTII,S$GLB,, | Performed by: INTERNAL MEDICINE

## 2023-08-07 PROCEDURE — 3288F FALL RISK ASSESSMENT DOCD: CPT | Mod: CPTII,S$GLB,, | Performed by: INTERNAL MEDICINE

## 2023-08-08 ENCOUNTER — HOSPITAL ENCOUNTER (OUTPATIENT)
Dept: RADIOLOGY | Facility: OTHER | Age: 78
Discharge: HOME | End: 2023-08-08
Attending: OBSTETRICS & GYNECOLOGY
Payer: MEDICARE

## 2023-08-08 ENCOUNTER — CLINICAL SUPPORT (OUTPATIENT)
Dept: CARDIOLOGY | Facility: HOSPITAL | Age: 78
End: 2023-08-08
Attending: INTERNAL MEDICINE
Payer: MEDICARE

## 2023-08-08 DIAGNOSIS — I48.0 PAROXYSMAL ATRIAL FIBRILLATION: ICD-10-CM

## 2023-08-08 DIAGNOSIS — Z12.31 SCREENING MAMMOGRAM, ENCOUNTER FOR: ICD-10-CM

## 2023-08-08 PROCEDURE — 77063 BREAST TOMOSYNTHESIS BI: CPT | Mod: 26,,, | Performed by: RADIOLOGY

## 2023-08-08 PROCEDURE — 77067 SCR MAMMO BI INCL CAD: CPT | Mod: 26,,, | Performed by: RADIOLOGY

## 2023-08-08 PROCEDURE — 77067 MAMMO DIGITAL SCREENING BILAT WITH TOMO: ICD-10-PCS | Mod: 26,,, | Performed by: RADIOLOGY

## 2023-08-08 PROCEDURE — 93248 EXT ECG>7D<15D REV&INTERPJ: CPT | Mod: ,,, | Performed by: INTERNAL MEDICINE

## 2023-08-08 PROCEDURE — 93248 CV CARDIAC MONITOR - 3-15 DAY ADULT (CUPID ONLY): ICD-10-PCS | Mod: ,,, | Performed by: INTERNAL MEDICINE

## 2023-08-08 PROCEDURE — 77067 SCR MAMMO BI INCL CAD: CPT | Mod: TC

## 2023-08-08 PROCEDURE — 77063 MAMMO DIGITAL SCREENING BILAT WITH TOMO: ICD-10-PCS | Mod: 26,,, | Performed by: RADIOLOGY

## 2023-08-11 DIAGNOSIS — I48.0 PAROXYSMAL ATRIAL FIBRILLATION: Primary | ICD-10-CM

## 2023-08-11 RX ORDER — METOPROLOL SUCCINATE 50 MG/1
50 TABLET, EXTENDED RELEASE ORAL DAILY
Qty: 30 TABLET | Refills: 12 | Status: SHIPPED | OUTPATIENT
Start: 2023-08-11 | End: 2023-10-09

## 2023-08-30 ENCOUNTER — OFFICE VISIT (OUTPATIENT)
Dept: CARDIOLOGY | Facility: CLINIC | Age: 78
End: 2023-08-30
Payer: MEDICARE

## 2023-08-30 VITALS
SYSTOLIC BLOOD PRESSURE: 131 MMHG | BODY MASS INDEX: 18.78 KG/M2 | HEART RATE: 78 BPM | DIASTOLIC BLOOD PRESSURE: 78 MMHG | WEIGHT: 112.88 LBS | OXYGEN SATURATION: 96 %

## 2023-08-30 DIAGNOSIS — I48.0 PAROXYSMAL ATRIAL FIBRILLATION: ICD-10-CM

## 2023-08-30 DIAGNOSIS — Z01.810 PREOPERATIVE CARDIOVASCULAR EXAMINATION: ICD-10-CM

## 2023-08-30 DIAGNOSIS — E78.5 HYPERLIPIDEMIA, UNSPECIFIED HYPERLIPIDEMIA TYPE: Primary | ICD-10-CM

## 2023-08-30 PROCEDURE — 99999 PR PBB SHADOW E&M-EST. PATIENT-LVL III: CPT | Mod: PBBFAC,GC,, | Performed by: STUDENT IN AN ORGANIZED HEALTH CARE EDUCATION/TRAINING PROGRAM

## 2023-08-30 PROCEDURE — 99999 PR PBB SHADOW E&M-EST. PATIENT-LVL III: ICD-10-PCS | Mod: PBBFAC,GC,, | Performed by: STUDENT IN AN ORGANIZED HEALTH CARE EDUCATION/TRAINING PROGRAM

## 2023-08-30 PROCEDURE — 99214 OFFICE O/P EST MOD 30 MIN: CPT | Mod: GC,S$GLB,, | Performed by: STUDENT IN AN ORGANIZED HEALTH CARE EDUCATION/TRAINING PROGRAM

## 2023-08-30 PROCEDURE — 1101F PR PT FALLS ASSESS DOC 0-1 FALLS W/OUT INJ PAST YR: ICD-10-PCS | Mod: CPTII,GC,S$GLB, | Performed by: STUDENT IN AN ORGANIZED HEALTH CARE EDUCATION/TRAINING PROGRAM

## 2023-08-30 PROCEDURE — 3075F PR MOST RECENT SYSTOLIC BLOOD PRESS GE 130-139MM HG: ICD-10-PCS | Mod: CPTII,GC,S$GLB, | Performed by: STUDENT IN AN ORGANIZED HEALTH CARE EDUCATION/TRAINING PROGRAM

## 2023-08-30 PROCEDURE — 1101F PT FALLS ASSESS-DOCD LE1/YR: CPT | Mod: CPTII,GC,S$GLB, | Performed by: STUDENT IN AN ORGANIZED HEALTH CARE EDUCATION/TRAINING PROGRAM

## 2023-08-30 PROCEDURE — 1126F PR PAIN SEVERITY QUANTIFIED, NO PAIN PRESENT: ICD-10-PCS | Mod: CPTII,GC,S$GLB, | Performed by: STUDENT IN AN ORGANIZED HEALTH CARE EDUCATION/TRAINING PROGRAM

## 2023-08-30 PROCEDURE — 99214 PR OFFICE/OUTPT VISIT, EST, LEVL IV, 30-39 MIN: ICD-10-PCS | Mod: GC,S$GLB,, | Performed by: STUDENT IN AN ORGANIZED HEALTH CARE EDUCATION/TRAINING PROGRAM

## 2023-08-30 PROCEDURE — 1126F AMNT PAIN NOTED NONE PRSNT: CPT | Mod: CPTII,GC,S$GLB, | Performed by: STUDENT IN AN ORGANIZED HEALTH CARE EDUCATION/TRAINING PROGRAM

## 2023-08-30 PROCEDURE — 3288F FALL RISK ASSESSMENT DOCD: CPT | Mod: CPTII,GC,S$GLB, | Performed by: STUDENT IN AN ORGANIZED HEALTH CARE EDUCATION/TRAINING PROGRAM

## 2023-08-30 PROCEDURE — 3075F SYST BP GE 130 - 139MM HG: CPT | Mod: CPTII,GC,S$GLB, | Performed by: STUDENT IN AN ORGANIZED HEALTH CARE EDUCATION/TRAINING PROGRAM

## 2023-08-30 PROCEDURE — 3078F DIAST BP <80 MM HG: CPT | Mod: CPTII,GC,S$GLB, | Performed by: STUDENT IN AN ORGANIZED HEALTH CARE EDUCATION/TRAINING PROGRAM

## 2023-08-30 PROCEDURE — 1159F PR MEDICATION LIST DOCUMENTED IN MEDICAL RECORD: ICD-10-PCS | Mod: CPTII,GC,S$GLB, | Performed by: STUDENT IN AN ORGANIZED HEALTH CARE EDUCATION/TRAINING PROGRAM

## 2023-08-30 PROCEDURE — 3078F PR MOST RECENT DIASTOLIC BLOOD PRESSURE < 80 MM HG: ICD-10-PCS | Mod: CPTII,GC,S$GLB, | Performed by: STUDENT IN AN ORGANIZED HEALTH CARE EDUCATION/TRAINING PROGRAM

## 2023-08-30 PROCEDURE — 1159F MED LIST DOCD IN RCRD: CPT | Mod: CPTII,GC,S$GLB, | Performed by: STUDENT IN AN ORGANIZED HEALTH CARE EDUCATION/TRAINING PROGRAM

## 2023-08-30 PROCEDURE — 3288F PR FALLS RISK ASSESSMENT DOCUMENTED: ICD-10-PCS | Mod: CPTII,GC,S$GLB, | Performed by: STUDENT IN AN ORGANIZED HEALTH CARE EDUCATION/TRAINING PROGRAM

## 2023-08-30 PROCEDURE — 1157F ADVNC CARE PLAN IN RCRD: CPT | Mod: CPTII,GC,S$GLB, | Performed by: STUDENT IN AN ORGANIZED HEALTH CARE EDUCATION/TRAINING PROGRAM

## 2023-08-30 PROCEDURE — 1157F PR ADVANCE CARE PLAN OR EQUIV PRESENT IN MEDICAL RECORD: ICD-10-PCS | Mod: CPTII,GC,S$GLB, | Performed by: STUDENT IN AN ORGANIZED HEALTH CARE EDUCATION/TRAINING PROGRAM

## 2023-08-30 NOTE — PROGRESS NOTES
"    PCP - Suzanna Duran MD  Referring Physician:     Subjective:   Patient ID:  Marian Durham is a 78 y.o. female with past medical history of:   -pAF followed by EP Dr. Duenas    Who presents for new patient evaluation today. The patient reports that she is doing well overall. She is here today for clearance for her upcoming knee replacement. She can climb two flights of steps without getting short of breath. Denies chest pain, shortness of breath, palpitations, PND. She follows with Dr. Duenas with EP for her pAF and states that she does not feel that she has been in AF over the past few weeks. She is wearing a ZioPatch and requests that we help her remove it today.    History:     Social History     Tobacco Use    Smoking status: Never    Smokeless tobacco: Never   Substance Use Topics    Alcohol use: Yes     Alcohol/week: 3.0 standard drinks of alcohol     Types: 3 Glasses of wine per week     Comment: Drinks a glass of wine 3 times weekly     Family History   Problem Relation Age of Onset    Hypertension Father     Hypertension Mother     Diabetes Brother     Colon cancer Paternal Aunt     Breast cancer Paternal Aunt         75    Coronary artery disease Brother     Ovarian cancer Neg Hx        Meds:     Review of patient's allergies indicates:   Allergen Reactions    Demerol [meperidine] Other (See Comments)     "Becomes Agitated and Combative"       Current Outpatient Medications:     apixaban (ELIQUIS) 5 mg Tab, Take 1 tablet (5 mg total) by mouth 2 (two) times daily., Disp: 60 tablet, Rfl: 12    calcium-vitamin D3 (OS- + D3) 500 mg-5 mcg (200 unit) per tablet, Take 1 tablet by mouth 2 (two) times daily with meals., Disp: , Rfl:     celecoxib (CELEBREX) 200 MG capsule, take 1 capsule (200MG) by oral route every day as needed, Disp: , Rfl: 1    glucosamine-chondroitin 500-400 mg tablet, Take 1 tablet by mouth 3 (three) times daily., Disp: , Rfl:     metoprolol succinate (TOPROL-XL) 50 MG 24 " "hr tablet, Take 1 tablet (50 mg total) by mouth once daily., Disp: 30 tablet, Rfl: 12    polyethylene glycol (MIRALAX) 17 gram/dose powder, Take 17 g by mouth 3 (three) times daily as needed (Constipation)., Disp: 510 g, Rfl: 0    simvastatin (ZOCOR) 40 MG tablet, Take 40 mg by mouth every evening. , Disp: , Rfl:     estradioL (ESTRACE) 0.01 % (0.1 mg/gram) vaginal cream, Place 1 g vaginally twice a week., Disp: 42.5 g, Rfl: 3      Objective:   /78   Pulse 78   Wt 51.2 kg (112 lb 14 oz)   SpO2 96%   BMI 18.78 kg/m²     Physical Exam  Gen: No apparent distress, resting comfortably  HEENT: Pupils equal and reactive to light  Cardio: Regular rate, point of maximal impulse not displaced, no murmur noted, 2+ radial pulses bilaterally, 2+ DP pulses bilaterally  Resp: CTAB, no wheezing  Abd: Soft, non-tender, non-distended  Skin: Warm, dry, no peripheral edema noted  Neuro: Alert and oriented x3  Psych: Normal mood and affect      Labs:     Lab Results   Component Value Date     07/14/2023    K 4.1 07/14/2023     07/14/2023    CO2 27 07/14/2023    BUN 23 07/14/2023    CREATININE 0.9 07/14/2023    ANIONGAP 5 (L) 07/14/2023     No results found for: "HGBA1C"  No results found for: "BNP", "BNPTRIAGEBLO"    Lab Results   Component Value Date    WBC 5.49 07/14/2023    HGB 12.6 07/14/2023    HCT 38.3 07/14/2023     07/14/2023    GRAN 2.8 07/14/2023    GRAN 51.6 07/14/2023     No results found for: "CHOL", "HDL", "LDLCALC", "TRIG"    Lab Results   Component Value Date     07/14/2023    K 4.1 07/14/2023     07/14/2023    CO2 27 07/14/2023    BUN 23 07/14/2023    CREATININE 0.9 07/14/2023    ANIONGAP 5 (L) 07/14/2023     No results found for: "HGBA1C"  No results found for: "BNP", "BNPTRIAGEBLO" Lab Results   Component Value Date    WBC 5.49 07/14/2023    HGB 12.6 07/14/2023    HCT 38.3 07/14/2023     07/14/2023    GRAN 2.8 07/14/2023    GRAN 51.6 07/14/2023     No results found for: " ""CHOL", "HDL", "LDLCALC", "TRIG"             Cardiovascular Imaging:     Echo 6/30/23:  The left ventricle is normal in size with normal systolic function.  Grade I left ventricular diastolic dysfunction.  Normal right ventricular size with normal right ventricular systolic function.       Stress test:     LHC:    Assessment & Plan:     Preoperative cardiovascular examination  -Patient is low risk for intermediate risk surgery. No risk factors on the RCRI scale. Can proceed without further testing  -Patient able to accomplish > 4 METS without issue    Paroxysmal atrial fibrillation  -Turn in ZioPatch today  -Decision on Eliquis per EP Dr. Duenas      RTC in 1 year. Check lipid panel. Case staffed with Dr. Webber.    Signed:  Oswald Joyner MD  [facility] PGY-6    Staff attestation to follow.      "

## 2023-08-30 NOTE — ASSESSMENT & PLAN NOTE
-Patient is low risk for intermediate risk surgery. No risk factors on the RCRI scale. Can proceed without further testing  -Patient able to accomplish > 4 METS without issue

## 2023-09-05 ENCOUNTER — LAB VISIT (OUTPATIENT)
Dept: LAB | Facility: OTHER | Age: 78
End: 2023-09-05
Attending: STUDENT IN AN ORGANIZED HEALTH CARE EDUCATION/TRAINING PROGRAM
Payer: MEDICARE

## 2023-09-05 DIAGNOSIS — E78.5 HYPERLIPIDEMIA, UNSPECIFIED HYPERLIPIDEMIA TYPE: ICD-10-CM

## 2023-09-05 LAB
CHOLEST SERPL-MCNC: 128 MG/DL (ref 120–199)
CHOLEST/HDLC SERPL: 2.7 {RATIO} (ref 2–5)
HDLC SERPL-MCNC: 47 MG/DL (ref 40–75)
HDLC SERPL: 36.7 % (ref 20–50)
LDLC SERPL CALC-MCNC: 65.2 MG/DL (ref 63–159)
NONHDLC SERPL-MCNC: 81 MG/DL
TRIGL SERPL-MCNC: 79 MG/DL (ref 30–150)

## 2023-09-05 PROCEDURE — 80061 LIPID PANEL: CPT | Performed by: STUDENT IN AN ORGANIZED HEALTH CARE EDUCATION/TRAINING PROGRAM

## 2023-09-05 PROCEDURE — 36415 COLL VENOUS BLD VENIPUNCTURE: CPT | Performed by: STUDENT IN AN ORGANIZED HEALTH CARE EDUCATION/TRAINING PROGRAM

## 2023-09-06 ENCOUNTER — TELEPHONE (OUTPATIENT)
Dept: ELECTROPHYSIOLOGY | Facility: CLINIC | Age: 78
End: 2023-09-06
Payer: MEDICARE

## 2023-09-06 NOTE — TELEPHONE ENCOUNTER
Called patient to inform extended holter report pending and will follow up as soon as possible with results. No answer. Left voicemail.

## 2023-09-06 NOTE — TELEPHONE ENCOUNTER
"----- Message from Rosenda Mckeon sent at 9/6/2023  9:22 AM CDT -----  Regarding: Test Results Needed  Good morning,    Pt requesting callback for test results. "Please do it", per pt.    Contact @ [phone] or leave message     Thanks     "

## 2023-09-07 LAB
OHS CV EVENT MONITOR DAY: 12
OHS CV HOLTER HOOKUP DATE: NORMAL
OHS CV HOLTER HOOKUP TIME: NORMAL
OHS CV HOLTER LENGTH DECIMAL HOURS: 290
OHS CV HOLTER LENGTH HOURS: 2
OHS CV HOLTER LENGTH MINUTES: 0
OHS CV HOLTER SCAN DATE: NORMAL
OHS CV HOLTER SINUS AVERAGE HR: 66 BPM
OHS CV HOLTER SINUS MAX HR: 174 BPM
OHS CV HOLTER SINUS MIN HR: 46 BPM
OHS CV HOLTER STUDY END DATE: NORMAL
OHS CV HOLTER STUDY END TIME: NORMAL

## 2023-09-08 ENCOUNTER — TELEPHONE (OUTPATIENT)
Dept: ELECTROPHYSIOLOGY | Facility: CLINIC | Age: 78
End: 2023-09-08
Payer: MEDICARE

## 2023-09-08 NOTE — TELEPHONE ENCOUNTER
Called patient and gave her the results of her monitor. Informed her that she could stop the Eliquis and the Toprol. She should take the Toprol as needed for palpitations. Patient will follow up with her PCP and general cardiologist. Patient verbalized understanding.

## 2023-09-08 NOTE — TELEPHONE ENCOUNTER
----- Message from Anshul Duenas MD sent at 9/8/2023  7:08 AM CDT -----  Regarding: RE: results  She can take again as needed (if palps ever return)    ----- Message -----  From: Kamilah Whitaker RN  Sent: 9/7/2023   4:06 PM CDT  To: Anshul Duenas MD  Subject: RE: results                                      Yes. I am sorry. I did not realize that you had already communicated the results. I called the patient and gave her the results. She is asking if she needs to continue on the Toprol or if she can stop it.  Thanks,  Iva  ----- Message -----  From: Anshul Duenas MD  Sent: 9/7/2023   4:01 PM CDT  To: Kamilah Whitaker RN; Miroslava Magana RN  Subject: RE: results                                      Miroslava--is this the same person that we were communicating about yesterday?    ----- Message -----  From: Kamilah Whitaker RN  Sent: 9/7/2023   3:51 PM CDT  To: Anshul Duenas MD  Subject: FW: results                                      Patient had holter done and is asking for results. Please review and advise. Result in Epic.  Iva  ----- Message -----  From: Kamilah Whitaker RN  Sent: 9/7/2023   3:40 PM CDT  To: Kamilah Whitaker RN  Subject: FW: results                                        ----- Message -----  From: Jacque Johnson  Sent: 9/7/2023   3:35 PM CDT  To: Marilou Kendall RN  Subject: results                                          Pt is calling for holter results and can be reached at [phone].  She received them on the portal but she does not understand any of it.    Thank you

## 2023-09-20 ENCOUNTER — OFFICE VISIT (OUTPATIENT)
Dept: OBSTETRICS AND GYNECOLOGY | Facility: CLINIC | Age: 78
End: 2023-09-20
Attending: OBSTETRICS & GYNECOLOGY
Payer: MEDICARE

## 2023-09-20 VITALS
HEIGHT: 65 IN | HEART RATE: 68 BPM | BODY MASS INDEX: 19.13 KG/M2 | WEIGHT: 114.81 LBS | SYSTOLIC BLOOD PRESSURE: 145 MMHG | DIASTOLIC BLOOD PRESSURE: 83 MMHG

## 2023-09-20 DIAGNOSIS — Z01.419 ENCOUNTER FOR GYNECOLOGICAL EXAMINATION WITHOUT ABNORMAL FINDING: Primary | ICD-10-CM

## 2023-09-20 DIAGNOSIS — R39.89 SENSATION OF PRESSURE IN BLADDER AREA: ICD-10-CM

## 2023-09-20 DIAGNOSIS — N95.2 POSTMENOPAUSAL ATROPHIC VAGINITIS: ICD-10-CM

## 2023-09-20 PROCEDURE — G0101 PR CA SCREEN;PELVIC/BREAST EXAM: ICD-10-PCS | Mod: GZ,S$GLB,, | Performed by: OBSTETRICS & GYNECOLOGY

## 2023-09-20 PROCEDURE — 3288F PR FALLS RISK ASSESSMENT DOCUMENTED: ICD-10-PCS | Mod: CPTII,S$GLB,, | Performed by: OBSTETRICS & GYNECOLOGY

## 2023-09-20 PROCEDURE — 3077F SYST BP >= 140 MM HG: CPT | Mod: CPTII,S$GLB,, | Performed by: OBSTETRICS & GYNECOLOGY

## 2023-09-20 PROCEDURE — 1126F AMNT PAIN NOTED NONE PRSNT: CPT | Mod: CPTII,S$GLB,, | Performed by: OBSTETRICS & GYNECOLOGY

## 2023-09-20 PROCEDURE — 3288F FALL RISK ASSESSMENT DOCD: CPT | Mod: CPTII,S$GLB,, | Performed by: OBSTETRICS & GYNECOLOGY

## 2023-09-20 PROCEDURE — 1126F PR PAIN SEVERITY QUANTIFIED, NO PAIN PRESENT: ICD-10-PCS | Mod: CPTII,S$GLB,, | Performed by: OBSTETRICS & GYNECOLOGY

## 2023-09-20 PROCEDURE — 3079F PR MOST RECENT DIASTOLIC BLOOD PRESSURE 80-89 MM HG: ICD-10-PCS | Mod: CPTII,S$GLB,, | Performed by: OBSTETRICS & GYNECOLOGY

## 2023-09-20 PROCEDURE — 1101F PT FALLS ASSESS-DOCD LE1/YR: CPT | Mod: CPTII,S$GLB,, | Performed by: OBSTETRICS & GYNECOLOGY

## 2023-09-20 PROCEDURE — 99999 PR PBB SHADOW E&M-EST. PATIENT-LVL III: ICD-10-PCS | Mod: PBBFAC,,, | Performed by: OBSTETRICS & GYNECOLOGY

## 2023-09-20 PROCEDURE — 87086 URINE CULTURE/COLONY COUNT: CPT | Performed by: OBSTETRICS & GYNECOLOGY

## 2023-09-20 PROCEDURE — 81001 URINALYSIS AUTO W/SCOPE: CPT | Performed by: OBSTETRICS & GYNECOLOGY

## 2023-09-20 PROCEDURE — 1157F ADVNC CARE PLAN IN RCRD: CPT | Mod: CPTII,S$GLB,, | Performed by: OBSTETRICS & GYNECOLOGY

## 2023-09-20 PROCEDURE — G0101 CA SCREEN;PELVIC/BREAST EXAM: HCPCS | Mod: GZ,S$GLB,, | Performed by: OBSTETRICS & GYNECOLOGY

## 2023-09-20 PROCEDURE — 1159F MED LIST DOCD IN RCRD: CPT | Mod: CPTII,S$GLB,, | Performed by: OBSTETRICS & GYNECOLOGY

## 2023-09-20 PROCEDURE — 87186 SC STD MICRODIL/AGAR DIL: CPT | Performed by: OBSTETRICS & GYNECOLOGY

## 2023-09-20 PROCEDURE — 1157F PR ADVANCE CARE PLAN OR EQUIV PRESENT IN MEDICAL RECORD: ICD-10-PCS | Mod: CPTII,S$GLB,, | Performed by: OBSTETRICS & GYNECOLOGY

## 2023-09-20 PROCEDURE — 87077 CULTURE AEROBIC IDENTIFY: CPT | Performed by: OBSTETRICS & GYNECOLOGY

## 2023-09-20 PROCEDURE — 3077F PR MOST RECENT SYSTOLIC BLOOD PRESSURE >= 140 MM HG: ICD-10-PCS | Mod: CPTII,S$GLB,, | Performed by: OBSTETRICS & GYNECOLOGY

## 2023-09-20 PROCEDURE — 99999 PR PBB SHADOW E&M-EST. PATIENT-LVL III: CPT | Mod: PBBFAC,,, | Performed by: OBSTETRICS & GYNECOLOGY

## 2023-09-20 PROCEDURE — 87088 URINE BACTERIA CULTURE: CPT | Performed by: OBSTETRICS & GYNECOLOGY

## 2023-09-20 PROCEDURE — 1159F PR MEDICATION LIST DOCUMENTED IN MEDICAL RECORD: ICD-10-PCS | Mod: CPTII,S$GLB,, | Performed by: OBSTETRICS & GYNECOLOGY

## 2023-09-20 PROCEDURE — 1101F PR PT FALLS ASSESS DOC 0-1 FALLS W/OUT INJ PAST YR: ICD-10-PCS | Mod: CPTII,S$GLB,, | Performed by: OBSTETRICS & GYNECOLOGY

## 2023-09-20 PROCEDURE — 3079F DIAST BP 80-89 MM HG: CPT | Mod: CPTII,S$GLB,, | Performed by: OBSTETRICS & GYNECOLOGY

## 2023-09-20 RX ORDER — ESTRADIOL 0.1 MG/G
1 CREAM VAGINAL
Qty: 42.5 G | Refills: 3 | Status: SHIPPED | OUTPATIENT
Start: 2023-09-20

## 2023-09-20 RX ORDER — ESTRADIOL 0.1 MG/G
1 CREAM VAGINAL
Qty: 42.5 G | Refills: 3 | Status: SHIPPED | OUTPATIENT
Start: 2023-09-21 | End: 2023-09-20

## 2023-09-20 NOTE — PROGRESS NOTES
Subjective:       Patient ID: Marian Durham is a 78 y.o. female.    Chief Complaint:  Annual Exam and Gynecologic Exam      History of Present Illness  Gynecologic Exam  Associated symptoms include frequency and urgency. Pertinent negatives include no abdominal pain, back pain, dysuria or headaches.     Marian Durham is a 78 y.o. female  here for her annual GYN exam.   She has had a very complicated year. Had 2 bowel obstructions treated with decompression; one in April/May 2022, and again in 2023; and is due for 2 knee replacements. Also was diagnosed with Atrial fibrillation and placed on blood thinners. She reports urinary frequency and a feeling of pressure during the past few months, because had stopped using her vaginal estradiol cream while hospitalized.  Denies dysuria.  denies vaginal itching or irritation.  Denies vaginal discharge.  She is not sexually active. She has been  since .   History of abnormal pap: No  Last Pap: was normal and had hysterectomy  Last MMG: normal-2023: BI-RADS Category 1: Negative-routine follow-up in 12 months  Last Dexa: 2022: Osteopenia of the hips.  Normal bone density of the spine.  Last Colonoscopy:  colonoscopy several years ago without abnormalities.  denies domestic violence. She does feel safe at home.     Past Medical History:   Diagnosis Date    Atrial fibrillation     Hyperlipidemia     Osteoarthritis     Shingles 2002    Spinal stenosis      Past Surgical History:   Procedure Laterality Date    ANKLE FRACTURE SURGERY      Right ankle    EXCISION OF MASS OF BACK N/A 2018    Procedure: EXCISION, MASS, BACK;  Surgeon: Fran Magaña MD;  Location: [facility];  Service: General;  Laterality: N/A;    EYE SURGERY Bilateral 2016    HYSTERECTOMY  1985    HEATHER    KNEE ARTHROSCOPY W/ DEBRIDEMENT      Bilateral    KNEE ARTHROSCOPY W/ DEBRIDEMENT      Left knee    OPEN PATELLA REEFING PROCEDURE  age 19    Left  knee    Tonsilectomy  as a child    TONSILLECTOMY      TUBAL LIGATION       Social History     Socioeconomic History    Marital status:    Tobacco Use    Smoking status: Never    Smokeless tobacco: Never   Substance and Sexual Activity    Alcohol use: Yes     Alcohol/week: 3.0 standard drinks of alcohol     Types: 3 Glasses of wine per week     Comment: Drinks a glass of wine 3 times weekly    Drug use: No    Sexual activity: Not Currently     Birth control/protection: Post-menopausal     Comment: Spouse  of kidney cancer : 2015     Social Determinants of Health     Financial Resource Strain: Low Risk  (2023)    Overall Financial Resource Strain (CARDIA)     Difficulty of Paying Living Expenses: Not hard at all   Food Insecurity: No Food Insecurity (2023)    Hunger Vital Sign     Worried About Running Out of Food in the Last Year: Never true     Ran Out of Food in the Last Year: Never true   Transportation Needs: No Transportation Needs (2023)    PRAPARE - Transportation     Lack of Transportation (Medical): No     Lack of Transportation (Non-Medical): No   Physical Activity: Sufficiently Active (2023)    Exercise Vital Sign     Days of Exercise per Week: 5 days     Minutes of Exercise per Session: 30 min   Stress: Stress Concern Present (2023)    [employer] - Occupational Stress Questionnaire     Feeling of Stress : Very much   Social Connections: Moderately Integrated (2023)    Social Connection and Isolation Panel [NHANES]     Frequency of Communication with Friends and Family: More than three times a week     Frequency of Social Gatherings with Friends and Family: More than three times a week     Attends [employer] Services: More than 4 times per year     Active Member of Clubs or Organizations: Yes     Attends Club or Organization Meetings: More than 4 times per year     Marital Status:    Housing Stability: Low Risk   "(2023)    Housing Stability Vital Sign     Unable to Pay for Housing in the Last Year: No     Number of Places Lived in the Last Year: 1     Unstable Housing in the Last Year: No     Family History   Problem Relation Age of Onset    Hypertension Father     Hypertension Mother     Diabetes Brother     Colon cancer Paternal Aunt     Breast cancer Paternal Aunt         75    Coronary artery disease Brother     Ovarian cancer Neg Hx      OB History          2    Para   2    Term   2            AB        Living   2         SAB        IAB        Ectopic        Multiple        Live Births   2                 BP (!) 145/83   Pulse 68   Ht 5' 5" (1.651 m)   Wt 52.1 kg (114 lb 12.8 oz)   BMI 19.10 kg/m²         GYN & OB History    Date of Last Pap: No result found    OB History    Para Term  AB Living   2 2 2     2   SAB IAB Ectopic Multiple Live Births           2      # Outcome Date GA Lbr Otto/2nd Weight Sex Delivery Anes PTL Lv   2 Term         KIRA   1 Term         IKRA       Review of Systems  Review of Systems   Constitutional:  Negative for activity change, appetite change, fatigue and unexpected weight change.   HENT: Negative.     Eyes:  Negative for visual disturbance.   Respiratory:  Negative for shortness of breath and wheezing.    Cardiovascular:  Negative for chest pain, palpitations and leg swelling.   Gastrointestinal:  Negative for abdominal pain, bloating and blood in stool.   Endocrine: Negative for diabetes and hair loss.   Genitourinary:  Positive for frequency, urgency and vaginal dryness. Negative for bladder incontinence and dysuria.   Musculoskeletal:  Positive for arthralgias. Negative for back pain and joint swelling.   Integumentary:  Negative for acne, hair changes and nipple discharge.   Neurological:  Negative for headaches.   Hematological:  Does not bruise/bleed easily.   Psychiatric/Behavioral:  Negative for depression and sleep disturbance. The patient is " not nervous/anxious.    Breast: Negative for mastodynia and nipple discharge          Objective:      Physical Exam:   Constitutional: She is oriented to person, place, and time. She appears well-developed and well-nourished.    HENT:   Head: Normocephalic and atraumatic.    Eyes: Pupils are equal, round, and reactive to light. EOM are normal.     Cardiovascular:  Normal rate and regular rhythm.             Pulmonary/Chest: Effort normal and breath sounds normal.   BREASTS:  no mass, no tenderness, no deformity and no retraction. Right breast exhibits no inverted nipple, no mass, no nipple discharge, no skin change, no tenderness, no bleeding and no swelling. Left breast exhibits no inverted nipple, no mass, no nipple discharge, no skin change, no tenderness, no bleeding and no swelling. Breasts are symmetrical.              Abdominal: Soft. Bowel sounds are normal.     Genitourinary:    Pelvic exam was performed with patient supine.      Genitourinary Comments: PELVIC: Normal external female genitalia without lesions. Normal hair distribution. Adequate perineal body, normal urethral meatus. Vagina atrophic without lesions or discharge. No significant cystocele or rectocele. Bimanual exam shows uterus and cervix to be surgically absent. Adnexa without masses or tenderness.  RECTAL: Deferred                 Musculoskeletal: Normal range of motion and moves all extremeties.       Neurological: She is alert and oriented to person, place, and time.    Skin: Skin is warm and dry.    Psychiatric: She has a normal mood and affect.              Assessment:        1. Encounter for gynecological examination without abnormal finding    2. Postmenopausal atrophic vaginitis    3. Sensation of pressure in bladder area                Plan:      Problem List Items Addressed This Visit    None  Visit Diagnoses       Encounter for gynecological examination without abnormal finding    -  Primary  COUNSELING:  The patient was counseled  today on regular weight bearing exercise. Patient was counseled today on the new ACS guidelines for cervical cytology screening as well as the current recommendations for breast cancer screening. Counseling session lasted approximately 10 minutes, and all her questions were answered. She was advised to see her primary care physician for all other health maintenance.   FOLLOW-UP with me for next routine visit.       Postmenopausal atrophic vaginitis        Relevant Medications    estradioL (ESTRACE) 0.01 % (0.1 mg/gram) vaginal cream    Sensation of pressure in bladder area        Relevant Orders    Urine culture    Urinalysis             Follow up in about 2 years (around 9/20/2025).

## 2023-09-21 DIAGNOSIS — N39.0 UTI (URINARY TRACT INFECTION), UNCOMPLICATED: Primary | ICD-10-CM

## 2023-09-21 LAB
BACTERIA #/AREA URNS AUTO: ABNORMAL /HPF
BILIRUB UR QL STRIP: NEGATIVE
CLARITY UR REFRACT.AUTO: ABNORMAL
COLOR UR AUTO: YELLOW
GLUCOSE UR QL STRIP: NEGATIVE
HGB UR QL STRIP: NEGATIVE
KETONES UR QL STRIP: NEGATIVE
LEUKOCYTE ESTERASE UR QL STRIP: ABNORMAL
MICROSCOPIC COMMENT: ABNORMAL
NITRITE UR QL STRIP: POSITIVE
PH UR STRIP: 6 [PH] (ref 5–8)
PROT UR QL STRIP: NEGATIVE
RBC #/AREA URNS AUTO: 3 /HPF (ref 0–4)
SP GR UR STRIP: 1.02 (ref 1–1.03)
SQUAMOUS #/AREA URNS AUTO: 3 /HPF
UNSPECIFIED CRY UR QL COMP ASSIST: 2
URN SPEC COLLECT METH UR: ABNORMAL
WBC #/AREA URNS AUTO: 16 /HPF (ref 0–5)

## 2023-09-21 RX ORDER — NITROFURANTOIN 25; 75 MG/1; MG/1
100 CAPSULE ORAL 2 TIMES DAILY
Qty: 14 CAPSULE | Refills: 0 | Status: SHIPPED | OUTPATIENT
Start: 2023-09-21 | End: 2023-09-28

## 2023-09-22 LAB — BACTERIA UR CULT: ABNORMAL

## 2023-09-22 NOTE — H&P
"[facility] - Surgery (Success)  History & Physical    Subjective:      Pain deformity both knees right worse than left.  Originally scheduled months ago she had a bowel obstruction and about of atrial fibrillation.  Now feeling good heart doing well.  Admitted for right knee replacement.  More deformity and instability on the right than the left although both are significant.    Patient Active Problem List    Diagnosis Date Noted    Preoperative cardiovascular examination 08/30/2023    Debility 07/06/2023    Paroxysmal atrial fibrillation 06/30/2023    Hypokalemia 06/26/2023    Elevated blood pressure reading 06/26/2023    Volume depletion 04/23/2022    Lactic acidosis 04/23/2022    Osteoarthritis 04/23/2022    SBO (small bowel obstruction) 04/22/2022    Lipoma 11/16/2018     Past Medical History:   Diagnosis Date    Atrial fibrillation     Hyperlipidemia     Osteoarthritis     Shingles 01/01/2002    Spinal stenosis      Past Surgical History:   Procedure Laterality Date    ANKLE FRACTURE SURGERY  2007    Right ankle    EXCISION OF MASS OF BACK N/A 11/16/2018    Procedure: EXCISION, MASS, BACK;  Surgeon: Fran Magaña MD;  Location: [facility];  Service: General;  Laterality: N/A;    EYE SURGERY Bilateral 2016    HYSTERECTOMY  1985    HEATHER    KNEE ARTHROSCOPY W/ DEBRIDEMENT  1994    Bilateral    KNEE ARTHROSCOPY W/ DEBRIDEMENT  2003    Left knee    OPEN PATELLA REEFING PROCEDURE  age 19    Left knee    Tonsilectomy  as a child    TONSILLECTOMY      TUBAL LIGATION       No medications prior to admission.     Review of patient's allergies indicates:   Allergen Reactions    Demerol [meperidine] Other (See Comments)     "Becomes Agitated and Combative"     Social History     Tobacco Use    Smoking status: Never    Smokeless tobacco: Never   Substance Use Topics    Alcohol use: Yes     Alcohol/week: 3.0 standard drinks of alcohol     Types: 3 Glasses of wine per week     Comment: Drinks a glass of wine 3 times weekly " "    Family History   Problem Relation Age of Onset    Hypertension Father     Hypertension Mother     Diabetes Brother     Colon cancer Paternal Aunt     Breast cancer Paternal Aunt         75    Coronary artery disease Brother     Ovarian cancer Neg Hx      Social History     Tobacco Use    Smoking status: Never    Smokeless tobacco: Never   Substance Use Topics    Alcohol use: Yes     Alcohol/week: 3.0 standard drinks of alcohol     Types: 3 Glasses of wine per week     Comment: Drinks a glass of wine 3 times weekly    Drug use: No       No medications prior to admission.     Review of patient's allergies indicates:   Allergen Reactions    Demerol [meperidine] Other (See Comments)     "Becomes Agitated and Combative"        Review of Systems:  All other systems reviewed and are negative.  .    No current facility-administered medications for this encounter.     Current Outpatient Medications   Medication Sig    apixaban (ELIQUIS) 5 mg Tab Take 1 tablet (5 mg total) by mouth 2 (two) times daily. (Patient not taking: Reported on 9/20/2023)    calcium-vitamin D3 (OS- + D3) 500 mg-5 mcg (200 unit) per tablet Take 1 tablet by mouth 2 (two) times daily with meals.    celecoxib (CELEBREX) 200 MG capsule take 1 capsule (200MG) by oral route every day as needed    estradioL (ESTRACE) 0.01 % (0.1 mg/gram) vaginal cream Place 1 g vaginally 3 (three) times a week.    glucosamine-chondroitin 500-400 mg tablet Take 1 tablet by mouth 3 (three) times daily.    metoprolol succinate (TOPROL-XL) 50 MG 24 hr tablet Take 1 tablet (50 mg total) by mouth once daily. (Patient not taking: Reported on 9/20/2023)    nitrofurantoin, macrocrystal-monohydrate, (MACROBID) 100 MG capsule Take 1 capsule (100 mg total) by mouth 2 (two) times daily. for 7 days    polyethylene glycol (MIRALAX) 17 gram/dose powder Take 17 g by mouth 3 (three) times daily as needed (Constipation).    simvastatin (ZOCOR) 40 MG tablet Take 40 mg by mouth every " evening.        Objective:      Vital Signs (Most Recent)       Vital Signs Range (Last 24H):  BP: ()/()   Arterial Line BP: ()/()     Physical Exam heart regular lungs clear valgus alignment both knees severe on the right moderate to severe on the left crepitance tenderness both knees significant unstable gait because of her valgus deformity    Imaging Review:   Valgus alignment loss of lateral joint space with sclerosis      Assessment:      There are no hospital problems to display for this patient.      Plan:    The various methods of treatment have been discussed with the patient and family.   After consideration of risks, benefits and other options for treatment, the patient has consented to surgical interventions (significant risk discussed).  Questions were answered and Pre-op teaching was done by me.

## 2023-10-02 ENCOUNTER — ANESTHESIA EVENT (OUTPATIENT)
Dept: SURGERY | Facility: OTHER | Age: 78
End: 2023-10-02
Payer: MEDICARE

## 2023-10-05 NOTE — DISCHARGE INSTRUCTIONS
Knee Athroplasty Discharge Instructions    Pain:   After surgery your knee will be sore. The knee will likely have been injected with a numbing medicine (Exparel) prior to completion of surgery for pain control. This is indicated on a green bracelet that you will continue to wear for 4 days after surgery. Ice and elevation will assist with pain control.    Apply ice as much as possible for the first 72 hours. After 72 hours, apply ice for 20minutes after therapy or whenever experiencing pain. Avoid direct skin contact with ice to prevent frostbit.           Elevate the affected leg with the pillow the length of the leg higher than your heart to assist with swelling and pain.  Incision Care:   Some drainage from the incision in the first 72 hours is normal. If drainage is excessive, reinforce dressing and call home health nurse or therapist. Keep incision clean, dry and covered until staples removed. Staples will be removed 14-21 days after surgery .    Activity:                  Perform exercises 2 x day.   You may shower 48 hours after surgery  providing the dressing is waterproof. You can wrap the knee or hip with press n seal saran wrap to assist with keeping the dressing dry while showering. Dr. Mitchell prefers his patients not to shower for 2 weeks and to sponge bathe.No tub or hot tub usage for 6 weeks after surgery. Support help is mandatory during showering. If the  dressing becomes wet, replace with a new dressing. Do not leave a wet dressing on.   Wear marcia hose to both extremities  for 3 weeks after surgery .You may remove stockings for 1- 2 hours during the day only.            If your physician orders the CPM machine you are to use it for 2 hours in the am and 2 hours in the pm. This is not to replace your exercise program.    You may need antibiotic coverage before dental or minor surgical procedures.  Possible Complication:  Infections: Report these signs and symptoms to your surgeon:  Unexpected  redness around incision  Persistent drainage from wound after 72 hours  Temperature Greater than 101 degrees F  Additional swelling  Pain not controlled with current pain medication  Blood Clot: Report these signs and symptoms to your surgeon:  Unusual pain, unusual warm skin  Red or discolored skin  Swelling in the leg

## 2023-10-06 ENCOUNTER — TELEPHONE (OUTPATIENT)
Dept: MEDSURG UNIT | Facility: OTHER | Age: 78
End: 2023-10-06
Payer: MEDICARE

## 2023-10-09 ENCOUNTER — HOSPITAL ENCOUNTER (OUTPATIENT)
Dept: PREADMISSION TESTING | Facility: OTHER | Age: 78
Discharge: HOME | End: 2023-10-09
Attending: ORTHOPAEDIC SURGERY
Payer: MEDICARE

## 2023-10-09 VITALS
SYSTOLIC BLOOD PRESSURE: 130 MMHG | WEIGHT: 112 LBS | TEMPERATURE: 97 F | OXYGEN SATURATION: 98 % | DIASTOLIC BLOOD PRESSURE: 63 MMHG | RESPIRATION RATE: 17 BRPM | HEART RATE: 75 BPM | HEIGHT: 65 IN | BODY MASS INDEX: 18.66 KG/M2

## 2023-10-09 LAB
ANION GAP SERPL CALC-SCNC: 9 MMOL/L (ref 8–16)
BASOPHILS # BLD AUTO: 0.05 K/UL (ref 0–0.2)
BASOPHILS NFR BLD: 0.7 % (ref 0–1.9)
BILIRUB UR QL STRIP: NEGATIVE
BUN SERPL-MCNC: 20 MG/DL (ref 8–23)
CALCIUM SERPL-MCNC: 10.2 MG/DL (ref 8.7–10.5)
CHLORIDE SERPL-SCNC: 105 MMOL/L (ref 95–110)
CLARITY UR: ABNORMAL
CO2 SERPL-SCNC: 26 MMOL/L (ref 23–29)
COLOR UR: YELLOW
CREAT SERPL-MCNC: 0.9 MG/DL (ref 0.5–1.4)
DIFFERENTIAL METHOD: NORMAL
EOSINOPHIL # BLD AUTO: 0.1 K/UL (ref 0–0.5)
EOSINOPHIL NFR BLD: 1.3 % (ref 0–8)
ERYTHROCYTE [DISTWIDTH] IN BLOOD BY AUTOMATED COUNT: 12.9 % (ref 11.5–14.5)
EST. GFR  (NO RACE VARIABLE): >60 ML/MIN/1.73 M^2
GLUCOSE SERPL-MCNC: 92 MG/DL (ref 70–110)
GLUCOSE UR QL STRIP: NEGATIVE
HCT VFR BLD AUTO: 45.2 % (ref 37–48.5)
HGB BLD-MCNC: 14.5 G/DL (ref 12–16)
HGB UR QL STRIP: ABNORMAL
IMM GRANULOCYTES # BLD AUTO: 0.02 K/UL (ref 0–0.04)
IMM GRANULOCYTES NFR BLD AUTO: 0.3 % (ref 0–0.5)
KETONES UR QL STRIP: NEGATIVE
LEUKOCYTE ESTERASE UR QL STRIP: NEGATIVE
LYMPHOCYTES # BLD AUTO: 2 K/UL (ref 1–4.8)
LYMPHOCYTES NFR BLD: 29.1 % (ref 18–48)
MCH RBC QN AUTO: 29.4 PG (ref 27–31)
MCHC RBC AUTO-ENTMCNC: 32.1 G/DL (ref 32–36)
MCV RBC AUTO: 92 FL (ref 82–98)
MONOCYTES # BLD AUTO: 0.5 K/UL (ref 0.3–1)
MONOCYTES NFR BLD: 8 % (ref 4–15)
NEUTROPHILS # BLD AUTO: 4.1 K/UL (ref 1.8–7.7)
NEUTROPHILS NFR BLD: 60.6 % (ref 38–73)
NITRITE UR QL STRIP: NEGATIVE
NRBC BLD-RTO: 0 /100 WBC
PH UR STRIP: 7 [PH] (ref 5–8)
PLATELET # BLD AUTO: 240 K/UL (ref 150–450)
PMV BLD AUTO: 9.3 FL (ref 9.2–12.9)
POTASSIUM SERPL-SCNC: 4.4 MMOL/L (ref 3.5–5.1)
PROT UR QL STRIP: NEGATIVE
RBC # BLD AUTO: 4.94 M/UL (ref 4–5.4)
SODIUM SERPL-SCNC: 140 MMOL/L (ref 136–145)
SP GR UR STRIP: 1.02 (ref 1–1.03)
URN SPEC COLLECT METH UR: ABNORMAL
UROBILINOGEN UR STRIP-ACNC: NEGATIVE EU/DL
WBC # BLD AUTO: 6.78 K/UL (ref 3.9–12.7)

## 2023-10-09 PROCEDURE — 81003 URINALYSIS AUTO W/O SCOPE: CPT | Performed by: ORTHOPAEDIC SURGERY

## 2023-10-09 PROCEDURE — 36415 COLL VENOUS BLD VENIPUNCTURE: CPT | Performed by: ORTHOPAEDIC SURGERY

## 2023-10-09 PROCEDURE — 85025 COMPLETE CBC W/AUTO DIFF WBC: CPT | Performed by: ORTHOPAEDIC SURGERY

## 2023-10-09 PROCEDURE — 80048 BASIC METABOLIC PNL TOTAL CA: CPT | Performed by: ORTHOPAEDIC SURGERY

## 2023-10-09 RX ORDER — SODIUM CHLORIDE, SODIUM LACTATE, POTASSIUM CHLORIDE, CALCIUM CHLORIDE 600; 310; 30; 20 MG/100ML; MG/100ML; MG/100ML; MG/100ML
INJECTION, SOLUTION INTRAVENOUS CONTINUOUS
Status: CANCELLED | OUTPATIENT
Start: 2023-10-09

## 2023-10-09 RX ORDER — ACETAMINOPHEN 325 MG/1
975 TABLET ORAL
Status: CANCELLED | OUTPATIENT
Start: 2023-10-09 | End: 2023-10-09

## 2023-10-09 RX ORDER — LIDOCAINE HYDROCHLORIDE 10 MG/ML
0.5 INJECTION, SOLUTION EPIDURAL; INFILTRATION; INTRACAUDAL; PERINEURAL ONCE
Status: CANCELLED | OUTPATIENT
Start: 2023-10-09 | End: 2023-10-09

## 2023-10-09 RX ORDER — FAMOTIDINE 20 MG/1
20 TABLET, FILM COATED ORAL
Status: CANCELLED | OUTPATIENT
Start: 2023-10-09 | End: 2023-10-09

## 2023-10-09 NOTE — DISCHARGE INSTRUCTIONS
Information to Prepare you for your Surgery    PRE-ADMIT TESTING -  [phone]    [address]          Your surgery has been scheduled at [facility]. We are pleased to have the opportunity to serve you. For Further Information please call [phone].    On the day of surgery please report to the Information Desk on the 1st floor.    CONTACT YOUR PHYSICIAN'S OFFICE THE DAY PRIOR TO YOUR SURGERY TO OBTAIN YOUR ARRIVAL TIME.     The evening before surgery do not eat anything after 9 p.m. ( this includes hard candy, chewing gum and mints).  You may only have GATORADE, POWERADE AND WATER  from 9 p.m. until you leave your home.   DO NOT DRINK ANY LIQUIDS ON THE WAY TO THE HOSPITAL.      Why does your anesthesiologist allow you to drink Gatorade/Powerade before surgery?  Gatorade/Powerade helps to increase your comfort before surgery and to decrease your nausea after surgery. The carbohydrates in Gatorade/Powerade help reduce your body's stress response to surgery.  If you are a diabetic-drink only water prior to surgery.    Outpatient Surgery- May allow 2 adult Support Persons (1 being the designated ) for all surgical/procedural patients. A breastfeeding mother will be allowed her infant and 2 adult Support Persons.       SPECIAL MEDICATION INSTRUCTIONS: TAKE medications checked off by the Anesthesiologist on your Medication List.    Angiogram Patients: Take medications as instructed by your physician, including aspirin.     Surgery Patients:    If you take ASPIRIN - Your PHYSICIAN/SURGEON will need to inform you IF/OR when you need to stop taking aspirin prior to your surgery.     The week prior to surgery do not ot take any medications containing IBUPROFEN or NSAIDS ( Advil, Motrin, Goodys, BC, Aleve, Naproxen etc) If you are not sure if you should take a medicine please call your surgeon's office.  Ok to take Tylenol    Do Not Wear any  make-up (especially eye make-up) to surgery. Please remove any false eyelashes or eyelash extensions. If you arrive the day of surgery with makeup/eyelashes on you will be required to remove prior to surgery. (There is a risk of corneal abrasions if eye makeup/eyelash extensions are not removed)      Leave all valuables at home.   Do Not wear any jewelry or watches, including any metal in body piercings. Jewelry must be removed prior to coming to the hospital.  There is a possibility that rings that are unable to be removed may be cut off if they are on the surgical extremity.    Please remove all hair extensions, wigs, clips and any other metal accessories/ ornaments from your hair.  These items may pose a flammable/fire risk in Surgery and must be removed.    Do not shave your surgical area at least 5 days prior to your surgery. The surgical prep will be performed at the hospital according to Infection Control regulations.    Contact Lens must be removed before surgery. Either do not wear the contact lens or bring a case and solution for storage.  Please bring a container for eyeglasses or dentures as required.  Bring any paperwork your physician has provided, such as consent forms,  history and physicals, doctor's orders, etc.   Bring comfortable clothes that are loose fitting to wear upon discharge. Take into consideration the type of surgery being performed.  Maintain your diet as advised per your physician the day prior to surgery.      Adequate rest the night before surgery is advised.   Park in the Parking lot behind the hospital or in the [place] [facility] across the street from the parking lot. Parking is complimentary.  If you will be discharged the same day as your procedure, please arrange for a responsible adult to drive you home or to accompany you if traveling by taxi.   YOU WILL NOT BE PERMITTED TO DRIVE OR TO LEAVE THE HOSPITAL ALONE AFTER SURGERY.   If you are being discharged the same day,  it is strongly recommended that you arrange for someone to remain with you for the first 24 hrs following your surgery.    The Surgeon will speak to your family/visitor after your surgery regarding the outcome of your surgery and post op care.  The Surgeon may speak to you after your surgery, but there is a possibility you may not remember the details.  Please check with your family members regarding the conversation with the Surgeon.    We strongly recommend whoever is bringing you home be present for discharge instructions.  This will ensure a thorough understanding for your post op home care.          Thank you for your cooperation.  The Staff of [facility].            Bathing Instructions with Hibiclens    Shower the evening before and morning of your procedure with Chlorhexidine (Hibiclens)  do not use Chlorhexidine on your face or genitals. Do not get in your eyes.  Wash your face with water and your regular face wash/soap  Use your regular shampoo  Apply Chlorhexidine (Hibiclens) directly on your skin or on a wet washcloth and wash gently. When showering: Move away from the shower stream when applying Chlorhexidine (Hibiclens) to avoid rinsing off too soon.  Rinse thoroughly with warm water  Do not dilute Chlorhexidine (Hibiclens)   Dry off as usual, do not use any deodorant, powder, body lotions, perfume, after shave or cologne.

## 2023-10-09 NOTE — ANESTHESIA PREPROCEDURE EVALUATION
10/09/2023  Marian Durham is a 78 y.o., female.      Pre-op Assessment    I have reviewed the Patient Summary Reports.     I have reviewed the Nursing Notes. I have reviewed the NPO Status.   I have reviewed the Medications.     Review of Systems  Anesthesia Hx:  No problems with previous Anesthesia    Social:  Non-Smoker    Hematology/Oncology:  Hematology Normal   Oncology Normal     EENT/Dental:EENT/Dental Normal   Cardiovascular:   Dysrhythmias atrial fibrillation hyperlipidemia    Pulmonary:  Pulmonary Normal    Hepatic/GI:  Hepatic/GI Normal    Musculoskeletal:   Arthritis     Neurological:  Neurology Normal    Endocrine:  Endocrine Normal    Psych:  Psychiatric Normal           Physical Exam  General: Well nourished, Cooperative and Alert    Airway:  Mallampati: II   Mouth Opening: Normal  TM Distance: Normal  Tongue: Normal  Neck ROM: Normal ROM    Dental:  Intact        Anesthesia Plan  Type of Anesthesia, risks & benefits discussed:    Anesthesia Type: Spinal  Intra-op Monitoring Plan: Standard ASA Monitors  Post Op Pain Control Plan: multimodal analgesia  Induction:  IV  Informed Consent: Informed consent signed with the Patient and all parties understand the risks and agree with anesthesia plan.  All questions answered.   ASA Score: 3  Anesthesia Plan Notes: Echo results from [facility],Summary    ? The left ventricle is normal in size with normal systolic function.  ? Grade I left ventricular diastolic dysfunction.  ? Normal right ventricular size with normal right ventricular systolic function.    Saw Dr Cheung for preop optimization, Preoperative cardiovascular examination  -Patient is low risk for intermediate risk surgery. No risk factors on the RCRI scale. Can proceed without further testing  -Patient able to accomplish > 4 METS without issue     Per pt she is not on any blood  thinners.  Discussed spinal.         Ready For Surgery From Anesthesia Perspective.     .

## 2023-10-19 ENCOUNTER — HOSPITAL ENCOUNTER (OUTPATIENT)
Facility: OTHER | Age: 78
Discharge: HOME HEALTH | End: 2023-10-20
Attending: ORTHOPAEDIC SURGERY | Admitting: ORTHOPAEDIC SURGERY
Payer: MEDICARE

## 2023-10-19 ENCOUNTER — ANESTHESIA (OUTPATIENT)
Dept: SURGERY | Facility: OTHER | Age: 78
End: 2023-10-19
Payer: MEDICARE

## 2023-10-19 DIAGNOSIS — M17.11 PRIMARY OSTEOARTHRITIS OF RIGHT KNEE: Primary | ICD-10-CM

## 2023-10-19 DIAGNOSIS — M17.9 OA (OSTEOARTHRITIS) OF KNEE: ICD-10-CM

## 2023-10-19 PROCEDURE — 25000003 PHARM REV CODE 250: Performed by: ORTHOPAEDIC SURGERY

## 2023-10-19 PROCEDURE — 99900035 HC TECH TIME PER 15 MIN (STAT)

## 2023-10-19 PROCEDURE — 63600175 PHARM REV CODE 636 W HCPCS: Performed by: NURSE ANESTHETIST, CERTIFIED REGISTERED

## 2023-10-19 PROCEDURE — 63600175 PHARM REV CODE 636 W HCPCS: Performed by: NURSE PRACTITIONER

## 2023-10-19 PROCEDURE — 36000710: Performed by: ORTHOPAEDIC SURGERY

## 2023-10-19 PROCEDURE — D9220A PRA ANESTHESIA: Mod: CRNA,,, | Performed by: NURSE ANESTHETIST, CERTIFIED REGISTERED

## 2023-10-19 PROCEDURE — D9220A PRA ANESTHESIA: ICD-10-PCS | Mod: CRNA,,, | Performed by: NURSE ANESTHETIST, CERTIFIED REGISTERED

## 2023-10-19 PROCEDURE — C1713 ANCHOR/SCREW BN/BN,TIS/BN: HCPCS | Performed by: ORTHOPAEDIC SURGERY

## 2023-10-19 PROCEDURE — 71000033 HC RECOVERY, INTIAL HOUR: Performed by: ORTHOPAEDIC SURGERY

## 2023-10-19 PROCEDURE — C9290 INJ, BUPIVACAINE LIPOSOME: HCPCS | Performed by: ORTHOPAEDIC SURGERY

## 2023-10-19 PROCEDURE — 63600175 PHARM REV CODE 636 W HCPCS: Performed by: ANESTHESIOLOGY

## 2023-10-19 PROCEDURE — 94761 N-INVAS EAR/PLS OXIMETRY MLT: CPT

## 2023-10-19 PROCEDURE — 25000003 PHARM REV CODE 250: Performed by: ANESTHESIOLOGY

## 2023-10-19 PROCEDURE — 63600175 PHARM REV CODE 636 W HCPCS: Performed by: ORTHOPAEDIC SURGERY

## 2023-10-19 PROCEDURE — 25000003 PHARM REV CODE 250: Performed by: NURSE PRACTITIONER

## 2023-10-19 PROCEDURE — C1776 JOINT DEVICE (IMPLANTABLE): HCPCS | Performed by: ORTHOPAEDIC SURGERY

## 2023-10-19 PROCEDURE — D9220A PRA ANESTHESIA: ICD-10-PCS | Mod: ANES,,, | Performed by: ANESTHESIOLOGY

## 2023-10-19 PROCEDURE — 94799 UNLISTED PULMONARY SVC/PX: CPT

## 2023-10-19 PROCEDURE — 37000009 HC ANESTHESIA EA ADD 15 MINS: Performed by: ORTHOPAEDIC SURGERY

## 2023-10-19 PROCEDURE — 71000039 HC RECOVERY, EACH ADD'L HOUR: Performed by: ORTHOPAEDIC SURGERY

## 2023-10-19 PROCEDURE — 36000711: Performed by: ORTHOPAEDIC SURGERY

## 2023-10-19 PROCEDURE — 27201423 OPTIME MED/SURG SUP & DEVICES STERILE SUPPLY: Performed by: ORTHOPAEDIC SURGERY

## 2023-10-19 PROCEDURE — 97110 THERAPEUTIC EXERCISES: CPT

## 2023-10-19 PROCEDURE — 97162 PT EVAL MOD COMPLEX 30 MIN: CPT

## 2023-10-19 PROCEDURE — 25000003 PHARM REV CODE 250: Performed by: NURSE ANESTHETIST, CERTIFIED REGISTERED

## 2023-10-19 PROCEDURE — D9220A PRA ANESTHESIA: Mod: ANES,,, | Performed by: ANESTHESIOLOGY

## 2023-10-19 PROCEDURE — 37000008 HC ANESTHESIA 1ST 15 MINUTES: Performed by: ORTHOPAEDIC SURGERY

## 2023-10-19 PROCEDURE — 63600175 PHARM REV CODE 636 W HCPCS

## 2023-10-19 DEVICE — PATELLA COMPONENT 3 PEG 31X8MM: Type: IMPLANTABLE DEVICE | Site: KNEE | Status: FUNCTIONAL

## 2023-10-19 DEVICE — TIBIAL TRAY CRUCIATE 67MM: Type: IMPLANTABLE DEVICE | Site: KNEE | Status: FUNCTIONAL

## 2023-10-19 DEVICE — IMPLANTABLE DEVICE: Type: IMPLANTABLE DEVICE | Site: KNEE | Status: FUNCTIONAL

## 2023-10-19 DEVICE — COMPONENT VANGUARD FEM 60M R: Type: IMPLANTABLE DEVICE | Site: KNEE | Status: FUNCTIONAL

## 2023-10-19 DEVICE — BEARING VANGAURD TIB 10X67MM: Type: IMPLANTABLE DEVICE | Site: KNEE | Status: FUNCTIONAL

## 2023-10-19 RX ORDER — TALC
8 POWDER (GRAM) TOPICAL NIGHTLY PRN
Status: DISCONTINUED | OUTPATIENT
Start: 2023-10-19 | End: 2023-10-20 | Stop reason: HOSPADM

## 2023-10-19 RX ORDER — ONDANSETRON 8 MG/1
8 TABLET, ORALLY DISINTEGRATING ORAL EVERY 8 HOURS PRN
Status: DISCONTINUED | OUTPATIENT
Start: 2023-10-19 | End: 2023-10-20 | Stop reason: HOSPADM

## 2023-10-19 RX ORDER — CELECOXIB 200 MG/1
200 CAPSULE ORAL
Status: DISCONTINUED | OUTPATIENT
Start: 2023-10-19 | End: 2023-10-20 | Stop reason: HOSPADM

## 2023-10-19 RX ORDER — NAPROXEN SODIUM 220 MG/1
81 TABLET, FILM COATED ORAL 2 TIMES DAILY
Status: DISCONTINUED | OUTPATIENT
Start: 2023-10-19 | End: 2023-10-20 | Stop reason: HOSPADM

## 2023-10-19 RX ORDER — SODIUM CHLORIDE 0.9 % (FLUSH) 0.9 %
5 SYRINGE (ML) INJECTION
Status: DISCONTINUED | OUTPATIENT
Start: 2023-10-19 | End: 2023-10-20 | Stop reason: HOSPADM

## 2023-10-19 RX ORDER — TRANEXAMIC ACID 100 MG/ML
INJECTION, SOLUTION INTRAVENOUS
Status: DISCONTINUED | OUTPATIENT
Start: 2023-10-19 | End: 2023-10-19 | Stop reason: HOSPADM

## 2023-10-19 RX ORDER — MEPERIDINE HYDROCHLORIDE 25 MG/ML
12.5 INJECTION INTRAMUSCULAR; INTRAVENOUS; SUBCUTANEOUS ONCE AS NEEDED
Status: DISCONTINUED | OUTPATIENT
Start: 2023-10-19 | End: 2023-10-19 | Stop reason: HOSPADM

## 2023-10-19 RX ORDER — LIDOCAINE HYDROCHLORIDE 10 MG/ML
0.5 INJECTION, SOLUTION EPIDURAL; INFILTRATION; INTRACAUDAL; PERINEURAL ONCE
Status: DISCONTINUED | OUTPATIENT
Start: 2023-10-19 | End: 2023-10-19 | Stop reason: HOSPADM

## 2023-10-19 RX ORDER — PHENYLEPHRINE HYDROCHLORIDE 10 MG/ML
INJECTION INTRAVENOUS
Status: DISCONTINUED | OUTPATIENT
Start: 2023-10-19 | End: 2023-10-19

## 2023-10-19 RX ORDER — LIDOCAINE HYDROCHLORIDE 20 MG/ML
INJECTION INTRAVENOUS
Status: DISCONTINUED | OUTPATIENT
Start: 2023-10-19 | End: 2023-10-19

## 2023-10-19 RX ORDER — MUPIROCIN 20 MG/G
OINTMENT TOPICAL 2 TIMES DAILY
Status: DISCONTINUED | OUTPATIENT
Start: 2023-10-19 | End: 2023-10-20 | Stop reason: HOSPADM

## 2023-10-19 RX ORDER — HYDROCODONE BITARTRATE AND ACETAMINOPHEN 10; 325 MG/1; MG/1
1 TABLET ORAL EVERY 4 HOURS PRN
Status: DISCONTINUED | OUTPATIENT
Start: 2023-10-19 | End: 2023-10-20 | Stop reason: HOSPADM

## 2023-10-19 RX ORDER — ATORVASTATIN CALCIUM 20 MG/1
20 TABLET, FILM COATED ORAL NIGHTLY
Status: DISCONTINUED | OUTPATIENT
Start: 2023-10-19 | End: 2023-10-20 | Stop reason: HOSPADM

## 2023-10-19 RX ORDER — ROPIVACAINE HYDROCHLORIDE 5 MG/ML
INJECTION, SOLUTION EPIDURAL; INFILTRATION; PERINEURAL
Status: COMPLETED | OUTPATIENT
Start: 2023-10-19 | End: 2023-10-19

## 2023-10-19 RX ORDER — BUPIVACAINE HYDROCHLORIDE AND EPINEPHRINE 5; 5 MG/ML; UG/ML
INJECTION, SOLUTION EPIDURAL; INTRACAUDAL; PERINEURAL
Status: DISCONTINUED | OUTPATIENT
Start: 2023-10-19 | End: 2023-10-19 | Stop reason: HOSPADM

## 2023-10-19 RX ORDER — SODIUM CHLORIDE 0.9 % (FLUSH) 0.9 %
3 SYRINGE (ML) INJECTION
Status: DISCONTINUED | OUTPATIENT
Start: 2023-10-19 | End: 2023-10-19 | Stop reason: HOSPADM

## 2023-10-19 RX ORDER — CEFAZOLIN SODIUM 1 G/3ML
2 INJECTION, POWDER, FOR SOLUTION INTRAMUSCULAR; INTRAVENOUS
Status: COMPLETED | OUTPATIENT
Start: 2023-10-19 | End: 2023-10-19

## 2023-10-19 RX ORDER — PROPOFOL 10 MG/ML
VIAL (ML) INTRAVENOUS
Status: DISCONTINUED | OUTPATIENT
Start: 2023-10-19 | End: 2023-10-19

## 2023-10-19 RX ORDER — FENTANYL CITRATE 50 UG/ML
INJECTION, SOLUTION INTRAMUSCULAR; INTRAVENOUS
Status: DISCONTINUED | OUTPATIENT
Start: 2023-10-19 | End: 2023-10-19

## 2023-10-19 RX ORDER — HYDROCODONE BITARTRATE AND ACETAMINOPHEN 5; 325 MG/1; MG/1
1 TABLET ORAL EVERY 4 HOURS PRN
Status: DISCONTINUED | OUTPATIENT
Start: 2023-10-19 | End: 2023-10-20 | Stop reason: HOSPADM

## 2023-10-19 RX ORDER — PROCHLORPERAZINE EDISYLATE 5 MG/ML
5 INJECTION INTRAMUSCULAR; INTRAVENOUS EVERY 30 MIN PRN
Status: DISCONTINUED | OUTPATIENT
Start: 2023-10-19 | End: 2023-10-19 | Stop reason: HOSPADM

## 2023-10-19 RX ORDER — PROPOFOL 10 MG/ML
VIAL (ML) INTRAVENOUS CONTINUOUS PRN
Status: DISCONTINUED | OUTPATIENT
Start: 2023-10-19 | End: 2023-10-19

## 2023-10-19 RX ORDER — ONDANSETRON 2 MG/ML
INJECTION INTRAMUSCULAR; INTRAVENOUS
Status: DISCONTINUED | OUTPATIENT
Start: 2023-10-19 | End: 2023-10-19

## 2023-10-19 RX ORDER — SODIUM CHLORIDE, SODIUM LACTATE, POTASSIUM CHLORIDE, CALCIUM CHLORIDE 600; 310; 30; 20 MG/100ML; MG/100ML; MG/100ML; MG/100ML
INJECTION, SOLUTION INTRAVENOUS CONTINUOUS
Status: DISCONTINUED | OUTPATIENT
Start: 2023-10-19 | End: 2023-10-19

## 2023-10-19 RX ORDER — ACETAMINOPHEN 325 MG/1
975 TABLET ORAL
Status: COMPLETED | OUTPATIENT
Start: 2023-10-19 | End: 2023-10-19

## 2023-10-19 RX ORDER — DEXTROSE MONOHYDRATE AND SODIUM CHLORIDE 5; .9 G/100ML; G/100ML
INJECTION, SOLUTION INTRAVENOUS CONTINUOUS
Status: DISCONTINUED | OUTPATIENT
Start: 2023-10-19 | End: 2023-10-20

## 2023-10-19 RX ORDER — SODIUM CHLORIDE 9 MG/ML
INJECTION, SOLUTION INTRAVENOUS CONTINUOUS
Status: DISCONTINUED | OUTPATIENT
Start: 2023-10-19 | End: 2023-10-19

## 2023-10-19 RX ORDER — FAMOTIDINE 20 MG/1
20 TABLET, FILM COATED ORAL DAILY
Status: DISCONTINUED | OUTPATIENT
Start: 2023-10-20 | End: 2023-10-20 | Stop reason: HOSPADM

## 2023-10-19 RX ORDER — OXYCODONE HYDROCHLORIDE 5 MG/1
5 TABLET ORAL
Status: DISCONTINUED | OUTPATIENT
Start: 2023-10-19 | End: 2023-10-19 | Stop reason: HOSPADM

## 2023-10-19 RX ORDER — FAMOTIDINE 20 MG/1
20 TABLET, FILM COATED ORAL
Status: COMPLETED | OUTPATIENT
Start: 2023-10-19 | End: 2023-10-19

## 2023-10-19 RX ORDER — METOCLOPRAMIDE HYDROCHLORIDE 5 MG/ML
5 INJECTION INTRAMUSCULAR; INTRAVENOUS EVERY 6 HOURS PRN
Status: DISCONTINUED | OUTPATIENT
Start: 2023-10-19 | End: 2023-10-20 | Stop reason: HOSPADM

## 2023-10-19 RX ORDER — POLYETHYLENE GLYCOL 3350 17 G/17G
17 POWDER, FOR SOLUTION ORAL DAILY
Status: DISCONTINUED | OUTPATIENT
Start: 2023-10-20 | End: 2023-10-20 | Stop reason: HOSPADM

## 2023-10-19 RX ORDER — HYDROMORPHONE HYDROCHLORIDE 2 MG/ML
0.4 INJECTION, SOLUTION INTRAMUSCULAR; INTRAVENOUS; SUBCUTANEOUS EVERY 5 MIN PRN
Status: DISCONTINUED | OUTPATIENT
Start: 2023-10-19 | End: 2023-10-19 | Stop reason: HOSPADM

## 2023-10-19 RX ORDER — POLYETHYLENE GLYCOL 3350 17 G/17G
17 POWDER, FOR SOLUTION ORAL DAILY
Status: DISCONTINUED | OUTPATIENT
Start: 2023-10-20 | End: 2023-10-19

## 2023-10-19 RX ORDER — METOCLOPRAMIDE HYDROCHLORIDE 5 MG/ML
10 INJECTION INTRAMUSCULAR; INTRAVENOUS EVERY 8 HOURS
Status: DISCONTINUED | OUTPATIENT
Start: 2023-10-19 | End: 2023-10-20 | Stop reason: HOSPADM

## 2023-10-19 RX ADMIN — LIDOCAINE HYDROCHLORIDE 50 MG: 20 INJECTION, SOLUTION INTRAVENOUS at 10:10

## 2023-10-19 RX ADMIN — CEFAZOLIN 2 G: 330 INJECTION, POWDER, FOR SOLUTION INTRAMUSCULAR; INTRAVENOUS at 11:10

## 2023-10-19 RX ADMIN — ATORVASTATIN CALCIUM 20 MG: 20 TABLET, FILM COATED ORAL at 09:10

## 2023-10-19 RX ADMIN — Medication 9 MG: at 09:10

## 2023-10-19 RX ADMIN — SODIUM CHLORIDE, SODIUM LACTATE, POTASSIUM CHLORIDE, AND CALCIUM CHLORIDE: 600; 310; 30; 20 INJECTION, SOLUTION INTRAVENOUS at 11:10

## 2023-10-19 RX ADMIN — CEFAZOLIN 2 G: 2 INJECTION, POWDER, FOR SOLUTION INTRAMUSCULAR; INTRAVENOUS at 06:10

## 2023-10-19 RX ADMIN — PROPOFOL 10 MG: 10 INJECTION, EMULSION INTRAVENOUS at 11:10

## 2023-10-19 RX ADMIN — PROPOFOL 20 MG: 10 INJECTION, EMULSION INTRAVENOUS at 11:10

## 2023-10-19 RX ADMIN — FENTANYL CITRATE 100 MCG: 50 INJECTION, SOLUTION INTRAMUSCULAR; INTRAVENOUS at 10:10

## 2023-10-19 RX ADMIN — MUPIROCIN: 20 OINTMENT TOPICAL at 09:10

## 2023-10-19 RX ADMIN — ACETAMINOPHEN 975 MG: 325 TABLET, FILM COATED ORAL at 08:10

## 2023-10-19 RX ADMIN — SODIUM CHLORIDE, SODIUM LACTATE, POTASSIUM CHLORIDE, AND CALCIUM CHLORIDE: 600; 310; 30; 20 INJECTION, SOLUTION INTRAVENOUS at 10:10

## 2023-10-19 RX ADMIN — CELECOXIB 200 MG: 200 CAPSULE ORAL at 07:10

## 2023-10-19 RX ADMIN — FAMOTIDINE 20 MG: 20 TABLET, FILM COATED ORAL at 08:10

## 2023-10-19 RX ADMIN — DEXTROSE AND SODIUM CHLORIDE: 5; 900 INJECTION, SOLUTION INTRAVENOUS at 02:10

## 2023-10-19 RX ADMIN — METOCLOPRAMIDE 10 MG: 5 INJECTION, SOLUTION INTRAMUSCULAR; INTRAVENOUS at 09:10

## 2023-10-19 RX ADMIN — ASPIRIN 81 MG CHEWABLE TABLET 81 MG: 81 TABLET CHEWABLE at 09:10

## 2023-10-19 RX ADMIN — PHENYLEPHRINE HYDROCHLORIDE 100 MCG: 10 INJECTION INTRAVENOUS at 11:10

## 2023-10-19 RX ADMIN — PROPOFOL 75 MCG/KG/MIN: 10 INJECTION, EMULSION INTRAVENOUS at 10:10

## 2023-10-19 RX ADMIN — HYDROCODONE BITARTRATE AND ACETAMINOPHEN 1 TABLET: 5; 325 TABLET ORAL at 06:10

## 2023-10-19 RX ADMIN — HYDROCODONE BITARTRATE AND ACETAMINOPHEN 1 TABLET: 10; 325 TABLET ORAL at 02:10

## 2023-10-19 RX ADMIN — ONDANSETRON HYDROCHLORIDE 4 MG: 2 INJECTION INTRAMUSCULAR; INTRAVENOUS at 10:10

## 2023-10-19 RX ADMIN — PHENYLEPHRINE HYDROCHLORIDE 200 MCG: 10 INJECTION INTRAVENOUS at 11:10

## 2023-10-19 RX ADMIN — ROPIVACAINE HYDROCHLORIDE 3 ML: 5 INJECTION, SOLUTION EPIDURAL; INFILTRATION; PERINEURAL at 10:10

## 2023-10-19 NOTE — NURSING TRANSFER
Nursing Transfer Note      10/19/2023   2:36 PM    Nurse giving handoff:aurora rubio  Nurse receiving handoff:rn room 371    Reason patient is being transferred: post op    Transfer To: room 471    Transfer via bed    Transfer with polar ice wrap    Transported by rn    Transfer Vital Signs:  Blood Pressure:seeflowsheet  Heart Rate:  O2:  Temperature:  Respirations:    Telemetry:   Order for Tele Monitor?     Additional Lines: Pederson Catheter    4eyes on Skin: yes    Medicines sent:     Any special needs or follow-up needed:     Patient belongings transferred with patient: Yes    Chart send with patient: Yes    Notified: family    Patient reassessed at:  (date, time)  1  Upon arrival to floor:

## 2023-10-19 NOTE — TRANSFER OF CARE
Anesthesia Transfer of Care Note    Patient: Marian Durham    Procedure(s) Performed: Procedure(s) (LRB):  ARTHROPLASTY, KNEE, TOTAL (Right)    Patient location: PACU    Anesthesia Type: spinal    Transport from OR: Transported from OR on 2-3 L/min O2 by NC with adequate spontaneous ventilation    Post pain: adequate analgesia    Post assessment: no apparent anesthetic complications    Post vital signs: stable    Level of consciousness: awake    Nausea/Vomiting: no nausea/vomiting    Complications: none    Transfer of care protocol was followed      Last vitals:   Visit Vitals  BP (!) 167/87 (BP Location: Right arm, Patient Position: Lying)   Pulse 75   Temp 36.2 °C (97.2 °F) (Temporal)   Resp 20   SpO2 99%   Breastfeeding No

## 2023-10-19 NOTE — OP NOTE
[facility], [place], operated by [facility]  Surgery Department  Operative Note    SUMMARY     Date of Procedure: 10/19/2023     Procedure: Procedure(s) (LRB):  ARTHROPLASTY, KNEE, TOTAL (Right)     Surgeon(s) and Role:     * Vicente Mitchell MD - Primary    Assisting Surgeon:  Fernando Jang    Pre-Operative Diagnosis: Osteoarthritis of right knee [M17.11]    Post-Operative Diagnosis: Post-Op Diagnosis Codes:     * Osteoarthritis of right knee [M17.11]    Anesthesia: Choice    Operative Findings (including complications, if any):  Osteoarthritis valgus alignment right knee    Description of Technical Procedures:  Patient brought the operating room and a spinal anesthesia.  Positioned appropriately prepped in usual fashion tourniquet applied 250 mm mercury after Esmarch.  Using standard midline incision sharp dissection made down extensor mechanism a standard medial parapatellar arthrotomy was performed medial fat pad was removed and a small medial release performed because she is significant valgus alignment.  Anterior posterior cruciate ligament remnants removed mediolateral meniscal remnants removed.  A tibial cut was made per midshaft tibia sizing was 67 attention then turned to the femur with intramedullary guide a 3 degree provisional distal cut was performed.  That got rid of the lateral deformity.  Just a skim cut there.  Sizing was 60 so with the tensor 1st in flexion the anterior-posterior cut was performed flexion gap was symmetric at 10.  Extension gap was symmetric at 10 with the provisional cut.  Chamber cuts were performed.  Floating trial 0-130 degrees range of motion rotation was marked tibia was punched prepatellar thickness 15 post patellar thickness 22 with a 31 patella the appropriate components opened using good cement technique all components were cemented excess cement was removed pulse lavage irrigation was used.  Final 10 mm AS placed.  Again excellent range of motion stability.  1. Vicryl was used on the  parapatellar arthrotomy closed in flexion post closure 0-130 degrees range of motion to 0 Vicryl subcutaneously staples on the skin.  Exparel tranexamic acid and Marcaine were instilled the soft tissues placed in bulky sterile dressing tourniquet released 48 minutes tolerated procedure well brought to come sterile fashion     Poor voice recognition    Significant Surgical Tasks Conducted by the Assistant(s), if Applicable:  Retraction    Estimated Blood Loss (EBL): * No values recorded between 10/19/2023 11:13 AM and 10/19/2023 12:05 PM * 48           Implants:   Implant Name Type Inv. Item Serial No.  Lot No. LRB No. Used Action   TIBIAL TRAY CRUCIATE 67MM - [id number]  TIBIAL TRAY CRUCIATE 67MM  [employer] [id number] Right 1 Implanted   COMPONENT VANGUARD FEM 60M R - [id number]  COMPONENT VANGUARD FEM 60M R  [employer] [id number] Right 1 Implanted   BEARING VANGAURD TIB [id number] - [id number]  BEARING VANGAURD TIB [id number]  [employer]EL[employer] [id number] Right 1 Implanted   PATELLA COMPONENT 3 PEG 31X8MM - [id number]  PATELLA COMPONENT 3 PEG 31X8MM  [employer] [id number] Right 1 Implanted          1 Implanted       Specimens:   Specimen (24h ago, onward)      None                    Condition: Good    Disposition: PACU - hemodynamically stable.    Attestation: I was present and scrubbed for the entire procedure.

## 2023-10-19 NOTE — CONSULTS
"Consult Note  MED    Consult Requested By: Vicente Mitchell MD  Reason for Consult: lipids/SBO/PONV    SUBJECTIVE:     History of Present Illness:  Patient is a 78 y.o. female presents with scheduled right knee repair.  Seen pre-op on ambulatory.  Discussed with RN/Dr. Mitchell.  Pre-op/Epic reviewed.  No CP/SOB/N/V/D/F/C.      Past Medical History:   Diagnosis Date    Atrial fibrillation     stress related per patient    Hyperlipidemia     Osteoarthritis     PONV (postoperative nausea and vomiting)     SBO (small bowel obstruction)     Shingles 01/01/2002    Spinal stenosis      Past Surgical History:   Procedure Laterality Date    ANKLE FRACTURE SURGERY  2007    Right ankle    COLON SURGERY      bowel obstruction    COLONOSCOPY      x 2    EXCISION OF MASS OF BACK N/A 11/16/2018    Procedure: EXCISION, MASS, BACK;  Surgeon: Fran Magaña MD;  Location: [facility];  Service: General;  Laterality: N/A;    EYE SURGERY Bilateral 2016    HYSTERECTOMY  1985    HEATHER    KNEE ARTHROSCOPY W/ DEBRIDEMENT  1994    Bilateral    KNEE ARTHROSCOPY W/ DEBRIDEMENT  2003    Left knee    OPEN PATELLA REEFING PROCEDURE  age 19    Left knee    Tonsilectomy  as a child    TONSILLECTOMY      TUBAL LIGATION       Family History   Problem Relation Age of Onset    Hypertension Father     Hypertension Mother     Diabetes Brother     Colon cancer Paternal Aunt     Breast cancer Paternal Aunt         75    Coronary artery disease Brother     Ovarian cancer Neg Hx      Social History     Tobacco Use    Smoking status: Never    Smokeless tobacco: Never   Substance Use Topics    Alcohol use: Yes     Alcohol/week: 3.0 standard drinks of alcohol     Types: 3 Glasses of wine per week     Comment: Drinks a glass of wine 3 times weekly; none 24 hr prior to surgery    Drug use: No       Review of patient's allergies indicates:   Allergen Reactions    Demerol [meperidine] Other (See Comments)     "Becomes Agitated and Combative"        Review of " "Systems:  Constitutional: No fever or chills  Respiratory: No cough or shortness of breath  Cardiovascular: No chest pain or palpitations  Gastrointestinal: No nausea or vomiting  Neurological: No confusion or weakness    OBJECTIVE:     Vital Signs (Most Recent)  Temp: 98.4 °F (36.9 °C) (10/19/23 0820)  Pulse: 75 (10/19/23 0820)  Resp: 20 (10/19/23 0820)  BP: (!) 167/87 (10/19/23 0820)  SpO2: 99 % (10/19/23 0820)    Vital Signs Range (Last 24H):  Temp:  [98.4 °F (36.9 °C)]   Pulse:  [75]   Resp:  [20]   BP: (167)/(87)   SpO2:  [99 %]     No intake or output data in the 24 hours ending 10/19/23 1003    Physical Exam:  General appearance: Well developed, well nourished  Eyes:  Conjunctivae/corneas clear. PERRL.  Lungs: Normal respiratory effort,   clear to auscultation bilaterally   Heart: Regular rate and rhythm, S1, S2 normal, no murmur, rub or zachariah.  Abdomen: Soft, non-tender non-distended; bowel sounds normal; no masses,  no organomegaly  Extremities: No cyanosis or clubbing. No edema.    Skin: Skin color, texture, turgor normal. No rashes or lesions  Neurologic: Normal strength and tone. No focal numbness or weakness       Laboratory:  No results for input(s): "WBC", "RBC", "HGB", "HCT", "PLT", "MCV", "MCH", "MCHC" in the last 24 hours.  BMP: No results for input(s): "GLU", "NA", "K", "CL", "CO2", "BUN", "CREATININE", "CALCIUM", "MG", "PHOS" in the last 168 hours.  Lab Results   Component Value Date    CALCIUM 10.2 10/09/2023    PHOS 3.3 07/14/2023     BNP  No results for input(s): "BNP", "BNPTRIAGEBLO" in the last 168 hours.No results found for: "URICACID"No results found for: "IRON", "TIBC", "FERRITIN", "SATURATEDIRO"  Lab Results   Component Value Date    CALCIUM 10.2 10/09/2023    PHOS 3.3 07/14/2023       Diagnostic Results:  No orders to display       ASSESSMENT/PLAN:     Right knee:  per ortho/therapy teams.    Hx of SBO:  takes miralax daily.  Will add reglan post op also.  Follow BS.      Lipids:  " statin.    Hx of PONV:  antiemetics and as above    DVT:  per ortho.      Thanks for consult  See above  Will follow along.

## 2023-10-19 NOTE — PLAN OF CARE
D/C Rec: Low Intensity Therapy, pt reports she will have 24 sitter assist at home for 12 days  DME Rec: None- pt has all necessary DME    Pt evaluated; performed R TKA HEP and bed mobility with minimal assist. Pt required most assist with standing transfer 2/2 baseline L LE pain and strength deficits but able to perform short gait training trial in room with minimal assistance. Pt would benefit from continued P.T. services to help address post-op mobility deficits and restore PLOF.     Problem: Physical Therapy  Goal: Physical Therapy Goal  Description: P.T Goals to be met in 1 month as follows:  1. Mod I Bed Mobility  2. Mod I T/F  3. Gait 100' Mod I with RW  4. I with R TKA HEP   Outcome: Ongoing, Progressing

## 2023-10-19 NOTE — ANESTHESIA PROCEDURE NOTES
Spinal    Diagnosis: knee osteoarthritis  Patient location during procedure: holding area  Timeout: 10/19/2023 10:40 AM  End time: 10/19/2023 10:41 AM    Staffing  Authorizing Provider: Leroy Tello MD  Performing Provider: Leroy Tello MD    Staffing  Performed by: Leroy Tello MD  Authorized by: Leroy Tello MD    Preanesthetic Checklist  Completed: patient identified, IV checked, site marked, risks and benefits discussed, surgical consent, monitors and equipment checked, pre-op evaluation and timeout performed  Spinal Block  Patient position: sitting  Prep: ChloraPrep  Patient monitoring: cardiac monitor and continuous pulse ox  Approach: midline  Location: L3-4  Injection technique: single shot  CSF Fluid: clear free-flowing CSF  Needle  Needle type: pencil-tip   Needle gauge: 25 G  Needle length: 3.5 in  Additional Documentation: incremental injection  Needle localization: anatomical landmarks  Assessment  Sensory level: T10   Dermatomal levels determined by alcohol wipe  Ease of block: easy  Patient's tolerance of the procedure: comfortable throughout block and no complaints  Medications:    Medications: ropivacaine (NAROPIN) injection 0.5% - Intraspinal   3 mL - 10/19/2023 10:41:00 AM

## 2023-10-19 NOTE — PT/OT/SLP EVAL
"Physical Therapy Evaluation    Patient Name:  Marian Durham   MRN:  [id number]    Recommendations:     Discharge Recommendations: Low Intensity Therapy- pt reports she will have 24 hour sitter assist for 12 days post D/C   Discharge Equipment Recommendations: none   Barriers to discharge: None    Assessment:     Marian Durham is a 78 y.o. female admitted with a medical diagnosis of Osteoarthritis of right knee.  She presents with the following impairments/functional limitations: weakness, impaired functional mobility, gait instability, impaired balance, decreased lower extremity function, pain, decreased ROM, edema.    Pt evaluated; performed R TKA HEP and bed mobility with minimal assist. Pt required most assist with standing transfer 2/2 baseline L LE pain and strength deficits but able to perform short gait training trial in room with minimal assistance. Pt would benefit from continued P.T. services to help address post-op mobility deficits and restore PLOF.     Rehab Prognosis: Good; patient would benefit from acute skilled PT services to address these deficits and reach maximum level of function.    Recent Surgery: Procedure(s) (LRB):  ARTHROPLASTY, KNEE, TOTAL (Right) Day of Surgery    Plan:     During this hospitalization, patient to be seen BID to address the identified rehab impairments via neuromuscular re-education, therapeutic exercises, therapeutic activities, gait training and progress toward the following goals:    Plan of Care Expires:  11/19/23    Subjective     Chief Complaint: "My Left knee is bad too, I need to have surgery on it next"   Patient/Family Comments/goals: "To move with little pain"  Pain/Comfort:  Pain Rating 1: 2/10  Location - Side 1: Right  Location - Orientation 1: anterior  Location 1: knee    Patients cultural, spiritual, Hinduism conflicts given the current situation: no    Living Environment:  [facility] with [address], pt has a stair lift setup with a plan for a neighbor to help " her transfer In and out of the stair lift chair   Prior to admission, patients level of function was Mod I with RW, reports she did 3 months of pre-hab in OPPT.  Equipment used at home: walker, rolling.  DME owned (not currently used): BSC and shower chair.  Upon discharge, patient will have assistance from 24 hr sitter for 12 days post D/C and son is coming to stay with her for a week after.    Objective:     Communicated with LISSETTE Hernandez prior to session.  Patient found supine with peripheral IV, cryotherapy, SCD, pyle catheter  upon PT entry to room.    General Precautions: Standard, fall  Orthopedic Precautions:RLE weight bearing as tolerated   Braces: N/A  Respiratory Status: Room air    Exams:  RLE ROM: WFL except knee flexion AROM 50 deg grossly  RLE Strength: WFL except quad 3-/5  LLE ROM: WFL  LLE Strength: Deficits: hip and knee strength 3+/5    Functional Mobility:  Bed Mobility:     Supine to Sit: stand by assistance  Sit to Supine: stand by assistance  Transfers:     Sit to Stand:  moderate assistance with rolling walker  Gait: 6' fwd and back and sidestepping to HOB with RW Min A, required cueing for technique and RW management       AM-PAC 6 CLICK MOBILITY  Total Score:18       Treatment & Education:  Issued handout for TKA HEP, performed B AP, R heelslides in tolerable range and SLR x 10 reps each    Patient left supine with all lines intact and call button in reach.    GOALS:   Multidisciplinary Problems       Physical Therapy Goals          Problem: Physical Therapy    Goal Priority Disciplines Outcome Goal Variances Interventions   Physical Therapy Goal     PT, PT/OT Ongoing, Progressing     Description: P.T Goals to be met in 1 month as follows:  1. Mod I Bed Mobility  2. Mod I T/F  3. Gait 100' Mod I with RW  4. I with R TKA HEP                        History:     Past Medical History:   Diagnosis Date    Atrial fibrillation     stress related per patient    Hyperlipidemia     Osteoarthritis      PONV (postoperative nausea and vomiting)     SBO (small bowel obstruction)     Shingles 01/01/2002    Spinal stenosis        Past Surgical History:   Procedure Laterality Date    ANKLE FRACTURE SURGERY  2007    Right ankle    COLON SURGERY      bowel obstruction    COLONOSCOPY      x 2    EXCISION OF MASS OF BACK N/A 11/16/2018    Procedure: EXCISION, MASS, BACK;  Surgeon: Fran Magaña MD;  Location: [facility];  Service: General;  Laterality: N/A;    EYE SURGERY Bilateral 2016    HYSTERECTOMY  1985    HEATHER    KNEE ARTHROSCOPY W/ DEBRIDEMENT  1994    Bilateral    KNEE ARTHROSCOPY W/ DEBRIDEMENT  2003    Left knee    OPEN PATELLA REEFING PROCEDURE  age 19    Left knee    Tonsilectomy  as a child    TONSILLECTOMY      TUBAL LIGATION         Time Tracking:     PT Received On: 10/19/23  PT Start Time: 1524     PT Stop Time: 1548  PT Total Time (min): 24 min     Billable Minutes: Evaluation 13 and Therapeutic Exercise 8      10/19/2023

## 2023-10-19 NOTE — ANESTHESIA POSTPROCEDURE EVALUATION
Anesthesia Post Evaluation    Patient: Marian Durham    Procedure(s) Performed: Procedure(s) (LRB):  ARTHROPLASTY, KNEE, TOTAL (Right)    Final Anesthesia Type: spinal      Patient location during evaluation: PACU  Patient participation: Yes- Able to Participate  Level of consciousness: awake and alert  Post-procedure vital signs: reviewed and stable  Pain management: adequate  Airway patency: patent    PONV status at discharge: No PONV  Anesthetic complications: no      Cardiovascular status: hemodynamically stable  Respiratory status: unassisted  Hydration status: euvolemic  Follow-up not needed.          Vitals Value Taken Time   /69 10/19/23 1316   Temp 36.5 °C (97.7 °F) 10/19/23 1245   Pulse 74 10/19/23 1325   Resp 26 10/19/23 1325   SpO2 97 % 10/19/23 1325   Vitals shown include unvalidated device data.      No case tracking events are documented in the log.      Pain/Renee Score: Pain Rating Prior to Med Admin: 0 (10/19/2023 12:12 PM)  Pain Rating Post Med Admin: 0 (10/19/2023 12:35 PM)  Renee Score: 9 (10/19/2023 12:35 PM)

## 2023-10-19 NOTE — PLAN OF CARE
Sw met with patient via bedside. Patient;s PCP is correct and preferred pharcmcay is bedside. Patient has a RW and a BSC. Jeannen denies any HH. Patient has sitters, her son and friends to help her when she returns home. Patient's friend will provide transportation home. I certify I provided patient choice and a list to the patient/family of CMS rated Home Health.  Patient/Family signed Patient's Choice Disclosure Form choosing the following  1.[facility]  2.[facility]  3.[facility]    [facility]  Initial Discharge Assessment       Primary Care Provider: Suzanna Duran MD    Admission Diagnosis: Osteoarthritis of right knee [M17.11]  OA (osteoarthritis) of knee [M17.9]    Admission Date: 10/19/2023  Expected Discharge Date:     Transition of Care Barriers: (P) None    Payor: HU[employer] MANAGED MEDICARE / Plan: HU[employer] MEDICARE HMO / Product Type: Capitation /     Extended Emergency Contact Information  Primary Emergency Contact: Evelin Lyons  Address: [address] [place] [address] [facility]  Home Phone: [phone]  Mobile Phone: [phone]  Relation: Friend  Secondary Emergency Contact: Duane Durham   [facility]  Home Phone: [phone]  Relation: Son    Discharge Plan A: (P) [facility]         [id number] [place], [place] - [address] AT [address]  [address]  [facility] [address]  Phone: [phone] Fax: [phone]      Initial Assessment (most recent)       Adult Discharge Assessment - 10/19/23 0840          Discharge Assessment    Assessment Type Discharge Planning Assessment (P)      Confirmed/corrected address, phone number and insurance Yes (P)      Confirmed Demographics Correct on Facesheet (P)      Source of Information patient (P)      People in Home alone (P)      Do you expect to return to your current living situation? Yes (P)      Do you have help at home or someone to help you  manage your care at home? Yes (P)      Prior to hospitilization cognitive status: Alert/Oriented;No Deficits (P)      Current cognitive status: Alert/Oriented;No Deficits (P)      Equipment Currently Used at Home bedside commode;walker, rolling;cane, straight (P)      Readmission within 30 days? No (P)      Patient currently being followed by outpatient case management? No (P)      Do you currently have service(s) that help you manage your care at home? No (P)      Do you take prescription medications? Yes (P)      Do you have prescription coverage? Yes (P)      Do you have any problems affording any of your prescribed medications? No (P)      Is the patient taking medications as prescribed? yes (P)      How do you get to doctors appointments? car, drives self;family or friend will provide (P)      Are you on dialysis? No (P)      Do you take coumadin? No (P)      DME Needed Upon Discharge  none (P)      Discharge Plan discussed with: Patient (P)      Transition of Care Barriers None (P)      Discharge Plan A Home Health (P)

## 2023-10-20 VITALS
DIASTOLIC BLOOD PRESSURE: 71 MMHG | TEMPERATURE: 99 F | BODY MASS INDEX: 18.66 KG/M2 | SYSTOLIC BLOOD PRESSURE: 145 MMHG | OXYGEN SATURATION: 95 % | HEART RATE: 118 BPM | HEIGHT: 65 IN | RESPIRATION RATE: 20 BRPM | WEIGHT: 112 LBS

## 2023-10-20 LAB
ANION GAP SERPL CALC-SCNC: 8 MMOL/L (ref 8–16)
BASOPHILS # BLD AUTO: 0.05 K/UL (ref 0–0.2)
BASOPHILS NFR BLD: 0.5 % (ref 0–1.9)
BUN SERPL-MCNC: 16 MG/DL (ref 8–23)
CALCIUM SERPL-MCNC: 9.1 MG/DL (ref 8.7–10.5)
CHLORIDE SERPL-SCNC: 107 MMOL/L (ref 95–110)
CO2 SERPL-SCNC: 25 MMOL/L (ref 23–29)
CREAT SERPL-MCNC: 1 MG/DL (ref 0.5–1.4)
DIFFERENTIAL METHOD: ABNORMAL
EOSINOPHIL # BLD AUTO: 0.2 K/UL (ref 0–0.5)
EOSINOPHIL NFR BLD: 2.4 % (ref 0–8)
ERYTHROCYTE [DISTWIDTH] IN BLOOD BY AUTOMATED COUNT: 13.1 % (ref 11.5–14.5)
EST. GFR  (NO RACE VARIABLE): 58 ML/MIN/1.73 M^2
GLUCOSE SERPL-MCNC: 115 MG/DL (ref 70–110)
HCT VFR BLD AUTO: 40.6 % (ref 37–48.5)
HGB BLD-MCNC: 13 G/DL (ref 12–16)
IMM GRANULOCYTES # BLD AUTO: 0.03 K/UL (ref 0–0.04)
IMM GRANULOCYTES NFR BLD AUTO: 0.3 % (ref 0–0.5)
LYMPHOCYTES # BLD AUTO: 1.8 K/UL (ref 1–4.8)
LYMPHOCYTES NFR BLD: 17.7 % (ref 18–48)
MCH RBC QN AUTO: 29.9 PG (ref 27–31)
MCHC RBC AUTO-ENTMCNC: 32 G/DL (ref 32–36)
MCV RBC AUTO: 93 FL (ref 82–98)
MONOCYTES # BLD AUTO: 1.1 K/UL (ref 0.3–1)
MONOCYTES NFR BLD: 10.5 % (ref 4–15)
NEUTROPHILS # BLD AUTO: 7 K/UL (ref 1.8–7.7)
NEUTROPHILS NFR BLD: 68.6 % (ref 38–73)
NRBC BLD-RTO: 0 /100 WBC
PLATELET # BLD AUTO: 190 K/UL (ref 150–450)
PMV BLD AUTO: 9.3 FL (ref 9.2–12.9)
POTASSIUM SERPL-SCNC: 4.6 MMOL/L (ref 3.5–5.1)
RBC # BLD AUTO: 4.35 M/UL (ref 4–5.4)
SODIUM SERPL-SCNC: 140 MMOL/L (ref 136–145)
WBC # BLD AUTO: 10.14 K/UL (ref 3.9–12.7)

## 2023-10-20 PROCEDURE — 97165 OT EVAL LOW COMPLEX 30 MIN: CPT

## 2023-10-20 PROCEDURE — 85025 COMPLETE CBC W/AUTO DIFF WBC: CPT | Performed by: ORTHOPAEDIC SURGERY

## 2023-10-20 PROCEDURE — 25000003 PHARM REV CODE 250: Performed by: ORTHOPAEDIC SURGERY

## 2023-10-20 PROCEDURE — 36415 COLL VENOUS BLD VENIPUNCTURE: CPT | Performed by: ORTHOPAEDIC SURGERY

## 2023-10-20 PROCEDURE — 94799 UNLISTED PULMONARY SVC/PX: CPT

## 2023-10-20 PROCEDURE — 97530 THERAPEUTIC ACTIVITIES: CPT | Mod: CQ

## 2023-10-20 PROCEDURE — 97116 GAIT TRAINING THERAPY: CPT | Mod: CQ

## 2023-10-20 PROCEDURE — 97110 THERAPEUTIC EXERCISES: CPT | Mod: CQ

## 2023-10-20 PROCEDURE — 94761 N-INVAS EAR/PLS OXIMETRY MLT: CPT

## 2023-10-20 PROCEDURE — 63600175 PHARM REV CODE 636 W HCPCS: Performed by: NURSE PRACTITIONER

## 2023-10-20 PROCEDURE — 25000003 PHARM REV CODE 250: Performed by: NURSE PRACTITIONER

## 2023-10-20 PROCEDURE — 63600175 PHARM REV CODE 636 W HCPCS: Performed by: ORTHOPAEDIC SURGERY

## 2023-10-20 PROCEDURE — 97535 SELF CARE MNGMENT TRAINING: CPT

## 2023-10-20 PROCEDURE — 80048 BASIC METABOLIC PNL TOTAL CA: CPT | Performed by: ORTHOPAEDIC SURGERY

## 2023-10-20 RX ORDER — NAPROXEN SODIUM 220 MG/1
81 TABLET, FILM COATED ORAL 2 TIMES DAILY
Qty: 60 TABLET | Refills: 0 | Status: SHIPPED | OUTPATIENT
Start: 2023-10-20 | End: 2024-02-29

## 2023-10-20 RX ORDER — HYDROCODONE BITARTRATE AND ACETAMINOPHEN 10; 325 MG/1; MG/1
1 TABLET ORAL EVERY 6 HOURS PRN
Qty: 40 TABLET | Refills: 0 | Status: ON HOLD | OUTPATIENT
Start: 2023-10-20 | End: 2024-02-02 | Stop reason: HOSPADM

## 2023-10-20 RX ADMIN — HYDROCODONE BITARTRATE AND ACETAMINOPHEN 1 TABLET: 5; 325 TABLET ORAL at 05:10

## 2023-10-20 RX ADMIN — ASPIRIN 81 MG CHEWABLE TABLET 81 MG: 81 TABLET CHEWABLE at 08:10

## 2023-10-20 RX ADMIN — METOCLOPRAMIDE 10 MG: 5 INJECTION, SOLUTION INTRAMUSCULAR; INTRAVENOUS at 03:10

## 2023-10-20 RX ADMIN — METOCLOPRAMIDE 10 MG: 5 INJECTION, SOLUTION INTRAMUSCULAR; INTRAVENOUS at 05:10

## 2023-10-20 RX ADMIN — HYDROCODONE BITARTRATE AND ACETAMINOPHEN 1 TABLET: 5; 325 TABLET ORAL at 10:10

## 2023-10-20 RX ADMIN — HYDROCODONE BITARTRATE AND ACETAMINOPHEN 1 TABLET: 5; 325 TABLET ORAL at 04:10

## 2023-10-20 RX ADMIN — HYDROCODONE BITARTRATE AND ACETAMINOPHEN 1 TABLET: 5; 325 TABLET ORAL at 01:10

## 2023-10-20 RX ADMIN — CELECOXIB 200 MG: 200 CAPSULE ORAL at 08:10

## 2023-10-20 RX ADMIN — FAMOTIDINE 20 MG: 20 TABLET ORAL at 08:10

## 2023-10-20 RX ADMIN — MUPIROCIN: 20 OINTMENT TOPICAL at 08:10

## 2023-10-20 RX ADMIN — CEFAZOLIN 2 G: 2 INJECTION, POWDER, FOR SOLUTION INTRAMUSCULAR; INTRAVENOUS at 02:10

## 2023-10-20 RX ADMIN — POLYETHYLENE GLYCOL 3350 17 G: 17 POWDER, FOR SOLUTION ORAL at 08:10

## 2023-10-20 NOTE — PT/OT/SLP PROGRESS
Physical Therapy Treatment    Patient Name:  Marian Durham   MRN:  [id number]    Recommendations:     Discharge Recommendations: Low Intensity Therapy  Discharge Equipment Recommendations: none  Barriers to discharge: None    Assessment:     Marian Durham is a 78 y.o. female admitted with a medical diagnosis of Osteoarthritis of right knee.  She presents with the following impairments/functional limitations: weakness, gait instability, pain, edema, impaired balance, impaired functional mobility, decreased lower extremity function, decreased ROM.    Supine>sit with CGA  Sit>stand with RW and modA  Toilet t/f with RW and CGA, step t/f  Amb 6' + 75' with RW and Konstantin progressing to CGA and SBA, R LE WBAT, cueing for step-through gait with good carryover  Pt needing assistance for safety with transfers and gait 2/2 generalized weakness and pain of arthritic contralateral knee  Rec Low Intensity Therapy with 24/7 assist    Rehab Prognosis: Good; patient would benefit from acute skilled PT services to address these deficits and reach maximum level of function.    Recent Surgery: Procedure(s) (LRB):  ARTHROPLASTY, KNEE, TOTAL (Right) 1 Day Post-Op    Plan:     During this hospitalization, patient to be seen BID to address the identified rehab impairments via gait training, therapeutic activities, therapeutic exercises, neuromuscular re-education and progress toward the following goals:    Plan of Care Expires:  11/19/23    Subjective     Chief Complaint: pt saying she needs to eat breakfast or she'll fall to the floor with therapy  Patient/Family Comments/goals: pt agreeable to therapy  Pain/Comfort:  Pain Rating 1: 1/10  Location - Side 1: Right  Location 1: knee  Pain Addressed 1: Reposition, Distraction, Cessation of Activity, Nurse notified  Pain Rating Post-Intervention 1: other (see comments) (pain not rated but increased with mobility and WB)      Objective:     Communicated with nurse Mary prior to session.   Patient found HOB elevated with peripheral IV, cryotherapy, FCD, PureWick upon PT entry to room.     General Precautions: Standard, fall  Orthopedic Precautions: RLE weight bearing as tolerated  Braces: N/A  Respiratory Status: Room air     Functional Mobility:  Bed Mobility:     Supine to Sit: contact guard assistance  Transfers:     Sit to Stand:  moderate assistance with rolling walker  Toilet Transfer: contact guard assistance with  rolling walker  using  Step Transfer  Gait: 6' + 75' with RW and Konstantin progressing to CGA and SBA, R LE WBAT, cueing for step-through gait with good carryover      AM-PAC 6 CLICK MOBILITY  Turning over in bed (including adjusting bedclothes, sheets and blankets)?: 4  Sitting down on and standing up from a chair with arms (e.g., wheelchair, bedside commode, etc.): 2  Moving from lying on back to sitting on the side of the bed?: 3  Moving to and from a bed to a chair (including a wheelchair)?: 3  Need to walk in hospital room?: 3  Climbing 3-5 steps with a railing?: 2  Basic Mobility Total Score: 17       Treatment & Education:  Supine therex R LE: AP, QS, GS, heel slides, hip abd/add, SLR x 10  Seated therex R LE: LAQ x 10  Gait training as noted    Patient left up in chair with all lines intact, call button in reach, and nurse Shonquell notified..    GOALS:   Multidisciplinary Problems       Physical Therapy Goals          Problem: Physical Therapy    Goal Priority Disciplines Outcome Goal Variances Interventions   Physical Therapy Goal     PT, PT/OT Ongoing, Progressing     Description: P.T Goals to be met in 1 month as follows:  1. Mod I Bed Mobility  2. Mod I T/F  3. Gait 100' Mod I with RW  4. I with R TKA HEP                        Time Tracking:     PT Received On: 10/20/23  PT Start Time: 0946     PT Stop Time: 1044  PT Total Time (min): 58 min     Billable Minutes: Gait Training 30, Therapeutic Activity 14, and Therapeutic Exercise 14    Treatment Type: Treatment  PT/PTA: PTA      Number of PTA visits since last PT visit: 1     10/20/2023

## 2023-10-20 NOTE — PLAN OF CARE
[facility]         [facility]  [phone]-office  [phone]-fax

## 2023-10-20 NOTE — PLAN OF CARE
Problem: Occupational Therapy  Goal: Occupational Therapy Goal  Description: Goals to be met by: 10/20/23     Patient will increase functional independence with ADLs by performing:    Demonstrate understanding of right TKA precautions and how to adhere to precautions during LB self care tasks.  Demonstrate understanding of right TKA precautions and how to adhere to precautions during ADL mobility and transfers using RW.  Donning underwear at SBA.  Donning shoes at SBA.  Toilet/BSC/Chair transfers at SBA.    Outcome: Ongoing, Progressing   Recommend discharge to home with 24/7 assist and Home Health OT and PT.

## 2023-10-20 NOTE — PROGRESS NOTES
Postop day 1. Right knee replacement.  Feeling weak.  Breakfast running late today.  Needs to get up with therapy.  Hopefully we can transfer home with home health today.  Medications reviewed.  Good alignment of the knee intact.

## 2023-10-20 NOTE — PT/OT/SLP EVAL
Occupational Therapy   Evaluation, Treatment and Discharge    Name: Marian Durham  MRN: [id number]  Admitting Diagnosis: Osteoarthritis of right knee  Recent Surgery: Procedure(s) (LRB):  ARTHROPLASTY, KNEE, TOTAL (Right) 1 Day Post-Op    Recommendations:     Discharge Recommendations: Low Intensity Therapy  Discharge Equipment Recommendations:  bedside commode  Barriers to discharge:  None (Pt will have support from friend and caregiver service.)    Assessment:     Marian Durham is a 78 y.o. female with a medical diagnosis of Osteoarthritis of right knee.  Pt receptive to all education and training in TKA precautions and safety strategies for ADL and ADL mobility using RW.  Performance deficits affecting function: weakness, impaired endurance, impaired self care skills, impaired functional mobility, gait instability, impaired balance, decreased coordination, decreased lower extremity function, decreased safety awareness, pain, orthopedic precautions, impaired skin, edema, decreased ROM, impaired joint extensibility.      Rehab Prognosis: Good; patient would benefit from acute skilled OT services to address these deficits and reach maximum level of function.       Plan:     Patient to be seen   to address the above listed problems via self-care/home management, therapeutic activities  Plan of Care Expires: 10/20/23  Plan of Care Reviewed with: patient    Subjective     Chief Complaint: Wanting to make sure she has lunch before leaving the hospital.  Patient/Family Comments/goals: Reporting that her friend will be assisting her until she has private caregivers for 12 days.    Occupational Profile:  Living Environment: Lives alone in [facility], [address], stair lift  Previous level of function: Mod I with RW, limited by knee pain  Equipment Used at Home: walker, rolling  Assistance upon Discharge: Friend then paid caregivers    Pain/Comfort:  Pain Rating 1:  (Pt not rating pain but objective signs of pain noted.)  Location  - Side 1: Right  Location 1: knee  Pain Addressed 1: Reposition, Distraction, Cessation of Activity  Pain Rating Post-Intervention 1:  (Pt not rating pain but having relief with it elevated)    Patients cultural, spiritual, Ep[employer] conflicts given the current situation: no    Objective:     Communicated with: nurse prior to session.  Patient found up in chair with cryotherapy, peripheral IV, PureWick upon OT entry to room.    General Precautions: Standard, fall  Orthopedic Precautions: RLE weight bearing as tolerated  Braces: N/A  Respiratory Status: Room air    Occupational Performance:    Bed Mobility:    NT    Functional Mobility/Transfers:  Sit to stand: Min A with RW  Transfers: Min A with RW  Functional Mobility: Pt reporting that her left knee is weak and she needs a a left knee replacement.Education/instruction on use of gait belt and recommending that pt's caregivers use a gait belt when assisting her during ADL mobility.  Pt receptive to education and recommendation.      Activities of Daily Living:  Grooming: CGA with RW standing at sink; Education and training on correct placement of RW when standing at sink/countertop  UB Dressing: Set up while seated; education on performing UB dressing in sitting to reduce fall risk  LB Dressing: After education, OT demonstration and training, pt donned underwear at Min A for balance when standing to pull up underwear over hips; Min A to stand from recliner as well.    Cognitive/Visual Perceptual:  Cognitive/Psychosocial Skills:     -       Oriented to: Person, Place, Time, and Situation   -       Follows Commands/attention:Follows one-step commands  -       Communication: clear/fluent  -       Memory: No Deficits noted  -       Safety awareness/insight to disability: intact   -       Mood/Affect/Coping skills/emotional control: Cooperative    Physical Exam:  Bilateral UE AROM: WFL for self care tasks  Bilateral UE Strength: Grossly 4-/5   Bilateral UE  Coordination: WFL for self care tasks  Sensation: Light touch intact  Edema: Mild right knee  Skin: Dressing over right knee surgical incision    AMPAC 6 Click ADL:  AMPAC Total Score: 16    Treatment & Education:  Role of OT, POC, right TKA precautions and how to adhere to precautions during ADL and ADL mobility, safety strategies including use of gait belt, discharge recs    Patient left up in chair with all lines intact, call button in reach, and nurse notified    GOALS:   Multidisciplinary Problems       Occupational Therapy Goals          Problem: Occupational Therapy    Goal Priority Disciplines Outcome Interventions   Occupational Therapy Goal     OT, PT/OT Ongoing, Progressing    Description: Goals to be met by: 10/20/23     Patient will increase functional independence with ADLs by performing:    Demonstrate understanding of right TKA precautions and how to adhere to precautions during LB self care tasks.  Demonstrate understanding of right TKA precautions and how to adhere to precautions during ADL mobility and transfers using RW.  Donning underwear at SBA.  Donning shoes at SBA.  Toilet/BSC/Chair transfers at SBA.                         History:     Past Medical History:   Diagnosis Date    Atrial fibrillation     stress related per patient    Hyperlipidemia     Osteoarthritis     PONV (postoperative nausea and vomiting)     SBO (small bowel obstruction)     Shingles 01/01/2002    Spinal stenosis          Past Surgical History:   Procedure Laterality Date    ANKLE FRACTURE SURGERY  2007    Right ankle    COLON SURGERY      bowel obstruction    COLONOSCOPY      x 2    EXCISION OF MASS OF BACK N/A 11/16/2018    Procedure: EXCISION, MASS, BACK;  Surgeon: Fran Magaña MD;  Location: [facility];  Service: General;  Laterality: N/A;    EYE SURGERY Bilateral 2016    HYSTERECTOMY  1985    HEATHER    KNEE ARTHROSCOPY W/ DEBRIDEMENT  1994    Bilateral    KNEE ARTHROSCOPY W/ DEBRIDEMENT  2003    Left knee    OPEN  PATELLA REEFING PROCEDURE  age 19    Left knee    Tonsilectomy  as a child    TONSILLECTOMY      TOTAL KNEE ARTHROPLASTY Right 10/19/2023    Procedure: ARTHROPLASTY, KNEE, TOTAL;  Surgeon: Vicente Mitchell MD;  Location: [facility];  Service: Orthopedics;  Laterality: Right;    TUBAL LIGATION         Time Tracking:     OT Date of Treatment: 10/20/23  OT Start Time: 1110  OT Stop Time: 1206  OT Total Time (min): 56 min    Billable Minutes:Evaluation 15  Self Care/Home Management 35    10/20/2023

## 2023-10-20 NOTE — PLAN OF CARE
Problem: Adult Inpatient Plan of Care  Goal: Plan of Care Review  Outcome: Ongoing, Progressing     Problem: Infection  Goal: Absence of Infection Signs and Symptoms  Outcome: Ongoing, Progressing     POC reviewed with patient this shift.  A/O x4.  Respirations unlabored on RA.  Skin w/d.  Pederson catheter removed as ordered.  Tolerated well.  Due to void.  See previous note.  Incision to rt knee continues with dressing changed as ordered and thigh high applied.  Polar ice running continuously to leg without difficulty.  Pain to knee continues but being managed with PRN [place].  Tolerates meds whole with water without difficulty.  ABX Tx in progress with no adverse effects noted.  VSS.  See flowsheet for full assessment.  Able to verbalize wants/needs.  No s/s of distress.  Fall/safety precautions maintained.

## 2023-10-20 NOTE — PT/OT/SLP PROGRESS
Physical Therapy      Patient Name:  Marian Durham   MRN:  [id number]    Patient not seen today secondary to patient with significant bleeding that is soaking through dressing. Nurse aware  . Will follow-up nextx treatment day if pt does not discharge today.

## 2023-10-20 NOTE — DISCHARGE SUMMARY
[facility] - Med Surg ([address])  Discharge Summary      Admit Date: 10/19/2023    Discharge Date and Time: No discharge date for patient encounter.    Attending Physician: Vicente Mitchell MD     Reason for Admission:  Valgus osteoarthritis right knee    Procedures Performed: Procedure(s) (LRB):  ARTHROPLASTY, KNEE, TOTAL (Right)    Hospital Course (synopsis of major diagnoses, care, treatment, and services provided during the course of the hospital stay):  A little slow with postoperative care.  Hopefully progresses with physical therapy to where we can discharge home with home health.  We will see how she progresses.      Goals of Care Treatment Preferences:  Code Status: Full Code    Living Will: Yes              Consults: nephrology    Significant Diagnostic Studies: Labs: All labs within the past 24 hours have been reviewed    Final Diagnoses:    Principal Problem: Osteoarthritis of right knee   Secondary Diagnoses:   Active Hospital Problems    Diagnosis  POA    *Osteoarthritis of right knee [M17.11]  Yes      Resolved Hospital Problems   No resolved problems to display.       Discharged Condition: good    Disposition: Home-Health Care [facility]    Follow Up/Patient Instructions:     Medications:  Reconciled Home Medications:      Medication List        START taking these medications      aspirin 81 MG Chew  Take 1 tablet (81 mg total) by mouth 2 (two) times daily.     HYDROcodone-acetaminophen  mg per tablet  Commonly known as: NORCO  Take 1 tablet by mouth every 6 (six) hours as needed.            CONTINUE taking these medications      calcium-vitamin D3 500 mg-5 mcg (200 unit) per tablet  Commonly known as: OS- + D3  Take 1 tablet by mouth 2 (two) times daily with meals.     celecoxib 200 MG capsule  Commonly known as: CeleBREX  take 1 capsule (200MG) by oral route every day as needed     estradioL 0.01 % (0.1 mg/gram) vaginal cream  Commonly known as: ESTRACE  Place 1 g vaginally 3 (three)  "times a week.     glucosamine-chondroitin 500-400 mg tablet  Take 1 tablet by mouth 3 (three) times daily.     polyethylene glycol 17 gram/dose powder  Commonly known as: MIRALAX  Take 17 g by mouth 3 (three) times daily as needed (Constipation).     simvastatin 40 MG tablet  Commonly known as: ZOCOR  Take 40 mg by mouth every evening.            Discharge Procedure Orders   WALKER FOR HOME USE     Order Specific Question Answer Comments   Type of Walker: Adult (5'4"-6'6")    With wheels? Yes    Height: 5' 5" (1.651 m)    Weight: 50.8 kg (112 lb)    Length of need (1-99 months): 99    Does patient have medical equipment at home? walker, rolling    Please check all that apply: Patient is unable to safely ambulate without equipment.      3 IN 1 COMMODE FOR HOME USE     Order Specific Question Answer Comments   Type: Standard    Height: 5' 5" (1.651 m)    Weight: 50.8 kg (112 lb)    Does patient have medical equipment at home? walker, rolling    Length of need (1-99 months): 99      Leave dressing on - Keep it clean, dry, and intact until clinic visit      Follow-up Information       [facility] Follow up on 10/20/2023.    Specialties: Home Health Services, Home Therapy Services, Home Living Aide Services  Why: They will contact you to set you home healthcare  Contact information:  [address] [address]  [phone]             Vicente Mitchell MD Follow up.    Specialty: Orthopedic Surgery  Why: Call [phone] to reach Dr. Mitchell, AS SCHEDULED   Contact information:  [address] [place] [address]  [phone]                             "

## 2023-10-20 NOTE — PLAN OF CARE
Patient will transport home at discharge with family.  Patient has  services with [facility].   10/20/23 1357   Final Note   Assessment Type Final Discharge Note   Anticipated Discharge Disposition Home-[facility]   Hospital Resources/Appts/Education Provided Provided patient/caregiver with written discharge plan information;Appointments scheduled and added to AVS   Post-Acute Status   Post-Acute Authorization Home Health   Home Health Status Set-up Complete/Auth obtained   Discharge Delays None known at this time

## 2023-10-20 NOTE — PROGRESS NOTES
"MED  Progress Note    Admit Date: 10/19/2023   LOS: 0 days     SUBJECTIVE:     Follow-up For:  Osteoarthritis of right knee    Interval History:     working with therapy.  No CP/SOB/Calf pain.  Upset about delay in meals.  Discussed with team.     Review of Systems:  Constitutional: No fever or chills  Respiratory: No cough or shortness of breath  Cardiovascular: No chest pain or palpitations  Gastrointestinal: No nausea or vomiting  Neurological: No confusion or weakness    OBJECTIVE:     Vital Signs Range (Last 24H):  BP (!) 156/68 (BP Location: Right arm, Patient Position: Sitting)   Pulse 79   Temp 97.7 °F (36.5 °C) (Oral)   Resp 20   Ht 5' 5" (1.651 m)   Wt 50.8 kg (112 lb)   SpO2 96%   Breastfeeding No   BMI 18.64 kg/m²     Temp:  [97.1 °F (36.2 °C)-98.1 °F (36.7 °C)]   Pulse:  [71-88]   Resp:  [15-20]   BP: (120-171)/(59-72)   SpO2:  [94 %-100 %]     I & O (Last 24H):  Intake/Output Summary (Last 24 hours) at 10/20/2023 1013  Last data filed at 10/20/2023 0609  Gross per 24 hour   Intake 1858.64 ml   Output 2600 ml   Net -741.36 ml       Physical Exam:  General appearance: Well developed, well nourished  Eyes:  Conjunctivae/corneas clear. PERRL.  Lungs: Normal respiratory effort,   clear to auscultation bilaterally   Heart: Regular rate and rhythm, S1, S2 normal, no murmur, rub or zachariah.  Abdomen: Soft, non-tender non-distended; bowel sounds normal; no masses,  no organomegaly  Extremities: No cyanosis or clubbing. No edema.    Skin: Skin color, texture, turgor normal. No rashes or lesions  Neurologic: Normal strength and tone. No focal numbness or weakness     Laboratory Data:  Recent Labs   Lab 10/20/23  0529   WBC 10.14   RBC 4.35   HGB 13.0   HCT 40.6      MCV 93   MCH 29.9   MCHC 32.0       BMP:   Recent Labs   Lab 10/20/23  0529   *      K 4.6      CO2 25   BUN 16   CREATININE 1.0   CALCIUM 9.1     Lab Results   Component Value Date    CALCIUM 9.1 10/20/2023    PHOS " "3.3 07/14/2023       Lab Results   Component Value Date    CALCIUM 9.1 10/20/2023    PHOS 3.3 07/14/2023       No results found for: "URICACID"    BNP  No results for input(s): "BNP", "BNPTRIAGEBLO" in the last 168 hours.    Medications:  Medication list was reviewed and changes noted under Assessment/Plan.    Diagnostic Results:        ASSESSMENT/PLAN:       Right knee:  per ortho/therapy teams.     Hx of SBO:  takes miralax daily.  Added reglan post op also.  +BS     Lipids:  statin.     Hx of PONV:  antiemetics and as above    Elevated BP w/o HTN:  lots of anxiety playing a role.  No need for meds yet.     DVT:  per ortho.        DC today.           "

## 2024-01-26 ENCOUNTER — TELEPHONE (OUTPATIENT)
Dept: ELECTROPHYSIOLOGY | Facility: CLINIC | Age: 79
End: 2024-01-26
Payer: MEDICARE

## 2024-01-26 DIAGNOSIS — I48.0 PAROXYSMAL ATRIAL FIBRILLATION: Primary | ICD-10-CM

## 2024-01-26 NOTE — TELEPHONE ENCOUNTER
Spoke with patient, she's feeling a little better today but would like to have an EKG to confirm. Order placed and appointment confirmed.    ----- Message from Gonzalo Heredia MA sent at 1/26/2024  8:14 AM CST -----  Contact: self    ----- Message -----  From: Mukul Torres MA  Sent: 1/25/2024   4:35 PM CST  To: Gonzalo Heredia MA      ----- Message -----  From: Chantel Scales MA  Sent: 1/25/2024   3:51 PM CST  To: Yulissa Landers Staff    Pt is calling because she has a.fib,has weakness heart rate is jumping around 69 to 143.Last visit 8-7-23. with . Please [phone]

## 2024-01-28 ENCOUNTER — PATIENT MESSAGE (OUTPATIENT)
Dept: ELECTROPHYSIOLOGY | Facility: CLINIC | Age: 79
End: 2024-01-28
Payer: MEDICARE

## 2024-01-28 ENCOUNTER — NURSE TRIAGE (OUTPATIENT)
Dept: ADMINISTRATIVE | Facility: CLINIC | Age: 79
End: 2024-01-28
Payer: MEDICARE

## 2024-01-29 ENCOUNTER — TELEPHONE (OUTPATIENT)
Dept: ELECTROPHYSIOLOGY | Facility: CLINIC | Age: 79
End: 2024-01-29
Payer: MEDICARE

## 2024-01-29 ENCOUNTER — HOSPITAL ENCOUNTER (OUTPATIENT)
Dept: CARDIOLOGY | Facility: OTHER | Age: 79
Discharge: HOME | DRG: 310 | End: 2024-01-29
Attending: INTERNAL MEDICINE
Payer: MEDICARE

## 2024-01-29 DIAGNOSIS — I48.0 PAROXYSMAL ATRIAL FIBRILLATION: Primary | ICD-10-CM

## 2024-01-29 DIAGNOSIS — I48.0 PAROXYSMAL ATRIAL FIBRILLATION: ICD-10-CM

## 2024-01-29 PROCEDURE — 93005 ELECTROCARDIOGRAM TRACING: CPT

## 2024-01-29 NOTE — TELEPHONE ENCOUNTER
Spoke with patient and advised that EKG showed normal rhythm, advised to f/u with PCP for work up.    ----- Message from Anshul Duenas MD sent at 1/29/2024  3:52 PM CST -----  Normal rhythm. Whatever is going on its not an arrhythmia problem.    ----- Message -----  From: Triny Blevins RN  Sent: 1/29/2024   3:30 PM CST  To: Anshul Duenas MD    Pt went for EKG today (see chart) - c/o lightheadedness, dizziness, palpitations, and weakness in the knees x 6 days. Does not take any antiarrhythmics or OACs.

## 2024-01-29 NOTE — TELEPHONE ENCOUNTER
[place]    PCP:  Dr. Marian Durham    Pt escalated to Janelle gomes.  C/O A. Fib episode.  She reports that she had an EKG on Friday morning.  C/O lightheadedness, dizziness, palpitations, and weakness in the knees.  She reports symptoms started Wednesday.  H/O PAF.  Denies CP, sweating, and difficulty breathing.  HR: 71-72.  Per protocol, care advised is go to ED now.  Pt VU and refused care advised.  Care advice reinforced but she continues to refuse care advice.  Pt asked that NT send a message to Dr. Duenas to return a call tomorrow.  Advised NT will send high priority to Provider.  Assisted pt to send Beatrice pt portal message to Provider also.  Advised pt to contact Dr. Duenas's office tomorrow when it is open.  Advised to call for worsening/questions/concerns.  VU.    Reason for Disposition   Dizziness, lightheadedness, or weakness    Additional Information   Negative: Passed out (i.e., lost consciousness, collapsed and was not responding)   Negative: Shock suspected (e.g., cold/pale/clammy skin, too weak to stand, low BP, rapid pulse)   Negative: Difficult to awaken or acting confused (e.g., disoriented, slurred speech)   Negative: Visible sweat on face or sweat dripping down face   Negative: Unable to walk, or can only walk with assistance (e.g., requires support)   Negative: [1] Received SHOCK from implantable cardiac defibrillator AND [2] persisting symptoms (i.e., palpitations, lightheadedness)   Negative: Sounds like a life-threatening emergency to the triager   Negative: Difficulty breathing    Protocols used: Heart Rate and Heartbeat [id number]

## 2024-02-01 ENCOUNTER — HOSPITAL ENCOUNTER (INPATIENT)
Facility: OTHER | Age: 79
LOS: 1 days | Discharge: HOME | DRG: 310 | End: 2024-02-02
Attending: EMERGENCY MEDICINE | Admitting: HOSPITALIST
Payer: MEDICARE

## 2024-02-01 DIAGNOSIS — I48.0 PAROXYSMAL ATRIAL FIBRILLATION: ICD-10-CM

## 2024-02-01 DIAGNOSIS — R00.2 PALPITATIONS: ICD-10-CM

## 2024-02-01 DIAGNOSIS — R07.9 CHEST PAIN: ICD-10-CM

## 2024-02-01 DIAGNOSIS — I48.91 ATRIAL FIBRILLATION WITH RVR: Primary | ICD-10-CM

## 2024-02-01 LAB
ALBUMIN SERPL BCP-MCNC: 4.4 G/DL (ref 3.5–5.2)
ALP SERPL-CCNC: 61 U/L (ref 55–135)
ALT SERPL W/O P-5'-P-CCNC: 12 U/L (ref 10–44)
ANION GAP SERPL CALC-SCNC: 8 MMOL/L (ref 8–16)
AST SERPL-CCNC: 16 U/L (ref 10–40)
BASOPHILS # BLD AUTO: 0.04 K/UL (ref 0–0.2)
BASOPHILS NFR BLD: 0.7 % (ref 0–1.9)
BILIRUB SERPL-MCNC: 0.4 MG/DL (ref 0.1–1)
BILIRUB UR QL STRIP: NEGATIVE
BUN SERPL-MCNC: 18 MG/DL (ref 8–23)
CALCIUM SERPL-MCNC: 10.5 MG/DL (ref 8.7–10.5)
CHLORIDE SERPL-SCNC: 105 MMOL/L (ref 95–110)
CLARITY UR: CLEAR
CO2 SERPL-SCNC: 25 MMOL/L (ref 23–29)
COLOR UR: COLORLESS
CREAT SERPL-MCNC: 0.9 MG/DL (ref 0.5–1.4)
DIFFERENTIAL METHOD BLD: NORMAL
EOSINOPHIL # BLD AUTO: 0 K/UL (ref 0–0.5)
EOSINOPHIL NFR BLD: 0.7 % (ref 0–8)
ERYTHROCYTE [DISTWIDTH] IN BLOOD BY AUTOMATED COUNT: 12.9 % (ref 11.5–14.5)
EST. GFR  (NO RACE VARIABLE): >60 ML/MIN/1.73 M^2
GLUCOSE SERPL-MCNC: 97 MG/DL (ref 70–110)
GLUCOSE UR QL STRIP: NEGATIVE
HCT VFR BLD AUTO: 43.5 % (ref 37–48.5)
HGB BLD-MCNC: 14.2 G/DL (ref 12–16)
HGB UR QL STRIP: NEGATIVE
IMM GRANULOCYTES # BLD AUTO: 0.01 K/UL (ref 0–0.04)
IMM GRANULOCYTES NFR BLD AUTO: 0.2 % (ref 0–0.5)
KETONES UR QL STRIP: ABNORMAL
LEUKOCYTE ESTERASE UR QL STRIP: NEGATIVE
LYMPHOCYTES # BLD AUTO: 1.6 K/UL (ref 1–4.8)
LYMPHOCYTES NFR BLD: 28.2 % (ref 18–48)
MAGNESIUM SERPL-MCNC: 2.1 MG/DL (ref 1.6–2.6)
MCH RBC QN AUTO: 29.7 PG (ref 27–31)
MCHC RBC AUTO-ENTMCNC: 32.6 G/DL (ref 32–36)
MCV RBC AUTO: 91 FL (ref 82–98)
MONOCYTES # BLD AUTO: 0.6 K/UL (ref 0.3–1)
MONOCYTES NFR BLD: 9.9 % (ref 4–15)
NEUTROPHILS # BLD AUTO: 3.4 K/UL (ref 1.8–7.7)
NEUTROPHILS NFR BLD: 60.3 % (ref 38–73)
NITRITE UR QL STRIP: NEGATIVE
NRBC BLD-RTO: 0 /100 WBC
PH UR STRIP: 8 [PH] (ref 5–8)
PHOSPHATE SERPL-MCNC: 3.2 MG/DL (ref 2.7–4.5)
PLATELET # BLD AUTO: 224 K/UL (ref 150–450)
PMV BLD AUTO: 9.2 FL (ref 9.2–12.9)
POCT GLUCOSE: 90 MG/DL (ref 70–110)
POTASSIUM SERPL-SCNC: 4.8 MMOL/L (ref 3.5–5.1)
PROT SERPL-MCNC: 6.9 G/DL (ref 6–8.4)
PROT UR QL STRIP: NEGATIVE
RBC # BLD AUTO: 4.78 M/UL (ref 4–5.4)
SODIUM SERPL-SCNC: 138 MMOL/L (ref 136–145)
SP GR UR STRIP: 1 (ref 1–1.03)
URN SPEC COLLECT METH UR: ABNORMAL
UROBILINOGEN UR STRIP-ACNC: NEGATIVE EU/DL
WBC # BLD AUTO: 5.64 K/UL (ref 3.9–12.7)

## 2024-02-01 PROCEDURE — 93005 ELECTROCARDIOGRAM TRACING: CPT

## 2024-02-01 PROCEDURE — 25000003 PHARM REV CODE 250: Performed by: INTERNAL MEDICINE

## 2024-02-01 PROCEDURE — 25000003 PHARM REV CODE 250: Performed by: EMERGENCY MEDICINE

## 2024-02-01 PROCEDURE — 25000003 PHARM REV CODE 250: Performed by: NURSE PRACTITIONER

## 2024-02-01 PROCEDURE — 93010 ELECTROCARDIOGRAM REPORT: CPT | Mod: ,,, | Performed by: INTERNAL MEDICINE

## 2024-02-01 PROCEDURE — 85025 COMPLETE CBC W/AUTO DIFF WBC: CPT | Performed by: PHYSICIAN ASSISTANT

## 2024-02-01 PROCEDURE — 84100 ASSAY OF PHOSPHORUS: CPT | Performed by: PHYSICIAN ASSISTANT

## 2024-02-01 PROCEDURE — 93010 ELECTROCARDIOGRAM REPORT: CPT | Mod: 76,,, | Performed by: INTERNAL MEDICINE

## 2024-02-01 PROCEDURE — 25000003 PHARM REV CODE 250: Performed by: HOSPITALIST

## 2024-02-01 PROCEDURE — 83735 ASSAY OF MAGNESIUM: CPT | Performed by: PHYSICIAN ASSISTANT

## 2024-02-01 PROCEDURE — 82962 GLUCOSE BLOOD TEST: CPT

## 2024-02-01 PROCEDURE — 99223 1ST HOSP IP/OBS HIGH 75: CPT | Mod: ,,, | Performed by: INTERNAL MEDICINE

## 2024-02-01 PROCEDURE — 80053 COMPREHEN METABOLIC PANEL: CPT | Performed by: PHYSICIAN ASSISTANT

## 2024-02-01 PROCEDURE — 20000000 HC ICU ROOM

## 2024-02-01 PROCEDURE — 96374 THER/PROPH/DIAG INJ IV PUSH: CPT

## 2024-02-01 PROCEDURE — 81003 URINALYSIS AUTO W/O SCOPE: CPT | Performed by: PHYSICIAN ASSISTANT

## 2024-02-01 PROCEDURE — 99291 CRITICAL CARE FIRST HOUR: CPT

## 2024-02-01 RX ORDER — ONDANSETRON HYDROCHLORIDE 2 MG/ML
4 INJECTION, SOLUTION INTRAVENOUS EVERY 6 HOURS PRN
Status: DISCONTINUED | OUTPATIENT
Start: 2024-02-01 | End: 2024-02-02 | Stop reason: HOSPADM

## 2024-02-01 RX ORDER — TALC
6 POWDER (GRAM) TOPICAL NIGHTLY PRN
Status: DISCONTINUED | OUTPATIENT
Start: 2024-02-01 | End: 2024-02-02 | Stop reason: HOSPADM

## 2024-02-01 RX ORDER — DILTIAZEM HYDROCHLORIDE 5 MG/ML
10 INJECTION INTRAVENOUS
Status: COMPLETED | OUTPATIENT
Start: 2024-02-01 | End: 2024-02-01

## 2024-02-01 RX ORDER — ACETAMINOPHEN 325 MG/1
650 TABLET ORAL EVERY 8 HOURS PRN
Status: DISCONTINUED | OUTPATIENT
Start: 2024-02-01 | End: 2024-02-02 | Stop reason: HOSPADM

## 2024-02-01 RX ORDER — SODIUM CHLORIDE 9 MG/ML
1000 INJECTION, SOLUTION INTRAVENOUS
Status: COMPLETED | OUTPATIENT
Start: 2024-02-01 | End: 2024-02-01

## 2024-02-01 RX ORDER — FLECAINIDE ACETATE 100 MG/1
100 TABLET ORAL EVERY 12 HOURS
Status: DISCONTINUED | OUTPATIENT
Start: 2024-02-01 | End: 2024-02-02 | Stop reason: HOSPADM

## 2024-02-01 RX ORDER — SODIUM CHLORIDE 0.9 % (FLUSH) 0.9 %
10 SYRINGE (ML) INJECTION EVERY 8 HOURS PRN
Status: DISCONTINUED | OUTPATIENT
Start: 2024-02-01 | End: 2024-02-02 | Stop reason: HOSPADM

## 2024-02-01 RX ORDER — ENOXAPARIN SODIUM 100 MG/ML
40 INJECTION SUBCUTANEOUS EVERY 24 HOURS
Status: DISCONTINUED | OUTPATIENT
Start: 2024-02-01 | End: 2024-02-01

## 2024-02-01 RX ORDER — POLYETHYLENE GLYCOL 3350 17 G/17G
17 POWDER, FOR SOLUTION ORAL DAILY
Status: DISCONTINUED | OUTPATIENT
Start: 2024-02-02 | End: 2024-02-02 | Stop reason: HOSPADM

## 2024-02-01 RX ORDER — AMOXICILLIN 250 MG
1 CAPSULE ORAL 2 TIMES DAILY
Status: DISCONTINUED | OUTPATIENT
Start: 2024-02-01 | End: 2024-02-01

## 2024-02-01 RX ORDER — ALUMINUM HYDROXIDE, MAGNESIUM HYDROXIDE, AND SIMETHICONE 1200; 120; 1200 MG/30ML; MG/30ML; MG/30ML
30 SUSPENSION ORAL 4 TIMES DAILY PRN
Status: DISCONTINUED | OUTPATIENT
Start: 2024-02-01 | End: 2024-02-02 | Stop reason: HOSPADM

## 2024-02-01 RX ORDER — IBUPROFEN 200 MG
24 TABLET ORAL
Status: DISCONTINUED | OUTPATIENT
Start: 2024-02-01 | End: 2024-02-02 | Stop reason: HOSPADM

## 2024-02-01 RX ORDER — DILTIAZEM HCL 1 MG/ML
0-15 INJECTION, SOLUTION INTRAVENOUS CONTINUOUS
Status: DISCONTINUED | OUTPATIENT
Start: 2024-02-01 | End: 2024-02-02

## 2024-02-01 RX ORDER — METOPROLOL TARTRATE 1 MG/ML
5 INJECTION, SOLUTION INTRAVENOUS EVERY 4 HOURS PRN
Status: DISCONTINUED | OUTPATIENT
Start: 2024-02-01 | End: 2024-02-02 | Stop reason: HOSPADM

## 2024-02-01 RX ORDER — PROCHLORPERAZINE EDISYLATE 5 MG/ML
5 INJECTION INTRAMUSCULAR; INTRAVENOUS EVERY 6 HOURS PRN
Status: DISCONTINUED | OUTPATIENT
Start: 2024-02-01 | End: 2024-02-02 | Stop reason: HOSPADM

## 2024-02-01 RX ORDER — METOPROLOL TARTRATE 25 MG/1
25 TABLET, FILM COATED ORAL 2 TIMES DAILY
Status: DISCONTINUED | OUTPATIENT
Start: 2024-02-01 | End: 2024-02-02

## 2024-02-01 RX ORDER — MUPIROCIN 20 MG/G
OINTMENT TOPICAL 2 TIMES DAILY
Status: DISCONTINUED | OUTPATIENT
Start: 2024-02-01 | End: 2024-02-02 | Stop reason: HOSPADM

## 2024-02-01 RX ORDER — GLUCAGON 1 MG
1 KIT INJECTION
Status: DISCONTINUED | OUTPATIENT
Start: 2024-02-01 | End: 2024-02-02 | Stop reason: HOSPADM

## 2024-02-01 RX ORDER — NALOXONE HCL 0.4 MG/ML
0.02 VIAL (ML) INJECTION
Status: DISCONTINUED | OUTPATIENT
Start: 2024-02-01 | End: 2024-02-02 | Stop reason: HOSPADM

## 2024-02-01 RX ORDER — IBUPROFEN 200 MG
16 TABLET ORAL
Status: DISCONTINUED | OUTPATIENT
Start: 2024-02-01 | End: 2024-02-02 | Stop reason: HOSPADM

## 2024-02-01 RX ADMIN — DILTIAZEM HYDROCHLORIDE 10 MG: 5 INJECTION INTRAVENOUS at 02:02

## 2024-02-01 RX ADMIN — Medication 5 MG/HR: at 03:02

## 2024-02-01 RX ADMIN — APIXABAN 2.5 MG: 2.5 TABLET, FILM COATED ORAL at 05:02

## 2024-02-01 RX ADMIN — Medication 10 MG/HR: at 03:02

## 2024-02-01 RX ADMIN — MUPIROCIN: 20 OINTMENT TOPICAL at 08:02

## 2024-02-01 RX ADMIN — SODIUM CHLORIDE 1000 ML: 9 INJECTION, SOLUTION INTRAVENOUS at 02:02

## 2024-02-01 RX ADMIN — FLECAINIDE ACETATE 100 MG: 100 TABLET ORAL at 05:02

## 2024-02-01 RX ADMIN — SENNOSIDES AND DOCUSATE SODIUM 1 TABLET: 8.6; 5 TABLET ORAL at 09:02

## 2024-02-01 RX ADMIN — Medication 15 MG/HR: at 04:02

## 2024-02-01 RX ADMIN — METOPROLOL TARTRATE 25 MG: 25 TABLET, FILM COATED ORAL at 05:02

## 2024-02-01 RX ADMIN — Medication 12.5 MG/HR: at 04:02

## 2024-02-01 NOTE — ED TRIAGE NOTES
Over past week patient states she has been feeling lightheaded with intermittent palpitations. States she has worn a holter monitor for past 2 weeks which has not shown any abnormalities in rate or rhythm. Patient received IVF yesterday and today, most symptoms have resolved and she is feeling much better. Presents awake, alert. Denies N/V but states she is trying to increase fluid intake. Denies pain or discomfort at this time.

## 2024-02-01 NOTE — FIRST PROVIDER EVALUATION
" Emergency Department TeleTriage Encounter Note      CHIEF COMPLAINT    Chief Complaint   Patient presents with    Dizziness     Lightheadedness x 1 week with interment palpations       VITAL SIGNS   Initial Vitals [02/01/24 1055]   BP Pulse Resp Temp SpO2   (!) 156/67 74 20 97.7 °F (36.5 °C) 97 %      MAP       --            ALLERGIES    Review of patient's allergies indicates:   Allergen Reactions    Demerol [meperidine] Other (See Comments)     "Becomes Agitated and Combative"       PROVIDER TRIAGE NOTE  This is a teletriage evaluation of a 78 y.o. female presenting to the ED complaining of lightheadedness. Patient reports lightheadedness, dizziness, and palpitations for one week. She was told to come to the ED for possible dehydration by her PCP. She denies chest pain or shortness of breath. She has been drinking gatorade since yesterday and feels much better.    Patient is alert and oriented. She speaks in complete sentences. She is sitting upright in the chair in no distress.     Initial orders will be placed and care will be transferred to an alternate provider when patient is roomed for a full evaluation. Any additional orders and the final disposition will be determined by that provider.         ORDERS  Labs Reviewed   CBC W/ AUTO DIFFERENTIAL   COMPREHENSIVE METABOLIC PANEL   URINALYSIS, REFLEX TO URINE CULTURE   POCT GLUCOSE MONITORING CONTINUOUS       ED Orders (720h ago, onward)      Start Ordered     Status Ordering Provider    02/01/24 1103 02/01/24 1103  CBC auto differential  STAT         Ordered GUY RENTERIA    02/01/24 1103 02/01/24 1103  Comprehensive metabolic panel  STAT         Ordered GUY RENTERIA    02/01/24 1103 02/01/24 1103  Insert Saline lock IV  Once         Ordered GUY RENTERIA    02/01/24 1103 02/01/24 1103  EKG 12-lead  Once         Ordered GUY RENTERIA    02/01/24 1103 02/01/24 1103  POCT glucose  Once         Ordered GUY RENTERIA    02/01/24 1103 02/01/24 1103  " Urinalysis, Reflex to Urine Culture Urine, Clean Catch  STAT         Ordered GUY RENTERIA.    02/01/24 1103 02/01/24 1103  Orthostatic blood pressure  Once         Ordered GUY RENTERIA              Virtual Visit Note: The provider triage portion of this emergency department evaluation and documentation was performed via [employer], a HIPAA-compliant telemedicine application, in concert with a tele-presenter in the room. A face to face patient evaluation with one of my colleagues will occur once the patient is placed in an emergency department room.      DISCLAIMER: This note was prepared with [employer] voice recognition transcription software. Garbled syntax, mangled pronouns, and other bizarre constructions may be attributed to that software system.

## 2024-02-01 NOTE — ED PROVIDER NOTES
"Encounter Date: 2/1/2024    SCRIBE #1 NOTE: I, Theo Dean, paulette scribing for, and in the presence of,  Jasson Loya II, MD.       History     Chief Complaint   Patient presents with    Dizziness     Lightheadedness x 1 week with interment palpations     Time seen by provider: 2:25 PM    Marian Durham is a 78 y.o. female, with a PMHx of HLD and SBO with prior episode of A-Fib, who presents to the ED with lightheadedness for the past week. Patient was seen at [facility] for SBO in June 2023 and while there was found to be in AF on RVR. Following discharge patient was put on Eliquis and ZioPatch monitoring however was taken off ahead of knee replacement surgery after no subsequent reported episodes. Last Thursday 1/25 she was at PT when she began feeling lightheaded with mild fluttering palpitations. She was seen by cardiology on Monday 1/29 with persisting symptoms however EKG showed no arrhythmias. Patient followed up with PCP who advised her to get IV fluids and rehydrate. She notes increased water and Gatorade intake yesterday seemed to have resolved her symptoms within the hour. Patient came today to receive IV fluids however while waiting states her lightheadedness had returned. She denies any diet changes besides the increased fluid intake. This is the extent of the patient's complaints today in the Emergency Department.     The history is provided by the patient.     Review of patient's allergies indicates:   Allergen Reactions    Demerol [meperidine] Other (See Comments)     "Becomes Agitated and Combative"     Past Medical History:   Diagnosis Date    Atrial fibrillation     stress related per patient    Hyperlipidemia     Osteoarthritis     PONV (postoperative nausea and vomiting)     SBO (small bowel obstruction)     Shingles 01/01/2002    Spinal stenosis      Past Surgical History:   Procedure Laterality Date    ANKLE FRACTURE SURGERY  2007    Right ankle    COLON SURGERY      bowel obstruction    " COLONOSCOPY      x 2    EXCISION OF MASS OF BACK N/A 11/16/2018    Procedure: EXCISION, MASS, BACK;  Surgeon: Fran Magaña MD;  Location: [facility];  Service: General;  Laterality: N/A;    EYE SURGERY Bilateral 2016    HYSTERECTOMY  1985    HEATHER    KNEE ARTHROSCOPY W/ DEBRIDEMENT  1994    Bilateral    KNEE ARTHROSCOPY W/ DEBRIDEMENT  2003    Left knee    OPEN PATELLA REEFING PROCEDURE  age 19    Left knee    Tonsilectomy  as a child    TONSILLECTOMY      TOTAL KNEE ARTHROPLASTY Right 10/19/2023    Procedure: ARTHROPLASTY, KNEE, TOTAL;  Surgeon: Vicente Mitchell MD;  Location: Psychiatric;  Service: Orthopedics;  Laterality: Right;    TUBAL LIGATION       Family History   Problem Relation Age of Onset    Hypertension Father     Hypertension Mother     Diabetes Brother     Colon cancer Paternal Aunt     Breast cancer Paternal Aunt         75    Coronary artery disease Brother     Ovarian cancer Neg Hx      Social History     Tobacco Use    Smoking status: Never    Smokeless tobacco: Never   Substance Use Topics    Alcohol use: Yes     Alcohol/week: 3.0 standard drinks of alcohol     Types: 3 Glasses of wine per week     Comment: Drinks a glass of wine 3 times weekly; none 24 hr prior to surgery    Drug use: No     Review of Systems   Constitutional:  Negative for chills, diaphoresis, fatigue and fever.   HENT:  Negative for ear discharge, facial swelling, hearing loss, nosebleeds and sore throat.    Eyes:  Negative for photophobia, pain, discharge and visual disturbance.   Respiratory:  Negative for choking, chest tightness, shortness of breath and wheezing.    Cardiovascular:  Positive for palpitations. Negative for chest pain and leg swelling.        Mild fluttering palpitations.   Gastrointestinal:  Negative for abdominal distention, abdominal pain, blood in stool, constipation, diarrhea, nausea and vomiting.   Genitourinary:  Negative for difficulty urinating, dysuria, frequency, hematuria, pelvic pain,  urgency, vaginal bleeding and vaginal discharge.   Musculoskeletal:  Negative for back pain, gait problem, joint swelling, myalgias and neck stiffness.   Skin:  Negative for color change, pallor and rash.   Neurological:  Positive for light-headedness. Negative for seizures, syncope, facial asymmetry, weakness, numbness and headaches.   Hematological:  Negative for adenopathy. Does not bruise/bleed easily.   Psychiatric/Behavioral:  Negative for confusion, hallucinations, self-injury and suicidal ideas.        Physical Exam     Initial Vitals [02/01/24 1055]   BP Pulse Resp Temp SpO2   (!) 156/67 74 20 97.7 °F (36.5 °C) 97 %      MAP       --         Physical Exam    Nursing note and vitals reviewed.  Constitutional: She appears well-developed and well-nourished. She is not diaphoretic. No distress.   HENT:   Head: Normocephalic and atraumatic.   Moist mucous membranes.   Eyes: EOM are normal. Pupils are equal, round, and reactive to light.   No pallor or icterus.   Neck: Neck supple.   Normal range of motion.  Cardiovascular:  Normal heart sounds. An irregularly irregular rhythm present.   Tachycardia present.   Exam reveals no gallop and no friction rub.       No murmur heard.  Pulmonary/Chest: Breath sounds normal. No respiratory distress. She has no wheezes. She has no rhonchi. She has no rales.   Abdominal: Abdomen is soft. There is no abdominal tenderness.   Musculoskeletal:         General: No tenderness or edema. Normal range of motion.      Cervical back: Normal range of motion and neck supple.     Lymphadenopathy:     She has no cervical adenopathy.   Neurological: She is alert and oriented to person, place, and time.   Skin: Skin is warm and dry.   Psychiatric: She has a normal mood and affect. Her behavior is normal. Judgment and thought content normal.         ED Course   Critical Care    Date/Time: 2/1/2024 5:25 PM    Performed by: Jasson Loya II, MD  Authorized by: Mainor Lombardi MD  Direct  patient critical care time: 15 minutes  Additional history critical care time: 5 minutes  Ordering / reviewing critical care time: 10 minutes  Documentation critical care time: 10 minutes  Consulting other physicians critical care time: 10 minutes  Total critical care time (exclusive of procedural time) : 50 minutes  Critical care was necessary to treat or prevent imminent or life-threatening deterioration of the following conditions: cardiac failure and circulatory failure.        Labs Reviewed   CBC W/ AUTO DIFFERENTIAL   COMPREHENSIVE METABOLIC PANEL   MAGNESIUM   PHOSPHORUS   PHOSPHORUS   MAGNESIUM   POCT GLUCOSE   POCT GLUCOSE MONITORING CONTINUOUS     EKG Readings: (Independently Interpreted)   1101: Sinus rhythm with rate of 70. No ST or T wave changes. No ischemia or arrhythmia.   1342: Atrial Fibrillation. Ventricular rate of 145. Nonspecific ST changes.         Imaging Results    None          Medications   diltiaZEM 125 mg in D5W 125 mL infusion (15 mg/hr Intravenous New Bag 2/1/24 5234)   sodium chloride 0.9% flush 10 mL (has no administration in time range)   melatonin tablet 6 mg (has no administration in time range)   ondansetron injection 4 mg (has no administration in time range)   prochlorperazine injection Soln 5 mg (has no administration in time range)   senna-docusate 8.6-50 mg per tablet 1 tablet (has no administration in time range)   acetaminophen tablet 650 mg (has no administration in time range)   aluminum-magnesium hydroxide-simethicone 200-200-20 mg/5 mL suspension 30 mL (has no administration in time range)   naloxone 0.4 mg/mL injection 0.02 mg (has no administration in time range)   glucose chewable tablet 16 g (has no administration in time range)   glucose chewable tablet 24 g (has no administration in time range)   glucagon (human recombinant) injection 1 mg (has no administration in time range)   dextrose 10% bolus 125 mL 125 mL (has no administration in time range)   dextrose 10%  bolus 250 mL 250 mL (has no administration in time range)   mupirocin 2 % ointment (has no administration in time range)   metoprolol tartrate (LOPRESSOR) tablet 25 mg (has no administration in time range)   apixaban tablet 2.5 mg (has no administration in time range)   flecainide tablet 100 mg (has no administration in time range)   metoprolol injection 5 mg (has no administration in time range)   diltiaZEM injection 10 mg (10 mg Intravenous Given 2/1/24 1421)   0.9%  NaCl infusion (1,000 mLs Intravenous New Bag 2/1/24 1423)     Medical Decision Making  Amount and/or Complexity of Data Reviewed  External Data Reviewed: ECG and notes.  Labs: ordered. Decision-making details documented in ED Course.  ECG/medicine tests: ordered and independent interpretation performed. Decision-making details documented in ED Course.    Risk  Prescription drug management.  Drug therapy requiring intensive monitoring for toxicity.  Decision regarding hospitalization.    Patient with prior evaluation for palpitations which was reportedly negative now presents with episodes of lightheadedness and palpitations initially sinus rhythm but during wait in the waiting room, developed recurrence of symptoms and upon evaluation in the room is found to be in AFib with RVR.  Suspect patient has been having paroxysmal AFib.  Not currently on anticoagulation or antiarrhythmics.  Laboratory studies were unremarkable.  Given IV dose of Cardizem without significant effect, therefore will start on Cardizem drip and admit to the ICU.  Case discussed with Dr. Pearson of the hospitalist Service, as well as Dr. Clarke, on-call cardiology        Scribe Attestation:   Scribe #1: I performed the above scribed service and the documentation accurately describes the services I performed. I attest to the accuracy of the note.    Physician Attestation for Scribe: I, TL, reviewed documentation as scribed in my presence, which is both accurate and complete.                               Clinical Impression:  Final diagnoses:  [R00.2] Palpitations          ED Disposition Condition    Admit                 Jasson Loya II, MD  02/01/24 8169

## 2024-02-01 NOTE — CONSULTS
[facility] ([facility])  Cardiology  Consult Note    Patient Name: Marian Durham  MRN: [id number]  Admission Date: 2/1/2024  Hospital Length of Stay: 0 days  Code Status: Full Code   Attending Provider: Mainor Lombardi MD   Consulting Provider: Lamar Clarke MD  Primary Care Physician: Suzanna Duran MD  Principal Problem:Paroxysmal atrial fibrillation    Patient information was obtained from patient, past medical records, and ER records.     Inpatient consult to Cardiology  Consult performed by: Lamar Clarke MD  Consult ordered by: Mainor Lombardi MD  Reason for consult: Atrial fibrillation        Subjective:     Chief Complaint: Palpitations.    HPI:     Marian Durham is a 78 y.o.female with hypercholesterolemia. She has a healthy weight close to being underweight. She had small bowel obstruction in 6/2023. She then had an episode of atrial fibrillation. She was anticoagulated for some time. With no recurrent spells it was felt she may discontinue the anticoagulation. Beginning on 1/26/2024 she began experience episodes of being lightheaded with an anxious feeling in her chest. She has an Apple watch and recorded a high heart rate. The episodes continued. She presented to the emergency room at [facility] on 2/1/2024 after a spell. She was in sinus rhythm. As she was monitored in the emergency room she went into artrial fibrillation with fast ventricular response rate. She was given diltiazem iv and admitted to the intensive care unit. She denies chest pain or shortness of breath.     Past Medical History:   Diagnosis Date    Atrial fibrillation     stress related per patient    Hyperlipidemia     Osteoarthritis     PONV (postoperative nausea and vomiting)     SBO (small bowel obstruction)     Shingles 01/01/2002    Spinal stenosis        Past Surgical History:   Procedure Laterality Date    ANKLE FRACTURE SURGERY  2007    Right ankle    COLON SURGERY       "bowel obstruction    COLONOSCOPY      x 2    EXCISION OF MASS OF BACK N/A 11/16/2018    Procedure: EXCISION, MASS, BACK;  Surgeon: Fran Magaña MD;  Location: [facility];  Service: General;  Laterality: N/A;    EYE SURGERY Bilateral 2016    HYSTERECTOMY  1985    HEATHER    KNEE ARTHROSCOPY W/ DEBRIDEMENT  1994    Bilateral    KNEE ARTHROSCOPY W/ DEBRIDEMENT  2003    Left knee    OPEN PATELLA REEFING PROCEDURE  age 19    Left knee    Tonsilectomy  as a child    TONSILLECTOMY      TOTAL KNEE ARTHROPLASTY Right 10/19/2023    Procedure: ARTHROPLASTY, KNEE, TOTAL;  Surgeon: Vicente Mitchell MD;  Location: [facility];  Service: Orthopedics;  Laterality: Right;    TUBAL LIGATION         Review of patient's allergies indicates:   Allergen Reactions    Demerol [meperidine] Other (See Comments)     "Becomes Agitated and Combative"       No current facility-administered medications on file prior to encounter.     Current Outpatient Medications on File Prior to Encounter   Medication Sig    aspirin 81 MG Chew Take 1 tablet (81 mg total) by mouth 2 (two) times daily.    calcium-vitamin D3 (OS- + D3) 500 mg-5 mcg (200 unit) per tablet Take 1 tablet by mouth 2 (two) times daily with meals.    celecoxib (CELEBREX) 200 MG capsule take 1 capsule (200MG) by oral route every day as needed    estradioL (ESTRACE) 0.01 % (0.1 mg/gram) vaginal cream Place 1 g vaginally 3 (three) times a week.    glucosamine-chondroitin 500-400 mg tablet Take 1 tablet by mouth 3 (three) times daily.    HYDROcodone-acetaminophen (NORCO)  mg per tablet Take 1 tablet by mouth every 6 (six) hours as needed.    polyethylene glycol (MIRALAX) 17 gram/dose powder Take 17 g by mouth 3 (three) times daily as needed (Constipation).    simvastatin (ZOCOR) 40 MG tablet Take 40 mg by mouth every evening.      Family History       Problem Relation (Age of Onset)    Breast cancer Paternal Aunt    Colon cancer Paternal Aunt    Coronary artery disease Brother "    Diabetes Brother    Hypertension Father, Mother          Tobacco Use    Smoking status: Never    Smokeless tobacco: Never   Substance and Sexual Activity    Alcohol use: Yes     Alcohol/week: 3.0 standard drinks of alcohol     Types: 3 Glasses of wine per week     Comment: Drinks a glass of wine 3 times weekly; none 24 hr prior to surgery    Drug use: No    Sexual activity: Not Currently     Birth control/protection: Post-menopausal     Comment: Spouse  of kidney cancer : 2015     Review of Systems   Constitutional: Positive for malaise/fatigue. Negative for chills and fever.   HENT:  Negative for nosebleeds and tinnitus.    Eyes:  Negative for double vision, vision loss in left eye and vision loss in right eye.   Cardiovascular:  Negative for chest pain, claudication, dyspnea on exertion, irregular heartbeat, leg swelling, near-syncope, orthopnea, palpitations, paroxysmal nocturnal dyspnea and syncope.   Respiratory:  Negative for cough, hemoptysis, shortness of breath and wheezing.    Endocrine: Negative for cold intolerance and heat intolerance.   Hematologic/Lymphatic: Negative for bleeding problem. Does not bruise/bleed easily.   Skin:  Negative for color change and rash.   Musculoskeletal:  Negative for back pain, falls, muscle weakness and myalgias.   Gastrointestinal:  Negative for abdominal pain, diarrhea, dysphagia, heartburn, hematemesis, hematochezia, hemorrhoids, jaundice, melena, nausea and vomiting.   Genitourinary:  Negative for dysuria and hematuria.   Neurological:  Positive for light-headedness and weakness. Negative for dizziness, focal weakness, headaches, loss of balance, numbness, tremors and vertigo.   Psychiatric/Behavioral:  Negative for altered mental status, depression and memory loss. The patient is not nervous/anxious.    Allergic/Immunologic: Negative for hives and persistent infections.     Objective:     Vital Signs (Most Recent):  Temp: 98.1 °F (36.7 °C) (24  1625)  Pulse: (!) 144 (02/01/24 1625)  Resp: (!) 23 (02/01/24 1625)  BP: (!) 141/65 (02/01/24 1625)  SpO2: 100 % (02/01/24 1625) Vital Signs (24h Range):  Temp:  [97.7 °F (36.5 °C)-98.1 °F (36.7 °C)] 98.1 °F (36.7 °C)  Pulse:  [] 144  Resp:  [16-25] 23  SpO2:  [97 %-100 %] 100 %  BP: (135-199)/(65-97) 141/65     Weight: 51 kg (112 lb 7 oz)  Body mass index is 18.71 kg/m².    SpO2: 100 %       No intake or output data in the 24 hours ending 02/01/24 1643    Lines/Drains/Airways       Peripheral Intravenous Line  Duration                  Peripheral IV - Single Lumen 02/01/24 1347 20 G Right Antecubital <1 day         Peripheral IV - Single Lumen 02/01/24 1450 20 G Left Forearm <1 day                    Physical Exam  Constitutional:       General: She is not in acute distress.     Appearance: Normal appearance. She is well-developed. She is not toxic-appearing or diaphoretic.   HENT:      Head: Normocephalic and atraumatic.      Nose: Nose normal.   Eyes:      General:         Right eye: No discharge.         Left eye: No discharge.      Conjunctiva/sclera:      Right eye: Right conjunctiva is not injected.      Left eye: Left conjunctiva is not injected.      Pupils: Pupils are equal.      Right eye: Pupil is round.      Left eye: Pupil is round.   Neck:      Thyroid: No thyromegaly.      Vascular: No carotid bruit or JVD.   Cardiovascular:      Rate and Rhythm: Tachycardia present. Rhythm irregularly irregular. No extrasystoles are present.     Chest Wall: PMI is not displaced.      Pulses:           Radial pulses are 2+ on the right side and 2+ on the left side.        Femoral pulses are 2+ on the right side and 2+ on the left side.       Dorsalis pedis pulses are 2+ on the right side and 2+ on the left side.        Posterior tibial pulses are 2+ on the right side and 2+ on the left side.      Heart sounds: S1 normal and S2 normal.      No gallop.   Pulmonary:      Effort: Pulmonary effort is normal.       Breath sounds: Normal breath sounds.   Abdominal:      Palpations: Abdomen is soft.      Tenderness: There is no abdominal tenderness.   Musculoskeletal:      Cervical back: Neck supple.      Right lower leg: Normal. No swelling. No edema.      Left lower leg: Normal. No swelling. No edema.   Lymphadenopathy:      Head:      Right side of head: No submandibular adenopathy.      Left side of head: No submandibular adenopathy.      Cervical: No cervical adenopathy.   Skin:     General: Skin is warm and dry.      Findings: No rash.      Nails: There is no clubbing.   Neurological:      General: No focal deficit present.      Mental Status: She is alert and oriented to person, place, and time. She is not disoriented.      Cranial Nerves: No cranial nerve deficit.   Psychiatric:         Attention and Perception: Attention normal.         Mood and Affect: Mood and affect normal.         Speech: Speech normal.         Behavior: Behavior normal.         Thought Content: Thought content normal.         Cognition and Memory: Cognition and memory normal.         Judgment: Judgment normal.         Current Medications:     enoxparin  40 mg Subcutaneous Q24H (prophylaxis, 1700)    mupirocin   Nasal BID    senna-docusate 8.6-50 mg  1 tablet Oral BID     Current Laboratory Results:    Recent Results (from the past 24 hour(s))   CBC auto differential    Collection Time: 02/01/24 11:14 AM   Result Value Ref Range    WBC 5.64 3.90 - 12.70 K/uL    RBC 4.78 4.00 - 5.40 M/uL    Hemoglobin 14.2 12.0 - 16.0 g/dL    Hematocrit 43.5 37.0 - 48.5 %    MCV 91 82 - 98 fL    MCH 29.7 27.0 - 31.0 pg    MCHC 32.6 32.0 - 36.0 g/dL    RDW 12.9 11.5 - 14.5 %    Platelets 224 150 - 450 K/uL    MPV 9.2 9.2 - 12.9 fL    Immature Granulocytes 0.2 0.0 - 0.5 %    Gran # (ANC) 3.4 1.8 - 7.7 K/uL    Immature Grans (Abs) 0.01 0.00 - 0.04 K/uL    Lymph # 1.6 1.0 - 4.8 K/uL    Mono # 0.6 0.3 - 1.0 K/uL    Eos # 0.0 0.0 - 0.5 K/uL    Baso # 0.04 0.00 - 0.20 K/uL     nRBC 0 0 /100 WBC    Gran % 60.3 38.0 - 73.0 %    Lymph % 28.2 18.0 - 48.0 %    Mono % 9.9 4.0 - 15.0 %    Eosinophil % 0.7 0.0 - 8.0 %    Basophil % 0.7 0.0 - 1.9 %    Differential Method Automated    Comprehensive metabolic panel    Collection Time: 02/01/24 11:14 AM   Result Value Ref Range    Sodium 138 136 - 145 mmol/L    Potassium 4.8 3.5 - 5.1 mmol/L    Chloride 105 95 - 110 mmol/L    CO2 25 23 - 29 mmol/L    Glucose 97 70 - 110 mg/dL    BUN 18 8 - 23 mg/dL    Creatinine 0.9 0.5 - 1.4 mg/dL    Calcium 10.5 8.7 - 10.5 mg/dL    Total Protein 6.9 6.0 - 8.4 g/dL    Albumin 4.4 3.5 - 5.2 g/dL    Total Bilirubin 0.4 0.1 - 1.0 mg/dL    Alkaline Phosphatase 61 55 - 135 U/L    AST 16 10 - 40 U/L    ALT 12 10 - 44 U/L    eGFR >60 >60 mL/min/1.73 m^2    Anion Gap 8 8 - 16 mmol/L   Phosphorus    Collection Time: 02/01/24 11:14 AM   Result Value Ref Range    Phosphorus 3.2 2.7 - 4.5 mg/dL   Magnesium    Collection Time: 02/01/24 11:14 AM   Result Value Ref Range    Magnesium 2.1 1.6 - 2.6 mg/dL   POCT glucose    Collection Time: 02/01/24 11:17 AM   Result Value Ref Range    POCT Glucose 90 70 - 110 mg/dL   Urinalysis, Reflex to Urine Culture Urine, Clean Catch    Collection Time: 02/01/24  3:25 PM    Specimen: Urine   Result Value Ref Range    Specimen UA Urine, Clean Catch     Color, UA Colorless (A) Yellow, Straw, Yun    Appearance, UA Clear Clear    pH, UA 8.0 5.0 - 8.0    Specific Gravity, UA 1.005 1.005 - 1.030    Protein, UA Negative Negative    Glucose, UA Negative Negative    Ketones, UA Trace (A) Negative    Bilirubin (UA) Negative Negative    Occult Blood UA Negative Negative    Nitrite, UA Negative Negative    Urobilinogen, UA Negative <2.0 EU/dL    Leukocytes, UA Negative Negative     Current Imaging Results:    No orders to display       6/30/2023: Echo:  The left ventricle is normal in size with normal systolic function.  Grade I left ventricular diastolic dysfunction.  Normal right ventricular size  with normal right ventricular systolic function.      2/1/2024: ECG2: AF.  bpm.    2/1/2024: ECG1: NSR. Normal ECG.    Assessment and Plan:     Active Diagnoses:    Diagnosis Date Noted POA    PRINCIPAL PROBLEM:  Paroxysmal atrial fibrillation [I48.0] 06/30/2023 Yes      Problems Resolved During this Admission:     Assessment and Plan:    Atrial Fibrillation   6/2023: Diagnosed with paroxysmal atrial fibrillation.   1/26/2024: Began having frequent spells of what appears to have been AF.   2/1/2024: Presents with palpitations. Initial ECG reveals SR. Later AF. 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  CHA2DS2VASc=3 (A2Sc).  Rate control with diltiazem iv and metoprolol.  I would favor flecainide 100 mg Q12 when rate controlled.  Anticoagulation with apixiban.    2. High Risk Medication   2/1/2023: Flecainide 100 mg Q12 to begin.    3. Chronic Anticoagulation  CHA2DS2VASc=3 (A2Sc).  Anticoagulation with apixiban.  I would favor apixiban 2.5 mg Q12. [Age 78 and weight 51 kg].  No aspirin or NSAID.    4. Hypercholesterolemia   On simvastatin 40 mg Q24.    5. Primary Care   Dr. Suzanna Duran.       VTE Risk Mitigation (From admission, onward)           Ordered     enoxaparin injection 40 mg  Every 24 hours         02/01/24 1621     IP VTE HIGH RISK PATIENT  Once         02/01/24 1617     Place sequential compression device  Until discontinued         02/01/24 1617                    Thank you for your consult.     I will follow-up with patient. Please contact us if you have any additional questions.    Lamar Clarke MD  Cardiology   [facility])

## 2024-02-02 ENCOUNTER — TELEPHONE (OUTPATIENT)
Dept: CARDIOLOGY | Facility: CLINIC | Age: 79
End: 2024-02-02
Payer: MEDICARE

## 2024-02-02 VITALS
SYSTOLIC BLOOD PRESSURE: 115 MMHG | HEIGHT: 65 IN | TEMPERATURE: 98 F | HEART RATE: 74 BPM | DIASTOLIC BLOOD PRESSURE: 68 MMHG | RESPIRATION RATE: 50 BRPM | OXYGEN SATURATION: 99 % | BODY MASS INDEX: 18.66 KG/M2 | WEIGHT: 112 LBS

## 2024-02-02 PROBLEM — Z79.01 CHRONIC ANTICOAGULATION: Status: ACTIVE | Noted: 2024-02-02

## 2024-02-02 PROBLEM — Z79.899 HIGH RISK MEDICATION USE: Status: ACTIVE | Noted: 2024-02-02

## 2024-02-02 PROBLEM — E78.00 HYPERCHOLESTEROLEMIA: Status: ACTIVE | Noted: 2024-02-02

## 2024-02-02 LAB
ALBUMIN SERPL BCP-MCNC: 4.1 G/DL (ref 3.5–5.2)
ALP SERPL-CCNC: 59 U/L (ref 55–135)
ALT SERPL W/O P-5'-P-CCNC: 13 U/L (ref 10–44)
ANION GAP SERPL CALC-SCNC: 8 MMOL/L (ref 8–16)
AST SERPL-CCNC: 17 U/L (ref 10–40)
AV INDEX (PROSTH): 0.93
AV MEAN GRADIENT: 4 MMHG
AV PEAK GRADIENT: 6 MMHG
AV VALVE AREA BY VELOCITY RATIO: 2.82 CM²
AV VALVE AREA: 3.1 CM²
AV VELOCITY RATIO: 0.85
BASOPHILS # BLD AUTO: 0.03 K/UL (ref 0–0.2)
BASOPHILS NFR BLD: 0.5 % (ref 0–1.9)
BILIRUB SERPL-MCNC: 0.5 MG/DL (ref 0.1–1)
BSA FOR ECHO PROCEDURE: 1.53 M2
BUN SERPL-MCNC: 19 MG/DL (ref 8–23)
CALCIUM SERPL-MCNC: 10.2 MG/DL (ref 8.7–10.5)
CHLORIDE SERPL-SCNC: 107 MMOL/L (ref 95–110)
CO2 SERPL-SCNC: 25 MMOL/L (ref 23–29)
CREAT SERPL-MCNC: 0.9 MG/DL (ref 0.5–1.4)
CV ECHO LV RWT: 0.35 CM
DIFFERENTIAL METHOD BLD: NORMAL
DOP CALC AO PEAK VEL: 1.25 M/S
DOP CALC AO VTI: 29.2 CM
DOP CALC LVOT AREA: 3.3 CM2
DOP CALC LVOT DIAMETER: 2.06 CM
DOP CALC LVOT PEAK VEL: 1.06 M/S
DOP CALC LVOT STROKE VOLUME: 90.61 CM3
DOP CALCLVOT PEAK VEL VTI: 27.2 CM
E WAVE DECELERATION TIME: 217.07 MSEC
E/A RATIO: 1.44
E/E' RATIO: 8.33 M/S
ECHO LV POSTERIOR WALL: 0.66 CM (ref 0.6–1.1)
EJECTION FRACTION: 65 %
EOSINOPHIL # BLD AUTO: 0.1 K/UL (ref 0–0.5)
EOSINOPHIL NFR BLD: 1.2 % (ref 0–8)
ERYTHROCYTE [DISTWIDTH] IN BLOOD BY AUTOMATED COUNT: 13 % (ref 11.5–14.5)
EST. GFR  (NO RACE VARIABLE): >60 ML/MIN/1.73 M^2
FRACTIONAL SHORTENING: 47 % (ref 28–44)
GLUCOSE SERPL-MCNC: 110 MG/DL (ref 70–110)
HCT VFR BLD AUTO: 44.1 % (ref 37–48.5)
HGB BLD-MCNC: 14.1 G/DL (ref 12–16)
IMM GRANULOCYTES # BLD AUTO: 0.01 K/UL (ref 0–0.04)
IMM GRANULOCYTES NFR BLD AUTO: 0.2 % (ref 0–0.5)
INTERVENTRICULAR SEPTUM: 0.75 CM (ref 0.6–1.1)
IVC DIAMETER: 1.19 CM
IVRT: 53.28 MSEC
LA MAJOR: 4.03 CM
LA MINOR: 3.96 CM
LA WIDTH: 3 CM
LEFT ATRIUM SIZE: 2.62 CM
LEFT ATRIUM VOLUME INDEX: 17.2 ML/M2
LEFT ATRIUM VOLUME: 26.69 CM3
LEFT INTERNAL DIMENSION IN SYSTOLE: 2.01 CM (ref 2.1–4)
LEFT VENTRICLE DIASTOLIC VOLUME INDEX: 39.5 ML/M2
LEFT VENTRICLE DIASTOLIC VOLUME: 61.23 ML
LEFT VENTRICLE MASS INDEX: 46 G/M2
LEFT VENTRICLE SYSTOLIC VOLUME INDEX: 8.3 ML/M2
LEFT VENTRICLE SYSTOLIC VOLUME: 12.86 ML
LEFT VENTRICULAR INTERNAL DIMENSION IN DIASTOLE: 3.78 CM (ref 3.5–6)
LEFT VENTRICULAR MASS: 71.98 G
LV LATERAL E/E' RATIO: 8.33 M/S
LV SEPTAL E/E' RATIO: 8.33 M/S
LVOT MG: 2.34 MMHG
LVOT MV: 0.72 CM/S
LYMPHOCYTES # BLD AUTO: 2.2 K/UL (ref 1–4.8)
LYMPHOCYTES NFR BLD: 33.9 % (ref 18–48)
MAGNESIUM SERPL-MCNC: 2.3 MG/DL (ref 1.6–2.6)
MCH RBC QN AUTO: 29.3 PG (ref 27–31)
MCHC RBC AUTO-ENTMCNC: 32 G/DL (ref 32–36)
MCV RBC AUTO: 92 FL (ref 82–98)
MONOCYTES # BLD AUTO: 0.7 K/UL (ref 0.3–1)
MONOCYTES NFR BLD: 9.8 % (ref 4–15)
MV PEAK A VEL: 0.52 M/S
MV PEAK E VEL: 0.75 M/S
MV STENOSIS PRESSURE HALF TIME: 62.95 MS
MV VALVE AREA P 1/2 METHOD: 3.49 CM2
NEUTROPHILS # BLD AUTO: 3.6 K/UL (ref 1.8–7.7)
NEUTROPHILS NFR BLD: 54.4 % (ref 38–73)
NRBC BLD-RTO: 0 /100 WBC
PISA MRMAX VEL: 2.5 M/S
PISA TR MAX VEL: 2.13 M/S
PLATELET # BLD AUTO: 224 K/UL (ref 150–450)
PMV BLD AUTO: 9.3 FL (ref 9.2–12.9)
POTASSIUM SERPL-SCNC: 4.6 MMOL/L (ref 3.5–5.1)
PROT SERPL-MCNC: 6.5 G/DL (ref 6–8.4)
PV PEAK GRADIENT: 3 MMHG
PV PEAK VELOCITY: 0.83 M/S
RA MAJOR: 4.37 CM
RA PRESSURE ESTIMATED: 3 MMHG
RBC # BLD AUTO: 4.81 M/UL (ref 4–5.4)
RIGHT ATRIUM VOLUME AREA LENGTH APICAL 4 CHAMBER: 3.4 ML
RV TB RVSP: 5 MMHG
SODIUM SERPL-SCNC: 140 MMOL/L (ref 136–145)
TDI LATERAL: 0.09 M/S
TDI SEPTAL: 0.09 M/S
TDI: 0.09 M/S
TR MAX PG: 18 MMHG
TV REST PULMONARY ARTERY PRESSURE: 21 MMHG
WBC # BLD AUTO: 6.61 K/UL (ref 3.9–12.7)
Z-SCORE OF LEFT VENTRICULAR DIMENSION IN END DIASTOLE: -1.71
Z-SCORE OF LEFT VENTRICULAR DIMENSION IN END SYSTOLE: -2.58

## 2024-02-02 PROCEDURE — 99233 SBSQ HOSP IP/OBS HIGH 50: CPT | Mod: ,,, | Performed by: INTERNAL MEDICINE

## 2024-02-02 PROCEDURE — 25000003 PHARM REV CODE 250: Performed by: INTERNAL MEDICINE

## 2024-02-02 PROCEDURE — 83735 ASSAY OF MAGNESIUM: CPT | Performed by: NURSE PRACTITIONER

## 2024-02-02 PROCEDURE — 36415 COLL VENOUS BLD VENIPUNCTURE: CPT | Performed by: NURSE PRACTITIONER

## 2024-02-02 PROCEDURE — 85025 COMPLETE CBC W/AUTO DIFF WBC: CPT | Performed by: NURSE PRACTITIONER

## 2024-02-02 PROCEDURE — 25000003 PHARM REV CODE 250: Performed by: NURSE PRACTITIONER

## 2024-02-02 PROCEDURE — 80053 COMPREHEN METABOLIC PANEL: CPT | Performed by: NURSE PRACTITIONER

## 2024-02-02 RX ORDER — METOPROLOL SUCCINATE 25 MG/1
25 TABLET, EXTENDED RELEASE ORAL EVERY 12 HOURS
Status: DISCONTINUED | OUTPATIENT
Start: 2024-02-02 | End: 2024-02-02 | Stop reason: HOSPADM

## 2024-02-02 RX ORDER — METOPROLOL SUCCINATE 25 MG/1
25 TABLET, EXTENDED RELEASE ORAL 2 TIMES DAILY
Status: DISCONTINUED | OUTPATIENT
Start: 2024-02-02 | End: 2024-02-02

## 2024-02-02 RX ORDER — FLECAINIDE ACETATE 100 MG/1
100 TABLET ORAL EVERY 12 HOURS
Qty: 60 TABLET | Refills: 1 | Status: SHIPPED | OUTPATIENT
Start: 2024-02-02 | End: 2024-02-29 | Stop reason: SDUPTHER

## 2024-02-02 RX ORDER — ATORVASTATIN CALCIUM 20 MG/1
20 TABLET, FILM COATED ORAL DAILY
Qty: 30 TABLET | Refills: 1 | Status: SHIPPED | OUTPATIENT
Start: 2024-02-03 | End: 2024-02-29 | Stop reason: SDUPTHER

## 2024-02-02 RX ORDER — METOPROLOL TARTRATE 25 MG/1
25 TABLET, FILM COATED ORAL 2 TIMES DAILY
Qty: 60 TABLET | Refills: 1 | Status: CANCELLED | OUTPATIENT
Start: 2024-02-02 | End: 2025-02-01

## 2024-02-02 RX ORDER — ATORVASTATIN CALCIUM 20 MG/1
20 TABLET, FILM COATED ORAL DAILY
Status: DISCONTINUED | OUTPATIENT
Start: 2024-02-02 | End: 2024-02-02 | Stop reason: HOSPADM

## 2024-02-02 RX ORDER — METOPROLOL SUCCINATE 25 MG/1
25 TABLET, EXTENDED RELEASE ORAL EVERY 12 HOURS
Qty: 60 TABLET | Refills: 1 | Status: SHIPPED | OUTPATIENT
Start: 2024-02-02 | End: 2024-02-29 | Stop reason: SDUPTHER

## 2024-02-02 RX ADMIN — APIXABAN 2.5 MG: 2.5 TABLET, FILM COATED ORAL at 08:02

## 2024-02-02 RX ADMIN — Medication 6 MG: at 01:02

## 2024-02-02 RX ADMIN — FLECAINIDE ACETATE 100 MG: 100 TABLET ORAL at 08:02

## 2024-02-02 RX ADMIN — METOPROLOL SUCCINATE 25 MG: 25 TABLET, EXTENDED RELEASE ORAL at 08:02

## 2024-02-02 RX ADMIN — ATORVASTATIN CALCIUM 20 MG: 20 TABLET, FILM COATED ORAL at 08:02

## 2024-02-02 NOTE — PLAN OF CARE
Lives alone - independent - plans to drive self home     [facility] - [facility] Care ([place])  Initial Discharge Assessment       Primary Care Provider: Suzanna Duran MD    Admission Diagnosis: Palpitations [R00.2]    Admission Date: 2/1/2024  Expected Discharge Date:     Transition of Care Barriers: None    Payor: HU[employer] MANAGED MEDICARE / Plan: HU[employer]NA MEDICARE HMO / Product Type: Capitation /     Extended Emergency Contact Information  Primary Emergency Contact: LyonsEvelin  Address: [address] [facility] [address] [facility] [place] [address] [facility]  Home Phone: [phone]  Mobile Phone: [phone]  Relation: Friend  Secondary Emergency Contact: Duane Durham   [facility]  Home Phone: [phone]  Relation: Son    Discharge Plan A: Home         [id number] - [place], [place] - [address]  [address]  [facility] [address]  Phone: [phone] Fax: [phone]      Initial Assessment (most recent)       Adult Discharge Assessment - 02/02/24 0721          Discharge Assessment    Assessment Type Discharge Planning Assessment     Confirmed/corrected address, phone number and insurance Yes     Confirmed Demographics Correct on Facesheet     Source of Information patient     Communicated WILLIAN with patient/caregiver Date not available/Unable to determine     People in Home alone     Do you expect to return to your current living situation? Yes     Prior to hospitilization cognitive status: Alert/Oriented     Current cognitive status: Alert/Oriented     Walking or Climbing Stairs Difficulty no     Dressing/Bathing Difficulty no     Equipment Currently Used at Home none     Readmission within 30 days? No     Patient currently being followed by outpatient case management? No     Do you currently have service(s) that help you manage your care at home? No     Do you take prescription medications? Yes     Do you have prescription  coverage? Yes     Do you have any problems affording any of your prescribed medications? No     Is the patient taking medications as prescribed? yes     Who is going to help you get home at discharge? drive self     How do you get to doctors appointments? car, drives self     Are you on dialysis? No     Discharge Plan A Home     DME Needed Upon Discharge  none     Discharge Plan discussed with: Patient     Transition of Care Barriers None        Physical Activity    On average, how many days per week do you engage in moderate to strenuous exercise (like a brisk walk)? 7 days     On average, how many minutes do you engage in exercise at this level? 40 min        Financial Resource Strain    How hard is it for you to pay for the very basics like food, housing, medical care, and heating? Not hard at all        Housing Stability    In the last 12 months, was there a time when you were not able to pay the mortgage or rent on time? No     In the last 12 months, how many places have you lived? 1     In the last 12 months, was there a time when you did not have a steady place to sleep or slept in a shelter (including now)? No        Transportation Needs    In the past 12 months, has lack of transportation kept you from medical appointments or from getting medications? No     In the past 12 months, has lack of transportation kept you from meetings, work, or from getting things needed for daily living? No        Food Insecurity    Within the past 12 months, you worried that your food would run out before you got the money to buy more. Never true     Within the past 12 months, the food you bought just didn't last and you didn't have money to get more. Never true        Stress    Do you feel stress - tense, restless, nervous, or anxious, or unable to sleep at night because your mind is troubled all the time - these days? To some extent        Social Connections    In a typical week, how many times do you talk on the phone with  family, friends, or neighbors? More than three times a week     How often do you get together with friends or relatives? More than three times a week     How often do you attend Mu-ism or Jainism services? More than 4 times per year     Do you belong to any clubs or organizations such as Mu-ism groups, unions, fraternal or athletic groups, or school groups? Yes     How often do you attend meetings of the clubs or organizations you belong to? More than 4 times per year     Are you , , , , never , or living with a partner?         Alcohol Use    Q1: How often do you have a drink containing alcohol? Monthly or less     Q2: How many drinks containing alcohol do you have on a typical day when you are drinking? 1 or 2     Q3: How often do you have six or more drinks on one occasion? Never

## 2024-02-02 NOTE — PLAN OF CARE
Pt is ready for discharge home today.     Meds sent to [facility] for bedside delivery.    Pt will drive herself home.    Dr Clarke's office will call pt to make her follow-up apt. Apt is scheduled for 2/29/24, Information is in AVS.    Pt is ready for discharge from  perspective.   02/02/24 1230   Final Note   Anticipated Discharge Disposition Home   What phone number can be called within the next 1-3 days to see how you are doing after discharge? [phone]   Post-Acute Status   Discharge Delays None known at this time     [facility] ([place])  Discharge Final Note    Primary Care Provider: Suzanna Duran MD    Expected Discharge Date: 2/2/2024    Final Discharge Note (most recent)       Final Note - 02/02/24 1230          Final Note    Anticipated Discharge Disposition Home or Self Care (P)      What phone number can be called within the next 1-3 days to see how you are doing after discharge? [phone] (P)         Post-Acute Status    Discharge Delays None known at this time (P)                      Important Message from Medicare             Contact Info       Lamar Clarke MD   Specialty: Cardiology, Interventional Cardiology    [address]  [address]  [facility] [address]   Phone: [phone]       Next Steps: Follow up    Instructions: Dr Clarke's office will call you to schedule a follow-up apt

## 2024-02-02 NOTE — NURSING
Spoke with MD Dr. Clarke. Stopped diltiazem gtt at 1908 per telephone order.     Asymptomatic sinus ramon overnight (50-60). No occurrences of Afib RVR observed HS.

## 2024-02-02 NOTE — TELEPHONE ENCOUNTER
----- Message from Dayna Montgomery sent at 2/2/2024  9:52 AM CST -----  Regarding: Schedule appt  Type: Patient Call Back    Who called: Coleen  Nurse [facility]  [phone]    What is the request in detail:  calling to schedule appt for p/t but believes it would be best if the office calls to schedule. Please call to assist.     Can the clinic reply by [facility]?    Would the patient rather a call back or a response via [facility]?     Best call back number: [phone]    Additional Information:

## 2024-02-02 NOTE — SUBJECTIVE & OBJECTIVE
"Past Medical History:   Diagnosis Date    Atrial fibrillation     stress related per patient    Hyperlipidemia     Osteoarthritis     PONV (postoperative nausea and vomiting)     SBO (small bowel obstruction)     Shingles 01/01/2002    Spinal stenosis        Past Surgical History:   Procedure Laterality Date    ANKLE FRACTURE SURGERY  2007    Right ankle    COLON SURGERY      bowel obstruction    COLONOSCOPY      x 2    EXCISION OF MASS OF BACK N/A 11/16/2018    Procedure: EXCISION, MASS, BACK;  Surgeon: Fran Magaña MD;  Location: [facility];  Service: General;  Laterality: N/A;    EYE SURGERY Bilateral 2016    HYSTERECTOMY  1985    HEATHER    KNEE ARTHROSCOPY W/ DEBRIDEMENT  1994    Bilateral    KNEE ARTHROSCOPY W/ DEBRIDEMENT  2003    Left knee    OPEN PATELLA REEFING PROCEDURE  age 19    Left knee    Tonsilectomy  as a child    TONSILLECTOMY      TOTAL KNEE ARTHROPLASTY Right 10/19/2023    Procedure: ARTHROPLASTY, KNEE, TOTAL;  Surgeon: Vicente Mitchell MD;  Location: [facility];  Service: Orthopedics;  Laterality: Right;    TUBAL LIGATION         Review of patient's allergies indicates:   Allergen Reactions    Demerol [meperidine] Other (See Comments)     "Becomes Agitated and Combative"       No current facility-administered medications on file prior to encounter.     Current Outpatient Medications on File Prior to Encounter   Medication Sig    aspirin 81 MG Chew Take 1 tablet (81 mg total) by mouth 2 (two) times daily.    calcium-vitamin D3 (OS- + D3) 500 mg-5 mcg (200 unit) per tablet Take 1 tablet by mouth 2 (two) times daily with meals.    celecoxib (CELEBREX) 200 MG capsule take 1 capsule (200MG) by oral route every day as needed    estradioL (ESTRACE) 0.01 % (0.1 mg/gram) vaginal cream Place 1 g vaginally 3 (three) times a week.    glucosamine-chondroitin 500-400 mg tablet Take 1 tablet by mouth 3 (three) times daily.    HYDROcodone-acetaminophen (NORCO)  mg per tablet Take 1 tablet by " mouth every 6 (six) hours as needed.    polyethylene glycol (MIRALAX) 17 gram/dose powder Take 17 g by mouth 3 (three) times daily as needed (Constipation).    simvastatin (ZOCOR) 40 MG tablet Take 40 mg by mouth every evening.      Family History       Problem Relation (Age of Onset)    Breast cancer Paternal Aunt    Colon cancer Paternal Aunt    Coronary artery disease Brother    Diabetes Brother    Hypertension Father, Mother          Tobacco Use    Smoking status: Never    Smokeless tobacco: Never   Substance and Sexual Activity    Alcohol use: Yes     Alcohol/week: 3.0 standard drinks of alcohol     Types: 3 Glasses of wine per week     Comment: Drinks a glass of wine 3 times weekly; none 24 hr prior to surgery    Drug use: No    Sexual activity: Not Currently     Birth control/protection: Post-menopausal     Comment: Spouse  of kidney cancer : 2015     Review of Systems   Constitutional:  Negative for activity change, appetite change, chills and fever.   HENT:  Negative for congestion, sore throat and trouble swallowing.    Eyes:  Negative for photophobia and visual disturbance.   Respiratory:  Negative for cough, chest tightness and shortness of breath.    Cardiovascular:  Positive for palpitations. Negative for chest pain and leg swelling.   Gastrointestinal:  Negative for abdominal pain, diarrhea and nausea.   Genitourinary:  Negative for dysuria, flank pain and hematuria.   Musculoskeletal:  Negative for back pain.   Neurological:  Positive for light-headedness. Negative for weakness and headaches.   Psychiatric/Behavioral:  Negative for confusion.      Objective:     Vital Signs (Most Recent):  Temp: 97.9 °F (36.6 °C) (24)  Pulse: (!) 56 (24)  Resp: 20 (24)  BP: (!) 99/55 (24)  SpO2: 97 % (24) Vital Signs (24h Range):  Temp:  [97.7 °F (36.5 °C)-98.1 °F (36.7 °C)] 97.9 °F (36.6 °C)  Pulse:  [] 56  Resp:  [16-25] 20  SpO2:  [97 %-100  %] 97 %  BP: ()/(55-97) 99/55     Weight: 51 kg (112 lb 7 oz)  Body mass index is 18.71 kg/m².     Physical Exam  Vitals reviewed.   Constitutional:       Appearance: Normal appearance. She is normal weight.   HENT:      Head: Normocephalic.      Mouth/Throat:      Mouth: Mucous membranes are moist.      Pharynx: Oropharynx is clear.   Eyes:      Pupils: Pupils are equal, round, and reactive to light.   Cardiovascular:      Rate and Rhythm: Normal rate. Rhythm regularly irregular.      Pulses: Normal pulses.      Heart sounds: Normal heart sounds.   Pulmonary:      Effort: Pulmonary effort is normal.      Breath sounds: Normal breath sounds.   Abdominal:      General: Bowel sounds are normal.      Palpations: Abdomen is soft.   Musculoskeletal:         General: Normal range of motion.      Cervical back: Normal range of motion.   Skin:     General: Skin is warm and dry.   Neurological:      Mental Status: She is alert and oriented to person, place, and time. Mental status is at baseline.   Psychiatric:         Mood and Affect: Mood normal.              CRANIAL NERVES     CN III, IV, VI   Pupils are equal, round, and reactive to light.       Significant Labs: All pertinent labs within the past 24 hours have been reviewed.  CBC:   Recent Labs   Lab 02/01/24  1114   WBC 5.64   HGB 14.2   HCT 43.5        CMP:   Recent Labs   Lab 02/01/24  1114      K 4.8      CO2 25   GLU 97   BUN 18   CREATININE 0.9   CALCIUM 10.5   PROT 6.9   ALBUMIN 4.4   BILITOT 0.4   ALKPHOS 61   AST 16   ALT 12   ANIONGAP 8       Significant Imaging: I have reviewed all pertinent imaging results/findings within the past 24 hours.  Imaging Results    None

## 2024-02-02 NOTE — PROGRESS NOTES
[facility] Intensive Care [facility]  Cardiology  Progress Note    Patient Name: Marian Durham  MRN: [id number]  Admission Date: 2/1/2024  Hospital Length of Stay: 1 days  Code Status: Full Code   Attending Physician: Mainor Lombardi MD   Primary Care Physician: Suzanna Duran MD  Expected Discharge Date:   Principal Problem:Paroxysmal atrial fibrillation    Subjective:     Brief HPI:    Marian Durham is a 78 y.o.female with hypercholesterolemia. She has a healthy weight close to being underweight. She had small bowel obstruction in 6/2023. She then had an episode of atrial fibrillation. She was anticoagulated for some time. With no recurrent spells it was felt she may discontinue the anticoagulation. Beginning on 1/26/2024 she began experience episodes of being lightheaded with an anxious feeling in her chest. She has an Apple watch and recorded a high heart rate. The episodes continued. She presented to the emergency room at [facility] on 2/1/2024 after a spell. She was in sinus rhythm. As she was monitored in the emergency room she went into artrial fibrillation with fast ventricular response rate. She was given diltiazem iv and admitted to the intensive care unit. She denied chest pain or shortness of breath.     Hospital Course:     2/1/2024: Diltiazem iv. Converted to SR.    2/1/2024: Flecainide, metoprolol and apixiban were begun.    Interval History:     Converted to sinus rhythm yesterday afternoon. No AF since.      Review of Systems   Constitutional: Negative for chills, fever and malaise/fatigue.   HENT:  Negative for nosebleeds.    Eyes:  Negative for vision loss in left eye and vision loss in right eye.   Cardiovascular:  Negative for chest pain, leg swelling, orthopnea, palpitations and paroxysmal nocturnal dyspnea.   Respiratory:  Negative for cough, hemoptysis, shortness of breath, sputum production and wheezing.    Hematologic/Lymphatic: Negative for bleeding  problem. Does not bruise/bleed easily.   Skin:  Negative for color change and rash.   Musculoskeletal:  Positive for joint pain (left knee). Negative for muscle weakness and myalgias.   Gastrointestinal:  Negative for abdominal pain, heartburn, hematemesis, hematochezia, melena, nausea and vomiting.   Genitourinary:  Negative for hematuria.   Neurological:  Negative for dizziness, focal weakness, headaches, light-headedness, vertigo and weakness.   Psychiatric/Behavioral:  Negative for altered mental status. The patient is not nervous/anxious.    Allergic/Immunologic: Negative for persistent infections.     Objective:     Vital Signs (Most Recent):  Temp: 97.7 °F (36.5 °C) (02/02/24 0301)  Pulse: (!) 54 (02/02/24 0600)  Resp: 14 (02/02/24 0600)  BP: (!) 112/57 (02/02/24 0600)  SpO2: 97 % (02/02/24 0600) Vital Signs (24h Range):  Temp:  [97.7 °F (36.5 °C)-98.1 °F (36.7 °C)] 97.7 °F (36.5 °C)  Pulse:  [] 54  Resp:  [13-28] 14  SpO2:  [96 %-100 %] 97 %  BP: ()/(51-97) 112/57     Weight: 51 kg (112 lb 7 oz)  Body mass index is 18.71 kg/m².    SpO2: 97 %         Intake/Output Summary (Last 24 hours) at 2/2/2024 0742  Last data filed at 2/2/2024 0608  Gross per 24 hour   Intake 1202.48 ml   Output 1100 ml   Net 102.48 ml       Lines/Drains/Airways       Drain  Duration             Female External Urinary Catheter w/ Suction 02/01/24 1600 <1 day              Peripheral Intravenous Line  Duration                  Peripheral IV - Single Lumen 02/01/24 1347 20 G Right Antecubital <1 day         Peripheral IV - Single Lumen 02/01/24 1450 20 G Left Forearm <1 day                    Physical Exam  Constitutional:       General: She is not in acute distress.     Appearance: Normal appearance. She is well-developed. She is not ill-appearing or toxic-appearing.   Eyes:      Conjunctiva/sclera:      Right eye: Right conjunctiva is not injected. No hemorrhage.     Left eye: Left conjunctiva is not injected. No  hemorrhage.  Neck:      Vascular: No JVD.   Cardiovascular:      Rate and Rhythm: Normal rate and regular rhythm.      Heart sounds: S1 normal and S2 normal. No murmur heard.     No gallop.   Pulmonary:      Effort: Pulmonary effort is normal.      Breath sounds: Normal breath sounds.   Chest:      Chest wall: No swelling or tenderness.   Abdominal:      Tenderness: There is no abdominal tenderness.   Musculoskeletal:      Right ankle: No swelling.      Left ankle: No swelling.   Skin:     General: Skin is warm and dry.      Findings: No rash.   Neurological:      General: No focal deficit present.      Mental Status: She is alert and oriented to person, place, and time. She is not disoriented.   Psychiatric:         Attention and Perception: Attention and perception normal.         Mood and Affect: Mood and affect normal.         Speech: Speech normal.         Behavior: Behavior normal.         Thought Content: Thought content normal.         Cognition and Memory: Cognition and memory normal.         Judgment: Judgment normal.         Current Medications:     apixaban  2.5 mg Oral BID    flecainide  100 mg Oral Q12H    metoprolol tartrate  25 mg Oral BID    mupirocin   Nasal BID    polyethylene glycol  17 g Oral Daily     Current Laboratory Results:    Recent Results (from the past 24 hour(s))   CBC auto differential    Collection Time: 02/01/24 11:14 AM   Result Value Ref Range    WBC 5.64 3.90 - 12.70 K/uL    RBC 4.78 4.00 - 5.40 M/uL    Hemoglobin 14.2 12.0 - 16.0 g/dL    Hematocrit 43.5 37.0 - 48.5 %    MCV 91 82 - 98 fL    MCH 29.7 27.0 - 31.0 pg    MCHC 32.6 32.0 - 36.0 g/dL    RDW 12.9 11.5 - 14.5 %    Platelets 224 150 - 450 K/uL    MPV 9.2 9.2 - 12.9 fL    Immature Granulocytes 0.2 0.0 - 0.5 %    Gran # (ANC) 3.4 1.8 - 7.7 K/uL    Immature Grans (Abs) 0.01 0.00 - 0.04 K/uL    Lymph # 1.6 1.0 - 4.8 K/uL    Mono # 0.6 0.3 - 1.0 K/uL    Eos # 0.0 0.0 - 0.5 K/uL    Baso # 0.04 0.00 - 0.20 K/uL    nRBC 0 0 /100  WBC    Gran % 60.3 38.0 - 73.0 %    Lymph % 28.2 18.0 - 48.0 %    Mono % 9.9 4.0 - 15.0 %    Eosinophil % 0.7 0.0 - 8.0 %    Basophil % 0.7 0.0 - 1.9 %    Differential Method Automated    Comprehensive metabolic panel    Collection Time: 02/01/24 11:14 AM   Result Value Ref Range    Sodium 138 136 - 145 mmol/L    Potassium 4.8 3.5 - 5.1 mmol/L    Chloride 105 95 - 110 mmol/L    CO2 25 23 - 29 mmol/L    Glucose 97 70 - 110 mg/dL    BUN 18 8 - 23 mg/dL    Creatinine 0.9 0.5 - 1.4 mg/dL    Calcium 10.5 8.7 - 10.5 mg/dL    Total Protein 6.9 6.0 - 8.4 g/dL    Albumin 4.4 3.5 - 5.2 g/dL    Total Bilirubin 0.4 0.1 - 1.0 mg/dL    Alkaline Phosphatase 61 55 - 135 U/L    AST 16 10 - 40 U/L    ALT 12 10 - 44 U/L    eGFR >60 >60 mL/min/1.73 m^2    Anion Gap 8 8 - 16 mmol/L   Phosphorus    Collection Time: 02/01/24 11:14 AM   Result Value Ref Range    Phosphorus 3.2 2.7 - 4.5 mg/dL   Magnesium    Collection Time: 02/01/24 11:14 AM   Result Value Ref Range    Magnesium 2.1 1.6 - 2.6 mg/dL   POCT glucose    Collection Time: 02/01/24 11:17 AM   Result Value Ref Range    POCT Glucose 90 70 - 110 mg/dL   Urinalysis, Reflex to Urine Culture Urine, Clean Catch    Collection Time: 02/01/24  3:25 PM    Specimen: Urine   Result Value Ref Range    Specimen UA Urine, Clean Catch     Color, UA Colorless (A) Yellow, Straw, Yun    Appearance, UA Clear Clear    pH, UA 8.0 5.0 - 8.0    Specific Gravity, UA 1.005 1.005 - 1.030    Protein, UA Negative Negative    Glucose, UA Negative Negative    Ketones, UA Trace (A) Negative    Bilirubin (UA) Negative Negative    Occult Blood UA Negative Negative    Nitrite, UA Negative Negative    Urobilinogen, UA Negative <2.0 EU/dL    Leukocytes, UA Negative Negative   Comprehensive Metabolic Panel (CMP)    Collection Time: 02/02/24  2:28 AM   Result Value Ref Range    Sodium 140 136 - 145 mmol/L    Potassium 4.6 3.5 - 5.1 mmol/L    Chloride 107 95 - 110 mmol/L    CO2 25 23 - 29 mmol/L    Glucose 110 70 -  110 mg/dL    BUN 19 8 - 23 mg/dL    Creatinine 0.9 0.5 - 1.4 mg/dL    Calcium 10.2 8.7 - 10.5 mg/dL    Total Protein 6.5 6.0 - 8.4 g/dL    Albumin 4.1 3.5 - 5.2 g/dL    Total Bilirubin 0.5 0.1 - 1.0 mg/dL    Alkaline Phosphatase 59 55 - 135 U/L    AST 17 10 - 40 U/L    ALT 13 10 - 44 U/L    eGFR >60 >60 mL/min/1.73 m^2    Anion Gap 8 8 - 16 mmol/L   Magnesium    Collection Time: 02/02/24  2:28 AM   Result Value Ref Range    Magnesium 2.3 1.6 - 2.6 mg/dL   CBC with Automated Differential    Collection Time: 02/02/24  2:28 AM   Result Value Ref Range    WBC 6.61 3.90 - 12.70 K/uL    RBC 4.81 4.00 - 5.40 M/uL    Hemoglobin 14.1 12.0 - 16.0 g/dL    Hematocrit 44.1 37.0 - 48.5 %    MCV 92 82 - 98 fL    MCH 29.3 27.0 - 31.0 pg    MCHC 32.0 32.0 - 36.0 g/dL    RDW 13.0 11.5 - 14.5 %    Platelets 224 150 - 450 K/uL    MPV 9.3 9.2 - 12.9 fL    Immature Granulocytes 0.2 0.0 - 0.5 %    Gran # (ANC) 3.6 1.8 - 7.7 K/uL    Immature Grans (Abs) 0.01 0.00 - 0.04 K/uL    Lymph # 2.2 1.0 - 4.8 K/uL    Mono # 0.7 0.3 - 1.0 K/uL    Eos # 0.1 0.0 - 0.5 K/uL    Baso # 0.03 0.00 - 0.20 K/uL    nRBC 0 0 /100 WBC    Gran % 54.4 38.0 - 73.0 %    Lymph % 33.9 18.0 - 48.0 %    Mono % 9.8 4.0 - 15.0 %    Eosinophil % 1.2 0.0 - 8.0 %    Basophil % 0.5 0.0 - 1.9 %    Differential Method Automated      Current Imaging Results:    No orders to display       6/30/2023: Echo:  The left ventricle is normal in size with normal systolic function.  Grade I left ventricular diastolic dysfunction.  Normal right ventricular size with normal right ventricular systolic function.      Assessment and Plan:     Problem List:    Active Diagnoses:    Diagnosis Date Noted POA    PRINCIPAL PROBLEM:  Paroxysmal atrial fibrillation [I48.0] 06/30/2023 Yes    Osteoarthritis of right knee [M17.11] 10/19/2023 Yes      Problems Resolved During this Admission:     Assessment and Plan:     Atrial Fibrillation              6/2023: Diagnosed with paroxysmal atrial  fibrillation.              1/26/2024: Began having frequent spells of what appears to have been AF.              2/1/2024: Presents with palpitations. Initial ECG revealed SR. Later AF. 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  CHA2DS2VASc=3 (A2Sc).  Rate controlled with diltiazem iv and metoprolol.  2/1/2024: Converted to SR.  On metoprolol 25 mg Q12 and flecainide 100 mg Q12.  Anticoagulation with apixiban.     2. High Risk Medication              2/1/2023: Flecainide 100 mg Q12 was  begun.   On flecainide 100 mg Q12.     3. Chronic Anticoagulation  CHA2DS2VASc=3 (A2Sc).  Anticoagulation with apixiban.  I would favor apixiban 2.5 mg Q12. [Age 78 and weight 51 kg].  On apixiban 2.5 mg Q12.  No aspirin or NSAID.     4. Hypercholesterolemia              On simvastatin 40 mg Q24.   Will change to atorvastatin 20 mg Q24.     5. Primary Care              Dr. Suzanna Duran.     6. Disposition   Home today after ECG and Echo.   F/u 2-3 weeks.    VTE Risk Mitigation (From admission, onward)           Ordered     apixaban tablet 2.5 mg  2 times daily         02/01/24 1653     IP VTE HIGH RISK PATIENT  Once         02/01/24 1617     Place sequential compression device  Until discontinued         02/01/24 1617                    Lamar Clarke MD  Cardiology  [facility] - Intensive Care ([place])

## 2024-02-02 NOTE — ASSESSMENT & PLAN NOTE
Patient with history of paroxysmal atrial fibrillation and went into AFib RVR to 140s while in the ED today.  Patient is started on IV diltiazem    -cardiology consulted, recommendations appreciated  -continue IV Cardizem infusion  -echo pending  -apixaban 2.5 mg b.i.d. per Cardiology recommendations

## 2024-02-02 NOTE — DISCHARGE INSTRUCTIONS
Take all medications as prescribed.  Eat a strict low salt cardiac diet.  Follow up with your physicians as scheduled - pcp within 1 week and cardiologist within 2-4 weeks.  Thank you for trusting [facility] and Dr. Lombardi with your care.  We are honored that you entrusted us with your healthcare needs. Your satisfaction is very important to us and we hope you have been very pleased with your experience at [facility]. After your discharge you may receive a survey asking you to rate your hospital experience. We encourage you to take the time to complete the survey as your feedback allows us to identify areas for improvement as well as recognize our staff.   We hope that you have received the very best care possible during your hospitalization at [facility], as your satisfaction is our top priority.

## 2024-02-02 NOTE — HPI
Marian Durham is a 70-year-old female with a past medical history of hyperlipidemia, AFib and SBO who presented with lightheadedness for the past week.  Patient was seen at [facility] for SBO in June 2023 and found to be in AFib RVR while hospitalized.  Following discharge, patient was placed on Eliquis and ZIO patch monitoring.  She was later taken off of Eliquis secondary to knee replacement surgery and had no further episodes of atrial fibrillation.  Per ED note, patient states she started to experience lightheadedness with mild flutter and palpitations while she was at physical therapy on 01/25/2024.  She was seen by Cardiology on Monday and EKG showed no arrhythmias.  She followed up with her PCP after experiencing continued lightheadedness and advised to rehydrate and received IV fluids.  Patient states she has increased her water and Gatorade intake and symptoms resolved yesterday.  She reports today for further IV fluids as instructed by PCP and while in the waiting room her lightheadedness had returned.      Upon arrival to the ED, patient found to be in sinus rhythm and soon went into atrial fibrillation with RVR to 140s.  Patient was given diltiazem IV and started on diltiazem infusion.  Lab work performed in the ED unremarkable and UA negative for infection.  Cardiology, Dr Clarke, was consulted and patient referred to Hospital Medicine.  Patient will be admitted to Hospital Medicine for further evaluation management.

## 2024-02-02 NOTE — H&P
[facility] Intensive [facility] Hospital Medicine  History & Physical    Patient Name: Marian Durham  MRN: [id number]  Patient Class: IP- Inpatient  Admission Date: 2/1/2024  Attending Physician: Mainor Lombardi MD   Primary Care Provider: Suzanna Duran MD         Patient information was obtained from patient, past medical records, and ER records.     Subjective:     Principal Problem:Paroxysmal atrial fibrillation    Chief Complaint:   Chief Complaint   Patient presents with    Dizziness     Lightheadedness x 1 week with interment palpations        HPI: Marian Durham is a 70-year-old female with a past medical history of hyperlipidemia, AFib and SBO who presented with lightheadedness for the past week.  Patient was seen at [facility] for SBO in June 2023 and found to be in AFib RVR while hospitalized.  Following discharge, patient was placed on Eliquis and ZIO patch monitoring.  She was later taken off of Eliquis secondary to knee replacement surgery and had no further episodes of atrial fibrillation.  Per ED note, patient states she started to experience lightheadedness with mild flutter and palpitations while she was at physical therapy on 01/25/2024.  She was seen by Cardiology on Monday and EKG showed no arrhythmias.  She followed up with her PCP after experiencing continued lightheadedness and advised to rehydrate and received IV fluids.  Patient states she has increased her water and Gatorade intake and symptoms resolved yesterday.  She reports today for further IV fluids as instructed by PCP and while in the waiting room her lightheadedness had returned.      Upon arrival to the ED, patient found to be in sinus rhythm and soon went into atrial fibrillation with RVR to 140s.  Patient was given diltiazem IV and started on diltiazem infusion.  Lab work performed in the ED unremarkable and UA negative for infection.  Cardiology, Dr Clarke, was consulted and patient referred to Hospital Medicine.   "Patient will be admitted to Hospital Medicine for further evaluation management.          Past Medical History:   Diagnosis Date    Atrial fibrillation     stress related per patient    Hyperlipidemia     Osteoarthritis     PONV (postoperative nausea and vomiting)     SBO (small bowel obstruction)     Shingles 01/01/2002    Spinal stenosis        Past Surgical History:   Procedure Laterality Date    ANKLE FRACTURE SURGERY  2007    Right ankle    COLON SURGERY      bowel obstruction    COLONOSCOPY      x 2    EXCISION OF MASS OF BACK N/A 11/16/2018    Procedure: EXCISION, MASS, BACK;  Surgeon: Fran Magaña MD;  Location: [facility];  Service: General;  Laterality: N/A;    EYE SURGERY Bilateral 2016    HYSTERECTOMY  1985    [facility]    KNEE ARTHROSCOPY W/ DEBRIDEMENT  1994    Bilateral    KNEE ARTHROSCOPY W/ DEBRIDEMENT  2003    Left knee    OPEN PATELLA REEFING PROCEDURE  age 19    Left knee    Tonsilectomy  as a child    TONSILLECTOMY      TOTAL KNEE ARTHROPLASTY Right 10/19/2023    Procedure: ARTHROPLASTY, KNEE, TOTAL;  Surgeon: Vicente Mitchell MD;  Location: [facility];  Service: Orthopedics;  Laterality: Right;    TUBAL LIGATION         Review of patient's allergies indicates:   Allergen Reactions    Demerol [meperidine] Other (See Comments)     "Becomes Agitated and Combative"       No current facility-administered medications on file prior to encounter.     Current Outpatient Medications on File Prior to Encounter   Medication Sig    aspirin 81 MG Chew Take 1 tablet (81 mg total) by mouth 2 (two) times daily.    calcium-vitamin D3 (OS- + D3) 500 mg-5 mcg (200 unit) per tablet Take 1 tablet by mouth 2 (two) times daily with meals.    celecoxib (CELEBREX) 200 MG capsule take 1 capsule (200MG) by oral route every day as needed    estradioL (ESTRACE) 0.01 % (0.1 mg/gram) vaginal cream Place 1 g vaginally 3 (three) times a week.    glucosamine-chondroitin 500-400 mg tablet Take 1 tablet by mouth 3 (three) " times daily.    HYDROcodone-acetaminophen (NORCO)  mg per tablet Take 1 tablet by mouth every 6 (six) hours as needed.    polyethylene glycol (MIRALAX) 17 gram/dose powder Take 17 g by mouth 3 (three) times daily as needed (Constipation).    simvastatin (ZOCOR) 40 MG tablet Take 40 mg by mouth every evening.      Family History       Problem Relation (Age of Onset)    Breast cancer Paternal Aunt    Colon cancer Paternal Aunt    Coronary artery disease Brother    Diabetes Brother    Hypertension Father, Mother          Tobacco Use    Smoking status: Never    Smokeless tobacco: Never   Substance and Sexual Activity    Alcohol use: Yes     Alcohol/week: 3.0 standard drinks of alcohol     Types: 3 Glasses of wine per week     Comment: Drinks a glass of wine 3 times weekly; none 24 hr prior to surgery    Drug use: No    Sexual activity: Not Currently     Birth control/protection: Post-menopausal     Comment: Spouse  of kidney cancer : 2015     Review of Systems   Constitutional:  Negative for activity change, appetite change, chills and fever.   HENT:  Negative for congestion, sore throat and trouble swallowing.    Eyes:  Negative for photophobia and visual disturbance.   Respiratory:  Negative for cough, chest tightness and shortness of breath.    Cardiovascular:  Positive for palpitations. Negative for chest pain and leg swelling.   Gastrointestinal:  Negative for abdominal pain, diarrhea and nausea.   Genitourinary:  Negative for dysuria, flank pain and hematuria.   Musculoskeletal:  Negative for back pain.   Neurological:  Positive for light-headedness. Negative for weakness and headaches.   Psychiatric/Behavioral:  Negative for confusion.      Objective:     Vital Signs (Most Recent):  Temp: 97.9 °F (36.6 °C) (24)  Pulse: (!) 56 (24)  Resp: 20 (24)  BP: (!) 99/55 (24)  SpO2: 97 % (24) Vital Signs (24h Range):  Temp:  [97.7 °F (36.5 °C)-98.1 °F  (36.7 °C)] 97.9 °F (36.6 °C)  Pulse:  [] 56  Resp:  [16-25] 20  SpO2:  [97 %-100 %] 97 %  BP: ()/(55-97) 99/55     Weight: 51 kg (112 lb 7 oz)  Body mass index is 18.71 kg/m².     Physical Exam  Vitals reviewed.   Constitutional:       Appearance: Normal appearance. She is normal weight.   HENT:      Head: Normocephalic.      Mouth/Throat:      Mouth: Mucous membranes are moist.      Pharynx: Oropharynx is clear.   Eyes:      Pupils: Pupils are equal, round, and reactive to light.   Cardiovascular:      Rate and Rhythm: Normal rate. Rhythm regularly irregular.      Pulses: Normal pulses.      Heart sounds: Normal heart sounds.   Pulmonary:      Effort: Pulmonary effort is normal.      Breath sounds: Normal breath sounds.   Abdominal:      General: Bowel sounds are normal.      Palpations: Abdomen is soft.   Musculoskeletal:         General: Normal range of motion.      Cervical back: Normal range of motion.   Skin:     General: Skin is warm and dry.   Neurological:      Mental Status: She is alert and oriented to person, place, and time. Mental status is at baseline.   Psychiatric:         Mood and Affect: Mood normal.              CRANIAL NERVES     CN III, IV, VI   Pupils are equal, round, and reactive to light.       Significant Labs: All pertinent labs within the past 24 hours have been reviewed.  CBC:   Recent Labs   Lab 02/01/24  1114   WBC 5.64   HGB 14.2   HCT 43.5        CMP:   Recent Labs   Lab 02/01/24  1114      K 4.8      CO2 25   GLU 97   BUN 18   CREATININE 0.9   CALCIUM 10.5   PROT 6.9   ALBUMIN 4.4   BILITOT 0.4   ALKPHOS 61   AST 16   ALT 12   ANIONGAP 8       Significant Imaging: I have reviewed all pertinent imaging results/findings within the past 24 hours.  Imaging Results    None         Assessment/Plan:     * Paroxysmal atrial fibrillation  Patient with history of paroxysmal atrial fibrillation and went into AFib RVR to 140s while in the ED today.  Patient is  started on IV diltiazem    -cardiology consulted, recommendations appreciated  -continue IV Cardizem infusion  -echo pending  -apixaban 2.5 mg b.i.d. per Cardiology recommendations    Osteoarthritis of right knee  History noted    -continue Celebrex  -follow up with Orthopedic as scheduled outpatient      VTE Risk Mitigation (From admission, onward)           Ordered     apixaban tablet 2.5 mg  2 times daily         02/01/24 1653     IP VTE HIGH RISK PATIENT  Once         02/01/24 1617     Place sequential compression device  Until discontinued         02/01/24 1617                         Missy Foster NP  Department of Hospital Medicine  [facility] - Intensive Care ([place])

## 2024-02-03 NOTE — DISCHARGE SUMMARY
[facility] Intensive [facility] ER Medicine  Discharge Summary      Patient Name: Marian Durham  MRN: [id number]  RAJ: [id number]  Patient Class: IP- Inpatient  Admission Date: 2/1/2024  Hospital Length of Stay: 1 days  Discharge Date and Time: 2/2/2024  2:04 PM  Attending Physician: No att. providers found   Discharging Provider: Mainor Lombardi MD  Primary Care Provider: Suzanna Duran MD    Primary Care Team: Networked reference to record PCT     HPI:   Marian Durham is a 70-year-old female with a past medical history of hyperlipidemia, AFib and SBO who presented with lightheadedness for the past week.  Patient was seen at [facility] for SBO in June 2023 and found to be in AFib RVR while hospitalized.  Following discharge, patient was placed on Eliquis and ZIO patch monitoring.  She was later taken off of Eliquis secondary to knee replacement surgery and had no further episodes of atrial fibrillation.  Per ED note, patient states she started to experience lightheadedness with mild flutter and palpitations while she was at physical therapy on 01/25/2024.  She was seen by Cardiology on Monday and EKG showed no arrhythmias.  She followed up with her PCP after experiencing continued lightheadedness and advised to rehydrate and received IV fluids.  Patient states she has increased her water and Gatorade intake and symptoms resolved yesterday.  She reports today for further IV fluids as instructed by PCP and while in the waiting room her lightheadedness had returned.      Upon arrival to the ED, patient found to be in sinus rhythm and soon went into atrial fibrillation with RVR to 140s.  Patient was given diltiazem IV and started on diltiazem infusion.  Lab work performed in the ED unremarkable and UA negative for infection.  Cardiology, Dr Clarke, was consulted and patient referred to Hospital Medicine.  Patient will be admitted to Hospital Medicine for further evaluation management.      [facility]  Course:   70 year old woman with hyperlipidemia and atrial fibrillation who presented for evaluation of racing heart and light headedness.  She was diagnosed with atrial fibrillation with RVR.  She was admitted to inpatient status in the ICU.  Cardiology was consulted and she was treated with diltiazem drip.  She converted to normal sinus rhythm and was transitioned to flecainide and metoprolol with eliquis for anticoagulation.  Additionally, her statin was changed to atorvastatin.  Echo was repeated and no severe changes identified.  She improved much faster than we expected.  Stable for discharge home with continued outpatient follow up with pcp and cardiology.    Goals of Care Treatment Preferences:  Code Status: Full Code    Living Will: Yes              Consults:   Consults (From admission, onward)          Status Ordering Provider     Inpatient consult to Cardiology  Once        Provider:  Lamar Clarke MD    Completed ROSSI PEREYRA            No new Assessment & Plan notes have been filed under this hospital service since the last note was generated.  Service: Hospital Medicine    Final Active Diagnoses:    Diagnosis Date Noted POA    PRINCIPAL PROBLEM:  Paroxysmal atrial fibrillation [I48.0] 06/30/2023 Yes    High risk medication use [Z79.899] 02/02/2024 Not Applicable    Chronic anticoagulation [Z79.01] 02/02/2024 Not Applicable    Hypercholesterolemia [E78.00] 02/02/2024 Yes    Osteoarthritis of right knee [M17.11] 10/19/2023 Yes      Problems Resolved During this Admission:       Discharged Condition: stable    Disposition: Home or Self Care    Follow Up:   Follow-up Information       Lamar Clarke MD Follow up.    Specialties: Cardiology, Interventional Cardiology  Why: Dr Clarke's office will call you to schedule a follow-up apt  Contact information:  [address]  [address]  [place] [address] [phone]                           Patient Instructions:      Diet Cardiac     Notify  "your health care provider if you experience any of the following:  increased confusion or weakness     Notify your health care provider if you experience any of the following:  persistent dizziness, light-headedness, or visual disturbances     Notify your health care provider if you experience any of the following:  worsening rash     Notify your health care provider if you experience any of the following:  severe persistent headache     Notify your health care provider if you experience any of the following:  difficulty breathing or increased cough     Notify your health care provider if you experience any of the following:  severe uncontrolled pain     Notify your health care provider if you experience any of the following:  persistent nausea and vomiting or diarrhea     Notify your health care provider if you experience any of the following:  temperature >100.4     Activity as tolerated       Significant Diagnostic Studies: Labs: BMP:   Recent Labs   Lab 02/02/24 0228         K 4.6      CO2 25   BUN 19   CREATININE 0.9   CALCIUM 10.2   MG 2.3   , CMP   Recent Labs   Lab 02/02/24 0228      K 4.6      CO2 25      BUN 19   CREATININE 0.9   CALCIUM 10.2   PROT 6.5   ALBUMIN 4.1   BILITOT 0.5   ALKPHOS 59   AST 17   ALT 13   ANIONGAP 8   , CBC   Recent Labs   Lab 02/02/24 0228   WBC 6.61   HGB 14.1   HCT 44.1      , INR No results found for: "INR", "PROTIME", Lipid Panel   Lab Results   Component Value Date    CHOL 128 09/05/2023    HDL 47 09/05/2023    LDLCALC 65.2 09/05/2023    TRIG 79 09/05/2023    CHOLHDL 36.7 09/05/2023   , Troponin No results for input(s): "TROPONINI" in the last 168 hours., and A1C: No results for input(s): "HGBA1C" in the last 4320 hours.    Pending Diagnostic Studies:       Procedure Component Value Units Date/Time    EKG 12-lead [[id number]]     Order Status: Sent Lab Status: No result            Medications:  Reconciled Home Medications:    "   Medication List        START taking these medications      atorvastatin 20 MG tablet  Commonly known as: LIPITOR  Take 1 tablet (20 mg total) by mouth once daily.     ELIQUIS 2.5 mg Tab  Generic drug: apixaban  Take 1 tablet (2.5 mg total) by mouth 2 (two) times daily.     flecainide 100 MG Tab  Commonly known as: TAMBOCOR  Take 1 tablet (100 mg total) by mouth every 12 (twelve) hours.     metoprolol succinate 25 MG 24 hr tablet  Commonly known as: TOPROL-XL  Take 1 tablet (25 mg total) by mouth every 12 (twelve) hours.            CONTINUE taking these medications      aspirin 81 MG Chew  Take 1 tablet (81 mg total) by mouth 2 (two) times daily.     calcium-vitamin D3 500 mg-5 mcg (200 unit) per tablet  Commonly known as: OS- + D3  Take 1 tablet by mouth 2 (two) times daily with meals.     estradioL 0.01 % (0.1 mg/gram) vaginal cream  Commonly known as: ESTRACE  Place 1 g vaginally 3 (three) times a week.     glucosamine-chondroitin 500-400 mg tablet  Take 1 tablet by mouth 3 (three) times daily.     polyethylene glycol 17 gram/dose powder  Commonly known as: MIRALAX  Take 17 g by mouth 3 (three) times daily as needed (Constipation).            STOP taking these medications      celecoxib 200 MG capsule  Commonly known as: CeleBREX     HYDROcodone-acetaminophen  mg per tablet  Commonly known as: NORCO     simvastatin 40 MG tablet  Commonly known as: ZOCOR              Indwelling Lines/Drains at time of discharge:   Lines/Drains/Airways       None                   Time spent on the discharge of patient: 35 minutes         Mainor Lombardi MD  Department of Hospital Medicine  [facility] Intensive [facility]

## 2024-02-14 DIAGNOSIS — I48.0 PAROXYSMAL ATRIAL FIBRILLATION: Primary | ICD-10-CM

## 2024-02-14 NOTE — TELEPHONE ENCOUNTER
----- Message from Ritu Bertrand sent at 2/14/2024  3:10 PM CST -----  Name of Who is Calling: SAI LUCIANO [[id number]]             [phone] [facility]       Send all to [id number] - [facility] [address]:    atorvastatin (LIPITOR) 20 MG tablet   flecainide (TAMBOCOR) 100 MG Tab   metoprolol succinate (TOPROL-XL) 25 MG 24 hr tablet

## 2024-02-29 ENCOUNTER — HOSPITAL ENCOUNTER (INPATIENT)
Facility: OTHER | Age: 79
LOS: 6 days | Discharge: HOME | DRG: 390 | End: 2024-03-07
Attending: EMERGENCY MEDICINE | Admitting: INTERNAL MEDICINE
Payer: MEDICARE

## 2024-02-29 ENCOUNTER — OFFICE VISIT (OUTPATIENT)
Dept: CARDIOLOGY | Facility: CLINIC | Age: 79
DRG: 390 | End: 2024-02-29
Attending: INTERNAL MEDICINE
Payer: MEDICARE

## 2024-02-29 VITALS
WEIGHT: 114.31 LBS | SYSTOLIC BLOOD PRESSURE: 126 MMHG | OXYGEN SATURATION: 99 % | HEART RATE: 54 BPM | BODY MASS INDEX: 19.04 KG/M2 | HEIGHT: 65 IN | DIASTOLIC BLOOD PRESSURE: 76 MMHG

## 2024-02-29 DIAGNOSIS — K56.609 SBO (SMALL BOWEL OBSTRUCTION): ICD-10-CM

## 2024-02-29 DIAGNOSIS — R10.84 GENERALIZED ABDOMINAL PAIN: Primary | ICD-10-CM

## 2024-02-29 DIAGNOSIS — Z79.01 CHRONIC ANTICOAGULATION: ICD-10-CM

## 2024-02-29 DIAGNOSIS — E78.00 HYPERCHOLESTEROLEMIA: ICD-10-CM

## 2024-02-29 DIAGNOSIS — R07.9 CHEST PAIN: ICD-10-CM

## 2024-02-29 DIAGNOSIS — I48.0 PAROXYSMAL ATRIAL FIBRILLATION: ICD-10-CM

## 2024-02-29 DIAGNOSIS — Z79.899 HIGH RISK MEDICATION USE: ICD-10-CM

## 2024-02-29 LAB
ALBUMIN SERPL BCP-MCNC: 4.6 G/DL (ref 3.5–5.2)
ALP SERPL-CCNC: 75 U/L (ref 55–135)
ALT SERPL W/O P-5'-P-CCNC: 24 U/L (ref 10–44)
ANION GAP SERPL CALC-SCNC: 13 MMOL/L (ref 8–16)
AST SERPL-CCNC: 24 U/L (ref 10–40)
BASOPHILS # BLD AUTO: 0.04 K/UL (ref 0–0.2)
BASOPHILS NFR BLD: 0.3 % (ref 0–1.9)
BILIRUB SERPL-MCNC: 0.6 MG/DL (ref 0.1–1)
BUN SERPL-MCNC: 23 MG/DL (ref 8–23)
CALCIUM SERPL-MCNC: 10.9 MG/DL (ref 8.7–10.5)
CHLORIDE SERPL-SCNC: 103 MMOL/L (ref 95–110)
CO2 SERPL-SCNC: 21 MMOL/L (ref 23–29)
CREAT SERPL-MCNC: 1.1 MG/DL (ref 0.5–1.4)
DIFFERENTIAL METHOD BLD: ABNORMAL
EOSINOPHIL # BLD AUTO: 0.1 K/UL (ref 0–0.5)
EOSINOPHIL NFR BLD: 0.4 % (ref 0–8)
ERYTHROCYTE [DISTWIDTH] IN BLOOD BY AUTOMATED COUNT: 12.9 % (ref 11.5–14.5)
EST. GFR  (NO RACE VARIABLE): 51 ML/MIN/1.73 M^2
GLUCOSE SERPL-MCNC: 136 MG/DL (ref 70–110)
HCT VFR BLD AUTO: 46.8 % (ref 37–48.5)
HGB BLD-MCNC: 15.6 G/DL (ref 12–16)
IMM GRANULOCYTES # BLD AUTO: 0.05 K/UL (ref 0–0.04)
IMM GRANULOCYTES NFR BLD AUTO: 0.3 % (ref 0–0.5)
LIPASE SERPL-CCNC: 13 U/L (ref 4–60)
LYMPHOCYTES # BLD AUTO: 1.4 K/UL (ref 1–4.8)
LYMPHOCYTES NFR BLD: 9.4 % (ref 18–48)
MCH RBC QN AUTO: 29.6 PG (ref 27–31)
MCHC RBC AUTO-ENTMCNC: 33.3 G/DL (ref 32–36)
MCV RBC AUTO: 89 FL (ref 82–98)
MONOCYTES # BLD AUTO: 0.7 K/UL (ref 0.3–1)
MONOCYTES NFR BLD: 4.4 % (ref 4–15)
NEUTROPHILS # BLD AUTO: 13 K/UL (ref 1.8–7.7)
NEUTROPHILS NFR BLD: 85.2 % (ref 38–73)
NRBC BLD-RTO: 0 /100 WBC
PLATELET # BLD AUTO: 249 K/UL (ref 150–450)
PMV BLD AUTO: 8.9 FL (ref 9.2–12.9)
POTASSIUM SERPL-SCNC: 4.2 MMOL/L (ref 3.5–5.1)
PROT SERPL-MCNC: 7.6 G/DL (ref 6–8.4)
RBC # BLD AUTO: 5.27 M/UL (ref 4–5.4)
SODIUM SERPL-SCNC: 137 MMOL/L (ref 136–145)
WBC # BLD AUTO: 15.29 K/UL (ref 3.9–12.7)

## 2024-02-29 PROCEDURE — 93000 ELECTROCARDIOGRAM COMPLETE: CPT | Mod: S$GLB,,, | Performed by: INTERNAL MEDICINE

## 2024-02-29 PROCEDURE — 1126F AMNT PAIN NOTED NONE PRSNT: CPT | Mod: CPTII,S$GLB,, | Performed by: INTERNAL MEDICINE

## 2024-02-29 PROCEDURE — 80053 COMPREHEN METABOLIC PANEL: CPT | Performed by: NURSE PRACTITIONER

## 2024-02-29 PROCEDURE — 63600175 PHARM REV CODE 636 W HCPCS: Performed by: NURSE PRACTITIONER

## 2024-02-29 PROCEDURE — G0378 HOSPITAL OBSERVATION PER HR: HCPCS

## 2024-02-29 PROCEDURE — 96374 THER/PROPH/DIAG INJ IV PUSH: CPT

## 2024-02-29 PROCEDURE — 3288F FALL RISK ASSESSMENT DOCD: CPT | Mod: CPTII,S$GLB,, | Performed by: INTERNAL MEDICINE

## 2024-02-29 PROCEDURE — 96376 TX/PRO/DX INJ SAME DRUG ADON: CPT

## 2024-02-29 PROCEDURE — 1159F MED LIST DOCD IN RCRD: CPT | Mod: CPTII,S$GLB,, | Performed by: INTERNAL MEDICINE

## 2024-02-29 PROCEDURE — 99999 PR PBB SHADOW E&M-EST. PATIENT-LVL III: CPT | Mod: PBBFAC,,, | Performed by: INTERNAL MEDICINE

## 2024-02-29 PROCEDURE — 1111F DSCHRG MED/CURRENT MED MERGE: CPT | Mod: CPTII,S$GLB,, | Performed by: INTERNAL MEDICINE

## 2024-02-29 PROCEDURE — 85025 COMPLETE CBC W/AUTO DIFF WBC: CPT | Performed by: NURSE PRACTITIONER

## 2024-02-29 PROCEDURE — 99215 OFFICE O/P EST HI 40 MIN: CPT | Mod: 25,S$GLB,, | Performed by: INTERNAL MEDICINE

## 2024-02-29 PROCEDURE — 1101F PT FALLS ASSESS-DOCD LE1/YR: CPT | Mod: CPTII,S$GLB,, | Performed by: INTERNAL MEDICINE

## 2024-02-29 PROCEDURE — 96361 HYDRATE IV INFUSION ADD-ON: CPT

## 2024-02-29 PROCEDURE — 3078F DIAST BP <80 MM HG: CPT | Mod: CPTII,S$GLB,, | Performed by: INTERNAL MEDICINE

## 2024-02-29 PROCEDURE — 93010 ELECTROCARDIOGRAM REPORT: CPT | Mod: S$GLB,,, | Performed by: INTERNAL MEDICINE

## 2024-02-29 PROCEDURE — 99285 EMERGENCY DEPT VISIT HI MDM: CPT | Mod: 25

## 2024-02-29 PROCEDURE — 1160F RVW MEDS BY RX/DR IN RCRD: CPT | Mod: CPTII,S$GLB,, | Performed by: INTERNAL MEDICINE

## 2024-02-29 PROCEDURE — 1157F ADVNC CARE PLAN IN RCRD: CPT | Mod: CPTII,S$GLB,, | Performed by: INTERNAL MEDICINE

## 2024-02-29 PROCEDURE — 63600175 PHARM REV CODE 636 W HCPCS: Performed by: EMERGENCY MEDICINE

## 2024-02-29 PROCEDURE — 3074F SYST BP LT 130 MM HG: CPT | Mod: CPTII,S$GLB,, | Performed by: INTERNAL MEDICINE

## 2024-02-29 PROCEDURE — 93005 ELECTROCARDIOGRAM TRACING: CPT

## 2024-02-29 PROCEDURE — 25000003 PHARM REV CODE 250: Performed by: EMERGENCY MEDICINE

## 2024-02-29 PROCEDURE — 83690 ASSAY OF LIPASE: CPT | Performed by: NURSE PRACTITIONER

## 2024-02-29 PROCEDURE — 96375 TX/PRO/DX INJ NEW DRUG ADDON: CPT

## 2024-02-29 PROCEDURE — 25500020 PHARM REV CODE 255: Performed by: EMERGENCY MEDICINE

## 2024-02-29 RX ORDER — ALUMINUM HYDROXIDE, MAGNESIUM HYDROXIDE, AND SIMETHICONE 1200; 120; 1200 MG/30ML; MG/30ML; MG/30ML
30 SUSPENSION ORAL 4 TIMES DAILY PRN
Status: DISCONTINUED | OUTPATIENT
Start: 2024-02-29 | End: 2024-02-29

## 2024-02-29 RX ORDER — MORPHINE SULFATE 4 MG/ML
4 INJECTION, SOLUTION INTRAMUSCULAR; INTRAVENOUS
Status: COMPLETED | OUTPATIENT
Start: 2024-02-29 | End: 2024-02-29

## 2024-02-29 RX ORDER — IBUPROFEN 200 MG
16 TABLET ORAL
Status: DISCONTINUED | OUTPATIENT
Start: 2024-02-29 | End: 2024-03-07 | Stop reason: HOSPADM

## 2024-02-29 RX ORDER — ATORVASTATIN CALCIUM 20 MG/1
20 TABLET, FILM COATED ORAL DAILY
Qty: 90 TABLET | Refills: 3 | Status: SHIPPED | OUTPATIENT
Start: 2024-02-29

## 2024-02-29 RX ORDER — METOPROLOL SUCCINATE 25 MG/1
25 TABLET, EXTENDED RELEASE ORAL EVERY 12 HOURS
Status: DISCONTINUED | OUTPATIENT
Start: 2024-02-29 | End: 2024-03-07 | Stop reason: HOSPADM

## 2024-02-29 RX ORDER — PROCHLORPERAZINE EDISYLATE 5 MG/ML
5 INJECTION INTRAMUSCULAR; INTRAVENOUS EVERY 6 HOURS PRN
Status: DISCONTINUED | OUTPATIENT
Start: 2024-02-29 | End: 2024-03-07 | Stop reason: HOSPADM

## 2024-02-29 RX ORDER — GLUCAGON 1 MG
1 KIT INJECTION
Status: DISCONTINUED | OUTPATIENT
Start: 2024-02-29 | End: 2024-03-07 | Stop reason: HOSPADM

## 2024-02-29 RX ORDER — NALOXONE HCL 0.4 MG/ML
0.02 VIAL (ML) INJECTION
Status: DISCONTINUED | OUTPATIENT
Start: 2024-02-29 | End: 2024-03-07 | Stop reason: HOSPADM

## 2024-02-29 RX ORDER — SODIUM CHLORIDE 0.9 % (FLUSH) 0.9 %
10 SYRINGE (ML) INJECTION EVERY 8 HOURS PRN
Status: DISCONTINUED | OUTPATIENT
Start: 2024-02-29 | End: 2024-03-07 | Stop reason: HOSPADM

## 2024-02-29 RX ORDER — CELECOXIB 200 MG/1
200 CAPSULE ORAL DAILY
Qty: 90 CAPSULE | Refills: 3 | Status: SHIPPED | OUTPATIENT
Start: 2024-02-29

## 2024-02-29 RX ORDER — MORPHINE SULFATE 4 MG/ML
4 INJECTION, SOLUTION INTRAMUSCULAR; INTRAVENOUS EVERY 4 HOURS PRN
Status: DISCONTINUED | OUTPATIENT
Start: 2024-02-29 | End: 2024-03-05

## 2024-02-29 RX ORDER — MORPHINE SULFATE 2 MG/ML
2 INJECTION, SOLUTION INTRAMUSCULAR; INTRAVENOUS EVERY 4 HOURS PRN
Status: DISCONTINUED | OUTPATIENT
Start: 2024-02-29 | End: 2024-03-07 | Stop reason: HOSPADM

## 2024-02-29 RX ORDER — TALC
6 POWDER (GRAM) TOPICAL NIGHTLY PRN
Status: DISCONTINUED | OUTPATIENT
Start: 2024-02-29 | End: 2024-03-07 | Stop reason: HOSPADM

## 2024-02-29 RX ORDER — ACETAMINOPHEN 325 MG/1
650 TABLET ORAL EVERY 8 HOURS PRN
Status: DISCONTINUED | OUTPATIENT
Start: 2024-02-29 | End: 2024-03-07 | Stop reason: HOSPADM

## 2024-02-29 RX ORDER — FLECAINIDE ACETATE 50 MG/1
100 TABLET ORAL EVERY 12 HOURS
Status: DISCONTINUED | OUTPATIENT
Start: 2024-02-29 | End: 2024-03-07 | Stop reason: HOSPADM

## 2024-02-29 RX ORDER — ONDANSETRON HYDROCHLORIDE 2 MG/ML
4 INJECTION, SOLUTION INTRAVENOUS EVERY 6 HOURS PRN
Status: DISCONTINUED | OUTPATIENT
Start: 2024-02-29 | End: 2024-03-07 | Stop reason: HOSPADM

## 2024-02-29 RX ORDER — IBUPROFEN 200 MG
24 TABLET ORAL
Status: DISCONTINUED | OUTPATIENT
Start: 2024-02-29 | End: 2024-03-07 | Stop reason: HOSPADM

## 2024-02-29 RX ORDER — ATORVASTATIN CALCIUM 20 MG/1
20 TABLET, FILM COATED ORAL DAILY
Status: DISCONTINUED | OUTPATIENT
Start: 2024-03-01 | End: 2024-03-07 | Stop reason: HOSPADM

## 2024-02-29 RX ORDER — FLECAINIDE ACETATE 100 MG/1
100 TABLET ORAL EVERY 12 HOURS
Qty: 180 TABLET | Refills: 3 | Status: SHIPPED | OUTPATIENT
Start: 2024-02-29

## 2024-02-29 RX ORDER — ONDANSETRON HYDROCHLORIDE 2 MG/ML
4 INJECTION, SOLUTION INTRAVENOUS
Status: COMPLETED | OUTPATIENT
Start: 2024-02-29 | End: 2024-02-29

## 2024-02-29 RX ORDER — METOPROLOL SUCCINATE 25 MG/1
25 TABLET, EXTENDED RELEASE ORAL EVERY 12 HOURS
Qty: 180 TABLET | Refills: 3 | Status: SHIPPED | OUTPATIENT
Start: 2024-02-29

## 2024-02-29 RX ORDER — SIMETHICONE 80 MG
1 TABLET,CHEWABLE ORAL 4 TIMES DAILY PRN
Status: DISCONTINUED | OUTPATIENT
Start: 2024-02-29 | End: 2024-02-29

## 2024-02-29 RX ADMIN — MORPHINE SULFATE 2 MG: 2 INJECTION, SOLUTION INTRAMUSCULAR; INTRAVENOUS at 10:02

## 2024-02-29 RX ADMIN — ONDANSETRON 4 MG: 2 INJECTION INTRAMUSCULAR; INTRAVENOUS at 10:02

## 2024-02-29 RX ADMIN — MORPHINE SULFATE 4 MG: 4 INJECTION, SOLUTION INTRAMUSCULAR; INTRAVENOUS at 04:02

## 2024-02-29 RX ADMIN — IOHEXOL 75 ML: 350 INJECTION, SOLUTION INTRAVENOUS at 05:02

## 2024-02-29 RX ADMIN — ONDANSETRON 4 MG: 2 INJECTION INTRAMUSCULAR; INTRAVENOUS at 04:02

## 2024-02-29 RX ADMIN — SODIUM CHLORIDE 1000 ML: 9 INJECTION, SOLUTION INTRAVENOUS at 04:02

## 2024-02-29 NOTE — FIRST PROVIDER EVALUATION
"Medical screening examination initiated.  I have conducted a focused provider triage encounter, findings are as follows:    Brief history of present illness:  abd distention, cramping, nausea.  Hx of multiple bowel obstructions, states feels the same.  No vomiting, no fever.  Last BM was this morning    Vitals:    02/29/24 1420   BP: 137/65   BP Location: Left arm   Patient Position: Sitting   Pulse: 61   Resp: 20   Temp: 97.6 °F (36.4 °C)   TempSrc: Oral   SpO2: 98%   Weight: 50.8 kg (112 lb)   Height: 5' 5" (1.651 m)       Pertinent physical exam:  well appearing    Brief workup plan:  xray, labs    Preliminary workup initiated; this workup will be continued and followed by the physician or advanced practice provider that is assigned to the patient when roomed.  "

## 2024-02-29 NOTE — ED PROVIDER NOTES
"Chief complaint:  Abdominal Pain (Reports bloating, belching, "bubbling" in stomach, and abd discomfort onset 2 hours ago. Hx of bowel obstruction, states she feels like she has one again. Hx of afib. LBM this morning. )      HPI:  Marian Durham is a 78 y.o. female with hx atrial fibrillation on apixaban and flecainide, prior hysterex, prior SBOs presenting with approximately 5 hours abdominal distention with generalized pain more prominent in the upper abdomen accompanied by nausea and belching doubt emesis consistent with feeling of prior small-bowel obstruction.  Pain is constant.  She denies radiation or migration.  No associated fever.  Normal movement today without blood.    ROS: As per HPI and below:  No urinary frequency, urgency, dysuria, hematuria flank pain, fever, chest pain, dyspnea, syncope, swelling.    Review of patient's allergies indicates:   Allergen Reactions    Demerol [meperidine] Other (See Comments)     "Becomes Agitated and Combative"       Patient's Medications   New Prescriptions    No medications on file   Previous Medications    APIXABAN (ELIQUIS) 2.5 MG TAB    Take 1 tablet (2.5 mg total) by mouth 2 (two) times daily.    ATORVASTATIN (LIPITOR) 20 MG TABLET    Take 1 tablet (20 mg total) by mouth once daily.    CALCIUM-VITAMIN D3 (OS- + D3) 500 MG-5 MCG (200 UNIT) PER TABLET    Take 1 tablet by mouth 2 (two) times daily with meals.    CELECOXIB (CELEBREX) 200 MG CAPSULE    Take 1 capsule (200 mg total) by mouth once daily.    ESTRADIOL (ESTRACE) 0.01 % (0.1 MG/GRAM) VAGINAL CREAM    Place 1 g vaginally 3 (three) times a week.    FLECAINIDE (TAMBOCOR) 100 MG TAB    Take 1 tablet (100 mg total) by mouth every 12 (twelve) hours.    GLUCOSAMINE-CHONDROITIN 500-400 MG TABLET    Take 1 tablet by mouth 3 (three) times daily.    METOPROLOL SUCCINATE (TOPROL-XL) 25 MG 24 HR TABLET    Take 1 tablet (25 mg total) by mouth every 12 (twelve) hours.    POLYETHYLENE GLYCOL (MIRALAX) 17 " GRAM/DOSE POWDER    Take 17 g by mouth 3 (three) times daily as needed (Constipation).   Modified Medications    No medications on file   Discontinued Medications    No medications on file       PMH:  As per HPI and below:  Past Medical History:   Diagnosis Date    Atrial fibrillation     stress related per patient    Hyperlipidemia     Osteoarthritis     PONV (postoperative nausea and vomiting)     SBO (small bowel obstruction)     Shingles 2002    Spinal stenosis      Past Surgical History:   Procedure Laterality Date    ANKLE FRACTURE SURGERY  2007    Right ankle    COLON SURGERY      bowel obstruction    COLONOSCOPY      x 2    EXCISION OF MASS OF BACK N/A 2018    Procedure: EXCISION, MASS, BACK;  Surgeon: Fran Magaña MD;  Location: [facility];  Service: General;  Laterality: N/A;    EYE SURGERY Bilateral 2016    HYSTERECTOMY  1985    HEATHER    KNEE ARTHROSCOPY W/ DEBRIDEMENT      Bilateral    KNEE ARTHROSCOPY W/ DEBRIDEMENT      Left knee    OPEN PATELLA REEFING PROCEDURE  age 19    Left knee    Tonsilectomy  as a child    TONSILLECTOMY      TOTAL KNEE ARTHROPLASTY Right 10/19/2023    Procedure: ARTHROPLASTY, KNEE, TOTAL;  Surgeon: Vicente Mitchell MD;  Location: [facility];  Service: Orthopedics;  Laterality: Right;    TUBAL LIGATION         Social History     Socioeconomic History    Marital status:    Tobacco Use    Smoking status: Never    Smokeless tobacco: Never   Substance and Sexual Activity    Alcohol use: Yes     Alcohol/week: 3.0 standard drinks of alcohol     Types: 3 Glasses of wine per week     Comment: Drinks a glass of wine 3 times weekly; none 24 hr prior to surgery    Drug use: No    Sexual activity: Not Currently     Birth control/protection: Post-menopausal     Comment: Spouse  of kidney cancer : 2015     Social Determinants of Health     Financial Resource Strain: Low Risk  (2024)    Overall Financial Resource Strain (CARDIA)     Difficulty of  Paying Living Expenses: Not hard at all   Food Insecurity: No Food Insecurity (2/2/2024)    Hunger Vital Sign     Worried About Running Out of Food in the Last Year: Never true     Ran Out of Food in the Last Year: Never true   Transportation Needs: No Transportation Needs (2/2/2024)    PRAPARE - Transportation     Lack of Transportation (Medical): No     Lack of Transportation (Non-Medical): No   Physical Activity: Sufficiently Active (2/2/2024)    Exercise Vital Sign     Days of Exercise per Week: 7 days     Minutes of Exercise per Session: 40 min   Stress: Stress Concern Present (2/2/2024)    [employer] - Occupational Stress Questionnaire     Feeling of Stress : To some extent   Social Connections: Moderately Integrated (2/2/2024)    Social Connection and Isolation Panel [NHANES]     Frequency of Communication with Friends and Family: More than three times a week     Frequency of Social Gatherings with Friends and Family: More than three times a week     Attends [employer] Services: More than 4 times per year     Active Member of Clubs or Organizations: Yes     Attends Club or Organization Meetings: More than 4 times per year     Marital Status:    Housing Stability: Low Risk  (2/2/2024)    Housing Stability Vital Sign     Unable to Pay for Housing in the Last Year: No     Number of Places Lived in the Last Year: 1     Unstable Housing in the Last Year: No       Family History   Problem Relation Age of Onset    Hypertension Father     Hypertension Mother     Diabetes Brother     Colon cancer Paternal Aunt     Breast cancer Paternal Aunt         75    Coronary artery disease Brother     Ovarian cancer Neg Hx        Physical Exam:    Vitals:    02/29/24 1802   BP: (!) 167/78   Pulse: 69   Resp:    Temp:      GENERAL:  No apparent distress.  Alert.  Occasional belching noted.  HEENT:  Moist mucous membranes.  Normocephalic and atraumatic.    NECK:  No swelling.  Midline trachea.    CARDIOVASCULAR:  Regular rate and rhythm.  2+ radial pulses.    PULMONARY:  Lungs clear to auscultation bilaterally.  No wheezes, rales, or rhonci.    ABDOMEN:  Abdomen distended.  No palpable hernias or masses.  Mild, diffuse tenderness with mild voluntary guarding.    EXTREMITIES:  Warm and well perfused.  Brisk capillary refill.  No peripheral edema.  NEUROLOGICAL:  Normal mental status.  Appropriate and conversant.    SKIN:  No rashes or ecchymoses.    BACK:  Atraumatic.  No CVA tenderness to palpation.      Labs Reviewed   CBC W/ AUTO DIFFERENTIAL - Abnormal; Notable for the following components:       Result Value    WBC 15.29 (*)     MPV 8.9 (*)     Gran # (ANC) 13.0 (*)     Immature Grans (Abs) 0.05 (*)     Gran % 85.2 (*)     Lymph % 9.4 (*)     All other components within normal limits   COMPREHENSIVE METABOLIC PANEL - Abnormal; Notable for the following components:    CO2 21 (*)     Glucose 136 (*)     Calcium 10.9 (*)     eGFR 51 (*)     All other components within normal limits   LIPASE       Current Discharge Medication List        CONTINUE these medications which have NOT CHANGED    Details   apixaban (ELIQUIS) 2.5 mg Tab Take 1 tablet (2.5 mg total) by mouth 2 (two) times daily.  Qty: 180 tablet, Refills: 3    Associated Diagnoses: Paroxysmal atrial fibrillation; Chronic anticoagulation      atorvastatin (LIPITOR) 20 MG tablet Take 1 tablet (20 mg total) by mouth once daily.  Qty: 90 tablet, Refills: 3    Associated Diagnoses: Hypercholesterolemia      calcium-vitamin D3 (OS- + D3) 500 mg-5 mcg (200 unit) per tablet Take 1 tablet by mouth 2 (two) times daily with meals.      celecoxib (CELEBREX) 200 MG capsule Take 1 capsule (200 mg total) by mouth once daily.  Qty: 90 capsule, Refills: 3      estradioL (ESTRACE) 0.01 % (0.1 mg/gram) vaginal cream Place 1 g vaginally 3 (three) times a week.  Qty: 42.5 g, Refills: 3    Associated Diagnoses: Postmenopausal atrophic vaginitis      flecainide  (TAMBOCOR) 100 MG Tab Take 1 tablet (100 mg total) by mouth every 12 (twelve) hours.  Qty: 180 tablet, Refills: 3    Associated Diagnoses: Paroxysmal atrial fibrillation; High risk medication use      glucosamine-chondroitin 500-400 mg tablet Take 1 tablet by mouth 3 (three) times daily.      metoprolol succinate (TOPROL-XL) 25 MG 24 hr tablet Take 1 tablet (25 mg total) by mouth every 12 (twelve) hours.  Qty: 180 tablet, Refills: 3    Associated Diagnoses: Paroxysmal atrial fibrillation      polyethylene glycol (MIRALAX) 17 gram/dose powder Take 17 g by mouth 3 (three) times daily as needed (Constipation).  Qty: 510 g, Refills: 0             Orders Placed This Encounter   Procedures    X-Ray Abdomen Flat And Erect    CT Abdomen Pelvis With IV Contrast NO Oral Contrast    CBC W/ AUTO DIFFERENTIAL    Comp. Metabolic Panel    Lipase    Basic Metabolic Panel (BMP)    CBC with Automated Differential    Diet NPO Except for: Sips with Medication    Vital signs    Vital signs    Weigh patient    Bladder scan    Straight Cath    Notify Physician    Place sequential compression device    Recheck Blood Glucose:    Full code    Inpatient consult to General Surgery    Oxygen PRN    EKG 12-lead    Insert peripheral IV    Saline lock IV    Possible Hospitalization    Place in Observation       Imaging Results              CT Abdomen Pelvis With IV Contrast NO Oral Contrast (Final result)  Result time 02/29/24 18:15:38      Final result by Sally Ojeda MD (02/29/24 18:15:38)                   Impression:      Significant small-bowel obstruction with the transition point in the mid upper pelvis.  Associated small free pelvic fluid.    Probable subcentimeter left hepatic cyst.    Colonic diverticulosis.    Trace right pleural effusion again seen.      Electronically signed by: Sally Ojeda  Date:    02/29/2024  Time:    18:15               Narrative:    EXAMINATION:  CT OF ABDOMEN PELVIS WITH    CLINICAL  HISTORY:  Abdominal pain, acute, nonlocalized;Bowel obstruction suspected;    TECHNIQUE:  5 mm enhanced axial images were obtained from the lung bases through the greater trochanters.  Seventy-five mL of Omnipaque 350 was injected.    COMPARISON:  06/24/2023    FINDINGS:  There are multiple dilated fluid and air fluid level containing small bowel loops.  There is a marked caliber change in the mid upper pelvis (series 2 axial image 82, series 601 coronal image 42, and series 602 sagittal image 77).  There is least 1 bowel loops measuring up to 5 cm in diameter (series 2 axial image 83).    A too small to characterize low attenuation lesion in the left hepatic lobe.    Spleen, pancreas, kidneys, and adrenal glands are unremarkable.  The pancreas is atrophic, which may be related the gallbladder contains no calcified gallstones.  A splenule is present.    There is no definite evidence for abdominal adenopathy or ascites.  Moderate atherosclerotic changes are present.    There is colonic diverticulosis.  There are no pelvic masses or adenopathy.    There is small free fluid in the pelvis, which is uncommon in a female of this age.    There is again a trace right pleural effusion with associated compressive atelectasis.                                       X-Ray Abdomen Flat And Erect (Final result)  Result time 02/29/24 15:04:46      Final result by Bandar Acevedo III, MD (02/29/24 15:04:46)                   Impression:      The presence of dilated small bowel with differential fluid levels is often indicative of a small bowel obstruction.  The patient does have mild constipation.      Electronically signed by: Bandar Acevedo MD  Date:    02/29/2024  Time:    15:04               Narrative:    EXAMINATION:  XR ABDOMEN FLAT AND ERECT    CLINICAL HISTORY:  Constipation, unspecified    FINDINGS:  Abdomen flat erect:    There are small bowel loops with differential fluid levels.  There is mild constipation.  There is  distension of the stomach.    No perforation seen.                                  (Rad read)    ED Course as of 02/29/24 1907   Thu Feb 29, 2024   1833 D/w Dr. Werner general surgery re: presentation with workup indicative of SBO.  Agrees with admit to medicine and obs, serial exams. [MR]      ED Course User Index  [MR] Larry Jay MD       MDM:    78 y.o. female with onset of abdominal distention and generalized pain with nausea in the setting of multiple previous SBO does concerning for recurrent SBO.  Imaging beyond x-ray from triage for acute abdominal process including SBO as well as to exclude, she is perforation, volvulus, or alternative intra-abdominal process such as diverticulitis, abscess, cholecystitis.  She is nontoxic appearing.  Analgesia and antiemetic along with IV fluids ordered for symptomatic relief.  Labs protocol from triage reviewed with nonspecific mild leukocytosis noted.  CT confirms small bowel obstruction with transition point without complications such as perforation.  I will admit to medicine for observation.  I have discussed with surgery who agrees with observation and serial abdominal exams without need for emergent intervention this evening.    Diagnoses:    1. SBO       Larry Jay MD  02/29/24 1921

## 2024-02-29 NOTE — PROGRESS NOTES
Subjective:     Marian Durham is a 78 y.o. female with hypercholesterolemia. She has a healthy weight close to being underweight. She had small bowel obstruction in 6/2023. She then had an episode of atrial fibrillation. She was anticoagulated for some time. With no recurrent spells it was felt she may discontinue the anticoagulation. Beginning on 1/26/2024 she began experience episodes of being lightheaded with an anxious feeling in her chest. She has an Apple watch and recorded a high heart rate. The episodes continued. She presented to the emergency room at [facility] on 2/1/2024 after a spell. She was in sinus rhythm. As she was monitored in the emergency room she went into artrial fibrillation with fast ventricular response rate. She was given diltiazem iv and admitted to the intensive care unit. She denied chest pain or shortness of breath. On 2/1/2024 she was given diltiazem intravenously. She converted to sinus rhythm On 2/1/2024 she began flecainide, metoprolol and apixiban. No palpitations since. No exertional chest pain or shortness of breath. No issues with any of her prescribed medications. Feeling well overall.       Atrial Fibrillation  Presents for follow-up visit. Symptoms are negative for bradycardia, chest pain, dizziness, hypertension, palpitations, shortness of breath, syncope, tachycardia and weakness. The symptoms have been stable. Past medical history includes atrial fibrillation and hyperlipidemia.   Hyperlipidemia  She has no history of chronic renal disease, diabetes, hypothyroidism, liver disease, obesity or nephrotic syndrome. Pertinent negatives include no chest pain, focal sensory loss, focal weakness, leg pain, myalgias or shortness of breath.       Review of Systems   Constitutional: Negative for chills, fever and malaise/fatigue.   HENT:  Negative for nosebleeds and tinnitus.    Eyes:  Negative for double vision, vision loss in left eye and vision  loss in right eye.   Cardiovascular:  Negative for chest pain, claudication, dyspnea on exertion, irregular heartbeat, leg swelling, near-syncope, orthopnea, palpitations, paroxysmal nocturnal dyspnea and syncope.   Respiratory:  Negative for cough, hemoptysis, shortness of breath and wheezing.    Endocrine: Negative for cold intolerance and heat intolerance.   Hematologic/Lymphatic: Negative for bleeding problem. Does not bruise/bleed easily.   Skin:  Negative for color change and rash.   Musculoskeletal:  Positive for arthritis and joint pain (left knee). Negative for back pain, falls, muscle weakness and myalgias.   Gastrointestinal:  Negative for abdominal pain, diarrhea, dysphagia, heartburn, hematemesis, hematochezia, hemorrhoids, jaundice, melena, nausea and vomiting.   Genitourinary:  Negative for dysuria and hematuria.   Neurological:  Negative for dizziness, focal weakness, headaches, light-headedness, loss of balance, numbness, tremors, vertigo and weakness.   Psychiatric/Behavioral:  Negative for altered mental status, depression and memory loss. The patient is not nervous/anxious.    Allergic/Immunologic: Negative for hives and persistent infections.       Current Outpatient Medications on File Prior to Visit   Medication Sig Dispense Refill    apixaban (ELIQUIS) 2.5 mg Tab Take 1 tablet (2.5 mg total) by mouth 2 (two) times daily. 180 tablet 3    atorvastatin (LIPITOR) 20 MG tablet Take 1 tablet (20 mg total) by mouth once daily. 30 tablet 1    calcium-vitamin D3 (OS- + D3) 500 mg-5 mcg (200 unit) per tablet Take 1 tablet by mouth 2 (two) times daily with meals.      estradioL (ESTRACE) 0.01 % (0.1 mg/gram) vaginal cream Place 1 g vaginally 3 (three) times a week. 42.5 g 3    flecainide (TAMBOCOR) 100 MG Tab Take 1 tablet (100 mg total) by mouth every 12 (twelve) hours. 60 tablet 1    glucosamine-chondroitin 500-400 mg tablet Take 1 tablet by mouth 3 (three) times daily.      metoprolol  "succinate (TOPROL-XL) 25 MG 24 hr tablet Take 1 tablet (25 mg total) by mouth every 12 (twelve) hours. 60 tablet 1    polyethylene glycol (MIRALAX) 17 gram/dose powder Take 17 g by mouth 3 (three) times daily as needed (Constipation). 510 g 0    aspirin 81 MG Chew Take 1 tablet (81 mg total) by mouth 2 (two) times daily. (Patient not taking: Reported on 2/29/2024) 60 tablet 0     No current facility-administered medications on file prior to visit.        /76   Pulse (!) 54   Ht 5' 5" (1.651 m)   Wt 51.9 kg (114 lb 4.9 oz)   SpO2 99%   BMI 19.02 kg/m²     Objective:     Physical Exam  Constitutional:       General: She is not in acute distress.     Appearance: Normal appearance. She is well-developed. She is not toxic-appearing or diaphoretic.   HENT:      Head: Normocephalic and atraumatic.      Nose: Nose normal.   Eyes:      General:         Right eye: No discharge.         Left eye: No discharge.      Conjunctiva/sclera:      Right eye: Right conjunctiva is not injected.      Left eye: Left conjunctiva is not injected.      Pupils: Pupils are equal.      Right eye: Pupil is round.      Left eye: Pupil is round.   Neck:      Thyroid: No thyromegaly.      Vascular: No carotid bruit or JVD.   Cardiovascular:      Rate and Rhythm: Normal rate and regular rhythm. No extrasystoles are present.     Chest Wall: PMI is not displaced.      Pulses:           Radial pulses are 2+ on the right side and 2+ on the left side.        Femoral pulses are 2+ on the right side and 2+ on the left side.       Dorsalis pedis pulses are 2+ on the right side and 2+ on the left side.        Posterior tibial pulses are 2+ on the right side and 2+ on the left side.      Heart sounds: S1 normal and S2 normal.      No gallop. No S4 sounds.   Pulmonary:      Effort: Pulmonary effort is normal.      Breath sounds: Normal breath sounds.   Abdominal:      Palpations: Abdomen is soft.      Tenderness: There is no abdominal tenderness. "   Musculoskeletal:      Cervical back: Neck supple.      Right lower leg: Normal. No swelling. No edema.      Left lower leg: Normal. No swelling. No edema.   Lymphadenopathy:      Head:      Right side of head: No submandibular adenopathy.      Left side of head: No submandibular adenopathy.      Cervical: No cervical adenopathy.   Skin:     General: Skin is warm and dry.      Findings: No rash.      Nails: There is no clubbing.   Neurological:      General: No focal deficit present.      Mental Status: She is alert and oriented to person, place, and time. She is not disoriented.      Cranial Nerves: No cranial nerve deficit.   Psychiatric:         Attention and Perception: Attention and perception normal.         Mood and Affect: Mood and affect normal.         Speech: Speech normal.         Behavior: Behavior normal.         Thought Content: Thought content normal.         Cognition and Memory: Cognition and memory normal.         Judgment: Judgment normal.         Assessment:      1. Paroxysmal atrial fibrillation    2. High risk medication use    3. Chronic anticoagulation    4. Hypercholesterolemia        Plan:     Atrial Fibrillation              6/2023: Diagnosed with paroxysmal atrial fibrillation.              1/26/2024: Began having frequent spells of what appears to have been AF.              2/1/2024: Presents with palpitations. Initial ECG revealed SR. Later AF. 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  CHA2DS2VASc=3 (A2Sc).  Rate controlled with diltiazem iv and metoprolol.  2/1/2024: Converted to SR.  Anticoagulation with apixiban.  On metoprolol 25 mg Q12 and flecainide 100 mg Q12.  No clinical recurrence.     2. High Risk Medication              2/1/2023: Flecainide 100 mg Q12 was  begun.              On flecainide 100 mg Q12.     3. Chronic Anticoagulation  CHA2DS2VASc=3 (A2Sc).  Anticoagulation with apixiban.  I would favor apixiban 2.5 mg Q12. [Age 78 and weight 51 kg].  On apixiban 2.5 mg Q12.  No  aspirin or NSAID.     4. Hypercholesterolemia              On atorvastatin 20 mg Q24.    5. Arthritis   Much less pain on celecoxib 200 mg Q24.   Discussed issues in detail.     6. Primary Care              Dr. Suzanna Duran.     F/u 3 months.    Lamar Clarke M.D.

## 2024-03-01 LAB
ANION GAP SERPL CALC-SCNC: 9 MMOL/L (ref 8–16)
BASOPHILS # BLD AUTO: 0.05 K/UL (ref 0–0.2)
BASOPHILS NFR BLD: 0.5 % (ref 0–1.9)
BUN SERPL-MCNC: 23 MG/DL (ref 8–23)
CALCIUM SERPL-MCNC: 9.4 MG/DL (ref 8.7–10.5)
CHLORIDE SERPL-SCNC: 107 MMOL/L (ref 95–110)
CO2 SERPL-SCNC: 21 MMOL/L (ref 23–29)
CREAT SERPL-MCNC: 1 MG/DL (ref 0.5–1.4)
DIFFERENTIAL METHOD BLD: ABNORMAL
EOSINOPHIL # BLD AUTO: 0 K/UL (ref 0–0.5)
EOSINOPHIL NFR BLD: 0.3 % (ref 0–8)
ERYTHROCYTE [DISTWIDTH] IN BLOOD BY AUTOMATED COUNT: 13 % (ref 11.5–14.5)
EST. GFR  (NO RACE VARIABLE): 58 ML/MIN/1.73 M^2
GLUCOSE SERPL-MCNC: 154 MG/DL (ref 70–110)
HCT VFR BLD AUTO: 44.4 % (ref 37–48.5)
HGB BLD-MCNC: 14.5 G/DL (ref 12–16)
IMM GRANULOCYTES # BLD AUTO: 0.01 K/UL (ref 0–0.04)
IMM GRANULOCYTES NFR BLD AUTO: 0.1 % (ref 0–0.5)
LYMPHOCYTES # BLD AUTO: 1 K/UL (ref 1–4.8)
LYMPHOCYTES NFR BLD: 11 % (ref 18–48)
MCH RBC QN AUTO: 29.3 PG (ref 27–31)
MCHC RBC AUTO-ENTMCNC: 32.7 G/DL (ref 32–36)
MCV RBC AUTO: 90 FL (ref 82–98)
MONOCYTES # BLD AUTO: 0.9 K/UL (ref 0.3–1)
MONOCYTES NFR BLD: 9.9 % (ref 4–15)
NEUTROPHILS # BLD AUTO: 7.2 K/UL (ref 1.8–7.7)
NEUTROPHILS NFR BLD: 78.2 % (ref 38–73)
NRBC BLD-RTO: 0 /100 WBC
OHS QRS DURATION: 106 MS
OHS QTC CALCULATION: 422 MS
PLATELET # BLD AUTO: 234 K/UL (ref 150–450)
PMV BLD AUTO: 9.5 FL (ref 9.2–12.9)
POTASSIUM SERPL-SCNC: 4 MMOL/L (ref 3.5–5.1)
RBC # BLD AUTO: 4.95 M/UL (ref 4–5.4)
SODIUM SERPL-SCNC: 137 MMOL/L (ref 136–145)
WBC # BLD AUTO: 9.19 K/UL (ref 3.9–12.7)

## 2024-03-01 PROCEDURE — 94761 N-INVAS EAR/PLS OXIMETRY MLT: CPT

## 2024-03-01 PROCEDURE — 85025 COMPLETE CBC W/AUTO DIFF WBC: CPT | Performed by: NURSE PRACTITIONER

## 2024-03-01 PROCEDURE — 36415 COLL VENOUS BLD VENIPUNCTURE: CPT | Performed by: NURSE PRACTITIONER

## 2024-03-01 PROCEDURE — 80048 BASIC METABOLIC PNL TOTAL CA: CPT | Performed by: NURSE PRACTITIONER

## 2024-03-01 PROCEDURE — 25000003 PHARM REV CODE 250: Performed by: PHYSICIAN ASSISTANT

## 2024-03-01 PROCEDURE — 25000003 PHARM REV CODE 250: Performed by: NURSE PRACTITIONER

## 2024-03-01 PROCEDURE — 11000001 HC ACUTE MED/SURG PRIVATE ROOM

## 2024-03-01 PROCEDURE — 63600175 PHARM REV CODE 636 W HCPCS: Performed by: NURSE PRACTITIONER

## 2024-03-01 RX ORDER — POLYETHYLENE GLYCOL 3350 17 G/17G
17 POWDER, FOR SOLUTION ORAL ONCE
Status: COMPLETED | OUTPATIENT
Start: 2024-03-01 | End: 2024-03-01

## 2024-03-01 RX ORDER — SODIUM CHLORIDE 9 MG/ML
INJECTION, SOLUTION INTRAVENOUS CONTINUOUS
Status: DISCONTINUED | OUTPATIENT
Start: 2024-03-01 | End: 2024-03-03

## 2024-03-01 RX ADMIN — METOPROLOL SUCCINATE 25 MG: 25 TABLET, EXTENDED RELEASE ORAL at 08:03

## 2024-03-01 RX ADMIN — MORPHINE SULFATE 2 MG: 2 INJECTION, SOLUTION INTRAMUSCULAR; INTRAVENOUS at 03:03

## 2024-03-01 RX ADMIN — PROCHLORPERAZINE EDISYLATE 5 MG: 5 INJECTION INTRAMUSCULAR; INTRAVENOUS at 03:03

## 2024-03-01 RX ADMIN — METOPROLOL SUCCINATE 25 MG: 25 TABLET, EXTENDED RELEASE ORAL at 09:03

## 2024-03-01 RX ADMIN — SODIUM CHLORIDE: 9 INJECTION, SOLUTION INTRAVENOUS at 12:03

## 2024-03-01 RX ADMIN — FLECAINIDE ACETATE 100 MG: 50 TABLET ORAL at 09:03

## 2024-03-01 RX ADMIN — ATORVASTATIN CALCIUM 20 MG: 20 TABLET, FILM COATED ORAL at 09:03

## 2024-03-01 RX ADMIN — POLYETHYLENE GLYCOL 3350 17 G: 17 POWDER, FOR SOLUTION ORAL at 11:03

## 2024-03-01 RX ADMIN — FLECAINIDE ACETATE 100 MG: 50 TABLET ORAL at 08:03

## 2024-03-01 NOTE — ASSESSMENT & PLAN NOTE
History noted, followed by cardiology -Dr Clarke. Patient taking metoprolol, flecainide and anticoagulated with Eliquis    -telemetry monitoring  -hold Eliquis, pending general surgery consult  -continue metoprolol 25 milligram b.i.d.  -continue flecainide 100 mg b.i.d.

## 2024-03-01 NOTE — SUBJECTIVE & OBJECTIVE
Interval History: seen at bedside, no BM or flatus overnight. However her nausea has improved as well as her pain. No vomiting since admission. Will start IVF. Follow surgery recs    Review of Systems   Constitutional:  Negative for fever.   Respiratory:  Negative for shortness of breath.    Cardiovascular:  Negative for leg swelling.   Gastrointestinal:  Positive for abdominal distention, abdominal pain and nausea. Negative for diarrhea.   Genitourinary:  Negative for dysuria.   Musculoskeletal:  Negative for back pain.   Neurological:  Negative for weakness.   Psychiatric/Behavioral:  Negative for agitation.      Objective:     Vital Signs (Most Recent):  Temp: 98 °F (36.7 °C) (03/01/24 0746)  Pulse: 62 (03/01/24 0746)  Resp: 16 (03/01/24 0746)  BP: 124/60 (03/01/24 0746)  SpO2: 95 % (03/01/24 0746) Vital Signs (24h Range):  Temp:  [97.6 °F (36.4 °C)-98.6 °F (37 °C)] 98 °F (36.7 °C)  Pulse:  [61-69] 62  Resp:  [16-20] 16  SpO2:  [94 %-100 %] 95 %  BP: (124-182)/(60-96) 124/60     Weight: 51.5 kg (113 lb 8.6 oz)  Body mass index is 18.89 kg/m².    Intake/Output Summary (Last 24 hours) at 3/1/2024 1102  Last data filed at 2/29/2024 2242  Gross per 24 hour   Intake --   Output 400 ml   Net -400 ml         Physical Exam  Vitals reviewed.   Constitutional:       Appearance: Normal appearance.   HENT:      Head: Normocephalic.      Mouth/Throat:      Mouth: Mucous membranes are moist.      Pharynx: Oropharynx is clear.   Eyes:      General: Lids are normal. Gaze aligned appropriately.   Cardiovascular:      Rate and Rhythm: Normal rate and regular rhythm.   Pulmonary:      Effort: Pulmonary effort is normal.      Breath sounds: Normal breath sounds.   Abdominal:      General: Bowel sounds are decreased. There is distension.      Tenderness: There is abdominal tenderness. There is no guarding.   Musculoskeletal:      Cervical back: Normal range of motion.      Right lower leg: No edema.      Left lower leg: No edema.    Neurological:      General: No focal deficit present.      Mental Status: She is alert.   Psychiatric:         Mood and Affect: Mood normal.         Behavior: Behavior normal.             Significant Labs: All pertinent labs within the past 24 hours have been reviewed.    Significant Imaging: I have reviewed all pertinent imaging results/findings within the past 24 hours.

## 2024-03-01 NOTE — HPI
Marian Durham is a 78 year old female with a past medical history of atrial fibrillation, hyper lipidemia and past small-bowel obstructions who presented with abdominal distention and abdominal pain with associated nausea and belching for the past 5 hours.  Patient reports symptoms are similar to those of prior small-bowel obstructions.  She reports experiencing normal bowel movement today with no blood in stool.  Patient denies nausea and vomiting.    ED workup revealed WBC 15.2 and otherwise unremarkable labs.  Abdominal x-ray showed findings concerning for small bowel obstruction CT abdomen and pelvis revealed significant small-bowel obstruction with a transition point in the mid upper pelvis.  ED provider spoke to Dr. Werner, general surgery, who agreed to consult.  Patient received IV morphine in the ED with some relief and was referred to Hospital Medicine.  Patient will be placed in observation for further evaluation and management.

## 2024-03-01 NOTE — NURSING
Pt arrived to unit via stretcher with x1 transporter in attendance.  A/O x4.  Respirations unlabored on RA.  Skin WDI with scar noted to rt knee r/t recent knee replacement.  VSS.  See flowsheet for full assessment.  No c/o pain.  Fall/safety precautions initiated.

## 2024-03-01 NOTE — H&P
"[facility]  History & Physical    Patient Name: Marian Durham  MRN: [id number]  Patient Class: OP- Observation  Admission Date: 2/29/2024  Attending Physician: Debra Kerr MD   Primary Care Provider: Suzanna Duran MD         Patient information was obtained from patient, past medical records, and ER records.     Subjective:     Principal Problem:SBO (small bowel obstruction)    Chief Complaint:   Chief Complaint   Patient presents with    Abdominal Pain     Reports bloating, belching, "bubbling" in stomach, and abd discomfort onset 2 hours ago. Hx of bowel obstruction, states she feels like she has one again. Hx of afib. LBM this morning.         HPI: Marian Durham is a 78 year old female with a past medical history of atrial fibrillation, hyper lipidemia and past small-bowel obstructions who presented with abdominal distention and abdominal pain with associated nausea and belching for the past 5 hours.  Patient reports symptoms are similar to those of prior small-bowel obstructions.  She reports experiencing normal bowel movement today with no blood in stool.  Patient denies nausea and vomiting.    ED workup revealed WBC 15.2 and otherwise unremarkable labs.  Abdominal x-ray showed findings concerning for small bowel obstruction CT abdomen and pelvis revealed significant small-bowel obstruction with a transition point in the mid upper pelvis.  ED provider spoke to Dr. Werner, general surgery, who agreed to consult.  Patient received IV morphine in the ED with some relief and was referred to Hospital Medicine.  Patient will be placed in observation for further evaluation and management.    Past Medical History:   Diagnosis Date    Atrial fibrillation     stress related per patient    Hyperlipidemia     Osteoarthritis     PONV (postoperative nausea and vomiting)     SBO (small bowel obstruction)     Shingles 01/01/2002    Spinal stenosis        Past Surgical " "History:   Procedure Laterality Date    ANKLE FRACTURE SURGERY  2007    Right ankle    COLON SURGERY      bowel obstruction    COLONOSCOPY      x 2    EXCISION OF MASS OF BACK N/A 11/16/2018    Procedure: EXCISION, MASS, BACK;  Surgeon: Fran Magaña MD;  Location: [facility];  Service: General;  Laterality: N/A;    EYE SURGERY Bilateral 2016    HYSTERECTOMY  1985    HEATEHR    KNEE ARTHROSCOPY W/ DEBRIDEMENT  1994    Bilateral    KNEE ARTHROSCOPY W/ DEBRIDEMENT  2003    Left knee    OPEN PATELLA REEFING PROCEDURE  age 19    Left knee    Tonsilectomy  as a child    TONSILLECTOMY      TOTAL KNEE ARTHROPLASTY Right 10/19/2023    Procedure: ARTHROPLASTY, KNEE, TOTAL;  Surgeon: Vicente Mitchell MD;  Location: [facility];  Service: Orthopedics;  Laterality: Right;    TUBAL LIGATION         Review of patient's allergies indicates:   Allergen Reactions    Demerol [meperidine] Other (See Comments)     "Becomes Agitated and Combative"       No current facility-administered medications on file prior to encounter.     Current Outpatient Medications on File Prior to Encounter   Medication Sig    apixaban (ELIQUIS) 2.5 mg Tab Take 1 tablet (2.5 mg total) by mouth 2 (two) times daily.    atorvastatin (LIPITOR) 20 MG tablet Take 1 tablet (20 mg total) by mouth once daily.    calcium-vitamin D3 (OS- + D3) 500 mg-5 mcg (200 unit) per tablet Take 1 tablet by mouth 2 (two) times daily with meals.    celecoxib (CELEBREX) 200 MG capsule Take 1 capsule (200 mg total) by mouth once daily.    estradioL (ESTRACE) 0.01 % (0.1 mg/gram) vaginal cream Place 1 g vaginally 3 (three) times a week.    flecainide (TAMBOCOR) 100 MG Tab Take 1 tablet (100 mg total) by mouth every 12 (twelve) hours.    glucosamine-chondroitin 500-400 mg tablet Take 1 tablet by mouth 3 (three) times daily.    metoprolol succinate (TOPROL-XL) 25 MG 24 hr tablet Take 1 tablet (25 mg total) by mouth every 12 (twelve) hours.    polyethylene glycol (MIRALAX) 17 " gram/dose powder Take 17 g by mouth 3 (three) times daily as needed (Constipation).    [DISCONTINUED] apixaban (ELIQUIS) 2.5 mg Tab Take 1 tablet (2.5 mg total) by mouth 2 (two) times daily.    [DISCONTINUED] aspirin 81 MG Chew Take 1 tablet (81 mg total) by mouth 2 (two) times daily. (Patient not taking: Reported on 2024)    [DISCONTINUED] atorvastatin (LIPITOR) 20 MG tablet Take 1 tablet (20 mg total) by mouth once daily.    [DISCONTINUED] flecainide (TAMBOCOR) 100 MG Tab Take 1 tablet (100 mg total) by mouth every 12 (twelve) hours.    [DISCONTINUED] metoprolol succinate (TOPROL-XL) 25 MG 24 hr tablet Take 1 tablet (25 mg total) by mouth every 12 (twelve) hours.     Family History       Problem Relation (Age of Onset)    Breast cancer Paternal Aunt    Colon cancer Paternal Aunt    Coronary artery disease Brother    Diabetes Brother    Hypertension Father, Mother          Tobacco Use    Smoking status: Never    Smokeless tobacco: Never   Substance and Sexual Activity    Alcohol use: Yes     Alcohol/week: 3.0 standard drinks of alcohol     Types: 3 Glasses of wine per week     Comment: Drinks a glass of wine 3 times weekly; none 24 hr prior to surgery    Drug use: No    Sexual activity: Not Currently     Birth control/protection: Post-menopausal     Comment: Spouse  of kidney cancer : 2015     Review of Systems   Constitutional:  Negative for activity change, appetite change, chills and fever.   HENT:  Negative for congestion, sore throat and trouble swallowing.    Eyes:  Negative for photophobia and visual disturbance.   Respiratory:  Negative for cough, chest tightness and shortness of breath.    Cardiovascular:  Negative for chest pain, palpitations and leg swelling.   Gastrointestinal:  Positive for abdominal distention, abdominal pain and nausea. Negative for diarrhea.   Genitourinary:  Negative for dysuria, flank pain and hematuria.   Musculoskeletal:  Negative for back pain.   Neurological:   Negative for dizziness, weakness and headaches.   Psychiatric/Behavioral:  Negative for confusion.      Objective:     Vital Signs (Most Recent):  Temp: 97.6 °F (36.4 °C) (02/29/24 1420)  Pulse: 67 (02/29/24 1905)  Resp: 18 (02/29/24 1905)  BP: (!) 159/69 (02/29/24 1905)  SpO2: 99 % (02/29/24 1905) Vital Signs (24h Range):  Temp:  [97.6 °F (36.4 °C)] 97.6 °F (36.4 °C)  Pulse:  [54-69] 67  Resp:  [16-20] 18  SpO2:  [98 %-100 %] 99 %  BP: (126-182)/(65-82) 159/69     Weight: 50.8 kg (112 lb)  Body mass index is 18.64 kg/m².     Physical Exam  Vitals reviewed.   Constitutional:       Appearance: Normal appearance. She is normal weight.   HENT:      Head: Normocephalic.      Mouth/Throat:      Mouth: Mucous membranes are moist.      Pharynx: Oropharynx is clear.   Eyes:      General: Lids are normal. Gaze aligned appropriately.      Conjunctiva/sclera: Conjunctivae normal.   Cardiovascular:      Rate and Rhythm: Normal rate and regular rhythm.      Pulses: Normal pulses.      Heart sounds: Normal heart sounds.   Pulmonary:      Effort: Pulmonary effort is normal.      Breath sounds: Normal breath sounds.   Abdominal:      General: Bowel sounds are decreased. There is distension.      Tenderness: There is abdominal tenderness.   Musculoskeletal:         General: Normal range of motion.      Cervical back: Normal range of motion.   Skin:     General: Skin is warm and dry.   Neurological:      Mental Status: She is alert and oriented to person, place, and time. Mental status is at baseline.   Psychiatric:         Mood and Affect: Mood normal.                Significant Labs: All pertinent labs within the past 24 hours have been reviewed.  CBC:   Recent Labs   Lab 02/29/24  1518   WBC 15.29*   HGB 15.6   HCT 46.8        CMP:   Recent Labs   Lab 02/29/24  1518      K 4.2      CO2 21*   *   BUN 23   CREATININE 1.1   CALCIUM 10.9*   PROT 7.6   ALBUMIN 4.6   BILITOT 0.6   ALKPHOS 75   AST 24   ALT 24    ANIONGAP 13       Significant Imaging: I have reviewed all pertinent imaging results/findings within the past 24 hours.    Imaging Results              CT Abdomen Pelvis With IV Contrast NO Oral Contrast (Final result)  Result time 02/29/24 18:15:38      Final result by Sally Ojeda MD (02/29/24 18:15:38)                   Impression:      Significant small-bowel obstruction with the transition point in the mid upper pelvis.  Associated small free pelvic fluid.    Probable subcentimeter left hepatic cyst.    Colonic diverticulosis.    Trace right pleural effusion again seen.      Electronically signed by: Sally Ojeda  Date:    02/29/2024  Time:    18:15               Narrative:    EXAMINATION:  CT OF ABDOMEN PELVIS WITH    CLINICAL HISTORY:  Abdominal pain, acute, nonlocalized;Bowel obstruction suspected;    TECHNIQUE:  5 mm enhanced axial images were obtained from the lung bases through the greater trochanters.  Seventy-five mL of Omnipaque 350 was injected.    COMPARISON:  06/24/2023    FINDINGS:  There are multiple dilated fluid and air fluid level containing small bowel loops.  There is a marked caliber change in the mid upper pelvis (series 2 axial image 82, series 601 coronal image 42, and series 602 sagittal image 77).  There is least 1 bowel loops measuring up to 5 cm in diameter (series 2 axial image 83).    A too small to characterize low attenuation lesion in the left hepatic lobe.    Spleen, pancreas, kidneys, and adrenal glands are unremarkable.  The pancreas is atrophic, which may be related the gallbladder contains no calcified gallstones.  A splenule is present.    There is no definite evidence for abdominal adenopathy or ascites.  Moderate atherosclerotic changes are present.    There is colonic diverticulosis.  There are no pelvic masses or adenopathy.    There is small free fluid in the pelvis, which is uncommon in a female of this age.    There is again a trace right pleural  effusion with associated compressive atelectasis.                                       X-Ray Abdomen Flat And Erect (Final result)  Result time 02/29/24 15:04:46      Final result by Bandar Acevedo III, MD (02/29/24 15:04:46)                   Impression:      The presence of dilated small bowel with differential fluid levels is often indicative of a small bowel obstruction.  The patient does have mild constipation.      Electronically signed by: Bandar Acevedo MD  Date:    02/29/2024  Time:    15:04               Narrative:    EXAMINATION:  XR ABDOMEN FLAT AND ERECT    CLINICAL HISTORY:  Constipation, unspecified    FINDINGS:  Abdomen flat erect:    There are small bowel loops with differential fluid levels.  There is mild constipation.  There is distension of the stomach.    No perforation seen.                                      Assessment/Plan:     * SBO (small bowel obstruction)  Presenting with nausea, abdominal pain and distention. CT showed Significant small-bowel obstruction with the transition point in the mid upper pelvis     -consult General Surgery, recommendations appreciated  -PRN antiemetic and pain medication      Hypercholesterolemia  History noted.  Currently taking atorvastatin    -continue atorvastatin 20 mg daily      Atrial fibrillation  History noted, followed by cardiology -Dr Clarke. Patient taking metoprolol, flecainide and anticoagulated with Eliquis    -telemetry monitoring  -hold Eliquis, pending general surgery consult  -continue metoprolol 25 milligram b.i.d.  -continue flecainide 100 mg b.i.d.        VTE Risk Mitigation (From admission, onward)           Ordered     IP VTE HIGH RISK PATIENT  Once         02/29/24 1853     Place sequential compression device  Until discontinued         02/29/24 1853     Reason for No Pharmacological VTE Prophylaxis  Once        Question:  Reasons:  Answer:  Already adequately anticoagulated on oral Anticoagulants    02/29/24 1853                          On 02/29/2024, patient should be placed in hospital observation            Missy Foster NP  Department of Hospital Medicine  [facility] - Emergency Dept

## 2024-03-01 NOTE — SUBJECTIVE & OBJECTIVE
"Past Medical History:   Diagnosis Date    Atrial fibrillation     stress related per patient    Hyperlipidemia     Osteoarthritis     PONV (postoperative nausea and vomiting)     SBO (small bowel obstruction)     Shingles 01/01/2002    Spinal stenosis        Past Surgical History:   Procedure Laterality Date    ANKLE FRACTURE SURGERY  2007    Right ankle    COLON SURGERY      bowel obstruction    COLONOSCOPY      x 2    EXCISION OF MASS OF BACK N/A 11/16/2018    Procedure: EXCISION, MASS, BACK;  Surgeon: Fran Magaña MD;  Location: [facility];  Service: General;  Laterality: N/A;    EYE SURGERY Bilateral 2016    HYSTERECTOMY  1985    HEATHER    KNEE ARTHROSCOPY W/ DEBRIDEMENT  1994    Bilateral    KNEE ARTHROSCOPY W/ DEBRIDEMENT  2003    Left knee    OPEN PATELLA REEFING PROCEDURE  age 19    Left knee    Tonsilectomy  as a child    TONSILLECTOMY      TOTAL KNEE ARTHROPLASTY Right 10/19/2023    Procedure: ARTHROPLASTY, KNEE, TOTAL;  Surgeon: Vicente Mitchell MD;  Location: [facility];  Service: Orthopedics;  Laterality: Right;    TUBAL LIGATION         Review of patient's allergies indicates:   Allergen Reactions    Demerol [meperidine] Other (See Comments)     "Becomes Agitated and Combative"       No current facility-administered medications on file prior to encounter.     Current Outpatient Medications on File Prior to Encounter   Medication Sig    apixaban (ELIQUIS) 2.5 mg Tab Take 1 tablet (2.5 mg total) by mouth 2 (two) times daily.    atorvastatin (LIPITOR) 20 MG tablet Take 1 tablet (20 mg total) by mouth once daily.    calcium-vitamin D3 (OS- + D3) 500 mg-5 mcg (200 unit) per tablet Take 1 tablet by mouth 2 (two) times daily with meals.    celecoxib (CELEBREX) 200 MG capsule Take 1 capsule (200 mg total) by mouth once daily.    estradioL (ESTRACE) 0.01 % (0.1 mg/gram) vaginal cream Place 1 g vaginally 3 (three) times a week.    flecainide (TAMBOCOR) 100 MG Tab Take 1 tablet (100 mg total) by mouth " every 12 (twelve) hours.    glucosamine-chondroitin 500-400 mg tablet Take 1 tablet by mouth 3 (three) times daily.    metoprolol succinate (TOPROL-XL) 25 MG 24 hr tablet Take 1 tablet (25 mg total) by mouth every 12 (twelve) hours.    polyethylene glycol (MIRALAX) 17 gram/dose powder Take 17 g by mouth 3 (three) times daily as needed (Constipation).    [DISCONTINUED] apixaban (ELIQUIS) 2.5 mg Tab Take 1 tablet (2.5 mg total) by mouth 2 (two) times daily.    [DISCONTINUED] aspirin 81 MG Chew Take 1 tablet (81 mg total) by mouth 2 (two) times daily. (Patient not taking: Reported on 2024)    [DISCONTINUED] atorvastatin (LIPITOR) 20 MG tablet Take 1 tablet (20 mg total) by mouth once daily.    [DISCONTINUED] flecainide (TAMBOCOR) 100 MG Tab Take 1 tablet (100 mg total) by mouth every 12 (twelve) hours.    [DISCONTINUED] metoprolol succinate (TOPROL-XL) 25 MG 24 hr tablet Take 1 tablet (25 mg total) by mouth every 12 (twelve) hours.     Family History       Problem Relation (Age of Onset)    Breast cancer Paternal Aunt    Colon cancer Paternal Aunt    Coronary artery disease Brother    Diabetes Brother    Hypertension Father, Mother          Tobacco Use    Smoking status: Never    Smokeless tobacco: Never   Substance and Sexual Activity    Alcohol use: Yes     Alcohol/week: 3.0 standard drinks of alcohol     Types: 3 Glasses of wine per week     Comment: Drinks a glass of wine 3 times weekly; none 24 hr prior to surgery    Drug use: No    Sexual activity: Not Currently     Birth control/protection: Post-menopausal     Comment: Spouse  of kidney cancer : 2015     Review of Systems   Constitutional:  Negative for activity change, appetite change, chills and fever.   HENT:  Negative for congestion, sore throat and trouble swallowing.    Eyes:  Negative for photophobia and visual disturbance.   Respiratory:  Negative for cough, chest tightness and shortness of breath.    Cardiovascular:  Negative for chest  pain, palpitations and leg swelling.   Gastrointestinal:  Positive for abdominal distention, abdominal pain and nausea. Negative for diarrhea.   Genitourinary:  Negative for dysuria, flank pain and hematuria.   Musculoskeletal:  Negative for back pain.   Neurological:  Negative for dizziness, weakness and headaches.   Psychiatric/Behavioral:  Negative for confusion.      Objective:     Vital Signs (Most Recent):  Temp: 97.6 °F (36.4 °C) (02/29/24 1420)  Pulse: 67 (02/29/24 1905)  Resp: 18 (02/29/24 1905)  BP: (!) 159/69 (02/29/24 1905)  SpO2: 99 % (02/29/24 1905) Vital Signs (24h Range):  Temp:  [97.6 °F (36.4 °C)] 97.6 °F (36.4 °C)  Pulse:  [54-69] 67  Resp:  [16-20] 18  SpO2:  [98 %-100 %] 99 %  BP: (126-182)/(65-82) 159/69     Weight: 50.8 kg (112 lb)  Body mass index is 18.64 kg/m².     Physical Exam  Vitals reviewed.   Constitutional:       Appearance: Normal appearance. She is normal weight.   HENT:      Head: Normocephalic.      Mouth/Throat:      Mouth: Mucous membranes are moist.      Pharynx: Oropharynx is clear.   Eyes:      General: Lids are normal. Gaze aligned appropriately.      Conjunctiva/sclera: Conjunctivae normal.   Cardiovascular:      Rate and Rhythm: Normal rate and regular rhythm.      Pulses: Normal pulses.      Heart sounds: Normal heart sounds.   Pulmonary:      Effort: Pulmonary effort is normal.      Breath sounds: Normal breath sounds.   Abdominal:      General: Bowel sounds are decreased. There is distension.      Tenderness: There is abdominal tenderness.   Musculoskeletal:         General: Normal range of motion.      Cervical back: Normal range of motion.   Skin:     General: Skin is warm and dry.   Neurological:      Mental Status: She is alert and oriented to person, place, and time. Mental status is at baseline.   Psychiatric:         Mood and Affect: Mood normal.                Significant Labs: All pertinent labs within the past 24 hours have been reviewed.  CBC:   Recent Labs    Lab 02/29/24  1518   WBC 15.29*   HGB 15.6   HCT 46.8        CMP:   Recent Labs   Lab 02/29/24  1518      K 4.2      CO2 21*   *   BUN 23   CREATININE 1.1   CALCIUM 10.9*   PROT 7.6   ALBUMIN 4.6   BILITOT 0.6   ALKPHOS 75   AST 24   ALT 24   ANIONGAP 13       Significant Imaging: I have reviewed all pertinent imaging results/findings within the past 24 hours.    Imaging Results              CT Abdomen Pelvis With IV Contrast NO Oral Contrast (Final result)  Result time 02/29/24 18:15:38      Final result by Sally Ojeda MD (02/29/24 18:15:38)                   Impression:      Significant small-bowel obstruction with the transition point in the mid upper pelvis.  Associated small free pelvic fluid.    Probable subcentimeter left hepatic cyst.    Colonic diverticulosis.    Trace right pleural effusion again seen.      Electronically signed by: Sally Ojeda  Date:    02/29/2024  Time:    18:15               Narrative:    EXAMINATION:  CT OF ABDOMEN PELVIS WITH    CLINICAL HISTORY:  Abdominal pain, acute, nonlocalized;Bowel obstruction suspected;    TECHNIQUE:  5 mm enhanced axial images were obtained from the lung bases through the greater trochanters.  Seventy-five mL of Omnipaque 350 was injected.    COMPARISON:  06/24/2023    FINDINGS:  There are multiple dilated fluid and air fluid level containing small bowel loops.  There is a marked caliber change in the mid upper pelvis (series 2 axial image 82, series 601 coronal image 42, and series 602 sagittal image 77).  There is least 1 bowel loops measuring up to 5 cm in diameter (series 2 axial image 83).    A too small to characterize low attenuation lesion in the left hepatic lobe.    Spleen, pancreas, kidneys, and adrenal glands are unremarkable.  The pancreas is atrophic, which may be related the gallbladder contains no calcified gallstones.  A splenule is present.    There is no definite evidence for abdominal adenopathy or  ascites.  Moderate atherosclerotic changes are present.    There is colonic diverticulosis.  There are no pelvic masses or adenopathy.    There is small free fluid in the pelvis, which is uncommon in a female of this age.    There is again a trace right pleural effusion with associated compressive atelectasis.                                       X-Ray Abdomen Flat And Erect (Final result)  Result time 02/29/24 15:04:46      Final result by Bandar Acevedo III, MD (02/29/24 15:04:46)                   Impression:      The presence of dilated small bowel with differential fluid levels is often indicative of a small bowel obstruction.  The patient does have mild constipation.      Electronically signed by: Bandar Acevedo MD  Date:    02/29/2024  Time:    15:04               Narrative:    EXAMINATION:  XR ABDOMEN FLAT AND ERECT    CLINICAL HISTORY:  Constipation, unspecified    FINDINGS:  Abdomen flat erect:    There are small bowel loops with differential fluid levels.  There is mild constipation.  There is distension of the stomach.    No perforation seen.

## 2024-03-01 NOTE — PLAN OF CARE
MOON Message    Medicare Outpatient and Observation Notification regarding financial responsibilityGiven to patient/caregiver; Explained to patient/caregiver; Signed/date by patient/caregiverDate SMITH was signed3/1/2024Time SMITH was [id number]

## 2024-03-01 NOTE — PLAN OF CARE
LMSW met with the patient at the bedside. Patient is alert and oriented with no communication barriers. Prior to admission, the patient is independent. Patient denies the use of HH. Patient has DME. Patients PCP is correct on the face sheet. Patient choice pharmacy is bedside. Patient's family will transport the patient home at discharge.   [facility] - Med Surg ([address])  Initial Discharge Assessment       Primary Care Provider: Suzanna Duran MD    Admission Diagnosis: SBO (small bowel obstruction) [K56.609]  Generalized abdominal pain [R10.84]  Chest pain [R07.9]    Admission Date: 2/29/2024  Expected Discharge Date:     Transition of Care Barriers: (P) None    Payor: HU[employer] MANAGED MEDICARE / Plan: HU[employer] MEDICARE HMO / Product Type: Capitation /     Extended Emergency Contact Information  Primary Emergency Contact: Evelin Lyons  Address: [facility] [place] [address] [facility]  Home Phone: [phone]  Mobile Phone: [phone]  Relation: Friend  Secondary Emergency Contact: Duane Durham   [facility]  Home Phone: [phone]  Relation: Son    Discharge Plan A: (P) Home with family, Home         [id number] [place], [place] - [address]  [address]  [facility] [address]  Phone: [phone] Fax: [phone]    [facility] - [facility] [address] [facility]  [address] [facility] [address]  Phone: [phone] Fax: [phone]      Initial Assessment (most recent)       Adult Discharge Assessment - 03/01/24 0853          Discharge Assessment    Assessment Type Discharge Planning Assessment (P)      Confirmed/corrected address, phone number and insurance Yes (P)      Confirmed Demographics Correct on Facesheet (P)      Source of Information patient (P)      People in Home alone (P)      Do you expect to return to your current living situation? Yes (P)      Do  you have help at home or someone to help you manage your care at home? Yes (P)      Prior to hospitilization cognitive status: Alert/Oriented;No Deficits (P)      Current cognitive status: Alert/Oriented;No Deficits (P)      Equipment Currently Used at Home bedside commode;walker, rolling;cane, straight;nebulizer (P)      Readmission within 30 days? Yes (P)      Patient currently being followed by outpatient case management? No (P)      Do you currently have service(s) that help you manage your care at home? No (P)      Do you take prescription medications? Yes (P)      Do you have prescription coverage? Yes (P)      Do you have any problems affording any of your prescribed medications? No (P)      Is the patient taking medications as prescribed? yes (P)      How do you get to doctors appointments? car, drives self;family or friend will provide (P)      Are you on dialysis? No (P)      Do you take coumadin? No (P)      Discharge Plan A Home with family;Home (P)      Discharge Plan discussed with: Patient (P)      Transition of Care Barriers None (P)

## 2024-03-01 NOTE — PROGRESS NOTES
[facility] Surg [address] Medicine  Progress Note    Patient Name: Marian Durham  MRN: [id number]  Patient Class: OP- Observation   Admission Date: 2/29/2024  Length of Stay: 0 days  Attending Physician: SHERIN Pearson MD  Primary Care Provider: Suzanna Duran MD        Subjective:     Principal Problem:SBO (small bowel obstruction)        HPI:  Marian Durham is a 78 year old female with a past medical history of atrial fibrillation, hyper lipidemia and past small-bowel obstructions who presented with abdominal distention and abdominal pain with associated nausea and belching for the past 5 hours.  Patient reports symptoms are similar to those of prior small-bowel obstructions.  She reports experiencing normal bowel movement today with no blood in stool.  Patient denies nausea and vomiting.    ED workup revealed WBC 15.2 and otherwise unremarkable labs.  Abdominal x-ray showed findings concerning for small bowel obstruction CT abdomen and pelvis revealed significant small-bowel obstruction with a transition point in the mid upper pelvis.  ED provider spoke to Dr. Werner, general surgery, who agreed to consult.  Patient received IV morphine in the ED with some relief and was referred to Hospital Medicine.  Patient will be placed in observation for further evaluation and management.    Overview/Hospital Course:  Marian Durham was admitted for SBO as confirmed on CT. Nausea and vomiting improved since admission. Surgery consulted for further assistance, eliquis on hold until surgery recommendations    Interval History: seen at bedside, no BM or flatus overnight. However her nausea has improved as well as her pain. No vomiting since admission. Will start IVF. Follow surgery recs    Review of Systems   Constitutional:  Negative for fever.   Respiratory:  Negative for shortness of breath.    Cardiovascular:  Negative for leg swelling.   Gastrointestinal:  Positive for abdominal  distention, abdominal pain and nausea. Negative for diarrhea.   Genitourinary:  Negative for dysuria.   Musculoskeletal:  Negative for back pain.   Neurological:  Negative for weakness.   Psychiatric/Behavioral:  Negative for agitation.      Objective:     Vital Signs (Most Recent):  Temp: 98 °F (36.7 °C) (03/01/24 0746)  Pulse: 62 (03/01/24 0746)  Resp: 16 (03/01/24 0746)  BP: 124/60 (03/01/24 0746)  SpO2: 95 % (03/01/24 0746) Vital Signs (24h Range):  Temp:  [97.6 °F (36.4 °C)-98.6 °F (37 °C)] 98 °F (36.7 °C)  Pulse:  [61-69] 62  Resp:  [16-20] 16  SpO2:  [94 %-100 %] 95 %  BP: (124-182)/(60-96) 124/60     Weight: 51.5 kg (113 lb 8.6 oz)  Body mass index is 18.89 kg/m².    Intake/Output Summary (Last 24 hours) at 3/1/2024 1102  Last data filed at 2/29/2024 2242  Gross per 24 hour   Intake --   Output 400 ml   Net -400 ml         Physical Exam  Vitals reviewed.   Constitutional:       Appearance: Normal appearance.   HENT:      Head: Normocephalic.      Mouth/Throat:      Mouth: Mucous membranes are moist.      Pharynx: Oropharynx is clear.   Eyes:      General: Lids are normal. Gaze aligned appropriately.   Cardiovascular:      Rate and Rhythm: Normal rate and regular rhythm.   Pulmonary:      Effort: Pulmonary effort is normal.      Breath sounds: Normal breath sounds.   Abdominal:      General: Bowel sounds are decreased. There is distension.      Tenderness: There is abdominal tenderness. There is no guarding.   Musculoskeletal:      Cervical back: Normal range of motion.      Right lower leg: No edema.      Left lower leg: No edema.   Neurological:      General: No focal deficit present.      Mental Status: She is alert.   Psychiatric:         Mood and Affect: Mood normal.         Behavior: Behavior normal.             Significant Labs: All pertinent labs within the past 24 hours have been reviewed.    Significant Imaging: I have reviewed all pertinent imaging results/findings within the past 24  hours.    Assessment/Plan:      * SBO (small bowel obstruction)  Presenting with nausea, abdominal pain and distention. CT showed Significant small-bowel obstruction with the transition point in the mid upper pelvis     -consult General Surgery, recommendations appreciated  - NPO  - maintenance IVF  - eliquis on hold until surgery recommendations  -PRN antiemetic and pain medication  - no vomiting since admission, no BM or flatus    Hypercholesterolemia  History noted.  Currently taking atorvastatin    -continue atorvastatin 20 mg daily      Atrial fibrillation  History noted, followed by cardiology -Dr Clarke. Patient taking metoprolol, flecainide and anticoagulated with Eliquis    -telemetry monitoring  -hold Eliquis, pending general surgery consult  -continue metoprolol 25 milligram b.i.d.  -continue flecainide 100 mg b.i.d.        VTE Risk Mitigation (From admission, onward)           Ordered     IP VTE HIGH RISK PATIENT  Once         02/29/24 1853     Place sequential compression device  Until discontinued         02/29/24 1853     Reason for No Pharmacological VTE Prophylaxis  Once        Question:  Reasons:  Answer:  Already adequately anticoagulated on oral Anticoagulants    02/29/24 1853                    Discharge Planning   WILLIAN:      Code Status: Full Code   Is the patient medically ready for discharge?:     Reason for patient still in hospital (select all that apply): Patient trending condition  Discharge Plan A: Home with family, Home                  Cheryl Wolf PA-C  Department of Hospital Medicine   [facility] - Med Surg ([address])

## 2024-03-01 NOTE — CONSULTS
[facility] ([address])  Consult Note      Date of Consultation:3/1/2024    Reason for Consult:  Small-bowel obstruction  SUBJECTIVE:     History of Present Illness:  78-year-old female who presents to the emergency room with abdominal pain and distention.  This was accompanied by nausea and belching.  The symptoms began yesterday.  The patient had had 2 bowel movements earlier in the day and subsequently developed pain and distention.  Imaging study shows small bowel obstruction.  Patient's past medical history is significant for a hysterectomy in the distant past and 2 admissions for small-bowel obstruction in the last 18 months.  The patient's past medical history is significant for atrial fibrillation on Eliquis.  The patient relates that she has not had a bowel movement or passed gas since admission however, she is feeling significantly better today.                   Past Medical History:   Diagnosis Date    Atrial fibrillation     stress related per patient    Hyperlipidemia     Osteoarthritis     PONV (postoperative nausea and vomiting)     SBO (small bowel obstruction)     Shingles 01/01/2002    Spinal stenosis      Past Surgical History:   Procedure Laterality Date    ANKLE FRACTURE SURGERY  2007    Right ankle    COLON SURGERY      bowel obstruction    COLONOSCOPY      x 2    EXCISION OF MASS OF BACK N/A 11/16/2018    Procedure: EXCISION, MASS, BACK;  Surgeon: Fran Magaña MD;  Location: [facility];  Service: General;  Laterality: N/A;    EYE SURGERY Bilateral 2016    HYSTERECTOMY  1985    HEATHER    KNEE ARTHROSCOPY W/ DEBRIDEMENT  1994    Bilateral    KNEE ARTHROSCOPY W/ DEBRIDEMENT  2003    Left knee    OPEN PATELLA REEFING PROCEDURE  age 19    Left knee    Tonsilectomy  as a child    TONSILLECTOMY      TOTAL KNEE ARTHROPLASTY Right 10/19/2023    Procedure: ARTHROPLASTY, KNEE, TOTAL;  Surgeon: Vicente Mitchell MD;  Location: [facility];  Service: Orthopedics;  Laterality: Right;    TUBAL  LIGATION       Family History   Problem Relation Age of Onset    Hypertension Father     Hypertension Mother     Diabetes Brother     Colon cancer Paternal Aunt     Breast cancer Paternal Aunt         75    Coronary artery disease Brother     Ovarian cancer Neg Hx      Social History     Tobacco Use    Smoking status: Never    Smokeless tobacco: Never   Substance Use Topics    Alcohol use: Yes     Alcohol/week: 3.0 standard drinks of alcohol     Types: 3 Glasses of wine per week     Comment: Drinks a glass of wine 3 times weekly; none 24 hr prior to surgery    Drug use: No       Current Facility-Administered Medications:     0.9%  NaCl infusion, , Intravenous, Continuous, Cheryl Wolf PA-C, Last Rate: 100 mL/hr at 03/01/24 1201, New Bag at 03/01/24 1201    acetaminophen tablet 650 mg, 650 mg, Oral, Q8H PRN, Missy Foster, TANGELA    atorvastatin tablet 20 mg, 20 mg, Oral, Daily, Missy Foster, TANGELA, 20 mg at 03/01/24 0919    dextrose 10% bolus 125 mL 125 mL, 12.5 g, Intravenous, PRN, Missy Foster, NP    dextrose 10% bolus 250 mL 250 mL, 25 g, Intravenous, PRN, Missy Foster, NP    flecainide tablet 100 mg, 100 mg, Oral, Q12H, Missy Foster, NP, 100 mg at 03/01/24 0919    glucagon (human recombinant) injection 1 mg, 1 mg, Intramuscular, PRN, Missy Foster, NP    glucose chewable tablet 16 g, 16 g, Oral, PRN, Missy Foster NP    glucose chewable tablet 24 g, 24 g, Oral, PRN, Missy Foster, TANGELA    melatonin tablet 6 mg, 6 mg, Oral, Nightly PRN, Misys Foster NP    metoprolol succinate (TOPROL-XL) 24 hr tablet 25 mg, 25 mg, Oral, Q12H, Missy Foster, NP, 25 mg at 03/01/24 0919    morphine injection 2 mg, 2 mg, Intravenous, Q4H PRN, Missy Foster, NP, 2 mg at 03/01/24 0340    morphine injection 4 mg, 4 mg, Intravenous, Q4H PRN, Missy Foster, NP    naloxone 0.4 mg/mL injection 0.02 mg, 0.02 mg, Intravenous, PRN, Missy Foster, NP    ondansetron injection  "4 mg, 4 mg, Intravenous, Q6H PRN, Missy Foster, NP, 4 mg at 02/29/24 2245    prochlorperazine injection Soln 5 mg, 5 mg, Intravenous, Q6H PRN, Missy Foster, NP, 5 mg at 03/01/24 0340    sodium chloride 0.9% flush 10 mL, 10 mL, Intravenous, Q8H PRN, Missy Foster, NP     Review of patient's allergies indicates:   Allergen Reactions    Demerol [meperidine] Other (See Comments)     "Becomes Agitated and Combative"       Review of Systems:  Review of Systems   Constitutional: Negative.  Negative for fatigue and fever.   HENT: Negative.  Negative for sore throat and trouble swallowing.    Eyes: Negative.    Respiratory: Negative.     Cardiovascular: Negative.  Negative for chest pain.   Gastrointestinal:  Positive for abdominal distention, abdominal pain and nausea.   Genitourinary: Negative.    Musculoskeletal:  Positive for arthralgias and back pain.   Skin: Negative.    Neurological: Negative.    Psychiatric/Behavioral: Negative.          Current Facility-Administered Medications   Medication    0.9%  NaCl infusion    acetaminophen tablet 650 mg    atorvastatin tablet 20 mg    dextrose 10% bolus 125 mL 125 mL    dextrose 10% bolus 250 mL 250 mL    flecainide tablet 100 mg    glucagon (human recombinant) injection 1 mg    glucose chewable tablet 16 g    glucose chewable tablet 24 g    melatonin tablet 6 mg    metoprolol succinate (TOPROL-XL) 24 hr tablet 25 mg    morphine injection 2 mg    morphine injection 4 mg    naloxone 0.4 mg/mL injection 0.02 mg    ondansetron injection 4 mg    prochlorperazine injection Soln 5 mg    sodium chloride 0.9% flush 10 mL       OBJECTIVE:     Physical Exam:  Physical Exam  Constitutional:       Appearance: She is well-developed.   HENT:      Head: Normocephalic and atraumatic.      Right Ear: External ear normal.      Left Ear: External ear normal.   Eyes:      Pupils: Pupils are equal, round, and reactive to light.   Cardiovascular:      Rate and Rhythm: Normal rate " "and regular rhythm.      Heart sounds: No murmur heard.     No friction rub. No gallop.   Pulmonary:      Breath sounds: Normal breath sounds.   Abdominal:      General: Bowel sounds are normal. There is distension.      Palpations: Abdomen is soft. There is no mass.      Tenderness: There is no abdominal tenderness. There is no guarding or rebound.      Hernia: No hernia is present.      Comments: Positive bowel sounds   Musculoskeletal:         General: Normal range of motion.      Cervical back: Neck supple.   Skin:     General: Skin is warm.   Neurological:      Mental Status: She is alert and oriented to person, place, and time.            Laboratory:  Recent Labs   Lab 03/01/24 0443   WBC 9.19   RBC 4.95   HGB 14.5   HCT 44.4      MCV 90   MCH 29.3   MCHC 32.7     BMP:   Recent Labs   Lab 03/01/24 0443   *      K 4.0      CO2 21*   BUN 23   CREATININE 1.0   CALCIUM 9.4     Lab Results   Component Value Date    CALCIUM 9.4 03/01/2024    PHOS 3.2 02/01/2024     BNP  No results for input(s): "BNP", "BNPTRIAGEBLO" in the last 168 hours.No results found for: "URICACID"No results found for: "IRON", "TIBC", "FERRITIN", "SATURATEDIRO"  Lab Results   Component Value Date    CALCIUM 9.4 03/01/2024    PHOS 3.2 02/01/2024       Diagnostic Results:  CT Abdomen Pelvis With IV Contrast NO Oral Contrast  Narrative: EXAMINATION:  CT OF ABDOMEN PELVIS WITH    CLINICAL HISTORY:  Abdominal pain, acute, nonlocalized;Bowel obstruction suspected;    TECHNIQUE:  5 mm enhanced axial images were obtained from the lung bases through the greater trochanters.  Seventy-five mL of Omnipaque 350 was injected.    COMPARISON:  06/24/2023    FINDINGS:  There are multiple dilated fluid and air fluid level containing small bowel loops.  There is a marked caliber change in the mid upper pelvis (series 2 axial image 82, series 601 coronal image 42, and series 602 sagittal image 77).  There is least 1 bowel loops " measuring up to 5 cm in diameter (series 2 axial image 83).    A too small to characterize low attenuation lesion in the left hepatic lobe.    Spleen, pancreas, kidneys, and adrenal glands are unremarkable.  The pancreas is atrophic, which may be related the gallbladder contains no calcified gallstones.  A splenule is present.    There is no definite evidence for abdominal adenopathy or ascites.  Moderate atherosclerotic changes are present.    There is colonic diverticulosis.  There are no pelvic masses or adenopathy.    There is small free fluid in the pelvis, which is uncommon in a female of this age.    There is again a trace right pleural effusion with associated compressive atelectasis.  Impression: Significant small-bowel obstruction with the transition point in the mid upper pelvis.  Associated small free pelvic fluid.    Probable subcentimeter left hepatic cyst.    Colonic diverticulosis.    Trace right pleural effusion again seen.    Electronically signed by: Sally Ojeda  Date:    02/29/2024  Time:    18:15  X-Ray Abdomen Flat And Erect  Narrative: EXAMINATION:  XR ABDOMEN FLAT AND ERECT    CLINICAL HISTORY:  Constipation, unspecified    FINDINGS:  Abdomen flat erect:    There are small bowel loops with differential fluid levels.  There is mild constipation.  There is distension of the stomach.    No perforation seen.  Impression: The presence of dilated small bowel with differential fluid levels is often indicative of a small bowel obstruction.  The patient does have mild constipation.    Electronically signed by: Bandar Acevedo MD  Date:    02/29/2024  Time:    15:04      IMPRESSION:  Small-bowel obstruction    Plan:  No immediately compelling surgical findings.  Continue NPO with IV fluids and serial exam until bowel function returns.    Thank you for the opportunity of seeing Marian Durham in consultation

## 2024-03-01 NOTE — ASSESSMENT & PLAN NOTE
Presenting with nausea, abdominal pain and distention. CT showed Significant small-bowel obstruction with the transition point in the mid upper pelvis     -consult General Surgery, recommendations appreciated  - NPO  - maintenance IVF  - eliquis on hold until surgery recommendations  -PRN antiemetic and pain medication  - no vomiting since admission, no BM or flatus

## 2024-03-01 NOTE — ED NOTES
Received patient A&Ox4. Denies any complaints. VSS. Safety measures in place; Maintained side rails up x 2 and cardiac monitor. Call light within pt reach. Plan of care ongoing.

## 2024-03-01 NOTE — NURSING
Nurses Note -- 4 Eyes      3/1/2024   1:31 AM      Skin assessed during: Admit      [x] No Altered Skin Integrity Present    []Prevention Measures Documented      [] Yes- Altered Skin Integrity Present or Discovered   [] LDA Added if Not in Epic (Describe Wound)   [] New Altered Skin Integrity was Present on Admit and Documented in LDA   [] Wound Image Taken    Wound Care Consulted? No    Attending Nurse:  Peg Cobb RN/Staff Member:  NARCISO Meade (scar to rt knee r/t recent knee replacement)

## 2024-03-01 NOTE — HOSPITAL COURSE
Marian Durham was admitted for SBO as confirmed on CT. Nausea and vomiting improved since admission. Surgery consulted for further assistance. Symptoms improving, passing flatus and BM. Tolerating PO, slowly advancing diet.  Repeat KUB demonstrated improved bowel appearance and patient had multiple bowel movements. Today, Ms Fonseca had no pain, tolerated her diet fully, and reported that her bowels had returned to normal. She was in agreement to her discharge home.

## 2024-03-01 NOTE — ASSESSMENT & PLAN NOTE
Presenting with nausea, abdominal pain and distention. CT showed Significant small-bowel obstruction with the transition point in the mid upper pelvis     -consult General Surgery, recommendations appreciated  -PRN antiemetic and pain medication

## 2024-03-02 LAB
ANION GAP SERPL CALC-SCNC: 8 MMOL/L (ref 8–16)
BASOPHILS # BLD AUTO: 0.04 K/UL (ref 0–0.2)
BASOPHILS NFR BLD: 0.8 % (ref 0–1.9)
BUN SERPL-MCNC: 22 MG/DL (ref 8–23)
CALCIUM SERPL-MCNC: 8.6 MG/DL (ref 8.7–10.5)
CHLORIDE SERPL-SCNC: 111 MMOL/L (ref 95–110)
CO2 SERPL-SCNC: 20 MMOL/L (ref 23–29)
CREAT SERPL-MCNC: 0.8 MG/DL (ref 0.5–1.4)
DIFFERENTIAL METHOD BLD: ABNORMAL
EOSINOPHIL # BLD AUTO: 0.1 K/UL (ref 0–0.5)
EOSINOPHIL NFR BLD: 2.9 % (ref 0–8)
ERYTHROCYTE [DISTWIDTH] IN BLOOD BY AUTOMATED COUNT: 13.2 % (ref 11.5–14.5)
EST. GFR  (NO RACE VARIABLE): >60 ML/MIN/1.73 M^2
GLUCOSE SERPL-MCNC: 80 MG/DL (ref 70–110)
HCT VFR BLD AUTO: 39.6 % (ref 37–48.5)
HGB BLD-MCNC: 12.5 G/DL (ref 12–16)
IMM GRANULOCYTES # BLD AUTO: 0.01 K/UL (ref 0–0.04)
IMM GRANULOCYTES NFR BLD AUTO: 0.2 % (ref 0–0.5)
LYMPHOCYTES # BLD AUTO: 1.6 K/UL (ref 1–4.8)
LYMPHOCYTES NFR BLD: 33.3 % (ref 18–48)
MCH RBC QN AUTO: 29.4 PG (ref 27–31)
MCHC RBC AUTO-ENTMCNC: 31.6 G/DL (ref 32–36)
MCV RBC AUTO: 93 FL (ref 82–98)
MONOCYTES # BLD AUTO: 0.7 K/UL (ref 0.3–1)
MONOCYTES NFR BLD: 15.1 % (ref 4–15)
NEUTROPHILS # BLD AUTO: 2.3 K/UL (ref 1.8–7.7)
NEUTROPHILS NFR BLD: 47.7 % (ref 38–73)
NRBC BLD-RTO: 0 /100 WBC
PLATELET # BLD AUTO: 180 K/UL (ref 150–450)
PMV BLD AUTO: 9.1 FL (ref 9.2–12.9)
POTASSIUM SERPL-SCNC: 4 MMOL/L (ref 3.5–5.1)
RBC # BLD AUTO: 4.25 M/UL (ref 4–5.4)
SODIUM SERPL-SCNC: 139 MMOL/L (ref 136–145)
WBC # BLD AUTO: 4.77 K/UL (ref 3.9–12.7)

## 2024-03-02 PROCEDURE — 25000003 PHARM REV CODE 250: Performed by: NURSE PRACTITIONER

## 2024-03-02 PROCEDURE — 25000003 PHARM REV CODE 250: Performed by: PHYSICIAN ASSISTANT

## 2024-03-02 PROCEDURE — 85025 COMPLETE CBC W/AUTO DIFF WBC: CPT | Performed by: NURSE PRACTITIONER

## 2024-03-02 PROCEDURE — 99231 SBSQ HOSP IP/OBS SF/LOW 25: CPT | Mod: ,,, | Performed by: SURGERY

## 2024-03-02 PROCEDURE — 36415 COLL VENOUS BLD VENIPUNCTURE: CPT | Performed by: NURSE PRACTITIONER

## 2024-03-02 PROCEDURE — 11000001 HC ACUTE MED/SURG PRIVATE ROOM

## 2024-03-02 PROCEDURE — 80048 BASIC METABOLIC PNL TOTAL CA: CPT | Performed by: NURSE PRACTITIONER

## 2024-03-02 PROCEDURE — 94761 N-INVAS EAR/PLS OXIMETRY MLT: CPT

## 2024-03-02 RX ORDER — BISACODYL 10 MG/1
10 SUPPOSITORY RECTAL DAILY PRN
Status: DISCONTINUED | OUTPATIENT
Start: 2024-03-02 | End: 2024-03-07 | Stop reason: HOSPADM

## 2024-03-02 RX ADMIN — FLECAINIDE ACETATE 100 MG: 50 TABLET ORAL at 08:03

## 2024-03-02 RX ADMIN — SODIUM CHLORIDE: 9 INJECTION, SOLUTION INTRAVENOUS at 08:03

## 2024-03-02 RX ADMIN — ATORVASTATIN CALCIUM 20 MG: 20 TABLET, FILM COATED ORAL at 09:03

## 2024-03-02 RX ADMIN — FLECAINIDE ACETATE 100 MG: 50 TABLET ORAL at 09:03

## 2024-03-02 RX ADMIN — METOPROLOL SUCCINATE 25 MG: 25 TABLET, EXTENDED RELEASE ORAL at 09:03

## 2024-03-02 RX ADMIN — METOPROLOL SUCCINATE 25 MG: 25 TABLET, EXTENDED RELEASE ORAL at 08:03

## 2024-03-02 NOTE — PROGRESS NOTES
Surgery Inpatient Progress Note    Date: 03/02/2024    Overnight events: Passing flatus, abdominal symptoms improved     O:   Vitals:    03/02/24 0742   BP: 138/74   Pulse: 70   Resp: 14   Temp: 97.9 °F (36.6 °C)       Physical Exam   Gen: well developed female, NAD  HEENT: normocephalic, atraumatic, PERRL, EOMI   CV: RRR, no murmurs  Resp: nonlabored, CTAB   Abd: soft, mild distention, no rebound or guarding   Msk: no gross deformities, no cyanosis or edema     Labs: reviewed and stable     Assessment and plan:   Marian Durham is a 78 y.o. female who presents with recurrent SBO    - labs reviewed and stable  - patient passing flatus and symptoms improved   - okay for clears, advance diet as tolerated  - okay for miralax, suppositories as needed for constipation   - no emergent surgical intervention  - will continue to follow       Wendy Dacosta MD  Staff Surgeon   Colon & Rectal Surgery

## 2024-03-02 NOTE — SUBJECTIVE & OBJECTIVE
Interval History: Symptoms improving, passing flatus. Abdomen softer today significantly. Trial liquids    Review of Systems   Constitutional:  Negative for fever.   Respiratory:  Negative for shortness of breath.    Cardiovascular:  Negative for leg swelling.   Gastrointestinal:  Positive for abdominal distention, abdominal pain and nausea. Negative for diarrhea.   Genitourinary:  Negative for dysuria.   Musculoskeletal:  Negative for back pain.   Neurological:  Negative for weakness.   Psychiatric/Behavioral:  Negative for agitation.      Objective:     Vital Signs (Most Recent):  Temp: 97.6 °F (36.4 °C) (03/02/24 1217)  Pulse: (!) 59 (03/02/24 1217)  Resp: 12 (03/02/24 1217)  BP: 125/61 (03/02/24 1217)  SpO2: 97 % (03/02/24 1217) Vital Signs (24h Range):  Temp:  [97.6 °F (36.4 °C)-98.3 °F (36.8 °C)] 97.6 °F (36.4 °C)  Pulse:  [58-70] 59  Resp:  [12-18] 12  SpO2:  [94 %-99 %] 97 %  BP: (117-143)/(57-74) 125/61     Weight: 52 kg (114 lb 10.2 oz)  Body mass index is 19.08 kg/m².  No intake or output data in the 24 hours ending 03/02/24 1219      Physical Exam  Vitals reviewed.   Constitutional:       Appearance: Normal appearance.   HENT:      Head: Normocephalic.      Mouth/Throat:      Mouth: Mucous membranes are moist.      Pharynx: Oropharynx is clear.   Eyes:      General: Lids are normal. Gaze aligned appropriately.   Cardiovascular:      Rate and Rhythm: Normal rate and regular rhythm.   Pulmonary:      Effort: Pulmonary effort is normal.      Breath sounds: Normal breath sounds.   Abdominal:      General: Bowel sounds are normal. There is distension.      Palpations: Abdomen is soft.      Tenderness: There is no abdominal tenderness. There is no guarding.   Musculoskeletal:      Cervical back: Normal range of motion.      Right lower leg: No edema.      Left lower leg: No edema.   Neurological:      General: No focal deficit present.      Mental Status: She is alert.   Psychiatric:         Mood and Affect:  Mood normal.         Behavior: Behavior normal.             Significant Labs: All pertinent labs within the past 24 hours have been reviewed.    Significant Imaging: I have reviewed all pertinent imaging results/findings within the past 24 hours.

## 2024-03-02 NOTE — ASSESSMENT & PLAN NOTE
Presenting with nausea, abdominal pain and distention. CT showed Significant small-bowel obstruction with the transition point in the mid upper pelvis     -consult General Surgery, recommendations appreciated  - NPO  - maintenance IVF  - eliquis on hold pending procedure  - passing flatus, trial liquid diet 3/2  -PRN antiemetic and pain medication

## 2024-03-02 NOTE — PROGRESS NOTES
[facility] Surg [address] Medicine  Progress Note    Patient Name: Marian Durham  MRN: [id number]  Patient Class: IP- Inpatient   Admission Date: 2/29/2024  Length of Stay: 1 days  Attending Physician: SHERIN Pearson MD  Primary Care Provider: Suzanna Duran MD        Subjective:     Principal Problem:SBO (small bowel obstruction)        HPI:  Marian Durham is a 78 year old female with a past medical history of atrial fibrillation, hyper lipidemia and past small-bowel obstructions who presented with abdominal distention and abdominal pain with associated nausea and belching for the past 5 hours.  Patient reports symptoms are similar to those of prior small-bowel obstructions.  She reports experiencing normal bowel movement today with no blood in stool.  Patient denies nausea and vomiting.    ED workup revealed WBC 15.2 and otherwise unremarkable labs.  Abdominal x-ray showed findings concerning for small bowel obstruction CT abdomen and pelvis revealed significant small-bowel obstruction with a transition point in the mid upper pelvis.  ED provider spoke to Dr. Werner, general surgery, who agreed to consult.  Patient received IV morphine in the ED with some relief and was referred to Hospital Medicine.  Patient will be placed in observation for further evaluation and management.    Overview/Hospital Course:  Marian Durham was admitted for SBO as confirmed on CT. Nausea and vomiting improved since admission. Surgery consulted for further assistance, eliquis on hold pending need for surgery. Symptoms improving, passing flatus. PO trial     Interval History: Symptoms improving, passing flatus. Abdomen softer today significantly. Trial liquids    Review of Systems   Constitutional:  Negative for fever.   Respiratory:  Negative for shortness of breath.    Cardiovascular:  Negative for leg swelling.   Gastrointestinal:  Positive for abdominal distention, abdominal pain and nausea.  Negative for diarrhea.   Genitourinary:  Negative for dysuria.   Musculoskeletal:  Negative for back pain.   Neurological:  Negative for weakness.   Psychiatric/Behavioral:  Negative for agitation.      Objective:     Vital Signs (Most Recent):  Temp: 97.6 °F (36.4 °C) (03/02/24 1217)  Pulse: (!) 59 (03/02/24 1217)  Resp: 12 (03/02/24 1217)  BP: 125/61 (03/02/24 1217)  SpO2: 97 % (03/02/24 1217) Vital Signs (24h Range):  Temp:  [97.6 °F (36.4 °C)-98.3 °F (36.8 °C)] 97.6 °F (36.4 °C)  Pulse:  [58-70] 59  Resp:  [12-18] 12  SpO2:  [94 %-99 %] 97 %  BP: (117-143)/(57-74) 125/61     Weight: 52 kg (114 lb 10.2 oz)  Body mass index is 19.08 kg/m².  No intake or output data in the 24 hours ending 03/02/24 1219      Physical Exam  Vitals reviewed.   Constitutional:       Appearance: Normal appearance.   HENT:      Head: Normocephalic.      Mouth/Throat:      Mouth: Mucous membranes are moist.      Pharynx: Oropharynx is clear.   Eyes:      General: Lids are normal. Gaze aligned appropriately.   Cardiovascular:      Rate and Rhythm: Normal rate and regular rhythm.   Pulmonary:      Effort: Pulmonary effort is normal.      Breath sounds: Normal breath sounds.   Abdominal:      General: Bowel sounds are normal. There is distension.      Palpations: Abdomen is soft.      Tenderness: There is no abdominal tenderness. There is no guarding.   Musculoskeletal:      Cervical back: Normal range of motion.      Right lower leg: No edema.      Left lower leg: No edema.   Neurological:      General: No focal deficit present.      Mental Status: She is alert.   Psychiatric:         Mood and Affect: Mood normal.         Behavior: Behavior normal.             Significant Labs: All pertinent labs within the past 24 hours have been reviewed.    Significant Imaging: I have reviewed all pertinent imaging results/findings within the past 24 hours.    Assessment/Plan:      * SBO (small bowel obstruction)  Presenting with nausea, abdominal pain  and distention. CT showed Significant small-bowel obstruction with the transition point in the mid upper pelvis     -consult General Surgery, recommendations appreciated  - NPO  - maintenance IVF  - eliquis on hold pending procedure  - passing flatus, trial liquid diet 3/2  -PRN antiemetic and pain medication    Hypercholesterolemia  History noted.  Currently taking atorvastatin    -continue atorvastatin 20 mg daily      Atrial fibrillation  History noted, followed by cardiology -Dr Clarke. Patient taking metoprolol, flecainide and anticoagulated with Eliquis    -telemetry monitoring  -hold Eliquis, pending general surgery consult  -continue metoprolol 25 milligram b.i.d.  -continue flecainide 100 mg b.i.d.        VTE Risk Mitigation (From admission, onward)           Ordered     IP VTE HIGH RISK PATIENT  Once         02/29/24 1853     Place sequential compression device  Until discontinued         02/29/24 1853     Reason for No Pharmacological VTE Prophylaxis  Once        Question:  Reasons:  Answer:  Already adequately anticoagulated on oral Anticoagulants    02/29/24 1853                    Discharge Planning   WILLIAN: 3/5/2024     Code Status: Full Code   Is the patient medically ready for discharge?:     Reason for patient still in hospital (select all that apply): Patient trending condition  Discharge Plan A: Home with family, Home                  Cheryl Wolf PA-C  Department of Hospital Medicine   [facility] - [facility] Surg ([address])

## 2024-03-03 LAB
ANION GAP SERPL CALC-SCNC: 7 MMOL/L (ref 8–16)
BASOPHILS # BLD AUTO: 0.02 K/UL (ref 0–0.2)
BASOPHILS NFR BLD: 0.4 % (ref 0–1.9)
BUN SERPL-MCNC: 11 MG/DL (ref 8–23)
CALCIUM SERPL-MCNC: 8.7 MG/DL (ref 8.7–10.5)
CHLORIDE SERPL-SCNC: 112 MMOL/L (ref 95–110)
CO2 SERPL-SCNC: 21 MMOL/L (ref 23–29)
CREAT SERPL-MCNC: 0.7 MG/DL (ref 0.5–1.4)
DIFFERENTIAL METHOD BLD: ABNORMAL
EOSINOPHIL # BLD AUTO: 0.2 K/UL (ref 0–0.5)
EOSINOPHIL NFR BLD: 3.2 % (ref 0–8)
ERYTHROCYTE [DISTWIDTH] IN BLOOD BY AUTOMATED COUNT: 12.7 % (ref 11.5–14.5)
EST. GFR  (NO RACE VARIABLE): >60 ML/MIN/1.73 M^2
GLUCOSE SERPL-MCNC: 92 MG/DL (ref 70–110)
HCT VFR BLD AUTO: 38.4 % (ref 37–48.5)
HGB BLD-MCNC: 12.2 G/DL (ref 12–16)
IMM GRANULOCYTES # BLD AUTO: 0.01 K/UL (ref 0–0.04)
IMM GRANULOCYTES NFR BLD AUTO: 0.2 % (ref 0–0.5)
LYMPHOCYTES # BLD AUTO: 1.4 K/UL (ref 1–4.8)
LYMPHOCYTES NFR BLD: 29.9 % (ref 18–48)
MCH RBC QN AUTO: 29.4 PG (ref 27–31)
MCHC RBC AUTO-ENTMCNC: 31.8 G/DL (ref 32–36)
MCV RBC AUTO: 93 FL (ref 82–98)
MONOCYTES # BLD AUTO: 0.5 K/UL (ref 0.3–1)
MONOCYTES NFR BLD: 11.3 % (ref 4–15)
NEUTROPHILS # BLD AUTO: 2.6 K/UL (ref 1.8–7.7)
NEUTROPHILS NFR BLD: 55 % (ref 38–73)
NRBC BLD-RTO: 0 /100 WBC
PLATELET # BLD AUTO: 174 K/UL (ref 150–450)
PMV BLD AUTO: 9 FL (ref 9.2–12.9)
POTASSIUM SERPL-SCNC: 3.8 MMOL/L (ref 3.5–5.1)
RBC # BLD AUTO: 4.15 M/UL (ref 4–5.4)
SODIUM SERPL-SCNC: 140 MMOL/L (ref 136–145)
WBC # BLD AUTO: 4.71 K/UL (ref 3.9–12.7)

## 2024-03-03 PROCEDURE — 25000003 PHARM REV CODE 250: Performed by: NURSE PRACTITIONER

## 2024-03-03 PROCEDURE — 25000003 PHARM REV CODE 250: Performed by: PHYSICIAN ASSISTANT

## 2024-03-03 PROCEDURE — 99231 SBSQ HOSP IP/OBS SF/LOW 25: CPT | Mod: ,,, | Performed by: SURGERY

## 2024-03-03 PROCEDURE — 80048 BASIC METABOLIC PNL TOTAL CA: CPT | Performed by: NURSE PRACTITIONER

## 2024-03-03 PROCEDURE — 36415 COLL VENOUS BLD VENIPUNCTURE: CPT | Performed by: NURSE PRACTITIONER

## 2024-03-03 PROCEDURE — 11000001 HC ACUTE MED/SURG PRIVATE ROOM

## 2024-03-03 PROCEDURE — 94761 N-INVAS EAR/PLS OXIMETRY MLT: CPT

## 2024-03-03 PROCEDURE — 85025 COMPLETE CBC W/AUTO DIFF WBC: CPT | Performed by: NURSE PRACTITIONER

## 2024-03-03 RX ORDER — POLYETHYLENE GLYCOL 3350 17 G/17G
17 POWDER, FOR SOLUTION ORAL 2 TIMES DAILY PRN
Status: DISCONTINUED | OUTPATIENT
Start: 2024-03-03 | End: 2024-03-07 | Stop reason: HOSPADM

## 2024-03-03 RX ORDER — SIMETHICONE 80 MG
1 TABLET,CHEWABLE ORAL ONCE
Status: COMPLETED | OUTPATIENT
Start: 2024-03-03 | End: 2024-03-03

## 2024-03-03 RX ADMIN — ATORVASTATIN CALCIUM 20 MG: 20 TABLET, FILM COATED ORAL at 08:03

## 2024-03-03 RX ADMIN — METOPROLOL SUCCINATE 25 MG: 25 TABLET, EXTENDED RELEASE ORAL at 08:03

## 2024-03-03 RX ADMIN — FLECAINIDE ACETATE 100 MG: 50 TABLET ORAL at 08:03

## 2024-03-03 RX ADMIN — APIXABAN 2.5 MG: 2.5 TABLET, FILM COATED ORAL at 10:03

## 2024-03-03 RX ADMIN — METOPROLOL SUCCINATE 25 MG: 25 TABLET, EXTENDED RELEASE ORAL at 09:03

## 2024-03-03 RX ADMIN — SODIUM CHLORIDE: 9 INJECTION, SOLUTION INTRAVENOUS at 05:03

## 2024-03-03 RX ADMIN — SIMETHICONE 80 MG: 80 TABLET, CHEWABLE ORAL at 09:03

## 2024-03-03 RX ADMIN — APIXABAN 2.5 MG: 2.5 TABLET, FILM COATED ORAL at 08:03

## 2024-03-03 RX ADMIN — FLECAINIDE ACETATE 100 MG: 50 TABLET ORAL at 09:03

## 2024-03-03 NOTE — PROGRESS NOTES
[facility] Surg [address] Medicine  Progress Note    Patient Name: Marian Durham  MRN: [id number]  Patient Class: IP- Inpatient   Admission Date: 2/29/2024  Length of Stay: 2 days  Attending Physician: SHERIN Pearson MD  Primary Care Provider: Suzanna Duran MD        Subjective:     Principal Problem:SBO (small bowel obstruction)        HPI:  Marian Durham is a 78 year old female with a past medical history of atrial fibrillation, hyper lipidemia and past small-bowel obstructions who presented with abdominal distention and abdominal pain with associated nausea and belching for the past 5 hours.  Patient reports symptoms are similar to those of prior small-bowel obstructions.  She reports experiencing normal bowel movement today with no blood in stool.  Patient denies nausea and vomiting.    ED workup revealed WBC 15.2 and otherwise unremarkable labs.  Abdominal x-ray showed findings concerning for small bowel obstruction CT abdomen and pelvis revealed significant small-bowel obstruction with a transition point in the mid upper pelvis.  ED provider spoke to Dr. Werner, general surgery, who agreed to consult.  Patient received IV morphine in the ED with some relief and was referred to Hospital Medicine.  Patient will be placed in observation for further evaluation and management.    Overview/Hospital Course:  Marian Durham was admitted for SBO as confirmed on CT. Nausea and vomiting improved since admission. Surgery consulted for further assistance, eliquis on hold pending need for surgery. Symptoms improving, passing flatus. PO trial     Interval History: Had several Bms yesterday. Tolerating PO. Advanced to low residue diet.     Review of Systems   Constitutional:  Negative for fever.   Cardiovascular:  Negative for leg swelling.   Gastrointestinal:  Positive for abdominal distention, abdominal pain (improved) and nausea (resolved). Negative for constipation and diarrhea.    Musculoskeletal:  Negative for back pain.   Neurological:  Negative for weakness.   Psychiatric/Behavioral:  Negative for agitation.      Objective:     Vital Signs (Most Recent):  Temp: 97.5 °F (36.4 °C) (03/03/24 1222)  Pulse: 60 (03/03/24 1222)  Resp: 16 (03/03/24 1222)  BP: (!) 114/56 (03/03/24 1222)  SpO2: 98 % (03/03/24 1222) Vital Signs (24h Range):  Temp:  [97.4 °F (36.3 °C)-98 °F (36.7 °C)] 97.5 °F (36.4 °C)  Pulse:  [55-64] 60  Resp:  [12-20] 16  SpO2:  [97 %-100 %] 98 %  BP: (114-163)/(56-94) 114/56     Weight: 52 kg (114 lb 10.2 oz)  Body mass index is 19.08 kg/m².    Intake/Output Summary (Last 24 hours) at 3/3/2024 1302  Last data filed at 3/3/2024 1104  Gross per 24 hour   Intake 4672.92 ml   Output --   Net 4672.92 ml         Physical Exam  Vitals reviewed.   Constitutional:       Appearance: Normal appearance.   HENT:      Head: Normocephalic.      Mouth/Throat:      Mouth: Mucous membranes are moist.   Eyes:      General: Lids are normal. Gaze aligned appropriately.   Cardiovascular:      Rate and Rhythm: Normal rate and regular rhythm.   Pulmonary:      Effort: Pulmonary effort is normal. No respiratory distress.   Abdominal:      General: Bowel sounds are normal. There is distension (improving).      Palpations: Abdomen is soft.      Tenderness: There is no abdominal tenderness.   Musculoskeletal:      Cervical back: Normal range of motion.      Right lower leg: No edema.      Left lower leg: No edema.   Neurological:      Mental Status: She is alert.   Psychiatric:         Mood and Affect: Mood normal.         Behavior: Behavior normal.             Significant Labs: All pertinent labs within the past 24 hours have been reviewed.    Significant Imaging: I have reviewed all pertinent imaging results/findings within the past 24 hours.    Assessment/Plan:      * SBO (small bowel obstruction)  Presenting with nausea, abdominal pain and distention. CT showed Significant small-bowel obstruction with  the transition point in the mid upper pelvis     -consult General Surgery, recommendations appreciated  - NPO  - maintenance IVF  - eliquis on hold pending procedure >> resume 3/3  - passing flatus, trial liquid diet 3/2, tolerating and having Bms  - advanced to low residue diet  -PRN antiemetic and pain medication    Hypercholesterolemia  History noted.  Currently taking atorvastatin    -continue atorvastatin 20 mg daily      Atrial fibrillation  History noted, followed by cardiology -Dr Clarke. Patient taking metoprolol, flecainide and anticoagulated with Eliquis    -telemetry monitoring  -hold Eliquis, pending general surgery consult >> resume 3/3 as her SBO is resolving  -continue metoprolol 25 milligram b.i.d.  -continue flecainide 100 mg b.i.d.        VTE Risk Mitigation (From admission, onward)           Ordered     apixaban tablet 2.5 mg  2 times daily         03/03/24 0954     IP VTE HIGH RISK PATIENT  Once         02/29/24 1853     Place sequential compression device  Until discontinued         02/29/24 1853     Reason for No Pharmacological VTE Prophylaxis  Once        Question:  Reasons:  Answer:  Already adequately anticoagulated on oral Anticoagulants    02/29/24 1853                    Discharge Planning   WILLIAN: 3/5/2024     Code Status: Full Code   Is the patient medically ready for discharge?:     Reason for patient still in hospital (select all that apply): Patient trending condition  Discharge Plan A: Home with family, Home                  Cheryl Wolf PA-C  Department of Hospital Medicine   [facility] - [facility] Surg ([address])

## 2024-03-03 NOTE — ASSESSMENT & PLAN NOTE
Presenting with nausea, abdominal pain and distention. CT showed Significant small-bowel obstruction with the transition point in the mid upper pelvis     -consult General Surgery, recommendations appreciated  - NPO  - maintenance IVF  - eliquis on hold pending procedure >> resume 3/3  - passing flatus, trial liquid diet 3/2, tolerating and having Bms  - advanced to low residue diet  -PRN antiemetic and pain medication

## 2024-03-03 NOTE — SUBJECTIVE & OBJECTIVE
Interval History: Had several Bms yesterday. Tolerating PO. Advanced to low residue diet.     Review of Systems   Constitutional:  Negative for fever.   Cardiovascular:  Negative for leg swelling.   Gastrointestinal:  Positive for abdominal distention, abdominal pain (improved) and nausea (resolved). Negative for constipation and diarrhea.   Musculoskeletal:  Negative for back pain.   Neurological:  Negative for weakness.   Psychiatric/Behavioral:  Negative for agitation.      Objective:     Vital Signs (Most Recent):  Temp: 97.5 °F (36.4 °C) (03/03/24 1222)  Pulse: 60 (03/03/24 1222)  Resp: 16 (03/03/24 1222)  BP: (!) 114/56 (03/03/24 1222)  SpO2: 98 % (03/03/24 1222) Vital Signs (24h Range):  Temp:  [97.4 °F (36.3 °C)-98 °F (36.7 °C)] 97.5 °F (36.4 °C)  Pulse:  [55-64] 60  Resp:  [12-20] 16  SpO2:  [97 %-100 %] 98 %  BP: (114-163)/(56-94) 114/56     Weight: 52 kg (114 lb 10.2 oz)  Body mass index is 19.08 kg/m².    Intake/Output Summary (Last 24 hours) at 3/3/2024 1302  Last data filed at 3/3/2024 1104  Gross per 24 hour   Intake 4672.92 ml   Output --   Net 4672.92 ml         Physical Exam  Vitals reviewed.   Constitutional:       Appearance: Normal appearance.   HENT:      Head: Normocephalic.      Mouth/Throat:      Mouth: Mucous membranes are moist.   Eyes:      General: Lids are normal. Gaze aligned appropriately.   Cardiovascular:      Rate and Rhythm: Normal rate and regular rhythm.   Pulmonary:      Effort: Pulmonary effort is normal. No respiratory distress.   Abdominal:      General: Bowel sounds are normal. There is distension (improving).      Palpations: Abdomen is soft.      Tenderness: There is no abdominal tenderness.   Musculoskeletal:      Cervical back: Normal range of motion.      Right lower leg: No edema.      Left lower leg: No edema.   Neurological:      Mental Status: She is alert.   Psychiatric:         Mood and Affect: Mood normal.         Behavior: Behavior normal.              Significant Labs: All pertinent labs within the past 24 hours have been reviewed.    Significant Imaging: I have reviewed all pertinent imaging results/findings within the past 24 hours.

## 2024-03-03 NOTE — PLAN OF CARE
AAOX4. VSS. Tolerated medication well. Advanced diet and tolerated well. 1 BM on shift and continuing to pass gas. Pt complains of abdominal cramping intermittently. Pt ambulating in hallway w/o difficulty. Abdomen distended after eating lunch. Linens changed. Bed locked in lowest position, call light in reach, and non slip socks on. No falls or injuries on shift. Safety maintained throughout shift.      Problem: Adult Inpatient Plan of Care  Goal: Plan of Care Review  Outcome: Ongoing, Progressing  Goal: Patient-Specific Goal (Individualized)  Outcome: Ongoing, Progressing  Goal: Absence of Hospital-Acquired Illness or Injury  Outcome: Ongoing, Progressing  Goal: Optimal Comfort and Wellbeing  Outcome: Ongoing, Progressing  Goal: Readiness for Transition of Care  Outcome: Ongoing, Progressing     Problem: Infection  Goal: Absence of Infection Signs and Symptoms  Outcome: Ongoing, Progressing

## 2024-03-03 NOTE — ASSESSMENT & PLAN NOTE
History noted, followed by cardiology -Dr Clarke. Patient taking metoprolol, flecainide and anticoagulated with Eliquis    -telemetry monitoring  -hold Eliquis, pending general surgery consult >> resume 3/3 as her SBO is resolving  -continue metoprolol 25 milligram b.i.d.  -continue flecainide 100 mg b.i.d.

## 2024-03-03 NOTE — PROGRESS NOTES
Surgery Inpatient Progress Note    Date: 03/03/2024    Overnight events: multiple bowel movements overnight, mild bloating with PO intake     O:   Vitals:    03/03/24 0802   BP: (!) 143/67   Pulse: 64   Resp: 12   Temp: 97.6 °F (36.4 °C)       Physical Exam   Gen: well developed female, NAD  HEENT: normocephalic, atraumatic, PERRL, EOMI   CV: RRR, no murmurs  Resp: nonlabored, CTAB   Abd: soft, mild distention, no rebound or guarding   Msk: no gross deformities, no cyanosis or edema     Assessment and plan:   Marian Durham is a 78 y.o. female who presents with recurrent SBO    - labs reviewed and stable  - Patient with multiple bowel movements overnight  - advanced to low residue diet   - added miralax PRN per patient request   - no acute surgical intervention indicated    Wendy Dacosta MD  Staff Surgeon   Colon & Rectal Surgery

## 2024-03-04 PROCEDURE — 25000003 PHARM REV CODE 250: Performed by: PHYSICIAN ASSISTANT

## 2024-03-04 PROCEDURE — 11000001 HC ACUTE MED/SURG PRIVATE ROOM

## 2024-03-04 PROCEDURE — 25000003 PHARM REV CODE 250: Performed by: NURSE PRACTITIONER

## 2024-03-04 PROCEDURE — 25000003 PHARM REV CODE 250: Performed by: SURGERY

## 2024-03-04 PROCEDURE — 94761 N-INVAS EAR/PLS OXIMETRY MLT: CPT

## 2024-03-04 RX ADMIN — METOPROLOL SUCCINATE 25 MG: 25 TABLET, EXTENDED RELEASE ORAL at 09:03

## 2024-03-04 RX ADMIN — POLYETHYLENE GLYCOL 3350 17 G: 17 POWDER, FOR SOLUTION ORAL at 04:03

## 2024-03-04 RX ADMIN — POLYETHYLENE GLYCOL 3350 17 G: 17 POWDER, FOR SOLUTION ORAL at 08:03

## 2024-03-04 RX ADMIN — APIXABAN 2.5 MG: 2.5 TABLET, FILM COATED ORAL at 08:03

## 2024-03-04 RX ADMIN — METOPROLOL SUCCINATE 25 MG: 25 TABLET, EXTENDED RELEASE ORAL at 08:03

## 2024-03-04 RX ADMIN — FLECAINIDE ACETATE 100 MG: 50 TABLET ORAL at 09:03

## 2024-03-04 RX ADMIN — FLECAINIDE ACETATE 100 MG: 50 TABLET ORAL at 08:03

## 2024-03-04 RX ADMIN — ATORVASTATIN CALCIUM 20 MG: 20 TABLET, FILM COATED ORAL at 08:03

## 2024-03-04 RX ADMIN — APIXABAN 2.5 MG: 2.5 TABLET, FILM COATED ORAL at 09:03

## 2024-03-04 NOTE — ASSESSMENT & PLAN NOTE
Presenting with nausea, abdominal pain and distention. CT showed Significant small-bowel obstruction with the transition point in the mid upper pelvis     -consult General Surgery, recommendations appreciated  - NPO  - maintenance IVF  - eliquis on hold pending procedure >> resume 3/3  - passing flatus, trial liquid diet 3/2, tolerating and having Bms  - slowly advancing diet, tolerating PO  - KUB 3/4 confirms resolving SBO  -PRN antiemetic and pain medication

## 2024-03-04 NOTE — PROGRESS NOTES
[facility] Surg [address] Medicine  Progress Note    Patient Name: Marian Durham  MRN: [id number]  Patient Class: IP- Inpatient   Admission Date: 2/29/2024  Length of Stay: 3 days  Attending Physician: SHERIN Pearson MD  Primary Care Provider: Suzanna Duran MD        Subjective:     Principal Problem:SBO (small bowel obstruction)        HPI:  Marian Durham is a 78 year old female with a past medical history of atrial fibrillation, hyper lipidemia and past small-bowel obstructions who presented with abdominal distention and abdominal pain with associated nausea and belching for the past 5 hours.  Patient reports symptoms are similar to those of prior small-bowel obstructions.  She reports experiencing normal bowel movement today with no blood in stool.  Patient denies nausea and vomiting.    ED workup revealed WBC 15.2 and otherwise unremarkable labs.  Abdominal x-ray showed findings concerning for small bowel obstruction CT abdomen and pelvis revealed significant small-bowel obstruction with a transition point in the mid upper pelvis.  ED provider spoke to Dr. Werner, general surgery, who agreed to consult.  Patient received IV morphine in the ED with some relief and was referred to Hospital Medicine.  Patient will be placed in observation for further evaluation and management.    Overview/Hospital Course:  Marian Durham was admitted for SBO as confirmed on CT. Nausea and vomiting improved since admission. Surgery consulted for further assistance. Symptoms improving, passing flatus and BM. Tolerating PO, slowly advancing diet    Interval History: Continues to have Bms and passing gas. No Nausea but describes indigestion and bloating after eating. KUB today with improving SBO. Likely able to dc tomorrow pending toleration of diet    Review of Systems   Constitutional:  Negative for fever.   Cardiovascular:  Negative for leg swelling.   Gastrointestinal:  Positive for abdominal  distention, abdominal pain (improved) and nausea (resolved). Negative for constipation and diarrhea.   Musculoskeletal:  Negative for back pain.   Neurological:  Negative for weakness.   Psychiatric/Behavioral:  Negative for agitation.      Objective:     Vital Signs (Most Recent):  Temp: 97.9 °F (36.6 °C) (03/04/24 1208)  Pulse: (!) 54 (03/04/24 1208)  Resp: 20 (03/04/24 1208)  BP: (!) 140/63 (03/04/24 1208)  SpO2: 98 % (03/04/24 1208) Vital Signs (24h Range):  Temp:  [97.6 °F (36.4 °C)-98.2 °F (36.8 °C)] 97.9 °F (36.6 °C)  Pulse:  [54-63] 54  Resp:  [16-20] 20  SpO2:  [97 %-98 %] 98 %  BP: (127-155)/(61-71) 140/63     Weight: 52 kg (114 lb 10.2 oz)  Body mass index is 19.08 kg/m².    Intake/Output Summary (Last 24 hours) at 3/4/2024 1243  Last data filed at 3/4/2024 0850  Gross per 24 hour   Intake 540 ml   Output --   Net 540 ml         Physical Exam  Vitals reviewed.   Constitutional:       Appearance: Normal appearance.   HENT:      Head: Normocephalic.      Mouth/Throat:      Mouth: Mucous membranes are moist.   Eyes:      General: Lids are normal. Gaze aligned appropriately.   Cardiovascular:      Rate and Rhythm: Normal rate and regular rhythm.   Pulmonary:      Effort: Pulmonary effort is normal. No respiratory distress.   Abdominal:      General: Bowel sounds are normal. There is distension (improving).      Palpations: Abdomen is soft.      Tenderness: There is no abdominal tenderness.   Musculoskeletal:      Cervical back: Normal range of motion.      Right lower leg: No edema.      Left lower leg: No edema.   Neurological:      General: No focal deficit present.      Mental Status: She is alert.             Significant Labs: All pertinent labs within the past 24 hours have been reviewed.    Significant Imaging: I have reviewed all pertinent imaging results/findings within the past 24 hours.    Assessment/Plan:      * SBO (small bowel obstruction)  Presenting with nausea, abdominal pain and distention.  CT showed Significant small-bowel obstruction with the transition point in the mid upper pelvis     -consult General Surgery, recommendations appreciated  - NPO  - maintenance IVF  - eliquis on hold pending procedure >> resume 3/3  - passing flatus, trial liquid diet 3/2, tolerating and having Bms  - slowly advancing diet, tolerating PO  - KUB 3/4 confirms resolving SBO  -PRN antiemetic and pain medication    Hypercholesterolemia  History noted.  Currently taking atorvastatin    -continue atorvastatin 20 mg daily      Atrial fibrillation  History noted, followed by cardiology -Dr Clarke. Patient taking metoprolol, flecainide and anticoagulated with Eliquis    -telemetry monitoring  -hold Eliquis, pending general surgery consult >> resume 3/3 as her SBO is resolving  -continue metoprolol 25 milligram b.i.d.  -continue flecainide 100 mg b.i.d.        VTE Risk Mitigation (From admission, onward)           Ordered     apixaban tablet 2.5 mg  2 times daily         03/03/24 0954     IP VTE HIGH RISK PATIENT  Once         02/29/24 1853     Place sequential compression device  Until discontinued         02/29/24 1853     Reason for No Pharmacological VTE Prophylaxis  Once        Question:  Reasons:  Answer:  Already adequately anticoagulated on oral Anticoagulants    02/29/24 1853                    Discharge Planning   WILLIAN: 3/5/2024     Code Status: Full Code   Is the patient medically ready for discharge?:     Reason for patient still in hospital (select all that apply): Patient trending condition  Discharge Plan A: Home with family, Home                  Cheryl Wolf PA-C  Department of Hospital Medicine   [facility] ([address])

## 2024-03-04 NOTE — PLAN OF CARE
Medicare Message     Important Message from Medicare regarding Discharge Appeal Rights -- Given to patient/caregiver; Explained to patient/caregiver; Signed/date by patient/caregiver   Date IMM was signed -- 3/4/2024   Time IMM was signed -- 0940

## 2024-03-04 NOTE — PROGRESS NOTES
Hospital day 5.  Diagnosis-SBO      Subjective  Had multiple bowel movements yesterday but significant bloating post low residue diet.  Requests soft diet.  No abdominal pain or bloating this a.m.    PE  Afebrile  Vital signs stable  Abdomen-soft, nondistended, no guarding, no rebound, positive bowel sounds,    Impression/plan  SBO resolving  We will switch to soft diet as per patient's request  Check KUB

## 2024-03-04 NOTE — SUBJECTIVE & OBJECTIVE
Interval History: Continues to have Bms and passing gas. No Nausea but describes indigestion and bloating after eating. KUB today with improving SBO. Likely able to dc tomorrow pending toleration of diet    Review of Systems   Constitutional:  Negative for fever.   Cardiovascular:  Negative for leg swelling.   Gastrointestinal:  Positive for abdominal distention, abdominal pain (improved) and nausea (resolved). Negative for constipation and diarrhea.   Musculoskeletal:  Negative for back pain.   Neurological:  Negative for weakness.   Psychiatric/Behavioral:  Negative for agitation.      Objective:     Vital Signs (Most Recent):  Temp: 97.9 °F (36.6 °C) (03/04/24 1208)  Pulse: (!) 54 (03/04/24 1208)  Resp: 20 (03/04/24 1208)  BP: (!) 140/63 (03/04/24 1208)  SpO2: 98 % (03/04/24 1208) Vital Signs (24h Range):  Temp:  [97.6 °F (36.4 °C)-98.2 °F (36.8 °C)] 97.9 °F (36.6 °C)  Pulse:  [54-63] 54  Resp:  [16-20] 20  SpO2:  [97 %-98 %] 98 %  BP: (127-155)/(61-71) 140/63     Weight: 52 kg (114 lb 10.2 oz)  Body mass index is 19.08 kg/m².    Intake/Output Summary (Last 24 hours) at 3/4/2024 1243  Last data filed at 3/4/2024 0850  Gross per 24 hour   Intake 540 ml   Output --   Net 540 ml         Physical Exam  Vitals reviewed.   Constitutional:       Appearance: Normal appearance.   HENT:      Head: Normocephalic.      Mouth/Throat:      Mouth: Mucous membranes are moist.   Eyes:      General: Lids are normal. Gaze aligned appropriately.   Cardiovascular:      Rate and Rhythm: Normal rate and regular rhythm.   Pulmonary:      Effort: Pulmonary effort is normal. No respiratory distress.   Abdominal:      General: Bowel sounds are normal. There is distension (improving).      Palpations: Abdomen is soft.      Tenderness: There is no abdominal tenderness.   Musculoskeletal:      Cervical back: Normal range of motion.      Right lower leg: No edema.      Left lower leg: No edema.   Neurological:      General: No focal deficit  present.      Mental Status: She is alert.             Significant Labs: All pertinent labs within the past 24 hours have been reviewed.    Significant Imaging: I have reviewed all pertinent imaging results/findings within the past 24 hours.

## 2024-03-05 PROCEDURE — 25000003 PHARM REV CODE 250: Performed by: NURSE PRACTITIONER

## 2024-03-05 PROCEDURE — 99231 SBSQ HOSP IP/OBS SF/LOW 25: CPT | Mod: ,,, | Performed by: SPECIALIST

## 2024-03-05 PROCEDURE — 94761 N-INVAS EAR/PLS OXIMETRY MLT: CPT

## 2024-03-05 PROCEDURE — 11000001 HC ACUTE MED/SURG PRIVATE ROOM

## 2024-03-05 PROCEDURE — 25000003 PHARM REV CODE 250: Performed by: SURGERY

## 2024-03-05 PROCEDURE — 25000003 PHARM REV CODE 250: Performed by: PHYSICIAN ASSISTANT

## 2024-03-05 RX ORDER — DOCUSATE SODIUM 100 MG/1
100 CAPSULE, LIQUID FILLED ORAL DAILY
Status: DISCONTINUED | OUTPATIENT
Start: 2024-03-05 | End: 2024-03-07 | Stop reason: HOSPADM

## 2024-03-05 RX ORDER — DOCUSATE SODIUM 100 MG/1
100 CAPSULE, LIQUID FILLED ORAL DAILY
Status: DISCONTINUED | OUTPATIENT
Start: 2024-03-06 | End: 2024-03-05

## 2024-03-05 RX ADMIN — METOPROLOL SUCCINATE 25 MG: 25 TABLET, EXTENDED RELEASE ORAL at 09:03

## 2024-03-05 RX ADMIN — APIXABAN 2.5 MG: 2.5 TABLET, FILM COATED ORAL at 08:03

## 2024-03-05 RX ADMIN — FLECAINIDE ACETATE 100 MG: 50 TABLET ORAL at 09:03

## 2024-03-05 RX ADMIN — METOPROLOL SUCCINATE 25 MG: 25 TABLET, EXTENDED RELEASE ORAL at 08:03

## 2024-03-05 RX ADMIN — POLYETHYLENE GLYCOL 3350 17 G: 17 POWDER, FOR SOLUTION ORAL at 05:03

## 2024-03-05 RX ADMIN — FLECAINIDE ACETATE 100 MG: 50 TABLET ORAL at 08:03

## 2024-03-05 RX ADMIN — APIXABAN 2.5 MG: 2.5 TABLET, FILM COATED ORAL at 09:03

## 2024-03-05 RX ADMIN — DOCUSATE SODIUM 100 MG: 100 CAPSULE, LIQUID FILLED ORAL at 09:03

## 2024-03-05 RX ADMIN — Medication 6 MG: at 09:03

## 2024-03-05 RX ADMIN — ATORVASTATIN CALCIUM 20 MG: 20 TABLET, FILM COATED ORAL at 10:03

## 2024-03-05 NOTE — PLAN OF CARE
AAOX4. VSS. Tolerated medication well. Given PRN miralax twice. Ambulated in room & hallway. Shower. Xray of abdomen. No BM on shift. Abdomen slightly distended. No falls or injuries on shift. Tolerating diet while passing gas. Safety maintained throughout shift.     Problem: Adult Inpatient Plan of Care  Goal: Plan of Care Review  Outcome: Ongoing, Progressing  Goal: Patient-Specific Goal (Individualized)  Outcome: Ongoing, Progressing  Goal: Absence of Hospital-Acquired Illness or Injury  Outcome: Ongoing, Progressing  Goal: Optimal Comfort and Wellbeing  Outcome: Ongoing, Progressing  Goal: Readiness for Transition of Care  Outcome: Ongoing, Progressing     Problem: Infection  Goal: Absence of Infection Signs and Symptoms  Outcome: Ongoing, Progressing

## 2024-03-05 NOTE — PLAN OF CARE
Problem: Gas Exchange Impaired  Goal: Optimal Gas Exchange  Outcome: Ongoing, Progressing  Intervention: Optimize Oxygenation and Ventilation  Flowsheets (Taken 3/4/2024 1956)  Airway/Ventilation Management: airway patency maintained  Head of Bed (HOB) Positioning: HOB elevated   Patient in no apparent distress. Sat's 98  % on room air . Will continue to monitor.

## 2024-03-05 NOTE — PROGRESS NOTES
Hospital day 6.  Diagnosis-small-bowel obstruction    Subjective   Feeling better with soft diet  No bloating  Bowel movement today    PE  Afebrile  Vital signs stable  Abdomen-soft, no guarding, no rebound, no tenderness      Impression/plan  SBO resolving  Diet as tolerated

## 2024-03-05 NOTE — SUBJECTIVE & OBJECTIVE
Interval History: Mrs Fonseca gave me her HPI. She men tioned that yesterday she had advanced her diet but it caused abdominal distention and discomfort and so she downgraded her diet. She will increase it later this evening if she tolerates lunch and has a BM. She has pain or nausea at this time and did have a small bowel movement this morning.     Review of Systems   Constitutional:  Negative for activity change, appetite change and fever.   HENT:  Negative for congestion and trouble swallowing.    Eyes:  Negative for visual disturbance.   Respiratory:  Negative for shortness of breath.    Cardiovascular:  Negative for chest pain.   Gastrointestinal:  Positive for abdominal distention and constipation. Negative for abdominal pain, diarrhea, nausea and vomiting.   Genitourinary:  Negative for decreased urine volume, difficulty urinating and dysuria.   Musculoskeletal:  Negative for arthralgias.   Skin:  Negative for wound.   Neurological:  Negative for dizziness.     Objective:     Vital Signs (Most Recent):  Temp: 97.5 °F (36.4 °C) (03/05/24 1139)  Pulse: (!) 56 (03/05/24 1139)  Resp: 18 (03/05/24 1139)  BP: 138/68 (03/05/24 1139)  SpO2: 97 % (03/05/24 1139) Vital Signs (24h Range):  Temp:  [97.5 °F (36.4 °C)-98.2 °F (36.8 °C)] 97.5 °F (36.4 °C)  Pulse:  [54-62] 56  Resp:  [16-18] 18  SpO2:  [95 %-99 %] 97 %  BP: (125-158)/(58-70) 138/68     Weight: 52 kg (114 lb 10.2 oz)  Body mass index is 19.08 kg/m².  No intake or output data in the 24 hours ending 03/05/24 1353      Physical Exam  Vitals and nursing note reviewed.   HENT:      Head: Normocephalic.      Mouth/Throat:      Mouth: Mucous membranes are moist.   Eyes:      Pupils: Pupils are equal, round, and reactive to light.   Cardiovascular:      Rate and Rhythm: Normal rate.   Pulmonary:      Effort: Pulmonary effort is normal.   Abdominal:      General: There is distension.      Palpations: Abdomen is soft.      Tenderness: Tenderness: mild. There is no  "guarding.   Musculoskeletal:         General: Normal range of motion.      Right lower leg: No edema.      Left lower leg: No edema.   Skin:     General: Skin is warm.   Neurological:      General: No focal deficit present.      Mental Status: She is alert and oriented to person, place, and time.   Psychiatric:         Mood and Affect: Mood normal.         Behavior: Behavior normal.             Significant Labs: All pertinent labs within the past 24 hours have been reviewed.  BMP: No results for input(s): "GLU", "NA", "K", "CL", "CO2", "BUN", "CREATININE", "CALCIUM", "MG" in the last 48 hours.  CBC: No results for input(s): "WBC", "HGB", "HCT", "PLT" in the last 48 hours.  CMP: No results for input(s): "NA", "K", "CL", "CO2", "GLU", "BUN", "CREATININE", "CALCIUM", "PROT", "ALBUMIN", "BILITOT", "ALKPHOS", "AST", "ALT", "ANIONGAP", "EGFRNONAA" in the last 48 hours.    Invalid input(s): "ESTGFAFR[place]"    Significant Imaging: I have reviewed all pertinent imaging results/findings within the past 24 hours.  X-Ray Abdomen AP 1 View  Narrative: EXAMINATION:  XR ABDOMEN AP 1 VIEW    CLINICAL HISTORY:  SBO;    TECHNIQUE:  AP View(s) of the abdomen was performed.    COMPARISON:  02/29/2024    FINDINGS:  Persistent gaseous distention of the stomach which may be slightly improved.  Mildly distended bowel loops in the right abdomen, improving.  Moderate colonic stool burden.    Clear lung bases.  Regional skeleton shows degenerative change.  Impression: Improving small bowel obstruction.    Electronically signed by: Radha Porter  Date:    03/04/2024  Time:    09:59      "

## 2024-03-05 NOTE — ASSESSMENT & PLAN NOTE
Presenting with nausea, abdominal pain and distention. CT showed Significant small-bowel obstruction with the transition point in the mid upper pelvis     -consult General Surgery, recommendations appreciated  - maintenance IVF discontinued  - eliquis on hold pending procedure >> resume 3/3  - passing flatus, trial liquid diet 3/2, tolerating and having Bms  - slowly advancing diet, tolerating PO  - KUB 3/4 confirms resolving SBO  - diet downgraded for increased ab distention 3/3  - tolerating soft diet 3/5. Had BM  -PRN antiemetic and pain medication

## 2024-03-05 NOTE — PROGRESS NOTES
[facility] Surg [address] Medicine  Progress Note    Patient Name: Marian Durham  MRN: [id number]  Patient Class: IP- Inpatient   Admission Date: 2/29/2024  Length of Stay: 4 days  Attending Physician: Yandy Horne MD  Primary Care Provider: Suzanna Duran MD        Subjective:     Principal Problem:SBO (small bowel obstruction)        HPI:  Marian Durham is a 78 year old female with a past medical history of atrial fibrillation, hyper lipidemia and past small-bowel obstructions who presented with abdominal distention and abdominal pain with associated nausea and belching for the past 5 hours.  Patient reports symptoms are similar to those of prior small-bowel obstructions.  She reports experiencing normal bowel movement today with no blood in stool.  Patient denies nausea and vomiting.    ED workup revealed WBC 15.2 and otherwise unremarkable labs.  Abdominal x-ray showed findings concerning for small bowel obstruction CT abdomen and pelvis revealed significant small-bowel obstruction with a transition point in the mid upper pelvis.  ED provider spoke to Dr. Werner, general surgery, who agreed to consult.  Patient received IV morphine in the ED with some relief and was referred to Hospital Medicine.  Patient will be placed in observation for further evaluation and management.    Overview/Hospital Course:  Marian Durham was admitted for SBO as confirmed on CT. Nausea and vomiting improved since admission. Surgery consulted for further assistance. Symptoms improving, passing flatus and BM. Tolerating PO, slowly advancing diet    Interval History: Mrs oFnseca gave me her HPI. She men tioned that yesterday she had advanced her diet but it caused abdominal distention and discomfort and so she downgraded her diet. She will increase it later this evening if she tolerates lunch and has a BM. She has pain or nausea at this time and did have a small bowel movement this morning.     Review of  Systems   Constitutional:  Negative for activity change, appetite change and fever.   HENT:  Negative for congestion and trouble swallowing.    Eyes:  Negative for visual disturbance.   Respiratory:  Negative for shortness of breath.    Cardiovascular:  Negative for chest pain.   Gastrointestinal:  Positive for abdominal distention and constipation. Negative for abdominal pain, diarrhea, nausea and vomiting.   Genitourinary:  Negative for decreased urine volume, difficulty urinating and dysuria.   Musculoskeletal:  Negative for arthralgias.   Skin:  Negative for wound.   Neurological:  Negative for dizziness.     Objective:     Vital Signs (Most Recent):  Temp: 97.5 °F (36.4 °C) (03/05/24 1139)  Pulse: (!) 56 (03/05/24 1139)  Resp: 18 (03/05/24 1139)  BP: 138/68 (03/05/24 1139)  SpO2: 97 % (03/05/24 1139) Vital Signs (24h Range):  Temp:  [97.5 °F (36.4 °C)-98.2 °F (36.8 °C)] 97.5 °F (36.4 °C)  Pulse:  [54-62] 56  Resp:  [16-18] 18  SpO2:  [95 %-99 %] 97 %  BP: (125-158)/(58-70) 138/68     Weight: 52 kg (114 lb 10.2 oz)  Body mass index is 19.08 kg/m².  No intake or output data in the 24 hours ending 03/05/24 1353      Physical Exam  Vitals and nursing note reviewed.   HENT:      Head: Normocephalic.      Mouth/Throat:      Mouth: Mucous membranes are moist.   Eyes:      Pupils: Pupils are equal, round, and reactive to light.   Cardiovascular:      Rate and Rhythm: Normal rate.   Pulmonary:      Effort: Pulmonary effort is normal.   Abdominal:      General: There is distension.      Palpations: Abdomen is soft.      Tenderness: Tenderness: mild. There is no guarding.   Musculoskeletal:         General: Normal range of motion.      Right lower leg: No edema.      Left lower leg: No edema.   Skin:     General: Skin is warm.   Neurological:      General: No focal deficit present.      Mental Status: She is alert and oriented to person, place, and time.   Psychiatric:         Mood and Affect: Mood normal.          "Behavior: Behavior normal.             Significant Labs: All pertinent labs within the past 24 hours have been reviewed.  BMP: No results for input(s): "GLU", "NA", "K", "CL", "CO2", "BUN", "CREATININE", "CALCIUM", "MG" in the last 48 hours.  CBC: No results for input(s): "WBC", "HGB", "HCT", "PLT" in the last 48 hours.  CMP: No results for input(s): "NA", "K", "CL", "CO2", "GLU", "BUN", "CREATININE", "CALCIUM", "PROT", "ALBUMIN", "BILITOT", "ALKPHOS", "AST", "ALT", "ANIONGAP", "EGFRNONAA" in the last 48 hours.    Invalid input(s): "ESTGFA[place]"    Significant Imaging: I have reviewed all pertinent imaging results/findings within the past 24 hours.  X-Ray Abdomen AP 1 View  Narrative: EXAMINATION:  XR ABDOMEN AP 1 VIEW    CLINICAL HISTORY:  SBO;    TECHNIQUE:  AP View(s) of the abdomen was performed.    COMPARISON:  02/29/2024    FINDINGS:  Persistent gaseous distention of the stomach which may be slightly improved.  Mildly distended bowel loops in the right abdomen, improving.  Moderate colonic stool burden.    Clear lung bases.  Regional skeleton shows degenerative change.  Impression: Improving small bowel obstruction.    Electronically signed by: Radha Porter  Date:    03/04/2024  Time:    09:59        Assessment/Plan:      * SBO (small bowel obstruction)  Presenting with nausea, abdominal pain and distention. CT showed Significant small-bowel obstruction with the transition point in the mid upper pelvis     -consult General Surgery, recommendations appreciated  - maintenance IVF discontinued  - eliquis on hold pending procedure >> resume 3/3  - passing flatus, trial liquid diet 3/2, tolerating and having Bms  - slowly advancing diet, tolerating PO  - KUB 3/4 confirms resolving SBO  - diet downgraded for increased ab distention 3/3  - tolerating soft diet 3/5. Had BM  -PRN antiemetic and pain medication    Hypercholesterolemia  History noted.  Currently taking atorvastatin    -continue atorvastatin 20 mg " daily      Atrial fibrillation  History noted, followed by cardiology -Dr Clarke. Patient taking metoprolol, flecainide and anticoagulated with Eliquis    -telemetry monitoring  -hold Eliquis, pending general surgery consult >> resume 3/3 as her SBO is resolving  -continue metoprolol 25 milligram b.i.d.  -continue flecainide 100 mg b.i.d.        VTE Risk Mitigation (From admission, onward)           Ordered     apixaban tablet 2.5 mg  2 times daily         03/03/24 0954     IP VTE HIGH RISK PATIENT  Once         02/29/24 1853     Place sequential compression device  Until discontinued         02/29/24 1853     Reason for No Pharmacological VTE Prophylaxis  Once        Question:  Reasons:  Answer:  Already adequately anticoagulated on oral Anticoagulants    02/29/24 1853                    Discharge Planning   WILLIAN: 3/7/2024     Code Status: Full Code   Is the patient medically ready for discharge?:     Reason for patient still in hospital (select all that apply): Patient trending condition  Discharge Plan A: Home with family, Home                  Karmen Pollock DNP  Department of Hospital Medicine   [facility] ([address])

## 2024-03-06 PROCEDURE — 11000001 HC ACUTE MED/SURG PRIVATE ROOM

## 2024-03-06 PROCEDURE — 25000003 PHARM REV CODE 250: Performed by: NURSE PRACTITIONER

## 2024-03-06 PROCEDURE — 99221 1ST HOSP IP/OBS SF/LOW 40: CPT | Mod: ,,, | Performed by: SPECIALIST

## 2024-03-06 PROCEDURE — 25000003 PHARM REV CODE 250: Performed by: PHYSICIAN ASSISTANT

## 2024-03-06 RX ADMIN — METOPROLOL SUCCINATE 25 MG: 25 TABLET, EXTENDED RELEASE ORAL at 09:03

## 2024-03-06 RX ADMIN — ATORVASTATIN CALCIUM 20 MG: 20 TABLET, FILM COATED ORAL at 09:03

## 2024-03-06 RX ADMIN — Medication 6 MG: at 09:03

## 2024-03-06 RX ADMIN — DOCUSATE SODIUM 100 MG: 100 CAPSULE, LIQUID FILLED ORAL at 09:03

## 2024-03-06 RX ADMIN — FLECAINIDE ACETATE 100 MG: 50 TABLET ORAL at 09:03

## 2024-03-06 RX ADMIN — APIXABAN 2.5 MG: 2.5 TABLET, FILM COATED ORAL at 09:03

## 2024-03-06 NOTE — PROGRESS NOTES
[facility] Surg [address] Medicine  Progress Note    Patient Name: Marian Durham  MRN: [id number]  Patient Class: IP- Inpatient   Admission Date: 2/29/2024  Length of Stay: 5 days  Attending Physician: Yandy Horne MD  Primary Care Provider: Suzanna Duran MD        Subjective:     Principal Problem:SBO (small bowel obstruction)        HPI:  Marian Durham is a 78 year old female with a past medical history of atrial fibrillation, hyper lipidemia and past small-bowel obstructions who presented with abdominal distention and abdominal pain with associated nausea and belching for the past 5 hours.  Patient reports symptoms are similar to those of prior small-bowel obstructions.  She reports experiencing normal bowel movement today with no blood in stool.  Patient denies nausea and vomiting.    ED workup revealed WBC 15.2 and otherwise unremarkable labs.  Abdominal x-ray showed findings concerning for small bowel obstruction CT abdomen and pelvis revealed significant small-bowel obstruction with a transition point in the mid upper pelvis.  ED provider spoke to Dr. Werner, general surgery, who agreed to consult.  Patient received IV morphine in the ED with some relief and was referred to Hospital Medicine.  Patient will be placed in observation for further evaluation and management.    Overview/Hospital Course:  Marian Durham was admitted for SBO as confirmed on CT. Nausea and vomiting improved since admission. Surgery consulted for further assistance. Symptoms improving, passing flatus and BM. Tolerating PO, slowly advancing diet    Interval History: Mrs Fonseca is concerned that her bowel movements have been smaller than usual. She denies abdominal pain.  She tolerated soft diet and has been upgraded to bland diet.     Review of Systems   Constitutional:  Negative for activity change, appetite change and fever.   HENT:  Negative for congestion and trouble swallowing.    Eyes:  Negative  for visual disturbance.   Respiratory:  Negative for shortness of breath.    Cardiovascular:  Negative for chest pain.   Gastrointestinal:  Positive for constipation (BM 3/5 but feels that she has stool burden bc of small bowel movements). Negative for abdominal distention, abdominal pain, diarrhea, nausea and vomiting.   Genitourinary:  Negative for decreased urine volume, difficulty urinating and dysuria.   Musculoskeletal:  Negative for arthralgias.   Skin:  Negative for wound.   Neurological:  Negative for dizziness.     Objective:     Vital Signs (Most Recent):  Temp: 97.5 °F (36.4 °C) (03/06/24 0752)  Pulse: (!) 55 (03/06/24 0752)  Resp: 18 (03/06/24 0752)  BP: 138/66 (03/06/24 0752)  SpO2: 95 % (03/06/24 0752) Vital Signs (24h Range):  Temp:  [97.5 °F (36.4 °C)-98.4 °F (36.9 °C)] 97.5 °F (36.4 °C)  Pulse:  [55-60] 55  Resp:  [18-19] 18  SpO2:  [95 %-98 %] 95 %  BP: (102-145)/(56-68) 138/66     Weight: 53.5 kg (117 lb 15.1 oz)  Body mass index is 19.63 kg/m².    Intake/Output Summary (Last 24 hours) at 3/6/2024 1145  Last data filed at 3/6/2024 0552  Gross per 24 hour   Intake 900 ml   Output --   Net 900 ml         Physical Exam  Vitals and nursing note reviewed.   HENT:      Head: Normocephalic.      Mouth/Throat:      Mouth: Mucous membranes are moist.   Eyes:      Pupils: Pupils are equal, round, and reactive to light.   Cardiovascular:      Rate and Rhythm: Normal rate.   Pulmonary:      Effort: Pulmonary effort is normal.   Abdominal:      General: There is distension (mild).      Palpations: Abdomen is soft.      Tenderness: There is no abdominal tenderness (mild). There is no guarding.   Musculoskeletal:         General: Normal range of motion.      Right lower leg: No edema.      Left lower leg: No edema.   Skin:     General: Skin is warm.   Neurological:      General: No focal deficit present.      Mental Status: She is alert and oriented to person, place, and time.   Psychiatric:         Mood and  "Affect: Mood normal.         Behavior: Behavior normal.             Significant Labs: All pertinent labs within the past 24 hours have been reviewed.  BMP: No results for input(s): "GLU", "NA", "K", "CL", "CO2", "BUN", "CREATININE", "CALCIUM", "MG" in the last 48 hours.  CBC: No results for input(s): "WBC", "HGB", "HCT", "PLT" in the last 48 hours.  CMP: No results for input(s): "NA", "K", "CL", "CO2", "GLU", "BUN", "CREATININE", "CALCIUM", "PROT", "ALBUMIN", "BILITOT", "ALKPHOS", "AST", "ALT", "ANIONGAP", "EGFRNONAA" in the last 48 hours.    Invalid input(s): "ESTGFA[place]"    Significant Imaging: I have reviewed all pertinent imaging results/findings within the past 24 hours.  X-Ray Abdomen AP 1 View  Narrative: EXAMINATION:  XR ABDOMEN AP 1 VIEW    CLINICAL HISTORY:  SBO;    TECHNIQUE:  AP View(s) of the abdomen was performed.    COMPARISON:  02/29/2024    FINDINGS:  Persistent gaseous distention of the stomach which may be slightly improved.  Mildly distended bowel loops in the right abdomen, improving.  Moderate colonic stool burden.    Clear lung bases.  Regional skeleton shows degenerative change.  Impression: Improving small bowel obstruction.    Electronically signed by: Radha Porter  Date:    03/04/2024  Time:    09:59        Assessment/Plan:      * SBO (small bowel obstruction)  Presenting with nausea, abdominal pain and distention. CT showed Significant small-bowel obstruction with the transition point in the mid upper pelvis     -consult General Surgery, recommendations appreciated  - maintenance IVF discontinued  - eliquis on hold pending procedure >> resume 3/3  - passing flatus, trial liquid diet 3/2, tolerating and having Bms  - slowly advancing diet, tolerating PO  - KUB 3/4 confirms resolving SBO  - diet downgraded for increased ab distention 3/3  - tolerating soft diet 3/5. Had BM  -PRN antiemetic and pain medication  -repeat KUB 3/7    Hypercholesterolemia  History noted.  Currently taking " atorvastatin    -continue atorvastatin 20 mg daily      Atrial fibrillation  History noted, followed by cardiology -Dr Clarke. Patient taking metoprolol, flecainide and anticoagulated with Eliquis    -telemetry monitoring  -hold Eliquis, pending general surgery consult >> resume 3/3 as her SBO is resolving  -continue metoprolol 25 milligram b.i.d.  -continue flecainide 100 mg b.i.d.        VTE Risk Mitigation (From admission, onward)           Ordered     apixaban tablet 2.5 mg  2 times daily         03/03/24 0954     IP VTE HIGH RISK PATIENT  Once         02/29/24 1853     Place sequential compression device  Until discontinued         02/29/24 1853     Reason for No Pharmacological VTE Prophylaxis  Once        Question:  Reasons:  Answer:  Already adequately anticoagulated on oral Anticoagulants    02/29/24 1853                    Discharge Planning   WILLIAN: 3/7/2024     Code Status: Full Code   Is the patient medically ready for discharge?:     Reason for patient still in hospital (select all that apply): Patient trending condition  Discharge Plan A: Home with family, Home                  Karmen Pollock DNP  Department of Hospital Medicine   [facility] ([address])

## 2024-03-06 NOTE — ASSESSMENT & PLAN NOTE
Presenting with nausea, abdominal pain and distention. CT showed Significant small-bowel obstruction with the transition point in the mid upper pelvis     -consult General Surgery, recommendations appreciated  - maintenance IVF discontinued  - eliquis on hold pending procedure >> resume 3/3  - passing flatus, trial liquid diet 3/2, tolerating and having Bms  - slowly advancing diet, tolerating PO  - KUB 3/4 confirms resolving SBO  - diet downgraded for increased ab distention 3/3  - tolerating soft diet 3/5. Had BM  -PRN antiemetic and pain medication  -repeat KUB 3/7

## 2024-03-06 NOTE — PROGRESS NOTES
House day 7.  Diagnosis-small-bowel obstruction    Subjective   Tolerating soft diet  Small bowel  No nausea/vomiting    PE  Afebrile  Vital signs stable  Abdomen-soft, no guarding no tenderness, no rebound  Extremities-no edema, tenderness    Impression/plan   Resolving SBO  Low residue diet

## 2024-03-06 NOTE — PLAN OF CARE
Medicare Message     Important Message from Medicare regarding Discharge Appeal Rights -- Given to patient/caregiver; Explained to patient/caregiver; Signed/date by patient/caregiver Given to patient/caregiver; Explained to patient/caregiver; Signed/date by patient/caregiver   Date IMM was signed -- 3/4/2024 3/6/2024   Time IMM was signed -- [id number]

## 2024-03-06 NOTE — PLAN OF CARE
Problem: Adult Inpatient Plan of Care  Goal: Plan of Care Review  Outcome: Ongoing, Progressing  Goal: Patient-Specific Goal (Individualized)  Outcome: Ongoing, Progressing  Goal: Absence of Hospital-Acquired Illness or Injury  Outcome: Ongoing, Progressing  Goal: Optimal Comfort and Wellbeing  Outcome: Ongoing, Progressing  Goal: Readiness for Transition of Care  Outcome: Ongoing, Progressing     Problem: Infection  Goal: Absence of Infection Signs and Symptoms  Outcome: Ongoing, Progressing     Problem: Gas Exchange Impaired  Goal: Optimal Gas Exchange  Outcome: Ongoing, Progressing     Problem: Fluid Deficit (Intestinal Obstruction)  Goal: Fluid Balance  Outcome: Ongoing, Progressing     Problem: Infection (Intestinal Obstruction)  Goal: Absence of Infection Signs and Symptoms  Outcome: Ongoing, Progressing     Problem: Nausea and Vomiting (Intestinal Obstruction)  Goal: Nausea and Vomiting Relief  Outcome: Ongoing, Progressing     Problem: Pain (Intestinal Obstruction)  Goal: Acceptable Pain Control  Outcome: Ongoing, Progressing

## 2024-03-06 NOTE — SUBJECTIVE & OBJECTIVE
Interval History: Mrs Fonseca is concerned that her bowel movements have been smaller than usual. She denies abdominal pain.  She tolerated soft diet and has been upgraded to bland diet.     Review of Systems   Constitutional:  Negative for activity change, appetite change and fever.   HENT:  Negative for congestion and trouble swallowing.    Eyes:  Negative for visual disturbance.   Respiratory:  Negative for shortness of breath.    Cardiovascular:  Negative for chest pain.   Gastrointestinal:  Positive for constipation (BM 3/5 but feels that she has stool burden bc of small bowel movements). Negative for abdominal distention, abdominal pain, diarrhea, nausea and vomiting.   Genitourinary:  Negative for decreased urine volume, difficulty urinating and dysuria.   Musculoskeletal:  Negative for arthralgias.   Skin:  Negative for wound.   Neurological:  Negative for dizziness.     Objective:     Vital Signs (Most Recent):  Temp: 97.5 °F (36.4 °C) (03/06/24 0752)  Pulse: (!) 55 (03/06/24 0752)  Resp: 18 (03/06/24 0752)  BP: 138/66 (03/06/24 0752)  SpO2: 95 % (03/06/24 0752) Vital Signs (24h Range):  Temp:  [97.5 °F (36.4 °C)-98.4 °F (36.9 °C)] 97.5 °F (36.4 °C)  Pulse:  [55-60] 55  Resp:  [18-19] 18  SpO2:  [95 %-98 %] 95 %  BP: (102-145)/(56-68) 138/66     Weight: 53.5 kg (117 lb 15.1 oz)  Body mass index is 19.63 kg/m².    Intake/Output Summary (Last 24 hours) at 3/6/2024 1145  Last data filed at 3/6/2024 0552  Gross per 24 hour   Intake 900 ml   Output --   Net 900 ml         Physical Exam  Vitals and nursing note reviewed.   HENT:      Head: Normocephalic.      Mouth/Throat:      Mouth: Mucous membranes are moist.   Eyes:      Pupils: Pupils are equal, round, and reactive to light.   Cardiovascular:      Rate and Rhythm: Normal rate.   Pulmonary:      Effort: Pulmonary effort is normal.   Abdominal:      General: There is distension (mild).      Palpations: Abdomen is soft.      Tenderness: There is no abdominal  "tenderness (mild). There is no guarding.   Musculoskeletal:         General: Normal range of motion.      Right lower leg: No edema.      Left lower leg: No edema.   Skin:     General: Skin is warm.   Neurological:      General: No focal deficit present.      Mental Status: She is alert and oriented to person, place, and time.   Psychiatric:         Mood and Affect: Mood normal.         Behavior: Behavior normal.             Significant Labs: All pertinent labs within the past 24 hours have been reviewed.  BMP: No results for input(s): "GLU", "NA", "K", "CL", "CO2", "BUN", "CREATININE", "CALCIUM", "MG" in the last 48 hours.  CBC: No results for input(s): "WBC", "HGB", "HCT", "PLT" in the last 48 hours.  CMP: No results for input(s): "NA", "K", "CL", "CO2", "GLU", "BUN", "CREATININE", "CALCIUM", "PROT", "ALBUMIN", "BILITOT", "ALKPHOS", "AST", "ALT", "ANIONGAP", "EGFRNONAA" in the last 48 hours.    Invalid input(s): "ESTGFAFRICA"    Significant Imaging: I have reviewed all pertinent imaging results/findings within the past 24 hours.  X-Ray Abdomen AP 1 View  Narrative: EXAMINATION:  XR ABDOMEN AP 1 VIEW    CLINICAL HISTORY:  SBO;    TECHNIQUE:  AP View(s) of the abdomen was performed.    COMPARISON:  02/29/2024    FINDINGS:  Persistent gaseous distention of the stomach which may be slightly improved.  Mildly distended bowel loops in the right abdomen, improving.  Moderate colonic stool burden.    Clear lung bases.  Regional skeleton shows degenerative change.  Impression: Improving small bowel obstruction.    Electronically signed by: Radha Porter  Date:    03/04/2024  Time:    09:59      "

## 2024-03-06 NOTE — PLAN OF CARE
Problem: Adult Inpatient Plan of Care  Goal: Plan of Care Review  Outcome: Ongoing, Progressing  Goal: Absence of Hospital-Acquired Illness or Injury  Outcome: Ongoing, Progressing  Goal: Optimal Comfort and Wellbeing  Outcome: Ongoing, Progressing     Problem: Infection  Goal: Absence of Infection Signs and Symptoms  Outcome: Ongoing, Progressing     Problem: Fluid Deficit (Intestinal Obstruction)  Goal: Fluid Balance  Outcome: Ongoing, Progressing     Problem: Pain (Intestinal Obstruction)  Goal: Acceptable Pain Control  Outcome: Ongoing, Progressing   POC reviewed with pt who verbalized understanding. AAOx4. VSS on RA. No complaints of pain or nausea. Up independently to bathroom. Fluids encouraged. Resting between care. Remains free of falls and injury. Call light in reach and rounds made for safety.

## 2024-03-07 VITALS
HEART RATE: 57 BPM | DIASTOLIC BLOOD PRESSURE: 51 MMHG | BODY MASS INDEX: 18.66 KG/M2 | WEIGHT: 112 LBS | TEMPERATURE: 98 F | SYSTOLIC BLOOD PRESSURE: 113 MMHG | OXYGEN SATURATION: 97 % | RESPIRATION RATE: 20 BRPM | HEIGHT: 65 IN

## 2024-03-07 PROCEDURE — 25000003 PHARM REV CODE 250: Performed by: NURSE PRACTITIONER

## 2024-03-07 PROCEDURE — 94761 N-INVAS EAR/PLS OXIMETRY MLT: CPT

## 2024-03-07 PROCEDURE — 25000003 PHARM REV CODE 250: Performed by: PHYSICIAN ASSISTANT

## 2024-03-07 RX ADMIN — METOPROLOL SUCCINATE 25 MG: 25 TABLET, EXTENDED RELEASE ORAL at 10:03

## 2024-03-07 RX ADMIN — FLECAINIDE ACETATE 100 MG: 50 TABLET ORAL at 11:03

## 2024-03-07 RX ADMIN — APIXABAN 2.5 MG: 2.5 TABLET, FILM COATED ORAL at 10:03

## 2024-03-07 RX ADMIN — DOCUSATE SODIUM 100 MG: 100 CAPSULE, LIQUID FILLED ORAL at 10:03

## 2024-03-07 RX ADMIN — ATORVASTATIN CALCIUM 20 MG: 20 TABLET, FILM COATED ORAL at 10:03

## 2024-03-07 NOTE — PLAN OF CARE
Problem: Adult Inpatient Plan of Care  Goal: Plan of Care Review  Outcome: Ongoing, Progressing  Goal: Absence of Hospital-Acquired Illness or Injury  Outcome: Ongoing, Progressing  Goal: Optimal Comfort and Wellbeing  Outcome: Ongoing, Progressing     Problem: Fluid Deficit (Intestinal Obstruction)  Goal: Fluid Balance  Outcome: Ongoing, Progressing    POC reviewed with pt who verbalized understanding. AAOx4. VSS on RA. No complaints of pain or nausea. Up independently to bathroom. Fluids encouraged. Resting between care. Remains free of falls and injury. Call light in reach and rounds made for safety.

## 2024-03-07 NOTE — PLAN OF CARE
12:09 PM  SBO-   Tolerating low fiber/low residual diet  Ambulating hallway independently   BM x2. Hopeful for DC home

## 2024-03-07 NOTE — PLAN OF CARE
Patient has her follow up appointment scheduled with her PCP. Patient's preferred pharmacy is bedside. Patient's family will provide transportation home up discharge.     [facility] ([address])  Discharge Final Note    Primary Care Provider: Suzanna Duran MD    Expected Discharge Date: 3/7/2024    Final Discharge Note (most recent)       Final Note - 03/07/24 1408          Final Note    Assessment Type Final Discharge Note (P)      Anticipated Discharge Disposition Home or Self Care (P)      Hospital Resources/Appts/Education Provided Provided patient/caregiver with written discharge plan information;Appointments scheduled and added to AVS (P)         Post-Acute Status    Post-Acute Authorization Other (P)      Other Status No Post-Acute Service Needs (P)      Discharge Delays None known at this time (P)                      Important Message from Medicare  Important Message from Medicare regarding Discharge Appeal Rights: Given to patient/caregiver, Explained to patient/caregiver, Signed/date by patient/caregiver     Date IMM was signed: 03/06/24  Time IMM was signed: 0915    Contact Info       Suzanna Duran MD   Specialty: Internal Medicine   Relationship: PCP - General    [address] [place] [address]   Phone: [phone]       Next Steps: Go on 3/13/2024    Instructions: Hospital follow-up 10:00am

## 2024-03-08 NOTE — DISCHARGE SUMMARY
[facility] Surg [address] Medicine  Discharge Summary      Patient Name: Marian Durham  MRN: [id number]  [facility]: [id number]  Patient Class: IP- Inpatient  Admission Date: 2/29/2024  Hospital Length of Stay: 6 days  Discharge Date and Time:  03/07/2024 8:03 PM  Attending Physician: No att. providers found   Discharging Provider: Karmen Pollock DNP  Primary Care Provider: Suzanna Duran MD    Primary Care Team: Networked reference to record PCT     HPI:   Marian Durham is a 78 year old female with a past medical history of atrial fibrillation, hyper lipidemia and past small-bowel obstructions who presented with abdominal distention and abdominal pain with associated nausea and belching for the past 5 hours.  Patient reports symptoms are similar to those of prior small-bowel obstructions.  She reports experiencing normal bowel movement today with no blood in stool.  Patient denies nausea and vomiting.    ED workup revealed WBC 15.2 and otherwise unremarkable labs.  Abdominal x-ray showed findings concerning for small bowel obstruction CT abdomen and pelvis revealed significant small-bowel obstruction with a transition point in the mid upper pelvis.  ED provider spoke to Dr. Werner, general surgery, who agreed to consult.  Patient received IV morphine in the ED with some relief and was referred to Hospital Medicine.  Patient will be placed in observation for further evaluation and management.    * No surgery found *      Hospital Course:   Marian Durham was admitted for SBO as confirmed on CT. Nausea and vomiting improved since admission. Surgery consulted for further assistance. Symptoms improving, passing flatus and BM. Tolerating PO, slowly advancing diet.  Repeat KUB demonstrated improved bowel appearance and patient had multiple bowel movements. Today, Ms Fonseca had no pain, tolerated her diet fully, and reported that her bowels had returned to normal. She was in agreement to her  discharge home.      Goals of Care Treatment Preferences:  Code Status: Full Code    Living Will: Yes              Consults:     No new Assessment & Plan notes have been filed under this hospital service since the last note was generated.  Service: Hospital Medicine    Final Active Diagnoses:    Diagnosis Date Noted POA    PRINCIPAL PROBLEM:  SBO (small bowel obstruction) [K56.609] 04/22/2022 Yes    Hypercholesterolemia [E78.00] 02/02/2024 Yes    Atrial fibrillation [I48.91] 06/30/2023 Yes      Problems Resolved During this Admission:       Discharged Condition: good    Disposition: Home or Self Care    Follow Up:   Follow-up Information       Suzanna Duran MD. Go on 3/13/2024.    Specialty: Internal Medicine  Why: Hospital follow-up 10:00am  Contact information:  [facility] [facility] [address] [phone]                           Patient Instructions:      Notify your health care provider if you experience any of the following:  temperature >100.4     Notify your health care provider if you experience any of the following:  redness, tenderness, or signs of infection (pain, swelling, redness, odor or green/yellow discharge around incision site)     Notify your health care provider if you experience any of the following:  severe persistent headache     Activity as tolerated       Significant Diagnostic Studies: N/A    Pending Diagnostic Studies:       None           Medications:  Reconciled Home Medications:      Medication List        CONTINUE taking these medications      apixaban 2.5 mg Tab  Commonly known as: ELIQUIS  Take 1 tablet (2.5 mg total) by mouth 2 (two) times daily.     atorvastatin 20 MG tablet  Commonly known as: LIPITOR  Take 1 tablet (20 mg total) by mouth once daily.     calcium-vitamin D3 500 mg-5 mcg (200 unit) per tablet  Commonly known as: OS- + D3  Take 1 tablet by mouth 2 (two) times daily with meals.     celecoxib 200 MG capsule  Commonly known as:  CeleBREX  Take 1 capsule (200 mg total) by mouth once daily.     estradioL 0.01 % (0.1 mg/gram) vaginal cream  Commonly known as: ESTRACE  Place 1 g vaginally 3 (three) times a week.     flecainide 100 MG Tab  Commonly known as: TAMBOCOR  Take 1 tablet (100 mg total) by mouth every 12 (twelve) hours.     glucosamine-chondroitin 500-400 mg tablet  Take 1 tablet by mouth 3 (three) times daily.     metoprolol succinate 25 MG 24 hr tablet  Commonly known as: TOPROL-XL  Take 1 tablet (25 mg total) by mouth every 12 (twelve) hours.     polyethylene glycol 17 gram/dose powder  Commonly known as: MIRALAX  Take 17 g by mouth 3 (three) times daily as needed (Constipation).              Indwelling Lines/Drains at time of discharge:   Lines/Drains/Airways       None                   Time spent on the discharge of patient: 50 minutes         Karmen Pollock DNP  Department of Hospital Medicine  [facility] ([address])

## 2024-03-11 NOTE — PHYSICIAN QUERY
PT Name: Marian Durham  MR #: [id number]     DOCUMENTATION CLARIFICATION     CDS: Salina MONTALVO RN  Contact information: [email or web address]    This form is a permanent document in the medical record.     Query Date: March 11, 2024    By submitting this query, we are merely seeking further clarification of documentation. Please utilize your independent clinical judgment when addressing the question(s) below.    The medical record reflects the following:     Indicators   Supporting Clinical Findings Location in Medical Record   X Bowel obstruction, intestinal obstruction, LBO or SBO documented * SBO (small bowel obstruction)  Presenting with nausea, abdominal pain and distention. CT showed Significant small-bowel obstruction with the transition point in the mid upper pelvis     Imaging study shows small bowel obstruction.  Patient's past medical history is significant for a hysterectomy in the distant past and 2 admissions for small-bowel obstruction in the last 18 months. H&P Hosp Med 2/29/24        Consults Gen Surg 3/1/24   X Radiology findings Significant small-bowel obstruction with the transition point in the mid upper pelvis.     The presence of dilated small bowel with differential fluid levels is often indicative of a small bowel obstruction. The patient does have mild constipation.  CT Abd Pelvis 2/29/24    XR Abd 3/4/24   X Treatment/Medication -consult General Surgery, recommendations appreciated  - NPO  - maintenance IVF  - eliquis on hold until surgery recommendations  -PRN antiemetic and pain medication    - okay for miralax, suppositories as needed for constipation  PN Hosp Med 3/1/24            PN Colorectal Surg 3/2/24    Procedure/Surgery      Other       Provider, please further specify the bowel obstruction diagnosis:  [  X ] Partial or incomplete intestinal obstruction, due to adhesions   [   ] Partial or incomplete intestinal obstruction, due to other (please specify): ____________    [   ] Partial or incomplete intestinal obstruction, unknown or unspecified etiology   [   ] Complete intestinal obstruction, due to adhesions   [   ] Complete intestinal obstruction, due to other (please specify): ____________   [   ] Complete intestinal obstruction, unknown or unspecified etiology   [   ] Other intestinal condition (please specify): _____________________   [   ]  Clinically Undetermined         Please document in your progress notes daily for the duration of treatment until resolved, and include in your discharge summary.    Form No. [id number]

## 2024-05-29 ENCOUNTER — OFFICE VISIT (OUTPATIENT)
Dept: CARDIOLOGY | Facility: CLINIC | Age: 79
End: 2024-05-29
Attending: INTERNAL MEDICINE
Payer: MEDICARE

## 2024-05-29 ENCOUNTER — PATIENT MESSAGE (OUTPATIENT)
Dept: CARDIOLOGY | Facility: CLINIC | Age: 79
End: 2024-05-29

## 2024-05-29 VITALS
SYSTOLIC BLOOD PRESSURE: 103 MMHG | DIASTOLIC BLOOD PRESSURE: 60 MMHG | WEIGHT: 115.19 LBS | HEART RATE: 54 BPM | OXYGEN SATURATION: 97 % | BODY MASS INDEX: 19.19 KG/M2 | HEIGHT: 65 IN

## 2024-05-29 DIAGNOSIS — I48.0 PAROXYSMAL ATRIAL FIBRILLATION: ICD-10-CM

## 2024-05-29 DIAGNOSIS — Z79.01 CHRONIC ANTICOAGULATION: ICD-10-CM

## 2024-05-29 DIAGNOSIS — Z79.899 HIGH RISK MEDICATION USE: ICD-10-CM

## 2024-05-29 DIAGNOSIS — E78.00 HYPERCHOLESTEROLEMIA: ICD-10-CM

## 2024-05-29 PROCEDURE — 93000 ELECTROCARDIOGRAM COMPLETE: CPT | Mod: S$GLB,,, | Performed by: INTERNAL MEDICINE

## 2024-05-29 PROCEDURE — 99214 OFFICE O/P EST MOD 30 MIN: CPT | Mod: 25,S$GLB,, | Performed by: INTERNAL MEDICINE

## 2024-05-29 PROCEDURE — 1125F AMNT PAIN NOTED PAIN PRSNT: CPT | Mod: CPTII,S$GLB,, | Performed by: INTERNAL MEDICINE

## 2024-05-29 PROCEDURE — 3288F FALL RISK ASSESSMENT DOCD: CPT | Mod: CPTII,S$GLB,, | Performed by: INTERNAL MEDICINE

## 2024-05-29 PROCEDURE — 93005 ELECTROCARDIOGRAM TRACING: CPT

## 2024-05-29 PROCEDURE — 1159F MED LIST DOCD IN RCRD: CPT | Mod: CPTII,S$GLB,, | Performed by: INTERNAL MEDICINE

## 2024-05-29 PROCEDURE — 1101F PT FALLS ASSESS-DOCD LE1/YR: CPT | Mod: CPTII,S$GLB,, | Performed by: INTERNAL MEDICINE

## 2024-05-29 PROCEDURE — 1157F ADVNC CARE PLAN IN RCRD: CPT | Mod: CPTII,S$GLB,, | Performed by: INTERNAL MEDICINE

## 2024-05-29 PROCEDURE — 99999 PR PBB SHADOW E&M-EST. PATIENT-LVL III: CPT | Mod: PBBFAC,,, | Performed by: INTERNAL MEDICINE

## 2024-05-29 PROCEDURE — 1160F RVW MEDS BY RX/DR IN RCRD: CPT | Mod: CPTII,S$GLB,, | Performed by: INTERNAL MEDICINE

## 2024-05-29 PROCEDURE — 3074F SYST BP LT 130 MM HG: CPT | Mod: CPTII,S$GLB,, | Performed by: INTERNAL MEDICINE

## 2024-05-29 PROCEDURE — 3078F DIAST BP <80 MM HG: CPT | Mod: CPTII,S$GLB,, | Performed by: INTERNAL MEDICINE

## 2024-05-29 RX ORDER — FAMOTIDINE 20 MG/1
20 TABLET, FILM COATED ORAL DAILY
Qty: 90 TABLET | Refills: 3 | Status: SHIPPED | OUTPATIENT
Start: 2024-05-29

## 2024-05-29 NOTE — PROGRESS NOTES
Subjective:     Marian Durham is a 78 y.o. female with hypercholesterolemia. She has a healthy weight close to being underweight. She had small bowel obstruction in 6/2023. She then had an episode of atrial fibrillation. She was anticoagulated for some time. With no recurrent spells it was felt she may discontinue the anticoagulation. Beginning on 1/26/2024 she began experience episodes of being lightheaded with an anxious feeling in her chest. She has an Apple watch and recorded a high heart rate. The episodes continued. She presented to the emergency room at [facility] on 2/1/2024 after a spell. She was in sinus rhythm. As she was monitored in the emergency room she went into artrial fibrillation with fast ventricular response rate. She was given diltiazem iv and admitted to the intensive care unit. She denied chest pain or shortness of breath. On 2/1/2024 she was given diltiazem intravenously. She converted to sinus rhythm On 2/1/2024 she began flecainide, metoprolol and apixiban. No bleeding. No palpitations since. No exertional chest pain or shortness of breath. No issues with any of her prescribed medications. Feeling well overall.       Atrial Fibrillation  Presents for follow-up visit. Symptoms are negative for bradycardia, chest pain, dizziness, hypertension, palpitations, shortness of breath, syncope, tachycardia and weakness. The symptoms have been stable. Past medical history includes hyperlipidemia.   Hyperlipidemia  She has no history of chronic renal disease, diabetes, hypothyroidism, liver disease, obesity or nephrotic syndrome. Pertinent negatives include no chest pain, focal sensory loss, focal weakness, leg pain, myalgias or shortness of breath.       Review of Systems   Constitutional: Negative for chills, fever and malaise/fatigue.   HENT:  Negative for nosebleeds and tinnitus.    Eyes:  Negative for double vision, vision loss in left eye and vision loss in right  eye.   Cardiovascular:  Negative for chest pain, claudication, dyspnea on exertion, irregular heartbeat, leg swelling, near-syncope, orthopnea, palpitations, paroxysmal nocturnal dyspnea and syncope.   Respiratory:  Negative for cough, hemoptysis, shortness of breath and wheezing.    Endocrine: Negative for cold intolerance and heat intolerance.   Hematologic/Lymphatic: Negative for bleeding problem. Does not bruise/bleed easily.   Skin:  Negative for color change and rash.   Musculoskeletal:  Positive for arthritis and joint pain (left knee). Negative for back pain, falls, muscle weakness and myalgias.   Gastrointestinal:  Negative for abdominal pain, diarrhea, dysphagia, heartburn, hematemesis, hematochezia, hemorrhoids, jaundice, melena, nausea and vomiting.   Genitourinary:  Negative for dysuria and hematuria.   Neurological:  Negative for dizziness, focal weakness, headaches, light-headedness, loss of balance, numbness, tremors, vertigo and weakness.   Psychiatric/Behavioral:  Negative for altered mental status, depression and memory loss. The patient is not nervous/anxious.    Allergic/Immunologic: Negative for hives and persistent infections.       Current Outpatient Medications on File Prior to Visit   Medication Sig Dispense Refill    apixaban (ELIQUIS) 2.5 mg Tab Take 1 tablet (2.5 mg total) by mouth 2 (two) times daily. 180 tablet 3    atorvastatin (LIPITOR) 20 MG tablet Take 1 tablet (20 mg total) by mouth once daily. 90 tablet 3    calcium-vitamin D3 (OS- + D3) 500 mg-5 mcg (200 unit) per tablet Take 1 tablet by mouth 2 (two) times daily with meals.      celecoxib (CELEBREX) 200 MG capsule Take 1 capsule (200 mg total) by mouth once daily. 90 capsule 3    estradioL (ESTRACE) 0.01 % (0.1 mg/gram) vaginal cream Place 1 g vaginally 3 (three) times a week. 42.5 g 3    flecainide (TAMBOCOR) 100 MG Tab Take 1 tablet (100 mg total) by mouth every 12 (twelve) hours. 180 tablet 3     "glucosamine-chondroitin 500-400 mg tablet Take 1 tablet by mouth 3 (three) times daily.      metoprolol succinate (TOPROL-XL) 25 MG 24 hr tablet Take 1 tablet (25 mg total) by mouth every 12 (twelve) hours. 180 tablet 3    polyethylene glycol (MIRALAX) 17 gram/dose powder Take 17 g by mouth 3 (three) times daily as needed (Constipation). 510 g 0     No current facility-administered medications on file prior to visit.        /60   Pulse (!) 54   Ht 5' 5" (1.651 m)   Wt 52.3 kg (115 lb 3.1 oz)   SpO2 97%   BMI 19.17 kg/m²     Objective:     Physical Exam  Constitutional:       General: She is not in acute distress.     Appearance: Normal appearance. She is well-developed. She is not toxic-appearing or diaphoretic.   HENT:      Head: Normocephalic and atraumatic.      Nose: Nose normal.   Eyes:      General:         Right eye: No discharge.         Left eye: No discharge.      Conjunctiva/sclera:      Right eye: Right conjunctiva is not injected.      Left eye: Left conjunctiva is not injected.      Pupils: Pupils are equal.      Right eye: Pupil is round.      Left eye: Pupil is round.   Neck:      Thyroid: No thyromegaly.      Vascular: No carotid bruit or JVD.   Cardiovascular:      Rate and Rhythm: Normal rate and regular rhythm. No extrasystoles are present.     Chest Wall: PMI is not displaced.      Pulses:           Radial pulses are 2+ on the right side and 2+ on the left side.        Femoral pulses are 2+ on the right side and 2+ on the left side.       Dorsalis pedis pulses are 2+ on the right side and 2+ on the left side.        Posterior tibial pulses are 2+ on the right side and 2+ on the left side.      Heart sounds: S1 normal and S2 normal.      No gallop. No S4 sounds.   Pulmonary:      Effort: Pulmonary effort is normal.      Breath sounds: Normal breath sounds.   Abdominal:      Palpations: Abdomen is soft.      Tenderness: There is no abdominal tenderness.   Musculoskeletal:      Cervical " back: Neck supple.      Right lower leg: Normal. No swelling. No edema.      Left lower leg: Normal. No swelling. No edema.   Lymphadenopathy:      Head:      Right side of head: No submandibular adenopathy.      Left side of head: No submandibular adenopathy.      Cervical: No cervical adenopathy.   Skin:     General: Skin is warm and dry.      Findings: No rash.      Nails: There is no clubbing.   Neurological:      General: No focal deficit present.      Mental Status: She is alert and oriented to person, place, and time. She is not disoriented.      Cranial Nerves: No cranial nerve deficit.   Psychiatric:         Attention and Perception: Attention and perception normal.         Mood and Affect: Mood and affect normal.         Speech: Speech normal.         Behavior: Behavior normal.         Thought Content: Thought content normal.         Cognition and Memory: Cognition and memory normal.         Judgment: Judgment normal.         Assessment:      1. Paroxysmal atrial fibrillation    2. High risk medication use    3. Chronic anticoagulation    4. Hypercholesterolemia        Plan:     Atrial Fibrillation              6/2023: Diagnosed with paroxysmal atrial fibrillation.              1/26/2024: Began having frequent spells of what appears to have been AF.              2/1/2024: Presents with palpitations. Initial ECG revealed SR. Later AF. 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  CHA2DS2VASc=3 (A2Sc).  Rate controlled with diltiazem iv and metoprolol.  2/1/2024: Converted to SR.  Anticoagulation with apixiban.  On metoprolol 25 mg Q12 and flecainide 100 mg Q12.  No clinical recurrence.     2. High Risk Medication              2/1/2023: Flecainide 100 mg Q12 was  begun.              On flecainide 100 mg Q12.     3. Chronic Anticoagulation  CHA2DS2VASc=3 (A2Sc).  Anticoagulation with apixiban.  I would favor apixiban 2.5 mg Q12. [Age 78 and weight 51 kg].  On apixiban 2.5 mg Q12.  No aspirin or NSAID.     4.  Hypercholesterolemia   2014: Statin was begin.              On atorvastatin 20 mg Q24.    5. History of Knee Surgery   10/19/2023: Right knee replacement. Dr. Vicente Mitchell.   7/18/2024: Plan is left knee replacement.    6. Arthritis   Much less pain on celecoxib 200 mg Q24.   Discussed issues in detail.   If she takes celecoxib advised to take famotidine 20 mg Q24.     7. Primary Care              Dr. Suzanna Duran.     F/u 4 months.    Lamar Clarke M.D.

## 2024-05-30 LAB
OHS QRS DURATION: 106 MS
OHS QTC CALCULATION: 423 MS

## 2024-06-20 NOTE — H&P
[facility] ([facility])  History & Physical    Subjective:      Progressive pain valgus deformity right knee admitted for right knee replacement.  Left knee replacement doing well.  Hopefully same success    Patient Active Problem List    Diagnosis Date Noted    High risk medication use 02/02/2024    Chronic anticoagulation 02/02/2024    Hypercholesterolemia 02/02/2024    Osteoarthritis of right knee 10/19/2023    Preoperative cardiovascular examination 08/30/2023    Debility 07/06/2023    Atrial fibrillation 06/30/2023    Hypokalemia 06/26/2023    Elevated blood pressure reading 06/26/2023    Volume depletion 04/23/2022    Lactic acidosis 04/23/2022    Osteoarthritis 04/23/2022    SBO (small bowel obstruction) 04/22/2022    Lipoma 11/16/2018     Past Medical History:   Diagnosis Date    Atrial fibrillation     stress related per patient    Hyperlipidemia     Osteoarthritis     PONV (postoperative nausea and vomiting)     SBO (small bowel obstruction)     Shingles 01/01/2002    Spinal stenosis      Past Surgical History:   Procedure Laterality Date    ANKLE FRACTURE SURGERY  2007    Right ankle    COLON SURGERY      bowel obstruction    COLONOSCOPY      x 2    EXCISION OF MASS OF BACK N/A 11/16/2018    Procedure: EXCISION, MASS, BACK;  Surgeon: Fran Magaña MD;  Location: [facility];  Service: General;  Laterality: N/A;    EYE SURGERY Bilateral 2016    HYSTERECTOMY  1985    HEATHER    KNEE ARTHROSCOPY W/ DEBRIDEMENT  1994    Bilateral    KNEE ARTHROSCOPY W/ DEBRIDEMENT  2003    Left knee    OPEN PATELLA REEFING PROCEDURE  age 19    Left knee    Tonsilectomy  as a child    TONSILLECTOMY      TOTAL KNEE ARTHROPLASTY Right 10/19/2023    Procedure: ARTHROPLASTY, KNEE, TOTAL;  Surgeon: Vicente Mitchell MD;  Location: [facility];  Service: Orthopedics;  Laterality: Right;    TUBAL LIGATION       No medications prior to admission.     Review of patient's allergies indicates:   Allergen Reactions    Demerol  "[meperidine] Other (See Comments)     "Becomes Agitated and Combative"     Social History     Tobacco Use    Smoking status: Never    Smokeless tobacco: Never   Substance Use Topics    Alcohol use: Yes     Alcohol/week: 3.0 standard drinks of alcohol     Types: 3 Glasses of wine per week     Comment: Drinks a glass of wine 3 times weekly; none 24 hr prior to surgery     Family History   Problem Relation Name Age of Onset    Hypertension Father      Hypertension Mother 92     Diabetes Brother      Colon cancer Paternal Aunt      Breast cancer Paternal Aunt          75    Coronary artery disease Brother      Ovarian cancer Neg Hx       Social History     Tobacco Use    Smoking status: Never    Smokeless tobacco: Never   Substance Use Topics    Alcohol use: Yes     Alcohol/week: 3.0 standard drinks of alcohol     Types: 3 Glasses of wine per week     Comment: Drinks a glass of wine 3 times weekly; none 24 hr prior to surgery    Drug use: No       No medications prior to admission.     Review of patient's allergies indicates:   Allergen Reactions    Demerol [meperidine] Other (See Comments)     "Becomes Agitated and Combative"        Review of Systems:  All other systems reviewed and are negative.  .    No current facility-administered medications for this encounter.     Current Outpatient Medications   Medication Sig    apixaban (ELIQUIS) 2.5 mg Tab Take 1 tablet (2.5 mg total) by mouth 2 (two) times daily.    atorvastatin (LIPITOR) 20 MG tablet Take 1 tablet (20 mg total) by mouth once daily.    calcium-vitamin D3 (OS- + D3) 500 mg-5 mcg (200 unit) per tablet Take 1 tablet by mouth 2 (two) times daily with meals.    celecoxib (CELEBREX) 200 MG capsule Take 1 capsule (200 mg total) by mouth once daily.    estradioL (ESTRACE) 0.01 % (0.1 mg/gram) vaginal cream Place 1 g vaginally 3 (three) times a week.    famotidine (PEPCID) 20 MG tablet Take 1 tablet (20 mg total) by mouth Daily.    flecainide (TAMBOCOR) 100 MG " Tab Take 1 tablet (100 mg total) by mouth every 12 (twelve) hours.    glucosamine-chondroitin 500-400 mg tablet Take 1 tablet by mouth 3 (three) times daily.    metoprolol succinate (TOPROL-XL) 25 MG 24 hr tablet Take 1 tablet (25 mg total) by mouth every 12 (twelve) hours.    polyethylene glycol (MIRALAX) 17 gram/dose powder Take 17 g by mouth 3 (three) times daily as needed (Constipation).       Objective:      Vital Signs (Most Recent)       Vital Signs Range (Last 24H):       Physical Exam heart regular lungs clear valgus alignment right knee with crepitance limping    Imaging Review:   Loss of lateral joint space with a valgus alignment      Assessment:      There are no hospital problems to display for this patient.      Plan:    The various methods of treatment have been discussed with the patient and family.   After consideration of risks, benefits and other options for treatment, the patient has consented to surgical interventions (significant risk discussed).  Questions were answered and Pre-op teaching was done by me.

## 2024-07-08 ENCOUNTER — ANESTHESIA EVENT (OUTPATIENT)
Dept: SURGERY | Facility: OTHER | Age: 79
End: 2024-07-08
Payer: MEDICARE

## 2024-07-08 ENCOUNTER — HOSPITAL ENCOUNTER (OUTPATIENT)
Dept: PREADMISSION TESTING | Facility: OTHER | Age: 79
Discharge: HOME | End: 2024-07-08
Attending: ORTHOPAEDIC SURGERY
Payer: MEDICARE

## 2024-07-08 VITALS
TEMPERATURE: 97 F | HEART RATE: 60 BPM | OXYGEN SATURATION: 97 % | BODY MASS INDEX: 19.16 KG/M2 | WEIGHT: 115 LBS | RESPIRATION RATE: 17 BRPM | HEIGHT: 65 IN | DIASTOLIC BLOOD PRESSURE: 58 MMHG | SYSTOLIC BLOOD PRESSURE: 120 MMHG

## 2024-07-08 LAB
ANION GAP SERPL CALC-SCNC: 7 MMOL/L (ref 8–16)
BASOPHILS # BLD AUTO: 0.06 K/UL (ref 0–0.2)
BASOPHILS NFR BLD: 0.8 % (ref 0–1.9)
BILIRUB UR QL STRIP: NEGATIVE
BUN SERPL-MCNC: 17 MG/DL (ref 8–23)
CALCIUM SERPL-MCNC: 10.1 MG/DL (ref 8.7–10.5)
CHLORIDE SERPL-SCNC: 105 MMOL/L (ref 95–110)
CLARITY UR: CLEAR
CO2 SERPL-SCNC: 27 MMOL/L (ref 23–29)
COLOR UR: COLORLESS
CREAT SERPL-MCNC: 1 MG/DL (ref 0.5–1.4)
DIFFERENTIAL METHOD BLD: NORMAL
EOSINOPHIL # BLD AUTO: 0.1 K/UL (ref 0–0.5)
EOSINOPHIL NFR BLD: 1.7 % (ref 0–8)
ERYTHROCYTE [DISTWIDTH] IN BLOOD BY AUTOMATED COUNT: 13.2 % (ref 11.5–14.5)
EST. GFR  (NO RACE VARIABLE): 57 ML/MIN/1.73 M^2
GLUCOSE SERPL-MCNC: 99 MG/DL (ref 70–110)
GLUCOSE UR QL STRIP: NEGATIVE
HCT VFR BLD AUTO: 45.7 % (ref 37–48.5)
HGB BLD-MCNC: 15 G/DL (ref 12–16)
HGB UR QL STRIP: ABNORMAL
IMM GRANULOCYTES # BLD AUTO: 0.02 K/UL (ref 0–0.04)
IMM GRANULOCYTES NFR BLD AUTO: 0.3 % (ref 0–0.5)
KETONES UR QL STRIP: NEGATIVE
LEUKOCYTE ESTERASE UR QL STRIP: NEGATIVE
LYMPHOCYTES # BLD AUTO: 1.9 K/UL (ref 1–4.8)
LYMPHOCYTES NFR BLD: 24.9 % (ref 18–48)
MCH RBC QN AUTO: 30.4 PG (ref 27–31)
MCHC RBC AUTO-ENTMCNC: 32.8 G/DL (ref 32–36)
MCV RBC AUTO: 93 FL (ref 82–98)
MONOCYTES # BLD AUTO: 0.7 K/UL (ref 0.3–1)
MONOCYTES NFR BLD: 8.7 % (ref 4–15)
NEUTROPHILS # BLD AUTO: 5 K/UL (ref 1.8–7.7)
NEUTROPHILS NFR BLD: 63.6 % (ref 38–73)
NITRITE UR QL STRIP: NEGATIVE
NRBC BLD-RTO: 0 /100 WBC
PH UR STRIP: 7 [PH] (ref 5–8)
PLATELET # BLD AUTO: 232 K/UL (ref 150–450)
PMV BLD AUTO: 9.2 FL (ref 9.2–12.9)
POTASSIUM SERPL-SCNC: 4.6 MMOL/L (ref 3.5–5.1)
PROT UR QL STRIP: NEGATIVE
RBC # BLD AUTO: 4.93 M/UL (ref 4–5.4)
SODIUM SERPL-SCNC: 139 MMOL/L (ref 136–145)
SP GR UR STRIP: 1.01 (ref 1–1.03)
URN SPEC COLLECT METH UR: ABNORMAL
UROBILINOGEN UR STRIP-ACNC: NEGATIVE EU/DL
WBC # BLD AUTO: 7.8 K/UL (ref 3.9–12.7)

## 2024-07-08 PROCEDURE — 80048 BASIC METABOLIC PNL TOTAL CA: CPT | Performed by: ORTHOPAEDIC SURGERY

## 2024-07-08 PROCEDURE — 85025 COMPLETE CBC W/AUTO DIFF WBC: CPT | Performed by: ORTHOPAEDIC SURGERY

## 2024-07-08 PROCEDURE — 81003 URINALYSIS AUTO W/O SCOPE: CPT | Performed by: ORTHOPAEDIC SURGERY

## 2024-07-08 PROCEDURE — 36415 COLL VENOUS BLD VENIPUNCTURE: CPT | Performed by: ORTHOPAEDIC SURGERY

## 2024-07-08 RX ORDER — ACETAMINOPHEN 500 MG
1000 TABLET ORAL
OUTPATIENT
Start: 2024-07-08 | End: 2024-07-08

## 2024-07-08 RX ORDER — FLUTICASONE PROPIONATE 50 MCG
SPRAY, SUSPENSION (ML) NASAL
COMMUNITY

## 2024-07-08 RX ORDER — METOPROLOL SUCCINATE 50 MG/1
1 TABLET, EXTENDED RELEASE ORAL
COMMUNITY
End: 2024-07-08 | Stop reason: CLARIF

## 2024-07-08 RX ORDER — LIDOCAINE HYDROCHLORIDE 10 MG/ML
1 INJECTION, SOLUTION EPIDURAL; INFILTRATION; INTRACAUDAL; PERINEURAL ONCE
OUTPATIENT
Start: 2024-07-08 | End: 2024-07-08

## 2024-07-08 RX ORDER — PETROLATUM,WHITE/LANOLIN
OINTMENT (GRAM) TOPICAL
COMMUNITY
End: 2024-07-08 | Stop reason: CLARIF

## 2024-07-08 NOTE — DISCHARGE INSTRUCTIONS
Information to Prepare you for your Surgery    PRE-ADMIT TESTING -  [phone]    [address]  [facility]          Your surgery has been scheduled at [facility]. We are pleased to have the opportunity to serve you. For Further Information please call [phone].    On the day of surgery please report to the Information Desk on the 1st floor.    CONTACT YOUR PHYSICIAN'S OFFICE THE DAY PRIOR TO YOUR SURGERY TO OBTAIN YOUR ARRIVAL TIME.     The evening before surgery do not eat anything after 9 p.m. ( this includes hard candy, chewing gum and mints).  You may only have GATORADE, POWERADE AND WATER  from 9 p.m. until you leave your home.   DO NOT DRINK ANY LIQUIDS ON THE WAY TO THE HOSPITAL.      Why does your anesthesiologist allow you to drink Gatorade/Powerade before surgery?  Gatorade/Powerade helps to increase your comfort before surgery and to decrease your nausea after surgery. The carbohydrates in Gatorade/Powerade help reduce your body's stress response to surgery.  If you are a diabetic-drink only water prior to surgery.    Outpatient Surgery- May allow 2 adult (18 and older) Support Persons (1 being the designated ) for all surgical/procedural patients. A breastfeeding mother will be allowed her infant and 2 adult Support Persons. No one under the age of 18 will be allowed in the building. No swapping out of visitors in the [facility].      SPECIAL MEDICATION INSTRUCTIONS: TAKE medications checked off by the Anesthesiologist on your Medication List.    Angiogram Patients: Take medications as instructed by your physician, including aspirin.     Surgery Patients:    If you take ASPIRIN - Your PHYSICIAN/SURGEON will need to inform you IF/OR when you need to stop taking aspirin prior to your surgery.     The week prior to surgery do not ot take any medications containing IBUPROFEN or NSAIDS ( Advil, Motrin, Goodys, BC, Aleve, Naproxen etc)  If you are not sure if you should take a medicine please call your surgeon's office.  Ok to take Tylenol    Do Not Wear any make-up (especially eye make-up) to surgery. Please remove any false eyelashes or eyelash extensions. If you arrive the day of surgery with makeup/eyelashes on you will be required to remove prior to surgery. (There is a risk of corneal abrasions if eye makeup/eyelash extensions are not removed)      Leave all valuables at home.   Do Not wear any jewelry or watches, including any metal in body piercings. Jewelry must be removed prior to coming to the hospital.  There is a possibility that rings that are unable to be removed may be cut off if they are on the surgical extremity.    Please remove all hair extensions, wigs, clips and any other metal accessories/ ornaments from your hair.  These items may pose a flammable/fire risk in Surgery and must be removed.    Do not shave your surgical area at least 5 days prior to your surgery. The surgical prep will be performed at the hospital according to Infection Control regulations.    Contact Lens must be removed before surgery. Either do not wear the contact lens or bring a case and solution for storage.  Please bring a container for eyeglasses or dentures as required.  Bring any paperwork your physician has provided, such as consent forms,  history and physicals, doctor's orders, etc.   Bring comfortable clothes that are loose fitting to wear upon discharge. Take into consideration the type of surgery being performed.  Maintain your diet as advised per your physician the day prior to surgery.      Adequate rest the night before surgery is advised.   Park in the Parking lot behind the hospital or in the [place] [facility] across the street from the parking lot. Parking is complimentary.  If you will be discharged the same day as your procedure, please arrange for a responsible adult to drive you home or to accompany you if traveling by taxi.    YOU WILL NOT BE PERMITTED TO DRIVE OR TO LEAVE THE HOSPITAL ALONE AFTER SURGERY.   If you are being discharged the same day, it is strongly recommended that you arrange for someone to remain with you for the first 24 hrs following your surgery.    The Surgeon will speak to your family/visitor after your surgery regarding the outcome of your surgery and post op care.  The Surgeon may speak to you after your surgery, but there is a possibility you may not remember the details.  Please check with your family members regarding the conversation with the Surgeon.    We strongly recommend whoever is bringing you home be present for discharge instructions.  This will ensure a thorough understanding for your post op home care.          Thank you for your cooperation.  The Staff of [facility].            Bathing Instructions with Hibiclens    Shower the evening before and morning of your procedure with Chlorhexidine (Hibiclens)  do not use Chlorhexidine on your face or genitals. Do not get in your eyes.  Wash your face with water and your regular face wash/soap  Use your regular shampoo  Apply Chlorhexidine (Hibiclens) directly on your skin or on a wet washcloth and wash gently. When showering: Move away from the shower stream when applying Chlorhexidine (Hibiclens) to avoid rinsing off too soon.  Rinse thoroughly with warm water  Do not dilute Chlorhexidine (Hibiclens)   Dry off as usual, do not use any deodorant, powder, body lotions, perfume, after shave or cologne.

## 2024-07-08 NOTE — ANESTHESIA PREPROCEDURE EVALUATION
07/08/2024  Marian Durham is a 79 y.o., female.      Pre-op Assessment    I have reviewed the Patient Summary Reports.     I have reviewed the Nursing Notes. I have reviewed the NPO Status.   I have reviewed the Medications.     Review of Systems  Anesthesia Hx:  No previous Anesthesia   History of prior surgery of interest to airway management or planning:  Previous anesthesia: Spinal   Oct 2023 Total Knee Dr Paula Taylor at [facility] with spinal.  Procedure performed at an [facility].      Denies Family Hx of Anesthesia complications.   Personal Hx of Anesthesia complications, Post-Operative Nausea/Vomiting, in the past, but not with recent anesthetics / prophylaxis                    Social:  Non-Smoker       Hematology/Oncology:  Hematology Normal   Oncology Normal                Hematology Comments: On Eliquis for prev A Fib                    EENT/Dental:  EENT/Dental Normal           Cardiovascular:         Dysrhythmias atrial fibrillation      hyperlipidemia   ECG has been reviewed. Seen recently and followed by Dr Coulter for previous bout of a fib,converting to SR while on Cardizem drip!                         Pulmonary:  Pulmonary Normal                       Renal/:  Renal/ Normal                 Hepatic/GI:  Hepatic/GI Normal                 Musculoskeletal:  Arthritis               Neurological:  Neurology Normal                                      Endocrine:  Endocrine Normal            Dermatological:  Skin Normal    Psych:  Psychiatric Normal                  Physical Exam  General: Cooperative, Alert and Oriented    Airway:  Mallampati: II   Mouth Opening: Normal  Neck ROM: Normal ROM    Dental:  Intact, Caps / Implants      Anesthesia Plan  Type of Anesthesia, risks & benefits discussed:    Anesthesia Type: Spinal  Intra-op Monitoring Plan: Standard ASA Monitors  Post Op  Pain Control Plan: multimodal analgesia  Informed Consent: Informed consent signed with the Patient and all parties understand the risks and agree with anesthesia plan.  All questions answered.   ASA Score: 3  Anesthesia Plan Notes: Plan spinal if off Eliquis  Seen by Dr Coulter cardiology    Ready For Surgery From Anesthesia Perspective.     .

## 2024-07-10 NOTE — DISCHARGE INSTRUCTIONS
Knee Athroplasty Discharge Instructions    Pain:     After surgery your knee will be sore. The knee will likely have been injected with a numbing medicine (Exparel) prior to completion of surgery for pain control. This is indicated on a green bracelet that you will continue to wear for 4 days after surgery. Ice and elevation will assist with pain control.    Apply ice as much as possible for the first 72 hours. After 72 hours, apply ice for 20minutes after therapy or whenever experiencing pain. Avoid direct skin contact with ice to prevent frostbit.           Elevate the affected leg with the pillow the length of the leg higher than your heart to assist with swelling and pain.  2.  Incision Care:    Some drainage from the incision in the first 72 hours is normal. If drainage is excessive, reinforce dressing and call your surgeon.  Call [phone]. Do not go to the ER. Keep incision clean, dry and covered until staples removed. Staples will be removed 14-21 days after surgery.    3.  Activity:                  -Perform exercises 2 x day.  -You may shower 48 hours after surgery  providing the dressing is waterproof. You can wrap the knee or hip with press n seal saran wrap to assist with keeping the dressing dry while showering.   No tub or hot tub usage for 4 weeks after surgery. Support help is mandatory during showering. If the  dressing becomes wet, replace with a new dressing. Do not leave a wet dressing on.   -Wear marcia hose to both extremities  for 3 weeks after surgery .You may remove stockings for 1- 2 hours during the day only.            -If your physician orders the CPM machine you are to use it for 2 hours in the am and 2 hours in the pm.Start the machine at -5 extension and 50 degree flexion. Increase flexion 5 degrees each SESSION. This is not to replace your exercise program.    5. Possible Complication:  Infections: Report these signs and symptoms to your surgeon:  Unexpected redness around  incision  Persistent drainage from wound after 72 hours  Temperature Greater than 101 degrees F  Additional swelling  Pain not controlled with current pain medication  Blood Clot: Report these signs and symptoms to your surgeon:  Unusual pain, unusual warm skin  Red or discolored skin  Swelling in the leg     You may need antibiotic coverage before dental or minor surgical procedures.

## 2024-07-18 ENCOUNTER — ANESTHESIA (OUTPATIENT)
Dept: SURGERY | Facility: OTHER | Age: 79
End: 2024-07-18
Payer: MEDICARE

## 2024-07-18 ENCOUNTER — HOSPITAL ENCOUNTER (OUTPATIENT)
Facility: OTHER | Age: 79
LOS: 1 days | Discharge: HOME HEALTH | End: 2024-07-19
Attending: ORTHOPAEDIC SURGERY | Admitting: ORTHOPAEDIC SURGERY
Payer: MEDICARE

## 2024-07-18 DIAGNOSIS — M17.9 OA (OSTEOARTHRITIS) OF KNEE: ICD-10-CM

## 2024-07-18 PROCEDURE — 71000033 HC RECOVERY, INTIAL HOUR: Performed by: ORTHOPAEDIC SURGERY

## 2024-07-18 PROCEDURE — C1713 ANCHOR/SCREW BN/BN,TIS/BN: HCPCS | Performed by: ORTHOPAEDIC SURGERY

## 2024-07-18 PROCEDURE — 36000711: Performed by: ORTHOPAEDIC SURGERY

## 2024-07-18 PROCEDURE — 25000003 PHARM REV CODE 250: Performed by: NURSE PRACTITIONER

## 2024-07-18 PROCEDURE — 25000003 PHARM REV CODE 250

## 2024-07-18 PROCEDURE — 63600175 PHARM REV CODE 636 W HCPCS: Performed by: ORTHOPAEDIC SURGERY

## 2024-07-18 PROCEDURE — 25000003 PHARM REV CODE 250: Performed by: ANESTHESIOLOGY

## 2024-07-18 PROCEDURE — 63600175 PHARM REV CODE 636 W HCPCS: Performed by: INTERNAL MEDICINE

## 2024-07-18 PROCEDURE — 63600175 PHARM REV CODE 636 W HCPCS: Performed by: NURSE PRACTITIONER

## 2024-07-18 PROCEDURE — 63600175 PHARM REV CODE 636 W HCPCS: Performed by: NURSE ANESTHETIST, CERTIFIED REGISTERED

## 2024-07-18 PROCEDURE — 37000009 HC ANESTHESIA EA ADD 15 MINS: Performed by: ORTHOPAEDIC SURGERY

## 2024-07-18 PROCEDURE — C1776 JOINT DEVICE (IMPLANTABLE): HCPCS | Performed by: ORTHOPAEDIC SURGERY

## 2024-07-18 PROCEDURE — 25000003 PHARM REV CODE 250: Performed by: ORTHOPAEDIC SURGERY

## 2024-07-18 PROCEDURE — 36000710: Performed by: ORTHOPAEDIC SURGERY

## 2024-07-18 PROCEDURE — 63600175 PHARM REV CODE 636 W HCPCS

## 2024-07-18 PROCEDURE — 97530 THERAPEUTIC ACTIVITIES: CPT

## 2024-07-18 PROCEDURE — 37000008 HC ANESTHESIA 1ST 15 MINUTES: Performed by: ORTHOPAEDIC SURGERY

## 2024-07-18 PROCEDURE — 97161 PT EVAL LOW COMPLEX 20 MIN: CPT

## 2024-07-18 PROCEDURE — 99900035 HC TECH TIME PER 15 MIN (STAT)

## 2024-07-18 PROCEDURE — 63600175 PHARM REV CODE 636 W HCPCS: Performed by: ANESTHESIOLOGY

## 2024-07-18 PROCEDURE — 97110 THERAPEUTIC EXERCISES: CPT

## 2024-07-18 PROCEDURE — 71000039 HC RECOVERY, EACH ADD'L HOUR: Performed by: ORTHOPAEDIC SURGERY

## 2024-07-18 PROCEDURE — 25000003 PHARM REV CODE 250: Performed by: NURSE ANESTHETIST, CERTIFIED REGISTERED

## 2024-07-18 PROCEDURE — 27201423 OPTIME MED/SURG SUP & DEVICES STERILE SUPPLY: Performed by: ORTHOPAEDIC SURGERY

## 2024-07-18 DEVICE — BEARING VANGAURD TIB 10X67MM: Type: IMPLANTABLE DEVICE | Site: KNEE | Status: FUNCTIONAL

## 2024-07-18 DEVICE — PATELLA COMPONENT 3 PEG 28X8MM: Type: IMPLANTABLE DEVICE | Site: KNEE | Status: FUNCTIONAL

## 2024-07-18 DEVICE — TIBIAL TRAY CRUCIATE 67MM: Type: IMPLANTABLE DEVICE | Site: KNEE | Status: FUNCTIONAL

## 2024-07-18 DEVICE — IMPLANTABLE DEVICE: Type: IMPLANTABLE DEVICE | Site: KNEE | Status: FUNCTIONAL

## 2024-07-18 RX ORDER — METOCLOPRAMIDE HYDROCHLORIDE 5 MG/ML
5 INJECTION INTRAMUSCULAR; INTRAVENOUS EVERY 6 HOURS PRN
Status: DISCONTINUED | OUTPATIENT
Start: 2024-07-18 | End: 2024-07-19 | Stop reason: HOSPADM

## 2024-07-18 RX ORDER — PROPOFOL 10 MG/ML
VIAL (ML) INTRAVENOUS CONTINUOUS PRN
Status: DISCONTINUED | OUTPATIENT
Start: 2024-07-18 | End: 2024-07-18

## 2024-07-18 RX ORDER — TRANEXAMIC ACID 100 MG/ML
INJECTION, SOLUTION INTRAVENOUS
Status: DISCONTINUED | OUTPATIENT
Start: 2024-07-18 | End: 2024-07-18

## 2024-07-18 RX ORDER — HYDROCODONE BITARTRATE AND ACETAMINOPHEN 5; 325 MG/1; MG/1
1 TABLET ORAL EVERY 4 HOURS PRN
Status: DISCONTINUED | OUTPATIENT
Start: 2024-07-18 | End: 2024-07-19 | Stop reason: HOSPADM

## 2024-07-18 RX ORDER — ACETAMINOPHEN 500 MG
1000 TABLET ORAL
Status: COMPLETED | OUTPATIENT
Start: 2024-07-18 | End: 2024-07-18

## 2024-07-18 RX ORDER — METOCLOPRAMIDE HYDROCHLORIDE 5 MG/ML
5 INJECTION INTRAMUSCULAR; INTRAVENOUS EVERY 8 HOURS
Status: DISCONTINUED | OUTPATIENT
Start: 2024-07-18 | End: 2024-07-19 | Stop reason: HOSPADM

## 2024-07-18 RX ORDER — MUPIROCIN 20 MG/G
OINTMENT TOPICAL 2 TIMES DAILY
Status: DISCONTINUED | OUTPATIENT
Start: 2024-07-18 | End: 2024-07-19 | Stop reason: HOSPADM

## 2024-07-18 RX ORDER — METOPROLOL SUCCINATE 25 MG/1
25 TABLET, EXTENDED RELEASE ORAL EVERY 12 HOURS
Status: DISCONTINUED | OUTPATIENT
Start: 2024-07-18 | End: 2024-07-19 | Stop reason: HOSPADM

## 2024-07-18 RX ORDER — SODIUM CHLORIDE 9 MG/ML
INJECTION, SOLUTION INTRAVENOUS CONTINUOUS
Status: DISCONTINUED | OUTPATIENT
Start: 2024-07-18 | End: 2024-07-18

## 2024-07-18 RX ORDER — ATORVASTATIN CALCIUM 20 MG/1
20 TABLET, FILM COATED ORAL DAILY
Status: DISCONTINUED | OUTPATIENT
Start: 2024-07-18 | End: 2024-07-19 | Stop reason: HOSPADM

## 2024-07-18 RX ORDER — FAMOTIDINE 20 MG/1
20 TABLET, FILM COATED ORAL DAILY
Status: DISCONTINUED | OUTPATIENT
Start: 2024-07-18 | End: 2024-07-19 | Stop reason: HOSPADM

## 2024-07-18 RX ORDER — TALC
9 POWDER (GRAM) TOPICAL NIGHTLY PRN
Status: DISCONTINUED | OUTPATIENT
Start: 2024-07-18 | End: 2024-07-19 | Stop reason: HOSPADM

## 2024-07-18 RX ORDER — ONDANSETRON HYDROCHLORIDE 2 MG/ML
INJECTION, SOLUTION INTRAVENOUS
Status: DISCONTINUED | OUTPATIENT
Start: 2024-07-18 | End: 2024-07-18

## 2024-07-18 RX ORDER — POLYETHYLENE GLYCOL 3350 17 G/17G
17 POWDER, FOR SOLUTION ORAL DAILY
Status: DISCONTINUED | OUTPATIENT
Start: 2024-07-18 | End: 2024-07-19 | Stop reason: HOSPADM

## 2024-07-18 RX ORDER — DEXTROSE MONOHYDRATE AND SODIUM CHLORIDE 5; .9 G/100ML; G/100ML
INJECTION, SOLUTION INTRAVENOUS CONTINUOUS
Status: DISCONTINUED | OUTPATIENT
Start: 2024-07-18 | End: 2024-07-19 | Stop reason: HOSPADM

## 2024-07-18 RX ORDER — HYDRALAZINE HYDROCHLORIDE 20 MG/ML
5 INJECTION INTRAMUSCULAR; INTRAVENOUS EVERY 4 HOURS PRN
Status: DISCONTINUED | OUTPATIENT
Start: 2024-07-18 | End: 2024-07-19 | Stop reason: HOSPADM

## 2024-07-18 RX ORDER — GLUCAGON 1 MG
1 KIT INJECTION
Status: DISCONTINUED | OUTPATIENT
Start: 2024-07-18 | End: 2024-07-18 | Stop reason: HOSPADM

## 2024-07-18 RX ORDER — OXYCODONE HYDROCHLORIDE 5 MG/1
5 TABLET ORAL
Status: DISCONTINUED | OUTPATIENT
Start: 2024-07-18 | End: 2024-07-18 | Stop reason: HOSPADM

## 2024-07-18 RX ORDER — AMLODIPINE BESYLATE 5 MG/1
5 TABLET ORAL DAILY
Status: DISCONTINUED | OUTPATIENT
Start: 2024-07-18 | End: 2024-07-19 | Stop reason: HOSPADM

## 2024-07-18 RX ORDER — SODIUM CHLORIDE 0.9 % (FLUSH) 0.9 %
3 SYRINGE (ML) INJECTION
Status: DISCONTINUED | OUTPATIENT
Start: 2024-07-18 | End: 2024-07-18 | Stop reason: HOSPADM

## 2024-07-18 RX ORDER — PROCHLORPERAZINE EDISYLATE 5 MG/ML
5 INJECTION INTRAMUSCULAR; INTRAVENOUS EVERY 30 MIN PRN
Status: DISCONTINUED | OUTPATIENT
Start: 2024-07-18 | End: 2024-07-18 | Stop reason: HOSPADM

## 2024-07-18 RX ORDER — HYDROMORPHONE HYDROCHLORIDE 2 MG/ML
0.4 INJECTION, SOLUTION INTRAMUSCULAR; INTRAVENOUS; SUBCUTANEOUS EVERY 5 MIN PRN
Status: DISCONTINUED | OUTPATIENT
Start: 2024-07-18 | End: 2024-07-18 | Stop reason: HOSPADM

## 2024-07-18 RX ORDER — HYDROCODONE BITARTRATE AND ACETAMINOPHEN 10; 325 MG/1; MG/1
1 TABLET ORAL EVERY 4 HOURS PRN
Status: DISCONTINUED | OUTPATIENT
Start: 2024-07-18 | End: 2024-07-19 | Stop reason: HOSPADM

## 2024-07-18 RX ORDER — CELECOXIB 200 MG/1
200 CAPSULE ORAL DAILY
Status: DISCONTINUED | OUTPATIENT
Start: 2024-07-18 | End: 2024-07-19 | Stop reason: HOSPADM

## 2024-07-18 RX ORDER — LIDOCAINE HYDROCHLORIDE 20 MG/ML
INJECTION INTRAVENOUS
Status: DISCONTINUED | OUTPATIENT
Start: 2024-07-18 | End: 2024-07-18

## 2024-07-18 RX ORDER — SODIUM CHLORIDE 0.9 % (FLUSH) 0.9 %
5 SYRINGE (ML) INJECTION
Status: DISCONTINUED | OUTPATIENT
Start: 2024-07-18 | End: 2024-07-19 | Stop reason: HOSPADM

## 2024-07-18 RX ORDER — ONDANSETRON 8 MG/1
8 TABLET, ORALLY DISINTEGRATING ORAL EVERY 8 HOURS PRN
Status: DISCONTINUED | OUTPATIENT
Start: 2024-07-18 | End: 2024-07-19 | Stop reason: HOSPADM

## 2024-07-18 RX ORDER — FENTANYL CITRATE 50 UG/ML
INJECTION, SOLUTION INTRAMUSCULAR; INTRAVENOUS
Status: DISCONTINUED | OUTPATIENT
Start: 2024-07-18 | End: 2024-07-18

## 2024-07-18 RX ORDER — MEPERIDINE HYDROCHLORIDE 25 MG/ML
12.5 INJECTION INTRAMUSCULAR; INTRAVENOUS; SUBCUTANEOUS ONCE AS NEEDED
OUTPATIENT
Start: 2024-07-18 | End: 2024-07-19

## 2024-07-18 RX ORDER — LIDOCAINE HYDROCHLORIDE 10 MG/ML
1 INJECTION, SOLUTION EPIDURAL; INFILTRATION; INTRACAUDAL; PERINEURAL ONCE
Status: DISCONTINUED | OUTPATIENT
Start: 2024-07-18 | End: 2024-07-18 | Stop reason: HOSPADM

## 2024-07-18 RX ORDER — ROPIVACAINE HYDROCHLORIDE 5 MG/ML
INJECTION, SOLUTION EPIDURAL; INFILTRATION; PERINEURAL
Status: COMPLETED | OUTPATIENT
Start: 2024-07-18 | End: 2024-07-18

## 2024-07-18 RX ORDER — FLECAINIDE ACETATE 50 MG/1
100 TABLET ORAL EVERY 12 HOURS
Status: DISCONTINUED | OUTPATIENT
Start: 2024-07-18 | End: 2024-07-19 | Stop reason: HOSPADM

## 2024-07-18 RX ORDER — ALPRAZOLAM 0.25 MG/1
0.25 TABLET ORAL EVERY 12 HOURS PRN
Status: DISCONTINUED | OUTPATIENT
Start: 2024-07-18 | End: 2024-07-19 | Stop reason: HOSPADM

## 2024-07-18 RX ORDER — PROPOFOL 10 MG/ML
VIAL (ML) INTRAVENOUS
Status: DISCONTINUED | OUTPATIENT
Start: 2024-07-18 | End: 2024-07-18

## 2024-07-18 RX ADMIN — ALPRAZOLAM 0.25 MG: 0.25 TABLET ORAL at 06:07

## 2024-07-18 RX ADMIN — SODIUM CHLORIDE, SODIUM LACTATE, POTASSIUM CHLORIDE, AND CALCIUM CHLORIDE: .6; .31; .03; .02 INJECTION, SOLUTION INTRAVENOUS at 11:07

## 2024-07-18 RX ADMIN — PROPOFOL 20 MG: 10 INJECTION, EMULSION INTRAVENOUS at 11:07

## 2024-07-18 RX ADMIN — CEFAZOLIN 2 G: 2 INJECTION, POWDER, FOR SOLUTION INTRAMUSCULAR; INTRAVENOUS at 05:07

## 2024-07-18 RX ADMIN — PROPOFOL 75 MCG/KG/MIN: 10 INJECTION, EMULSION INTRAVENOUS at 10:07

## 2024-07-18 RX ADMIN — PROPOFOL 15 MG: 10 INJECTION, EMULSION INTRAVENOUS at 11:07

## 2024-07-18 RX ADMIN — METOCLOPRAMIDE 5 MG: 5 INJECTION, SOLUTION INTRAMUSCULAR; INTRAVENOUS at 09:07

## 2024-07-18 RX ADMIN — HYDROCODONE BITARTRATE AND ACETAMINOPHEN 1 TABLET: 10; 325 TABLET ORAL at 10:07

## 2024-07-18 RX ADMIN — SODIUM CHLORIDE, SODIUM LACTATE, POTASSIUM CHLORIDE, AND CALCIUM CHLORIDE: .6; .31; .03; .02 INJECTION, SOLUTION INTRAVENOUS at 10:07

## 2024-07-18 RX ADMIN — FLECAINIDE ACETATE 100 MG: 50 TABLET ORAL at 09:07

## 2024-07-18 RX ADMIN — MUPIROCIN: 20 OINTMENT TOPICAL at 09:07

## 2024-07-18 RX ADMIN — HYDROCODONE BITARTRATE AND ACETAMINOPHEN 1 TABLET: 10; 325 TABLET ORAL at 05:07

## 2024-07-18 RX ADMIN — PROPOFOL 20 MG: 10 INJECTION, EMULSION INTRAVENOUS at 10:07

## 2024-07-18 RX ADMIN — PROPOFOL 10 MG: 10 INJECTION, EMULSION INTRAVENOUS at 10:07

## 2024-07-18 RX ADMIN — ACETAMINOPHEN 1000 MG: 500 TABLET ORAL at 08:07

## 2024-07-18 RX ADMIN — TRANEXAMIC ACID 1000 MG: 100 INJECTION, SOLUTION INTRAVENOUS at 10:07

## 2024-07-18 RX ADMIN — DEXTROSE AND SODIUM CHLORIDE: 5; 900 INJECTION, SOLUTION INTRAVENOUS at 03:07

## 2024-07-18 RX ADMIN — POLYETHYLENE GLYCOL 3350 17 G: 17 POWDER, FOR SOLUTION ORAL at 03:07

## 2024-07-18 RX ADMIN — CEFAZOLIN 2 G: 2 INJECTION, POWDER, FOR SOLUTION INTRAMUSCULAR; INTRAVENOUS at 10:07

## 2024-07-18 RX ADMIN — OXYCODONE HYDROCHLORIDE 5 MG: 5 TABLET ORAL at 01:07

## 2024-07-18 RX ADMIN — FENTANYL CITRATE 100 MCG: 50 INJECTION, SOLUTION INTRAMUSCULAR; INTRAVENOUS at 10:07

## 2024-07-18 RX ADMIN — HYDRALAZINE HYDROCHLORIDE 5 MG: 20 INJECTION INTRAMUSCULAR; INTRAVENOUS at 05:07

## 2024-07-18 RX ADMIN — ATORVASTATIN CALCIUM 20 MG: 20 TABLET, FILM COATED ORAL at 03:07

## 2024-07-18 RX ADMIN — AMLODIPINE BESYLATE 5 MG: 5 TABLET ORAL at 03:07

## 2024-07-18 RX ADMIN — APIXABAN 2.5 MG: 2.5 TABLET, FILM COATED ORAL at 09:07

## 2024-07-18 RX ADMIN — ROPIVACAINE HYDROCHLORIDE 3 ML: 5 INJECTION, SOLUTION EPIDURAL; INFILTRATION; PERINEURAL at 10:07

## 2024-07-18 RX ADMIN — ONDANSETRON 8 MG: 8 TABLET, ORALLY DISINTEGRATING ORAL at 06:07

## 2024-07-18 RX ADMIN — ONDANSETRON HYDROCHLORIDE 4 MG: 2 INJECTION INTRAMUSCULAR; INTRAVENOUS at 10:07

## 2024-07-18 RX ADMIN — LIDOCAINE HYDROCHLORIDE 50 MG: 20 INJECTION, SOLUTION INTRAVENOUS at 10:07

## 2024-07-18 RX ADMIN — METOPROLOL SUCCINATE 25 MG: 25 TABLET, EXTENDED RELEASE ORAL at 09:07

## 2024-07-18 NOTE — CONSULTS
Consult Note  MED    Consult Requested By: Vicente Mitchell MD  Reason for Consult: Afib/Lipids/SBO    SUBJECTIVE:     History of Present Illness:  Patient is a 79 y.o. female presents with scheduled left knee repair.  Seen pre-op on ambulatory.  VSS.  Pre-op/EPIC reviewed.  Known to us from last year following right knee.  Has since developed another SBO and Afib.  Cleared by Dr. Clarke.   No CP/SOB/N/V/D/F/C.      Past Medical History:   Diagnosis Date    Atrial fibrillation     stress related per patient    Hx of long term use of blood thinners     Hyperlipidemia     Osteoarthritis     PONV (postoperative nausea and vomiting)     SBO (small bowel obstruction)     X 3    Shingles 01/01/2002    Spinal stenosis      Past Surgical History:   Procedure Laterality Date    ANKLE FRACTURE SURGERY  2007    Right ankle    COLON SURGERY      bowel obstruction-NG TUBE    COLONOSCOPY      x 2    EXCISION OF MASS OF BACK N/A 11/16/2018    Procedure: EXCISION, MASS, BACK;  Surgeon: Fran Magaña MD;  Location: [facility];  Service: General;  Laterality: N/A;    EYE SURGERY Bilateral 2016    HYSTERECTOMY  1985    HEATHER    KNEE ARTHROSCOPY W/ DEBRIDEMENT  1994    Bilateral    KNEE ARTHROSCOPY W/ DEBRIDEMENT  2003    Left knee    OPEN PATELLA REEFING PROCEDURE  age 19    Left knee    Tonsilectomy  as a child    TONSILLECTOMY      TOTAL KNEE ARTHROPLASTY Right 10/19/2023    Procedure: ARTHROPLASTY, KNEE, TOTAL;  Surgeon: Vicente Mitchell MD;  Location: [facility];  Service: Orthopedics;  Laterality: Right;    TUBAL LIGATION       Family History   Problem Relation Name Age of Onset    Hypertension Father      Hypertension Mother 92     Diabetes Brother      Colon cancer Paternal Aunt      Breast cancer Paternal Aunt          75    Coronary artery disease Brother      Ovarian cancer Neg Hx       Social History     Tobacco Use    Smoking status: Never    Smokeless tobacco: Never   Substance Use Topics    Alcohol use: Yes      "Alcohol/week: 3.0 standard drinks of alcohol     Types: 3 Glasses of wine per week     Comment: Drinks a glass of wine 3 times weekly; none 24 hr prior to surgery    Drug use: No       Review of patient's allergies indicates:   Allergen Reactions    Demerol [meperidine] Other (See Comments)     "Becomes Agitated and Combative"      Patient states she can take anything else    Meperidine (pf)         Review of Systems:  Constitutional: No fever or chills  Respiratory: No cough or shortness of breath  Cardiovascular: No chest pain or palpitations  Gastrointestinal: No nausea or vomiting  Neurological: No confusion or weakness    OBJECTIVE:     Vital Signs (Most Recent)  Temp: 98.2 °F (36.8 °C) (07/18/24 0845)  Pulse: 61 (07/18/24 0845)  Resp: 16 (07/18/24 0845)  BP: (!) 163/86 (07/18/24 0845)  SpO2: 97 % (07/18/24 0845)    Vital Signs Range (Last 24H):  Temp:  [98.2 °F (36.8 °C)]   Pulse:  [61]   Resp:  [16]   BP: (163)/(86)   SpO2:  [97 %]     No intake or output data in the 24 hours ending 07/18/24 0909    Physical Exam:  General appearance: Well developed, well nourished  Eyes:  Conjunctivae/corneas clear. PERRL.  Lungs: Normal respiratory effort,   clear to auscultation bilaterally   Heart: Nando-Regular rate and rhythm, S1, S2 normal, no murmur, rub or zachariah.  Abdomen: Soft, non-tender non-distended; bowel sounds normal; no masses,  no organomegaly  Extremities: No cyanosis or clubbing. No edema.    Skin: Skin color, texture, turgor normal. No rashes or lesions  Neurologic: Normal strength and tone. No focal numbness or weakness       Laboratory:  No results for input(s): "WBC", "RBC", "HGB", "HCT", "PLT", "MCV", "MCH", "MCHC" in the last 24 hours.  BMP: No results for input(s): "GLU", "NA", "K", "CL", "CO2", "BUN", "CREATININE", "CALCIUM", "MG", "PHOS" in the last 168 hours.  Lab Results   Component Value Date    CALCIUM 10.1 07/08/2024    PHOS 3.2 02/01/2024     BNP  No results for input(s): "BNP", " ""BNPTRIAGEBLO" in the last 168 hours.No results found for: "URICACID"No results found for: "IRON", "TIBC", "FERRITIN", "SATURATEDIRO"  Lab Results   Component Value Date    CALCIUM 10.1 07/08/2024    PHOS 3.2 02/01/2024       Diagnostic Results:  No orders to display       ASSESSMENT/PLAN:     left Knee:  per ortho/therapy teams.     SBO:  hx noted.  Bowel regimen.  Give reglan post op to help.    PAF:  cleared by Dr. Clarke.  Flecanide/toprol.  Place on tele.  Resume eliquis per Dr. Mitchell.    Lipids:  statin.    DVT:  resume eliquis per ortho.         Thanks for consult  See above  Will follow along.       High MDM complexity necessary to treat or prevent imminent or life-threatening deterioration of the following conditions: AF/Lipids/SBO  Time spent personally by me on the following activities 50 min: development of treatment plan with patient or surrogate, discussions with consultants, interpretation of cardiac output measurements, examination of patient, ordering and performing treatments and interventions, evaluation of patient's response to treatment, obtaining history from patient or surrogate, ordering and review of laboratory studies, ordering and review of radiographic studies, re-evaluation of patient's condition, pulse oximetry and blood draw for specimens  "
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
## 2024-07-18 NOTE — PLAN OF CARE
Problem: Physical Therapy  Goal: Physical Therapy Goal  Description: Goals to be met by: 24     Patient will increase functional independence with mobility by performin. Supine to sit with supervision.   2. Sit to supine with supervision.   3. Sit<>stand transfer with supervision using rolling walker.   4. Gait > 150 feet with SBA using rolling walker.   5. Ascend/descend threshold stairs with CGA and AD.  Outcome: Progressing     Patient evaluated by PT and goals established. Patient with increased pain during therapy session especially with quad activation and knee flexion, but mostly limited by anxious affect. AAROM knee flexion ROM 5-70. Bed mobility with modA progressing to Konstantin, sit<>stand with CGA with RW, and amb x2 ft forwards, backwards, and laterally with RW and CGA.  PT will continue to follow and progress as tolerated. Rec for d/c to home with therapy per MD when able to ambulate household distances. Please see progress note for detailed plan of care and recommendations.

## 2024-07-18 NOTE — PROGRESS NOTES
Pt denies pain at this time.  VSS, BP is slightly elevated above pre-op level, Dr. Gamble is aware and does not want to treat at this time.  Pt able to move all extremities.  Dressing C/D/I.

## 2024-07-18 NOTE — TRANSFER OF CARE
"Anesthesia Transfer of Care Note    Patient: Marian Durham    Procedure(s) Performed: Procedure(s) (LRB):  ARTHROPLASTY, KNEE, TOTAL (Left)    Patient location: PACU    Anesthesia Type: spinal    Transport from OR: Transported from OR on 2-3 L/min O2 by NC with adequate spontaneous ventilation    Post pain: adequate analgesia    Post assessment: no apparent anesthetic complications    Post vital signs: stable    Level of consciousness: awake    Nausea/Vomiting: no nausea/vomiting    Complications: none    Transfer of care protocol was followed      Last vitals: Visit Vitals  BP (!) 163/86 (BP Location: Left arm, Patient Position: Lying)   Pulse 61   Temp 36.8 °C (98.2 °F) (Oral)   Resp 16   Ht 5' 5" (1.651 m)   Wt 52.2 kg (115 lb)   SpO2 97%   Breastfeeding No   BMI 19.14 kg/m²     "

## 2024-07-18 NOTE — ANESTHESIA POSTPROCEDURE EVALUATION
Anesthesia Post Evaluation    Patient: Marian Durham    Procedure(s) Performed: Procedure(s) (LRB):  ARTHROPLASTY, KNEE, TOTAL (Left)    Final Anesthesia Type: spinal      Patient location during evaluation: PACU  Patient participation: Yes- Able to Participate  Level of consciousness: awake and alert  Post-procedure vital signs: reviewed and stable  Pain management: adequate  Airway patency: patent    PONV status at discharge: No PONV  Anesthetic complications: no      Cardiovascular status: blood pressure returned to baseline  Respiratory status: unassisted  Hydration status: euvolemic  Follow-up not needed.              Vitals Value Taken Time   /77 07/18/24 1302   Temp 36.2 °C (97.1 °F) 07/18/24 1159   Pulse 61 07/18/24 1304   Resp 16 07/18/24 1300   SpO2 97 % 07/18/24 1304   Vitals shown include unfiled device data.      No case tracking events are documented in the log.      Pain/Renee Score: Pain Rating Prior to Med Admin: 0 (7/18/2024  8:52 AM)  Renee Score: 9 (7/18/2024 12:59 PM)

## 2024-07-18 NOTE — PT/OT/SLP EVAL
Physical Therapy Evaluation and Treatment    Patient Name:  Marian Durham   MRN:  [id number]    Recommendations:     Discharge Recommendations: Low Intensity Therapy   Discharge Equipment Recommendations: none   Barriers to discharge: Inaccessible home and Decreased caregiver support    Assessment:     Marian Durham is a 79 y.o. female admitted with a medical diagnosis of <principal problem not specified>. Pmhx pertinent for recurrent SBO, R TKA in 2023, spinal stenosis. She presents with the following impairments/functional limitations: weakness, impaired self care skills, impaired functional mobility, gait instability, decreased coordination, decreased lower extremity function, pain, decreased ROM, impaired skin, edema, orthopedic precautions, decreased safety awareness.    Patient evaluated by PT and goals established. Patient with increased pain during therapy session especially with quad activation and knee flexion, but mostly limited by anxious affect. AAROM knee flexion ROM 5-70. Bed mobility with modA progressing to Konstantin, sit<>stand with CGA with RW, and amb x2 ft forwards, backwards, and laterally with RW and CGA.  PT will continue to follow and progress as tolerated. Rec for d/c to home with therapy per MD when able to ambulate household distances.      Rehab Prognosis: Good; patient would benefit from acute skilled PT services to address these deficits and reach maximum level of function.    Recent Surgery: Procedure(s) (LRB):  ARTHROPLASTY, KNEE, TOTAL (Left) Day of Surgery    Plan:     During this hospitalization, patient to be seen BID to address the identified rehab impairments via gait training, therapeutic activities, therapeutic exercises and progress toward the following goals:    Plan of Care Expires:  07/25/24    Subjective     Chief Complaint: Worried about how she'll manage getting out of bed to use the toilet at home, pain in knee and worried about her elevated BP  Patient/Family  Comments/goals: Initially resistant to standing but easily encouraged; Patient agreeable to evaluation.  Pain/Comfort:  Pain Rating 1:  (unrated, reports increased pain)  Location - Side 1: Left  Location 1: knee  Pain Addressed 1: Pre-medicate for activity, Reposition, Distraction, Cessation of Activity  Pain Rating Post-Intervention 1:  (increased soreness, but no overt pain)    Patients cultural, spiritual, Worship conflicts given the current situation: no    Living Environment:  Lives alone in [facility] with 17 [address] with stair lift available.  Prior to admission, patients level of function was indep.  Equipment used at home: bedside commode, walker, rolling (has cane). Upon discharge, patient will have assistance from sitters.    Objective:     Communicated with RN Monica prior to session.  Patient found HOB elevated with peripheral IV, pyle catheter, FCD, cryotherapy, telemetry  upon PT entry to room.    General Precautions: Standard, fall  Orthopedic Precautions:LLE weight bearing as tolerated   Braces: N/A  Respiratory Status: Room air    Patient donned non slip socks and gait belt for OOB mobility.    Exams:  Cognition:   Patient is oriented to name, , date, place, situation.  Pt follows approximately 100% of one step commands.    Mood: Pleasant and cooperative, anxious affect.   Musculoskeletal:  Posture: Protective guarding surgical joint  LE ROM/Strength: 5/5 bilateral hip flexion, nonsurgical knee extension, bilateral ankle dorsiflexion. AROM surgical knee difficult to accurately assess with large bulky dressing, although knee flexion grossly 5-70 degrees. Surgical knee strength grossly 5/5 knee flexion and 2/5 knee extension.  Neuromuscular:  Sensation: Intact to light touch bilateral LEs. Pt denied paresthesias.   Coordination/Tone/Reflexes: No impairments identified with functional mobility. No formal testing performed.   Balance: CGA for dynamic standing with bilateral UE support.    Visual-vestibular: No impairments identified with functional mobility. No formal testing performed.  Integument:  Visible skin intact and surgical extremity dressing clean and dry.   Cardiopulmonary:  Vital signs:   Pre (supine): 197/81  During (sitting): 194/89  Edema: Mild surgical extremity.    Color/temperature/pulses: Equal    Functional Mobility:  Bed Mobility:     Bridging: stand by assistance  Supine to Sit: minimum assistance and moderate assistance  Practiced 2x, initial time pt with reistance to moving LLE off EOB and increased muscle guarding with attempted assistance  With cueing, improved relaxation and sequencing for avoiding extended time with LLE in dangle on EOB  Sit to Supine: minimum assistance  Cueing for scooting backwards for increased support on bed surface  Transfers:     Sit to Stand:  contact guard assistance with rolling walker  From EOB 2x  Performs exaggerated forward flexion of trunk but no overt LOB  Cueing for hand placement and eccentric control of descent  Gait: x2 ft forwards, backwards, and laterally with RW and CGA.   Stpe to gait pattern with decreased stance time of LLE, maintains knee flexion on stance but no overt knee buckling.   No significant increase in pain, but pt defers further gait at this time.       AM-PAC 6 CLICK MOBILITY  Total Score:16       Treatment & Education:  Pt performed supine therapeutic exercises including ankle pumps, quad sets, glute sets, SLR, SAQs, heel slides, abd/add x 10 reps with verbal and tactile cues.   Extensor lag, Konstantin for AAROM  Extensive cueing for bed mobility, transfers, and gait  PT educated patient  re:   PT plan of care/role of PT  Safety with OOB mobility  Use of RW  Positioning of LE and use of ice  Orthopedic precautions  Gait deviations  Therapeutic exercises  Discharge disposition    Pt  verbalized understanding       Patient left HOB elevated with all lines intact, call button in reach, bed alarm on, RN notified, and   ice  applied and LE positioned to encourage extension of knee and neutral rotation of hip.    GOALS:   Multidisciplinary Problems       Physical Therapy Goals          Problem: Physical Therapy    Goal Priority Disciplines Outcome Goal Variances Interventions   Physical Therapy Goal     PT, PT/OT Progressing     Description: Goals to be met by: 24     Patient will increase functional independence with mobility by performin. Supine to sit with supervision.   2. Sit to supine with supervision.   3. Sit<>stand transfer with supervision using rolling walker.   4. Gait > 150 feet with SBA using rolling walker.   5. Ascend/descend threshold stairs with CGA and AD.                       History:     Past Medical History:   Diagnosis Date    Atrial fibrillation     stress related per patient    Hx of long term use of blood thinners     Hyperlipidemia     Osteoarthritis     PONV (postoperative nausea and vomiting)     SBO (small bowel obstruction)     X 3    Shingles 2002    Spinal stenosis        Past Surgical History:   Procedure Laterality Date    ANKLE FRACTURE SURGERY      Right ankle    COLON SURGERY      bowel obstruction-NG TUBE    COLONOSCOPY      x 2    EXCISION OF MASS OF BACK N/A 2018    Procedure: EXCISION, MASS, BACK;  Surgeon: Fran Magaña MD;  Location: [facility];  Service: General;  Laterality: N/A;    EYE SURGERY Bilateral 2016    HYSTERECTOMY  1985    HEATHER    KNEE ARTHROSCOPY W/ DEBRIDEMENT      Bilateral    KNEE ARTHROSCOPY W/ DEBRIDEMENT      Left knee    OPEN PATELLA REEFING PROCEDURE  age 19    Left knee    Tonsilectomy  as a child    TONSILLECTOMY      TOTAL KNEE ARTHROPLASTY Right 10/19/2023    Procedure: ARTHROPLASTY, KNEE, TOTAL;  Surgeon: Vicente Mitchell MD;  Location: [facility];  Service: Orthopedics;  Laterality: Right;    TUBAL LIGATION         Time Tracking:     PT Received On: 24  PT Start Time: 1618     PT Stop Time: 1652  PT Total Time (min): 34  min     Billable Minutes: Evaluation 10, Therapeutic Activity 14, and Therapeutic Exercise 10      07/18/2024

## 2024-07-18 NOTE — OP NOTE
[facility] Surgery ([facility]  Surgery Department  Operative Note    SUMMARY     Date of Procedure: 7/18/2024     Procedure: Procedure(s) (LRB):  ARTHROPLASTY, KNEE, TOTAL (Left)     Surgeons and Role:     * Vicente Mitchell MD - Primary    Assisting Surgeon:  Fernando Jang    Pre-Operative Diagnosis: Osteoarthritis of left knee [M17.12]    Post-Operative Diagnosis: Post-Op Diagnosis Codes:     * Osteoarthritis of left knee [M17.12]    Anesthesia: Choice    Operative Findings (including complications, if any):  Osteoarthritis left knee    Description of Technical Procedures:  Patient underwent spinal anesthesia brought to the operating room positioned appropriately prepped in usual fashion tourniquet applied 250 mm mercury after Esmarch.  Using the prior medial parapatellar arthrotomy she had from extensor mechanism years ago.  Sharp dissection made down to the extensor mechanism standard medial parapatellar arthrotomy was performed medial fat pad was removed and a small medial release was performed on to gain access to proximal tibia.  She was in valgus alignment.  Anterior posterior cruciate ligaments were remnants removed mediolateral meniscal remnants removed.  A tibial cut was made per midshaft tibia sizing was 67 which matched the other side attention then turned to the femur with the intramedullary guide a 3 degree provisional distal cut was performed.  Had take 2 more mm off to get a full extension could she did have flexion contracture.  At that time she had good alignment good stability.  Sizing was 60 which matched the other side so with the tensor in flexion anterior-posterior cuts performed with the 60.  Flexion gap was symmetric at 10 chamber cuts performed with a floating trial 0-130 degrees range of motion rotation marked tibia punched prepatellar thickness significant deformity lateral actually downsized the patellofemoral because it was too thin there.  With the patella was 21.  We used a 28  because I had a downsized the patella because of the defect lateral.  With the patella showed excellent range of motion and stability.  The appropriate components opened using good cement technique all components were cemented excess cement was removed pulse lavage irrigation was used.  Final 10 mm AS insert placed.  Again excellent range of motion stability.  1. Vicryl using parapatellar arthrotomy closed in flexion post closure 0-130 degrees range of motion to 0 Vicryl subcutaneously staples on the skin.  Exparel bupivacaine instilled the soft tissues placed in bulky sterile dressing tourniquet released approximately 50 minutes tolerated procedure well brought to come sterile fashion     This performed with a poor recognition system.    Significant Surgical Tasks Conducted by the Assistant(s), if Applicable:  Retraction    Estimated Blood Loss (EBL): * No values recorded between 7/18/2024 11:02 AM and 7/18/2024 11:57 AM * 50           Implants:   Implant Name Type Inv. Item Serial No.  Lot No. LRB No. Used Action   Voradius R pro 40    [employer] [id number] Left 1 Implanted   TIBIAL TRAY CRUCIATE 67MM - [id number]  TIBIAL TRAY CRUCIATE 67MM  [employer] [id number] Left 1 Implanted   BEARING VANGAURD TIB 51N75BC - [id number]  BEARING VANGAURD TIB 66V68EO  MICHAEL,INC [id number] Left 1 Implanted   PATELLA COMPONENT 3 PEG 28X8MM - [id number]  PATELLA COMPONENT 3 PEG 28X8MM  [employer] [id number] Left 1 Implanted   Vanguard CR femoral 60mm    MICHAEL BIOMET [id number] Left 1 Implanted       Specimens:   Specimen (24h ago, onward)      None                    Condition: Good    Disposition: PACU - hemodynamically stable.    Attestation: Op Note Attestation: I was physically present and scrubbed for the entire procedure.

## 2024-07-18 NOTE — ANESTHESIA PROCEDURE NOTES
Spinal    Diagnosis: DJD right knee  Patient location during procedure: holding area  Timeout: 7/18/2024 10:30 AM    Staffing  Authorizing Provider: George Gamble MD  Performing Provider: George Gamble MD    Staffing  Performed by: George Gamble MD  Authorized by: George Gamble MD    Preanesthetic Checklist  Completed: patient identified, IV checked, site marked, risks and benefits discussed, surgical consent, monitors and equipment checked, pre-op evaluation and timeout performed  Spinal Block  Patient position: sitting  Prep: ChloraPrep  Patient monitoring: heart rate, cardiac monitor, continuous pulse ox and frequent blood pressure checks  Approach: midline  Location: L3-4  Injection technique: single shot  CSF Fluid: clear free-flowing CSF  Needle  Needle gauge: 24 G  Needle length: 4 in  Additional Documentation: incremental injection, negative aspiration for heme and no paresthesia on injection  Needle localization: anatomical landmarks  Assessment  Ease of block: easy  Patient's tolerance of the procedure: comfortable throughout block and no complaints  Medications:    Medications: ropivacaine (NAROPIN) injection 0.5% - Intraspinal   3 mL - 7/18/2024 10:32:00 AM

## 2024-07-18 NOTE — PLAN OF CARE
LMSW met with the patient at the bedside. Patient is alert and oriented with no communication barriers. Prior to admission, the patient is independent. Patient denies the use of HH. Patient has a RW and BSC. Patient is agreeable to MD's preferred choice for HH. I certify I provided patient choice and a list to the patient/family of CMS rated Home Health.  Patient/Family signed Patient's Choice Disclosure Form choosing the following: [facility]. Patient choice pharmacy is bedside. Patients' family will transport the patient home at discharge.    [facility] ([address])  Initial Discharge Assessment       Primary Care Provider: Suzanna Duran MD    Admission Diagnosis: Osteoarthritis of left knee [M17.12]  OA (osteoarthritis) of knee [M17.9]    Admission Date: 7/18/2024  Expected Discharge Date:     Transition of Care Barriers: (P) No family/friends to help    Payor: HU[employer] MANAGED MEDICARE / Plan: HU[employer] MEDICARE HMO / Product Type: Capitation /     Extended Emergency Contact Information  Primary Emergency Contact: Evelin Lyons  Address: [address] [place] [address] [facility]  Home Phone: [phone]  Mobile Phone: [phone]  Relation: Friend  Secondary Emergency Contact: Duane Durham   [facility]  Home Phone: [phone]  Relation: Son    Discharge Plan A: (P) Home Health  Discharge Plan B: (P) Home Health      [facility]  [address] [address]  Phone: [phone] Fax: [phone]      Initial Assessment (most recent)       Adult Discharge Assessment - 07/18/24 1620          Discharge Assessment    Assessment Type Discharge Planning Assessment (P)      Confirmed/corrected address, phone number and insurance Yes (P)      Confirmed Demographics Correct on Facesheet (P)      Source of Information patient;health record (P)      People in Home alone (P)      Do you expect to return to your current  living situation? Yes (P)      Do you have help at home or someone to help you manage your care at home? Yes (P)      Who are your caregiver(s) and their phone number(s)? [facility] (P)      Prior to hospitilization cognitive status: Alert/Oriented;No Deficits (P)      Current cognitive status: Alert/Oriented;No Deficits (P)      Equipment Currently Used at Home walker, rolling;bedside commode (P)      Readmission within 30 days? No (P)      Patient currently being followed by outpatient case management? No (P)      Do you currently have service(s) that help you manage your care at home? No (P)      Do you take prescription medications? Yes (P)      Do you have prescription coverage? Yes (P)      Do you have any problems affording any of your prescribed medications? No (P)      Is the patient taking medications as prescribed? yes (P)      How do you get to doctors appointments? family or friend will provide (P)      Are you on dialysis? No (P)      Do you take coumadin? No (P)      Discharge Plan A Home Health (P)      Discharge Plan B Home Health (P)      DME Needed Upon Discharge  none (P)      Discharge Plan discussed with: Patient (P)      Transition of Care Barriers No family/friends to help (P)

## 2024-07-19 VITALS
DIASTOLIC BLOOD PRESSURE: 71 MMHG | SYSTOLIC BLOOD PRESSURE: 159 MMHG | OXYGEN SATURATION: 96 % | WEIGHT: 116 LBS | RESPIRATION RATE: 17 BRPM | TEMPERATURE: 97 F | HEIGHT: 65 IN | BODY MASS INDEX: 19.33 KG/M2 | HEART RATE: 75 BPM

## 2024-07-19 LAB
ANION GAP SERPL CALC-SCNC: 7 MMOL/L (ref 8–16)
BASOPHILS # BLD AUTO: 0.02 K/UL (ref 0–0.2)
BASOPHILS NFR BLD: 0.2 % (ref 0–1.9)
BUN SERPL-MCNC: 10 MG/DL (ref 8–23)
CALCIUM SERPL-MCNC: 9.1 MG/DL (ref 8.7–10.5)
CHLORIDE SERPL-SCNC: 105 MMOL/L (ref 95–110)
CO2 SERPL-SCNC: 25 MMOL/L (ref 23–29)
CREAT SERPL-MCNC: 0.9 MG/DL (ref 0.5–1.4)
DIFFERENTIAL METHOD BLD: ABNORMAL
EOSINOPHIL # BLD AUTO: 0.1 K/UL (ref 0–0.5)
EOSINOPHIL NFR BLD: 1.3 % (ref 0–8)
ERYTHROCYTE [DISTWIDTH] IN BLOOD BY AUTOMATED COUNT: 13.1 % (ref 11.5–14.5)
EST. GFR  (NO RACE VARIABLE): >60 ML/MIN/1.73 M^2
GLUCOSE SERPL-MCNC: 137 MG/DL (ref 70–110)
HCT VFR BLD AUTO: 39.2 % (ref 37–48.5)
HGB BLD-MCNC: 13.1 G/DL (ref 12–16)
IMM GRANULOCYTES # BLD AUTO: 0.04 K/UL (ref 0–0.04)
IMM GRANULOCYTES NFR BLD AUTO: 0.5 % (ref 0–0.5)
LYMPHOCYTES # BLD AUTO: 1.3 K/UL (ref 1–4.8)
LYMPHOCYTES NFR BLD: 15.5 % (ref 18–48)
MCH RBC QN AUTO: 30.5 PG (ref 27–31)
MCHC RBC AUTO-ENTMCNC: 33.4 G/DL (ref 32–36)
MCV RBC AUTO: 91 FL (ref 82–98)
MONOCYTES # BLD AUTO: 1 K/UL (ref 0.3–1)
MONOCYTES NFR BLD: 12 % (ref 4–15)
NEUTROPHILS # BLD AUTO: 5.9 K/UL (ref 1.8–7.7)
NEUTROPHILS NFR BLD: 70.5 % (ref 38–73)
NRBC BLD-RTO: 0 /100 WBC
PLATELET # BLD AUTO: 184 K/UL (ref 150–450)
PMV BLD AUTO: 9.2 FL (ref 9.2–12.9)
POTASSIUM SERPL-SCNC: 4.1 MMOL/L (ref 3.5–5.1)
RBC # BLD AUTO: 4.29 M/UL (ref 4–5.4)
SODIUM SERPL-SCNC: 137 MMOL/L (ref 136–145)
WBC # BLD AUTO: 8.43 K/UL (ref 3.9–12.7)

## 2024-07-19 PROCEDURE — 63600175 PHARM REV CODE 636 W HCPCS: Performed by: ORTHOPAEDIC SURGERY

## 2024-07-19 PROCEDURE — 36415 COLL VENOUS BLD VENIPUNCTURE: CPT | Performed by: ORTHOPAEDIC SURGERY

## 2024-07-19 PROCEDURE — 63600175 PHARM REV CODE 636 W HCPCS: Performed by: NURSE PRACTITIONER

## 2024-07-19 PROCEDURE — 97116 GAIT TRAINING THERAPY: CPT | Mod: CQ

## 2024-07-19 PROCEDURE — 25000003 PHARM REV CODE 250: Performed by: NURSE PRACTITIONER

## 2024-07-19 PROCEDURE — 85025 COMPLETE CBC W/AUTO DIFF WBC: CPT | Performed by: ORTHOPAEDIC SURGERY

## 2024-07-19 PROCEDURE — 97535 SELF CARE MNGMENT TRAINING: CPT

## 2024-07-19 PROCEDURE — 97110 THERAPEUTIC EXERCISES: CPT | Mod: CQ

## 2024-07-19 PROCEDURE — 80048 BASIC METABOLIC PNL TOTAL CA: CPT | Performed by: ORTHOPAEDIC SURGERY

## 2024-07-19 PROCEDURE — 94761 N-INVAS EAR/PLS OXIMETRY MLT: CPT

## 2024-07-19 PROCEDURE — 97530 THERAPEUTIC ACTIVITIES: CPT | Mod: CQ

## 2024-07-19 PROCEDURE — 97165 OT EVAL LOW COMPLEX 30 MIN: CPT

## 2024-07-19 PROCEDURE — 25000003 PHARM REV CODE 250: Performed by: ORTHOPAEDIC SURGERY

## 2024-07-19 RX ORDER — ONDANSETRON 8 MG/1
8 TABLET, ORALLY DISINTEGRATING ORAL EVERY 8 HOURS PRN
Qty: 30 TABLET | Refills: 0 | Status: SHIPPED | OUTPATIENT
Start: 2024-07-19

## 2024-07-19 RX ORDER — HYDROCODONE BITARTRATE AND ACETAMINOPHEN 10; 325 MG/1; MG/1
1 TABLET ORAL EVERY 6 HOURS PRN
Qty: 40 TABLET | Refills: 0 | Status: SHIPPED | OUTPATIENT
Start: 2024-07-19

## 2024-07-19 RX ADMIN — ATORVASTATIN CALCIUM 20 MG: 20 TABLET, FILM COATED ORAL at 09:07

## 2024-07-19 RX ADMIN — CEFAZOLIN 2 G: 2 INJECTION, POWDER, FOR SOLUTION INTRAMUSCULAR; INTRAVENOUS at 03:07

## 2024-07-19 RX ADMIN — HYDROCODONE BITARTRATE AND ACETAMINOPHEN 1 TABLET: 10; 325 TABLET ORAL at 03:07

## 2024-07-19 RX ADMIN — ONDANSETRON 8 MG: 8 TABLET, ORALLY DISINTEGRATING ORAL at 12:07

## 2024-07-19 RX ADMIN — POLYETHYLENE GLYCOL 3350 17 G: 17 POWDER, FOR SOLUTION ORAL at 09:07

## 2024-07-19 RX ADMIN — METOCLOPRAMIDE 5 MG: 5 INJECTION, SOLUTION INTRAMUSCULAR; INTRAVENOUS at 06:07

## 2024-07-19 RX ADMIN — DEXTROSE AND SODIUM CHLORIDE: 5; 900 INJECTION, SOLUTION INTRAVENOUS at 03:07

## 2024-07-19 RX ADMIN — FLECAINIDE ACETATE 100 MG: 50 TABLET ORAL at 09:07

## 2024-07-19 RX ADMIN — FAMOTIDINE 20 MG: 20 TABLET ORAL at 09:07

## 2024-07-19 RX ADMIN — AMLODIPINE BESYLATE 5 MG: 5 TABLET ORAL at 09:07

## 2024-07-19 RX ADMIN — ALPRAZOLAM 0.25 MG: 0.25 TABLET ORAL at 12:07

## 2024-07-19 RX ADMIN — APIXABAN 2.5 MG: 2.5 TABLET, FILM COATED ORAL at 09:07

## 2024-07-19 RX ADMIN — METOPROLOL SUCCINATE 25 MG: 25 TABLET, EXTENDED RELEASE ORAL at 09:07

## 2024-07-19 RX ADMIN — CELECOXIB 200 MG: 200 CAPSULE ORAL at 09:07

## 2024-07-19 RX ADMIN — HYDROCODONE BITARTRATE AND ACETAMINOPHEN 1 TABLET: 10; 325 TABLET ORAL at 09:07

## 2024-07-19 NOTE — PLAN OF CARE
LMSW met with patient via bedside. Patient will be discharging with [facility] as HH. Patient already has a RW and BSC. Patient's preferred pharmacy is bedside. Patient's family will provide transportation home up discharge.     [facility] ([address])  Discharge Final Note    Primary Care Provider: Suzanna Duran MD    Expected Discharge Date: 7/19/2024    Final Discharge Note (most recent)       Final Note - 07/19/24 1146          Final Note    Assessment Type Final Discharge Note (P)      Anticipated Discharge Disposition Home-Health Care Svc (P)      Hospital Resources/Appts/Education Provided Provided patient/caregiver with written discharge plan information;Appointments scheduled and added to AVS (P)         Post-Acute Status    Post-Acute Authorization Home Health (P)      Home Health Status Set-up Complete/Auth obtained (P)      Discharge Delays None known at this time (P)                      Important Message from Medicare             Contact [employer]   Specialty: Home Health Services, Home Therapy Services, Home Living Aide Services    [facility] [address]   Phone: [phone]       Next Steps: Follow up on 7/20/2024    Instructions: They will contact you for home healthcare appointments.    Vicente Mitchell MD   Specialty: Orthopedic Surgery    [address] [place] [address]   Phone: [phone]       Next Steps: Follow up    Instructions: Call [phone] to reach Dr. Mitchell, AS SCHEDULED

## 2024-07-19 NOTE — PLAN OF CARE
Problem: Adult Inpatient Plan of Care  Goal: Plan of Care Review  Outcome: Progressing  Goal: Patient-Specific Goal (Individualized)  Outcome: Progressing  Goal: Absence of Hospital-Acquired Illness or Injury  Outcome: Progressing  Goal: Optimal Comfort and Wellbeing  Outcome: Progressing  Goal: Readiness for Transition of Care  Outcome: Progressing     Problem: Wound  Goal: Optimal Coping  Outcome: Progressing  Goal: Optimal Functional Ability  Outcome: Progressing  Goal: Absence of Infection Signs and Symptoms  Outcome: Progressing  Goal: Improved Oral Intake  Outcome: Progressing  Goal: Optimal Pain Control and Function  Outcome: Progressing  Goal: Skin Health and Integrity  Outcome: Progressing  Goal: Optimal Wound Healing  Outcome: Progressing     Problem: Infection  Goal: Absence of Infection Signs and Symptoms  Outcome: Progressing     Problem: Fall Injury Risk  Goal: Absence of Fall and Fall-Related Injury  Outcome: Progressing     Problem: Pain Acute  Goal: Optimal Pain Control and Function  Outcome: Progressing     Problem: Nausea and Vomiting  Goal: Nausea and Vomiting Relief  Outcome: Progressing     Problem: Knee Arthroplasty  Goal: Optimal Coping  Outcome: Progressing  Goal: Absence of Bleeding  Outcome: Progressing  Goal: Effective Bowel Elimination  Outcome: Progressing  Goal: Fluid and Electrolyte Balance  Outcome: Progressing  Goal: Optimal Functional Ability  Outcome: Progressing  Goal: Absence of Infection Signs and Symptoms  Outcome: Progressing  Goal: Intact Neurovascular Status  Outcome: Progressing  Goal: Anesthesia/Sedation Recovery  Outcome: Progressing  Goal: Optimal Pain Control and Function  Outcome: Progressing  Goal: Nausea and Vomiting Relief  Outcome: Progressing  Goal: Effective Urinary Elimination  Outcome: Progressing  Goal: Effective Oxygenation and Ventilation  Outcome: Progressing     POC reviewed with patient. Patient verbalized understanding. AAOX4. VSS .Pain managed per   prn medication orders. D5W and NS IV fluids infusing at 75 mL/hr. Call bell left within reach. Bed lowered and wheels locked. Patient free of falls and injury

## 2024-07-19 NOTE — PLAN OF CARE
Problem: Occupational Therapy  Goal: Occupational Therapy Goal  Description: Goals to be met by: 7/19/24     Patient will increase functional independence with ADLs by performing:    Demonstrate understanding of left TKA precautions and how to adhere to precautions during LB self care tasks.  Demonstrate understanding of left TKA precautions and how to adhere to precautions during ADL mobility and transfers using RW.      Outcome: Met  Recommend 24/7 assist and Low Intensity Therapy.

## 2024-07-19 NOTE — PT/OT/SLP PROGRESS
"Physical Therapy Treatment    Patient Name:  Marian Durham   MRN:  [id number]    Recommendations:     Discharge Recommendations: Low Intensity Therapy  Discharge Equipment Recommendations: none  Barriers to discharge: Inaccessible home and Decreased caregiver support    Assessment:     Marian Durham is a 79 y.o. female admitted with a medical diagnosis of <principal problem not specified>.  She presents with the following impairments/functional limitations: weakness, gait instability, pain, edema, orthopedic precautions, impaired functional mobility, impaired self care skills, impaired skin, decreased coordination, decreased lower extremity function, decreased ROM, decreased safety awareness.    Supine>sit with CGA  Sit>stand with RW and CGA  Bed>BSC with RW and CGA, stand/pivot  Amb 150' with RW and CGA progressing to SBA, LLE WBAT, step-through gait pattern  Ascended/descended 4" curb step with RW and CGA, x 2 trials  Pt demos good progress since yesterday, ambulating household distances and req'ing decreased levels of assistance  Rec Low Intensity Therapy    Rehab Prognosis: Good; patient would benefit from acute skilled PT services to address these deficits and reach maximum level of function.    Recent Surgery: Procedure(s) (LRB):  ARTHROPLASTY, KNEE, TOTAL (Left) 1 Day Post-Op    Plan:     During this hospitalization, patient to be seen BID to address the identified rehab impairments via gait training, therapeutic activities, therapeutic exercises and progress toward the following goals:    Plan of Care Expires:  07/25/24    Subjective     Chief Complaint: pain  Patient/Family Comments/goals: pt agreeable to therapy, says she like to be bossy  Pain/Comfort:  Pain Rating 1: 7/10  Location - Side 1: Left  Location 1: knee  Pain Addressed 1: Reposition, Distraction, Cessation of Activity, Nurse notified  Pain Rating Post-Intervention 1: 7/10      Objective:     Communicated with nurse Botello prior to " "session.  Patient found HOB elevated with peripheral IV, FCD, cryotherapy, telemetry upon PT entry to room.     General Precautions: Standard, fall  Orthopedic Precautions: LLE weight bearing as tolerated  Braces: N/A  Respiratory Status: Room air     Functional Mobility:  Bed Mobility:     Supine to Sit: contact guard assistance  Transfers:     Sit to Stand:  contact guard assistance with rolling walker  Toilet Transfer: contact guard assistance with  rolling walker  using  Stand Pivot  Gait: 150' with RW and CGA progressing to SBA, LLE WBAT, step-through gait pattern  Stairs:  Pt ascended/descended 4" curb step with Rolling Walker with no handrails with Contact Guard Assistance.       AM-PAC 6 CLICK MOBILITY  Turning over in bed (including adjusting bedclothes, sheets and blankets)?: 3  Sitting down on and standing up from a chair with arms (e.g., wheelchair, bedside commode, etc.): 3  Moving from lying on back to sitting on the side of the bed?: 3  Moving to and from a bed to a chair (including a wheelchair)?: 3  Need to walk in hospital room?: 2  Climbing 3-5 steps with a railing?: 2  Basic Mobility Total Score: 16       Treatment & Education:  Seated therex LLE: LAQ x 5  Reclined therex LLE: AP, QS, GS, hip abd/add, SLR x 10  Gait and stair training as noted    Patient left up in chair with all lines intact, call button in reach, nurse Rosmery and OT Sandy notified, and cryotherapy ..    GOALS:   Multidisciplinary Problems       Physical Therapy Goals          Problem: Physical Therapy    Goal Priority Disciplines Outcome Goal Variances Interventions   Physical Therapy Goal     PT, PT/OT Progressing     Description: Goals to be met by: 24     Patient will increase functional independence with mobility by performin. Supine to sit with supervision.   2. Sit to supine with supervision.   3. Sit<>stand transfer with supervision using rolling walker.   4. Gait > 150 feet with SBA using rolling walker.   5. " Ascend/descend threshold stairs with CGA and AD.                       Time Tracking:     PT Received On: 07/19/24  PT Start Time: 0838     PT Stop Time: 0940  PT Total Time (min): 62 min     Billable Minutes: Gait Training 30, Therapeutic Activity 16, and Therapeutic Exercise 16    Treatment Type: Treatment  PT/PTA: PTA     Number of PTA visits since last PT visit: 1 07/19/2024

## 2024-07-19 NOTE — PT/OT/SLP EVAL
Occupational Therapy   Evaluation and Treatment    Name: Marian Durham  MRN: [id number]  Admitting Diagnosis: <principal problem not specified>  Recent Surgery: Procedure(s) (LRB):  ARTHROPLASTY, KNEE, TOTAL (Left) 1 Day Post-Op    Recommendations:     Discharge Recommendations: Low Intensity Therapy (24/7 assist)  Discharge Equipment Recommendations:  none  Barriers to discharge:  None (Pt will have 24/7 private caregivers during recovery.)    Assessment:     Marian Durham is a 79 y.o. female with a medical diagnosis of <principal problem not specified>.  She presents with the following performance deficits affecting function: weakness, impaired endurance, impaired self care skills, impaired functional mobility, gait instability, decreased lower extremity function, decreased safety awareness, pain, impaired skin, edema, decreased coordination, decreased ROM, orthopedic precautions, impaired joint extensibility.      Rehab Prognosis: Good; patient would benefit from acute skilled OT services to address these deficits and reach maximum level of function.       Plan:     Patient to be seen  (Today) to address the above listed problems via self-care/home management  Plan of Care Expires: 07/19/24  Plan of Care Reviewed with: patient    Subjective     Chief Complaint: Pain and having to move her left LE slowly  Patient/Family Comments/goals: Pt reporting she has a friend that will pick her up from the hospital but she has private caregivers to assist her when she returns home.    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  Patient found up in chair with cryotherapy, telemetry, peripheral IV upon OT entry to room.    General Precautions: Standard, fall  Orthopedic Precautions: LLE weight bearing as tolerated  Braces: N/A  Respiratory Status: Room air    Occupational Performance:    Bed Mobility:    NT    Functional Mobility/Transfers:  Sit to stand: CGA with RW, verbal cues for sequencing and safety through transition  Toilet transfer: CGA with RW and use of grab bar, verbal cues for sequencing and safety  Functional Mobility: CGA<>SBA with RW    Activities of Daily Living:  UB Dressing: Set up/SBA, education on performing UB dressing in sitting to decrease fall risk  LB Dressing: After education and training on left TKA precautions and how to adhere to precautions during LB dressing, pt donned pants at CGA and shoes at Min A. [place] left sock: Mod A, [place] right sock: SBA. Education on safety.   Toileting: Min A  Grooming: SBA with RW standing at sink after education and training for correct RW placement and pt standing inside RW  Education on safety with wearing CLARISA hose.  Education and training on purpose of cryotherapy, how to don cryotherapy and precautions.     Cognitive/Visual Perceptual:  Cognitive/Psychosocial Skills:     -       Oriented to: Person, Place, Time, and Situation   -       Follows Commands/attention:Follows one-step commands  -       Communication: clear/fluent  -       Memory: No Deficits noted  -       Safety awareness/insight to disability: intact   -       Mood/Affect/Coping skills/emotional control: Cooperative, Anxious, and Pleasant    Physical Exam:  UE Function: AROM, Strength and Coordination are WFL for self care  tasks  Sitting Balance: Good  Standing Balance: Fair, needing bilateral UE support  Edema: Mild left knee  Skin: Light strikethrough of blood on left knee bandage.  Sensation: Light touch intact      AMPAC 6 Click ADL:  AMPAC Total Score: 19    Treatment & Education:  Role of OT, POC, education and training on left TKA precautions and how to adhere to precautions during ADL and ADL mobilty, safety strategies to reduce fall risk, use of DME, discharge recs  More (3)    Patient left up in chair with all lines intact and call button in reach    GOALS:   Multidisciplinary Problems       Multidisciplinary Problems (Resolved)          Problem: Occupational Therapy    Goal Priority Disciplines Outcome Interventions   Occupational Therapy Goal   (Resolved)     OT, PT/OT Met    Description: Goals to be met by: 7/19/24     Patient will increase functional independence with ADLs by performing:    Demonstrate understanding of left TKA precautions and how to adhere to precautions during LB self care tasks.  Demonstrate understanding of left TKA precautions and how to adhere to precautions during ADL mobility and transfers using RW.                           History:     Past Medical History:   Diagnosis Date    Atrial fibrillation     stress related per patient    Hx of long term use of blood thinners     Hyperlipidemia     Osteoarthritis     PONV (postoperative nausea and vomiting)     SBO (small bowel obstruction)     X 3    Shingles 01/01/2002    Spinal stenosis          Past Surgical History:   Procedure Laterality Date    ANKLE FRACTURE SURGERY  2007    Right ankle    COLON SURGERY      bowel obstruction-NG TUBE    COLONOSCOPY      x 2    EXCISION OF MASS OF BACK N/A 11/16/2018    Procedure: EXCISION, MASS, BACK;  Surgeon: Fran Magaña MD;  Location: [facility];  Service: General;  Laterality: N/A;    EYE SURGERY Bilateral 2016    HYSTERECTOMY  1985    HEATHER    KNEE ARTHROSCOPY W/ DEBRIDEMENT  1994    Bilateral    KNEE  ARTHROSCOPY W/ DEBRIDEMENT  2003    Left knee    OPEN PATELLA REEFING PROCEDURE  age 19    Left knee    Tonsilectomy  as a child    TONSILLECTOMY      TOTAL KNEE ARTHROPLASTY Right 10/19/2023    Procedure: ARTHROPLASTY, KNEE, TOTAL;  Surgeon: Vicente Mitchell MD;  Location: [facility];  Service: Orthopedics;  Laterality: Right;    TOTAL KNEE ARTHROPLASTY Left 7/18/2024    Procedure: ARTHROPLASTY, KNEE, TOTAL;  Surgeon: Vicente Mitchell MD;  Location: [facility];  Service: Orthopedics;  Laterality: Left;    TUBAL LIGATION         Time Tracking:     OT Date of Treatment: 07/19/24  OT Start Time: 1005  OT Stop Time: 1030  OT Total Time (min): 25 min    Billable Minutes:Evaluation 5  Self Care/Home Management 20 7/19/2024

## 2024-07-19 NOTE — PROGRESS NOTES
07/19/24 1214   Discharge Delays   Discharge Delays (!) Patient and Family Barriers  (anxiety attack)     Patient is convinced she is having afib.  Cardiac rhythm c visible p waves

## 2024-07-19 NOTE — PROGRESS NOTES
AVS reviewed with pt. All questions answered. PIV and tele discontinued. Discharge delayed waiting for bedside delivery of medications. Pt to discharge to home once medications delivered.

## 2024-07-19 NOTE — PROGRESS NOTES
"Nephrology  Progress Note    Admit Date: 7/18/2024   LOS: 1 day     SUBJECTIVE:     Follow-up For:  PAF/Med Mgmt    Interval History:     some elevated BP yesterday mostly related to pain/anxiety.  Better this am.  Pederson removal delayed secondary to pt preference.  Slow with PT yesterday.  No CP/SOB/Calf pain.  Discussed with RN.      Review of Systems:  Constitutional: No fever or chills  Respiratory: No cough or shortness of breath  Cardiovascular: No chest pain or palpitations  Gastrointestinal: No nausea or vomiting  Neurological: No confusion or weakness    OBJECTIVE:     Vital Signs Range (Last 24H):  /62 (BP Location: Right arm, Patient Position: Lying)   Pulse 62   Temp 97.7 °F (36.5 °C)   Resp 16   Ht 5' 5" (1.651 m)   Wt 52.6 kg (116 lb)   SpO2 96%   Breastfeeding No   BMI 19.30 kg/m²     Temp:  [97.1 °F (36.2 °C)-98.8 °F (37.1 °C)]   Pulse:  [57-69]   Resp:  [13-18]   BP: (131-198)/(61-93)   SpO2:  [95 %-100 %]     I & O (Last 24H):  Intake/Output Summary (Last 24 hours) at 7/19/2024 0816  Last data filed at 7/19/2024 0657  Gross per 24 hour   Intake 3070.76 ml   Output 2780 ml   Net 290.76 ml       Physical Exam:  General appearance: Well developed, well nourished  Eyes:  Conjunctivae/corneas clear. PERRL.  Lungs: Normal respiratory effort,   clear to auscultation bilaterally   Heart: Regular rate and rhythm, S1, S2 normal, no murmur, rub or zachariah.  Abdomen: Soft, non-tender non-distended; bowel sounds normal; no masses,  no organomegaly  Extremities: No cyanosis or clubbing. No edema.    Skin: Skin color, texture, turgor normal. No rashes or lesions  Neurologic: Normal strength and tone. No focal numbness or weakness   Left knee CDI    Laboratory Data:  Recent Labs   Lab 07/19/24  0431   WBC 8.43   RBC 4.29   HGB 13.1   HCT 39.2      MCV 91   MCH 30.5   MCHC 33.4       BMP:   Recent Labs   Lab 07/19/24  0430   *      K 4.1      CO2 25   BUN 10   CREATININE 0.9 " "  CALCIUM 9.1     Lab Results   Component Value Date    CALCIUM 9.1 07/19/2024    PHOS 3.2 02/01/2024       Lab Results   Component Value Date    CALCIUM 9.1 07/19/2024    PHOS 3.2 02/01/2024       No results found for: "URICACID"    BNP  No results for input(s): "BNP", "BNPTRIAGEBLO" in the last 168 hours.    Medications:  Medication list was reviewed and changes noted under Assessment/Plan.    Diagnostic Results:        ASSESSMENT/PLAN:     Left Knee:  per ortho/therapy teams.      SBO:  hx noted.  Bowel regimen.  Giving reglan post op to help.     PAF:  cleared by Dr. Clarke.  Flecanide/toprol.  Placed on tele.  Resume eliquis per Dr. Mitchell.    Elevated BP:  secondary to pain/anxiety.  See MAR.      Lipids:  statin.     DVT:  resumed eliquis per ortho.     Medically stable.   Await voiding.     High MDM complexity necessary to treat or prevent imminent or life-threatening deterioration of the following conditions: PAF/HTN  Time spent personally by me on the following activities 45 min: development of treatment plan with patient or surrogate, discussions with consultants, interpretation of cardiac output measurements, examination of patient, ordering and performing treatments and interventions, evaluation of patient's response to treatment, obtaining history from patient or surrogate, ordering and review of laboratory studies, ordering and review of radiographic studies, re-evaluation of patient's condition, pulse oximetry and blood draw for specimens       "

## 2024-07-19 NOTE — DISCHARGE SUMMARY
[facility] Surg ([address])  Discharge Summary      Admit Date: 7/18/2024    Discharge Date and Time: No discharge date for patient encounter.    Attending Physician: Vicente Mitchell MD     Reason for Admission:  Produce left knee    Procedures Performed: Procedure(s) (LRB):  ARTHROPLASTY, KNEE, TOTAL (Left)    Hospital Course (synopsis of major diagnoses, care, treatment, and services provided during the course of the hospital stay):  Overall stable postoperative course.  A little problem with nausea.  Medically stable.  Okay to discharge      Goals of Care Treatment Preferences:  Code Status: Full Code    Living Will: Yes              Consults: nephrology    Significant Diagnostic Studies: Labs: All labs within the past 24 hours have been reviewed    Final Diagnoses:    Principal Problem: <principal problem not specified>   Secondary Diagnoses: There are no hospital problems to display for this patient.     Discharged Condition: good    Disposition: [place] [facility]    Follow Up/Patient Instructions:     Medications:  Reconciled Home Medications:      Medication List        START taking these medications      HYDROcodone-acetaminophen  mg per tablet  Commonly known as: NORCO  Take 1 tablet by mouth every 6 (six) hours as needed.     ondansetron 8 MG Tbdl  Commonly known as: ZOFRAN-ODT  Take 1 tablet (8 mg total) by mouth every 8 (eight) hours as needed.            CONTINUE taking these medications      apixaban 2.5 mg Tab  Commonly known as: ELIQUIS  Take 1 tablet (2.5 mg total) by mouth 2 (two) times daily.     atorvastatin 20 MG tablet  Commonly known as: LIPITOR  Take 1 tablet (20 mg total) by mouth once daily.     calcium-vitamin D3 500 mg-5 mcg (200 unit) per tablet  Commonly known as: OS- + D3  Take 1 tablet by mouth 2 (two) times daily with meals.     celecoxib 200 MG capsule  Commonly known as: CeleBREX  Take 1 capsule (200 mg total) by mouth once daily.     estradioL 0.01 %  (0.1 mg/gram) vaginal cream  Commonly known as: ESTRACE  Place 1 g vaginally 3 (three) times a week.     famotidine 20 MG tablet  Commonly known as: PEPCID  Take 1 tablet (20 mg total) by mouth Daily.     flecainide 100 MG Tab  Commonly known as: TAMBOCOR  Take 1 tablet (100 mg total) by mouth every 12 (twelve) hours.     glucosamine-chondroitin 500-400 mg tablet  Take 1 tablet by mouth 3 (three) times daily.     metoprolol succinate 25 MG 24 hr tablet  Commonly known as: TOPROL-XL  Take 1 tablet (25 mg total) by mouth every 12 (twelve) hours.     polyethylene glycol 17 gram/dose powder  Commonly known as: MIRALAX  Take 17 g by mouth 3 (three) times daily as needed (Constipation).            Discharge Procedure Orders   Leave dressing on - Keep it clean, dry, and intact until clinic visit      Follow-up Information       [facility] Follow up on 7/20/2024.    Specialties: Home Health Services, Home Therapy Services, Home Living Aide Services  Why: They will contact you for home healthcare appointments.  Contact information:  [address] [place] [address]  [phone]             Vicente Mitchell MD Follow up.    Specialty: Orthopedic Surgery  Why: Call [phone] to reach Dr. Mitchell, AS SCHEDULED   Contact information:  [address] [place] [address]  [phone]

## 2024-08-23 ENCOUNTER — TELEPHONE (OUTPATIENT)
Dept: OBSTETRICS AND GYNECOLOGY | Facility: CLINIC | Age: 79
End: 2024-08-23
Payer: MEDICARE

## 2024-08-23 DIAGNOSIS — Z12.31 SCREENING MAMMOGRAM, ENCOUNTER FOR: Primary | ICD-10-CM

## 2024-08-23 NOTE — TELEPHONE ENCOUNTER
----- Message from Keshia Gorman sent at 8/23/2024  1:44 PM CDT -----  Regarding: order request  Name of Who is Calling: Marian           What is the request in detail: Patient is requesting to have mammogram order put in and call when done.            Can the clinic reply by [facility]: No           What Number to Call Back if not in [facility]:  [phone]

## 2024-09-09 ENCOUNTER — HOSPITAL ENCOUNTER (OUTPATIENT)
Dept: RADIOLOGY | Facility: OTHER | Age: 79
Discharge: HOME | End: 2024-09-09
Attending: OBSTETRICS & GYNECOLOGY
Payer: MEDICARE

## 2024-09-09 DIAGNOSIS — Z12.31 SCREENING MAMMOGRAM, ENCOUNTER FOR: ICD-10-CM

## 2024-09-09 PROCEDURE — 77067 SCR MAMMO BI INCL CAD: CPT | Mod: TC

## 2024-09-09 PROCEDURE — 77067 SCR MAMMO BI INCL CAD: CPT | Mod: 26,,, | Performed by: RADIOLOGY

## 2024-09-09 PROCEDURE — 77063 BREAST TOMOSYNTHESIS BI: CPT | Mod: 26,,, | Performed by: RADIOLOGY

## 2024-10-02 ENCOUNTER — PATIENT MESSAGE (OUTPATIENT)
Dept: CARDIOLOGY | Facility: CLINIC | Age: 79
End: 2024-10-02

## 2024-10-02 ENCOUNTER — OFFICE VISIT (OUTPATIENT)
Dept: CARDIOLOGY | Facility: CLINIC | Age: 79
End: 2024-10-02
Attending: INTERNAL MEDICINE
Payer: MEDICARE

## 2024-10-02 ENCOUNTER — OFFICE VISIT (OUTPATIENT)
Dept: OBSTETRICS AND GYNECOLOGY | Facility: CLINIC | Age: 79
End: 2024-10-02
Attending: OBSTETRICS & GYNECOLOGY
Payer: MEDICARE

## 2024-10-02 VITALS
HEIGHT: 65 IN | SYSTOLIC BLOOD PRESSURE: 123 MMHG | WEIGHT: 117.81 LBS | DIASTOLIC BLOOD PRESSURE: 70 MMHG | BODY MASS INDEX: 19.63 KG/M2

## 2024-10-02 VITALS
BODY MASS INDEX: 19.62 KG/M2 | DIASTOLIC BLOOD PRESSURE: 51 MMHG | SYSTOLIC BLOOD PRESSURE: 101 MMHG | HEIGHT: 65 IN | HEART RATE: 65 BPM | OXYGEN SATURATION: 96 % | WEIGHT: 117.75 LBS

## 2024-10-02 DIAGNOSIS — I48.0 PAROXYSMAL ATRIAL FIBRILLATION: ICD-10-CM

## 2024-10-02 DIAGNOSIS — E78.00 HYPERCHOLESTEROLEMIA: ICD-10-CM

## 2024-10-02 DIAGNOSIS — Z01.419 ENCOUNTER FOR GYNECOLOGICAL EXAMINATION WITHOUT ABNORMAL FINDING: Primary | ICD-10-CM

## 2024-10-02 DIAGNOSIS — Z79.899 HIGH RISK MEDICATION USE: ICD-10-CM

## 2024-10-02 DIAGNOSIS — N95.2 POSTMENOPAUSAL ATROPHIC VAGINITIS: ICD-10-CM

## 2024-10-02 DIAGNOSIS — Z79.01 CHRONIC ANTICOAGULATION: ICD-10-CM

## 2024-10-02 DIAGNOSIS — Z96.653 HISTORY OF BILATERAL KNEE REPLACEMENT: ICD-10-CM

## 2024-10-02 DIAGNOSIS — R35.0 URINARY FREQUENCY: ICD-10-CM

## 2024-10-02 PROBLEM — Z96.659 HISTORY OF KNEE REPLACEMENT: Status: ACTIVE | Noted: 2023-10-19

## 2024-10-02 PROCEDURE — 93005 ELECTROCARDIOGRAM TRACING: CPT

## 2024-10-02 PROCEDURE — 87086 URINE CULTURE/COLONY COUNT: CPT | Performed by: OBSTETRICS & GYNECOLOGY

## 2024-10-02 PROCEDURE — 1157F ADVNC CARE PLAN IN RCRD: CPT | Mod: CPTII,S$GLB,, | Performed by: INTERNAL MEDICINE

## 2024-10-02 PROCEDURE — 3288F FALL RISK ASSESSMENT DOCD: CPT | Mod: CPTII,S$GLB,, | Performed by: INTERNAL MEDICINE

## 2024-10-02 PROCEDURE — 93010 ELECTROCARDIOGRAM REPORT: CPT | Mod: S$GLB,,, | Performed by: INTERNAL MEDICINE

## 2024-10-02 PROCEDURE — 1126F AMNT PAIN NOTED NONE PRSNT: CPT | Mod: CPTII,S$GLB,, | Performed by: INTERNAL MEDICINE

## 2024-10-02 PROCEDURE — 99999 PR PBB SHADOW E&M-EST. PATIENT-LVL III: CPT | Mod: PBBFAC,,, | Performed by: INTERNAL MEDICINE

## 2024-10-02 PROCEDURE — 93000 ELECTROCARDIOGRAM COMPLETE: CPT | Mod: S$GLB,,, | Performed by: INTERNAL MEDICINE

## 2024-10-02 PROCEDURE — 3078F DIAST BP <80 MM HG: CPT | Mod: CPTII,S$GLB,, | Performed by: INTERNAL MEDICINE

## 2024-10-02 PROCEDURE — 99999 PR PBB SHADOW E&M-EST. PATIENT-LVL III: CPT | Mod: PBBFAC,,, | Performed by: OBSTETRICS & GYNECOLOGY

## 2024-10-02 PROCEDURE — 1101F PT FALLS ASSESS-DOCD LE1/YR: CPT | Mod: CPTII,S$GLB,, | Performed by: INTERNAL MEDICINE

## 2024-10-02 PROCEDURE — 3074F SYST BP LT 130 MM HG: CPT | Mod: CPTII,S$GLB,, | Performed by: INTERNAL MEDICINE

## 2024-10-02 PROCEDURE — 1160F RVW MEDS BY RX/DR IN RCRD: CPT | Mod: CPTII,S$GLB,, | Performed by: INTERNAL MEDICINE

## 2024-10-02 PROCEDURE — 1159F MED LIST DOCD IN RCRD: CPT | Mod: CPTII,S$GLB,, | Performed by: INTERNAL MEDICINE

## 2024-10-02 PROCEDURE — 99214 OFFICE O/P EST MOD 30 MIN: CPT | Mod: 25,S$GLB,, | Performed by: INTERNAL MEDICINE

## 2024-10-02 RX ORDER — ESTRADIOL 0.1 MG/G
1 CREAM VAGINAL
Qty: 42.5 G | Refills: 3 | Status: SHIPPED | OUTPATIENT
Start: 2024-10-02

## 2024-10-02 NOTE — PROGRESS NOTES
Subjective:     Marian Durham is a 79 y.o. female with hypercholesterolemia. She has a healthy weight close to being underweight. She had small bowel obstruction in 6/2023. She then had an episode of atrial fibrillation. She was anticoagulated for some time. With no recurrent spells it was felt she may discontinue the anticoagulation. Beginning on 1/26/2024 she began experience episodes of being lightheaded with an anxious feeling in her chest. She has an Apple watch and recorded a high heart rate. The episodes continued. She presented to the emergency room at [facility] on 2/1/2024 after a spell. She was in sinus rhythm. As she was monitored in the emergency room she went into artrial fibrillation with fast ventricular response rate. She was given diltiazem iv and admitted to the intensive care unit. She denied chest pain or shortness of breath. On 2/1/2024 she was given diltiazem intravenously. She converted to sinus rhythm On 2/1/2024 she began flecainide, metoprolol and apixiban. No bleeding. No palpitations since. She has undergone bilateral knee replacements. No exertional chest pain or shortness of breath. No issues with any of her prescribed medications. Feeling well overall.       Atrial Fibrillation  Presents for follow-up visit. Symptoms are negative for bradycardia, chest pain, dizziness, hypertension, palpitations, shortness of breath, syncope, tachycardia and weakness. The symptoms have been stable.   Hyperlipidemia  She has no history of chronic renal disease, diabetes, hypothyroidism, liver disease, obesity or nephrotic syndrome. Pertinent negatives include no chest pain, focal sensory loss, focal weakness, leg pain, myalgias or shortness of breath.       Review of Systems   Constitutional: Negative for chills, fever and malaise/fatigue.   HENT:  Negative for nosebleeds and tinnitus.    Eyes:  Negative for double vision, vision loss in left eye and vision loss in right  eye.   Cardiovascular:  Negative for chest pain, claudication, dyspnea on exertion, irregular heartbeat, leg swelling, near-syncope, orthopnea, palpitations, paroxysmal nocturnal dyspnea and syncope.   Respiratory:  Negative for cough, hemoptysis, shortness of breath and wheezing.    Endocrine: Negative for cold intolerance and heat intolerance.   Hematologic/Lymphatic: Negative for bleeding problem. Does not bruise/bleed easily.   Skin:  Negative for color change and rash.   Musculoskeletal:  Positive for arthritis and joint pain (left knee). Negative for back pain, falls, muscle weakness and myalgias.   Gastrointestinal:  Negative for abdominal pain, diarrhea, dysphagia, heartburn, hematemesis, hematochezia, hemorrhoids, jaundice, melena, nausea and vomiting.   Genitourinary:  Negative for dysuria and hematuria.   Neurological:  Negative for dizziness, focal weakness, headaches, light-headedness, loss of balance, numbness, tremors, vertigo and weakness.   Psychiatric/Behavioral:  Negative for altered mental status, depression and memory loss. The patient is not nervous/anxious.    Allergic/Immunologic: Negative for hives and persistent infections.       Current Outpatient Medications on File Prior to Visit   Medication Sig Dispense Refill    apixaban (ELIQUIS) 2.5 mg Tab Take 1 tablet (2.5 mg total) by mouth 2 (two) times daily. 180 tablet 3    atorvastatin (LIPITOR) 20 MG tablet Take 1 tablet (20 mg total) by mouth once daily. 90 tablet 3    calcium-vitamin D3 (OS- + D3) 500 mg-5 mcg (200 unit) per tablet Take 1 tablet by mouth 2 (two) times daily with meals.      celecoxib (CELEBREX) 200 MG capsule Take 1 capsule (200 mg total) by mouth once daily. 90 capsule 3    estradioL (ESTRACE) 0.01 % (0.1 mg/gram) vaginal cream Place 1 g vaginally 3 (three) times a week. 42.5 g 3    famotidine (PEPCID) 20 MG tablet Take 1 tablet (20 mg total) by mouth Daily. 90 tablet 3    flecainide (TAMBOCOR) 100 MG Tab Take 1  "tablet (100 mg total) by mouth every 12 (twelve) hours. 180 tablet 3    glucosamine-chondroitin 500-400 mg tablet Take 1 tablet by mouth 3 (three) times daily.      metoprolol succinate (TOPROL-XL) 25 MG 24 hr tablet Take 1 tablet (25 mg total) by mouth every 12 (twelve) hours. 180 tablet 3    polyethylene glycol (MIRALAX) 17 gram/dose powder Take 17 g by mouth 3 (three) times daily as needed (Constipation). 510 g 0    HYDROcodone-acetaminophen (NORCO)  mg per tablet Take 1 tablet by mouth every 6 (six) hours as needed. (Patient not taking: Reported on 10/2/2024) 40 tablet 0    ondansetron (ZOFRAN-ODT) 8 MG TbDL Take 1 tablet (8 mg total) by mouth every 8 (eight) hours as needed. (Patient not taking: Reported on 10/2/2024) 30 tablet 0     No current facility-administered medications on file prior to visit.        BP (!) 101/51   Pulse 65   Ht 5' 5" (1.651 m)   Wt 53.4 kg (117 lb 11.6 oz)   SpO2 96%   BMI 19.59 kg/m²     Objective:     Physical Exam  Constitutional:       General: She is not in acute distress.     Appearance: Normal appearance. She is well-developed. She is not toxic-appearing or diaphoretic.   HENT:      Head: Normocephalic and atraumatic.      Nose: Nose normal.   Eyes:      General:         Right eye: No discharge.         Left eye: No discharge.      Conjunctiva/sclera:      Right eye: Right conjunctiva is not injected.      Left eye: Left conjunctiva is not injected.      Pupils: Pupils are equal.      Right eye: Pupil is round.      Left eye: Pupil is round.   Neck:      Thyroid: No thyromegaly.      Vascular: No carotid bruit or JVD.   Cardiovascular:      Rate and Rhythm: Normal rate and regular rhythm. No extrasystoles are present.     Chest Wall: PMI is not displaced.      Pulses:           Radial pulses are 2+ on the right side and 2+ on the left side.        Femoral pulses are 2+ on the right side and 2+ on the left side.       Dorsalis pedis pulses are 2+ on the right side and " 2+ on the left side.        Posterior tibial pulses are 2+ on the right side and 2+ on the left side.      Heart sounds: S1 normal and S2 normal.      No gallop. No S4 sounds.   Pulmonary:      Effort: Pulmonary effort is normal.      Breath sounds: Normal breath sounds.   Abdominal:      Palpations: Abdomen is soft.      Tenderness: There is no abdominal tenderness.   Musculoskeletal:      Cervical back: Neck supple.      Right lower leg: Normal. No swelling. No edema.      Left lower leg: Normal. No swelling. No edema.   Lymphadenopathy:      Head:      Right side of head: No submandibular adenopathy.      Left side of head: No submandibular adenopathy.      Cervical: No cervical adenopathy.   Skin:     General: Skin is warm and dry.      Findings: No rash.      Nails: There is no clubbing.   Neurological:      General: No focal deficit present.      Mental Status: She is alert and oriented to person, place, and time. She is not disoriented.      Cranial Nerves: No cranial nerve deficit.   Psychiatric:         Attention and Perception: Attention and perception normal.         Mood and Affect: Mood and affect normal.         Speech: Speech normal.         Behavior: Behavior normal.         Thought Content: Thought content normal.         Cognition and Memory: Cognition and memory normal.         Judgment: Judgment normal.         Assessment:      1. Paroxysmal atrial fibrillation    2. High risk medication use    3. Chronic anticoagulation    4. Hypercholesterolemia    5. History of bilateral knee replacement        Plan:     Atrial Fibrillation              6/2023: Diagnosed with paroxysmal atrial fibrillation.              1/26/2024: Began having frequent spells of what appears to have been AF.              2/1/2024: Presented with palpitations. Initial ECG revealed SR. Later AF. 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  CHA2DS2VASc=3 (A2Sc).  Rate controlled with diltiazem iv and metoprolol.  2/1/2024: Converted to  SR.  Anticoagulation with apixiban.  On metoprolol 25 mg Q12 and flecainide 100 mg Q12.  No clinical recurrence.     2. High Risk Medication              2/1/2023: Flecainide 100 mg Q12 was  begun.              On flecainide 100 mg Q12.     3. Chronic Anticoagulation  CHA2DS2VASc=3 (A2Sc).  Anticoagulation with apixiban.  I would favor apixiban 2.5 mg Q12. [Age 78 and weight 51 kg].  On apixiban 2.5 mg Q12.  No aspirin or NSAID.     4. Hypercholesterolemia   2014: Statin was begin.              On atorvastatin 20 mg Q24.    5. History of Knee Surgery   10/19/2023: Right knee replacement. Dr. Vicente Mitchell.   7/18/2024: Left knee replacement. Dr. Vicente Mitchell.    6. Arthritis   Much less pain on celecoxib 200 mg Q24.   On celecoxib 200 mg Q24 and famotidine 20 mg Q24.   Discussed issues in detail.     7. Primary Care              Dr. Suzanna Duran.     F/u 4 months.    Lamar Clarke M.D.

## 2024-10-02 NOTE — PROGRESS NOTES
Subjective:       Patient ID: Marian Durham is a 79 y.o. female.    Chief Complaint:  Well Woman and Gynecologic Exam      History of Present Illness  Gynecologic Exam  Associated symptoms include urgency. Pertinent negatives include no abdominal pain, back pain or headaches.     Marian Durham is a 79 y.o. female  here for her annual GYN exam.   She is menopausal since about age 50 and is not on HRT.   denies vaginal itching or irritation.  Denies vaginal discharge.  She is not sexually active. She has been  since    History of abnormal pap: No  Last Pap:  had hysterectomy  Last MMG: normal-2024-: BI-RADS Category 1: Negative routine follow-up in 12 months  Last Colonoscopy:  colonoscopy several years ago without abnormalities.  denies domestic violence. She does feel safe at home. (Lives alone)    Past Medical History:   Diagnosis Date    Atrial fibrillation     stress related per patient    Hx of long term use of blood thinners     Hyperlipidemia     Osteoarthritis     PONV (postoperative nausea and vomiting)     SBO (small bowel obstruction)     X 3    Shingles 2002    Spinal stenosis      Past Surgical History:   Procedure Laterality Date    ANKLE FRACTURE SURGERY      Right ankle    COLON SURGERY      bowel obstruction-NG TUBE    COLONOSCOPY      x 2    EXCISION OF MASS OF BACK N/A 2018    Procedure: EXCISION, MASS, BACK;  Surgeon: Fran Magaña MD;  Location: [facility];  Service: General;  Laterality: N/A;    EYE SURGERY Bilateral 2016    HYSTERECTOMY  1985    HEATHER    KNEE ARTHROSCOPY W/ DEBRIDEMENT      Bilateral    KNEE ARTHROSCOPY W/ DEBRIDEMENT      Left knee    OPEN PATELLA REEFING PROCEDURE  age 19    Left knee    Tonsilectomy  as a child    TONSILLECTOMY      TOTAL KNEE ARTHROPLASTY Right 10/19/2023    Procedure: ARTHROPLASTY, KNEE, TOTAL;  Surgeon: Vicente Mitchell MD;  Location: [facility];  Service: Orthopedics;  Laterality: Right;    TOTAL  KNEE ARTHROPLASTY Left 2024    Procedure: ARTHROPLASTY, KNEE, TOTAL;  Surgeon: Vicente Mitchell MD;  Location: [facility];  Service: Orthopedics;  Laterality: Left;    TUBAL LIGATION       Social History     Socioeconomic History    Marital status:    Tobacco Use    Smoking status: Never    Smokeless tobacco: Never   Substance and Sexual Activity    Alcohol use: Yes     Alcohol/week: 3.0 standard drinks of alcohol     Types: 3 Glasses of wine per week     Comment: Drinks a glass of wine 3 times weekly; none 24 hr prior to surgery    Drug use: No    Sexual activity: Not Currently     Birth control/protection: Post-menopausal     Comment: Spouse  of kidney cancer : 2015     Social Drivers of Health     Financial Resource Strain: Low Risk  (2024)    Overall Financial Resource Strain (CARDIA)     Difficulty of Paying Living Expenses: Not hard at all   Food Insecurity: No Food Insecurity (2024)    Hunger Vital Sign     Worried About Running Out of Food in the Last Year: Never true     Ran Out of Food in the Last Year: Never true   Transportation Needs: No Transportation Needs (2024)    PRAPARE - Transportation     Lack of Transportation (Medical): No     Lack of Transportation (Non-Medical): No   Physical Activity: Sufficiently Active (2024)    Exercise Vital Sign     Days of Exercise per Week: 7 days     Minutes of Exercise per Session: 40 min   Stress: Stress Concern Present (2024)    Citizen of the [place] [employer] - Occupational Stress Questionnaire     Feeling of Stress : To some extent   Housing Stability: Low Risk  (2024)    Housing Stability Vital Sign     Unable to Pay for Housing in the Last Year: No     Number of Places Lived in the Last Year: 1     Unstable Housing in the Last Year: No     Family History   Problem Relation Name Age of Onset    Hypertension Father      Hypertension Mother 92     Diabetes Brother      Colon cancer Paternal Aunt       "Breast cancer Paternal Aunt          75    Coronary artery disease Brother      Ovarian cancer Neg Hx       OB History          2    Para   2    Term   2            AB        Living   2         SAB        IAB        Ectopic        Multiple        Live Births   2                 /70 (Patient Position: Sitting)   Ht 5' 5" (1.651 m)   Wt 53.4 kg (117 lb 12.8 oz)   BMI 19.60 kg/m²         GYN & OB History    Date of Last Pap: No result found    OB History    Para Term  AB Living   2 2 2     2   SAB IAB Ectopic Multiple Live Births           2      # Outcome Date GA Lbr Otto/2nd Weight Sex Type Anes PTL Lv   2 Term      Vag-Spont   KIRA   1 Term      Vag-Spont   KIRA       Review of Systems  Review of Systems   Constitutional:  Negative for activity change, appetite change, fatigue and unexpected weight change.   HENT: Negative.     Eyes:  Negative for visual disturbance.   Respiratory:  Negative for shortness of breath and wheezing.    Cardiovascular:  Negative for chest pain, palpitations and leg swelling.   Gastrointestinal:  Negative for abdominal pain, bloating and blood in stool.   Endocrine: Negative for diabetes and hair loss.   Genitourinary:  Positive for urgency, urinary incontinence and vaginal dryness.   Musculoskeletal:  Negative for back pain and joint swelling.   Integumentary:  Negative for acne, hair changes and nipple discharge.   Neurological:  Negative for headaches.   Hematological:  Does not bruise/bleed easily.   Psychiatric/Behavioral:  Negative for depression and sleep disturbance. The patient is not nervous/anxious.    Breast: Negative for mastodynia and nipple discharge          Objective:      Physical Exam:   Constitutional: She is oriented to person, place, and time. She appears well-developed and well-nourished.    HENT:   Head: Normocephalic and atraumatic.    Eyes: Pupils are equal, round, and reactive to light. EOM are normal.     Cardiovascular:  " Normal rate and regular rhythm.             Pulmonary/Chest: Effort normal and breath sounds normal.   BREASTS:  no mass, no tenderness, no deformity and no retraction. Right breast exhibits no inverted nipple, no mass, no nipple discharge, no skin change, no tenderness, no bleeding and no swelling. Left breast exhibits no inverted nipple, no mass, no nipple discharge, no skin change, no tenderness, no bleeding and no swelling. Breasts are symmetrical.              Abdominal: Soft. Bowel sounds are normal.     Genitourinary:    Pelvic exam was performed with patient supine.      Genitourinary Comments: PELVIC: Normal external female genitalia without lesions. Normal hair distribution. Adequate perineal body, normal urethral meatus. Vagina atrophic without lesions or discharge. No significant cystocele or rectocele. Bimanual exam shows uterus and cervix to be surgically absent. Adnexa without masses or tenderness.  RECTAL: Deferred                 Musculoskeletal: Normal range of motion and moves all extremeties.       Neurological: She is alert and oriented to person, place, and time.    Skin: Skin is warm and dry.    Psychiatric: She has a normal mood and affect.              Assessment:        1. Encounter for gynecological examination without abnormal finding    2. Postmenopausal atrophic vaginitis    3. Urinary frequency                Plan:      Problem List Items Addressed This Visit    None  Visit Diagnoses       Encounter for gynecological examination without abnormal finding    -  Primary    Postmenopausal atrophic vaginitis        Relevant Medications    estradioL (ESTRACE) 0.01 % (0.1 mg/gram) vaginal cream    Urinary frequency        Relevant Orders    Urine culture             Follow up in about 2 years (around 10/2/2026).

## 2024-10-04 LAB
BACTERIA UR CULT: NORMAL
BACTERIA UR CULT: NORMAL
OHS QRS DURATION: 112 MS
OHS QTC CALCULATION: 449 MS

## 2024-12-01 DIAGNOSIS — N95.2 POSTMENOPAUSAL ATROPHIC VAGINITIS: ICD-10-CM

## 2024-12-02 RX ORDER — ESTRADIOL 0.1 MG/G
CREAM VAGINAL
Qty: 42.5 G | Refills: 2 | Status: SHIPPED | OUTPATIENT
Start: 2024-12-02

## 2024-12-02 NOTE — TELEPHONE ENCOUNTER
Refill Decision Note   Marian Durham  is requesting a refill authorization.  Brief Assessment and Rationale for Refill:  Approve     Medication Therapy Plan:  each tube should last apprx 4mths      Comments:     Note composed:2:09 PM 12/02/2024

## 2024-12-17 ENCOUNTER — HOSPITAL ENCOUNTER (INPATIENT)
Facility: OTHER | Age: 79
LOS: 6 days | Discharge: HOME HEALTH | DRG: 389 | End: 2024-12-23
Attending: HOSPITALIST | Admitting: HOSPITALIST
Payer: MEDICARE

## 2024-12-17 DIAGNOSIS — K56.609 SBO (SMALL BOWEL OBSTRUCTION): ICD-10-CM

## 2024-12-17 DIAGNOSIS — R07.9 CHEST PAIN: ICD-10-CM

## 2024-12-17 PROBLEM — E87.6 HYPOKALEMIA: Status: RESOLVED | Noted: 2023-06-26 | Resolved: 2024-12-17

## 2024-12-17 LAB
ALBUMIN SERPL BCP-MCNC: 4.6 G/DL (ref 3.5–5.2)
ALP SERPL-CCNC: 87 U/L (ref 40–150)
ALT SERPL W/O P-5'-P-CCNC: 17 U/L (ref 10–44)
ANION GAP SERPL CALC-SCNC: 13 MMOL/L (ref 8–16)
AST SERPL-CCNC: 22 U/L (ref 10–40)
BASOPHILS # BLD AUTO: 0.03 K/UL (ref 0–0.2)
BASOPHILS NFR BLD: 0.2 % (ref 0–1.9)
BILIRUB SERPL-MCNC: 1.1 MG/DL (ref 0.1–1)
BUN SERPL-MCNC: 23 MG/DL (ref 8–23)
CALCIUM SERPL-MCNC: 10.3 MG/DL (ref 8.7–10.5)
CHLORIDE SERPL-SCNC: 105 MMOL/L (ref 95–110)
CO2 SERPL-SCNC: 21 MMOL/L (ref 23–29)
CREAT SERPL-MCNC: 0.9 MG/DL (ref 0.5–1.4)
CREAT SERPL-MCNC: 1 MG/DL (ref 0.5–1.4)
DIFFERENTIAL METHOD BLD: ABNORMAL
EOSINOPHIL # BLD AUTO: 0 K/UL (ref 0–0.5)
EOSINOPHIL NFR BLD: 0.1 % (ref 0–8)
ERYTHROCYTE [DISTWIDTH] IN BLOOD BY AUTOMATED COUNT: 12.5 % (ref 11.5–14.5)
EST. GFR  (NO RACE VARIABLE): 57 ML/MIN/1.73 M^2
GLUCOSE SERPL-MCNC: 138 MG/DL (ref 70–110)
HCT VFR BLD AUTO: 47.8 % (ref 37–48.5)
HGB BLD-MCNC: 16.2 G/DL (ref 12–16)
IMM GRANULOCYTES # BLD AUTO: 0.05 K/UL (ref 0–0.04)
IMM GRANULOCYTES NFR BLD AUTO: 0.3 % (ref 0–0.5)
LIPASE SERPL-CCNC: 11 U/L (ref 4–60)
LYMPHOCYTES # BLD AUTO: 1 K/UL (ref 1–4.8)
LYMPHOCYTES NFR BLD: 5.9 % (ref 18–48)
MCH RBC QN AUTO: 30.7 PG (ref 27–31)
MCHC RBC AUTO-ENTMCNC: 33.9 G/DL (ref 32–36)
MCV RBC AUTO: 91 FL (ref 82–98)
MONOCYTES # BLD AUTO: 1 K/UL (ref 0.3–1)
MONOCYTES NFR BLD: 5.9 % (ref 4–15)
NEUTROPHILS # BLD AUTO: 14.6 K/UL (ref 1.8–7.7)
NEUTROPHILS NFR BLD: 87.6 % (ref 38–73)
NRBC BLD-RTO: 0 /100 WBC
PLATELET # BLD AUTO: 277 K/UL (ref 150–450)
PMV BLD AUTO: 9.7 FL (ref 9.2–12.9)
POCT GLUCOSE: 114 MG/DL (ref 70–110)
POCT GLUCOSE: 126 MG/DL (ref 70–110)
POTASSIUM SERPL-SCNC: 4.1 MMOL/L (ref 3.5–5.1)
PROT SERPL-MCNC: 7.6 G/DL (ref 6–8.4)
RBC # BLD AUTO: 5.28 M/UL (ref 4–5.4)
SAMPLE: NORMAL
SODIUM SERPL-SCNC: 139 MMOL/L (ref 136–145)
WBC # BLD AUTO: 16.69 K/UL (ref 3.9–12.7)

## 2024-12-17 PROCEDURE — 85025 COMPLETE CBC W/AUTO DIFF WBC: CPT

## 2024-12-17 PROCEDURE — 63600175 PHARM REV CODE 636 W HCPCS

## 2024-12-17 PROCEDURE — 82565 ASSAY OF CREATININE: CPT

## 2024-12-17 PROCEDURE — 25000003 PHARM REV CODE 250

## 2024-12-17 PROCEDURE — 25000003 PHARM REV CODE 250: Performed by: NURSE PRACTITIONER

## 2024-12-17 PROCEDURE — 96374 THER/PROPH/DIAG INJ IV PUSH: CPT

## 2024-12-17 PROCEDURE — 25500020 PHARM REV CODE 255

## 2024-12-17 PROCEDURE — 63600175 PHARM REV CODE 636 W HCPCS: Performed by: NURSE PRACTITIONER

## 2024-12-17 PROCEDURE — 99285 EMERGENCY DEPT VISIT HI MDM: CPT | Mod: 25

## 2024-12-17 PROCEDURE — 99233 SBSQ HOSP IP/OBS HIGH 50: CPT | Mod: ,,, | Performed by: SURGERY

## 2024-12-17 PROCEDURE — 99900035 HC TECH TIME PER 15 MIN (STAT)

## 2024-12-17 PROCEDURE — 83690 ASSAY OF LIPASE: CPT

## 2024-12-17 PROCEDURE — 21400001 HC TELEMETRY ROOM

## 2024-12-17 PROCEDURE — 80053 COMPREHEN METABOLIC PANEL: CPT

## 2024-12-17 PROCEDURE — 96375 TX/PRO/DX INJ NEW DRUG ADDON: CPT

## 2024-12-17 RX ORDER — ONDANSETRON HYDROCHLORIDE 2 MG/ML
4 INJECTION, SOLUTION INTRAVENOUS EVERY 6 HOURS PRN
Status: DISCONTINUED | OUTPATIENT
Start: 2024-12-17 | End: 2024-12-23 | Stop reason: HOSPADM

## 2024-12-17 RX ORDER — FAMOTIDINE 10 MG/ML
20 INJECTION INTRAVENOUS DAILY
Status: DISCONTINUED | OUTPATIENT
Start: 2024-12-18 | End: 2024-12-17

## 2024-12-17 RX ORDER — NALOXONE HCL 0.4 MG/ML
0.02 VIAL (ML) INJECTION
Status: DISCONTINUED | OUTPATIENT
Start: 2024-12-17 | End: 2024-12-23 | Stop reason: HOSPADM

## 2024-12-17 RX ORDER — KETOROLAC TROMETHAMINE 30 MG/ML
15 INJECTION, SOLUTION INTRAMUSCULAR; INTRAVENOUS
Status: COMPLETED | OUTPATIENT
Start: 2024-12-17 | End: 2024-12-17

## 2024-12-17 RX ORDER — FAMOTIDINE 10 MG/ML
20 INJECTION INTRAVENOUS DAILY
Status: DISCONTINUED | OUTPATIENT
Start: 2024-12-18 | End: 2024-12-21

## 2024-12-17 RX ORDER — FAMOTIDINE 10 MG/ML
20 INJECTION INTRAVENOUS 2 TIMES DAILY
Status: DISCONTINUED | OUTPATIENT
Start: 2024-12-17 | End: 2024-12-17

## 2024-12-17 RX ORDER — ACETAMINOPHEN 325 MG/1
650 TABLET ORAL EVERY 4 HOURS PRN
Status: DISCONTINUED | OUTPATIENT
Start: 2024-12-17 | End: 2024-12-23 | Stop reason: HOSPADM

## 2024-12-17 RX ORDER — SODIUM CHLORIDE, SODIUM LACTATE, POTASSIUM CHLORIDE, CALCIUM CHLORIDE 600; 310; 30; 20 MG/100ML; MG/100ML; MG/100ML; MG/100ML
INJECTION, SOLUTION INTRAVENOUS CONTINUOUS
Status: DISCONTINUED | OUTPATIENT
Start: 2024-12-17 | End: 2024-12-21

## 2024-12-17 RX ORDER — FAMOTIDINE 10 MG/ML
20 INJECTION INTRAVENOUS
Status: COMPLETED | OUTPATIENT
Start: 2024-12-17 | End: 2024-12-17

## 2024-12-17 RX ORDER — FLECAINIDE ACETATE 100 MG/1
100 TABLET ORAL EVERY 12 HOURS
Status: DISCONTINUED | OUTPATIENT
Start: 2024-12-17 | End: 2024-12-20

## 2024-12-17 RX ORDER — KETOROLAC TROMETHAMINE 30 MG/ML
15 INJECTION, SOLUTION INTRAMUSCULAR; INTRAVENOUS EVERY 6 HOURS PRN
Status: DISPENSED | OUTPATIENT
Start: 2024-12-17 | End: 2024-12-19

## 2024-12-17 RX ORDER — MORPHINE SULFATE 2 MG/ML
2 INJECTION, SOLUTION INTRAMUSCULAR; INTRAVENOUS EVERY 8 HOURS PRN
Status: DISCONTINUED | OUTPATIENT
Start: 2024-12-17 | End: 2024-12-20

## 2024-12-17 RX ORDER — FAMOTIDINE 10 MG/ML
20 INJECTION INTRAVENOUS ONCE
Status: COMPLETED | OUTPATIENT
Start: 2024-12-17 | End: 2024-12-17

## 2024-12-17 RX ORDER — METOPROLOL SUCCINATE 25 MG/1
25 TABLET, EXTENDED RELEASE ORAL EVERY 12 HOURS
Status: DISCONTINUED | OUTPATIENT
Start: 2024-12-17 | End: 2024-12-20

## 2024-12-17 RX ADMIN — FAMOTIDINE 20 MG: 10 INJECTION, SOLUTION INTRAVENOUS at 04:12

## 2024-12-17 RX ADMIN — KETOROLAC TROMETHAMINE 15 MG: 30 INJECTION, SOLUTION INTRAMUSCULAR; INTRAVENOUS at 05:12

## 2024-12-17 RX ADMIN — APIXABAN 2.5 MG: 2.5 TABLET, FILM COATED ORAL at 08:12

## 2024-12-17 RX ADMIN — SODIUM CHLORIDE 1000 ML: 9 INJECTION, SOLUTION INTRAVENOUS at 12:12

## 2024-12-17 RX ADMIN — FAMOTIDINE 20 MG: 10 INJECTION, SOLUTION INTRAVENOUS at 11:12

## 2024-12-17 RX ADMIN — KETOROLAC TROMETHAMINE 15 MG: 30 INJECTION, SOLUTION INTRAMUSCULAR; INTRAVENOUS at 11:12

## 2024-12-17 RX ADMIN — MORPHINE SULFATE 2 MG: 2 INJECTION, SOLUTION INTRAMUSCULAR; INTRAVENOUS at 02:12

## 2024-12-17 RX ADMIN — ONDANSETRON 4 MG: 2 INJECTION INTRAMUSCULAR; INTRAVENOUS at 05:12

## 2024-12-17 RX ADMIN — SODIUM CHLORIDE, POTASSIUM CHLORIDE, SODIUM LACTATE AND CALCIUM CHLORIDE: 600; 310; 30; 20 INJECTION, SOLUTION INTRAVENOUS at 11:12

## 2024-12-17 RX ADMIN — ONDANSETRON 4 MG: 2 INJECTION INTRAMUSCULAR; INTRAVENOUS at 11:12

## 2024-12-17 RX ADMIN — FLECAINIDE ACETATE 100 MG: 100 TABLET ORAL at 08:12

## 2024-12-17 RX ADMIN — METOPROLOL SUCCINATE 25 MG: 25 TABLET, EXTENDED RELEASE ORAL at 08:12

## 2024-12-17 RX ADMIN — IOHEXOL 75 ML: 350 INJECTION, SOLUTION INTRAVENOUS at 09:12

## 2024-12-17 RX ADMIN — SODIUM CHLORIDE, POTASSIUM CHLORIDE, SODIUM LACTATE AND CALCIUM CHLORIDE: 600; 310; 30; 20 INJECTION, SOLUTION INTRAVENOUS at 03:12

## 2024-12-17 NOTE — HPI
79 year old female with a PMH that includes previous small bowel obstructions, atrial fibrillation, and hypertension. She came to the ED after experiencing severe abdominal bloating and cramping that began after she ate last night. Her last SBO was in March 2024. He ED work up showed leukocytosis on CBC and a distal small bowel obstruction on CT A/P. She was admitted to hospital medicine for further management.

## 2024-12-17 NOTE — SUBJECTIVE & OBJECTIVE
"Past Medical History:   Diagnosis Date    Atrial fibrillation     stress related per patient    Hx of long term use of blood thinners     Hyperlipidemia     Osteoarthritis     PONV (postoperative nausea and vomiting)     SBO (small bowel obstruction)     X 3    Shingles 01/01/2002    Spinal stenosis        Past Surgical History:   Procedure Laterality Date    ANKLE FRACTURE SURGERY  2007    Right ankle    COLON SURGERY      bowel obstruction-NG TUBE    COLONOSCOPY      x 2    EXCISION OF MASS OF BACK N/A 11/16/2018    Procedure: EXCISION, MASS, BACK;  Surgeon: Fran Magaña MD;  Location: [facility];  Service: General;  Laterality: N/A;    EYE SURGERY Bilateral 2016    HYSTERECTOMY  1985    HEATHER    KNEE ARTHROSCOPY W/ DEBRIDEMENT  1994    Bilateral    KNEE ARTHROSCOPY W/ DEBRIDEMENT  2003    Left knee    OPEN PATELLA REEFING PROCEDURE  age 19    Left knee    Tonsilectomy  as a child    TONSILLECTOMY      TOTAL KNEE ARTHROPLASTY Right 10/19/2023    Procedure: ARTHROPLASTY, KNEE, TOTAL;  Surgeon: Vicente Mitchell MD;  Location: [facility];  Service: Orthopedics;  Laterality: Right;    TOTAL KNEE ARTHROPLASTY Left 7/18/2024    Procedure: ARTHROPLASTY, KNEE, TOTAL;  Surgeon: Vicente Mitchell MD;  Location: [facility];  Service: Orthopedics;  Laterality: Left;    TUBAL LIGATION         Review of patient's allergies indicates:   Allergen Reactions    Demerol [meperidine] Other (See Comments)     "Becomes Agitated and Combative"      Patient states she can take anything else    Meperidine (pf)        No current facility-administered medications on file prior to encounter.     Current Outpatient Medications on File Prior to Encounter   Medication Sig    apixaban (ELIQUIS) 2.5 mg Tab Take 1 tablet (2.5 mg total) by mouth 2 (two) times daily.    atorvastatin (LIPITOR) 20 MG tablet Take 1 tablet (20 mg total) by mouth once daily.    calcium-vitamin D3 (OS- + D3) 500 mg-5 mcg (200 unit) per tablet Take 1 tablet by " mouth 2 (two) times daily with meals.    celecoxib (CELEBREX) 200 MG capsule Take 1 capsule (200 mg total) by mouth once daily.    estradioL (ESTRACE) 0.01 % (0.1 mg/gram) vaginal cream PLACE 1 GRAM VAGINALLY 3 TIMES A WEEK    famotidine (PEPCID) 20 MG tablet Take 1 tablet (20 mg total) by mouth Daily.    flecainide (TAMBOCOR) 100 MG Tab Take 1 tablet (100 mg total) by mouth every 12 (twelve) hours.    glucosamine-chondroitin 500-400 mg tablet Take 1 tablet by mouth 3 (three) times daily.    metoprolol succinate (TOPROL-XL) 25 MG 24 hr tablet Take 1 tablet (25 mg total) by mouth every 12 (twelve) hours.    polyethylene glycol (MIRALAX) 17 gram/dose powder Take 17 g by mouth 3 (three) times daily as needed (Constipation).    [DISCONTINUED] HYDROcodone-acetaminophen (NORCO)  mg per tablet Take 1 tablet by mouth every 6 (six) hours as needed. (Patient not taking: Reported on 10/2/2024)    [DISCONTINUED] ondansetron (ZOFRAN-ODT) 8 MG TbDL Take 1 tablet (8 mg total) by mouth every 8 (eight) hours as needed. (Patient not taking: Reported on 10/2/2024)     Family History       Problem Relation (Age of Onset)    Breast cancer Paternal Aunt    Colon cancer Paternal Aunt    Coronary artery disease Brother    Diabetes Brother    Hypertension Father, Mother          Tobacco Use    Smoking status: Never    Smokeless tobacco: Never   Substance and Sexual Activity    Alcohol use: Yes     Alcohol/week: 3.0 standard drinks of alcohol     Types: 3 Glasses of wine per week     Comment: Drinks a glass of wine 3 times weekly; none 24 hr prior to surgery    Drug use: No    Sexual activity: Not Currently     Birth control/protection: Post-menopausal     Comment: Spouse  of kidney cancer : 2015     Review of Systems   Constitutional:  Positive for appetite change. Negative for activity change, fatigue and unexpected weight change.   HENT:  Negative for trouble swallowing.    Eyes:  Negative for visual disturbance.    Respiratory:  Negative for chest tightness and shortness of breath.    Cardiovascular:  Negative for chest pain and leg swelling.   Gastrointestinal:  Positive for abdominal distention and abdominal pain. Negative for anal bleeding, constipation, diarrhea, nausea, rectal pain and vomiting.   Genitourinary:  Negative for dysuria, flank pain and urgency.   Musculoskeletal:  Negative for arthralgias.   Skin: Negative.    Neurological:  Negative for weakness.   Psychiatric/Behavioral:  Negative for agitation.      Objective:     Vital Signs (Most Recent):  Temp: 98.6 °F (37 °C) (12/17/24 1118)  Pulse: 67 (12/17/24 1302)  Resp: 17 (12/17/24 1247)  BP: (!) 152/79 (12/17/24 1302)  SpO2: 97 % (12/17/24 1302) Vital Signs (24h Range):  Temp:  [97.8 °F (36.6 °C)-98.6 °F (37 °C)] 98.6 °F (37 °C)  Pulse:  [64-73] 67  Resp:  [17-22] 17  SpO2:  [95 %-98 %] 97 %  BP: (133-166)/(64-79) 152/79     Weight: 54.4 kg (120 lb)  Body mass index is 19.97 kg/m².     Physical Exam  Vitals and nursing note reviewed.   HENT:      Head: Normocephalic.      Mouth/Throat:      Mouth: Mucous membranes are moist.   Eyes:      Extraocular Movements: Extraocular movements intact.   Cardiovascular:      Rate and Rhythm: Normal rate and regular rhythm.   Pulmonary:      Effort: Pulmonary effort is normal. No respiratory distress.      Breath sounds: No wheezing or rales.   Abdominal:      General: Bowel sounds are normal. There is distension.      Tenderness: There is abdominal tenderness.   Musculoskeletal:         General: Normal range of motion.      Cervical back: Normal range of motion.      Right lower leg: No edema.      Left lower leg: No edema.   Skin:     General: Skin is warm.   Neurological:      General: No focal deficit present.      Mental Status: She is alert and oriented to person, place, and time.   Psychiatric:         Mood and Affect: Mood normal.         Behavior: Behavior normal.                Significant Labs: All pertinent  labs within the past 24 hours have been reviewed.  BMP:   Recent Labs   Lab 12/17/24  0840   *      K 4.1      CO2 21*   BUN 23   CREATININE 1.0   CALCIUM 10.3     CBC:   Recent Labs   Lab 12/17/24  0840   WBC 16.69*   HGB 16.2*   HCT 47.8        CMP:   Recent Labs   Lab 12/17/24  0840      K 4.1      CO2 21*   *   BUN 23   CREATININE 1.0   CALCIUM 10.3   PROT 7.6   ALBUMIN 4.6   BILITOT 1.1*   ALKPHOS 87   AST 22   ALT 17   ANIONGAP 13       Significant Imaging: I have reviewed all pertinent imaging results/findings within the past 24 hours.CT Abdomen Pelvis With IV Contrast NO Oral Contrast  Narrative: EXAMINATION:  CT ABDOMEN PELVIS WITH IV CONTRAST    CLINICAL HISTORY:  Bowel obstruction suspected;    FINDINGS:  Comparison is 02/29/2024.    Patient was administered 75 cc of Omnipaque 350 intravenously.    Lung bases are clear.  There is a tiny liver cyst.  Gallbladder and biliary tree are normal.  The spleen, pancreas, and duodenum show nothing unusual.  The adrenal glands are not enlarged.  The kidneys enhance normally.  The vessels enhance normally.  No para-aortic, retroperitoneal, or mesenteric adenopathy is seen.  There is distal small bowel dilatation.  There is colonic decompression.  There is mild diverticulosis.  No ascites mass abscess or perforation seen.  Bones demonstrate nothing unusual.  Impression: Recurrent distal small bowel obstruction.    Electronically signed by: Bandar Acevedo MD  Date:    12/17/2024  Time:    10:14

## 2024-12-17 NOTE — ASSESSMENT & PLAN NOTE
79-year-old female with recurrent small bowel obstruction, history of a hysterectomy in the past  Recommend conservative management for now.    Consideration for Gastrografin challenge tomorrow if patient does not improve.    If patient develops worsening nausea or vomiting NG tube placement would be beneficial.    Patient states she had NG tube in the past and I told her that if she gets any more distention or sludge develop nausea or vomiting that this would be likely necessary.

## 2024-12-17 NOTE — ED NOTES
Attempted to call report x 1; receiving floor states that pts room is currently not ready and to call back shortly. ED charge RN notified.

## 2024-12-17 NOTE — SUBJECTIVE & OBJECTIVE
"No current facility-administered medications on file prior to encounter.     Current Outpatient Medications on File Prior to Encounter   Medication Sig    apixaban (ELIQUIS) 2.5 mg Tab Take 1 tablet (2.5 mg total) by mouth 2 (two) times daily.    atorvastatin (LIPITOR) 20 MG tablet Take 1 tablet (20 mg total) by mouth once daily.    calcium-vitamin D3 (OS- + D3) 500 mg-5 mcg (200 unit) per tablet Take 1 tablet by mouth 2 (two) times daily with meals.    celecoxib (CELEBREX) 200 MG capsule Take 1 capsule (200 mg total) by mouth once daily.    estradioL (ESTRACE) 0.01 % (0.1 mg/gram) vaginal cream PLACE 1 GRAM VAGINALLY 3 TIMES A WEEK    famotidine (PEPCID) 20 MG tablet Take 1 tablet (20 mg total) by mouth Daily.    flecainide (TAMBOCOR) 100 MG Tab Take 1 tablet (100 mg total) by mouth every 12 (twelve) hours.    glucosamine-chondroitin 500-400 mg tablet Take 1 tablet by mouth 3 (three) times daily.    metoprolol succinate (TOPROL-XL) 25 MG 24 hr tablet Take 1 tablet (25 mg total) by mouth every 12 (twelve) hours.    polyethylene glycol (MIRALAX) 17 gram/dose powder Take 17 g by mouth 3 (three) times daily as needed (Constipation).    [DISCONTINUED] HYDROcodone-acetaminophen (NORCO)  mg per tablet Take 1 tablet by mouth every 6 (six) hours as needed. (Patient not taking: Reported on 10/2/2024)    [DISCONTINUED] ondansetron (ZOFRAN-ODT) 8 MG TbDL Take 1 tablet (8 mg total) by mouth every 8 (eight) hours as needed. (Patient not taking: Reported on 10/2/2024)       Review of patient's allergies indicates:   Allergen Reactions    Demerol [meperidine] Other (See Comments)     "Becomes Agitated and Combative"      Patient states she can take anything else    Meperidine (pf)        Past Medical History:   Diagnosis Date    Atrial fibrillation     stress related per patient    Hx of long term use of blood thinners     Hyperlipidemia     Osteoarthritis     PONV (postoperative nausea and vomiting)     SBO (small " bowel obstruction)     X 3    Shingles 2002    Spinal stenosis      Past Surgical History:   Procedure Laterality Date    ANKLE FRACTURE SURGERY  2007    Right ankle    COLON SURGERY      bowel obstruction-NG TUBE    COLONOSCOPY      x 2    EXCISION OF MASS OF BACK N/A 2018    Procedure: EXCISION, MASS, BACK;  Surgeon: Fran Magaña MD;  Location: [facility];  Service: General;  Laterality: N/A;    EYE SURGERY Bilateral 2016    HYSTERECTOMY  1985    HEATHER    KNEE ARTHROSCOPY W/ DEBRIDEMENT      Bilateral    KNEE ARTHROSCOPY W/ DEBRIDEMENT      Left knee    OPEN PATELLA REEFING PROCEDURE  age 19    Left knee    Tonsilectomy  as a child    TONSILLECTOMY      TOTAL KNEE ARTHROPLASTY Right 10/19/2023    Procedure: ARTHROPLASTY, KNEE, TOTAL;  Surgeon: Vicente Mitchell MD;  Location: [facility];  Service: Orthopedics;  Laterality: Right;    TOTAL KNEE ARTHROPLASTY Left 2024    Procedure: ARTHROPLASTY, KNEE, TOTAL;  Surgeon: Vicente Mitchell MD;  Location: [facility];  Service: Orthopedics;  Laterality: Left;    TUBAL LIGATION       Family History       Problem Relation (Age of Onset)    Breast cancer Paternal Aunt    Colon cancer Paternal Aunt    Coronary artery disease Brother    Diabetes Brother    Hypertension Father, Mother          Tobacco Use    Smoking status: Never    Smokeless tobacco: Never   Substance and Sexual Activity    Alcohol use: Yes     Alcohol/week: 3.0 standard drinks of alcohol     Types: 3 Glasses of wine per week     Comment: Drinks a glass of wine 3 times weekly; none 24 hr prior to surgery    Drug use: No    Sexual activity: Not Currently     Birth control/protection: Post-menopausal     Comment: Spouse  of kidney cancer : 2015     Review of Systems   Constitutional:  Negative for appetite change, fatigue, fever and unexpected weight change.   HENT:  Negative for sore throat and trouble swallowing.    Eyes: Negative.    Respiratory:  Negative for cough,  shortness of breath and wheezing.    Cardiovascular:  Negative for chest pain and leg swelling.   Gastrointestinal:  Positive for abdominal distention and abdominal pain. Negative for blood in stool, constipation, diarrhea, nausea and vomiting.   Endocrine: Negative.    Genitourinary: Negative.    Musculoskeletal:  Negative for back pain.   Skin: Negative.  Negative for rash.   Allergic/Immunologic: Negative.    Neurological: Negative.    Hematological: Negative.    Psychiatric/Behavioral:  Negative for confusion.      Objective:     Vital Signs (Most Recent):  Temp: 98.6 °F (37 °C) (12/17/24 1118)  Pulse: 67 (12/17/24 1302)  Resp: (!) 24 (12/17/24 1450)  BP: (!) 152/79 (12/17/24 1302)  SpO2: 97 % (12/17/24 1302) Vital Signs (24h Range):  Temp:  [97.8 °F (36.6 °C)-98.6 °F (37 °C)] 98.6 °F (37 °C)  Pulse:  [64-73] 67  Resp:  [17-24] 24  SpO2:  [95 %-98 %] 97 %  BP: (133-166)/(64-79) 152/79     Weight: 54.4 kg (120 lb)  Body mass index is 19.97 kg/m².     Physical Exam  Vitals and nursing note reviewed.   Constitutional:       Appearance: She is well-developed.   HENT:      Head: Normocephalic and atraumatic.   Cardiovascular:      Rate and Rhythm: Normal rate.      Heart sounds: Normal heart sounds.   Pulmonary:      Effort: Pulmonary effort is normal.   Abdominal:      General: Bowel sounds are normal. There is distension.      Palpations: Abdomen is soft.      Tenderness: There is no abdominal tenderness.   Musculoskeletal:         General: Normal range of motion.      Cervical back: Normal range of motion.   Skin:     General: Skin is warm and dry.      Capillary Refill: Capillary refill takes less than 2 seconds.   Neurological:      Mental Status: She is alert and oriented to person, place, and time.   Psychiatric:         Behavior: Behavior normal.            I have reviewed all pertinent lab results within the past 24 hours.  CBC:   Recent Labs   Lab 12/17/24  0840   WBC 16.69*   RBC 5.28   HGB 16.2*   HCT  47.8      MCV 91   MCH 30.7   MCHC 33.9     CMP:   Recent Labs   Lab 12/17/24  0840   *   CALCIUM 10.3   ALBUMIN 4.6   PROT 7.6      K 4.1   CO2 21*      BUN 23   CREATININE 1.0   ALKPHOS 87   ALT 17   AST 22   BILITOT 1.1*       Significant Diagnostics:  I have reviewed all pertinent imaging results/findings within the past 24 hours.  CT: I have reviewed all pertinent results/findings within the past 24 hours and my personal findings are:  Distended stomach and small bowel with decompressed however stool in the colon.

## 2024-12-17 NOTE — CONSULTS
[facility] ([address])  General Surgery  Consult Note    Patient Name: Marian Durham  MRN: [id number]  Code Status: Full Code  Admission Date: 12/17/2024  Hospital Length of Stay: 0 days  Attending Physician: Yandy Horne MD  Primary Care Provider: Suzanna Duran MD    Patient information was obtained from patient and ER records.     Inpatient consult to General Surgery  Consult performed by: Girma Milian MD  Consult ordered by: Karmen Pollock DNP  Reason for consult: Small-bowel obstruction  Assessment/Recommendations: Conservative management for now with the patient NPO   Patient is having bowel movements and no active vomiting so discussed holding off on NG tube for now  If patient worsens at all our gets more distended NG tube would be necessary.            Subjective:     Principal Problem: SBO (small bowel obstruction)    History of Present Illness: 79 year old female with a PMH that includes previous small bowel obstructions, atrial fibrillation, and hypertension. She came to the ED after experiencing severe abdominal bloating and cramping that began after she ate last night. Her last SBO was in March 2024. He ED work up showed leukocytosis on CBC and a distal small bowel obstruction on CT A/P. She was admitted to hospital medicine for further management.     No current facility-administered medications on file prior to encounter.     Current Outpatient Medications on File Prior to Encounter   Medication Sig    apixaban (ELIQUIS) 2.5 mg Tab Take 1 tablet (2.5 mg total) by mouth 2 (two) times daily.    atorvastatin (LIPITOR) 20 MG tablet Take 1 tablet (20 mg total) by mouth once daily.    calcium-vitamin D3 (OS- + D3) 500 mg-5 mcg (200 unit) per tablet Take 1 tablet by mouth 2 (two) times daily with meals.    celecoxib (CELEBREX) 200 MG capsule Take 1 capsule (200 mg total) by mouth once daily.    estradioL (ESTRACE) 0.01 % (0.1 mg/gram) vaginal cream PLACE 1 GRAM  "VAGINALLY 3 TIMES A WEEK    famotidine (PEPCID) 20 MG tablet Take 1 tablet (20 mg total) by mouth Daily.    flecainide (TAMBOCOR) 100 MG Tab Take 1 tablet (100 mg total) by mouth every 12 (twelve) hours.    glucosamine-chondroitin 500-400 mg tablet Take 1 tablet by mouth 3 (three) times daily.    metoprolol succinate (TOPROL-XL) 25 MG 24 hr tablet Take 1 tablet (25 mg total) by mouth every 12 (twelve) hours.    polyethylene glycol (MIRALAX) 17 gram/dose powder Take 17 g by mouth 3 (three) times daily as needed (Constipation).    [DISCONTINUED] HYDROcodone-acetaminophen (NORCO)  mg per tablet Take 1 tablet by mouth every 6 (six) hours as needed. (Patient not taking: Reported on 10/2/2024)    [DISCONTINUED] ondansetron (ZOFRAN-ODT) 8 MG TbDL Take 1 tablet (8 mg total) by mouth every 8 (eight) hours as needed. (Patient not taking: Reported on 10/2/2024)       Review of patient's allergies indicates:   Allergen Reactions    Demerol [meperidine] Other (See Comments)     "Becomes Agitated and Combative"      Patient states she can take anything else    Meperidine (pf)        Past Medical History:   Diagnosis Date    Atrial fibrillation     stress related per patient    Hx of long term use of blood thinners     Hyperlipidemia     Osteoarthritis     PONV (postoperative nausea and vomiting)     SBO (small bowel obstruction)     X 3    Shingles 01/01/2002    Spinal stenosis      Past Surgical History:   Procedure Laterality Date    ANKLE FRACTURE SURGERY  2007    Right ankle    COLON SURGERY      bowel obstruction-NG TUBE    COLONOSCOPY      x 2    EXCISION OF MASS OF BACK N/A 11/16/2018    Procedure: EXCISION, MASS, BACK;  Surgeon: Fran Magaña MD;  Location: [facility];  Service: General;  Laterality: N/A;    EYE SURGERY Bilateral 2016    HYSTERECTOMY  1985    HEATHER    KNEE ARTHROSCOPY W/ DEBRIDEMENT  1994    Bilateral    KNEE ARTHROSCOPY W/ DEBRIDEMENT  2003    Left knee    OPEN PATELLA REEFING PROCEDURE  age 19 "    Left knee    Tonsilectomy  as a child    TONSILLECTOMY      TOTAL KNEE ARTHROPLASTY Right 10/19/2023    Procedure: ARTHROPLASTY, KNEE, TOTAL;  Surgeon: Vicente Mitchell MD;  Location: [facility] OR;  Service: Orthopedics;  Laterality: Right;    TOTAL KNEE ARTHROPLASTY Left 2024    Procedure: ARTHROPLASTY, KNEE, TOTAL;  Surgeon: iVcente Mitchell MD;  Location: [facility] OR;  Service: Orthopedics;  Laterality: Left;    TUBAL LIGATION       Family History       Problem Relation (Age of Onset)    Breast cancer Paternal Aunt    Colon cancer Paternal Aunt    Coronary artery disease Brother    Diabetes Brother    Hypertension Father, Mother          Tobacco Use    Smoking status: Never    Smokeless tobacco: Never   Substance and Sexual Activity    Alcohol use: Yes     Alcohol/week: 3.0 standard drinks of alcohol     Types: 3 Glasses of wine per week     Comment: Drinks a glass of wine 3 times weekly; none 24 hr prior to surgery    Drug use: No    Sexual activity: Not Currently     Birth control/protection: Post-menopausal     Comment: Spouse  of kidney cancer : 2015     Review of Systems   Constitutional:  Negative for appetite change, fatigue, fever and unexpected weight change.   HENT:  Negative for sore throat and trouble swallowing.    Eyes: Negative.    Respiratory:  Negative for cough, shortness of breath and wheezing.    Cardiovascular:  Negative for chest pain and leg swelling.   Gastrointestinal:  Positive for abdominal distention and abdominal pain. Negative for blood in stool, constipation, diarrhea, nausea and vomiting.   Endocrine: Negative.    Genitourinary: Negative.    Musculoskeletal:  Negative for back pain.   Skin: Negative.  Negative for rash.   Allergic/Immunologic: Negative.    Neurological: Negative.    Hematological: Negative.    Psychiatric/Behavioral:  Negative for confusion.      Objective:     Vital Signs (Most Recent):  Temp: 98.6 °F (37 °C) (24 1118)  Pulse: 67 (24  1302)  Resp: (!) 24 (12/17/24 1450)  BP: (!) 152/79 (12/17/24 1302)  SpO2: 97 % (12/17/24 1302) Vital Signs (24h Range):  Temp:  [97.8 °F (36.6 °C)-98.6 °F (37 °C)] 98.6 °F (37 °C)  Pulse:  [64-73] 67  Resp:  [17-24] 24  SpO2:  [95 %-98 %] 97 %  BP: (133-166)/(64-79) 152/79     Weight: 54.4 kg (120 lb)  Body mass index is 19.97 kg/m².     Physical Exam  Vitals and nursing note reviewed.   Constitutional:       Appearance: She is well-developed.   HENT:      Head: Normocephalic and atraumatic.   Cardiovascular:      Rate and Rhythm: Normal rate.      Heart sounds: Normal heart sounds.   Pulmonary:      Effort: Pulmonary effort is normal.   Abdominal:      General: Bowel sounds are normal. There is distension.      Palpations: Abdomen is soft.      Tenderness: There is no abdominal tenderness.   Musculoskeletal:         General: Normal range of motion.      Cervical back: Normal range of motion.   Skin:     General: Skin is warm and dry.      Capillary Refill: Capillary refill takes less than 2 seconds.   Neurological:      Mental Status: She is alert and oriented to person, place, and time.   Psychiatric:         Behavior: Behavior normal.            I have reviewed all pertinent lab results within the past 24 hours.  CBC:   Recent Labs   Lab 12/17/24  0840   WBC 16.69*   RBC 5.28   HGB 16.2*   HCT 47.8      MCV 91   MCH 30.7   MCHC 33.9     CMP:   Recent Labs   Lab 12/17/24  0840   *   CALCIUM 10.3   ALBUMIN 4.6   PROT 7.6      K 4.1   CO2 21*      BUN 23   CREATININE 1.0   ALKPHOS 87   ALT 17   AST 22   BILITOT 1.1*       Significant Diagnostics:  I have reviewed all pertinent imaging results/findings within the past 24 hours.  CT: I have reviewed all pertinent results/findings within the past 24 hours and my personal findings are:  Distended stomach and small bowel with decompressed however stool in the colon.      Assessment/Plan:     * SBO (small bowel obstruction)  79-year-old female  with recurrent small bowel obstruction, history of a hysterectomy in the past  Recommend conservative management for now.    Consideration for Gastrografin challenge tomorrow if patient does not improve.    If patient develops worsening nausea or vomiting NG tube placement would be beneficial.    Patient states she had NG tube in the past and I told her that if she gets any more distention or sludge develop nausea or vomiting that this would be likely necessary.          VTE Risk Mitigation (From admission, onward)           Ordered     apixaban tablet 2.5 mg  2 times daily         12/17/24 1348     Reason for No Pharmacological VTE Prophylaxis  Once        Question:  Reasons:  Answer:  Already adequately anticoagulated on oral Anticoagulants    12/17/24 1348     IP VTE HIGH RISK PATIENT  Once         12/17/24 1348     Place sequential compression device  Until discontinued         12/17/24 1348                    Thank you for your consult. I will follow-up with patient. Please contact us if you have any additional questions.    Girma Milian MD  General Surgery  [facility] ([address])

## 2024-12-17 NOTE — H&P
[facility] Surg [address] Medicine  History & Physical    Patient Name: Marian Durham  MRN: [id number]  Patient Class: IP- Inpatient  Admission Date: 12/17/2024  Attending Physician: Yandy Horne MD   Primary Care Provider: Suzanna Duran MD         Patient information was obtained from patient, past medical records, and ER records.     Subjective:     Principal Problem:SBO (small bowel obstruction)    Chief Complaint:   Chief Complaint   Patient presents with    Abdominal Pain     Pt c/o cramping, bloating, distension, nausea x days. Denying v/d, no fevers. History of multiple SBO and concerned for this.        HPI: Ms Fonseca is a 79 year old female with a PMH that includes previous small bowel obstructions, atrial fibrillation, and hypertension. She came to the ED after experiencing severe abdominal bloating and cramping that began after she ate last night. Her last SBO was in March 2024.  He ED work up showed leukocytosis on CBC and a distal small bowel obstruction on CT A/P.  She was admitted to hospital medicine for further management.     Past Medical History:   Diagnosis Date    Atrial fibrillation     stress related per patient    Hx of long term use of blood thinners     Hyperlipidemia     Osteoarthritis     PONV (postoperative nausea and vomiting)     SBO (small bowel obstruction)     X 3    Shingles 01/01/2002    Spinal stenosis        Past Surgical History:   Procedure Laterality Date    ANKLE FRACTURE SURGERY  2007    Right ankle    COLON SURGERY      bowel obstruction-NG TUBE    COLONOSCOPY      x 2    EXCISION OF MASS OF BACK N/A 11/16/2018    Procedure: EXCISION, MASS, BACK;  Surgeon: Fran Magaña MD;  Location: [facility];  Service: General;  Laterality: N/A;    EYE SURGERY Bilateral 2016    HYSTERECTOMY  1985    [facility]    KNEE ARTHROSCOPY W/ DEBRIDEMENT  1994    Bilateral    KNEE ARTHROSCOPY W/ DEBRIDEMENT  2003    Left knee    OPEN PATELLA REEFING PROCEDURE  age 19  "   Left knee    Tonsilectomy  as a child    TONSILLECTOMY      TOTAL KNEE ARTHROPLASTY Right 10/19/2023    Procedure: ARTHROPLASTY, KNEE, TOTAL;  Surgeon: Vicente Mitchell MD;  Location: [facility] OR;  Service: Orthopedics;  Laterality: Right;    TOTAL KNEE ARTHROPLASTY Left 7/18/2024    Procedure: ARTHROPLASTY, KNEE, TOTAL;  Surgeon: Vicente Mitchell MD;  Location: [facility] OR;  Service: Orthopedics;  Laterality: Left;    TUBAL LIGATION         Review of patient's allergies indicates:   Allergen Reactions    Demerol [meperidine] Other (See Comments)     "Becomes Agitated and Combative"      Patient states she can take anything else    Meperidine (pf)        No current facility-administered medications on file prior to encounter.     Current Outpatient Medications on File Prior to Encounter   Medication Sig    apixaban (ELIQUIS) 2.5 mg Tab Take 1 tablet (2.5 mg total) by mouth 2 (two) times daily.    atorvastatin (LIPITOR) 20 MG tablet Take 1 tablet (20 mg total) by mouth once daily.    calcium-vitamin D3 (OS- + D3) 500 mg-5 mcg (200 unit) per tablet Take 1 tablet by mouth 2 (two) times daily with meals.    celecoxib (CELEBREX) 200 MG capsule Take 1 capsule (200 mg total) by mouth once daily.    estradioL (ESTRACE) 0.01 % (0.1 mg/gram) vaginal cream PLACE 1 GRAM VAGINALLY 3 TIMES A WEEK    famotidine (PEPCID) 20 MG tablet Take 1 tablet (20 mg total) by mouth Daily.    flecainide (TAMBOCOR) 100 MG Tab Take 1 tablet (100 mg total) by mouth every 12 (twelve) hours.    glucosamine-chondroitin 500-400 mg tablet Take 1 tablet by mouth 3 (three) times daily.    metoprolol succinate (TOPROL-XL) 25 MG 24 hr tablet Take 1 tablet (25 mg total) by mouth every 12 (twelve) hours.    polyethylene glycol (MIRALAX) 17 gram/dose powder Take 17 g by mouth 3 (three) times daily as needed (Constipation).    [DISCONTINUED] HYDROcodone-acetaminophen (NORCO)  mg per tablet Take 1 tablet by mouth every 6 (six) hours as needed. " (Patient not taking: Reported on 10/2/2024)    [DISCONTINUED] ondansetron (ZOFRAN-ODT) 8 MG TbDL Take 1 tablet (8 mg total) by mouth every 8 (eight) hours as needed. (Patient not taking: Reported on 10/2/2024)     Family History       Problem Relation (Age of Onset)    Breast cancer Paternal Aunt    Colon cancer Paternal Aunt    Coronary artery disease Brother    Diabetes Brother    Hypertension Father, Mother          Tobacco Use    Smoking status: Never    Smokeless tobacco: Never   Substance and Sexual Activity    Alcohol use: Yes     Alcohol/week: 3.0 standard drinks of alcohol     Types: 3 Glasses of wine per week     Comment: Drinks a glass of wine 3 times weekly; none 24 hr prior to surgery    Drug use: No    Sexual activity: Not Currently     Birth control/protection: Post-menopausal     Comment: Spouse  of kidney cancer : 2015     Review of Systems   Constitutional:  Positive for appetite change. Negative for activity change, fatigue and unexpected weight change.   HENT:  Negative for trouble swallowing.    Eyes:  Negative for visual disturbance.   Respiratory:  Negative for chest tightness and shortness of breath.    Cardiovascular:  Negative for chest pain and leg swelling.   Gastrointestinal:  Positive for abdominal distention and abdominal pain. Negative for anal bleeding, constipation, diarrhea, nausea, rectal pain and vomiting.   Genitourinary:  Negative for dysuria, flank pain and urgency.   Musculoskeletal:  Negative for arthralgias.   Skin: Negative.    Neurological:  Negative for weakness.   Psychiatric/Behavioral:  Negative for agitation.      Objective:     Vital Signs (Most Recent):  Temp: 98.6 °F (37 °C) (24 1118)  Pulse: 67 (24 1302)  Resp: 17 (24 1247)  BP: (!) 152/79 (24 1302)  SpO2: 97 % (24 1302) Vital Signs (24h Range):  Temp:  [97.8 °F (36.6 °C)-98.6 °F (37 °C)] 98.6 °F (37 °C)  Pulse:  [64-73] 67  Resp:  [17-22] 17  SpO2:  [95 %-98 %] 97  %  BP: (133-166)/(64-79) 152/79     Weight: 54.4 kg (120 lb)  Body mass index is 19.97 kg/m².     Physical Exam  Vitals and nursing note reviewed.   HENT:      Head: Normocephalic.      Mouth/Throat:      Mouth: Mucous membranes are moist.   Eyes:      Extraocular Movements: Extraocular movements intact.   Cardiovascular:      Rate and Rhythm: Normal rate and regular rhythm.   Pulmonary:      Effort: Pulmonary effort is normal. No respiratory distress.      Breath sounds: No wheezing or rales.   Abdominal:      General: Bowel sounds are normal. There is distension.      Tenderness: There is abdominal tenderness.   Musculoskeletal:         General: Normal range of motion.      Cervical back: Normal range of motion.      Right lower leg: No edema.      Left lower leg: No edema.   Skin:     General: Skin is warm.   Neurological:      General: No focal deficit present.      Mental Status: She is alert and oriented to person, place, and time.   Psychiatric:         Mood and Affect: Mood normal.         Behavior: Behavior normal.                Significant Labs: All pertinent labs within the past 24 hours have been reviewed.  BMP:   Recent Labs   Lab 12/17/24  0840   *      K 4.1      CO2 21*   BUN 23   CREATININE 1.0   CALCIUM 10.3     CBC:   Recent Labs   Lab 12/17/24  0840   WBC 16.69*   HGB 16.2*   HCT 47.8        CMP:   Recent Labs   Lab 12/17/24  0840      K 4.1      CO2 21*   *   BUN 23   CREATININE 1.0   CALCIUM 10.3   PROT 7.6   ALBUMIN 4.6   BILITOT 1.1*   ALKPHOS 87   AST 22   ALT 17   ANIONGAP 13       Significant Imaging: I have reviewed all pertinent imaging results/findings within the past 24 hours.CT Abdomen Pelvis With IV Contrast NO Oral Contrast  Narrative: EXAMINATION:  CT ABDOMEN PELVIS WITH IV CONTRAST    CLINICAL HISTORY:  Bowel obstruction suspected;    FINDINGS:  Comparison is 02/29/2024.    Patient was administered 75 cc of Omnipaque 350  intravenously.    Lung bases are clear.  There is a tiny liver cyst.  Gallbladder and biliary tree are normal.  The spleen, pancreas, and duodenum show nothing unusual.  The adrenal glands are not enlarged.  The kidneys enhance normally.  The vessels enhance normally.  No para-aortic, retroperitoneal, or mesenteric adenopathy is seen.  There is distal small bowel dilatation.  There is colonic decompression.  There is mild diverticulosis.  No ascites mass abscess or perforation seen.  Bones demonstrate nothing unusual.  Impression: Recurrent distal small bowel obstruction.    Electronically signed by: Banadr Acevedo MD  Date:    12/17/2024  Time:    10:14      Assessment/Plan:     * SBO (small bowel obstruction)  Past h/o SBOs  CT A/P: recurrent distal small bowel obstruction  Last BM 12/17  General Surgery consulted. Appreciate recs  NPO except meds. Will continue eliquis for now, as no surgery planned  IVF: LR  Monitor labs  PRN toradol not effective. Will use small dose morphine q8 hour prn for breakthrough pain    Atrial fibrillation  Patient has permanent atrial fibrillation. Patient is currently in sinus rhythm. [id number] Score: 3. The patients heart rate in the last 24 hours is as follows:  Pulse  Min: 64  Max: 73     Antiarrhythmics  flecainide tablet 100 mg, Every 12 hours, Oral  metoprolol succinate (TOPROL-XL) 24 hr tablet 25 mg, Every 12 hours, Oral    Anticoagulants  apixaban tablet 2.5 mg, 2 times daily, Oral    Plan  - Replace lytes with a goal of K>4, Mg >2  - Patient is anticoagulated, see medications listed above.  - Patient's afib is currently controlled          VTE Risk Mitigation (From admission, onward)           Ordered     apixaban tablet 2.5 mg  2 times daily         12/17/24 1348     Reason for No Pharmacological VTE Prophylaxis  Once        Question:  Reasons:  Answer:  Already adequately anticoagulated on oral Anticoagulants    12/17/24 1348     IP VTE HIGH RISK PATIENT  Once          12/17/24 1348     Place sequential compression device  Until discontinued         12/17/24 1348                                    Karmen Pollock DNP  Department of Hospital Medicine  [facility] - Med Surg ([address])

## 2024-12-17 NOTE — ED PROVIDER NOTES
"Encounter Date: 12/17/2024       History     Chief Complaint   Patient presents with    Abdominal Pain     Pt c/o cramping, bloating, distension, nausea x days. Denying v/d, no fevers. History of multiple SBO and concerned for this.     80yo female with pmhx A-fib on eliquis, HLP, and 3 previous bowel obstructions presents for abdominal distension, intermittent lower abdominal cramping, and burping since last night. Pt states she was eating dinner when she started to feel these symptoms which she states feel similar to her prior SBOs. Pt reports hysterectomy 40 years ago but denies any other abdominal surgeries. Pt reports her prior SBOs resolved without surgery but with multiple day hospital admissions. Pt denies nausea or vomiting at this time. Pt denies fevers. Pt reports having 2 bowel movements this morning that were firm. Pt states she has not eaten anything yet this morning and has not taken her at home medications. Pt denies URI symptoms. Pt denies CP, SOB, N/V/D, hematuria, hematemesis, melena, fever, chills          The history is provided by the patient.     Review of patient's allergies indicates:   Allergen Reactions    Demerol [meperidine] Other (See Comments)     "Becomes Agitated and Combative"      Patient states she can take anything else    Meperidine (pf)      Past Medical History:   Diagnosis Date    Atrial fibrillation     stress related per patient    Hx of long term use of blood thinners     Hyperlipidemia     Osteoarthritis     PONV (postoperative nausea and vomiting)     SBO (small bowel obstruction)     X 3    Shingles 01/01/2002    Spinal stenosis      Past Surgical History:   Procedure Laterality Date    ANKLE FRACTURE SURGERY  2007    Right ankle    COLON SURGERY      bowel obstruction-NG TUBE    COLONOSCOPY      x 2    EXCISION OF MASS OF BACK N/A 11/16/2018    Procedure: EXCISION, MASS, BACK;  Surgeon: Fran Magaña MD;  Location: [facility];  Service: General;  Laterality: N/A; "    EYE SURGERY Bilateral 2016    HYSTERECTOMY  1985    [facility]    KNEE ARTHROSCOPY W/ DEBRIDEMENT  1994    Bilateral    KNEE ARTHROSCOPY W/ DEBRIDEMENT  2003    Left knee    OPEN PATELLA REEFING PROCEDURE  age 19    Left knee    Tonsilectomy  as a child    TONSILLECTOMY      TOTAL KNEE ARTHROPLASTY Right 10/19/2023    Procedure: ARTHROPLASTY, KNEE, TOTAL;  Surgeon: Vicente Mitchell MD;  Location: [facility];  Service: Orthopedics;  Laterality: Right;    TOTAL KNEE ARTHROPLASTY Left 7/18/2024    Procedure: ARTHROPLASTY, KNEE, TOTAL;  Surgeon: Vicente Mitchell MD;  Location: [facility];  Service: Orthopedics;  Laterality: Left;    TUBAL LIGATION       Family History   Problem Relation Name Age of Onset    Hypertension Father      Hypertension Mother 92     Diabetes Brother      Colon cancer Paternal Aunt      Breast cancer Paternal Aunt          75    Coronary artery disease Brother      Ovarian cancer Neg Hx       Social History     Tobacco Use    Smoking status: Never    Smokeless tobacco: Never   Substance Use Topics    Alcohol use: Yes     Alcohol/week: 3.0 standard drinks of alcohol     Types: 3 Glasses of wine per week     Comment: Drinks a glass of wine 3 times weekly; none 24 hr prior to surgery    Drug use: No     Review of Systems  As per hpi  Physical Exam     Initial Vitals [12/17/24 0750]   BP Pulse Resp Temp SpO2   133/66 73 18 97.8 °F (36.6 °C) 98 %      MAP       --         Physical Exam    Nursing note and vitals reviewed.  Constitutional: Vital signs are normal. She appears well-developed and well-nourished. She is cooperative.   HENT:   Head: Normocephalic and atraumatic.   Eyes: Conjunctivae and lids are normal.   Neck: Trachea normal. No thyroid mass present.   Cardiovascular:  Normal rate and regular rhythm.           Pulmonary/Chest: No respiratory distress.   Abdominal: Abdomen is soft. Bowel sounds are normal. She exhibits distension. There is abdominal tenderness in the right lower quadrant,  suprapubic area and left lower quadrant.   No right CVA tenderness.  No left CVA tenderness. There is negative Bedoya's sign. negative Rovsing's sign    Neurological: She is alert and oriented to person, place, and time. GCS eye subscore is 4. GCS verbal subscore is 5. GCS motor subscore is 6.   Skin: Skin is warm, dry and intact. No rash noted.   Psychiatric: She has a normal mood and affect. Her speech is normal and behavior is normal. Thought content normal.         ED Course   Procedures  Labs Reviewed   CBC W/ AUTO DIFFERENTIAL - Abnormal       Result Value    WBC 16.69 (*)     RBC 5.28      Hemoglobin 16.2 (*)     Hematocrit 47.8      MCV 91      MCH 30.7      MCHC 33.9      RDW 12.5      Platelets 277      MPV 9.7      Immature Granulocytes 0.3      Gran # (ANC) 14.6 (*)     Immature Grans (Abs) 0.05 (*)     Lymph # 1.0      Mono # 1.0      Eos # 0.0      Baso # 0.03      nRBC 0      Gran % 87.6 (*)     Lymph % 5.9 (*)     Mono % 5.9      Eosinophil % 0.1      Basophil % 0.2      Differential Method Automated     COMPREHENSIVE METABOLIC PANEL - Abnormal    Sodium 139      Potassium 4.1      Chloride 105      CO2 21 (*)     Glucose 138 (*)     BUN 23      Creatinine 1.0      Calcium 10.3      Total Protein 7.6      Albumin 4.6      Total Bilirubin 1.1 (*)     Alkaline Phosphatase 87      AST 22      ALT 17      eGFR 57 (*)     Anion Gap 13     LIPASE    Lipase 11     ISTAT CREATININE    POC Creatinine 0.9      Sample VENOUS            Imaging Results              CT Abdomen Pelvis With IV Contrast NO Oral Contrast (Final result)  Result time 12/17/24 10:14:23      Final result by Bandar Acevedo III, MD (12/17/24 10:14:23)                   Impression:      Recurrent distal small bowel obstruction.      Electronically signed by: Bandar Acevedo MD  Date:    12/17/2024  Time:    10:14               Narrative:    EXAMINATION:  CT ABDOMEN PELVIS WITH IV CONTRAST    CLINICAL HISTORY:  Bowel obstruction  suspected;    FINDINGS:  Comparison is 02/29/2024.    Patient was administered 75 cc of Omnipaque 350 intravenously.    Lung bases are clear.  There is a tiny liver cyst.  Gallbladder and biliary tree are normal.  The spleen, pancreas, and duodenum show nothing unusual.  The adrenal glands are not enlarged.  The kidneys enhance normally.  The vessels enhance normally.  No para-aortic, retroperitoneal, or mesenteric adenopathy is seen.  There is distal small bowel dilatation.  There is colonic decompression.  There is mild diverticulosis.  No ascites mass abscess or perforation seen.  Bones demonstrate nothing unusual.                                       Medications   apixaban tablet 2.5 mg (has no administration in time range)   flecainide tablet 100 mg (has no administration in time range)   metoprolol succinate (TOPROL-XL) 24 hr tablet 25 mg (has no administration in time range)   ondansetron injection 4 mg (4 mg Intravenous Given 12/17/24 1725)   acetaminophen tablet 650 mg (has no administration in time range)   naloxone 0.4 mg/mL injection 0.02 mg (has no administration in time range)   lactated ringers infusion ( Intravenous New Bag 12/17/24 1523)   ketorolac injection 15 mg (15 mg Intravenous Given 12/17/24 1724)   morphine injection 2 mg (2 mg Intravenous Given 12/17/24 1450)   famotidine (PF) injection 20 mg (has no administration in time range)   iohexoL (OMNIPAQUE 350) injection 75 mL (75 mLs Intravenous Given 12/17/24 0947)   ketorolac injection 15 mg (15 mg Intravenous Given 12/17/24 1113)   famotidine (PF) injection 20 mg (20 mg Intravenous Given 12/17/24 1113)   sodium chloride 0.9% bolus 1,000 mL 1,000 mL (0 mLs Intravenous Stopped 12/17/24 1350)   famotidine (PF) injection 20 mg (20 mg Intravenous Given 12/17/24 1655)     Medical Decision Making  Urgent evaluation of a nontoxic afebrile 79-year-old female with history of 3 prior SBOs. Pt well appearing on exam, in no acute distress. Vitals wnl and  reassuring. Passed firm stool this morning pta. Denies N/V/D. Denies melena or hematochezia. Physical exam remarkable for bilateral lower abdominal tenderness. Pt denies need for pain medication at this time.  Will order basic labs and CT abdomen.     Differential Diagnosis includes, but is not limited to:  AAA, aortic dissection, mesenteric ischemia, perforated viscous, MI/ACS, SBO/volvulus, incarcerated/strangulated hernia, intussusception, ileus, appendicitis, cholecystitis, cholangitis, diverticulitis, esophagitis, hepatitis, nephrolithiasis, pancreatitis, gastroenteritis, colitis, IBD/IBS, biliary colic, GERD, PUD, constipation, UTI/pyelonephritis,  disorder.    CT: Recurrent distal small bowel obstruction.    Consulted GI regarding pt's physical exam, lab, and imaging findings. Will admit to hospital medicine for continued observation with GI follow for SBO diagnosis and treatment. Thoroughly discussed plan with pt. She verbalized understanding and agreement with plan. Pt is doing well, VSS with no complaints at this time.     Problems Addressed:  SBO (small bowel obstruction): acute illness or injury    Amount and/or Complexity of Data Reviewed  Labs: ordered. Decision-making details documented in ED Course.  Radiology: ordered. Decision-making details documented in ED Course.    Risk  Prescription drug management.  Decision regarding hospitalization.               ED Course as of 12/17/24 1805   Tue Dec 17, 2024   0909 CBC W/ AUTO DIFFERENTIAL(!)  Wt ct 16.69 [RM]   1030 Spoke with Gi Dr. Milian who states keep pt npo. No need for ng tube at this time. States admit to hospital medicine for continued observation. States he will come see pt. [RM]      ED Course User Index  [RM] Lizzie Wagner PA-C                           Clinical Impression:  Final diagnoses:  [K56.609] SBO (small bowel obstruction)          ED Disposition Condition    Admit Stable                Lizzie Wagner  PRIYA  12/17/24 1804

## 2024-12-17 NOTE — HPI
Ms Fonseca is a 79 year old female with a PMH that includes previous small bowel obstructions, atrial fibrillation, and hypertension. She came to the ED after experiencing severe abdominal bloating and cramping that began after she ate last night. Her last SBO was in March 2024.  He ED work up showed leukocytosis on CBC and a distal small bowel obstruction on CT A/P.  She was admitted to hospital medicine for further management.

## 2024-12-17 NOTE — ASSESSMENT & PLAN NOTE
Past h/o SBOs  CT A/P: recurrent distal small bowel obstruction  Last BM 12/17  General Surgery consulted. Appreciate recs  NPO except meds. Will continue eliquis for now, as no surgery planned  IVF: LR  Monitor labs  PRN toradol not effective. Will use small dose morphine q8 hour prn for breakthrough pain

## 2024-12-17 NOTE — ASSESSMENT & PLAN NOTE
Patient has permanent atrial fibrillation. Patient is currently in sinus rhythm. [id number] Score: 3. The patients heart rate in the last 24 hours is as follows:  Pulse  Min: 64  Max: 73     Antiarrhythmics  flecainide tablet 100 mg, Every 12 hours, Oral  metoprolol succinate (TOPROL-XL) 24 hr tablet 25 mg, Every 12 hours, Oral    Anticoagulants  apixaban tablet 2.5 mg, 2 times daily, Oral    Plan  - Replace lytes with a goal of K>4, Mg >2  - Patient is anticoagulated, see medications listed above.  - Patient's afib is currently controlled

## 2024-12-17 NOTE — HOSPITAL COURSE
Patient had several bowel movements however still with some distention.    Denies any active nausea or vomiting.    CT is concerning for small bowel obstruction.    Patient has had multiple small-bowel obstructions in the last several years.    States this 1 feels similar.    Has not had any surgical intervention for prior obstruction

## 2024-12-17 NOTE — CHAPLAIN
12/17/24 1530   Clinical Encounter Type   Visit Type Initial Visit   Visit Category General Rounding   Visited With Patient   Length of Visit 10 Minutes   Patient Spiritual Encounters   Care Provided Compassionate presence;Reflective listening   Patient Coping Accepting;Open/discussion;Active frantz;Internal strength;Family/ friends resources   Comments - Patient Visited w/pt not long after her admission. Ms. Durham was in good spirits but was uncomfortable. She engaged with me throughout my visit. Pt related that she has been a patient here several times over the years. This is her 3rd admission for her current primary problem. During her last admission, she was visited by a  and knows one is available to her if she desires a visit. Pt has been  for several years but enjoys the support of family who are local to her. She did not appear to be in any emotional or spiritual distress during my visit and seems to be coping appropriately with her hospitalization thus far. No needs were expressed during my visit. Pt was grateful for the spiritual care check-in. Chaplains remain available to support this patient and her family.

## 2024-12-18 LAB
ALBUMIN SERPL BCP-MCNC: 3.3 G/DL (ref 3.5–5.2)
ALP SERPL-CCNC: 67 U/L (ref 40–150)
ALT SERPL W/O P-5'-P-CCNC: 11 U/L (ref 10–44)
ANION GAP SERPL CALC-SCNC: 10 MMOL/L (ref 8–16)
AST SERPL-CCNC: 16 U/L (ref 10–40)
BASOPHILS # BLD AUTO: 0.02 K/UL (ref 0–0.2)
BASOPHILS NFR BLD: 0.5 % (ref 0–1.9)
BILIRUB SERPL-MCNC: 0.9 MG/DL (ref 0.1–1)
BUN SERPL-MCNC: 27 MG/DL (ref 8–23)
CALCIUM SERPL-MCNC: 9 MG/DL (ref 8.7–10.5)
CHLORIDE SERPL-SCNC: 111 MMOL/L (ref 95–110)
CO2 SERPL-SCNC: 19 MMOL/L (ref 23–29)
CREAT SERPL-MCNC: 0.9 MG/DL (ref 0.5–1.4)
DIFFERENTIAL METHOD BLD: ABNORMAL
EOSINOPHIL # BLD AUTO: 0 K/UL (ref 0–0.5)
EOSINOPHIL NFR BLD: 0.2 % (ref 0–8)
ERYTHROCYTE [DISTWIDTH] IN BLOOD BY AUTOMATED COUNT: 13 % (ref 11.5–14.5)
EST. GFR  (NO RACE VARIABLE): >60 ML/MIN/1.73 M^2
GLUCOSE SERPL-MCNC: 131 MG/DL (ref 70–110)
HCT VFR BLD AUTO: 42.2 % (ref 37–48.5)
HGB BLD-MCNC: 14.2 G/DL (ref 12–16)
IMM GRANULOCYTES # BLD AUTO: 0 K/UL (ref 0–0.04)
IMM GRANULOCYTES NFR BLD AUTO: 0 % (ref 0–0.5)
LYMPHOCYTES # BLD AUTO: 0.6 K/UL (ref 1–4.8)
LYMPHOCYTES NFR BLD: 16 % (ref 18–48)
MAGNESIUM SERPL-MCNC: 1.9 MG/DL (ref 1.6–2.6)
MCH RBC QN AUTO: 30.5 PG (ref 27–31)
MCHC RBC AUTO-ENTMCNC: 33.6 G/DL (ref 32–36)
MCV RBC AUTO: 91 FL (ref 82–98)
MONOCYTES # BLD AUTO: 0.7 K/UL (ref 0.3–1)
MONOCYTES NFR BLD: 16.2 % (ref 4–15)
NEUTROPHILS # BLD AUTO: 2.7 K/UL (ref 1.8–7.7)
NEUTROPHILS NFR BLD: 67.1 % (ref 38–73)
NRBC BLD-RTO: 0 /100 WBC
PHOSPHATE SERPL-MCNC: 4.2 MG/DL (ref 2.7–4.5)
PLATELET # BLD AUTO: 215 K/UL (ref 150–450)
PMV BLD AUTO: 9.2 FL (ref 9.2–12.9)
POCT GLUCOSE: 126 MG/DL (ref 70–110)
POCT GLUCOSE: 134 MG/DL (ref 70–110)
POCT GLUCOSE: 137 MG/DL (ref 70–110)
POCT GLUCOSE: 153 MG/DL (ref 70–110)
POTASSIUM SERPL-SCNC: 4.1 MMOL/L (ref 3.5–5.1)
PROT SERPL-MCNC: 5.6 G/DL (ref 6–8.4)
RBC # BLD AUTO: 4.65 M/UL (ref 4–5.4)
SODIUM SERPL-SCNC: 140 MMOL/L (ref 136–145)
WBC # BLD AUTO: 4.01 K/UL (ref 3.9–12.7)

## 2024-12-18 PROCEDURE — 83735 ASSAY OF MAGNESIUM: CPT | Performed by: NURSE PRACTITIONER

## 2024-12-18 PROCEDURE — 63600175 PHARM REV CODE 636 W HCPCS: Performed by: NURSE PRACTITIONER

## 2024-12-18 PROCEDURE — 21400001 HC TELEMETRY ROOM

## 2024-12-18 PROCEDURE — 25500020 PHARM REV CODE 255: Performed by: NURSE PRACTITIONER

## 2024-12-18 PROCEDURE — 99233 SBSQ HOSP IP/OBS HIGH 50: CPT | Mod: ,,, | Performed by: SURGERY

## 2024-12-18 PROCEDURE — 36415 COLL VENOUS BLD VENIPUNCTURE: CPT | Performed by: NURSE PRACTITIONER

## 2024-12-18 PROCEDURE — 25000003 PHARM REV CODE 250: Performed by: NURSE PRACTITIONER

## 2024-12-18 PROCEDURE — 84100 ASSAY OF PHOSPHORUS: CPT | Performed by: NURSE PRACTITIONER

## 2024-12-18 PROCEDURE — 0D9670Z DRAINAGE OF STOMACH WITH DRAINAGE DEVICE, VIA NATURAL OR ARTIFICIAL OPENING: ICD-10-PCS | Performed by: SURGERY

## 2024-12-18 PROCEDURE — 80053 COMPREHEN METABOLIC PANEL: CPT | Performed by: NURSE PRACTITIONER

## 2024-12-18 PROCEDURE — 85025 COMPLETE CBC W/AUTO DIFF WBC: CPT | Performed by: NURSE PRACTITIONER

## 2024-12-18 RX ORDER — HYDRALAZINE HYDROCHLORIDE 20 MG/ML
10 INJECTION INTRAMUSCULAR; INTRAVENOUS EVERY 8 HOURS PRN
Status: DISCONTINUED | OUTPATIENT
Start: 2024-12-18 | End: 2024-12-23 | Stop reason: HOSPADM

## 2024-12-18 RX ORDER — DIATRIZOATE MEGLUMINE AND DIATRIZOATE SODIUM 660; 100 MG/ML; MG/ML
90 SOLUTION ORAL; RECTAL
Status: COMPLETED | OUTPATIENT
Start: 2024-12-18 | End: 2024-12-18

## 2024-12-18 RX ADMIN — SODIUM CHLORIDE, POTASSIUM CHLORIDE, SODIUM LACTATE AND CALCIUM CHLORIDE: 600; 310; 30; 20 INJECTION, SOLUTION INTRAVENOUS at 10:12

## 2024-12-18 RX ADMIN — SODIUM CHLORIDE, POTASSIUM CHLORIDE, SODIUM LACTATE AND CALCIUM CHLORIDE: 600; 310; 30; 20 INJECTION, SOLUTION INTRAVENOUS at 07:12

## 2024-12-18 RX ADMIN — HYDRALAZINE HYDROCHLORIDE 10 MG: 20 INJECTION INTRAMUSCULAR; INTRAVENOUS at 06:12

## 2024-12-18 RX ADMIN — APIXABAN 2.5 MG: 2.5 TABLET, FILM COATED ORAL at 08:12

## 2024-12-18 RX ADMIN — FAMOTIDINE 20 MG: 10 INJECTION, SOLUTION INTRAVENOUS at 08:12

## 2024-12-18 RX ADMIN — METOPROLOL SUCCINATE 25 MG: 25 TABLET, EXTENDED RELEASE ORAL at 08:12

## 2024-12-18 RX ADMIN — FLECAINIDE ACETATE 100 MG: 100 TABLET ORAL at 08:12

## 2024-12-18 RX ADMIN — KETOROLAC TROMETHAMINE 15 MG: 30 INJECTION, SOLUTION INTRAMUSCULAR; INTRAVENOUS at 08:12

## 2024-12-18 RX ADMIN — DIATRIZOATE MEGLUMINE AND DIATRIZOATE SODIUM 90 ML: 660; 100 LIQUID ORAL; RECTAL at 03:12

## 2024-12-18 RX ADMIN — ONDANSETRON 4 MG: 2 INJECTION INTRAMUSCULAR; INTRAVENOUS at 11:12

## 2024-12-18 RX ADMIN — MORPHINE SULFATE 2 MG: 2 INJECTION, SOLUTION INTRAMUSCULAR; INTRAVENOUS at 12:12

## 2024-12-18 RX ADMIN — MORPHINE SULFATE 2 MG: 2 INJECTION, SOLUTION INTRAMUSCULAR; INTRAVENOUS at 11:12

## 2024-12-18 RX ADMIN — SODIUM CHLORIDE, POTASSIUM CHLORIDE, SODIUM LACTATE AND CALCIUM CHLORIDE: 600; 310; 30; 20 INJECTION, SOLUTION INTRAVENOUS at 03:12

## 2024-12-18 NOTE — PROGRESS NOTES
[address] Medicine  Progress Note    Patient Name: Marian Durham  MRN: [id number]  Patient Class: IP- Inpatient   Admission Date: 12/17/2024  Length of Stay: 1 days  Attending Physician: Yandy Horne MD  Primary Care Provider: Suzanna Duran MD        Subjective     Principal Problem:SBO (small bowel obstruction)        HPI:  Ms Fonseca is a 79 year old female with a PMH that includes previous small bowel obstructions, atrial fibrillation, and hypertension. She came to the ED after experiencing severe abdominal bloating and cramping that began after she ate last night. Her last SBO was in March 2024.  He ED work up showed leukocytosis on CBC and a distal small bowel obstruction on CT A/P.  She was admitted to hospital medicine for further management.     Overview/Hospital Course:  Small bowel obstruction causing ab pain and distention. NGT placed overnight due to nausea and vomiting. No improvement in abdominal distention. GGC ordered.     Interval History: NGT needs to be reinserted-coiled on xray. AB distention worsened. No flatus. Intermittent bowel sounds. GGC ordered, Discussed with GS.    Review of Systems   Constitutional:  Positive for appetite change. Negative for activity change, fatigue and unexpected weight change.   HENT:  Negative for trouble swallowing.    Eyes:  Negative for visual disturbance.   Respiratory:  Negative for chest tightness and shortness of breath.    Cardiovascular:  Negative for chest pain and leg swelling.   Gastrointestinal:  Positive for abdominal distention and abdominal pain. Negative for anal bleeding, constipation, diarrhea, nausea, rectal pain and vomiting.   Genitourinary:  Negative for dysuria, flank pain and urgency.   Musculoskeletal:  Negative for arthralgias.   Skin: Negative.    Neurological:  Negative for weakness.   Psychiatric/Behavioral:  Negative for agitation.      Objective:     Vital Signs (Most Recent):  Temp: 98.2 °F  (36.8 °C) (12/18/24 1143)  Pulse: 72 (12/18/24 1143)  Resp: 18 (12/18/24 1143)  BP: (!) 177/82 (12/18/24 1143)  SpO2: 95 % (12/18/24 1143) Vital Signs (24h Range):  Temp:  [98 °F (36.7 °C)-98.9 °F (37.2 °C)] 98.2 °F (36.8 °C)  Pulse:  [66-87] 72  Resp:  [17-24] 18  SpO2:  [93 %-97 %] 95 %  BP: (134-177)/(68-84) 177/82     Weight: 54.4 kg (120 lb)  Body mass index is 19.97 kg/m².    Intake/Output Summary (Last 24 hours) at 12/18/2024 1201  Last data filed at 12/18/2024 0523  Gross per 24 hour   Intake 1000 ml   Output 300 ml   Net 700 ml         Physical Exam  Vitals and nursing note reviewed.   HENT:      Head: Normocephalic.      Mouth/Throat:      Mouth: Mucous membranes are moist.   Eyes:      Extraocular Movements: Extraocular movements intact.   Cardiovascular:      Rate and Rhythm: Normal rate and regular rhythm.   Pulmonary:      Effort: Pulmonary effort is normal. No respiratory distress.      Breath sounds: No wheezing or rales.   Abdominal:      General: Bowel sounds are normal. There is distension.      Tenderness: There is abdominal tenderness.   Musculoskeletal:         General: Normal range of motion.      Cervical back: Normal range of motion.      Right lower leg: No edema.      Left lower leg: No edema.   Skin:     General: Skin is warm.   Neurological:      General: No focal deficit present.      Mental Status: She is alert and oriented to person, place, and time.   Psychiatric:         Mood and Affect: Mood normal.         Behavior: Behavior normal.             Significant Labs: All pertinent labs within the past 24 hours have been reviewed.  CBC:   Recent Labs   Lab 12/17/24  0840 12/18/24  0322   WBC 16.69* 4.01   HGB 16.2* 14.2   HCT 47.8 42.2    215     CMP:   Recent Labs   Lab 12/17/24  0840 12/18/24  0322    140   K 4.1 4.1    111*   CO2 21* 19*   * 131*   BUN 23 27*   CREATININE 1.0 0.9   CALCIUM 10.3 9.0   PROT 7.6 5.6*   ALBUMIN 4.6 3.3*   BILITOT 1.1* 0.9    ALKPHOS 87 67   AST 22 16   ALT 17 11   ANIONGAP 13 10       Significant Imaging: I have reviewed all pertinent imaging results/findings within the past 24 hours.  X-Ray KUB  Narrative: EXAMINATION:  XR KUB    CLINICAL HISTORY:  NGT placement;    TECHNIQUE:  Single AP supine view of the abdomen (KUB) was performed    COMPARISON:  03/07/2024    FINDINGS:  Three views abdomen supine.    Nasogastric tube tip and side hole is coiled overlying the expected location of the gastric lumen.  There are several dilated small bowel loops throughout the abdomen and pelvis, similar to previous CT.  No convincing pneumatosis.  Impression: As above    Electronically signed by: Jose Rodney MD  Date:    12/17/2024  Time:    18:13  CT Abdomen Pelvis With IV Contrast NO Oral Contrast  Narrative: EXAMINATION:  CT ABDOMEN PELVIS WITH IV CONTRAST    CLINICAL HISTORY:  Bowel obstruction suspected;    FINDINGS:  Comparison is 02/29/2024.    Patient was administered 75 cc of Omnipaque 350 intravenously.    Lung bases are clear.  There is a tiny liver cyst.  Gallbladder and biliary tree are normal.  The spleen, pancreas, and duodenum show nothing unusual.  The adrenal glands are not enlarged.  The kidneys enhance normally.  The vessels enhance normally.  No para-aortic, retroperitoneal, or mesenteric adenopathy is seen.  There is distal small bowel dilatation.  There is colonic decompression.  There is mild diverticulosis.  No ascites mass abscess or perforation seen.  Bones demonstrate nothing unusual.  Impression: Recurrent distal small bowel obstruction.    Electronically signed by: Bandar Acevedo MD  Date:    12/17/2024  Time:    10:14        Assessment and Plan     * SBO (small bowel obstruction)  Past h/o SBOs  CT A/P: recurrent distal small bowel obstruction  Last BM 12/17  General Surgery consulted. Appreciate recs  NPO except meds. Will continue eliquis for now, as no surgery planned  IVF: LR  Monitor labs  PRN toradol not  effective. Will use small dose morphine q8 hour prn for breakthrough pain.  GGC ordered 12/18  NGT placed 12/17--needs to be reinserted due to coiling seen on KUB    Atrial fibrillation  Patient has permanent atrial fibrillation. Patient is currently in sinus rhythm. [id number] Score: 3. The patients heart rate in the last 24 hours is as follows:  Pulse  Min: 64  Max: 73     Antiarrhythmics  flecainide tablet 100 mg, Every 12 hours, Oral  metoprolol succinate (TOPROL-XL) 24 hr tablet 25 mg, Every 12 hours, Oral    Anticoagulants  apixaban tablet 2.5 mg, 2 times daily, Oral    Plan  - Replace lytes with a goal of K>4, Mg >2  - Patient is anticoagulated, see medications listed above.  - Patient's afib is currently controlled          VTE Risk Mitigation (From admission, onward)           Ordered     apixaban tablet 2.5 mg  2 times daily         12/17/24 1348     Reason for No Pharmacological VTE Prophylaxis  Once        Question:  Reasons:  Answer:  Already adequately anticoagulated on oral Anticoagulants    12/17/24 1348     IP VTE HIGH RISK PATIENT  Once         12/17/24 1348     Place sequential compression device  Until discontinued         12/17/24 1348                    Discharge Planning   WILLIAN: 12/21/2024     Code Status: Full Code   Medical Readiness for Discharge Date:   Discharge Plan A: Home Health                Karmen Pollock DNP  Department of Hospital Medicine   [facility] ([address])

## 2024-12-18 NOTE — HOSPITAL COURSE
Small bowel obstruction causing ab pain and distention. NGT placed overnight due to nausea and vomiting. No improvement in abdominal distention. GGC ordered. Contrast remained in small bowel as of 12/19. Repeat Film 12/20 with significant improvement. No bowel obstruction on imaging 12/21. Diet advanced. Had additional BM, tolerating diet. Stable for discharge with PCP and surgery follow up, [facility]. Return precautions discussed, no further questions at [place]

## 2024-12-18 NOTE — PROGRESS NOTES
"[facility] ([address])  General Surgery  Progress Note    Subjective:     History of Present Illness:  79 year old female with a PMH that includes previous small bowel obstructions, atrial fibrillation, and hypertension. She came to the ED after experiencing severe abdominal bloating and cramping that began after she ate last night. Her last SBO was in March 2024. He ED work up showed leukocytosis on CBC and a distal small bowel obstruction on CT A/P. She was admitted to hospital medicine for further management.     Post-Op Info:  * No surgery found *         Interval History:  Patient not passing flatus today.    Complains of worsening and distention and some hiccups some more abdominal pain.    No vomiting.    NG tube has been placed.        Medications:  Continuous Infusions:   lactated ringers   Intravenous Continuous 125 mL/hr at 12/18/24 1516 New Bag at 12/18/24 1516     Scheduled Meds:   apixaban  2.5 mg Oral BID    famotidine (PF)  20 mg Intravenous Daily    flecainide  100 mg Oral Q12H    metoprolol succinate  25 mg Oral Q12H     PRN Meds:  Current Facility-Administered Medications:     acetaminophen, 650 mg, Oral, Q4H PRN    ketorolac, 15 mg, Intravenous, Q6H PRN    morphine, 2 mg, Intravenous, Q8H PRN    naloxone, 0.02 mg, Intravenous, PRN    ondansetron, 4 mg, Intravenous, Q6H PRN     Review of patient's allergies indicates:   Allergen Reactions    Demerol [meperidine] Other (See Comments)     "Becomes Agitated and Combative"      Patient states she can take anything else    Meperidine (pf)      Objective:     Vital Signs (Most Recent):  Temp: 98.6 °F (37 °C) (12/18/24 1643)  Pulse: 83 (12/18/24 1643)  Resp: 18 (12/18/24 1643)  BP: (!) 198/92 (12/18/24 1643)  SpO2: 95 % (12/18/24 1643) Vital Signs (24h Range):  Temp:  [98 °F (36.7 °C)-98.9 °F (37.2 °C)] 98.6 °F (37 °C)  Pulse:  [72-93] 83  Resp:  [17-18] 18  SpO2:  [93 %-96 %] 95 %  BP: (152-198)/(68-92) 198/92     Weight: 54.4 kg (120 " lb)  Body mass index is 19.97 kg/m².    Intake/Output - Last 3 Shifts         12/16 0700  12/17 0659 12/17 0700 12/18 0659 12/18 0700 12/19 0659    NG/GT  0     IV Piggyback  1000     Total Intake(mL/kg)  1000 (18.4)     Urine (mL/kg/hr)  300 200 (0.3)    Emesis/NG output  0     Drains  0     Stool  0     Total Output  300 200    Net  +700 -200                    Physical Exam  Vitals and nursing note reviewed.   Constitutional:       Appearance: She is well-developed.   HENT:      Head: Normocephalic and atraumatic.   Cardiovascular:      Rate and Rhythm: Normal rate.      Heart sounds: Normal heart sounds.   Pulmonary:      Effort: Pulmonary effort is normal.   Abdominal:      General: Bowel sounds are normal. There is distension.      Palpations: Abdomen is soft.      Tenderness: There is abdominal tenderness.   Musculoskeletal:         General: Normal range of motion.      Cervical back: Normal range of motion.   Skin:     General: Skin is warm and dry.      Capillary Refill: Capillary refill takes less than 2 seconds.      Comments: NG tube in place, clamped for Gastrografin challenge   Neurological:      Mental Status: She is alert and oriented to person, place, and time.   Psychiatric:         Behavior: Behavior normal.          Significant Labs:  I have reviewed all pertinent lab results within the past 24 hours.  CBC:   Recent Labs   Lab 12/18/24  0322   WBC 4.01   RBC 4.65   HGB 14.2   HCT 42.2      MCV 91   MCH 30.5   MCHC 33.6     CMP:   Recent Labs   Lab 12/18/24  0322   *   CALCIUM 9.0   ALBUMIN 3.3*   PROT 5.6*      K 4.1   CO2 19*   *   BUN 27*   CREATININE 0.9   ALKPHOS 67   ALT 11   AST 16   BILITOT 0.9       Significant Diagnostics:  I have reviewed all pertinent imaging results/findings within the past 24 hours.  Assessment/Plan:     * SBO (small bowel obstruction)  79-year-old female with recurrent small bowel obstruction, history of a hysterectomy in the  past  Recommend conservative management for now.    NG tube in place and clamped for now.    Follow-up serial x-rays for Gastrografin challenge.    If patient gets significant nausea or vomiting hook NG tube up to suction to decompress        Girma Milian MD  General Surgery  [facility] ([address])

## 2024-12-18 NOTE — PLAN OF CARE
Case Management Assessment     PCP: Suzanna Duran MD  Pharmacy: [facility]    Patient Arrived From: Home  Existing Help at Home: Son/friends    Barriers to Discharge: None    Discharge Plan:    A. Home with HH    B. SNF      [facility] ([address])  Initial Discharge Assessment       Primary Care Provider: Suzanna Duran MD    Admission Diagnosis: SBO (small bowel obstruction) [K56.609]  Chest pain [R07.9]    Admission Date: 12/17/2024  Expected Discharge Date:     Transition of Care Barriers: (P) None    Payor: [employer] MANAGED MEDICARE / Plan: [employer] SNP HMO PPO SPECIAL NEEDS / Product Type: Medicare Advantage /     Extended Emergency Contact Information  Primary Emergency Contact: Evelin Lyons  Address: [facility] [place] [address] [facility]  Home Phone: [phone]  Mobile Phone: [phone]  Relation: Friend  Secondary Emergency Contact: Duane Durham   [facility]  Home Phone: [phone]  Relation: Son    Discharge Plan A: (P) Home Health  Discharge Plan B: (P) Skilled Nursing Facility      [facility]  [address]  [facility] [address]  Phone: [phone] Fax: [phone]    [id number] [place], [place] - [address] [facility]  [address]  [facility] [address]  Phone: [phone] Fax: [phone]      Initial Assessment (most recent)       Adult Discharge Assessment - 12/18/24 1030          Discharge Assessment    Assessment Type Discharge Planning Assessment     Confirmed/corrected address, phone number and insurance Yes     Confirmed Demographics Correct on Facesheet     Source of Information patient;health record     People in Home alone     Do you expect to return to your current living situation? Yes     Do you have help at home or someone to help you manage your care at home? No     Prior to hospitilization cognitive status: Alert/Oriented;No Deficits      Current cognitive status: Alert/Oriented;No Deficits     Equipment Currently Used at Home cane, straight;crutches, forearm;shower chair;walker, rolling;bedside commode (P)      Readmission within 30 days? No (P)      Patient currently being followed by outpatient case management? No (P)      Do you currently have service(s) that help you manage your care at home? No (P)      Do you take prescription medications? Yes (P)      Do you have prescription coverage? Yes (P)      Do you have any problems affording any of your prescribed medications? No (P)      Is the patient taking medications as prescribed? yes (P)      How do you get to doctors appointments? family or friend will provide;car, drives self (P)      Are you on dialysis? No (P)      Do you take coumadin? No (P)      Discharge Plan A Home Health (P)      Discharge Plan B Skilled Nursing Facility (P)      Discharge Plan discussed with: Patient (P)      Transition of Care Barriers None (P)

## 2024-12-18 NOTE — SUBJECTIVE & OBJECTIVE
Interval History: NGT needs to be reinserted-coiled on xray. AB distention worsened. No flatus. Intermittent bowel sounds. GGC ordered, Discussed with GS.    Review of Systems   Constitutional:  Positive for appetite change. Negative for activity change, fatigue and unexpected weight change.   HENT:  Negative for trouble swallowing.    Eyes:  Negative for visual disturbance.   Respiratory:  Negative for chest tightness and shortness of breath.    Cardiovascular:  Negative for chest pain and leg swelling.   Gastrointestinal:  Positive for abdominal distention and abdominal pain. Negative for anal bleeding, constipation, diarrhea, nausea, rectal pain and vomiting.   Genitourinary:  Negative for dysuria, flank pain and urgency.   Musculoskeletal:  Negative for arthralgias.   Skin: Negative.    Neurological:  Negative for weakness.   Psychiatric/Behavioral:  Negative for agitation.      Objective:     Vital Signs (Most Recent):  Temp: 98.2 °F (36.8 °C) (12/18/24 1143)  Pulse: 72 (12/18/24 1143)  Resp: 18 (12/18/24 1143)  BP: (!) 177/82 (12/18/24 1143)  SpO2: 95 % (12/18/24 1143) Vital Signs (24h Range):  Temp:  [98 °F (36.7 °C)-98.9 °F (37.2 °C)] 98.2 °F (36.8 °C)  Pulse:  [66-87] 72  Resp:  [17-24] 18  SpO2:  [93 %-97 %] 95 %  BP: (134-177)/(68-84) 177/82     Weight: 54.4 kg (120 lb)  Body mass index is 19.97 kg/m².    Intake/Output Summary (Last 24 hours) at 12/18/2024 1201  Last data filed at 12/18/2024 0523  Gross per 24 hour   Intake 1000 ml   Output 300 ml   Net 700 ml         Physical Exam  Vitals and nursing note reviewed.   HENT:      Head: Normocephalic.      Mouth/Throat:      Mouth: Mucous membranes are moist.   Eyes:      Extraocular Movements: Extraocular movements intact.   Cardiovascular:      Rate and Rhythm: Normal rate and regular rhythm.   Pulmonary:      Effort: Pulmonary effort is normal. No respiratory distress.      Breath sounds: No wheezing or rales.   Abdominal:      General: Bowel sounds are  normal. There is distension.      Tenderness: There is abdominal tenderness.   Musculoskeletal:         General: Normal range of motion.      Cervical back: Normal range of motion.      Right lower leg: No edema.      Left lower leg: No edema.   Skin:     General: Skin is warm.   Neurological:      General: No focal deficit present.      Mental Status: She is alert and oriented to person, place, and time.   Psychiatric:         Mood and Affect: Mood normal.         Behavior: Behavior normal.             Significant Labs: All pertinent labs within the past 24 hours have been reviewed.  CBC:   Recent Labs   Lab 12/17/24  0840 12/18/24  0322   WBC 16.69* 4.01   HGB 16.2* 14.2   HCT 47.8 42.2    215     CMP:   Recent Labs   Lab 12/17/24  0840 12/18/24  0322    140   K 4.1 4.1    111*   CO2 21* 19*   * 131*   BUN 23 27*   CREATININE 1.0 0.9   CALCIUM 10.3 9.0   PROT 7.6 5.6*   ALBUMIN 4.6 3.3*   BILITOT 1.1* 0.9   ALKPHOS 87 67   AST 22 16   ALT 17 11   ANIONGAP 13 10       Significant Imaging: I have reviewed all pertinent imaging results/findings within the past 24 hours.  X-Ray KUB  Narrative: EXAMINATION:  XR KUB    CLINICAL HISTORY:  NGT placement;    TECHNIQUE:  Single AP supine view of the abdomen (KUB) was performed    COMPARISON:  03/07/2024    FINDINGS:  Three views abdomen supine.    Nasogastric tube tip and side hole is coiled overlying the expected location of the gastric lumen.  There are several dilated small bowel loops throughout the abdomen and pelvis, similar to previous CT.  No convincing pneumatosis.  Impression: As above    Electronically signed by: Jose Rodney MD  Date:    12/17/2024  Time:    18:13  CT Abdomen Pelvis With IV Contrast NO Oral Contrast  Narrative: EXAMINATION:  CT ABDOMEN PELVIS WITH IV CONTRAST    CLINICAL HISTORY:  Bowel obstruction suspected;    FINDINGS:  Comparison is 02/29/2024.    Patient was administered 75 cc of Omnipaque 350  intravenously.    Lung bases are clear.  There is a tiny liver cyst.  Gallbladder and biliary tree are normal.  The spleen, pancreas, and duodenum show nothing unusual.  The adrenal glands are not enlarged.  The kidneys enhance normally.  The vessels enhance normally.  No para-aortic, retroperitoneal, or mesenteric adenopathy is seen.  There is distal small bowel dilatation.  There is colonic decompression.  There is mild diverticulosis.  No ascites mass abscess or perforation seen.  Bones demonstrate nothing unusual.  Impression: Recurrent distal small bowel obstruction.    Electronically signed by: Bandar Acevedo MD  Date:    12/17/2024  Time:    10:14

## 2024-12-18 NOTE — ASSESSMENT & PLAN NOTE
Past h/o SBOs  CT A/P: recurrent distal small bowel obstruction  Last BM 12/17  General Surgery consulted. Appreciate recs  NPO except meds. Will continue eliquis for now, as no surgery planned  IVF: LR  Monitor labs  PRN toradol not effective. Will use small dose morphine q8 hour prn for breakthrough pain.  GGC ordered 12/18  NGT placed 12/17--needs to be reinserted due to coiling seen on KUB

## 2024-12-18 NOTE — SUBJECTIVE & OBJECTIVE
"Interval History:  Patient not passing flatus today.    Complains of worsening and distention and some hiccups some more abdominal pain.    No vomiting.    NG tube has been placed.        Medications:  Continuous Infusions:   lactated ringers   Intravenous Continuous 125 mL/hr at 12/18/24 1516 New Bag at 12/18/24 1516     Scheduled Meds:   apixaban  2.5 mg Oral BID    famotidine (PF)  20 mg Intravenous Daily    flecainide  100 mg Oral Q12H    metoprolol succinate  25 mg Oral Q12H     PRN Meds:  Current Facility-Administered Medications:     acetaminophen, 650 mg, Oral, Q4H PRN    ketorolac, 15 mg, Intravenous, Q6H PRN    morphine, 2 mg, Intravenous, Q8H PRN    naloxone, 0.02 mg, Intravenous, PRN    ondansetron, 4 mg, Intravenous, Q6H PRN     Review of patient's allergies indicates:   Allergen Reactions    Demerol [meperidine] Other (See Comments)     "Becomes Agitated and Combative"      Patient states she can take anything else    Meperidine (pf)      Objective:     Vital Signs (Most Recent):  Temp: 98.6 °F (37 °C) (12/18/24 1643)  Pulse: 83 (12/18/24 1643)  Resp: 18 (12/18/24 1643)  BP: (!) 198/92 (12/18/24 1643)  SpO2: 95 % (12/18/24 1643) Vital Signs (24h Range):  Temp:  [98 °F (36.7 °C)-98.9 °F (37.2 °C)] 98.6 °F (37 °C)  Pulse:  [72-93] 83  Resp:  [17-18] 18  SpO2:  [93 %-96 %] 95 %  BP: (152-198)/(68-92) 198/92     Weight: 54.4 kg (120 lb)  Body mass index is 19.97 kg/m².    Intake/Output - Last 3 Shifts         12/16 0700 12/17 0659 12/17 0700 12/18 0659 12/18 0700 12/19 0659    NG/GT  0     IV Piggyback  1000     Total Intake(mL/kg)  1000 (18.4)     Urine (mL/kg/hr)  300 200 (0.3)    Emesis/NG output  0     Drains  0     Stool  0     Total Output  300 200    Net  +700 -200                    Physical Exam  Vitals and nursing note reviewed.   Constitutional:       Appearance: She is well-developed.   HENT:      Head: Normocephalic and atraumatic.   Cardiovascular:      Rate and Rhythm: Normal rate.      " Heart sounds: Normal heart sounds.   Pulmonary:      Effort: Pulmonary effort is normal.   Abdominal:      General: Bowel sounds are normal. There is distension.      Palpations: Abdomen is soft.      Tenderness: There is abdominal tenderness.   Musculoskeletal:         General: Normal range of motion.      Cervical back: Normal range of motion.   Skin:     General: Skin is warm and dry.      Capillary Refill: Capillary refill takes less than 2 seconds.      Comments: NG tube in place, clamped for Gastrografin challenge   Neurological:      Mental Status: She is alert and oriented to person, place, and time.   Psychiatric:         Behavior: Behavior normal.          Significant Labs:  I have reviewed all pertinent lab results within the past 24 hours.  CBC:   Recent Labs   Lab 12/18/24  0322   WBC 4.01   RBC 4.65   HGB 14.2   HCT 42.2      MCV 91   MCH 30.5   MCHC 33.6     CMP:   Recent Labs   Lab 12/18/24  0322   *   CALCIUM 9.0   ALBUMIN 3.3*   PROT 5.6*      K 4.1   CO2 19*   *   BUN 27*   CREATININE 0.9   ALKPHOS 67   ALT 11   AST 16   BILITOT 0.9       Significant Diagnostics:  I have reviewed all pertinent imaging results/findings within the past 24 hours.

## 2024-12-18 NOTE — NURSING
Patient lying comfortably in bed. No output from NG tube overnight. Patient reports belching and having some flatulence throughout the night. Pain has been well controlled with PRN medications. No other needs at this time.    [Midline] : trachea located in midline position [Normal] : no rashes

## 2024-12-18 NOTE — ASSESSMENT & PLAN NOTE
79-year-old female with recurrent small bowel obstruction, history of a hysterectomy in the past  Recommend conservative management for now.    NG tube in place and clamped for now.    Follow-up serial x-rays for Gastrografin challenge.    If patient gets significant nausea or vomiting hook NG tube up to suction to decompress

## 2024-12-19 LAB
ALBUMIN SERPL BCP-MCNC: 3.4 G/DL (ref 3.5–5.2)
ALP SERPL-CCNC: 67 U/L (ref 40–150)
ALT SERPL W/O P-5'-P-CCNC: 23 U/L (ref 10–44)
ANION GAP SERPL CALC-SCNC: 14 MMOL/L (ref 8–16)
AST SERPL-CCNC: 22 U/L (ref 10–40)
BASOPHILS # BLD AUTO: 0.03 K/UL (ref 0–0.2)
BASOPHILS NFR BLD: 0.6 % (ref 0–1.9)
BILIRUB SERPL-MCNC: 0.8 MG/DL (ref 0.1–1)
BUN SERPL-MCNC: 39 MG/DL (ref 8–23)
CALCIUM SERPL-MCNC: 9.7 MG/DL (ref 8.7–10.5)
CHLORIDE SERPL-SCNC: 105 MMOL/L (ref 95–110)
CO2 SERPL-SCNC: 21 MMOL/L (ref 23–29)
CREAT SERPL-MCNC: 1 MG/DL (ref 0.5–1.4)
DIFFERENTIAL METHOD BLD: ABNORMAL
EOSINOPHIL # BLD AUTO: 0 K/UL (ref 0–0.5)
EOSINOPHIL NFR BLD: 0 % (ref 0–8)
ERYTHROCYTE [DISTWIDTH] IN BLOOD BY AUTOMATED COUNT: 13.2 % (ref 11.5–14.5)
EST. GFR  (NO RACE VARIABLE): 57 ML/MIN/1.73 M^2
GLUCOSE SERPL-MCNC: 139 MG/DL (ref 70–110)
HCT VFR BLD AUTO: 44.6 % (ref 37–48.5)
HGB BLD-MCNC: 14.6 G/DL (ref 12–16)
IMM GRANULOCYTES # BLD AUTO: 0.03 K/UL (ref 0–0.04)
IMM GRANULOCYTES NFR BLD AUTO: 0.6 % (ref 0–0.5)
LYMPHOCYTES # BLD AUTO: 0.8 K/UL (ref 1–4.8)
LYMPHOCYTES NFR BLD: 14.9 % (ref 18–48)
MAGNESIUM SERPL-MCNC: 2.1 MG/DL (ref 1.6–2.6)
MCH RBC QN AUTO: 30.1 PG (ref 27–31)
MCHC RBC AUTO-ENTMCNC: 32.7 G/DL (ref 32–36)
MCV RBC AUTO: 92 FL (ref 82–98)
MONOCYTES # BLD AUTO: 0.6 K/UL (ref 0.3–1)
MONOCYTES NFR BLD: 12.2 % (ref 4–15)
NEUTROPHILS # BLD AUTO: 3.8 K/UL (ref 1.8–7.7)
NEUTROPHILS NFR BLD: 71.7 % (ref 38–73)
NRBC BLD-RTO: 0 /100 WBC
PHOSPHATE SERPL-MCNC: 3.4 MG/DL (ref 2.7–4.5)
PLATELET # BLD AUTO: 226 K/UL (ref 150–450)
PLATELET BLD QL SMEAR: ABNORMAL
PMV BLD AUTO: 9.5 FL (ref 9.2–12.9)
POCT GLUCOSE: 130 MG/DL (ref 70–110)
POCT GLUCOSE: 147 MG/DL (ref 70–110)
POTASSIUM SERPL-SCNC: 4.3 MMOL/L (ref 3.5–5.1)
PROT SERPL-MCNC: 6.3 G/DL (ref 6–8.4)
RBC # BLD AUTO: 4.85 M/UL (ref 4–5.4)
SODIUM SERPL-SCNC: 140 MMOL/L (ref 136–145)
WBC # BLD AUTO: 5.25 K/UL (ref 3.9–12.7)

## 2024-12-19 PROCEDURE — 25000003 PHARM REV CODE 250: Performed by: PHYSICIAN ASSISTANT

## 2024-12-19 PROCEDURE — 63600175 PHARM REV CODE 636 W HCPCS: Performed by: NURSE PRACTITIONER

## 2024-12-19 PROCEDURE — 84100 ASSAY OF PHOSPHORUS: CPT | Performed by: NURSE PRACTITIONER

## 2024-12-19 PROCEDURE — 51798 US URINE CAPACITY MEASURE: CPT

## 2024-12-19 PROCEDURE — 25000003 PHARM REV CODE 250: Performed by: NURSE PRACTITIONER

## 2024-12-19 PROCEDURE — 21400001 HC TELEMETRY ROOM

## 2024-12-19 PROCEDURE — 99233 SBSQ HOSP IP/OBS HIGH 50: CPT | Mod: ,,, | Performed by: SURGERY

## 2024-12-19 PROCEDURE — 83735 ASSAY OF MAGNESIUM: CPT | Performed by: NURSE PRACTITIONER

## 2024-12-19 PROCEDURE — 80053 COMPREHEN METABOLIC PANEL: CPT | Performed by: NURSE PRACTITIONER

## 2024-12-19 PROCEDURE — 36415 COLL VENOUS BLD VENIPUNCTURE: CPT | Performed by: NURSE PRACTITIONER

## 2024-12-19 PROCEDURE — 85025 COMPLETE CBC W/AUTO DIFF WBC: CPT | Performed by: NURSE PRACTITIONER

## 2024-12-19 RX ORDER — ENOXAPARIN SODIUM 100 MG/ML
1 INJECTION SUBCUTANEOUS EVERY 12 HOURS
Status: DISCONTINUED | OUTPATIENT
Start: 2024-12-19 | End: 2024-12-20

## 2024-12-19 RX ORDER — GUAIFENESIN 100 MG/5ML
200 SOLUTION ORAL ONCE
Status: COMPLETED | OUTPATIENT
Start: 2024-12-19 | End: 2024-12-19

## 2024-12-19 RX ADMIN — SODIUM CHLORIDE, POTASSIUM CHLORIDE, SODIUM LACTATE AND CALCIUM CHLORIDE: 600; 310; 30; 20 INJECTION, SOLUTION INTRAVENOUS at 05:12

## 2024-12-19 RX ADMIN — HYDRALAZINE HYDROCHLORIDE 10 MG: 20 INJECTION INTRAMUSCULAR; INTRAVENOUS at 06:12

## 2024-12-19 RX ADMIN — FAMOTIDINE 20 MG: 10 INJECTION, SOLUTION INTRAVENOUS at 08:12

## 2024-12-19 RX ADMIN — METOPROLOL SUCCINATE 25 MG: 25 TABLET, EXTENDED RELEASE ORAL at 08:12

## 2024-12-19 RX ADMIN — METOPROLOL SUCCINATE 25 MG: 25 TABLET, EXTENDED RELEASE ORAL at 09:12

## 2024-12-19 RX ADMIN — ENOXAPARIN SODIUM 50 MG: 60 INJECTION SUBCUTANEOUS at 09:12

## 2024-12-19 RX ADMIN — SODIUM CHLORIDE, POTASSIUM CHLORIDE, SODIUM LACTATE AND CALCIUM CHLORIDE: 600; 310; 30; 20 INJECTION, SOLUTION INTRAVENOUS at 12:12

## 2024-12-19 RX ADMIN — FLECAINIDE ACETATE 100 MG: 100 TABLET ORAL at 09:12

## 2024-12-19 RX ADMIN — GUAIFENESIN 200 MG: 100 SOLUTION ORAL at 10:12

## 2024-12-19 RX ADMIN — KETOROLAC TROMETHAMINE 15 MG: 30 INJECTION, SOLUTION INTRAMUSCULAR; INTRAVENOUS at 05:12

## 2024-12-19 RX ADMIN — ONDANSETRON 4 MG: 2 INJECTION INTRAMUSCULAR; INTRAVENOUS at 05:12

## 2024-12-19 RX ADMIN — SODIUM CHLORIDE, POTASSIUM CHLORIDE, SODIUM LACTATE AND CALCIUM CHLORIDE: 600; 310; 30; 20 INJECTION, SOLUTION INTRAVENOUS at 09:12

## 2024-12-19 RX ADMIN — HYDRALAZINE HYDROCHLORIDE 10 MG: 20 INJECTION INTRAMUSCULAR; INTRAVENOUS at 09:12

## 2024-12-19 RX ADMIN — GUAIFENESIN 200 MG: 100 SOLUTION ORAL at 09:12

## 2024-12-19 RX ADMIN — FLECAINIDE ACETATE 100 MG: 100 TABLET ORAL at 08:12

## 2024-12-19 NOTE — PLAN OF CARE
Problem: Adult Inpatient Plan of Care  Goal: Plan of Care Review  Outcome: Progressing  Goal: Patient-Specific Goal (Individualized)  Outcome: Progressing  Goal: Absence of Hospital-Acquired Illness or Injury  Outcome: Progressing  Goal: Optimal Comfort and Wellbeing  Outcome: Progressing  Goal: Readiness for Transition of Care  Outcome: Progressing     Problem: Wound  Goal: Optimal Coping  Outcome: Progressing  Goal: Optimal Functional Ability  Outcome: Progressing  Goal: Absence of Infection Signs and Symptoms  Outcome: Progressing  Goal: Improved Oral Intake  Outcome: Progressing  Goal: Optimal Pain Control and Function  Outcome: Progressing  Goal: Skin Health and Integrity  Outcome: Progressing  Goal: Optimal Wound Healing  Outcome: Progressing     Problem: Intestinal Obstruction  Goal: Fluid and Electrolyte Balance  Outcome: Progressing  Goal: Absence of Infection Signs and Symptoms  Outcome: Progressing  Goal: Optimize Nutrition Status  Outcome: Progressing  Goal: Optimal Pain Control and Function  Outcome: Progressing     Problem: Pain Acute  Goal: Optimal Pain Control and Function  Outcome: Progressing

## 2024-12-19 NOTE — PROGRESS NOTES
"[facility] ([address])  General Surgery  Progress Note    Subjective:     History of Present Illness:  79 year old female with a PMH that includes previous small bowel obstructions, atrial fibrillation, and hypertension. She came to the ED after experiencing severe abdominal bloating and cramping that began after she ate last night. Her last SBO was in March 2024. He ED work up showed leukocytosis on CBC and a distal small bowel obstruction on CT A/P. She was admitted to hospital medicine for further management.     Post-Op Info:  * No surgery found *         Interval History: still no flatus or bm  Patient has ngt to suction now and 800cc output, brown  Gastrograffin challenge shows continued SBO  Discussed need for surgery however patient would like to wait several days since she had this occur other times and avoided surgery  I discussed this may be an option however if she were to worsen or have complete obstruction surgical intervention is best option      Medications:  Continuous Infusions:   lactated ringers   Intravenous Continuous 200 mL/hr at 12/19/24 1215 New Bag at 12/19/24 1215     Scheduled Meds:   famotidine (PF)  20 mg Intravenous Daily    flecainide  100 mg Oral Q12H    metoprolol succinate  25 mg Oral Q12H     PRN Meds:  Current Facility-Administered Medications:     acetaminophen, 650 mg, Oral, Q4H PRN    hydrALAZINE, 10 mg, Intravenous, Q8H PRN    morphine, 2 mg, Intravenous, Q8H PRN    naloxone, 0.02 mg, Intravenous, PRN    ondansetron, 4 mg, Intravenous, Q6H PRN     Review of patient's allergies indicates:   Allergen Reactions    Demerol [meperidine] Other (See Comments)     "Becomes Agitated and Combative"      Patient states she can take anything else    Meperidine (pf)      Objective:     Vital Signs (Most Recent):  Temp: 98.6 °F (37 °C) (12/19/24 1231)  Pulse: 104 (12/19/24 1231)  Resp: 16 (12/19/24 1231)  BP: (!) 179/73 (12/19/24 1231)  SpO2: 97 % (12/19/24 1231) Vital Signs " (24h Range):  Temp:  [98.3 °F (36.8 °C)-98.6 °F (37 °C)] 98.6 °F (37 °C)  Pulse:  [] 104  Resp:  [16-19] 16  SpO2:  [92 %-97 %] 97 %  BP: (142-198)/(68-92) 179/73     Weight: 54.4 kg (120 lb)  Body mass index is 19.97 kg/m².    Intake/Output - Last 3 Shifts         12/17 0700 12/18 0659 12/18 0700 12/19 0659 12/19 0700 12/20 0659    P.O.  0 0    I.V. (mL/kg)  2804 (51.5)     NG/GT 0      IV Piggyback 1000      Total Intake(mL/kg) 1000 (18.4) 2804 (51.5) 0 (0)    Urine (mL/kg/hr) 300 350 (0.3) 100 (0.2)    Emesis/NG output 0      Drains 0 0 800    Stool 0      Total Output 300 350 900    Net +700 +2454 -900                    Physical Exam  Vitals and nursing note reviewed.   Constitutional:       Appearance: She is well-developed.   HENT:      Head: Normocephalic and atraumatic.   Cardiovascular:      Rate and Rhythm: Normal rate.      Heart sounds: Normal heart sounds.   Pulmonary:      Effort: Pulmonary effort is normal.   Abdominal:      General: Bowel sounds are normal. There is distension (mild).      Palpations: Abdomen is soft.      Tenderness: There is abdominal tenderness.   Musculoskeletal:         General: Normal range of motion.      Cervical back: Normal range of motion.   Skin:     General: Skin is warm and dry.      Capillary Refill: Capillary refill takes less than 2 seconds.   Neurological:      Mental Status: She is alert and oriented to person, place, and time.   Psychiatric:         Behavior: Behavior normal.          Significant Labs:  I have reviewed all pertinent lab results within the past 24 hours.  CBC:   Recent Labs   Lab 12/19/24  0455   WBC 5.25   RBC 4.85   HGB 14.6   HCT 44.6      MCV 92   MCH 30.1   MCHC 32.7     CMP:   Recent Labs   Lab 12/19/24  0455   *   CALCIUM 9.7   ALBUMIN 3.4*   PROT 6.3      K 4.3   CO2 21*      BUN 39*   CREATININE 1.0   ALKPHOS 67   ALT 23   AST 22   BILITOT 0.8       Significant Diagnostics:  I have reviewed all pertinent  imaging results/findings within the past 24 hours.  Gastrograffin challenge shows SBO  Assessment/Plan:     * SBO (small bowel obstruction)  79-year-old female with recurrent small bowel obstruction, history of a hysterectomy in the past  Recommend conservative management for now.    NG tube in place and clamped for now.    Follow-up serial x-rays for Gastrografin challenge.    If patient gets significant nausea or vomiting hook NG tube up to suction to decompress        Girma Milian MD  General Surgery  [facility] ([address])

## 2024-12-19 NOTE — ASSESSMENT & PLAN NOTE
Past h/o SBOs  CT A/P: recurrent distal small bowel obstruction  Last BM 12/17  General Surgery consulted. Appreciate recs  NPO except meds. Will continue eliquis for now, as no surgery planned  IVF: LR  Monitor labs  PRN toradol not effective. Will use small dose morphine q8 hour prn for breakthrough pain.  GGC ordered 12/18  NGT placed 12/17--needs to be reinserted due to coiling seen on KUB  NGT replaced--tea colored output to storage container  KUB 12/19 AM: contrast in small bowel

## 2024-12-19 NOTE — PROGRESS NOTES
[facility] S[facility] [address] Medicine  Progress Note    Patient Name: Marian Durham  MRN: [id number]  Patient Class: IP- Inpatient   Admission Date: 12/17/2024  Length of Stay: 2 days  Attending Physician: Yandy Horne MD  Primary Care Provider: Suzanna Duran MD        Subjective     Principal Problem:SBO (small bowel obstruction)        HPI:  Ms Fonseca is a 79 year old female with a PMH that includes previous small bowel obstructions, atrial fibrillation, and hypertension. She came to the ED after experiencing severe abdominal bloating and cramping that began after she ate last night. Her last SBO was in March 2024.  He ED work up showed leukocytosis on CBC and a distal small bowel obstruction on CT A/P.  She was admitted to hospital medicine for further management.     Overview/Hospital Course:  Small bowel obstruction causing ab pain and distention. NGT placed overnight due to nausea and vomiting. No improvement in abdominal distention. GGC ordered. Contrast remained in small bowel.    Interval History: Ms Fonseca has not had BM or flatus. AB distention present and tea colored output from NGT.  Minimal bowel sounds. Urine output documented appears low but patient reports that she is voiding well. Hold eliquis for possible surgical intervention.     Review of Systems   Constitutional:  Positive for appetite change. Negative for activity change, fatigue and unexpected weight change.   HENT:  Negative for trouble swallowing.    Eyes:  Negative for visual disturbance.   Respiratory:  Negative for chest tightness and shortness of breath.    Cardiovascular:  Negative for chest pain and leg swelling.   Gastrointestinal:  Positive for abdominal distention and abdominal pain. Negative for anal bleeding, constipation, diarrhea, nausea, rectal pain and vomiting.   Genitourinary:  Negative for dysuria, flank pain and urgency.   Musculoskeletal:  Negative for arthralgias.   Skin: Negative.     Neurological:  Negative for weakness.   Psychiatric/Behavioral:  Negative for agitation.      Objective:     Vital Signs (Most Recent):  Temp: 98.3 °F (36.8 °C) (12/19/24 0424)  Pulse: 85 (12/19/24 0714)  Resp: 19 (12/19/24 0439)  BP: (!) 165/85 (12/19/24 0439)  SpO2: 96 % (12/19/24 0439) Vital Signs (24h Range):  Temp:  [98.2 °F (36.8 °C)-98.6 °F (37 °C)] 98.3 °F (36.8 °C)  Pulse:  [72-93] 85  Resp:  [18-19] 19  SpO2:  [92 %-96 %] 96 %  BP: (142-198)/(68-92) 165/85     Weight: 54.4 kg (120 lb)  Body mass index is 19.97 kg/m².    Intake/Output Summary (Last 24 hours) at 12/19/2024 0804  Last data filed at 12/19/2024 0548  Gross per 24 hour   Intake 2804.02 ml   Output 350 ml   Net 2454.02 ml         Physical Exam  Vitals and nursing note reviewed.   HENT:      Head: Normocephalic.      Mouth/Throat:      Mouth: Mucous membranes are moist.   Eyes:      Extraocular Movements: Extraocular movements intact.   Cardiovascular:      Rate and Rhythm: Normal rate and regular rhythm.   Pulmonary:      Effort: Pulmonary effort is normal. No respiratory distress.      Breath sounds: No wheezing or rales.   Abdominal:      General: Bowel sounds are normal. There is distension.      Tenderness: There is abdominal tenderness.   Musculoskeletal:         General: Normal range of motion.      Cervical back: Normal range of motion.      Right lower leg: No edema.      Left lower leg: No edema.   Skin:     General: Skin is warm.   Neurological:      General: No focal deficit present.      Mental Status: She is alert and oriented to person, place, and time.   Psychiatric:         Mood and Affect: Mood normal.         Behavior: Behavior normal.             Significant Labs: All pertinent labs within the past 24 hours have been reviewed.  BMP:   Recent Labs   Lab 12/19/24  0457   *      K 4.3      CO2 21*   BUN 39*   CREATININE 1.0   CALCIUM 9.7   MG 2.1     CBC:   Recent Labs   Lab 12/17/24  0840 12/18/24  0322  12/19/24  0455   WBC 16.69* 4.01 5.25   HGB 16.2* 14.2 14.6   HCT 47.8 42.2 44.6    215 226     CMP:   Recent Labs   Lab 12/17/24  0840 12/18/24  0322 12/19/24  0455    140 140   K 4.1 4.1 4.3    111* 105   CO2 21* 19* 21*   * 131* 139*   BUN 23 27* 39*   CREATININE 1.0 0.9 1.0   CALCIUM 10.3 9.0 9.7   PROT 7.6 5.6* 6.3   ALBUMIN 4.6 3.3* 3.4*   BILITOT 1.1* 0.9 0.8   ALKPHOS 87 67 67   AST 22 16 22   ALT 17 11 23   ANIONGAP 13 10 14       Significant Imaging: I have reviewed all pertinent imaging results/findings within the past 24 hours.  X-Ray Abdomen AP 1 View  Narrative: EXAMINATION:  XR ABDOMEN AP 1 VIEW    CLINICAL HISTORY:  GGC;    FINDINGS:  Abdomen one view:    NG tip fundus.  There is contrast within small bowel.  There is small bowel dilatation.  No perforation seen.  Impression: Findings are highly suggestive of a small-bowel obstruction.    Electronically signed by: Bandar Acevedo MD  Date:    12/19/2024  Time:    07:59  X-Ray Abdomen AP 1 View  Narrative: EXAMINATION:  XR ABDOMEN AP 1 VIEW    CLINICAL HISTORY:  Bowel obstruction low-grade suspected;gastrograffin challenge for SBO;    FINDINGS:  Abdomen one view:    NG tip fundus.  There is small bowel dilatation.  No perforation seen.  Contrast is seen within stomach and proximal small bowel.  Impression: Findings are highly suggestive of a small-bowel obstruction.    Electronically signed by: Bandar Acevedo MD  Date:    12/19/2024  Time:    07:57        Assessment and Plan     * SBO (small bowel obstruction)  Past h/o SBOs  CT A/P: recurrent distal small bowel obstruction  Last BM 12/17  General Surgery consulted. Appreciate recs  NPO except meds. Will continue eliquis for now, as no surgery planned  IVF: LR  Monitor labs  PRN toradol not effective. Will use small dose morphine q8 hour prn for breakthrough pain.  GGC ordered 12/18  NGT placed 12/17--needs to be reinserted due to coiling seen on KUB  NGT replaced--tea colored  output to storage container  KUB 12/19 AM: contrast in small bowel    Atrial fibrillation  Patient has permanent atrial fibrillation. Patient is currently in sinus rhythm. [id number] Score: 3. The patients heart rate in the last 24 hours is as follows:  Pulse  Min: 72  Max: 93     Antiarrhythmics  flecainide tablet 100 mg, Every 12 hours, Oral  metoprolol succinate (TOPROL-XL) 24 hr tablet 25 mg, Every 12 hours, Oral    Anticoagulants     Hold eliquis for possible surgical interventions. Last dose was 12/18 0843  Plan  - Replace lytes with a goal of K>4, Mg >2  - Patient is anticoagulated, see medications listed above.  - Patient's afib is currently controlled          VTE Risk Mitigation (From admission, onward)           Ordered     Reason for No Pharmacological VTE Prophylaxis  Once        Question:  Reasons:  Answer:  Already adequately anticoagulated on oral Anticoagulants    12/17/24 1348     IP VTE HIGH RISK PATIENT  Once         12/17/24 1348     Place sequential compression device  Until discontinued         12/17/24 1348                    Discharge Planning   WILLIAN: 12/21/2024     Code Status: Full Code   Medical Readiness for Discharge Date:   Discharge Plan A: Home Health                        Karmen Pollock DNP  Department of Hospital Medicine   [facility] ([address])

## 2024-12-19 NOTE — SUBJECTIVE & OBJECTIVE
Interval History: Ms Fonseca has not had BM or flatus. AB distention present and tea colored output from NGT.  Minimal bowel sounds. Urine output documented appears low but patient reports that she is voiding well. Hold eliquis for possible surgical intervention.     Review of Systems   Constitutional:  Positive for appetite change. Negative for activity change, fatigue and unexpected weight change.   HENT:  Negative for trouble swallowing.    Eyes:  Negative for visual disturbance.   Respiratory:  Negative for chest tightness and shortness of breath.    Cardiovascular:  Negative for chest pain and leg swelling.   Gastrointestinal:  Positive for abdominal distention and abdominal pain. Negative for anal bleeding, constipation, diarrhea, nausea, rectal pain and vomiting.   Genitourinary:  Negative for dysuria, flank pain and urgency.   Musculoskeletal:  Negative for arthralgias.   Skin: Negative.    Neurological:  Negative for weakness.   Psychiatric/Behavioral:  Negative for agitation.      Objective:     Vital Signs (Most Recent):  Temp: 98.3 °F (36.8 °C) (12/19/24 0424)  Pulse: 85 (12/19/24 0714)  Resp: 19 (12/19/24 0439)  BP: (!) 165/85 (12/19/24 0439)  SpO2: 96 % (12/19/24 0439) Vital Signs (24h Range):  Temp:  [98.2 °F (36.8 °C)-98.6 °F (37 °C)] 98.3 °F (36.8 °C)  Pulse:  [72-93] 85  Resp:  [18-19] 19  SpO2:  [92 %-96 %] 96 %  BP: (142-198)/(68-92) 165/85     Weight: 54.4 kg (120 lb)  Body mass index is 19.97 kg/m².    Intake/Output Summary (Last 24 hours) at 12/19/2024 0804  Last data filed at 12/19/2024 0548  Gross per 24 hour   Intake 2804.02 ml   Output 350 ml   Net 2454.02 ml         Physical Exam  Vitals and nursing note reviewed.   HENT:      Head: Normocephalic.      Mouth/Throat:      Mouth: Mucous membranes are moist.   Eyes:      Extraocular Movements: Extraocular movements intact.   Cardiovascular:      Rate and Rhythm: Normal rate and regular rhythm.   Pulmonary:      Effort: Pulmonary effort is  normal. No respiratory distress.      Breath sounds: No wheezing or rales.   Abdominal:      General: Bowel sounds are normal. There is distension.      Tenderness: There is abdominal tenderness.   Musculoskeletal:         General: Normal range of motion.      Cervical back: Normal range of motion.      Right lower leg: No edema.      Left lower leg: No edema.   Skin:     General: Skin is warm.   Neurological:      General: No focal deficit present.      Mental Status: She is alert and oriented to person, place, and time.   Psychiatric:         Mood and Affect: Mood normal.         Behavior: Behavior normal.             Significant Labs: All pertinent labs within the past 24 hours have been reviewed.  BMP:   Recent Labs   Lab 12/19/24  0455   *      K 4.3      CO2 21*   BUN 39*   CREATININE 1.0   CALCIUM 9.7   MG 2.1     CBC:   Recent Labs   Lab 12/17/24  0840 12/18/24  0322 12/19/24  0455   WBC 16.69* 4.01 5.25   HGB 16.2* 14.2 14.6   HCT 47.8 42.2 44.6    215 226     CMP:   Recent Labs   Lab 12/17/24  0840 12/18/24  0322 12/19/24  0455    140 140   K 4.1 4.1 4.3    111* 105   CO2 21* 19* 21*   * 131* 139*   BUN 23 27* 39*   CREATININE 1.0 0.9 1.0   CALCIUM 10.3 9.0 9.7   PROT 7.6 5.6* 6.3   ALBUMIN 4.6 3.3* 3.4*   BILITOT 1.1* 0.9 0.8   ALKPHOS 87 67 67   AST 22 16 22   ALT 17 11 23   ANIONGAP 13 10 14       Significant Imaging: I have reviewed all pertinent imaging results/findings within the past 24 hours.  X-Ray Abdomen AP 1 View  Narrative: EXAMINATION:  XR ABDOMEN AP 1 VIEW    CLINICAL HISTORY:  GGC;    FINDINGS:  Abdomen one view:    NG tip fundus.  There is contrast within small bowel.  There is small bowel dilatation.  No perforation seen.  Impression: Findings are highly suggestive of a small-bowel obstruction.    Electronically signed by: Bandar Acevedo MD  Date:    12/19/2024  Time:    07:59  X-Ray Abdomen AP 1 View  Narrative: EXAMINATION:  XR ABDOMEN AP 1  VIEW    CLINICAL HISTORY:  Bowel obstruction low-grade suspected;gastrograffin challenge for SBO;    FINDINGS:  Abdomen one view:    NG tip fundus.  There is small bowel dilatation.  No perforation seen.  Contrast is seen within stomach and proximal small bowel.  Impression: Findings are highly suggestive of a small-bowel obstruction.    Electronically signed by: Bandar Acevedo MD  Date:    12/19/2024  Time:    07:57

## 2024-12-19 NOTE — ASSESSMENT & PLAN NOTE
Patient has permanent atrial fibrillation. Patient is currently in sinus rhythm. [id number] Score: 3. The patients heart rate in the last 24 hours is as follows:  Pulse  Min: 72  Max: 93     Antiarrhythmics  flecainide tablet 100 mg, Every 12 hours, Oral  metoprolol succinate (TOPROL-XL) 24 hr tablet 25 mg, Every 12 hours, Oral    Anticoagulants     Hold eliquis for possible surgical interventions. Last dose was 12/18 0843  Plan  - Replace lytes with a goal of K>4, Mg >2  - Patient is anticoagulated, see medications listed above.  - Patient's afib is currently controlled

## 2024-12-19 NOTE — SUBJECTIVE & OBJECTIVE
"Interval History: still no flatus or bm  Patient has ngt to suction now and 800cc output, brown  Gastrograffin challenge shows continued SBO  Discussed need for surgery however patient would like to wait several days since she had this occur other times and avoided surgery  I discussed this may be an option however if she were to worsen or have complete obstruction surgical intervention is best option      Medications:  Continuous Infusions:   lactated ringers   Intravenous Continuous 200 mL/hr at 12/19/24 1215 New Bag at 12/19/24 1215     Scheduled Meds:   famotidine (PF)  20 mg Intravenous Daily    flecainide  100 mg Oral Q12H    metoprolol succinate  25 mg Oral Q12H     PRN Meds:  Current Facility-Administered Medications:     acetaminophen, 650 mg, Oral, Q4H PRN    hydrALAZINE, 10 mg, Intravenous, Q8H PRN    morphine, 2 mg, Intravenous, Q8H PRN    naloxone, 0.02 mg, Intravenous, PRN    ondansetron, 4 mg, Intravenous, Q6H PRN     Review of patient's allergies indicates:   Allergen Reactions    Demerol [meperidine] Other (See Comments)     "Becomes Agitated and Combative"      Patient states she can take anything else    Meperidine (pf)      Objective:     Vital Signs (Most Recent):  Temp: 98.6 °F (37 °C) (12/19/24 1231)  Pulse: 104 (12/19/24 1231)  Resp: 16 (12/19/24 1231)  BP: (!) 179/73 (12/19/24 1231)  SpO2: 97 % (12/19/24 1231) Vital Signs (24h Range):  Temp:  [98.3 °F (36.8 °C)-98.6 °F (37 °C)] 98.6 °F (37 °C)  Pulse:  [] 104  Resp:  [16-19] 16  SpO2:  [92 %-97 %] 97 %  BP: (142-198)/(68-92) 179/73     Weight: 54.4 kg (120 lb)  Body mass index is 19.97 kg/m².    Intake/Output - Last 3 Shifts         12/17 0700  12/18 0659 12/18 0700  12/19 0659 12/19 0700 12/20 0659    P.O.  0 0    I.V. (mL/kg)  2804 (51.5)     NG/GT 0      IV Piggyback 1000      Total Intake(mL/kg) 1000 (18.4) 2804 (51.5) 0 (0)    Urine (mL/kg/hr) 300 350 (0.3) 100 (0.2)    Emesis/NG output 0      Drains 0 0 800    Stool 0      Total " Output 300 350 900    Net +700 +2454 -900                    Physical Exam  Vitals and nursing note reviewed.   Constitutional:       Appearance: She is well-developed.   HENT:      Head: Normocephalic and atraumatic.   Cardiovascular:      Rate and Rhythm: Normal rate.      Heart sounds: Normal heart sounds.   Pulmonary:      Effort: Pulmonary effort is normal.   Abdominal:      General: Bowel sounds are normal. There is distension (mild).      Palpations: Abdomen is soft.      Tenderness: There is abdominal tenderness.   Musculoskeletal:         General: Normal range of motion.      Cervical back: Normal range of motion.   Skin:     General: Skin is warm and dry.      Capillary Refill: Capillary refill takes less than 2 seconds.   Neurological:      Mental Status: She is alert and oriented to person, place, and time.   Psychiatric:         Behavior: Behavior normal.          Significant Labs:  I have reviewed all pertinent lab results within the past 24 hours.  CBC:   Recent Labs   Lab 12/19/24  0455   WBC 5.25   RBC 4.85   HGB 14.6   HCT 44.6      MCV 92   MCH 30.1   MCHC 32.7     CMP:   Recent Labs   Lab 12/19/24  0455   *   CALCIUM 9.7   ALBUMIN 3.4*   PROT 6.3      K 4.3   CO2 21*      BUN 39*   CREATININE 1.0   ALKPHOS 67   ALT 23   AST 22   BILITOT 0.8       Significant Diagnostics:  I have reviewed all pertinent imaging results/findings within the past 24 hours.  Gastrograffin challenge shows SBO

## 2024-12-20 LAB
ALBUMIN SERPL BCP-MCNC: 2.6 G/DL (ref 3.5–5.2)
ALP SERPL-CCNC: 61 U/L (ref 40–150)
ALT SERPL W/O P-5'-P-CCNC: 12 U/L (ref 10–44)
ANION GAP SERPL CALC-SCNC: 9 MMOL/L (ref 8–16)
AST SERPL-CCNC: 15 U/L (ref 10–40)
BASOPHILS # BLD AUTO: 0.02 K/UL (ref 0–0.2)
BASOPHILS NFR BLD: 0.2 % (ref 0–1.9)
BILIRUB SERPL-MCNC: 0.9 MG/DL (ref 0.1–1)
BUN SERPL-MCNC: 30 MG/DL (ref 8–23)
CALCIUM SERPL-MCNC: 8.8 MG/DL (ref 8.7–10.5)
CHLORIDE SERPL-SCNC: 107 MMOL/L (ref 95–110)
CO2 SERPL-SCNC: 24 MMOL/L (ref 23–29)
CREAT SERPL-MCNC: 0.8 MG/DL (ref 0.5–1.4)
DIFFERENTIAL METHOD BLD: ABNORMAL
EOSINOPHIL # BLD AUTO: 0 K/UL (ref 0–0.5)
EOSINOPHIL NFR BLD: 0 % (ref 0–8)
ERYTHROCYTE [DISTWIDTH] IN BLOOD BY AUTOMATED COUNT: 13.1 % (ref 11.5–14.5)
ERYTHROCYTE [DISTWIDTH] IN BLOOD BY AUTOMATED COUNT: 13.1 % (ref 11.5–14.5)
ERYTHROCYTE [DISTWIDTH] IN BLOOD BY AUTOMATED COUNT: 13.2 % (ref 11.5–14.5)
ERYTHROCYTE [DISTWIDTH] IN BLOOD BY AUTOMATED COUNT: 13.2 % (ref 11.5–14.5)
EST. GFR  (NO RACE VARIABLE): >60 ML/MIN/1.73 M^2
GIANT PLATELETS BLD QL SMEAR: PRESENT
GLUCOSE SERPL-MCNC: 106 MG/DL (ref 70–110)
HCT VFR BLD AUTO: 36.7 % (ref 37–48.5)
HCT VFR BLD AUTO: 37.1 % (ref 37–48.5)
HCT VFR BLD AUTO: 37.2 % (ref 37–48.5)
HCT VFR BLD AUTO: 38.3 % (ref 37–48.5)
HGB BLD-MCNC: 12.3 G/DL (ref 12–16)
HGB BLD-MCNC: 12.4 G/DL (ref 12–16)
HGB BLD-MCNC: 12.4 G/DL (ref 12–16)
HGB BLD-MCNC: 12.9 G/DL (ref 12–16)
IMM GRANULOCYTES # BLD AUTO: 0.03 K/UL (ref 0–0.04)
IMM GRANULOCYTES NFR BLD AUTO: 0.4 % (ref 0–0.5)
LYMPHOCYTES # BLD AUTO: 1.2 K/UL (ref 1–4.8)
LYMPHOCYTES NFR BLD: 15.3 % (ref 18–48)
MAGNESIUM SERPL-MCNC: 1.9 MG/DL (ref 1.6–2.6)
MCH RBC QN AUTO: 30.5 PG (ref 27–31)
MCH RBC QN AUTO: 30.8 PG (ref 27–31)
MCH RBC QN AUTO: 31 PG (ref 27–31)
MCH RBC QN AUTO: 31.2 PG (ref 27–31)
MCHC RBC AUTO-ENTMCNC: 33.1 G/DL (ref 32–36)
MCHC RBC AUTO-ENTMCNC: 33.4 G/DL (ref 32–36)
MCHC RBC AUTO-ENTMCNC: 33.7 G/DL (ref 32–36)
MCHC RBC AUTO-ENTMCNC: 33.8 G/DL (ref 32–36)
MCV RBC AUTO: 92 FL (ref 82–98)
MONOCYTES # BLD AUTO: 1.2 K/UL (ref 0.3–1)
MONOCYTES NFR BLD: 14.5 % (ref 4–15)
NEUTROPHILS # BLD AUTO: 5.6 K/UL (ref 1.8–7.7)
NEUTROPHILS NFR BLD: 69.6 % (ref 38–73)
NRBC BLD-RTO: 0 /100 WBC
PHOSPHATE SERPL-MCNC: 2.7 MG/DL (ref 2.7–4.5)
PLATELET # BLD AUTO: 186 K/UL (ref 150–450)
PLATELET # BLD AUTO: 196 K/UL (ref 150–450)
PLATELET # BLD AUTO: 203 K/UL (ref 150–450)
PLATELET # BLD AUTO: 211 K/UL (ref 150–450)
PLATELET BLD QL SMEAR: ABNORMAL
PMV BLD AUTO: 8.9 FL (ref 9.2–12.9)
PMV BLD AUTO: 9.2 FL (ref 9.2–12.9)
PMV BLD AUTO: 9.6 FL (ref 9.2–12.9)
PMV BLD AUTO: 9.7 FL (ref 9.2–12.9)
POCT GLUCOSE: 106 MG/DL (ref 70–110)
POCT GLUCOSE: 123 MG/DL (ref 70–110)
POCT GLUCOSE: 81 MG/DL (ref 70–110)
POCT GLUCOSE: 82 MG/DL (ref 70–110)
POCT GLUCOSE: 97 MG/DL (ref 70–110)
POTASSIUM SERPL-SCNC: 3.8 MMOL/L (ref 3.5–5.1)
PROT SERPL-MCNC: 4.9 G/DL (ref 6–8.4)
RBC # BLD AUTO: 3.98 M/UL (ref 4–5.4)
RBC # BLD AUTO: 4.02 M/UL (ref 4–5.4)
RBC # BLD AUTO: 4.03 M/UL (ref 4–5.4)
RBC # BLD AUTO: 4.16 M/UL (ref 4–5.4)
SODIUM SERPL-SCNC: 140 MMOL/L (ref 136–145)
TOXIC GRANULES BLD QL SMEAR: PRESENT
WBC # BLD AUTO: 10.56 K/UL (ref 3.9–12.7)
WBC # BLD AUTO: 11.2 K/UL (ref 3.9–12.7)
WBC # BLD AUTO: 12.4 K/UL (ref 3.9–12.7)
WBC # BLD AUTO: 8.08 K/UL (ref 3.9–12.7)

## 2024-12-20 PROCEDURE — 84100 ASSAY OF PHOSPHORUS: CPT | Performed by: NURSE PRACTITIONER

## 2024-12-20 PROCEDURE — 85025 COMPLETE CBC W/AUTO DIFF WBC: CPT | Performed by: NURSE PRACTITIONER

## 2024-12-20 PROCEDURE — 25000003 PHARM REV CODE 250: Performed by: NURSE PRACTITIONER

## 2024-12-20 PROCEDURE — 99231 SBSQ HOSP IP/OBS SF/LOW 25: CPT | Mod: ,,, | Performed by: SPECIALIST

## 2024-12-20 PROCEDURE — 97162 PT EVAL MOD COMPLEX 30 MIN: CPT

## 2024-12-20 PROCEDURE — 36415 COLL VENOUS BLD VENIPUNCTURE: CPT | Mod: XB | Performed by: NURSE PRACTITIONER

## 2024-12-20 PROCEDURE — 85027 COMPLETE CBC AUTOMATED: CPT | Mod: 91 | Performed by: NURSE PRACTITIONER

## 2024-12-20 PROCEDURE — 80053 COMPREHEN METABOLIC PANEL: CPT | Performed by: NURSE PRACTITIONER

## 2024-12-20 PROCEDURE — 63600175 PHARM REV CODE 636 W HCPCS: Performed by: NURSE PRACTITIONER

## 2024-12-20 PROCEDURE — 21400001 HC TELEMETRY ROOM

## 2024-12-20 PROCEDURE — 97165 OT EVAL LOW COMPLEX 30 MIN: CPT

## 2024-12-20 PROCEDURE — 83735 ASSAY OF MAGNESIUM: CPT | Performed by: NURSE PRACTITIONER

## 2024-12-20 RX ORDER — METOPROLOL SUCCINATE 25 MG/1
25 TABLET, EXTENDED RELEASE ORAL EVERY 12 HOURS
Status: DISCONTINUED | OUTPATIENT
Start: 2024-12-20 | End: 2024-12-20

## 2024-12-20 RX ORDER — ENOXAPARIN SODIUM 100 MG/ML
1 INJECTION SUBCUTANEOUS EVERY 12 HOURS
Status: DISCONTINUED | OUTPATIENT
Start: 2024-12-20 | End: 2024-12-21

## 2024-12-20 RX ORDER — ACETAMINOPHEN 325 MG/1
650 TABLET ORAL ONCE
Status: COMPLETED | OUTPATIENT
Start: 2024-12-20 | End: 2024-12-20

## 2024-12-20 RX ORDER — METOPROLOL TARTRATE 25 MG/1
25 TABLET, FILM COATED ORAL 2 TIMES DAILY
Status: DISCONTINUED | OUTPATIENT
Start: 2024-12-20 | End: 2024-12-21

## 2024-12-20 RX ORDER — FLECAINIDE ACETATE 100 MG/1
100 TABLET ORAL EVERY 12 HOURS
Status: DISCONTINUED | OUTPATIENT
Start: 2024-12-20 | End: 2024-12-21

## 2024-12-20 RX ORDER — CETIRIZINE HYDROCHLORIDE 5 MG/1
5 TABLET ORAL DAILY
Status: DISCONTINUED | OUTPATIENT
Start: 2024-12-20 | End: 2024-12-21

## 2024-12-20 RX ADMIN — FLECAINIDE ACETATE 100 MG: 100 TABLET ORAL at 10:12

## 2024-12-20 RX ADMIN — FLECAINIDE ACETATE 100 MG: 100 TABLET ORAL at 08:12

## 2024-12-20 RX ADMIN — ENOXAPARIN SODIUM 50 MG: 60 INJECTION SUBCUTANEOUS at 12:12

## 2024-12-20 RX ADMIN — METOPROLOL SUCCINATE 25 MG: 25 TABLET, EXTENDED RELEASE ORAL at 08:12

## 2024-12-20 RX ADMIN — CETIRIZINE HYDROCHLORIDE 5 MG: 5 TABLET, FILM COATED ORAL at 10:12

## 2024-12-20 RX ADMIN — ACETAMINOPHEN 650 MG: 325 TABLET, FILM COATED ORAL at 10:12

## 2024-12-20 RX ADMIN — ENOXAPARIN SODIUM 50 MG: 60 INJECTION SUBCUTANEOUS at 10:12

## 2024-12-20 RX ADMIN — SODIUM CHLORIDE, POTASSIUM CHLORIDE, SODIUM LACTATE AND CALCIUM CHLORIDE: 600; 310; 30; 20 INJECTION, SOLUTION INTRAVENOUS at 04:12

## 2024-12-20 RX ADMIN — HYDRALAZINE HYDROCHLORIDE 10 MG: 20 INJECTION INTRAMUSCULAR; INTRAVENOUS at 07:12

## 2024-12-20 RX ADMIN — FAMOTIDINE 20 MG: 10 INJECTION, SOLUTION INTRAVENOUS at 08:12

## 2024-12-20 RX ADMIN — SODIUM CHLORIDE, POTASSIUM CHLORIDE, SODIUM LACTATE AND CALCIUM CHLORIDE: 600; 310; 30; 20 INJECTION, SOLUTION INTRAVENOUS at 07:12

## 2024-12-20 RX ADMIN — METOPROLOL TARTRATE 25 MG: 25 TABLET, FILM COATED ORAL at 10:12

## 2024-12-20 NOTE — PROGRESS NOTES
House day 4.  Diagnosis-small-bowel obstruction    Subjective   No pain, nausea or vomiting  Passing small amounts of flatus    PE  Afebrile  Vital signs stable  HEENT-NG in place  Chest-clear  Heart-regular rate and rhythm  Abdomen-soft, no tenderness, no distention, no guarding, no rebound  Extremities-no tenderness    Imaging   KUB-marked improvement in small-bowel distention, passage of contrast into descending colon    Impression/plan   SBO resolving  Clamp NG tube, advance diet

## 2024-12-20 NOTE — PT/OT/SLP EVAL
Occupational Therapy   Evaluation    Name: Marian Durham  MRN: [id number]  Admitting Diagnosis: SBO (small bowel obstruction)  Recent Surgery: * No surgery found *      Recommendations:     Discharge Recommendations: Low Intensity Therapy  Discharge Equipment Recommendations:  none  Barriers to discharge:   none    Assessment:     Marian Durham is a 79 y.o. female with a medical diagnosis of SBO (small bowel obstruction).  She presents with fatigue. Performance deficits affecting function: weakness, impaired endurance, impaired self care skills, impaired functional mobility, gait instability, impaired balance.  Pt agreeable to participating in therapy upon arrival to room.  Pt demonstrates strength and ROM in (B) UE needed for ADLs that is WFL.  Pt able to perform sit <> stand transfer with CGA.  Pt able to adjust gown with supervision while seated at EOB.    Overall, pt tolerated therapy well.  PTA pt reports being (I) with ADLs and mobility.  Pt reports feeling like her balance has been off lately and she has been working with PT on it.  Pt would benefit from skilled OT services to address problems listed above and increase independence with ADLs.  Low intensity therapy is recommended upon d/c from acute care to further address deficits and help pt improve overall functional independence.       Rehab Prognosis: Good; patient would benefit from acute skilled OT services to address these deficits and reach maximum level of function.       Plan:     Patient to be seen 4 x/week to address the above listed problems via self-care/home management, therapeutic activities, therapeutic exercises  Plan of Care Expires:    Plan of Care Reviewed with: patient    Subjective     Chief Complaint: fatigue  Patient/Family Comments/goals: be able to walk up the back stairs    Occupational Profile:  Living Environment: Pt lives alone in 2SH with bedroom and bathroom on 2nd floor.  Chair lift present to go upstairs.  Bathroom has  tub/shower with shower chair.  There are 17 JAZMIN with wide HR; chair lift present   Previous level of function:   ADLs: Independent  Mobility: Independent  Roles and Routines: Has been going to OP PT to work on balance (has has B TKA), uses chair lift for groceries  Equipment Used at Home: none  Assistance upon Discharge: Pt lives alone    Pain/Comfort:  Pain Rating 1: 0/10  Pain Rating Post-Intervention 1: 0/10    Patients cultural, spiritual, [employer]al conflicts given the current situation: no    Objective:     Communicated with: RN (Jc) prior to session.  Patient found HOB elevated with NG tube, peripheral IV, telemetry upon OT entry to room.    General Precautions: Standard, fall  Orthopedic Precautions: N/A  Braces: N/A  Respiratory Status: Room air    Occupational Performance:    Bed Mobility:    Supine <> sit: Mod I  Scooting: Mod I  Sit <> supine: Mod I    Functional Mobility/Transfers:  Sit <> Stand: SBA  Functional Mobility: Pt took ~10 steps near bed with CGA.     Activities of Daily Living:  Upper Body Dressing: supervision for adjusting robe and pulling upwards while seated at EOB.    Cognitive/Visual Perceptual:  Cognitive/Psychosocial Skills:    -       Oriented to: Person, Place, Time, and Situation   -       Follows Commands/attention: Follows 100% of one step commands  -       Communication: clear/fluent  -       Memory: No Deficits noted  -       Safety awareness/insight to disability: intact   -       Mood/Affect/Coping skills/emotional control: Pleasant, cooperative     Physical Exam:  Postural examination/scapula alignment:    -       No postural abnormalities identified  Skin integrity: Visible skin intact  Edema:  Noted in fingers and feet  Sensation: -       Intact  Motor Planning: -       WNL  Dominant hand: -       Right  Upper Extremity Range of Motion:  Limited to ~90 degrees of shoulder flexion  -       Right Upper Extremity: WFL  -       Left Upper Extremity: WFL  Upper Extremity  Strength:   -       Right Upper Extremity: WFL; grossly 4/5 all muscle groups  -       Left Upper Extremity: WFL; grossly 4/5 all muscle groups   Strength: WFL  Fine Motor Coordination: Intact  Gross motor coordination: WFL  Balance: Sitting- Independent; Standing- CGA    AMPAC 6 Click ADL:  AMPAC Total Score: 20    Treatment & Education:  *Pt educated on role of OT in acute care setting    Patient left HOB elevated with all lines intact and call button in reach    GOALS:   Multidisciplinary Problems       Occupational Therapy Goals          Problem: Occupational Therapy    Goal Priority Disciplines Outcome Interventions   Occupational Therapy Goal     OT, PT/OT Progressing    Description: Goals to be met by: 1/10/2025     Patient will increase functional independence with ADLs by performing:    UE Dressing with Supervision.  LE Dressing with Supervision.  Grooming while standing at sink with CGA.  Toileting from toilet with SBAfor hygiene and clothing management.   Toilet transfer to toilet with SBA.                         History:     Past Medical History:   Diagnosis Date    Atrial fibrillation     stress related per patient    Hx of long term use of blood thinners     Hyperlipidemia     Osteoarthritis     PONV (postoperative nausea and vomiting)     SBO (small bowel obstruction)     X 3    Shingles 01/01/2002    Spinal stenosis          Past Surgical History:   Procedure Laterality Date    ANKLE FRACTURE SURGERY  2007    Right ankle    COLON SURGERY      bowel obstruction-NG TUBE    COLONOSCOPY      x 2    EXCISION OF MASS OF BACK N/A 11/16/2018    Procedure: EXCISION, MASS, BACK;  Surgeon: Fran Magaña MD;  Location: [facility];  Service: General;  Laterality: N/A;    EYE SURGERY Bilateral 2016    HYSTERECTOMY  1985    HEATHER    KNEE ARTHROSCOPY W/ DEBRIDEMENT  1994    Bilateral    KNEE ARTHROSCOPY W/ DEBRIDEMENT  2003    Left knee    OPEN PATELLA REEFING PROCEDURE  age 19    Left knee    Tonsilectomy   as a child    TONSILLECTOMY      TOTAL KNEE ARTHROPLASTY Right 10/19/2023    Procedure: ARTHROPLASTY, KNEE, TOTAL;  Surgeon: Vicente Mitchell MD;  Location: [facility];  Service: Orthopedics;  Laterality: Right;    TOTAL KNEE ARTHROPLASTY Left 7/18/2024    Procedure: ARTHROPLASTY, KNEE, TOTAL;  Surgeon: Vicente Mitchell MD;  Location: [facility];  Service: Orthopedics;  Laterality: Left;    TUBAL LIGATION         Time Tracking:     OT Date of Treatment: 12/20/24  OT Start Time: 1005  OT Stop Time: 1020  OT Total Time (min): 15 min    Billable Minutes:Evaluation 15    SILVER Alex  12/20/2024

## 2024-12-20 NOTE — PLAN OF CARE
I provided the patient a choice of post acute providers and offered a list of CMS rated home health agencies.     Patient/family chose the following as their preferred providers:    [facility] (previous experience with agency)    Referral forwarded - will need to send orders once available     12/20/24 8707   Post-Acute Status   Post-Acute Authorization Home Health   Home Health Status Referrals Sent   Hospital Resources/Appts/Education Provided Provided patient/caregiver with written discharge plan information   Discharge Delays None known at this time   Discharge Plan   Discharge Plan [facility]

## 2024-12-20 NOTE — PT/OT/SLP EVAL
"Physical Therapy Evaluation    Patient Name:  Marian Durham   MRN:  [id number]    Recommendations:     Discharge Recommendations: Low Intensity Therapy   Discharge Equipment Recommendations: none   Barriers to discharge: Decreased caregiver support    Assessment:     Marian Durham is a 79 y.o. female admitted with a medical diagnosis of SBO (small bowel obstruction).  She presents with the following impairments/functional limitations: weakness, impaired endurance, impaired functional mobility, gait instability, impaired balance.    Eval completed, pt presents with decreased activity tolerance and balance deficits and would benefit from continued PT services to prevent further deconditioning and restore PLOF.     Rehab Prognosis: Good; patient would benefit from acute skilled PT services to address these deficits and reach maximum level of function.    Recent Surgery: * No surgery found *      Plan:     During this hospitalization, patient to be seen 4 x/week to address the identified rehab impairments via neuromuscular re-education, therapeutic exercises, therapeutic activities, gait training and progress toward the following goals:    Plan of Care Expires:  01/20/25    Subjective     Chief Complaint: Pt c/o feeling tired and weak, states "I haven't eaten or really walked in days"  Patient/Family Comments/goals: Pt reports her goal is to "not have surgery"   Pain/Comfort:  Pain Rating 1: 0/10    Patients cultural, spiritual, L[employer] day conflicts given the current situation: no    Living Environment:  Raised [facility] with 17 JAZMIN, has a stair chair   Prior to admission, patients level of function was Mod I without AD, participating in OPPT for balance training 2/2 reports having lingering balance issues following B TKA.  Equipment used at home: none.  DME owned (not currently used): rolling walker, single point cane, bedside commode, shower chair, and crutches .  Upon discharge, patient will have assistance from lives " alone.    Objective:     Communicated with LISSETTE Greene prior to session.  Patient found supine with NG tube, peripheral IV, telemetry  upon PT entry to room.    General Precautions: Standard, fall  Orthopedic Precautions:N/A   Braces: N/A  Respiratory Status: Room air    Exams:  B fingers and feet edematous, sensation intact  B LE ROM and strength WFL    Functional Mobility:  Bed Mobility:     Supine to Sit: modified independence  Sit to Supine: modified independence  Transfers:     Sit to Stand:  stand by assistance with no AD  Gait: Pt deferred gait in hallway today, requested to remain in room. Pt ambulated fwd and back and sidesteps for a total of 50' Min HHA, demonstrated 1 LOB backward requiring Min A to maintain balance       AM-PAC 6 CLICK MOBILITY  Total Score:21       Treatment & Education:  Pt educated on role of PT and POC    While ambulatory in room, pt requested to perform her OPPT therex regimen, performed 10 standing heel raises and 5 miniquats with CGA    Patient left supine with all lines intact and call button in reach.    GOALS:   Multidisciplinary Problems       Physical Therapy Goals          Problem: Physical Therapy    Goal Priority Disciplines Outcome Interventions   Physical Therapy Goal     PT, PT/OT Progressing    Description: P.T goals to be met in 1 month as follows:  1. Bed<>chair Mod I   2. Tolerate OOB x 3 hours  3. Gait 200' Mod I with LRAD                           History:     Past Medical History:   Diagnosis Date    Atrial fibrillation     stress related per patient    Hx of long term use of blood thinners     Hyperlipidemia     Osteoarthritis     PONV (postoperative nausea and vomiting)     SBO (small bowel obstruction)     X 3    Shingles 01/01/2002    Spinal stenosis        Past Surgical History:   Procedure Laterality Date    ANKLE FRACTURE SURGERY  2007    Right ankle    COLON SURGERY      bowel obstruction-NG TUBE    COLONOSCOPY      x 2    EXCISION OF MASS OF BACK N/A  11/16/2018    Procedure: EXCISION, MASS, BACK;  Surgeon: Fran Magaña MD;  Location: [facility];  Service: General;  Laterality: N/A;    EYE SURGERY Bilateral 2016    HYSTERECTOMY  1985    HEATHER    KNEE ARTHROSCOPY W/ DEBRIDEMENT  1994    Bilateral    KNEE ARTHROSCOPY W/ DEBRIDEMENT  2003    Left knee    OPEN PATELLA REEFING PROCEDURE  age 19    Left knee    Tonsilectomy  as a child    TONSILLECTOMY      TOTAL KNEE ARTHROPLASTY Right 10/19/2023    Procedure: ARTHROPLASTY, KNEE, TOTAL;  Surgeon: Vicente Mitchell MD;  Location: [facility];  Service: Orthopedics;  Laterality: Right;    TOTAL KNEE ARTHROPLASTY Left 7/18/2024    Procedure: ARTHROPLASTY, KNEE, TOTAL;  Surgeon: Vicente Mitchell MD;  Location: [facility];  Service: Orthopedics;  Laterality: Left;    TUBAL LIGATION         Time Tracking:     PT Received On: 12/20/24  PT Start Time: 1020     PT Stop Time: 1036  PT Total Time (min): 16 min     Billable Minutes: Evaluation 12      12/20/2024

## 2024-12-20 NOTE — ASSESSMENT & PLAN NOTE
Patient has permanent atrial fibrillation. Patient is currently in sinus rhythm. [id number] Score: 3. The patients heart rate in the last 24 hours is as follows:  Pulse  Min: 82  Max: 109     Antiarrhythmics  flecainide tablet 100 mg, Every 12 hours, Oral  metoprolol succinate (TOPROL-XL) 24 hr tablet 25 mg, Every 12 hours, Oral    Anticoagulants  enoxaparin injection 50 mg, Every 12 hours, Subcutaneous  Hold eliquis for possible surgical interventions. Last dose was 12/18 0843  Plan  - Replace lytes with a goal of K>4, Mg >2  - Patient is anticoagulated, see medications listed above.  - Patient's afib is currently controlled  - 1mg/kg  lovenox BID started 12/19 in place of her eliquis

## 2024-12-20 NOTE — PLAN OF CARE
Problem: Adult Inpatient Plan of Care  Goal: Plan of Care Review  Outcome: Progressing  Goal: Patient-Specific Goal (Individualized)  Outcome: Progressing  Goal: Absence of Hospital-Acquired Illness or Injury  Outcome: Progressing  Goal: Optimal Comfort and Wellbeing  Outcome: Progressing  Goal: Readiness for Transition of Care  Outcome: Progressing     Problem: Intestinal Obstruction  Goal: Optimal Bowel Function  Outcome: Progressing  Goal: Fluid and Electrolyte Balance  Outcome: Progressing  Goal: Absence of Infection Signs and Symptoms  Outcome: Progressing  Goal: Optimize Nutrition Status  Outcome: Progressing  Goal: Optimal Pain Control and Function  Outcome: Progressing     Problem: Pain Acute  Goal: Optimal Pain Control and Function  Outcome: Progressing

## 2024-12-20 NOTE — ASSESSMENT & PLAN NOTE
Past h/o SBOs  CT A/P: recurrent distal small bowel obstruction  Last BM 12/17  General Surgery consulted. Appreciate recs  NPO except meds. Will continue eliquis for now, as no surgery planned  IVF: LR  Monitor labs  PRN toradol not effective. Will use small dose morphine q8 hour prn for breakthrough pain.  GGC ordered 12/18  NGT placed 12/17--needs to be reinserted due to coiling seen on KUB  NGT replaced--tea colored output to storage container  KUB 12/19 AM: contrast in small bowel  12/20 KUB pending

## 2024-12-20 NOTE — PLAN OF CARE
D/C Rec: Low Intensity Therapy  DME Rec: None    Eval completed, pt presents with decreased activity tolerance and balance deficits and would benefit from continued PT services to prevent further deconditioning and restore PLOF.     Problem: Physical Therapy  Goal: Physical Therapy Goal  Description: P.T goals to be met in 1 month as follows:  1. Bed<>chair Mod I   2. Tolerate OOB x 3 hours  3. Gait 200' Mod I with LRAD      Outcome: Progressing

## 2024-12-20 NOTE — PLAN OF CARE
Problem: Occupational Therapy  Goal: Occupational Therapy Goal  Description: Goals to be met by: 1/10/2025     Patient will increase functional independence with ADLs by performing:    UE Dressing with Supervision.  LE Dressing with Supervision.  Grooming while standing at sink with CGA.  Toileting from toilet with SBAfor hygiene and clothing management.   Toilet transfer to toilet with SBA.    Outcome: Progressing     OT evaluation complete and POC established.  Low intensity therapy is recommended upon d/c from acute care to further address deficits and help pt improve overall functional independence.     SILVER Alex  12/20/2024

## 2024-12-20 NOTE — SUBJECTIVE & OBJECTIVE
Interval History: Mrs Fonseca reports flatus. Hypoactive BS present and improved abdominal distention. Morning KUB pending. Pt denies abdominal pain. She is not interested in surgical intervention and risks were discussed. PT/OT consulted to prevent further deconditioning. Swelling to BL hands. IVF rate decreased.     Review of Systems   Constitutional:  Positive for appetite change. Negative for activity change, fatigue and unexpected weight change.   HENT:  Negative for trouble swallowing.    Eyes:  Negative for visual disturbance.   Respiratory:  Negative for chest tightness and shortness of breath.    Cardiovascular:  Negative for chest pain and leg swelling.   Gastrointestinal:  Positive for abdominal distention. Negative for abdominal pain, anal bleeding, constipation, diarrhea, nausea, rectal pain and vomiting.   Genitourinary:  Negative for dysuria, flank pain and urgency.   Musculoskeletal:  Negative for arthralgias.   Skin: Negative.    Neurological:  Negative for weakness.   Psychiatric/Behavioral:  Negative for agitation.      Objective:     Vital Signs (Most Recent):  Temp: 98.4 °F (36.9 °C) (12/20/24 0749)  Pulse: 84 (12/20/24 0749)  Resp: 20 (12/20/24 0749)  BP: (!) 191/88 (12/20/24 0749)  SpO2: (!) 93 % (12/20/24 0749) Vital Signs (24h Range):  Temp:  [97.5 °F (36.4 °C)-98.6 °F (37 °C)] 98.4 °F (36.9 °C)  Pulse:  [] 84  Resp:  [16-20] 20  SpO2:  [93 %-99 %] 93 %  BP: (154-191)/(72-94) 191/88     Weight: 54.4 kg (120 lb)  Body mass index is 19.97 kg/m².    Intake/Output Summary (Last 24 hours) at 12/20/2024 0839  Last data filed at 12/20/2024 0551  Gross per 24 hour   Intake 4491.5 ml   Output 1950 ml   Net 2541.5 ml         Physical Exam  Vitals and nursing note reviewed.   HENT:      Head: Normocephalic.      Nose:      Comments: NGT left nare     Mouth/Throat:      Mouth: Mucous membranes are moist.   Eyes:      Extraocular Movements: Extraocular movements intact.   Cardiovascular:      Rate  and Rhythm: Normal rate and regular rhythm.   Pulmonary:      Effort: Pulmonary effort is normal. No respiratory distress.      Breath sounds: No wheezing or rales.   Abdominal:      General: There is distension.      Tenderness: There is no abdominal tenderness.      Comments: BS decreased   Musculoskeletal:         General: Swelling (BL fingers) present. Normal range of motion.      Cervical back: Normal range of motion.      Right lower leg: No edema.      Left lower leg: No edema.   Skin:     General: Skin is warm.   Neurological:      General: No focal deficit present.      Mental Status: She is alert and oriented to person, place, and time.   Psychiatric:         Mood and Affect: Mood normal.         Behavior: Behavior normal.             Significant Labs: All pertinent labs within the past 24 hours have been reviewed.  BMP:   Recent Labs   Lab 12/20/24  0402         K 3.8      CO2 24   BUN 30*   CREATININE 0.8   CALCIUM 8.8   MG 1.9     CBC:   Recent Labs   Lab 12/19/24  0455 12/20/24  0402   WBC 5.25 8.08   HGB 14.6 12.3   HCT 44.6 37.2    196     CMP:   Recent Labs   Lab 12/19/24  0455 12/20/24  0402    140   K 4.3 3.8    107   CO2 21* 24   * 106   BUN 39* 30*   CREATININE 1.0 0.8   CALCIUM 9.7 8.8   PROT 6.3 4.9*   ALBUMIN 3.4* 2.6*   BILITOT 0.8 0.9   ALKPHOS 67 61   AST 22 15   ALT 23 12   ANIONGAP 14 9       Significant Imaging: I have reviewed all pertinent imaging results/findings within the past 24 hours.  X-Ray Abdomen AP 1 View  Narrative: EXAMINATION:  XR ABDOMEN AP 1 VIEW    CLINICAL HISTORY:  GGC;    FINDINGS:  Abdomen one view:    NG tip fundus.  There is contrast within small bowel.  There is small bowel dilatation.  No perforation seen.  Impression: Findings are highly suggestive of a small-bowel obstruction.    Electronically signed by: Bandar Acevedo MD  Date:    12/19/2024  Time:    07:59  X-Ray Abdomen AP 1 View  Narrative: EXAMINATION:  XR  ABDOMEN AP 1 VIEW    CLINICAL HISTORY:  Bowel obstruction low-grade suspected;gastrograffin challenge for SBO;    FINDINGS:  Abdomen one view:    NG tip fundus.  There is small bowel dilatation.  No perforation seen.  Contrast is seen within stomach and proximal small bowel.  Impression: Findings are highly suggestive of a small-bowel obstruction.    Electronically signed by: Bandar Acevedo MD  Date:    12/19/2024  Time:    07:57

## 2024-12-20 NOTE — PROGRESS NOTES
[facility] [address] Medicine  Progress Note    Patient Name: Marian Durham  MRN: [id number]  Patient Class: IP- Inpatient   Admission Date: 12/17/2024  Length of Stay: 3 days  Attending Physician: Yandy Horne MD  Primary Care Provider: Suzanna Duran MD        Subjective     Principal Problem:SBO (small bowel obstruction)        HPI:  Ms Fonseca is a 79 year old female with a PMH that includes previous small bowel obstructions, atrial fibrillation, and hypertension. She came to the ED after experiencing severe abdominal bloating and cramping that began after she ate last night. Her last SBO was in March 2024.  He ED work up showed leukocytosis on CBC and a distal small bowel obstruction on CT A/P.  She was admitted to hospital medicine for further management.     Overview/Hospital Course:  Small bowel obstruction causing ab pain and distention. NGT placed overnight due to nausea and vomiting. No improvement in abdominal distention. GGC ordered. Contrast remained in small bowel as of 12/19. Repeat Film 12/20 pending.     Interval History: Mrs Fonseca reports flatus. Hypoactive BS present and improved abdominal distention. Morning KUB pending. Pt denies abdominal pain. She is not interested in surgical intervention and risks were discussed. PT/OT consulted to prevent further deconditioning. Swelling to BL hands. IVF rate decreased.     Review of Systems   Constitutional:  Positive for appetite change. Negative for activity change, fatigue and unexpected weight change.   HENT:  Negative for trouble swallowing.    Eyes:  Negative for visual disturbance.   Respiratory:  Negative for chest tightness and shortness of breath.    Cardiovascular:  Negative for chest pain and leg swelling.   Gastrointestinal:  Positive for abdominal distention. Negative for abdominal pain, anal bleeding, constipation, diarrhea, nausea, rectal pain and vomiting.   Genitourinary:  Negative for dysuria, flank pain  and urgency.   Musculoskeletal:  Negative for arthralgias.   Skin: Negative.    Neurological:  Negative for weakness.   Psychiatric/Behavioral:  Negative for agitation.      Objective:     Vital Signs (Most Recent):  Temp: 98.4 °F (36.9 °C) (12/20/24 0749)  Pulse: 84 (12/20/24 0749)  Resp: 20 (12/20/24 0749)  BP: (!) 191/88 (12/20/24 0749)  SpO2: (!) 93 % (12/20/24 0749) Vital Signs (24h Range):  Temp:  [97.5 °F (36.4 °C)-98.6 °F (37 °C)] 98.4 °F (36.9 °C)  Pulse:  [] 84  Resp:  [16-20] 20  SpO2:  [93 %-99 %] 93 %  BP: (154-191)/(72-94) 191/88     Weight: 54.4 kg (120 lb)  Body mass index is 19.97 kg/m².    Intake/Output Summary (Last 24 hours) at 12/20/2024 0839  Last data filed at 12/20/2024 0551  Gross per 24 hour   Intake 4491.5 ml   Output 1950 ml   Net 2541.5 ml         Physical Exam  Vitals and nursing note reviewed.   HENT:      Head: Normocephalic.      Nose:      Comments: NGT left nare     Mouth/Throat:      Mouth: Mucous membranes are moist.   Eyes:      Extraocular Movements: Extraocular movements intact.   Cardiovascular:      Rate and Rhythm: Normal rate and regular rhythm.   Pulmonary:      Effort: Pulmonary effort is normal. No respiratory distress.      Breath sounds: No wheezing or rales.   Abdominal:      General: There is distension.      Tenderness: There is no abdominal tenderness.      Comments: BS decreased   Musculoskeletal:         General: Swelling (BL fingers) present. Normal range of motion.      Cervical back: Normal range of motion.      Right lower leg: No edema.      Left lower leg: No edema.   Skin:     General: Skin is warm.   Neurological:      General: No focal deficit present.      Mental Status: She is alert and oriented to person, place, and time.   Psychiatric:         Mood and Affect: Mood normal.         Behavior: Behavior normal.             Significant Labs: All pertinent labs within the past 24 hours have been reviewed.  BMP:   Recent Labs   Lab 12/20/24  2802          K 3.8      CO2 24   BUN 30*   CREATININE 0.8   CALCIUM 8.8   MG 1.9     CBC:   Recent Labs   Lab 12/19/24  0455 12/20/24  0402   WBC 5.25 8.08   HGB 14.6 12.3   HCT 44.6 37.2    196     CMP:   Recent Labs   Lab 12/19/24  0455 12/20/24  0402    140   K 4.3 3.8    107   CO2 21* 24   * 106   BUN 39* 30*   CREATININE 1.0 0.8   CALCIUM 9.7 8.8   PROT 6.3 4.9*   ALBUMIN 3.4* 2.6*   BILITOT 0.8 0.9   ALKPHOS 67 61   AST 22 15   ALT 23 12   ANIONGAP 14 9       Significant Imaging: I have reviewed all pertinent imaging results/findings within the past 24 hours.  X-Ray Abdomen AP 1 View  Narrative: EXAMINATION:  XR ABDOMEN AP 1 VIEW    CLINICAL HISTORY:  GGC;    FINDINGS:  Abdomen one view:    NG tip fundus.  There is contrast within small bowel.  There is small bowel dilatation.  No perforation seen.  Impression: Findings are highly suggestive of a small-bowel obstruction.    Electronically signed by: Bandar Acevedo MD  Date:    12/19/2024  Time:    07:59  X-Ray Abdomen AP 1 View  Narrative: EXAMINATION:  XR ABDOMEN AP 1 VIEW    CLINICAL HISTORY:  Bowel obstruction low-grade suspected;gastrograffin challenge for SBO;    FINDINGS:  Abdomen one view:    NG tip fundus.  There is small bowel dilatation.  No perforation seen.  Contrast is seen within stomach and proximal small bowel.  Impression: Findings are highly suggestive of a small-bowel obstruction.    Electronically signed by: Bandar Acevedo MD  Date:    12/19/2024  Time:    07:57        Assessment and Plan     * SBO (small bowel obstruction)  Past h/o SBOs  CT A/P: recurrent distal small bowel obstruction  Last BM 12/17  General Surgery consulted. Appreciate recs  NPO except meds. Will continue eliquis for now, as no surgery planned  IVF: LR  Monitor labs  PRN toradol not effective. Will use small dose morphine q8 hour prn for breakthrough pain.  GGC ordered 12/18  NGT placed 12/17--needs to be reinserted due to  coiling seen on KUB  NGT replaced--tea colored output to storage container  KUB 12/19 AM: contrast in small bowel  12/20 KUB pending    Atrial fibrillation  Patient has permanent atrial fibrillation. Patient is currently in sinus rhythm. [id number] Score: 3. The patients heart rate in the last 24 hours is as follows:  Pulse  Min: 82  Max: 109     Antiarrhythmics  flecainide tablet 100 mg, Every 12 hours, Oral  metoprolol succinate (TOPROL-XL) 24 hr tablet 25 mg, Every 12 hours, Oral    Anticoagulants  enoxaparin injection 50 mg, Every 12 hours, Subcutaneous  Hold eliquis for possible surgical interventions. Last dose was 12/18 0843  Plan  - Replace lytes with a goal of K>4, Mg >2  - Patient is anticoagulated, see medications listed above.  - Patient's afib is currently controlled  - 1mg/kg  lovenox BID started 12/19 in place of her eliquis          VTE Risk Mitigation (From admission, onward)           Ordered     enoxaparin injection 50 mg  Every 12 hours         12/20/24 0836     Reason for No Pharmacological VTE Prophylaxis  Once        Question:  Reasons:  Answer:  Already adequately anticoagulated on oral Anticoagulants    12/17/24 1348     IP VTE HIGH RISK PATIENT  Once         12/17/24 1348     Place sequential compression device  Until discontinued         12/17/24 1348                    Discharge Planning   WILLIAN: 12/23/2024     Code Status: Full Code   Medical Readiness for Discharge Date:   Discharge Plan A: Home Health          Karmen Pollock DNP  Department of Hospital Medicine   [facility] ([address])

## 2024-12-21 PROBLEM — J90 PLEURAL EFFUSION: Status: ACTIVE | Noted: 2024-12-21

## 2024-12-21 LAB
ANION GAP SERPL CALC-SCNC: 12 MMOL/L (ref 8–16)
BUN SERPL-MCNC: 26 MG/DL (ref 8–23)
CALCIUM SERPL-MCNC: 8.5 MG/DL (ref 8.7–10.5)
CHLORIDE SERPL-SCNC: 105 MMOL/L (ref 95–110)
CO2 SERPL-SCNC: 20 MMOL/L (ref 23–29)
CREAT SERPL-MCNC: 0.7 MG/DL (ref 0.5–1.4)
EST. GFR  (NO RACE VARIABLE): >60 ML/MIN/1.73 M^2
GLUCOSE SERPL-MCNC: 77 MG/DL (ref 70–110)
POCT GLUCOSE: 77 MG/DL (ref 70–110)
POTASSIUM SERPL-SCNC: 3.6 MMOL/L (ref 3.5–5.1)
SODIUM SERPL-SCNC: 137 MMOL/L (ref 136–145)

## 2024-12-21 PROCEDURE — 63600175 PHARM REV CODE 636 W HCPCS: Performed by: NURSE PRACTITIONER

## 2024-12-21 PROCEDURE — 97110 THERAPEUTIC EXERCISES: CPT | Mod: CQ

## 2024-12-21 PROCEDURE — 99232 SBSQ HOSP IP/OBS MODERATE 35: CPT | Mod: ,,, | Performed by: SURGERY

## 2024-12-21 PROCEDURE — 36415 COLL VENOUS BLD VENIPUNCTURE: CPT | Performed by: NURSE PRACTITIONER

## 2024-12-21 PROCEDURE — 80048 BASIC METABOLIC PNL TOTAL CA: CPT | Performed by: NURSE PRACTITIONER

## 2024-12-21 PROCEDURE — 97116 GAIT TRAINING THERAPY: CPT | Mod: CQ

## 2024-12-21 PROCEDURE — 21400001 HC TELEMETRY ROOM

## 2024-12-21 PROCEDURE — 25000003 PHARM REV CODE 250: Performed by: HOSPITALIST

## 2024-12-21 PROCEDURE — 25000003 PHARM REV CODE 250: Performed by: NURSE PRACTITIONER

## 2024-12-21 RX ORDER — METOPROLOL SUCCINATE 50 MG/1
50 TABLET, EXTENDED RELEASE ORAL DAILY
Status: DISCONTINUED | OUTPATIENT
Start: 2024-12-22 | End: 2024-12-23 | Stop reason: HOSPADM

## 2024-12-21 RX ORDER — FLECAINIDE ACETATE 100 MG/1
100 TABLET ORAL EVERY 12 HOURS
Status: DISCONTINUED | OUTPATIENT
Start: 2024-12-21 | End: 2024-12-23 | Stop reason: HOSPADM

## 2024-12-21 RX ORDER — FUROSEMIDE 10 MG/ML
20 INJECTION INTRAMUSCULAR; INTRAVENOUS ONCE
Status: COMPLETED | OUTPATIENT
Start: 2024-12-21 | End: 2024-12-21

## 2024-12-21 RX ORDER — FAMOTIDINE 10 MG/ML
20 INJECTION INTRAVENOUS 2 TIMES DAILY
Status: DISCONTINUED | OUTPATIENT
Start: 2024-12-21 | End: 2024-12-23 | Stop reason: HOSPADM

## 2024-12-21 RX ORDER — FUROSEMIDE 10 MG/ML
20 INJECTION INTRAMUSCULAR; INTRAVENOUS DAILY
Status: COMPLETED | OUTPATIENT
Start: 2024-12-22 | End: 2024-12-22

## 2024-12-21 RX ORDER — CETIRIZINE HYDROCHLORIDE 5 MG/1
5 TABLET ORAL DAILY
Status: DISCONTINUED | OUTPATIENT
Start: 2024-12-22 | End: 2024-12-23 | Stop reason: HOSPADM

## 2024-12-21 RX ORDER — METOPROLOL TARTRATE 25 MG/1
25 TABLET, FILM COATED ORAL 2 TIMES DAILY
Status: COMPLETED | OUTPATIENT
Start: 2024-12-21 | End: 2024-12-21

## 2024-12-21 RX ADMIN — APIXABAN 2.5 MG: 2.5 TABLET, FILM COATED ORAL at 09:12

## 2024-12-21 RX ADMIN — FAMOTIDINE 20 MG: 10 INJECTION, SOLUTION INTRAVENOUS at 09:12

## 2024-12-21 RX ADMIN — FLECAINIDE ACETATE 100 MG: 100 TABLET ORAL at 09:12

## 2024-12-21 RX ADMIN — METOPROLOL TARTRATE 25 MG: 25 TABLET, FILM COATED ORAL at 09:12

## 2024-12-21 RX ADMIN — FUROSEMIDE 20 MG: 10 INJECTION, SOLUTION INTRAMUSCULAR; INTRAVENOUS at 09:12

## 2024-12-21 RX ADMIN — HYDRALAZINE HYDROCHLORIDE 10 MG: 20 INJECTION INTRAMUSCULAR; INTRAVENOUS at 04:12

## 2024-12-21 RX ADMIN — ENOXAPARIN SODIUM 50 MG: 60 INJECTION SUBCUTANEOUS at 09:12

## 2024-12-21 RX ADMIN — ONDANSETRON 4 MG: 2 INJECTION INTRAMUSCULAR; INTRAVENOUS at 10:12

## 2024-12-21 RX ADMIN — SODIUM CHLORIDE, POTASSIUM CHLORIDE, SODIUM LACTATE AND CALCIUM CHLORIDE: 600; 310; 30; 20 INJECTION, SOLUTION INTRAVENOUS at 01:12

## 2024-12-21 NOTE — ASSESSMENT & PLAN NOTE
Past h/o SBOs  CT A/P: recurrent distal small bowel obstruction  Last BM 12/17  General Surgery consulted. Appreciate recs  NPO except meds. Will continue eliquis for now, as no surgery planned  IVF: LR  Monitor labs  PRN toradol not effective. Will use small dose morphine q8 hour prn for breakthrough pain.  GGC ordered 12/18  NGT placed 12/17--needs to be reinserted due to coiling seen on KUB  NGT replaced--tea colored output to storage container  KUB 12/19 AM: contrast in small bowel  12/20 KUB : improvement, large stool burden  12/21: no evidence of bowel obstruction or perforation; clear liquids started, NGT clamped

## 2024-12-21 NOTE — PT/OT/SLP PROGRESS
Physical Therapy Treatment    Patient Name:  Marian Durham   MRN:  [id number]    Recommendations:     Discharge Recommendations: Low Intensity Therapy  Discharge Equipment Recommendations: none  Barriers to discharge: None    Assessment:     Marian Durham is a 79 y.o. female admitted with a medical diagnosis of SBO (small bowel obstruction).  She presents with the following impairments/functional limitations: weakness, impaired endurance, impaired self care skills, impaired functional mobility, gait instability, impaired balance, decreased coordination, decreased lower extremity function, decreased safety awareness ;pt with fair/good mobility today, amb'd in hallway w/ RW and CGA ~100', though fatigued following.    Rehab Prognosis: Good; patient would benefit from acute skilled PT services to address these deficits and reach maximum level of function.    Recent Surgery: * No surgery found *      Plan:     During this hospitalization, patient to be seen 4 x/week to address the identified rehab impairments via gait training, therapeutic activities, therapeutic exercises, neuromuscular re-education and progress toward the following goals:    Plan of Care Expires:  01/20/25    Subjective     Chief Complaint: legs feel weak  Patient/Family Comments/goals: pt agreeable to session  Pain/Comfort:  Pain Rating 1: 0/10  Pain Rating Post-Intervention 1: 0/10      Objective:     Communicated with nurse prior to session.  Patient found HOB elevated with NG tube, peripheral IV, telemetry upon PT entry to room.     General Precautions: Standard, fall  Orthopedic Precautions: N/A  Braces: N/A  Respiratory Status: Room air     Functional Mobility:  Bed Mobility:     Supine to Sit: stand by assistance  Transfers:     Sit to Stand:  supervision and stand by assistance with rolling walker  Gait: amb'd ~100' w/ RW and CGA for balance, NO LOB noted, though pt appears unsteady on her feet.       AM-PAC 6 CLICK MOBILITY  Turning over  in bed (including adjusting bedclothes, sheets and blankets)?: 4  Sitting down on and standing up from a chair with arms (e.g., wheelchair, bedside commode, etc.): 4  Moving from lying on back to sitting on the side of the bed?: 4  Moving to and from a bed to a chair (including a wheelchair)?: 3  Need to walk in hospital room?: 3  Climbing 3-5 steps with a railing?: 3  Basic Mobility Total Score: 21       Treatment & Education:  Perf'd standing LE ex's of hip flex, hip abd, hs curls, heelraises, mini squats x 5-10 ea.     Patient left up in chair with all lines intact, call button in reach, and nruse notified..    GOALS:   Multidisciplinary Problems       Physical Therapy Goals          Problem: Physical Therapy    Goal Priority Disciplines Outcome Interventions   Physical Therapy Goal     PT, PT/OT Progressing    Description: P.T goals to be met in 1 month as follows:  1. Bed<>chair Mod I   2. Tolerate OOB x 3 hours  3. Gait 200' Mod I with LRAD                           Time Tracking:     PT Received On: 12/21/24  PT Start Time: 0928     PT Stop Time: 0954  PT Total Time (min): 26 min     Billable Minutes: Gait Training 14 and Therapeutic Exercise 12    Treatment Type: Treatment  PT/PTA: PTA     Number of PTA visits since last PT visit: 1 12/21/2024

## 2024-12-21 NOTE — PROGRESS NOTES
Pharmacist Renal Dose Adjustment Note    Marian Durham is a 79 y.o. female being treated with the medication famotidine    Patient Data:    Vital Signs (Most Recent):  Temp: 98.3 °F (36.8 °C) (12/21/24 0739)  Pulse: 77 (12/21/24 0739)  Resp: 18 (12/21/24 0739)  BP: (!) 162/69 (12/21/24 0739)  SpO2: (!) 92 % (12/21/24 0739) Vital Signs (72h Range):  Temp:  [97.5 °F (36.4 °C)-98.6 °F (37 °C)]   Pulse:  []   Resp:  [16-20]   BP: (142-198)/(68-94)   SpO2:  [92 %-100 %]      Recent Labs   Lab 12/19/24  0455 12/20/24  0402 12/21/24  0352   CREATININE 1.0 0.8 0.7     Serum creatinine: 0.7 mg/dL 12/21/24 0352  Estimated creatinine clearance: 56 mL/min    Medication:famotidine dose: 20 mg frequency daily will be changed to medication:famotidine dose:20 mg frequency:twice daily    Pharmacist's Name: Surekha Vitale  Pharmacist's Extension: [phone]

## 2024-12-21 NOTE — ASSESSMENT & PLAN NOTE
Patient found to have small pleural effusion on imaging. I have personally reviewed and interpreted the following imaging: Xray. A thoracentesis was  not indicated . Most likely etiology includes Volume overload not due to CHF. Management to include Diuresis  -stop ivf  -one time dose of iv lasix 20 mg

## 2024-12-21 NOTE — SUBJECTIVE & OBJECTIVE
Interval History: No bowel obstruction on KUB. CXR with right pleural effusion and NGT in GE. NGT advanced 5 cm and placement auscultated. Int suction for 5 min and then tube clamped. IVF stopped. One time dose of IV lasix ordered. Clear liquid diet started. BS present, decreased ab distention.     Review of Systems   Constitutional:  Positive for appetite change. Negative for activity change, fatigue and unexpected weight change.   HENT:  Negative for trouble swallowing.    Eyes:  Negative for visual disturbance.   Respiratory:  Negative for chest tightness and shortness of breath.    Cardiovascular:  Negative for chest pain and leg swelling.   Gastrointestinal:  Positive for abdominal distention. Negative for abdominal pain, anal bleeding, constipation, diarrhea, nausea, rectal pain and vomiting.   Genitourinary:  Negative for dysuria, flank pain and urgency.   Musculoskeletal:  Negative for arthralgias.   Skin: Negative.    Neurological:  Negative for weakness.   Psychiatric/Behavioral:  Negative for agitation.      Objective:     Vital Signs (Most Recent):  Temp: 98.3 °F (36.8 °C) (12/21/24 0739)  Pulse: 77 (12/21/24 0739)  Resp: 18 (12/21/24 0739)  BP: (!) 162/69 (12/21/24 0739)  SpO2: (!) 92 % (12/21/24 0739) Vital Signs (24h Range):  Temp:  [97.7 °F (36.5 °C)-98.3 °F (36.8 °C)] 98.3 °F (36.8 °C)  Pulse:  [67-94] 77  Resp:  [18-20] 18  SpO2:  [92 %-100 %] 92 %  BP: (151-189)/(68-94) 162/69     Weight: 54.4 kg (120 lb)  Body mass index is 19.97 kg/m².    Intake/Output Summary (Last 24 hours) at 12/21/2024 0855  Last data filed at 12/21/2024 0548  Gross per 24 hour   Intake 2800.11 ml   Output 1800 ml   Net 1000.11 ml         Physical Exam  Vitals and nursing note reviewed.   HENT:      Head: Normocephalic.      Nose:      Comments: NGT left nare     Mouth/Throat:      Mouth: Mucous membranes are moist.   Eyes:      Extraocular Movements: Extraocular movements intact.   Cardiovascular:      Rate and Rhythm:  Normal rate and regular rhythm.   Pulmonary:      Effort: Pulmonary effort is normal. No respiratory distress.      Breath sounds: No wheezing or rales.   Abdominal:      General: There is distension.      Tenderness: There is no abdominal tenderness.      Comments: BS decreased   Musculoskeletal:         General: Swelling (BL fingershands) present. Normal range of motion.      Cervical back: Normal range of motion.      Right lower leg: No edema.      Left lower leg: No edema.   Skin:     General: Skin is warm.   Neurological:      General: No focal deficit present.      Mental Status: She is alert and oriented to person, place, and time.   Psychiatric:         Mood and Affect: Mood normal.         Behavior: Behavior normal.             Significant Labs: All pertinent labs within the past 24 hours have been reviewed.  BMP:   Recent Labs   Lab 12/20/24  0402 12/21/24  0352    77    137   K 3.8 3.6    105   CO2 24 20*   BUN 30* 26*   CREATININE 0.8 0.7   CALCIUM 8.8 8.5*   MG 1.9  --      CBC:   Recent Labs   Lab 12/20/24  1310 12/20/24  1721 12/20/24  2332   WBC 10.56 11.20 12.40   HGB 12.4 12.9 12.4   HCT 37.1 38.3 36.7*    203 186     CMP:   Recent Labs   Lab 12/20/24  0402 12/21/24  0352    137   K 3.8 3.6    105   CO2 24 20*    77   BUN 30* 26*   CREATININE 0.8 0.7   CALCIUM 8.8 8.5*   PROT 4.9*  --    ALBUMIN 2.6*  --    BILITOT 0.9  --    ALKPHOS 61  --    AST 15  --    ALT 12  --    ANIONGAP 9 12       Significant Imaging: I have reviewed all pertinent imaging results/findings within the past 24 hours.  X-Ray Abdomen Flat And Erect  Addendum: Nasogastric tube side hole level of the GE junction can be further  advanced for optimal placement.     Electronically signed by: Rolly Guevara MD   Date:    12/21/2024   Time:    08:33  Narrative: EXAMINATION:  XR ABDOMEN FLAT AND ERECT    CLINICAL HISTORY:  sbo;    TECHNIQUE:  Flat and erect AP views of the abdomen were  preformed.    COMPARISON:  12/20/2024    FINDINGS:  Nasogastric tube LEFT upper quadrant.The bowel gas pattern is non-obstructive.  No findings to suggest free air.No abnormal soft tissue masses noted.    Blunting costophrenic angles bilaterally consistent with effusions/consolidative dependent atelectasis.    No acute bony abnormality.  Impression: No evidence of bowel obstruction or perforation.    Electronically signed by: Rolly Gueavra MD  Date:    12/21/2024  Time:    07:27  X-Ray Chest 1 View  Narrative: EXAMINATION:  XR CHEST 1 VIEW    CLINICAL HISTORY:  hypervolemia;    TECHNIQUE:  Single frontal view of the chest was performed.    COMPARISON:  07/01/2023    FINDINGS:  Nasogastric tube incompletely visualized with side hole at the level of the GE junction can be further advanced.The lungs are clear with normal appearance of pulmonary vasculature. RIGHT pleural effusion with consolidative change RIGHT lung base no evident pneumothorax.    The cardiac silhouette is normal in size. The hilar and mediastinal contours are unremarkable.    Bones are intact.  Impression: RIGHT pleural effusion with consolidative change at the RIGHT base.  Nasogastric tube side hole at the level of the GE junction can be further advanced    Electronically signed by: Rolly Guevara MD  Date:    12/21/2024  Time:    08:32

## 2024-12-21 NOTE — PROGRESS NOTES
[facility] [address] Medicine  Progress Note    Patient Name: Marian Durham  MRN: [id number]  Patient Class: IP- Inpatient   Admission Date: 12/17/2024  Length of Stay: 4 days  Attending Physician: Yandy Horne MD  Primary Care Provider: Suzanna Duran MD        Subjective     Principal Problem:SBO (small bowel obstruction)        HPI:  Ms Fonseca is a 79 year old female with a PMH that includes previous small bowel obstructions, atrial fibrillation, and hypertension. She came to the ED after experiencing severe abdominal bloating and cramping that began after she ate last night. Her last SBO was in March 2024.  He ED work up showed leukocytosis on CBC and a distal small bowel obstruction on CT A/P.  She was admitted to hospital medicine for further management.     Overview/Hospital Course:  Small bowel obstruction causing ab pain and distention. NGT placed overnight due to nausea and vomiting. No improvement in abdominal distention. GGC ordered. Contrast remained in small bowel as of 12/19. Repeat Film 12/20 pending. No bowel obstruction on imaging 12/21. Diet advanced.    Interval History: No bowel obstruction on KUB. CXR with right pleural effusion and NGT in GE. NGT advanced 5 cm and placement auscultated. Int suction for 5 min and then tube clamped. IVF stopped. One time dose of IV lasix ordered. Clear liquid diet started. BS present, decreased ab distention. Ambulation encouraged.    Review of Systems   Constitutional:  Positive for appetite change. Negative for activity change, fatigue and unexpected weight change.   HENT:  Negative for trouble swallowing.    Eyes:  Negative for visual disturbance.   Respiratory:  Negative for chest tightness and shortness of breath.    Cardiovascular:  Negative for chest pain and leg swelling.   Gastrointestinal:  Positive for abdominal distention. Negative for abdominal pain, anal bleeding, constipation, diarrhea, nausea, rectal pain and  vomiting.   Genitourinary:  Negative for dysuria, flank pain and urgency.   Musculoskeletal:  Negative for arthralgias.   Skin: Negative.    Neurological:  Negative for weakness.   Psychiatric/Behavioral:  Negative for agitation.      Objective:     Vital Signs (Most Recent):  Temp: 98.3 °F (36.8 °C) (12/21/24 0739)  Pulse: 77 (12/21/24 0739)  Resp: 18 (12/21/24 0739)  BP: (!) 162/69 (12/21/24 0739)  SpO2: (!) 92 % (12/21/24 0739) Vital Signs (24h Range):  Temp:  [97.7 °F (36.5 °C)-98.3 °F (36.8 °C)] 98.3 °F (36.8 °C)  Pulse:  [67-94] 77  Resp:  [18-20] 18  SpO2:  [92 %-100 %] 92 %  BP: (151-189)/(68-94) 162/69     Weight: 54.4 kg (120 lb)  Body mass index is 19.97 kg/m².    Intake/Output Summary (Last 24 hours) at 12/21/2024 0855  Last data filed at 12/21/2024 0548  Gross per 24 hour   Intake 2800.11 ml   Output 1800 ml   Net 1000.11 ml         Physical Exam  Vitals and nursing note reviewed.   HENT:      Head: Normocephalic.      Nose:      Comments: NGT left nare     Mouth/Throat:      Mouth: Mucous membranes are moist.   Eyes:      Extraocular Movements: Extraocular movements intact.   Cardiovascular:      Rate and Rhythm: Normal rate and regular rhythm.   Pulmonary:      Effort: Pulmonary effort is normal. No respiratory distress.      Breath sounds: No wheezing or rales.   Abdominal:      General: There is distension.      Tenderness: There is no abdominal tenderness.      Comments: BS decreased   Musculoskeletal:         General: Swelling (BL fingershands) present. Normal range of motion.      Cervical back: Normal range of motion.      Right lower leg: No edema.      Left lower leg: No edema.   Skin:     General: Skin is warm.   Neurological:      General: No focal deficit present.      Mental Status: She is alert and oriented to person, place, and time.   Psychiatric:         Mood and Affect: Mood normal.         Behavior: Behavior normal.             Significant Labs: All pertinent labs within the past 24  hours have been reviewed.  BMP:   Recent Labs   Lab 12/20/24  0402 12/21/24  0352    77    137   K 3.8 3.6    105   CO2 24 20*   BUN 30* 26*   CREATININE 0.8 0.7   CALCIUM 8.8 8.5*   MG 1.9  --      CBC:   Recent Labs   Lab 12/20/24  1310 12/20/24  1721 12/20/24  2332   WBC 10.56 11.20 12.40   HGB 12.4 12.9 12.4   HCT 37.1 38.3 36.7*    203 186     CMP:   Recent Labs   Lab 12/20/24  0402 12/21/24  0352    137   K 3.8 3.6    105   CO2 24 20*    77   BUN 30* 26*   CREATININE 0.8 0.7   CALCIUM 8.8 8.5*   PROT 4.9*  --    ALBUMIN 2.6*  --    BILITOT 0.9  --    ALKPHOS 61  --    AST 15  --    ALT 12  --    ANIONGAP 9 12       Significant Imaging: I have reviewed all pertinent imaging results/findings within the past 24 hours.  X-Ray Abdomen Flat And Erect  Addendum: Nasogastric tube side hole level of the GE junction can be further  advanced for optimal placement.     Electronically signed by: Rolly Guevara MD   Date:    12/21/2024   Time:    08:33  Narrative: EXAMINATION:  XR ABDOMEN FLAT AND ERECT    CLINICAL HISTORY:  sbo;    TECHNIQUE:  Flat and erect AP views of the abdomen were preformed.    COMPARISON:  12/20/2024    FINDINGS:  Nasogastric tube LEFT upper quadrant.The bowel gas pattern is non-obstructive.  No findings to suggest free air.No abnormal soft tissue masses noted.    Blunting costophrenic angles bilaterally consistent with effusions/consolidative dependent atelectasis.    No acute bony abnormality.  Impression: No evidence of bowel obstruction or perforation.    Electronically signed by: Rolly Guevara MD  Date:    12/21/2024  Time:    07:27  X-Ray Chest 1 View  Narrative: EXAMINATION:  XR CHEST 1 VIEW    CLINICAL HISTORY:  hypervolemia;    TECHNIQUE:  Single frontal view of the chest was performed.    COMPARISON:  07/01/2023    FINDINGS:  Nasogastric tube incompletely visualized with side hole at the level of the GE junction can be further advanced.The  lungs are clear with normal appearance of pulmonary vasculature. RIGHT pleural effusion with consolidative change RIGHT lung base no evident pneumothorax.    The cardiac silhouette is normal in size. The hilar and mediastinal contours are unremarkable.    Bones are intact.  Impression: RIGHT pleural effusion with consolidative change at the RIGHT base.  Nasogastric tube side hole at the level of the GE junction can be further advanced    Electronically signed by: Rolly Guevara MD  Date:    12/21/2024  Time:    08:32        Assessment and Plan     * SBO (small bowel obstruction)  Past h/o SBOs  CT A/P: recurrent distal small bowel obstruction  Last BM 12/17  General Surgery consulted. Appreciate recs  NPO except meds. Will continue eliquis for now, as no surgery planned  IVF: LR  Monitor labs  PRN toradol not effective. Will use small dose morphine q8 hour prn for breakthrough pain.  GGC ordered 12/18  NGT placed 12/17--needs to be reinserted due to coiling seen on KUB  NGT replaced--tea colored output to storage container  KUB 12/19 AM: contrast in small bowel  12/20 KUB : improvement, large stool burden  12/21: no evidence of bowel obstruction or perforation; clear liquids started, NGT clamped    Pleural effusion  Patient found to have small pleural effusion on imaging. I have personally reviewed and interpreted the following imaging: Xray. A thoracentesis was  not indicated . Most likely etiology includes Volume overload not due to CHF. Management to include Diuresis  -stop ivf  -one time dose of iv lasix 20 mg    Atrial fibrillation  Patient has permanent atrial fibrillation. Patient is currently in sinus rhythm. [id number] Score: 3. The patients heart rate in the last 24 hours is as follows:  Pulse  Min: 82  Max: 109     Antiarrhythmics  flecainide tablet 100 mg, Every 12 hours, Oral  metoprolol succinate (TOPROL-XL) 24 hr tablet 25 mg, Every 12 hours, Oral    Anticoagulants  enoxaparin injection 50 mg, Every  12 hours, Subcutaneous  Hold eliquis for possible surgical interventions. Last dose was 12/18 0843  Plan  - Replace lytes with a goal of K>4, Mg >2  - Patient is anticoagulated, see medications listed above.  - Patient's afib is currently controlled  - 1mg/kg  lovenox BID started 12/19 in place of her eliquis          VTE Risk Mitigation (From admission, onward)           Ordered     enoxaparin injection 50 mg  Every 12 hours         12/20/24 0836     Reason for No Pharmacological VTE Prophylaxis  Once        Question:  Reasons:  Answer:  Already adequately anticoagulated on oral Anticoagulants    12/17/24 1348     IP VTE HIGH RISK PATIENT  Once         12/17/24 1348     Place sequential compression device  Until discontinued         12/17/24 1348                    Discharge Planning   WILLIAN: 12/23/2024     Code Status: Full Code   Medical Readiness for Discharge Date:   Discharge Plan A: Home Health   Discharge Delays: None known at this time    Karmen Pollock DNP  Department of Hospital Medicine   [facility] ([address])

## 2024-12-21 NOTE — PROGRESS NOTES
House day 5  Diagnosis-small-bowel obstruction     Subjective   No pain, nausea or vomiting  Passing flatus and recently had a very large bowel movement according to the patient     PE  Afebrile  Vital signs stable  HEENT-NG in place  Chest-clear  Heart-regular rate and rhythm  Abdomen-soft, no tenderness, no distention, no guarding, no rebound  Extremities-no tenderness     Imaging   KUB today-no sign of intestinal obstruction     Impression/plan   SBO resolving  I have removed the NG tube.  Clear liquids for now , advance to low residue as tolerated

## 2024-12-22 LAB
ANION GAP SERPL CALC-SCNC: 9 MMOL/L (ref 8–16)
BASOPHILS # BLD AUTO: 0.04 K/UL (ref 0–0.2)
BASOPHILS NFR BLD: 0.4 % (ref 0–1.9)
BUN SERPL-MCNC: 21 MG/DL (ref 8–23)
CALCIUM SERPL-MCNC: 8.3 MG/DL (ref 8.7–10.5)
CHLORIDE SERPL-SCNC: 102 MMOL/L (ref 95–110)
CO2 SERPL-SCNC: 24 MMOL/L (ref 23–29)
CREAT SERPL-MCNC: 0.7 MG/DL (ref 0.5–1.4)
DIFFERENTIAL METHOD BLD: ABNORMAL
EOSINOPHIL # BLD AUTO: 0.1 K/UL (ref 0–0.5)
EOSINOPHIL NFR BLD: 0.9 % (ref 0–8)
ERYTHROCYTE [DISTWIDTH] IN BLOOD BY AUTOMATED COUNT: 12.7 % (ref 11.5–14.5)
EST. GFR  (NO RACE VARIABLE): >60 ML/MIN/1.73 M^2
GLUCOSE SERPL-MCNC: 97 MG/DL (ref 70–110)
HCT VFR BLD AUTO: 35 % (ref 37–48.5)
HGB BLD-MCNC: 11.9 G/DL (ref 12–16)
IMM GRANULOCYTES # BLD AUTO: 0.17 K/UL (ref 0–0.04)
IMM GRANULOCYTES NFR BLD AUTO: 1.6 % (ref 0–0.5)
LYMPHOCYTES # BLD AUTO: 1.4 K/UL (ref 1–4.8)
LYMPHOCYTES NFR BLD: 13.3 % (ref 18–48)
MCH RBC QN AUTO: 31.1 PG (ref 27–31)
MCHC RBC AUTO-ENTMCNC: 34 G/DL (ref 32–36)
MCV RBC AUTO: 91 FL (ref 82–98)
MONOCYTES # BLD AUTO: 1.2 K/UL (ref 0.3–1)
MONOCYTES NFR BLD: 11 % (ref 4–15)
NEUTROPHILS # BLD AUTO: 7.7 K/UL (ref 1.8–7.7)
NEUTROPHILS NFR BLD: 72.8 % (ref 38–73)
NRBC BLD-RTO: 0 /100 WBC
PLATELET # BLD AUTO: 203 K/UL (ref 150–450)
PMV BLD AUTO: 9.1 FL (ref 9.2–12.9)
POTASSIUM SERPL-SCNC: 3.2 MMOL/L (ref 3.5–5.1)
RBC # BLD AUTO: 3.83 M/UL (ref 4–5.4)
SODIUM SERPL-SCNC: 135 MMOL/L (ref 136–145)
WBC # BLD AUTO: 10.55 K/UL (ref 3.9–12.7)

## 2024-12-22 PROCEDURE — 99232 SBSQ HOSP IP/OBS MODERATE 35: CPT | Mod: ,,, | Performed by: SURGERY

## 2024-12-22 PROCEDURE — 25000003 PHARM REV CODE 250: Performed by: NURSE PRACTITIONER

## 2024-12-22 PROCEDURE — 80048 BASIC METABOLIC PNL TOTAL CA: CPT | Performed by: NURSE PRACTITIONER

## 2024-12-22 PROCEDURE — 94761 N-INVAS EAR/PLS OXIMETRY MLT: CPT

## 2024-12-22 PROCEDURE — 21400001 HC TELEMETRY ROOM

## 2024-12-22 PROCEDURE — 94799 UNLISTED PULMONARY SVC/PX: CPT

## 2024-12-22 PROCEDURE — 25000003 PHARM REV CODE 250: Performed by: PHYSICIAN ASSISTANT

## 2024-12-22 PROCEDURE — 85025 COMPLETE CBC W/AUTO DIFF WBC: CPT | Performed by: NURSE PRACTITIONER

## 2024-12-22 PROCEDURE — 36415 COLL VENOUS BLD VENIPUNCTURE: CPT | Performed by: NURSE PRACTITIONER

## 2024-12-22 PROCEDURE — 25000003 PHARM REV CODE 250: Performed by: HOSPITALIST

## 2024-12-22 PROCEDURE — 63600175 PHARM REV CODE 636 W HCPCS: Performed by: NURSE PRACTITIONER

## 2024-12-22 RX ORDER — POLYETHYLENE GLYCOL 3350 17 G/17G
17 POWDER, FOR SOLUTION ORAL DAILY
Status: DISCONTINUED | OUTPATIENT
Start: 2024-12-22 | End: 2024-12-23 | Stop reason: HOSPADM

## 2024-12-22 RX ORDER — POTASSIUM CHLORIDE 20 MEQ/1
40 TABLET, EXTENDED RELEASE ORAL ONCE
Status: COMPLETED | OUTPATIENT
Start: 2024-12-22 | End: 2024-12-22

## 2024-12-22 RX ADMIN — POTASSIUM CHLORIDE 40 MEQ: 1500 TABLET, EXTENDED RELEASE ORAL at 09:12

## 2024-12-22 RX ADMIN — CETIRIZINE HYDROCHLORIDE 5 MG: 5 TABLET, FILM COATED ORAL at 08:12

## 2024-12-22 RX ADMIN — FAMOTIDINE 20 MG: 10 INJECTION, SOLUTION INTRAVENOUS at 08:12

## 2024-12-22 RX ADMIN — FUROSEMIDE 20 MG: 10 INJECTION, SOLUTION INTRAMUSCULAR; INTRAVENOUS at 08:12

## 2024-12-22 RX ADMIN — POLYETHYLENE GLYCOL 3350 17 G: 17 POWDER, FOR SOLUTION ORAL at 01:12

## 2024-12-22 RX ADMIN — METOPROLOL SUCCINATE 50 MG: 50 TABLET, EXTENDED RELEASE ORAL at 08:12

## 2024-12-22 RX ADMIN — FLECAINIDE ACETATE 100 MG: 100 TABLET ORAL at 08:12

## 2024-12-22 RX ADMIN — APIXABAN 2.5 MG: 2.5 TABLET, FILM COATED ORAL at 08:12

## 2024-12-22 NOTE — ASSESSMENT & PLAN NOTE
Past h/o SBOs  CT A/P: recurrent distal small bowel obstruction  Last BM 12/17  General Surgery consulted. Appreciate recs  NPO except meds. Will continue eliquis for now, as no surgery planned  IVF: LR  Monitor labs  PRN toradol not effective. Will use small dose morphine q8 hour prn for breakthrough pain.  GGC ordered 12/18  NGT placed 12/17--needs to be reinserted due to coiling seen on KUB  NGT replaced--tea colored output to storage container  KUB 12/19 AM: contrast in small bowel  12/20 KUB : improvement, large stool burden  no evidence of bowel obstruction or perforation; clear liquids started, NGT clamped and removed  Tolerating diet, advance to solids

## 2024-12-22 NOTE — PROGRESS NOTES
[address] Medicine  Progress Note    Patient Name: Marian Durham  MRN: [id number]  Patient Class: IP- Inpatient   Admission Date: 12/17/2024  Length of Stay: 5 days  Attending Physician: SHERIN Pearson MD  Primary Care Provider: Suzanna Duran MD        Subjective     Principal Problem:SBO (small bowel obstruction)        HPI:  Ms Fonseca is a 79 year old female with a PMH that includes previous small bowel obstructions, atrial fibrillation, and hypertension. She came to the ED after experiencing severe abdominal bloating and cramping that began after she ate last night. Her last SBO was in March 2024.  He ED work up showed leukocytosis on CBC and a distal small bowel obstruction on CT A/P.  She was admitted to hospital medicine for further management.     Overview/Hospital Course:  Small bowel obstruction causing ab pain and distention. NGT placed overnight due to nausea and vomiting. No improvement in abdominal distention. GGC ordered. Contrast remained in small bowel as of 12/19. Repeat Film 12/20 pending. No bowel obstruction on imaging 12/21. Diet advanced.    Interval History: Seen at bedside resting in chair, still passing gas. Tolerating diet. Miralax added per patient request    Review of Systems   Constitutional:  Positive for appetite change. Negative for activity change.   Eyes:  Negative for visual disturbance.   Respiratory:  Positive for shortness of breath (improving).    Cardiovascular:  Negative for chest pain.   Gastrointestinal:  Positive for abdominal distention. Negative for abdominal pain, constipation, diarrhea, nausea, rectal pain and vomiting.   Genitourinary:  Negative for dysuria and flank pain.   Neurological:  Negative for weakness.   Psychiatric/Behavioral:  Negative for agitation and confusion.      Objective:     Vital Signs (Most Recent):  Temp: 98.4 °F (36.9 °C) (12/22/24 1115)  Pulse: 71 (12/22/24 1115)  Resp: 16 (12/22/24 1115)  BP: (!)  142/67 (12/22/24 1115)  SpO2: (!) 93 % (12/22/24 1115) Vital Signs (24h Range):  Temp:  [97.5 °F (36.4 °C)-98.4 °F (36.9 °C)] 98.4 °F (36.9 °C)  Pulse:  [62-86] 71  Resp:  [16-20] 16  SpO2:  [92 %-99 %] 93 %  BP: (134-170)/(60-71) 142/67     Weight: 54.4 kg (120 lb)  Body mass index is 19.97 kg/m².    Intake/Output Summary (Last 24 hours) at 12/22/2024 1309  Last data filed at 12/22/2024 0930  Gross per 24 hour   Intake 1080 ml   Output 1100 ml   Net -20 ml         Physical Exam  Vitals and nursing note reviewed.   HENT:      Head: Normocephalic.   Cardiovascular:      Rate and Rhythm: Normal rate.   Pulmonary:      Effort: Pulmonary effort is normal. No respiratory distress.   Abdominal:      General: Bowel sounds are normal. There is distension.      Palpations: Abdomen is soft.      Tenderness: There is no abdominal tenderness.      Comments: BS decreased   Musculoskeletal:         General: Swelling (BL fingershands) present.   Skin:     General: Skin is warm and dry.   Neurological:      General: No focal deficit present.      Mental Status: She is alert.   Psychiatric:         Mood and Affect: Mood normal.             Significant Labs: All pertinent labs within the past 24 hours have been reviewed.    Significant Imaging: I have reviewed all pertinent imaging results/findings within the past 24 hours.    Assessment and Plan     * SBO (small bowel obstruction)  Past h/o SBOs  CT A/P: recurrent distal small bowel obstruction  Last BM 12/17  General Surgery consulted. Appreciate recs  NPO except meds. Will continue eliquis for now, as no surgery planned  IVF: LR  Monitor labs  PRN toradol not effective. Will use small dose morphine q8 hour prn for breakthrough pain.  GGC ordered 12/18  NGT placed 12/17--needs to be reinserted due to coiling seen on KUB  NGT replaced--tea colored output to storage container  KUB 12/19 AM: contrast in small bowel  12/20 KUB : improvement, large stool burden  no evidence of bowel  obstruction or perforation; clear liquids started, NGT clamped and removed  Tolerating diet, advance to solids    Pleural effusion  Patient found to have small pleural effusion on imaging. I have personally reviewed and interpreted the following imaging: Xray. A thoracentesis was  not indicated . Most likely etiology includes Volume overload not due to CHF. Management to include Diuresis  -stop ivf  -one time dose of iv lasix 20 mg  - incentive spirometry    Atrial fibrillation  Patient has permanent atrial fibrillation. Patient is currently in sinus rhythm. [id number] Score: 3. The patients heart rate in the last 24 hours is as follows:  Pulse  Min: 62  Max: 86     Antiarrhythmics  flecainide tablet 100 mg, Every 12 hours, Oral  metoprolol succinate (TOPROL-XL) 24 hr tablet 50 mg, Daily, Oral    Anticoagulants  apixaban tablet 2.5 mg, 2 times daily, Oral  Hold eliquis for possible surgical interventions. Last dose was 12/18 0843  Plan  - Replace lytes with a goal of K>4, Mg >2  - Patient is anticoagulated, see medications listed above.  - Patient's afib is currently controlled  - 1mg/kg  lovenox BID started 12/19 in place of her eliquis>> switched back to eliquis          VTE Risk Mitigation (From admission, onward)           Ordered     apixaban tablet 2.5 mg  2 times daily         12/21/24 1325     Reason for No Pharmacological VTE Prophylaxis  Once        Question:  Reasons:  Answer:  Already adequately anticoagulated on oral Anticoagulants    12/17/24 1348     IP VTE HIGH RISK PATIENT  Once         12/17/24 1348     Place sequential compression device  Until discontinued         12/17/24 1348                    Discharge Planning   WILLIAN: 12/23/2024     Code Status: Full Code   Medical Readiness for Discharge Date:   Discharge Plan A: Home Health   Discharge Delays: None known at this time            Please place Justification for DME        Cheryl Wolf PA-C  Department of Hospital Medicine   [facility]  Surg ([address])

## 2024-12-22 NOTE — ASSESSMENT & PLAN NOTE
Patient found to have small pleural effusion on imaging. I have personally reviewed and interpreted the following imaging: Xray. A thoracentesis was  not indicated . Most likely etiology includes Volume overload not due to CHF. Management to include Diuresis  -stop ivf  -one time dose of iv lasix 20 mg  - incentive spirometry

## 2024-12-22 NOTE — PLAN OF CARE
Problem: Adult Inpatient Plan of Care  Goal: Absence of Hospital-Acquired Illness or Injury  12/21/2024 1913 by Franco Benitez, RN  Outcome: Progressing  12/21/2024 1913 by Franco Benitez, RN  Outcome: Progressing     Problem: Intestinal Obstruction  Goal: Absence of Infection Signs and Symptoms  Outcome: Progressing     Problem: Intestinal Obstruction  Goal: Optimal Pain Control and Function  Outcome: Progressing

## 2024-12-22 NOTE — ASSESSMENT & PLAN NOTE
Patient has permanent atrial fibrillation. Patient is currently in sinus rhythm. [id number] Score: 3. The patients heart rate in the last 24 hours is as follows:  Pulse  Min: 62  Max: 86     Antiarrhythmics  flecainide tablet 100 mg, Every 12 hours, Oral  metoprolol succinate (TOPROL-XL) 24 hr tablet 50 mg, Daily, Oral    Anticoagulants  apixaban tablet 2.5 mg, 2 times daily, Oral  Hold eliquis for possible surgical interventions. Last dose was 12/18 0843  Plan  - Replace lytes with a goal of K>4, Mg >2  - Patient is anticoagulated, see medications listed above.  - Patient's afib is currently controlled  - 1mg/kg  lovenox BID started 12/19 in place of her eliquis>> switched back to eliquis

## 2024-12-22 NOTE — SUBJECTIVE & OBJECTIVE
Interval History: Seen at bedside resting in chair, still passing gas. Tolerating diet. Miralax added per patient request    Review of Systems   Constitutional:  Positive for appetite change. Negative for activity change.   Eyes:  Negative for visual disturbance.   Respiratory:  Positive for shortness of breath (improving).    Cardiovascular:  Negative for chest pain.   Gastrointestinal:  Positive for abdominal distention. Negative for abdominal pain, constipation, diarrhea, nausea, rectal pain and vomiting.   Genitourinary:  Negative for dysuria and flank pain.   Neurological:  Negative for weakness.   Psychiatric/Behavioral:  Negative for agitation and confusion.      Objective:     Vital Signs (Most Recent):  Temp: 98.4 °F (36.9 °C) (12/22/24 1115)  Pulse: 71 (12/22/24 1115)  Resp: 16 (12/22/24 1115)  BP: (!) 142/67 (12/22/24 1115)  SpO2: (!) 93 % (12/22/24 1115) Vital Signs (24h Range):  Temp:  [97.5 °F (36.4 °C)-98.4 °F (36.9 °C)] 98.4 °F (36.9 °C)  Pulse:  [62-86] 71  Resp:  [16-20] 16  SpO2:  [92 %-99 %] 93 %  BP: (134-170)/(60-71) 142/67     Weight: 54.4 kg (120 lb)  Body mass index is 19.97 kg/m².    Intake/Output Summary (Last 24 hours) at 12/22/2024 1309  Last data filed at 12/22/2024 0930  Gross per 24 hour   Intake 1080 ml   Output 1100 ml   Net -20 ml         Physical Exam  Vitals and nursing note reviewed.   HENT:      Head: Normocephalic.   Cardiovascular:      Rate and Rhythm: Normal rate.   Pulmonary:      Effort: Pulmonary effort is normal. No respiratory distress.   Abdominal:      General: Bowel sounds are normal. There is distension.      Palpations: Abdomen is soft.      Tenderness: There is no abdominal tenderness.      Comments: BS decreased   Musculoskeletal:         General: Swelling (BL fingershands) present.   Skin:     General: Skin is warm and dry.   Neurological:      General: No focal deficit present.      Mental Status: She is alert.   Psychiatric:         Mood and Affect: Mood  normal.             Significant Labs: All pertinent labs within the past 24 hours have been reviewed.    Significant Imaging: I have reviewed all pertinent imaging results/findings within the past 24 hours.

## 2024-12-23 ENCOUNTER — TELEPHONE (OUTPATIENT)
Dept: HOME HEALTH SERVICES | Facility: CLINIC | Age: 79
End: 2024-12-23
Payer: MEDICARE

## 2024-12-23 VITALS
HEART RATE: 73 BPM | TEMPERATURE: 98 F | SYSTOLIC BLOOD PRESSURE: 184 MMHG | RESPIRATION RATE: 20 BRPM | WEIGHT: 120 LBS | HEIGHT: 65 IN | DIASTOLIC BLOOD PRESSURE: 75 MMHG | OXYGEN SATURATION: 93 % | BODY MASS INDEX: 19.99 KG/M2

## 2024-12-23 PROCEDURE — 25000003 PHARM REV CODE 250: Performed by: HOSPITALIST

## 2024-12-23 PROCEDURE — 25000003 PHARM REV CODE 250: Performed by: PHYSICIAN ASSISTANT

## 2024-12-23 PROCEDURE — 25000003 PHARM REV CODE 250: Performed by: NURSE PRACTITIONER

## 2024-12-23 PROCEDURE — 97116 GAIT TRAINING THERAPY: CPT | Mod: CQ

## 2024-12-23 RX ADMIN — FAMOTIDINE 20 MG: 10 INJECTION, SOLUTION INTRAVENOUS at 08:12

## 2024-12-23 RX ADMIN — FLECAINIDE ACETATE 100 MG: 100 TABLET ORAL at 08:12

## 2024-12-23 RX ADMIN — METOPROLOL SUCCINATE 50 MG: 50 TABLET, EXTENDED RELEASE ORAL at 08:12

## 2024-12-23 RX ADMIN — APIXABAN 2.5 MG: 2.5 TABLET, FILM COATED ORAL at 08:12

## 2024-12-23 RX ADMIN — CETIRIZINE HYDROCHLORIDE 5 MG: 5 TABLET, FILM COATED ORAL at 08:12

## 2024-12-23 NOTE — DISCHARGE SUMMARY
[facility] [address] Medicine  Discharge Summary      Patient Name: Marian Durham  MRN: [id number]  RAJ: [id number]  Patient Class: IP- Inpatient  Admission Date: 12/17/2024  Hospital Length of Stay: 6 days  Discharge Date and Time: No discharge date for patient encounter.  Attending Physician: SHERIN Pearson MD   Discharging Provider: Cheryl Wolf PA-C  Primary Care Provider: Suzanna Duran MD    Primary Care Team: Networked reference to record PCT     HPI:   Ms Fonseca is a 79 year old female with a PMH that includes previous small bowel obstructions, atrial fibrillation, and hypertension. She came to the ED after experiencing severe abdominal bloating and cramping that began after she ate last night. Her last SBO was in March 2024.  He ED work up showed leukocytosis on CBC and a distal small bowel obstruction on CT A/P.  She was admitted to hospital medicine for further management.     * No surgery found *      Hospital Course:   Small bowel obstruction causing ab pain and distention. NGT placed overnight due to nausea and vomiting. No improvement in abdominal distention. GGC ordered. Contrast remained in small bowel as of 12/19. Repeat Film 12/20 with significant improvement. No bowel obstruction on imaging 12/21. Diet advanced. Had additional BM, tolerating diet. Stable for discharge with PCP and surgery follow up, [facility]. Return precautions discussed, no further questions at OR     Goals of Care Treatment Preferences:  Code Status: Full Code    Living Will: Yes                 Consults:   Consults (From admission, onward)          Status Ordering Provider     Inpatient consult to General Surgery  Once        Provider:  Girma Milian MD    Completed DAVID LUIS            * SBO (small bowel obstruction)  Past h/o SBOs  CT A/P: recurrent distal small bowel obstruction  Last BM 12/17  General Surgery consulted. Appreciate recs  NPO except meds. Will continue  eliquis for now, as no surgery planned  IVF: LR  Monitor labs  PRN toradol not effective. Will use small dose morphine q8 hour prn for breakthrough pain.  GGC ordered 12/18  NGT placed 12/17--needs to be reinserted due to coiling seen on KUB  NGT replaced--tea colored output to storage container  KUB 12/19 AM: contrast in small bowel  12/20 KUB : improvement, large stool burden  no evidence of bowel obstruction or perforation; clear liquids started, NGT clamped and removed  Tolerating diet, advance to solids  Had additional BM  Stable for dc with pcp/surgery follow up    Pleural effusion  Patient found to have small pleural effusion on imaging. I have personally reviewed and interpreted the following imaging: Xray. A thoracentesis was  not indicated . Most likely etiology includes Volume overload not due to CHF. Management to include Diuresis  -stop ivf  -one time dose of iv lasix 20 mg  - incentive spirometry    Atrial fibrillation  Patient has permanent atrial fibrillation. Patient is currently in sinus rhythm. [id number] Score: 3. The patients heart rate in the last 24 hours is as follows:  Pulse  Min: 62  Max: 86     Antiarrhythmics  flecainide tablet 100 mg, Every 12 hours, Oral  metoprolol succinate (TOPROL-XL) 24 hr tablet 50 mg, Daily, Oral    Anticoagulants  apixaban tablet 2.5 mg, 2 times daily, Oral  Hold eliquis for possible surgical interventions. Last dose was 12/18 0843  Plan  - Replace lytes with a goal of K>4, Mg >2  - Patient is anticoagulated, see medications listed above.  - Patient's afib is currently controlled  - 1mg/kg  lovenox BID started 12/19 in place of her eliquis>> switched back to eliquis          Final Active Diagnoses:    Diagnosis Date Noted POA    PRINCIPAL PROBLEM:  SBO (small bowel obstruction) [K56.609] 04/22/2022 Yes    Pleural effusion [J90] 12/21/2024 No    Chronic anticoagulation [Z79.01] 02/02/2024 Not Applicable    Hypercholesterolemia [E78.00] 02/02/2024 Yes    Atrial  fibrillation [I48.91] 06/30/2023 Yes      Problems Resolved During this Admission:       Discharged Condition: stable    Disposition: Home or Self Care    Follow Up:   Follow-up Information       [facility] Follow up.    Specialties: Home Health Services, Home Therapy Services, Home Living Aide Services  Why: they will contact you to schedule home visits  Contact information:  [address] [address]  [phone]             Suzanna Duran MD Follow up on 12/26/2024.    Specialty: Internal Medicine  Why: Hospital follow up at 10:00 AM.  Contact information:  [address] [place] [address]  [phone]                           Patient Instructions:      Ambulatory referral/consult to [facility]   Standing Status: Future   Referral Priority: Routine Referral Type: Consultation   Referral Reason: Specialty Services Required   Number of Visits Requested: 1     Ambulatory referral/consult to General Surgery   Standing Status: Future   Referral Priority: Routine Referral Type: Consultation   Referral Reason: Specialty Services Required   Requested Specialty: General Surgery   Number of Visits Requested: 1     Notify your health care provider if you experience any of the following:  temperature >100.4     Notify your health care provider if you experience any of the following:  persistent nausea and vomiting or diarrhea     Notify your health care provider if you experience any of the following:  severe uncontrolled pain     Notify your health care provider if you experience any of the following:  redness, tenderness, or signs of infection (pain, swelling, redness, odor or green/yellow discharge around incision site)     Notify your health care provider if you experience any of the following:  worsening rash     Notify your health care provider if you experience any of the following:  persistent dizziness, light-headedness, or visual  disturbances     Notify your health care provider if you experience any of the following:  increased confusion or weakness     Activity as tolerated       Significant Diagnostic Studies: Radiology: X-Ray: KUB: X-Ray Abdomen AP 1 View (KUB):   Results for orders placed or performed during the hospital encounter of 12/17/24   X-Ray Abdomen AP 1 View    Narrative    EXAMINATION:  XR ABDOMEN AP 1 VIEW    CLINICAL HISTORY:  sbo;    TECHNIQUE:  AP View(s) of the abdomen was performed.    COMPARISON:  12/19/2024    FINDINGS:  Tip of the enteric tube projects over the stomach.    Small bowel dilatation persists, though is SIGNIFICANTLY improved compared to yesterday.  Migration of oral contrast into the descending colon.    Lucency in the right upper quadrant thought to represent artifact on account of the battery pack along the right abdomen.  However, recommend correlation with either upright view or decubitus view, left-side-down, to exclude the possibility of free air.      Impression    Please see above.    This report was flagged in Epic as abnormal.      Electronically signed by: Radha Porter  Date:    12/20/2024  Time:    10:37     CT scan: CT ABDOMEN PELVIS WITH CONTRAST:   Results for orders placed or performed during the hospital encounter of 12/17/24   CT Abdomen Pelvis With IV Contrast NO Oral Contrast    Narrative    EXAMINATION:  CT ABDOMEN PELVIS WITH IV CONTRAST    CLINICAL HISTORY:  Bowel obstruction suspected;    FINDINGS:  Comparison is 02/29/2024.    Patient was administered 75 cc of Omnipaque 350 intravenously.    Lung bases are clear.  There is a tiny liver cyst.  Gallbladder and biliary tree are normal.  The spleen, pancreas, and duodenum show nothing unusual.  The adrenal glands are not enlarged.  The kidneys enhance normally.  The vessels enhance normally.  No para-aortic, retroperitoneal, or mesenteric adenopathy is seen.  There is distal small bowel dilatation.  There is colonic decompression.   There is mild diverticulosis.  No ascites mass abscess or perforation seen.  Bones demonstrate nothing unusual.      Impression    Recurrent distal small bowel obstruction.      Electronically signed by: Bandar Acevedo MD  Date:    12/17/2024  Time:    10:14       Pending Diagnostic Studies:       None           Medications:  Reconciled Home Medications:      Medication List        CONTINUE taking these medications      apixaban 2.5 mg Tab  Commonly known as: ELIQUIS  Take 1 tablet (2.5 mg total) by mouth 2 (two) times daily.     atorvastatin 20 MG tablet  Commonly known as: LIPITOR  Take 1 tablet (20 mg total) by mouth once daily.     calcium-vitamin D3 500 mg-5 mcg (200 unit) per tablet  Commonly known as: OS- + D3  Take 1 tablet by mouth 2 (two) times daily with meals.     celecoxib 200 MG capsule  Commonly known as: CeleBREX  Take 1 capsule (200 mg total) by mouth once daily.     estradioL 0.01 % (0.1 mg/gram) vaginal cream  Commonly known as: ESTRACE  PLACE 1 GRAM VAGINALLY 3 TIMES A WEEK     famotidine 20 MG tablet  Commonly known as: PEPCID  Take 1 tablet (20 mg total) by mouth Daily.     flecainide 100 MG Tab  Commonly known as: TAMBOCOR  Take 1 tablet (100 mg total) by mouth every 12 (twelve) hours.     glucosamine-chondroitin 500-400 mg tablet  Take 1 tablet by mouth 3 (three) times daily.     metoprolol succinate 25 MG 24 hr tablet  Commonly known as: TOPROL-XL  Take 1 tablet (25 mg total) by mouth every 12 (twelve) hours.     polyethylene glycol 17 gram/dose powder  Commonly known as: MIRALAX  Take 17 g by mouth 3 (three) times daily as needed (Constipation).              Indwelling Lines/Drains at time of discharge:   Lines/Drains/Airways       None                   Time spent on the discharge of patient: >45 minutes         Cheryl Wolf PA-C  Department of Hospital Medicine  [facility] ([address])

## 2024-12-23 NOTE — PROGRESS NOTES
Abdomen , soft, nontender, nondistended , no guarding or rigidity , no masses palpable , normal bowel sounds , Liver and Spleen , no hepatomegaly present , no hepatosplenomegaly , liver nontender , spleen not palpable House day 6  Diagnosis-small-bowel obstruction     Subjective   No pain, nausea or vomiting  Passing flatus and recently had a small bowel movement according to the patient     PE  Afebrile  Vital signs stable    Chest-clear  Heart-regular rate and rhythm  Abdomen-soft, no tenderness, no distention, no guarding, no rebound  Extremities-no tenderness          Impression/plan   SBO resolving  Tolerating low residue diet

## 2024-12-23 NOTE — PLAN OF CARE
Plan of care reviewed with pt. Pt voiced understanding. Pt AAO X 4. No c/o during the night other than her abdomen feeling bloated after eating. Pt did not have a bm during the night. No apparent distress noted. Bed in lowest position, locked, call light within reach. Side rails up x's 2 with slip resistant socks on.     Problem: Adult Inpatient Plan of Care  Goal: Plan of Care Review  Outcome: Progressing  Flowsheets (Taken 12/23/2024 6506)  Plan of Care Reviewed With: patient  Goal: Absence of Hospital-Acquired Illness or Injury  Outcome: Progressing  Goal: Optimal Comfort and Wellbeing  Outcome: Progressing     Problem: Intestinal Obstruction  Goal: Optimal Bowel Function  Outcome: Progressing  Goal: Fluid and Electrolyte Balance  Outcome: Progressing  Goal: Absence of Infection Signs and Symptoms  Outcome: Progressing  Goal: Optimize Nutrition Status  Outcome: Progressing  Goal: Optimal Pain Control and Function  Outcome: Progressing     Problem: Pain Acute  Goal: Optimal Pain Control and Function  Outcome: Progressing     Problem: Fall Injury Risk  Goal: Absence of Fall and Fall-Related Injury  Outcome: Progressing

## 2024-12-23 NOTE — PLAN OF CARE
Case Management Final Discharge Note      Discharge Disposition: Home with HH (Family HH)    New DME ordered / company name: Patient has DME    Relevant SDOH / Transition of Care Barriers:  None    Primary Caretaker and contact information: Self and son    Scheduled followup appointment: PCP and GI    Referrals placed: GI    Transportation: Son    Patient and family educated on discharge services and updated on DC plan. Bedside RN notified, patient clear to discharge from Case Management Perspective.     [facility] ([address])  Discharge Final Note    Primary Care Provider: Suzanna Duran MD    Expected Discharge Date: 12/23/2024    Final Discharge Note (most recent)       Final Note - 12/23/24 1030          Final Note    Assessment Type Final Discharge Note (P)      Anticipated Discharge Disposition Home-Health Care Svc (P)      Hospital Resources/Appts/Education Provided Provided patient/caregiver with written discharge plan information;Appointments scheduled and added to AVS (P)         Post-Acute Status    Post-Acute Authorization Home Health (P)      Home Health Status Set-up Complete/Auth obtained (P)      Discharge Delays None known at this time (P)                    Contact Info       *[employer].   Specialty: Home Health Services, Home Therapy Services, Home Living Aide Services    [address] [address]   Phone: [phone]       Next Steps: Follow up    Instructions: they will contact you to schedule home visits    Suzanna Duran MD   Specialty: Internal Medicine   Relationship: PCP - General    [address] [place], [place] [id number]   Phone: [phone]       Next Steps: Follow up on 12/26/2024    Instructions: Hospital follow up at 10:00 AM.

## 2024-12-23 NOTE — ASSESSMENT & PLAN NOTE
Past h/o SBOs  CT A/P: recurrent distal small bowel obstruction  Last BM 12/17  General Surgery consulted. Appreciate recs  NPO except meds. Will continue eliquis for now, as no surgery planned  IVF: LR  Monitor labs  PRN toradol not effective. Will use small dose morphine q8 hour prn for breakthrough pain.  GGC ordered 12/18  NGT placed 12/17--needs to be reinserted due to coiling seen on KUB  NGT replaced--tea colored output to storage container  KUB 12/19 AM: contrast in small bowel  12/20 KUB : improvement, large stool burden  no evidence of bowel obstruction or perforation; clear liquids started, NGT clamped and removed  Tolerating diet, advance to solids  Had additional BM  Stable for dc with pcp/surgery follow up

## 2024-12-23 NOTE — PLAN OF CARE
"[facility] ([address])      HOME HEALTH ORDERS  FACE TO FACE ENCOUNTER    Patient Name: Marian Durham  YOB: 1945    PCP: Suzanna Duran MD   PCP Address: [address] [place] [address]  PCP Phone Number: [phone]  PCP Fax: [phone]    Encounter Date: 12/17/24    Admit to Home Health    Diagnoses:  Active Hospital Problems    Diagnosis  POA    *SBO (small bowel obstruction) [K56.609]  Yes    Pleural effusion [J90]  No    Chronic anticoagulation [Z79.01]  Not Applicable     CHA2DS2VASc=3 (A2Sc).  Anticoagulation with apixiban.      Hypercholesterolemia [E78.00]  Yes     2014: Statin was begun.      Atrial fibrillation [I48.91]  Yes     6/2023: Diagnosed with paroxysmal atrial fibrillation.  1/26/2024: Began having frequent spells of what appears to have been AF.  2/1/2024: Presented with palpitations. Initial ECG revealed SR. Later AF. 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  CHA2DS2VASc=3 (A2Sc).        Resolved Hospital Problems   No resolved problems to display.       Follow Up Appointments:  Future Appointments   Date Time Provider Department Center   2/17/2025 10:15 AM Lamar Clarke MD [id number]       Allergies:  Review of patient's allergies indicates:   Allergen Reactions    Demerol [meperidine] Other (See Comments)     "Becomes Agitated and Combative"      Patient states she can take anything else    Meperidine (pf)        Medications: Review discharge medications with patient and family and provide education.    Current Facility-Administered Medications   Medication Dose Route Frequency Provider Last Rate Last Admin    acetaminophen tablet 650 mg  650 mg Oral Q4H PRN Karmen Pollock DNP        apixaban tablet 2.5 mg  2.5 mg Oral BID Karmen Pollock DNP   2.5 mg at 12/23/24 0836    cetirizine tablet 5 mg  5 mg Oral Daily Karmen Pollock DNP   5 mg at 12/23/24 0835    famotidine (PF) injection 20 mg  20 mg Intravenous BID Coleen, " Yandy MALDONADO MD   20 mg at 12/23/24 0836    flecainide tablet 100 mg  100 mg Oral Q12H Karmen Pollock DNP   100 mg at 12/23/24 0836    hydrALAZINE injection 10 mg  10 mg Intravenous Q8H PRN Karmen Pollock DNP   10 mg at 12/21/24 0405    metoprolol succinate (TOPROL-XL) 24 hr tablet 50 mg  50 mg Oral Daily Karmen Pollock DNP   50 mg at 12/23/24 0836    naloxone 0.4 mg/mL injection 0.02 mg  0.02 mg Intravenous PRN Karmen Pollock DNP        ondansetron injection 4 mg  4 mg Intravenous Q6H PRN Karmen Pollock DNP   4 mg at 12/21/24 1004    polyethylene glycol packet 17 g  17 g Oral Daily Cheryl Wolf PA-C   17 g at 12/22/24 1340        Medication List        CONTINUE taking these medications      apixaban 2.5 mg Tab  Commonly known as: ELIQUIS  Take 1 tablet (2.5 mg total) by mouth 2 (two) times daily.     atorvastatin 20 MG tablet  Commonly known as: LIPITOR  Take 1 tablet (20 mg total) by mouth once daily.     calcium-vitamin D3 500 mg-5 mcg (200 unit) per tablet  Commonly known as: OS- + D3  Take 1 tablet by mouth 2 (two) times daily with meals.     celecoxib 200 MG capsule  Commonly known as: CeleBREX  Take 1 capsule (200 mg total) by mouth once daily.     estradioL 0.01 % (0.1 mg/gram) vaginal cream  Commonly known as: ESTRACE  PLACE 1 GRAM VAGINALLY 3 TIMES A WEEK     famotidine 20 MG tablet  Commonly known as: PEPCID  Take 1 tablet (20 mg total) by mouth Daily.     flecainide 100 MG Tab  Commonly known as: TAMBOCOR  Take 1 tablet (100 mg total) by mouth every 12 (twelve) hours.     glucosamine-chondroitin 500-400 mg tablet  Take 1 tablet by mouth 3 (three) times daily.     metoprolol succinate 25 MG 24 hr tablet  Commonly known as: TOPROL-XL  Take 1 tablet (25 mg total) by mouth every 12 (twelve) hours.     polyethylene glycol 17 gram/dose powder  Commonly known as: MIRALAX  Take 17 g by mouth 3 (three) times daily as needed (Constipation).                I have seen and  examined this patient within the last 30 days. My clinical findings that support the need for the home health skilled services and home bound status are the following:no   Weakness/numbness causing balance and gait disturbance due to Weakness/Debility making it taxing to leave home.     Diet:   other low residue diet    Labs:  N/a    Referrals/ Consults  Physical Therapy to evaluate and treat. Evaluate for home safety and equipment needs; Establish/upgrade home exercise program. Perform / instruct on therapeutic exercises, gait training, transfer training, and Range of Motion.  Occupational Therapy to evaluate and treat. Evaluate home environment for safety and equipment needs. Perform/Instruct on transfers, ADL training, ROM, and therapeutic exercises.    Activities:   activity as tolerated    Nursing:   Agency to admit patient within 24 hours of hospital discharge unless specified on physician order or at patient request    SN to complete comprehensive assessment including routine vital signs. Instruct on disease process and s/s of complications to report to MD. Review/verify medication list sent home with the patient at time of discharge  and instruct patient/caregiver as needed. Frequency may be adjusted depending on start of care date.     Skilled nurse to perform up to 3 visits PRN for symptoms related to diagnosis    Notify MD if SBP > 160 or < 90; DBP > 90 or < 50; HR > 120 or < 50; Temp > 101; O2 < 88%    Ok to schedule additional visits based on staff availability and patient request on consecutive days within the home health episode.    When multiple disciplines ordered:    Start of Care occurs on Sunday - Wednesday schedule remaining discipline evaluations as ordered on separate consecutive days following the start of care.    Thursday SOC -schedule subsequent evaluations Friday and Monday the following week.     Friday - Saturday SOC - schedule subsequent discipline evaluations on consecutive days  starting Monday of the following week.        Miscellaneous   Routine Skin for Bedridden Patients: Instruct patient/caregiver to apply moisture barrier cream to all skin folds and wet areas in perineal area daily and after baths and all bowel movements.    Home Health Aide:  Physical Therapy Three times weekly and Occupational Therapy Three times weekly    Wound Care Orders  no    I certify that this patient is confined to her home and needs physical therapy and occupational therapy.

## 2024-12-23 NOTE — PLAN OF CARE
Medicare Message     Important Message from Medicare regarding Discharge Appeal Rights Explained to patient/caregiver; Other (comments)Important Message from Medicare regarding Discharge Appeal Rights. Explained to patient/caregiver; Other (comments). The comment is Verbal Consent. Taken on 12/23/24 1522   Date IMM was signed 12/23/2024   Time IMM was signed 1148

## 2024-12-23 NOTE — PT/OT/SLP PROGRESS
Physical Therapy Treatment    Patient Name:  Marian Durham   MRN:  [id number]    Recommendations:     Discharge Recommendations: Low Intensity Therapy (resume OPPT)  Discharge Equipment Recommendations: none  Barriers to discharge: None    Assessment:     Marian Durham is a 79 y.o. female admitted with a medical diagnosis of SBO (small bowel obstruction).  She presents with the following impairments/functional limitations: weakness, impaired endurance, impaired self care skills, impaired functional mobility, gait instability, impaired balance, decreased coordination, decreased lower extremity function, decreased safety awareness ;pt with good mobility today w/RW, amb'd in hallway ~200' w/ SBA, NO LOB noted, mild SOB, though does appear unsteady on her feet..    Rehab Prognosis: Good; patient would benefit from acute skilled PT services to address these deficits and reach maximum level of function.    Recent Surgery: * No surgery found *      Plan:     During this hospitalization, patient to be seen 4 x/week to address the identified rehab impairments via gait training, therapeutic activities, therapeutic exercises, neuromuscular re-education and progress toward the following goals:    Plan of Care Expires:  01/20/25    Subjective     Chief Complaint: none stated  Patient/Family Comments/goals: pt reports feeling much better after having a BM yesterday  Pain/Comfort:  Pain Rating 1: 0/10  Pain Rating Post-Intervention 1: 0/10      Objective:     Communicated with nurse prior to session.  Patient found up in chair with peripheral IV, telemetry upon PT entry to room.     General Precautions: Standard, fall  Orthopedic Precautions: N/A  Braces: N/A  Respiratory Status: Room air     Functional Mobility:  Transfers:     Sit to Stand:  supervision with rolling walker  Gait: amb'd ~200' w/ RW and SBA, occ cueing for safety      AM-PAC 6 CLICK MOBILITY  Turning over in bed (including adjusting bedclothes, sheets and  blankets)?: 4  Sitting down on and standing up from a chair with arms (e.g., wheelchair, bedside commode, etc.): 4  Moving from lying on back to sitting on the side of the bed?: 4  Moving to and from a bed to a chair (including a wheelchair)?: 4  Need to walk in hospital room?: 3  Climbing 3-5 steps with a railing?: 3  Basic Mobility Total Score: 22       Treatment & Education:      Patient left up in chair with all lines intact, call button in reach, and nurse notified..    GOALS:   Multidisciplinary Problems       Physical Therapy Goals          Problem: Physical Therapy    Goal Priority Disciplines Outcome Interventions   Physical Therapy Goal     PT, PT/OT Progressing    Description: P.T goals to be met in 1 month as follows:  1. Bed<>chair Mod I   2. Tolerate OOB x 3 hours  3. Gait 200' Mod I with LRAD                           Time Tracking:     PT Received On: 12/23/24  PT Start Time: 0954     PT Stop Time: 1008  PT Total Time (min): 14 min     Billable Minutes: Gait Training 14    Treatment Type: Treatment  PT/PTA: PTA     Number of PTA visits since last PT visit: 2     12/23/2024

## 2025-01-14 ENCOUNTER — TELEPHONE (OUTPATIENT)
Dept: HOME HEALTH SERVICES | Facility: CLINIC | Age: 80
End: 2025-01-14

## 2025-01-15 ENCOUNTER — OFFICE VISIT (OUTPATIENT)
Dept: SURGERY | Facility: CLINIC | Age: 80
End: 2025-01-15
Attending: SPECIALIST
Payer: MEDICARE

## 2025-01-15 VITALS
DIASTOLIC BLOOD PRESSURE: 50 MMHG | HEIGHT: 65 IN | WEIGHT: 112 LBS | HEART RATE: 65 BPM | SYSTOLIC BLOOD PRESSURE: 99 MMHG | BODY MASS INDEX: 18.66 KG/M2 | OXYGEN SATURATION: 99 %

## 2025-01-15 DIAGNOSIS — K56.609 SBO (SMALL BOWEL OBSTRUCTION): ICD-10-CM

## 2025-01-15 PROCEDURE — 1159F MED LIST DOCD IN RCRD: CPT | Mod: CPTII,S$GLB,, | Performed by: SPECIALIST

## 2025-01-15 PROCEDURE — 1111F DSCHRG MED/CURRENT MED MERGE: CPT | Mod: CPTII,S$GLB,, | Performed by: SPECIALIST

## 2025-01-15 PROCEDURE — 1101F PT FALLS ASSESS-DOCD LE1/YR: CPT | Mod: CPTII,S$GLB,, | Performed by: SPECIALIST

## 2025-01-15 PROCEDURE — 1126F AMNT PAIN NOTED NONE PRSNT: CPT | Mod: CPTII,S$GLB,, | Performed by: SPECIALIST

## 2025-01-15 PROCEDURE — 99212 OFFICE O/P EST SF 10 MIN: CPT | Mod: S$GLB,,, | Performed by: SPECIALIST

## 2025-01-15 PROCEDURE — 3074F SYST BP LT 130 MM HG: CPT | Mod: CPTII,S$GLB,, | Performed by: SPECIALIST

## 2025-01-15 PROCEDURE — 1157F ADVNC CARE PLAN IN RCRD: CPT | Mod: CPTII,S$GLB,, | Performed by: SPECIALIST

## 2025-01-15 PROCEDURE — 3078F DIAST BP <80 MM HG: CPT | Mod: CPTII,S$GLB,, | Performed by: SPECIALIST

## 2025-01-15 PROCEDURE — 1160F RVW MEDS BY RX/DR IN RCRD: CPT | Mod: CPTII,S$GLB,, | Performed by: SPECIALIST

## 2025-01-15 PROCEDURE — 99999 PR PBB SHADOW E&M-EST. PATIENT-LVL IV: CPT | Mod: PBBFAC,,, | Performed by: SPECIALIST

## 2025-01-15 PROCEDURE — 3288F FALL RISK ASSESSMENT DOCD: CPT | Mod: CPTII,S$GLB,, | Performed by: SPECIALIST

## 2025-01-15 NOTE — PROGRESS NOTES
79-year-old female status post  section in the distant past   Personal with history of 4 hospital admissions for small-bowel obstructions over the last several years   CT scans have been unremarkable for pathology other than mechanical small-bowel obstruction  Patient on low residue diet    Subjective   No obstructive symptoms since discharge in December    PE   WD/WN female, NAD   Abdomen-soft, nondistended, no tenderness, no guarding, no rebound, no masses, positive bowel sounds    Impression/plan   Surgically stable   Gradually increase fiber within diet   RTC p.r.n.

## 2025-02-17 ENCOUNTER — PATIENT MESSAGE (OUTPATIENT)
Dept: CARDIOLOGY | Facility: CLINIC | Age: 80
End: 2025-02-17
Payer: MEDICARE

## 2025-02-21 ENCOUNTER — EXTERNAL HOME HEALTH (OUTPATIENT)
Dept: HOME HEALTH SERVICES | Facility: HOSPITAL | Age: 80
End: 2025-02-21
Payer: MEDICARE

## 2025-02-24 ENCOUNTER — OFFICE VISIT (OUTPATIENT)
Dept: SURGERY | Facility: CLINIC | Age: 80
End: 2025-02-24
Attending: SPECIALIST
Payer: MEDICARE

## 2025-02-24 VITALS — SYSTOLIC BLOOD PRESSURE: 151 MMHG | OXYGEN SATURATION: 99 % | HEART RATE: 57 BPM | DIASTOLIC BLOOD PRESSURE: 70 MMHG

## 2025-02-24 DIAGNOSIS — K56.609 SBO (SMALL BOWEL OBSTRUCTION): Primary | ICD-10-CM

## 2025-02-24 PROCEDURE — 1126F AMNT PAIN NOTED NONE PRSNT: CPT | Mod: CPTII,S$GLB,, | Performed by: SPECIALIST

## 2025-02-24 PROCEDURE — 3288F FALL RISK ASSESSMENT DOCD: CPT | Mod: CPTII,S$GLB,, | Performed by: SPECIALIST

## 2025-02-24 PROCEDURE — 3078F DIAST BP <80 MM HG: CPT | Mod: CPTII,S$GLB,, | Performed by: SPECIALIST

## 2025-02-24 PROCEDURE — 99213 OFFICE O/P EST LOW 20 MIN: CPT | Mod: S$GLB,,, | Performed by: SPECIALIST

## 2025-02-24 PROCEDURE — 99999 PR PBB SHADOW E&M-EST. PATIENT-LVL III: CPT | Mod: PBBFAC,,, | Performed by: SPECIALIST

## 2025-02-24 PROCEDURE — 1101F PT FALLS ASSESS-DOCD LE1/YR: CPT | Mod: CPTII,S$GLB,, | Performed by: SPECIALIST

## 2025-02-24 PROCEDURE — 1157F ADVNC CARE PLAN IN RCRD: CPT | Mod: CPTII,S$GLB,, | Performed by: SPECIALIST

## 2025-02-24 PROCEDURE — 3077F SYST BP >= 140 MM HG: CPT | Mod: CPTII,S$GLB,, | Performed by: SPECIALIST

## 2025-02-24 PROCEDURE — 1159F MED LIST DOCD IN RCRD: CPT | Mod: CPTII,S$GLB,, | Performed by: SPECIALIST

## 2025-02-24 NOTE — PROGRESS NOTES
79-year-old female status post  section in the distant past   Personal with history of 4 hospital admissions for small-bowel obstructions over the last several years   CT scans have been unremarkable for pathology other than mechanical small-bowel obstruction  Patient on low residue diet      Subjective  Patient wishes greater understanding of treatment for intermittent small bowel obstructions.  Patient has not had surgery for any of these obstructions.  She spoke to a nurse friend who mentioned enterolysis of adhesions.  Patient with multiple questions concerning this  Outlined for the patient rationale behind surgical intervention in small-bowel obstruction.  Discussed that there is no real prophylactic surgery unless patient has multiple short term interval intermittent obstructions.  Reviewed risks of surgery .  Discussed  whether patient needs to returned to OR for surgery due to obstruction is an uncertain issue.  Prophylactic enterolysis has a rigid set of criteria because the benefit is unknown.  Patient's last obstruction was then December and she currently has no GI symptoms.  Patient seemed to understand

## 2025-02-25 DIAGNOSIS — E78.00 HYPERCHOLESTEROLEMIA: ICD-10-CM

## 2025-02-25 DIAGNOSIS — Z79.899 HIGH RISK MEDICATION USE: ICD-10-CM

## 2025-02-25 DIAGNOSIS — I48.0 PAROXYSMAL ATRIAL FIBRILLATION: ICD-10-CM

## 2025-02-25 RX ORDER — ATORVASTATIN CALCIUM 20 MG/1
TABLET, FILM COATED ORAL
Qty: 90 TABLET | Refills: 0 | Status: SHIPPED | OUTPATIENT
Start: 2025-02-25 | End: 2025-02-27 | Stop reason: SDUPTHER

## 2025-02-25 RX ORDER — FLECAINIDE ACETATE 100 MG/1
100 TABLET ORAL EVERY 12 HOURS
Qty: 180 TABLET | Refills: 0 | Status: SHIPPED | OUTPATIENT
Start: 2025-02-25 | End: 2025-02-27 | Stop reason: SDUPTHER

## 2025-02-25 RX ORDER — METOPROLOL SUCCINATE 25 MG/1
25 TABLET, EXTENDED RELEASE ORAL EVERY 12 HOURS
Qty: 180 TABLET | Refills: 0 | Status: SHIPPED | OUTPATIENT
Start: 2025-02-25 | End: 2025-02-27 | Stop reason: SDUPTHER

## 2025-02-27 ENCOUNTER — OFFICE VISIT (OUTPATIENT)
Dept: CARDIOLOGY | Facility: CLINIC | Age: 80
End: 2025-02-27
Attending: INTERNAL MEDICINE
Payer: MEDICARE

## 2025-02-27 VITALS
BODY MASS INDEX: 19.6 KG/M2 | HEART RATE: 56 BPM | WEIGHT: 117.63 LBS | OXYGEN SATURATION: 98 % | SYSTOLIC BLOOD PRESSURE: 125 MMHG | DIASTOLIC BLOOD PRESSURE: 60 MMHG | HEIGHT: 65 IN

## 2025-02-27 DIAGNOSIS — Z79.01 CHRONIC ANTICOAGULATION: ICD-10-CM

## 2025-02-27 DIAGNOSIS — Z87.19 HISTORY OF SMALL BOWEL OBSTRUCTION: ICD-10-CM

## 2025-02-27 DIAGNOSIS — I48.0 PAROXYSMAL ATRIAL FIBRILLATION: ICD-10-CM

## 2025-02-27 DIAGNOSIS — E78.00 HYPERCHOLESTEROLEMIA: ICD-10-CM

## 2025-02-27 DIAGNOSIS — Z79.899 HIGH RISK MEDICATION USE: ICD-10-CM

## 2025-02-27 DIAGNOSIS — Z96.653 HISTORY OF BILATERAL KNEE REPLACEMENT: ICD-10-CM

## 2025-02-27 PROCEDURE — 99214 OFFICE O/P EST MOD 30 MIN: CPT | Mod: S$GLB,,, | Performed by: INTERNAL MEDICINE

## 2025-02-27 PROCEDURE — 3074F SYST BP LT 130 MM HG: CPT | Mod: CPTII,S$GLB,, | Performed by: INTERNAL MEDICINE

## 2025-02-27 PROCEDURE — 1159F MED LIST DOCD IN RCRD: CPT | Mod: CPTII,S$GLB,, | Performed by: INTERNAL MEDICINE

## 2025-02-27 PROCEDURE — 1126F AMNT PAIN NOTED NONE PRSNT: CPT | Mod: CPTII,S$GLB,, | Performed by: INTERNAL MEDICINE

## 2025-02-27 PROCEDURE — 3288F FALL RISK ASSESSMENT DOCD: CPT | Mod: CPTII,S$GLB,, | Performed by: INTERNAL MEDICINE

## 2025-02-27 PROCEDURE — 99999 PR PBB SHADOW E&M-EST. PATIENT-LVL III: CPT | Mod: PBBFAC,,, | Performed by: INTERNAL MEDICINE

## 2025-02-27 PROCEDURE — 1101F PT FALLS ASSESS-DOCD LE1/YR: CPT | Mod: CPTII,S$GLB,, | Performed by: INTERNAL MEDICINE

## 2025-02-27 PROCEDURE — 1157F ADVNC CARE PLAN IN RCRD: CPT | Mod: CPTII,S$GLB,, | Performed by: INTERNAL MEDICINE

## 2025-02-27 PROCEDURE — 1160F RVW MEDS BY RX/DR IN RCRD: CPT | Mod: CPTII,S$GLB,, | Performed by: INTERNAL MEDICINE

## 2025-02-27 PROCEDURE — 3078F DIAST BP <80 MM HG: CPT | Mod: CPTII,S$GLB,, | Performed by: INTERNAL MEDICINE

## 2025-02-27 RX ORDER — ATORVASTATIN CALCIUM 20 MG/1
20 TABLET, FILM COATED ORAL DAILY
Qty: 90 TABLET | Refills: 3 | Status: SHIPPED | OUTPATIENT
Start: 2025-02-27

## 2025-02-27 RX ORDER — METOPROLOL SUCCINATE 25 MG/1
25 TABLET, EXTENDED RELEASE ORAL EVERY 12 HOURS
Qty: 180 TABLET | Refills: 3 | Status: SHIPPED | OUTPATIENT
Start: 2025-02-27

## 2025-02-27 RX ORDER — FLECAINIDE ACETATE 100 MG/1
100 TABLET ORAL EVERY 12 HOURS
Qty: 180 TABLET | Refills: 3 | Status: SHIPPED | OUTPATIENT
Start: 2025-02-27

## 2025-02-27 NOTE — PROGRESS NOTES
Subjective:     Marian Durham is a 79 y.o. female with hypercholesterolemia. She has a healthy weight close to being underweight. She had small bowel obstruction in 6/2023. She then had an episode of atrial fibrillation. She was anticoagulated for some time. With no recurrent spells it was felt she may discontinue the anticoagulation. Beginning on 1/26/2024 she began experience episodes of being lightheaded with an anxious feeling in her chest. She has an Apple watch and recorded a high heart rate. The episodes continued. She presented to the emergency room at [facility] on 2/1/2024 after a spell. She was in sinus rhythm. As she was monitored in the emergency room she went into artrial fibrillation with fast ventricular response rate. She was given diltiazem iv and admitted to the intensive care unit. She denied chest pain or shortness of breath. On 2/1/2024 she was given diltiazem intravenously. She converted to sinus rhythm On 2/1/2024 she began flecainide, metoprolol and apixiban. She has undergone bilateral knee replacements.  She has had issuers with recurrent small bowel obstruction. No exertional chest pain or shortness of breath. No bleeding. No palpitations or weak spells. No issues with any of her prescribed medications. Feeling well overall.       Atrial Fibrillation  Presents for follow-up visit. Symptoms are negative for bradycardia, chest pain, dizziness, hypertension, palpitations, shortness of breath, syncope, tachycardia and weakness. The symptoms have been stable.   Hyperlipidemia  She has no history of chronic renal disease, diabetes, hypothyroidism, liver disease, obesity or nephrotic syndrome. Pertinent negatives include no chest pain, focal sensory loss, focal weakness, leg pain, myalgias or shortness of breath.       Review of Systems   Constitutional: Negative for chills, fever and malaise/fatigue.   HENT:  Negative for nosebleeds and tinnitus.    Eyes:   Negative for double vision, vision loss in left eye and vision loss in right eye.   Cardiovascular:  Negative for chest pain, claudication, dyspnea on exertion, irregular heartbeat, leg swelling, near-syncope, orthopnea, palpitations, paroxysmal nocturnal dyspnea and syncope.   Respiratory:  Negative for cough, hemoptysis, shortness of breath and wheezing.    Endocrine: Negative for cold intolerance and heat intolerance.   Hematologic/Lymphatic: Negative for bleeding problem. Does not bruise/bleed easily.   Skin:  Negative for color change and rash.   Musculoskeletal:  Positive for arthritis and joint pain (left knee). Negative for back pain, falls, muscle weakness and myalgias.   Gastrointestinal:  Negative for abdominal pain, diarrhea, dysphagia, heartburn, hematemesis, hematochezia, hemorrhoids, jaundice, melena, nausea and vomiting.   Genitourinary:  Negative for dysuria and hematuria.   Neurological:  Negative for dizziness, focal weakness, headaches, light-headedness, loss of balance, numbness, tremors, vertigo and weakness.   Psychiatric/Behavioral:  Negative for altered mental status, depression and memory loss. The patient is not nervous/anxious.    Allergic/Immunologic: Negative for hives and persistent infections.       Current Outpatient Medications on File Prior to Visit   Medication Sig Dispense Refill    apixaban (ELIQUIS) 2.5 mg Tab Take 1 tablet (2.5 mg total) by mouth 2 (two) times daily. 180 tablet 3    atorvastatin (LIPITOR) 20 MG tablet TAKE 1 TABLET(20 MG) BY MOUTH DAILY 90 tablet 0    calcium-vitamin D3 (OS- + D3) 500 mg-5 mcg (200 unit) per tablet Take 1 tablet by mouth 2 (two) times daily with meals.      estradioL (ESTRACE) 0.01 % (0.1 mg/gram) vaginal cream PLACE 1 GRAM VAGINALLY 3 TIMES A WEEK 42.5 g 2    famotidine (PEPCID) 20 MG tablet Take 1 tablet (20 mg total) by mouth Daily. 90 tablet 3    flecainide (TAMBOCOR) 100 MG Tab TAKE 1 TABLET(100 MG) BY MOUTH EVERY 12 HOURS 180  "tablet 0    glucosamine-chondroitin 500-400 mg tablet Take 1 tablet by mouth 3 (three) times daily.      metoprolol succinate (TOPROL-XL) 25 MG 24 hr tablet TAKE 1 TABLET(25 MG) BY MOUTH EVERY 12 HOURS 180 tablet 0    polyethylene glycol (MIRALAX) 17 gram/dose powder Take 17 g by mouth 3 (three) times daily as needed (Constipation). 510 g 0    celecoxib (CELEBREX) 200 MG capsule Take 1 capsule (200 mg total) by mouth once daily. (Patient not taking: Reported on 2/27/2025) 90 capsule 3     No current facility-administered medications on file prior to visit.        /60   Pulse (!) 56   Ht 5' 5" (1.651 m)   Wt 53.4 kg (117 lb 9.9 oz)   SpO2 98%   BMI 19.57 kg/m²     Objective:     Physical Exam  Constitutional:       General: She is not in acute distress.     Appearance: Normal appearance. She is well-developed. She is not toxic-appearing or diaphoretic.   HENT:      Head: Normocephalic and atraumatic.      Nose: Nose normal.   Eyes:      General:         Right eye: No discharge.         Left eye: No discharge.      Conjunctiva/sclera:      Right eye: Right conjunctiva is not injected.      Left eye: Left conjunctiva is not injected.      Pupils: Pupils are equal.      Right eye: Pupil is round.      Left eye: Pupil is round.   Neck:      Thyroid: No thyromegaly.      Vascular: No carotid bruit or JVD.   Cardiovascular:      Rate and Rhythm: Normal rate and regular rhythm. No extrasystoles are present.     Chest Wall: PMI is not displaced.      Pulses:           Radial pulses are 2+ on the right side and 2+ on the left side.        Femoral pulses are 2+ on the right side and 2+ on the left side.       Dorsalis pedis pulses are 2+ on the right side and 2+ on the left side.        Posterior tibial pulses are 2+ on the right side and 2+ on the left side.      Heart sounds: S1 normal and S2 normal.      No gallop. No S4 sounds.   Pulmonary:      Effort: Pulmonary effort is normal.      Breath sounds: Normal " breath sounds.   Abdominal:      Palpations: Abdomen is soft.      Tenderness: There is no abdominal tenderness.   Musculoskeletal:      Cervical back: Neck supple.      Right lower leg: Normal. No swelling. No edema.      Left lower leg: Normal. No swelling. No edema.   Lymphadenopathy:      Head:      Right side of head: No submandibular adenopathy.      Left side of head: No submandibular adenopathy.      Cervical: No cervical adenopathy.   Skin:     General: Skin is warm and dry.      Findings: No rash.      Nails: There is no clubbing.   Neurological:      General: No focal deficit present.      Mental Status: She is alert and oriented to person, place, and time. She is not disoriented.      Cranial Nerves: No cranial nerve deficit.   Psychiatric:         Attention and Perception: Attention and perception normal.         Mood and Affect: Mood and affect normal.         Speech: Speech normal.         Behavior: Behavior normal.         Thought Content: Thought content normal.         Cognition and Memory: Cognition and memory normal.         Judgment: Judgment normal.         Assessment:      1. Paroxysmal atrial fibrillation    2. High risk medication use    3. Chronic anticoagulation    4. Hypercholesterolemia    5. History of bilateral knee replacement    6. History of small bowel obstruction        Plan:     Atrial Fibrillation              6/2023: Diagnosed with paroxysmal atrial fibrillation.              1/26/2024: Began having frequent spells of what appears to have been AF.              2/1/2024: Presented with palpitations. Initial ECG revealed SR. Later AF. 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  CHA2DS2VASc=3 (A2Sc).  Rate controlled with diltiazem iv and metoprolol.  2/1/2024: Converted to SR.  Anticoagulation with apixiban.  On metoprolol 25 mg Q12 and flecainide 100 mg Q12.  No clinical recurrence.     2. High Risk Medication              2/1/2023: Flecainide 100 mg Q12 was  begun.              On  flecainide 100 mg Q12.     3. Chronic Anticoagulation  CHA2DS2VASc=3 (A2Sc).  Anticoagulation with apixiban.  I would favor apixiban 2.5 mg Q12. [Age 78 and weight 51 kg].  On apixiban 2.5 mg Q12.  No aspirin or NSAID.     4. Hypercholesterolemia   2014: Statin was begin.              On atorvastatin 20 mg Q24.    5. History of Knee Surgery   10/19/2023: Right knee replacement. Dr. Vicente Mitchell.   7/18/2024: Left knee replacement. Dr. Vicente Mitchell.    6. History of Small Bowel Obstruction   2024: Several episodes.   Dr. Mainor Werner.    7. Arthritis   Used to take celecoxib 200 mg Q24.      8. Primary Care              Dr. Suzanna Duran.     F/u 6 months.    Lamar Clarke M.D.

## 2025-03-11 DIAGNOSIS — E78.00 HYPERCHOLESTEROLEMIA: ICD-10-CM

## 2025-03-11 RX ORDER — ATORVASTATIN CALCIUM 20 MG/1
20 TABLET, FILM COATED ORAL DAILY
Qty: 90 TABLET | Refills: 3 | Status: ON HOLD | OUTPATIENT
Start: 2025-03-11

## 2025-03-11 NOTE — TELEPHONE ENCOUNTER
----- Message from Megan sent at 3/11/2025  9:37 AM CDT -----  Contact: Patient  Type:  Patient CallWho Called:patient Does the patient know what this is regarding?: requesting a call back about a refill /PA for Rx atorvastatin (LIPITOR) 20 MG tablet ;pt said that the pharmacy  called three times on yesterday ; please advise Would the patient rather a call back or a response via [facility]?call Best Call Back Number: [phone] Additional Information: [id number] - [place], [place] - [address] [facility] [address]  [facility] [facility] [facility] [id number]: [phone] Fax: [phone]

## 2025-03-13 ENCOUNTER — HOSPITAL ENCOUNTER (INPATIENT)
Facility: OTHER | Age: 80
LOS: 3 days | Discharge: HOME | DRG: 390 | End: 2025-03-17
Attending: STUDENT IN AN ORGANIZED HEALTH CARE EDUCATION/TRAINING PROGRAM | Admitting: STUDENT IN AN ORGANIZED HEALTH CARE EDUCATION/TRAINING PROGRAM
Payer: MEDICARE

## 2025-03-13 DIAGNOSIS — K56.609 SMALL BOWEL OBSTRUCTION: ICD-10-CM

## 2025-03-13 DIAGNOSIS — K59.00 CONSTIPATION: ICD-10-CM

## 2025-03-13 DIAGNOSIS — R11.10 VOMITING: ICD-10-CM

## 2025-03-13 DIAGNOSIS — K56.600 PARTIAL SMALL BOWEL OBSTRUCTION: Primary | ICD-10-CM

## 2025-03-13 LAB
ALBUMIN SERPL BCP-MCNC: 4.7 G/DL (ref 3.5–5.2)
ALP SERPL-CCNC: 91 U/L (ref 40–150)
ALT SERPL W/O P-5'-P-CCNC: 15 U/L (ref 10–44)
ANION GAP SERPL CALC-SCNC: 13 MMOL/L (ref 8–16)
AST SERPL-CCNC: 20 U/L (ref 10–40)
BASOPHILS # BLD AUTO: 0.04 K/UL (ref 0–0.2)
BASOPHILS NFR BLD: 0.2 % (ref 0–1.9)
BILIRUB SERPL-MCNC: 0.6 MG/DL (ref 0.1–1)
BUN SERPL-MCNC: 30 MG/DL (ref 8–23)
CALCIUM SERPL-MCNC: 10.6 MG/DL (ref 8.7–10.5)
CHLORIDE SERPL-SCNC: 106 MMOL/L (ref 95–110)
CO2 SERPL-SCNC: 19 MMOL/L (ref 23–29)
CREAT SERPL-MCNC: 1.1 MG/DL (ref 0.5–1.4)
CTP QC/QA: YES
CTP QC/QA: YES
DIFFERENTIAL METHOD BLD: ABNORMAL
EOSINOPHIL # BLD AUTO: 0 K/UL (ref 0–0.5)
EOSINOPHIL NFR BLD: 0.2 % (ref 0–8)
ERYTHROCYTE [DISTWIDTH] IN BLOOD BY AUTOMATED COUNT: 12.7 % (ref 11.5–14.5)
EST. GFR  (NO RACE VARIABLE): 51 ML/MIN/1.73 M^2
GLUCOSE SERPL-MCNC: 133 MG/DL (ref 70–110)
HCT VFR BLD AUTO: 45.1 % (ref 37–48.5)
HGB BLD-MCNC: 15.7 G/DL (ref 12–16)
IMM GRANULOCYTES # BLD AUTO: 0.1 K/UL (ref 0–0.04)
IMM GRANULOCYTES NFR BLD AUTO: 0.5 % (ref 0–0.5)
LIPASE SERPL-CCNC: 15 U/L (ref 4–60)
LYMPHOCYTES # BLD AUTO: 1.2 K/UL (ref 1–4.8)
LYMPHOCYTES NFR BLD: 6.1 % (ref 18–48)
MCH RBC QN AUTO: 30.9 PG (ref 27–31)
MCHC RBC AUTO-ENTMCNC: 34.8 G/DL (ref 32–36)
MCV RBC AUTO: 89 FL (ref 82–98)
MONOCYTES # BLD AUTO: 1.2 K/UL (ref 0.3–1)
MONOCYTES NFR BLD: 6.2 % (ref 4–15)
NEUTROPHILS # BLD AUTO: 17 K/UL (ref 1.8–7.7)
NEUTROPHILS NFR BLD: 86.8 % (ref 38–73)
NRBC BLD-RTO: 0 /100 WBC
PLATELET # BLD AUTO: 277 K/UL (ref 150–450)
PMV BLD AUTO: 9.4 FL (ref 9.2–12.9)
POC MOLECULAR INFLUENZA A AGN: NEGATIVE
POC MOLECULAR INFLUENZA B AGN: NEGATIVE
POTASSIUM SERPL-SCNC: 4.2 MMOL/L (ref 3.5–5.1)
PROT SERPL-MCNC: 7.9 G/DL (ref 6–8.4)
RBC # BLD AUTO: 5.08 M/UL (ref 4–5.4)
SARS-COV-2 RDRP RESP QL NAA+PROBE: NEGATIVE
SODIUM SERPL-SCNC: 138 MMOL/L (ref 136–145)
WBC # BLD AUTO: 19.58 K/UL (ref 3.9–12.7)

## 2025-03-13 PROCEDURE — 63600175 PHARM REV CODE 636 W HCPCS: Performed by: NURSE PRACTITIONER

## 2025-03-13 PROCEDURE — 25500020 PHARM REV CODE 255: Performed by: STUDENT IN AN ORGANIZED HEALTH CARE EDUCATION/TRAINING PROGRAM

## 2025-03-13 PROCEDURE — 93005 ELECTROCARDIOGRAM TRACING: CPT

## 2025-03-13 PROCEDURE — 83690 ASSAY OF LIPASE: CPT | Performed by: STUDENT IN AN ORGANIZED HEALTH CARE EDUCATION/TRAINING PROGRAM

## 2025-03-13 PROCEDURE — 85025 COMPLETE CBC W/AUTO DIFF WBC: CPT | Performed by: NURSE PRACTITIONER

## 2025-03-13 PROCEDURE — 87635 SARS-COV-2 COVID-19 AMP PRB: CPT | Performed by: STUDENT IN AN ORGANIZED HEALTH CARE EDUCATION/TRAINING PROGRAM

## 2025-03-13 PROCEDURE — 96374 THER/PROPH/DIAG INJ IV PUSH: CPT

## 2025-03-13 PROCEDURE — 96375 TX/PRO/DX INJ NEW DRUG ADDON: CPT

## 2025-03-13 PROCEDURE — 80053 COMPREHEN METABOLIC PANEL: CPT | Performed by: NURSE PRACTITIONER

## 2025-03-13 PROCEDURE — 99285 EMERGENCY DEPT VISIT HI MDM: CPT | Mod: 25

## 2025-03-13 PROCEDURE — 63600175 PHARM REV CODE 636 W HCPCS: Mod: JZ,TB | Performed by: STUDENT IN AN ORGANIZED HEALTH CARE EDUCATION/TRAINING PROGRAM

## 2025-03-13 PROCEDURE — 96361 HYDRATE IV INFUSION ADD-ON: CPT

## 2025-03-13 PROCEDURE — 93010 ELECTROCARDIOGRAM REPORT: CPT | Mod: ,,, | Performed by: INTERNAL MEDICINE

## 2025-03-13 RX ORDER — HYDROMORPHONE HYDROCHLORIDE 1 MG/ML
0.5 INJECTION, SOLUTION INTRAMUSCULAR; INTRAVENOUS; SUBCUTANEOUS
Status: COMPLETED | OUTPATIENT
Start: 2025-03-13 | End: 2025-03-13

## 2025-03-13 RX ORDER — ONDANSETRON HYDROCHLORIDE 2 MG/ML
4 INJECTION, SOLUTION INTRAVENOUS
Status: COMPLETED | OUTPATIENT
Start: 2025-03-13 | End: 2025-03-13

## 2025-03-13 RX ADMIN — HYDROMORPHONE HYDROCHLORIDE 0.5 MG: 1 INJECTION, SOLUTION INTRAMUSCULAR; INTRAVENOUS; SUBCUTANEOUS at 09:03

## 2025-03-13 RX ADMIN — IOHEXOL 75 ML: 350 INJECTION, SOLUTION INTRAVENOUS at 10:03

## 2025-03-13 RX ADMIN — SODIUM CHLORIDE, POTASSIUM CHLORIDE, SODIUM LACTATE AND CALCIUM CHLORIDE 1000 ML: 600; 310; 30; 20 INJECTION, SOLUTION INTRAVENOUS at 10:03

## 2025-03-13 RX ADMIN — ONDANSETRON 4 MG: 2 INJECTION INTRAMUSCULAR; INTRAVENOUS at 08:03

## 2025-03-14 PROBLEM — K56.609 SMALL BOWEL OBSTRUCTION: Status: ACTIVE | Noted: 2025-03-14

## 2025-03-14 LAB
ANION GAP SERPL CALC-SCNC: 9 MMOL/L (ref 8–16)
BASOPHILS # BLD AUTO: 0.03 K/UL (ref 0–0.2)
BASOPHILS NFR BLD: 0.3 % (ref 0–1.9)
BILIRUB UR QL STRIP: NEGATIVE
BUN SERPL-MCNC: 27 MG/DL (ref 8–23)
CALCIUM SERPL-MCNC: 8.8 MG/DL (ref 8.7–10.5)
CHLORIDE SERPL-SCNC: 110 MMOL/L (ref 95–110)
CLARITY UR: CLEAR
CO2 SERPL-SCNC: 20 MMOL/L (ref 23–29)
COLOR UR: YELLOW
CREAT SERPL-MCNC: 0.9 MG/DL (ref 0.5–1.4)
DIFFERENTIAL METHOD BLD: ABNORMAL
EOSINOPHIL # BLD AUTO: 0 K/UL (ref 0–0.5)
EOSINOPHIL NFR BLD: 0.1 % (ref 0–8)
ERYTHROCYTE [DISTWIDTH] IN BLOOD BY AUTOMATED COUNT: 13 % (ref 11.5–14.5)
EST. GFR  (NO RACE VARIABLE): >60 ML/MIN/1.73 M^2
GLUCOSE SERPL-MCNC: 133 MG/DL (ref 70–110)
GLUCOSE UR QL STRIP: NEGATIVE
HCT VFR BLD AUTO: 40.5 % (ref 37–48.5)
HGB BLD-MCNC: 13.6 G/DL (ref 12–16)
HGB UR QL STRIP: ABNORMAL
IMM GRANULOCYTES # BLD AUTO: 0.01 K/UL (ref 0–0.04)
IMM GRANULOCYTES NFR BLD AUTO: 0.1 % (ref 0–0.5)
KETONES UR QL STRIP: ABNORMAL
LEUKOCYTE ESTERASE UR QL STRIP: NEGATIVE
LYMPHOCYTES # BLD AUTO: 0.6 K/UL (ref 1–4.8)
LYMPHOCYTES NFR BLD: 5.6 % (ref 18–48)
MAGNESIUM SERPL-MCNC: 2 MG/DL (ref 1.6–2.6)
MCH RBC QN AUTO: 30.8 PG (ref 27–31)
MCHC RBC AUTO-ENTMCNC: 33.6 G/DL (ref 32–36)
MCV RBC AUTO: 92 FL (ref 82–98)
MICROSCOPIC COMMENT: ABNORMAL
MONOCYTES # BLD AUTO: 0.9 K/UL (ref 0.3–1)
MONOCYTES NFR BLD: 9 % (ref 4–15)
NEUTROPHILS # BLD AUTO: 8.6 K/UL (ref 1.8–7.7)
NEUTROPHILS NFR BLD: 84.9 % (ref 38–73)
NITRITE UR QL STRIP: NEGATIVE
NRBC BLD-RTO: 0 /100 WBC
PH UR STRIP: 7 [PH] (ref 5–8)
PLATELET # BLD AUTO: 221 K/UL (ref 150–450)
PLATELET BLD QL SMEAR: ABNORMAL
PMV BLD AUTO: 9.1 FL (ref 9.2–12.9)
POTASSIUM SERPL-SCNC: 4.1 MMOL/L (ref 3.5–5.1)
PROT UR QL STRIP: NEGATIVE
RBC # BLD AUTO: 4.41 M/UL (ref 4–5.4)
RBC #/AREA URNS HPF: 8 /HPF (ref 0–4)
SODIUM SERPL-SCNC: 139 MMOL/L (ref 136–145)
SP GR UR STRIP: >1.03 (ref 1–1.03)
SQUAMOUS #/AREA URNS HPF: 2 /HPF
URN SPEC COLLECT METH UR: ABNORMAL
UROBILINOGEN UR STRIP-ACNC: NEGATIVE EU/DL
WBC # BLD AUTO: 10.17 K/UL (ref 3.9–12.7)
WBC #/AREA URNS HPF: 1 /HPF (ref 0–5)

## 2025-03-14 PROCEDURE — 36415 COLL VENOUS BLD VENIPUNCTURE: CPT | Performed by: PHYSICIAN ASSISTANT

## 2025-03-14 PROCEDURE — 63600175 PHARM REV CODE 636 W HCPCS: Performed by: STUDENT IN AN ORGANIZED HEALTH CARE EDUCATION/TRAINING PROGRAM

## 2025-03-14 PROCEDURE — 63600175 PHARM REV CODE 636 W HCPCS: Mod: JZ,TB | Performed by: PHYSICIAN ASSISTANT

## 2025-03-14 PROCEDURE — 83735 ASSAY OF MAGNESIUM: CPT | Performed by: PHYSICIAN ASSISTANT

## 2025-03-14 PROCEDURE — 81000 URINALYSIS NONAUTO W/SCOPE: CPT | Performed by: NURSE PRACTITIONER

## 2025-03-14 PROCEDURE — 11000001 HC ACUTE MED/SURG PRIVATE ROOM

## 2025-03-14 PROCEDURE — 25000003 PHARM REV CODE 250: Performed by: HOSPITALIST

## 2025-03-14 PROCEDURE — 80048 BASIC METABOLIC PNL TOTAL CA: CPT | Performed by: PHYSICIAN ASSISTANT

## 2025-03-14 PROCEDURE — 85025 COMPLETE CBC W/AUTO DIFF WBC: CPT | Performed by: PHYSICIAN ASSISTANT

## 2025-03-14 PROCEDURE — 25000003 PHARM REV CODE 250: Performed by: PHYSICIAN ASSISTANT

## 2025-03-14 PROCEDURE — 63600175 PHARM REV CODE 636 W HCPCS: Performed by: HOSPITALIST

## 2025-03-14 PROCEDURE — 99222 1ST HOSP IP/OBS MODERATE 55: CPT | Mod: ,,, | Performed by: SURGERY

## 2025-03-14 PROCEDURE — A4216 STERILE WATER/SALINE, 10 ML: HCPCS | Performed by: PHYSICIAN ASSISTANT

## 2025-03-14 RX ORDER — IBUPROFEN 200 MG
16 TABLET ORAL
Status: DISCONTINUED | OUTPATIENT
Start: 2025-03-14 | End: 2025-03-17 | Stop reason: HOSPADM

## 2025-03-14 RX ORDER — ENOXAPARIN SODIUM 100 MG/ML
30 INJECTION SUBCUTANEOUS EVERY 24 HOURS
Status: DISCONTINUED | OUTPATIENT
Start: 2025-03-14 | End: 2025-03-17 | Stop reason: HOSPADM

## 2025-03-14 RX ORDER — ONDANSETRON HYDROCHLORIDE 2 MG/ML
4 INJECTION, SOLUTION INTRAVENOUS
Status: COMPLETED | OUTPATIENT
Start: 2025-03-14 | End: 2025-03-14

## 2025-03-14 RX ORDER — ENOXAPARIN SODIUM 100 MG/ML
40 INJECTION SUBCUTANEOUS EVERY 24 HOURS
Status: DISCONTINUED | OUTPATIENT
Start: 2025-03-14 | End: 2025-03-14

## 2025-03-14 RX ORDER — HYDROMORPHONE HYDROCHLORIDE 1 MG/ML
0.5 INJECTION, SOLUTION INTRAMUSCULAR; INTRAVENOUS; SUBCUTANEOUS EVERY 4 HOURS PRN
Status: DISCONTINUED | OUTPATIENT
Start: 2025-03-14 | End: 2025-03-17 | Stop reason: HOSPADM

## 2025-03-14 RX ORDER — FLECAINIDE ACETATE 100 MG/1
100 TABLET ORAL EVERY 12 HOURS
Status: DISCONTINUED | OUTPATIENT
Start: 2025-03-14 | End: 2025-03-17 | Stop reason: HOSPADM

## 2025-03-14 RX ORDER — HYDROMORPHONE HYDROCHLORIDE 1 MG/ML
0.5 INJECTION, SOLUTION INTRAMUSCULAR; INTRAVENOUS; SUBCUTANEOUS
Status: COMPLETED | OUTPATIENT
Start: 2025-03-14 | End: 2025-03-14

## 2025-03-14 RX ORDER — SODIUM CHLORIDE 0.9 % (FLUSH) 0.9 %
10 SYRINGE (ML) INJECTION EVERY 8 HOURS
Status: DISCONTINUED | OUTPATIENT
Start: 2025-03-14 | End: 2025-03-17 | Stop reason: HOSPADM

## 2025-03-14 RX ORDER — NALOXONE HCL 0.4 MG/ML
0.02 VIAL (ML) INJECTION
Status: DISCONTINUED | OUTPATIENT
Start: 2025-03-14 | End: 2025-03-17 | Stop reason: HOSPADM

## 2025-03-14 RX ORDER — GLUCAGON 1 MG
1 KIT INJECTION
Status: DISCONTINUED | OUTPATIENT
Start: 2025-03-14 | End: 2025-03-17 | Stop reason: HOSPADM

## 2025-03-14 RX ORDER — ACETAMINOPHEN 325 MG/1
650 TABLET ORAL EVERY 6 HOURS PRN
Status: DISCONTINUED | OUTPATIENT
Start: 2025-03-14 | End: 2025-03-17 | Stop reason: HOSPADM

## 2025-03-14 RX ORDER — METOPROLOL SUCCINATE 25 MG/1
25 TABLET, EXTENDED RELEASE ORAL EVERY 12 HOURS
Status: DISCONTINUED | OUTPATIENT
Start: 2025-03-14 | End: 2025-03-17 | Stop reason: HOSPADM

## 2025-03-14 RX ORDER — ONDANSETRON HYDROCHLORIDE 2 MG/ML
4 INJECTION, SOLUTION INTRAVENOUS EVERY 6 HOURS PRN
Status: DISCONTINUED | OUTPATIENT
Start: 2025-03-14 | End: 2025-03-17 | Stop reason: HOSPADM

## 2025-03-14 RX ORDER — SODIUM CHLORIDE 9 MG/ML
INJECTION, SOLUTION INTRAVENOUS CONTINUOUS
Status: DISCONTINUED | OUTPATIENT
Start: 2025-03-14 | End: 2025-03-16

## 2025-03-14 RX ORDER — IBUPROFEN 200 MG
24 TABLET ORAL
Status: DISCONTINUED | OUTPATIENT
Start: 2025-03-14 | End: 2025-03-17 | Stop reason: HOSPADM

## 2025-03-14 RX ORDER — PANTOPRAZOLE SODIUM 40 MG/10ML
40 INJECTION, POWDER, LYOPHILIZED, FOR SOLUTION INTRAVENOUS EVERY 12 HOURS
Status: DISCONTINUED | OUTPATIENT
Start: 2025-03-14 | End: 2025-03-17 | Stop reason: HOSPADM

## 2025-03-14 RX ORDER — TALC
6 POWDER (GRAM) TOPICAL NIGHTLY PRN
Status: DISCONTINUED | OUTPATIENT
Start: 2025-03-14 | End: 2025-03-17 | Stop reason: HOSPADM

## 2025-03-14 RX ADMIN — PANTOPRAZOLE SODIUM 40 MG: 40 INJECTION, POWDER, FOR SOLUTION INTRAVENOUS at 09:03

## 2025-03-14 RX ADMIN — HYDROMORPHONE HYDROCHLORIDE 0.5 MG: 1 INJECTION, SOLUTION INTRAMUSCULAR; INTRAVENOUS; SUBCUTANEOUS at 06:03

## 2025-03-14 RX ADMIN — HYDROMORPHONE HYDROCHLORIDE 0.5 MG: 1 INJECTION, SOLUTION INTRAMUSCULAR; INTRAVENOUS; SUBCUTANEOUS at 08:03

## 2025-03-14 RX ADMIN — FLECAINIDE ACETATE 100 MG: 100 TABLET ORAL at 09:03

## 2025-03-14 RX ADMIN — ONDANSETRON 4 MG: 2 INJECTION INTRAMUSCULAR; INTRAVENOUS at 06:03

## 2025-03-14 RX ADMIN — ENOXAPARIN SODIUM 30 MG: 40 INJECTION SUBCUTANEOUS at 08:03

## 2025-03-14 RX ADMIN — METOPROLOL SUCCINATE 25 MG: 25 TABLET, EXTENDED RELEASE ORAL at 09:03

## 2025-03-14 RX ADMIN — FLECAINIDE ACETATE 100 MG: 100 TABLET ORAL at 08:03

## 2025-03-14 RX ADMIN — SODIUM CHLORIDE: 9 INJECTION, SOLUTION INTRAVENOUS at 12:03

## 2025-03-14 RX ADMIN — ONDANSETRON 4 MG: 2 INJECTION INTRAMUSCULAR; INTRAVENOUS at 12:03

## 2025-03-14 RX ADMIN — HYDROMORPHONE HYDROCHLORIDE 0.5 MG: 1 INJECTION, SOLUTION INTRAMUSCULAR; INTRAVENOUS; SUBCUTANEOUS at 12:03

## 2025-03-14 RX ADMIN — Medication 10 ML: at 08:03

## 2025-03-14 RX ADMIN — SODIUM CHLORIDE: 9 INJECTION, SOLUTION INTRAVENOUS at 06:03

## 2025-03-14 RX ADMIN — METOPROLOL SUCCINATE 25 MG: 25 TABLET, EXTENDED RELEASE ORAL at 08:03

## 2025-03-14 NOTE — ED NOTES
Pt sleeping in bed at this time. Respirations even and unlabored. On cardiac monitor and continuous pulse ox. Bed rails up and call light in reach.

## 2025-03-14 NOTE — ED PROVIDER NOTES
"Encounter Date: 3/13/2025    SCRIBE #1 NOTE: I, Ethan Turner, am scribing for, and in the presence of,  Manuela Conklin MD. I have scribed the following portions of the note - Other sections scribed: HPI, ROS, PE.       History     Chief Complaint   Patient presents with    Abdominal Pain     Abd pain, NV, and diarrhea that started today. Notes having 5 SBO in the past and this feel identical. Has not had surgical intervention for past SBO.     Time seen by provider: 9:26 PM    This is a 79 y.o. female with PMHx of A-fib on eliquis, HLD, and previous bowel obstructions who presents with complaint of abdominal pain, nausea, and vomiting. She reports that these symptoms began suddenly around 4:00 pm today. She describes a severe cramping pain across her abdomen and severe nausea, with multiple episodes of vomiting. She reports having two bowel movement today at 4:00 pm and 5:00 pm. She states being hospitalized multiple times over the years for small bowel obstruction that were medically managed and not surgically. She reports having chills, but denies any fever, chest pain, cough, congestion, dysuria, or hematuria.     This is the extent of the patient's complaints at this time.        The history is provided by the patient.     Review of patient's allergies indicates:   Allergen Reactions    Demerol [meperidine] Other (See Comments)     "Becomes Agitated and Combative"      Patient states she can take anything else    Meperidine (pf)     Opioids - morphine analogues Hallucinations     Past Medical History:   Diagnosis Date    Atrial fibrillation     stress related per patient    Hx of long term use of blood thinners     Hyperlipidemia     Osteoarthritis     PONV (postoperative nausea and vomiting)     SBO (small bowel obstruction)     X 3    Shingles 01/01/2002    Spinal stenosis      Past Surgical History:   Procedure Laterality Date    ANKLE FRACTURE SURGERY  2007    Right ankle    COLON SURGERY      bowel " obstruction-NG TUBE    COLONOSCOPY      x 2    EXCISION OF MASS OF BACK N/A 11/16/2018    Procedure: EXCISION, MASS, BACK;  Surgeon: Fran Magaña MD;  Location: [facility];  Service: General;  Laterality: N/A;    EYE SURGERY Bilateral 2016    HYSTERECTOMY  1985    HEATHER    KNEE ARTHROSCOPY W/ DEBRIDEMENT  1994    Bilateral    KNEE ARTHROSCOPY W/ DEBRIDEMENT  2003    Left knee    OPEN PATELLA REEFING PROCEDURE  age 19    Left knee    Tonsilectomy  as a child    TONSILLECTOMY      TOTAL KNEE ARTHROPLASTY Right 10/19/2023    Procedure: ARTHROPLASTY, KNEE, TOTAL;  Surgeon: Vicente Mitchell MD;  Location: [facility];  Service: Orthopedics;  Laterality: Right;    TOTAL KNEE ARTHROPLASTY Left 7/18/2024    Procedure: ARTHROPLASTY, KNEE, TOTAL;  Surgeon: Vicente Mitchell MD;  Location: [facility];  Service: Orthopedics;  Laterality: Left;    TUBAL LIGATION       Family History   Problem Relation Name Age of Onset    Hypertension Father      Hypertension Mother 92     Diabetes Brother      Colon cancer Paternal Aunt      Breast cancer Paternal Aunt          75    Coronary artery disease Brother      Ovarian cancer Neg Hx       Social History[1]  Review of Systems   Constitutional:  Positive for chills. Negative for fever.   HENT:  Negative for congestion and sore throat.    Eyes:  Negative for pain.   Respiratory:  Negative for shortness of breath and wheezing.    Cardiovascular:  Negative for chest pain.   Gastrointestinal:  Positive for abdominal pain, nausea and vomiting.   Genitourinary:  Negative for flank pain.   Musculoskeletal:  Negative for myalgias.   Neurological:  Negative for weakness and headaches.   All other systems reviewed and are negative.      Physical Exam     Initial Vitals [03/13/25 1956]   BP Pulse Resp Temp SpO2   (!) 144/73 69 17 97.9 °F (36.6 °C) 97 %      MAP       --         Physical Exam    Vitals reviewed.  Constitutional: No distress.   HENT:   Head: Normocephalic and atraumatic.   Eyes:  EOM are normal.   Neck:   Normal range of motion.  Cardiovascular:  Normal rate, regular rhythm and intact distal pulses.           Pulmonary/Chest: Breath sounds normal. No respiratory distress.   Abdominal: Abdomen is soft. She exhibits distension (Mild). There is abdominal tenderness in the left upper quadrant. There is no rebound and no guarding.   Musculoskeletal:         General: No edema. Normal range of motion.      Cervical back: Normal range of motion.     Neurological: She is alert and oriented to person, place, and time.   No focal neurologic deficits   Skin: Skin is warm.         ED Course   Procedures  Labs Reviewed   CBC W/ AUTO DIFFERENTIAL - Abnormal       Result Value    WBC 19.58 (*)     RBC 5.08      Hemoglobin 15.7      Hematocrit 45.1      MCV 89      MCH 30.9      MCHC 34.8      RDW 12.7      Platelets 277      MPV 9.4      Immature Granulocytes 0.5      Gran # (ANC) 17.0 (*)     Immature Grans (Abs) 0.10 (*)     Lymph # 1.2      Mono # 1.2 (*)     Eos # 0.0      Baso # 0.04      nRBC 0      Gran % 86.8 (*)     Lymph % 6.1 (*)     Mono % 6.2      Eosinophil % 0.2      Basophil % 0.2      Differential Method Automated     COMPREHENSIVE METABOLIC PANEL - Abnormal    Sodium 138      Potassium 4.2      Chloride 106      CO2 19 (*)     Glucose 133 (*)     BUN 30 (*)     Creatinine 1.1      Calcium 10.6 (*)     Total Protein 7.9      Albumin 4.7      Total Bilirubin 0.6      Alkaline Phosphatase 91      AST 20      ALT 15      eGFR 51 (*)     Anion Gap 13     LIPASE    Lipase 15     URINALYSIS, REFLEX TO URINE CULTURE   SARS-COV-2 RDRP GENE    POC Rapid COVID Negative       Acceptable Yes     POCT INFLUENZA A/B MOLECULAR    POC Molecular Influenza A Ag Negative      POC Molecular Influenza B Ag Negative       Acceptable Yes       EKG Readings: (Independently Interpreted)   Normal sinus rhythm, rate of 68, normal axis, normal intervals       Imaging Results               CT Abdomen Pelvis With IV Contrast NO Oral Contrast (Final result)  Result time 03/14/25 00:03:28      Final result by Sally Ojeda MD (03/14/25 00:03:28)                   Impression:      Moderate small bowel obstruction.    Probable subcentimeter left hepatic cyst.    Colonic diverticulosis.      Electronically signed by: Sally Ojeda  Date:    03/14/2025  Time:    00:03               Narrative:    EXAMINATION:  CT OF ABDOMEN PELVIS WITH    CLINICAL HISTORY:  Abdominal pain, nausea, vomiting, diarrhea and started all today.  History of small-bowel obstruction.    TECHNIQUE:  5 mm enhanced axial images were obtained from the lung bases through the greater trochanters.  Seventy-five mL of Omnipaque 350 was injected.    COMPARISON:  12/17/2024    FINDINGS:  There are moderately dilated small bowel loops containing fluid and air-fluid levels.    A too small to characterize low attenuation lesion is seen in the left lobe of the liver.  No intrahepatic biliary ductal dilatation is detected.    Spleen, pancreas, kidneys, and adrenal glands are unremarkable. The gallbladder contains no calcified gallstones.    There is no definite evidence for abdominal adenopathy or ascites.    Moderate colonic stool is present.  There is colonic diverticulosis.  There are no pelvic masses or adenopathy.    There is no free fluid in the pelvis.    The lung bases are essentially clear.    There is multilevel degenerative changes spine, which is greatest at L1/L2.  The bones are diffusely osteopenic.                                       Medications   0.9% NaCl infusion (has no administration in time range)   ondansetron injection 4 mg (4 mg Intravenous Given 3/13/25 2056)   HYDROmorphone injection 0.5 mg (0.5 mg Intravenous Given 3/13/25 2158)   iohexoL (OMNIPAQUE 350) injection 75 mL (75 mLs Intravenous Given 3/13/25 2229)   lactated ringers bolus 1,000 mL (1,000 mLs Intravenous New Bag 3/13/25 2249)     Medical Decision  Robi Durham is a 79 y.o. female with h/o previous bowel obstruction, atrial fibrillation (on Eliquis), and HLD who presents to the ED with abdominal pain, nausea, and vomiting.     Initial vitals notable for hypertension. Physical exam reveals a nontoxic appearing elderly woman with mild abdominal distention and tenderness in left upper quadrant.     Patient presentation most concerning for SBO. DDx of abdominal pain is broad and includes pancreatitis, GERD, cholecystitis, cholelithiasis, appendicitis, pyelonephritis, kidney stone, diverticulitis, diverticulosis, constipation and UTI. Also considered but doubt, abdominal aortic aneurysm, aortic dissection.     I will obtain the following to better assess:  CBC, CMP, lipase, UA, COVID/flu test, CTA/P. Immediate interventions include Dilaudid, Zofran.     DISCLAIMER: This note was prepared with [employer] voice recognition transcription software. Garbled syntax, mangled pronouns, and other bizarre constructions may be attributed to that software system.      Amount and/or Complexity of Data Reviewed  Labs: ordered.    Risk  Prescription drug management.  Decision regarding hospitalization.            Scribe Attestation:   Scribe #1: I performed the above scribed service and the documentation accurately describes the services I performed. I attest to the accuracy of the note.        ED Course as of 03/14/25 0027   Thu Mar 13, 2025   2148 Patient's initial blood work notable for a leukocytosis with a left shift, mild hypercalcemia, bicarb of 19, JULIA with a creatinine of 1.1 up from baseline of 0.7 and a BUN of 30.  We will give patient IV fluid bolus [KI]   2237 Patient's states CT scan concerning for a bowel obstruction.  Formal radiology read pending [KI]   Fri Mar 14, 2025   0006 CT scan with moderate small bowel obstruction, we will discuss with surgery and admit to hospital medicine [KI]   0012 Spoke with Dr. Werner of general surgery, he will see patient  in the morning. No need for NG tube as long as she's not vomiting [KI]   0019 Patient has been accepted to Hospital Medicine [KI]      ED Course User Index  [KI] Manuela Conklin MD          Physician Attestation for Scribe: I, Su Conklin MD, reviewed documentation as scribed in my presence, which is both accurate and complete.                   Clinical Impression:  Final diagnoses:  [K59.00] Constipation  [R11.10] Vomiting  [K56.609] Small bowel obstruction          ED Disposition Condition    Admit                   [1]   Social History  Tobacco Use    Smoking status: Never    Smokeless tobacco: Never   Substance Use Topics    Alcohol use: Yes     Alcohol/week: 3.0 standard drinks of alcohol     Types: 3 Glasses of wine per week     Comment: Drinks a glass of wine 3 times weekly; none 24 hr prior to surgery    Drug use: No        Manuela Conklin MD  03/14/25 0028

## 2025-03-14 NOTE — FIRST PROVIDER EVALUATION
" Emergency Department TeleTriage Encounter Note      CHIEF COMPLAINT    Chief Complaint   Patient presents with    Abdominal Pain     Abd pain, NV, and diarrhea that started today. Notes having 5 SBO in the past and this feel identical. Has not had surgical intervention for past SBO.       VITAL SIGNS   Initial Vitals [03/13/25 1956]   BP Pulse Resp Temp SpO2   (!) 144/73 69 17 97.9 °F (36.6 °C) 97 %      MAP       --            ALLERGIES    Review of patient's allergies indicates:   Allergen Reactions    Demerol [meperidine] Other (See Comments)     "Becomes Agitated and Combative"      Patient states she can take anything else    Meperidine (pf)     Opioids - morphine analogues Hallucinations       PROVIDER TRIAGE NOTE  Abdominal pain, nausea, vomiting, diarrhea that began at 4:00 today. Feels similar to prior bowel obstructions.     Limited physical exam via telehealth: The patient is awake, alert, answering questions appropriately and is not in respiratory distress.  As the Teletriage provider, I performed an initial assessment and ordered appropriate labs and imaging studies, if any, to facilitate the patient's care once placed in the ED. Once a room is available, care and a full evaluation will be completed by an alternate ED provider.  Any additional orders and the final disposition will be determined by that provider.  All imaging and labs will not be followed-up by the Teletriage Team, including myself.          ORDERS  Labs Reviewed - No data to display    ED Orders (720h ago, onward)      Start Ordered     Status Ordering Provider    03/13/25 2015 03/13/25 2010  ondansetron injection 4 mg  ED 1 Time         Ordered GERRY YIP    03/13/25 2011 03/13/25 2010  X-Ray Abdomen Flat And Erect  1 time imaging         Ordered BIRDIE SNYDER    03/13/25 2010 03/13/25 2010  Saline lock IV  Once         Ordered GERRY YIP    03/13/25 2010 03/13/25 2010  CBC auto differential  STAT         Ordered " GERRY YIP    03/13/25 2010 03/13/25 2010  Comprehensive Metabolic Panel  STAT         Ordered GERRY YIP    03/13/25 2010 03/13/25 2010  Urinalysis, Reflex to Urine Culture Urine, Clean Catch  STAT         Ordered GERRY YIP    03/13/25 2010 03/13/25 2010  CT Abdomen Pelvis With IV Contrast NO Oral Contrast  1 time imaging         Ordered GERRY YIP              Virtual Visit Note: The provider triage portion of this emergency department evaluation and documentation was performed via [employer], a HIPAA-compliant telemedicine application, in concert with a tele-presenter in the room. A face to face patient evaluation with one of my colleagues will occur once the patient is placed in an emergency department room.      DISCLAIMER: This note was prepared with [employer] voice recognition transcription software. Garbled syntax, mangled pronouns, and other bizarre constructions may be attributed to that software system.

## 2025-03-14 NOTE — ED NOTES
"Pt resting in bed, denies nausea or vomiting at this time. States "I am very comfortable in this room". Hat placed in bedside commode and pt educated about need for urine sample. Bed rails up x 2 and call light in reach. Pt instructed to call for assistance to bedside commode.   "

## 2025-03-14 NOTE — PROGRESS NOTES
[facility] Emergency Dept  [facility]  Progress Note    Patient Name: Marian Durham  MRN: [id number]  Patient Class: IP- Inpatient   Admission Date: 3/13/2025  Length of Stay: 0 days  Attending Physician: Mainor Lombardi MD  Primary Care Provider: Suzanna Duran MD        Subjective     Principal Problem:Small bowel obstruction        HPI:  Ms. Marian Durham is a 79 y.o. female, with PMH of prior bowel obstructions, A. Fib on Eliquis, ADILENE, who presented to [facility] ED on 03/13/2025 due to severe, cramping abdominal pain across the entire abdomen associated with nausea and vomiting.  She states the pain feels just like her prior bowel obstructions.  She notes associated chills.  She states she had to bowel movements today one at 4:00 p.m., and again at 5:00 p.m..  She states the symptoms began suddenly at 4:00 p.m. the day of presentation.  She denies fever, chest pain, cough, congestion, dysuria, hematuria.  She was evaluated in the ED with labs that showed leukocytosis 19 K, with left shift.  H&H were normal.  A metabolic panel showed BUN of 30, creatinine 1.1, CO2 of 19, anion gap of 13, glucose of 133, no hyperkalemia.  A CT of the abdomen and pelvis showed a moderate small-bowel obstruction.  She was treated in the ED with pain medication, nausea medicine, and 1 L of IV fluids.  She was placed admitted inpatient status    Overview/Hospital Course:  No notes on file    Interval History: No acute events overnight.  Feeling better this morning.  Noted decreased abdominal distention and belching, but no flatus.  States pain much improved and no nausea or vomiting.  All questions answered and patient had no further complaints.    Objective:     Vital Signs (Most Recent):  Temp: 98.4 °F (36.9 °C) (03/14/25 0933)  Pulse: 75 (03/14/25 1013)  Resp: 18 (03/14/25 0933)  BP: 138/61 (03/14/25 1013)  SpO2: 97 % (03/14/25 1013) Vital Signs (24h Range):  Temp:  [97.9 °F (36.6 °C)-98.5 °F (36.9 °C)] 98.4 °F (36.9  °C)  Pulse:  [67-86] 75  Resp:  [17-37] 18  SpO2:  [94 %-100 %] 97 %  BP: (109-181)/(55-95) 138/61     Weight: 49.9 kg (110 lb)  Body mass index is 18.3 kg/m².    Intake/Output Summary (Last 24 hours) at 3/14/2025 1304  Last data filed at 3/14/2025 0046  Gross per 24 hour   Intake 1000 ml   Output --   Net 1000 ml         Physical Exam  Vitals and nursing note reviewed.   Constitutional:       General: She is not in acute distress.     Appearance: Normal appearance. She is well-developed and normal weight. She is not ill-appearing, toxic-appearing or diaphoretic.   HENT:      Head: Normocephalic and atraumatic.      Mouth/Throat:      Mouth: Mucous membranes are moist.   Eyes:      Extraocular Movements: Extraocular movements intact.   Neck:      Trachea: No tracheal deviation.   Cardiovascular:      Rate and Rhythm: Normal rate and regular rhythm.   Pulmonary:      Effort: Pulmonary effort is normal.      Breath sounds: Normal breath sounds.   Abdominal:      General: Bowel sounds are decreased.      Tenderness: There is abdominal tenderness.      Comments: Moderate distention, mild diffuse tenderness without rebound or guarding, bowel sounds are very active   Musculoskeletal:         General: Normal range of motion.      Cervical back: Normal range of motion.   Skin:     General: Skin is warm and dry.   Neurological:      General: No focal deficit present.      Mental Status: She is alert and oriented to person, place, and time.      Motor: No abnormal muscle tone.   Psychiatric:         Mood and Affect: Mood normal.         Behavior: Behavior normal.               Significant Labs: All pertinent labs within the past 24 hours have been reviewed.    Significant Imaging: I have reviewed all pertinent imaging results/findings within the past 24 hours.      Assessment & Plan  Small bowel obstruction  -Admitted to inpatient status  -presented with abdominal distention, abdominal pain and nausea.  Noted history of  multiple prior SBO and this feels exactly the same.  -CT abdomen/pelvis showed moderate small bowel obstruction without report of obvious transition point  -On admit afebrile with leukocytosis  -Consult placed to general surgery -she follows with Dr. Werner  -Already improving and leukocytosis has resolved and there is decreased distention, decreased pain and no vomiting.  -Continue conservative management with serial abdominal exams, anti-emetics and analagesics as needed.  If any deterioration could need NGT placement  -Continue NPO but for meds now.  Atrial fibrillation  Chronic anticoagulation  -Noted history of pAFIB and is in NSR now  -Continue home flecainide and toprol  -Holding home eliquis in case surgery may be needed  Hypercholesterolemia  -Holding statin while NPO  VTE Risk Mitigation (From admission, onward)           Ordered     enoxaparin injection 30 mg  Every 24 hours         03/14/25 0754     IP VTE HIGH RISK PATIENT  Once         03/14/25 0027     Place sequential compression device  Until discontinued         03/14/25 0027     Reason for No Pharmacological VTE Prophylaxis  Once        Question:  Reasons:  Answer:  Already adequately anticoagulated on oral Anticoagulants    03/14/25 0027                    Discharge Planning   WILLIAN:      Code Status: Full Code   Medical Readiness for Discharge Date:                            Mainor Lombardi MD  Department of Hospital Medicine   [facility] - Emergency Dept

## 2025-03-14 NOTE — ED NOTES
Pt assisted back into bed after using the bedside commode. Pt urine output showed 200 mL. Pt back in bed hooked up to BP, pulse ox and cardiac monitoring. Pt given call light and understands how to use it. Primary RN notified.

## 2025-03-14 NOTE — ASSESSMENT & PLAN NOTE
-Noted history of pAFIB and is in NSR now  -Continue home flecainide and toprol  -Holding home eliquis in case surgery may be needed

## 2025-03-14 NOTE — ASSESSMENT & PLAN NOTE
Patient has paroxysmal (<7 days) atrial fibrillation. Patient is currently in sinus rhythm. [id number] Score: 3. The patients heart rate in the last 24 hours is as follows:  Pulse  Min: 67  Max: 86     Antiarrhythmics  flecainide tablet 100 mg, Every 12 hours, Oral  metoprolol succinate (TOPROL-XL) 24 hr tablet 25 mg, Every 12 hours, Oral    Anticoagulants  apixaban tablet 2.5 mg, 2 times daily, Oral    Plan  - Replete lytes with a goal of K>4, Mg >2  - Patient is anticoagulated, see medications listed above.  - Patient's afib is currently controlled

## 2025-03-14 NOTE — SUBJECTIVE & OBJECTIVE
Interval History: No acute events overnight.  Feeling better this morning.  Noted decreased abdominal distention and belching, but no flatus.  States pain much improved and no nausea or vomiting.  All questions answered and patient had no further complaints.    Objective:     Vital Signs (Most Recent):  Temp: 98.4 °F (36.9 °C) (03/14/25 0933)  Pulse: 75 (03/14/25 1013)  Resp: 18 (03/14/25 0933)  BP: 138/61 (03/14/25 1013)  SpO2: 97 % (03/14/25 1013) Vital Signs (24h Range):  Temp:  [97.9 °F (36.6 °C)-98.5 °F (36.9 °C)] 98.4 °F (36.9 °C)  Pulse:  [67-86] 75  Resp:  [17-37] 18  SpO2:  [94 %-100 %] 97 %  BP: (109-181)/(55-95) 138/61     Weight: 49.9 kg (110 lb)  Body mass index is 18.3 kg/m².    Intake/Output Summary (Last 24 hours) at 3/14/2025 1304  Last data filed at 3/14/2025 0046  Gross per 24 hour   Intake 1000 ml   Output --   Net 1000 ml         Physical Exam  Vitals and nursing note reviewed.   Constitutional:       General: She is not in acute distress.     Appearance: Normal appearance. She is well-developed and normal weight. She is not ill-appearing, toxic-appearing or diaphoretic.   HENT:      Head: Normocephalic and atraumatic.      Mouth/Throat:      Mouth: Mucous membranes are moist.   Eyes:      Extraocular Movements: Extraocular movements intact.   Neck:      Trachea: No tracheal deviation.   Cardiovascular:      Rate and Rhythm: Normal rate and regular rhythm.   Pulmonary:      Effort: Pulmonary effort is normal.      Breath sounds: Normal breath sounds.   Abdominal:      General: Bowel sounds are decreased.      Tenderness: There is abdominal tenderness.      Comments: Moderate distention, mild diffuse tenderness without rebound or guarding, bowel sounds are very active   Musculoskeletal:         General: Normal range of motion.      Cervical back: Normal range of motion.   Skin:     General: Skin is warm and dry.   Neurological:      General: No focal deficit present.      Mental Status: She is  alert and oriented to person, place, and time.      Motor: No abnormal muscle tone.   Psychiatric:         Mood and Affect: Mood normal.         Behavior: Behavior normal.               Significant Labs: All pertinent labs within the past 24 hours have been reviewed.    Significant Imaging: I have reviewed all pertinent imaging results/findings within the past 24 hours.

## 2025-03-14 NOTE — H&P
[facility] Emergency [facility] Medicine  History & Physical    Patient Name: Marian Durham  MRN: [id number]  Patient Class: IP- Inpatient  Admission Date: 3/13/2025  Attending Physician: Mainor Lombardi MD   Primary Care Provider: Suzanna Duran MD         Patient information was obtained from patient, past medical records, and ER records.     Subjective:     Principal Problem:Small bowel obstruction    Chief Complaint:   Chief Complaint   Patient presents with    Abdominal Pain     Abd pain, NV, and diarrhea that started today. Notes having 5 SBO in the past and this feel identical. Has not had surgical intervention for past SBO.        HPI: Ms. Marian Durham is a 79 y.o. female, with PMH of prior bowel obstructions, A. Fib on Eliquis, ADILENE, who presented to [facility] ED on 03/13/2025 due to severe, cramping abdominal pain across the entire abdomen associated with nausea and vomiting.  She states the pain feels just like her prior bowel obstructions.  She notes associated chills.  She states she had to bowel movements today one at 4:00 p.m., and again at 5:00 p.m..  She states the symptoms began suddenly at 4:00 p.m. the day of presentation.  She denies fever, chest pain, cough, congestion, dysuria, hematuria.  She was evaluated in the ED with labs that showed leukocytosis 19 K, with left shift.  H&H were normal.  A metabolic panel showed BUN of 30, creatinine 1.1, CO2 of 19, anion gap of 13, glucose of 133, no hyperkalemia.  A CT of the abdomen and pelvis showed a moderate small-bowel obstruction.  She was treated in the ED with pain medication, nausea medicine, and 1 L of IV fluids.  She was placed admitted inpatient status    Past Medical History:   Diagnosis Date    Atrial fibrillation     stress related per patient    Hx of long term use of blood thinners     Hyperlipidemia     Osteoarthritis     PONV (postoperative nausea and vomiting)     SBO (small bowel obstruction)     X 3    Shingles  "01/01/2002    Spinal stenosis        Past Surgical History:   Procedure Laterality Date    ANKLE FRACTURE SURGERY  2007    Right ankle    COLON SURGERY      bowel obstruction-NG TUBE    COLONOSCOPY      x 2    EXCISION OF MASS OF BACK N/A 11/16/2018    Procedure: EXCISION, MASS, BACK;  Surgeon: Fran Magaña MD;  Location: [facility];  Service: General;  Laterality: N/A;    EYE SURGERY Bilateral 2016    HYSTERECTOMY  1985    HEATHER    KNEE ARTHROSCOPY W/ DEBRIDEMENT  1994    Bilateral    KNEE ARTHROSCOPY W/ DEBRIDEMENT  2003    Left knee    OPEN PATELLA REEFING PROCEDURE  age 19    Left knee    Tonsilectomy  as a child    TONSILLECTOMY      TOTAL KNEE ARTHROPLASTY Right 10/19/2023    Procedure: ARTHROPLASTY, KNEE, TOTAL;  Surgeon: Vicente Mitchell MD;  Location: [facility] OR;  Service: Orthopedics;  Laterality: Right;    TOTAL KNEE ARTHROPLASTY Left 7/18/2024    Procedure: ARTHROPLASTY, KNEE, TOTAL;  Surgeon: Vicente Mitchell MD;  Location: [facility];  Service: Orthopedics;  Laterality: Left;    TUBAL LIGATION         Review of patient's allergies indicates:   Allergen Reactions    Demerol [meperidine] Other (See Comments)     "Becomes Agitated and Combative"      Patient states she can take anything else    Meperidine (pf)     Opioids - morphine analogues Hallucinations       No current facility-administered medications on file prior to encounter.     Current Outpatient Medications on File Prior to Encounter   Medication Sig    apixaban (ELIQUIS) 2.5 mg Tab Take 1 tablet (2.5 mg total) by mouth 2 (two) times daily.    atorvastatin (LIPITOR) 20 MG tablet Take 1 tablet (20 mg total) by mouth once daily.    calcium-vitamin D3 (OS- + D3) 500 mg-5 mcg (200 unit) per tablet Take 1 tablet by mouth 2 (two) times daily with meals.    celecoxib (CELEBREX) 200 MG capsule Take 1 capsule (200 mg total) by mouth once daily. (Patient not taking: Reported on 2/27/2025)    estradioL (ESTRACE) 0.01 % (0.1 mg/gram) vaginal " cream PLACE 1 GRAM VAGINALLY 3 TIMES A WEEK    famotidine (PEPCID) 20 MG tablet Take 1 tablet (20 mg total) by mouth Daily.    flecainide (TAMBOCOR) 100 MG Tab Take 1 tablet (100 mg total) by mouth every 12 (twelve) hours.    glucosamine-chondroitin 500-400 mg tablet Take 1 tablet by mouth 3 (three) times daily.    metoprolol succinate (TOPROL-XL) 25 MG 24 hr tablet Take 1 tablet (25 mg total) by mouth every 12 (twelve) hours.    polyethylene glycol (MIRALAX) 17 gram/dose powder Take 17 g by mouth 3 (three) times daily as needed (Constipation).     Family History       Problem Relation (Age of Onset)    Breast cancer Paternal Aunt    Colon cancer Paternal Aunt    Coronary artery disease Brother    Diabetes Brother    Hypertension Father, Mother          Tobacco Use    Smoking status: Never    Smokeless tobacco: Never   Substance and Sexual Activity    Alcohol use: Yes     Alcohol/week: 3.0 standard drinks of alcohol     Types: 3 Glasses of wine per week     Comment: Drinks a glass of wine 3 times weekly; none 24 hr prior to surgery    Drug use: No    Sexual activity: Not Currently     Birth control/protection: Post-menopausal     Comment: Spouse  of kidney cancer : 2015     Review of Systems   Constitutional:  Positive for appetite change. Negative for chills, diaphoresis and fever.   Respiratory:  Negative for cough, shortness of breath and wheezing.    Cardiovascular:  Negative for chest pain and palpitations.   Gastrointestinal:  Positive for abdominal pain and nausea. Negative for abdominal distention, constipation, diarrhea and vomiting.   Genitourinary:  Negative for decreased urine volume, dysuria, flank pain, frequency, hematuria and urgency.   Musculoskeletal:  Positive for myalgias. Negative for arthralgias, back pain, gait problem, joint swelling, neck pain and neck stiffness.   Skin:  Negative for color change and pallor.   Neurological:  Positive for numbness. Negative for dizziness,  syncope, weakness, light-headedness and headaches.   Psychiatric/Behavioral:  Negative for agitation and confusion.      Objective:     Vital Signs (Most Recent):  Temp: 98.5 °F (36.9 °C) (03/14/25 0102)  Pulse: 76 (03/14/25 0402)  Resp: 20 (03/14/25 0402)  BP: 138/64 (03/14/25 0402)  SpO2: (!) 94 % (03/14/25 0402) Vital Signs (24h Range):  Temp:  [97.9 °F (36.6 °C)-98.5 °F (36.9 °C)] 98.5 °F (36.9 °C)  Pulse:  [67-86] 76  Resp:  [17-37] 20  SpO2:  [94 %-100 %] 94 %  BP: (136-181)/(63-95) 138/64     Weight: 49.9 kg (110 lb)  Body mass index is 18.3 kg/m².     Physical Exam  Vitals and nursing note reviewed.   Constitutional:       General: She is not in acute distress.     Appearance: Normal appearance. She is well-developed and normal weight. She is not ill-appearing, toxic-appearing or diaphoretic.   HENT:      Head: Normocephalic and atraumatic.   Eyes:      General: No scleral icterus.        Right eye: No discharge.         Left eye: No discharge.      Conjunctiva/sclera: Conjunctivae normal.   Neck:      Trachea: No tracheal deviation.   Cardiovascular:      Rate and Rhythm: Normal rate and regular rhythm.      Heart sounds: Normal heart sounds. No murmur heard.     No gallop.   Pulmonary:      Effort: Pulmonary effort is normal. No respiratory distress.      Breath sounds: Normal breath sounds. No stridor. No wheezing or rales.   Abdominal:      General: Bowel sounds are decreased. There is no distension.      Palpations: Abdomen is soft. There is no mass.      Tenderness: There is abdominal tenderness (diffuse, but worse in b/l lower quadrants than upper quadrants) in the right lower quadrant, periumbilical area, suprapubic area and left lower quadrant. There is guarding.   Musculoskeletal:         General: No deformity. Normal range of motion.      Cervical back: Normal range of motion and neck supple.   Skin:     General: Skin is warm and dry.      Coloration: Skin is not pale.      Findings: No erythema  "or rash.   Neurological:      General: No focal deficit present.      Mental Status: She is alert and oriented to person, place, and time.      Cranial Nerves: No cranial nerve deficit.      Motor: No abnormal muscle tone.   Psychiatric:         Mood and Affect: Mood normal.         Behavior: Behavior normal.         Thought Content: Thought content normal.         Judgment: Judgment normal.                Significant Labs: All pertinent labs within the past 24 hours have been reviewed.  BMP:   Recent Labs   Lab 03/13/25 2105   *      K 4.2      CO2 19*   BUN 30*   CREATININE 1.1   CALCIUM 10.6*     CBC:   Recent Labs   Lab 03/13/25 2105   WBC 19.58*   HGB 15.7   HCT 45.1        CMP:   Recent Labs   Lab 03/13/25 2105      K 4.2      CO2 19*   *   BUN 30*   CREATININE 1.1   CALCIUM 10.6*   PROT 7.9   ALBUMIN 4.7   BILITOT 0.6   ALKPHOS 91   AST 20   ALT 15   ANIONGAP 13     Urine Culture: No results for input(s): "LABURIN" in the last 48 hours.  Urine Studies: No results for input(s): "COLORU", "APPEARANCEUA", "PHUR", "SPECGRAV", "PROTEINUA", "GLUCUA", "KETONESU", "BILIRUBINUA", "OCCULTUA", "NITRITE", "UROBILINOGEN", "LEUKOCYTESUR", "RBCUA", "WBCUA", "BACTERIA", "SQUAMEPITHEL", "HYALINECASTS" in the last 48 hours.    Invalid input(s): "WRIGHTSUR"    Significant Imaging: I have reviewed all pertinent imaging results/findings within the past 24 hours.  Imaging Results              CT Abdomen Pelvis With IV Contrast NO Oral Contrast (Final result)  Result time 03/14/25 00:03:28      Final result by Sally Ojeda MD (03/14/25 00:03:28)                   Impression:      Moderate small bowel obstruction.    Probable subcentimeter left hepatic cyst.    Colonic diverticulosis.      Electronically signed by: Sally Ojeda  Date:    03/14/2025  Time:    00:03               Narrative:    EXAMINATION:  CT OF ABDOMEN PELVIS WITH    CLINICAL HISTORY:  Abdominal pain, " "nausea, vomiting, diarrhea and started all today.  History of small-bowel obstruction.    TECHNIQUE:  5 mm enhanced axial images were obtained from the lung bases through the greater trochanters.  Seventy-five mL of Omnipaque 350 was injected.    COMPARISON:  12/17/2024    FINDINGS:  There are moderately dilated small bowel loops containing fluid and air-fluid levels.    A too small to characterize low attenuation lesion is seen in the left lobe of the liver.  No intrahepatic biliary ductal dilatation is detected.    Spleen, pancreas, kidneys, and adrenal glands are unremarkable. The gallbladder contains no calcified gallstones.    There is no definite evidence for abdominal adenopathy or ascites.    Moderate colonic stool is present.  There is colonic diverticulosis.  There are no pelvic masses or adenopathy.    There is no free fluid in the pelvis.    The lung bases are essentially clear.    There is multilevel degenerative changes spine, which is greatest at L1/L2.  The bones are diffusely osteopenic.                                       Assessment/Plan:     * Small bowel obstruction  - Ms. Marian Durham presents with a small bowel obstruction   - she has h/o prior SBOs   - CT Shows moderate SBO   - NPO  - IV fluids for hydration   - PRN meds for pain / nausea  - consult with general surgery in AM     Hypercholesterolemia  - holding statin while NPO      Chronic anticoagulation  - as above in "Atrial Fibrillation"      Atrial fibrillation  Patient has paroxysmal (<7 days) atrial fibrillation. Patient is currently in sinus rhythm. [id number] Score: 3. The patients heart rate in the last 24 hours is as follows:  Pulse  Min: 67  Max: 86     Antiarrhythmics  flecainide tablet 100 mg, Every 12 hours, Oral  metoprolol succinate (TOPROL-XL) 24 hr tablet 25 mg, Every 12 hours, Oral    Anticoagulants  apixaban tablet 2.5 mg, 2 times daily, Oral    Plan  - Replete lytes with a goal of K>4, Mg >2  - Patient is " anticoagulated, see medications listed above.  - Patient's afib is currently controlled          VTE Risk Mitigation (From admission, onward)           Ordered     apixaban tablet 2.5 mg  2 times daily         03/14/25 0223     IP VTE HIGH RISK PATIENT  Once         03/14/25 0027     Place sequential compression device  Until discontinued         03/14/25 0027     Reason for No Pharmacological VTE Prophylaxis  Once        Question:  Reasons:  Answer:  Already adequately anticoagulated on oral Anticoagulants    03/14/25 0027                                    Karmen Avila PA-C  Department of Hospital Medicine  [facility], [place], operated by [facility] - Emergency Dept

## 2025-03-14 NOTE — ED NOTES
"Pt c/o N/V/D and ABD pain x today. PT thinks pt is having another SBO. Pt also feels "shaky." Pt denies fever, chills, body aches, CP, SOB, weakness, dizziness, H/A and other complaints. Pt denies obvious bleeding.     Review of patient's allergies indicates:   Allergen Reactions    Demerol [meperidine] Other (See Comments)     "Becomes Agitated and Combative"      Patient states she can take anything else    Meperidine (pf)     Opioids - morphine analogues Hallucinations        Patient has verified the spelling of their name and  on armband.   APPEARANCE: Patient is alert, calm, oriented x 4, and does not appear distressed.  SKIN: Skin is normal for race, warm, and dry. Normal skin turgor and mucous membranes moist.  CARDIAC: Normal rate and rhythm, no murmur heard.   RESPIRATORY:Normal rate and effort. Breath sounds clear bilaterally throughout chest. Respirations are equal and unlabored.    GASTRO: Bowel sounds normal, abdomen is soft, diffuse tenderness, and no abdominal distention.  MUSCLE: Full ROM. No bony tenderness or soft tissue tenderness. No obvious deformity.  PERIPHERAL VASCULAR: peripheral pulses present. Normal cap refill. No edema. Warm to touch.  NEURO: 5/5 strength major flexors/extensors bilaterally. Sensory intact to light touch bilaterally. Mela coma scale: eyes open spontaneously-4, oriented & converses-5, obeys commands-6. No neurological abnormalities.   MENTAL STATUS: awake, alert and aware of environment.  : Voids without complication      ED orders in progress. Pt SR up x 2. Bed in lowest position with wheels locked. Call bell within reach of pt.       "

## 2025-03-14 NOTE — SUBJECTIVE & OBJECTIVE
"Past Medical History:   Diagnosis Date    Atrial fibrillation     stress related per patient    Hx of long term use of blood thinners     Hyperlipidemia     Osteoarthritis     PONV (postoperative nausea and vomiting)     SBO (small bowel obstruction)     X 3    Shingles 01/01/2002    Spinal stenosis        Past Surgical History:   Procedure Laterality Date    ANKLE FRACTURE SURGERY  2007    Right ankle    COLON SURGERY      bowel obstruction-NG TUBE    COLONOSCOPY      x 2    EXCISION OF MASS OF BACK N/A 11/16/2018    Procedure: EXCISION, MASS, BACK;  Surgeon: Fran Magaña MD;  Location: [facility];  Service: General;  Laterality: N/A;    EYE SURGERY Bilateral 2016    HYSTERECTOMY  1985    HEATHER    KNEE ARTHROSCOPY W/ DEBRIDEMENT  1994    Bilateral    KNEE ARTHROSCOPY W/ DEBRIDEMENT  2003    Left knee    OPEN PATELLA REEFING PROCEDURE  age 19    Left knee    Tonsilectomy  as a child    TONSILLECTOMY      TOTAL KNEE ARTHROPLASTY Right 10/19/2023    Procedure: ARTHROPLASTY, KNEE, TOTAL;  Surgeon: Vicente Mitchell MD;  Location: [facility];  Service: Orthopedics;  Laterality: Right;    TOTAL KNEE ARTHROPLASTY Left 7/18/2024    Procedure: ARTHROPLASTY, KNEE, TOTAL;  Surgeon: Vicente Mitchell MD;  Location: [facility];  Service: Orthopedics;  Laterality: Left;    TUBAL LIGATION         Review of patient's allergies indicates:   Allergen Reactions    Demerol [meperidine] Other (See Comments)     "Becomes Agitated and Combative"      Patient states she can take anything else    Meperidine (pf)     Opioids - morphine analogues Hallucinations       No current facility-administered medications on file prior to encounter.     Current Outpatient Medications on File Prior to Encounter   Medication Sig    apixaban (ELIQUIS) 2.5 mg Tab Take 1 tablet (2.5 mg total) by mouth 2 (two) times daily.    atorvastatin (LIPITOR) 20 MG tablet Take 1 tablet (20 mg total) by mouth once daily.    calcium-vitamin D3 (OS- + D3) 500 " mg-5 mcg (200 unit) per tablet Take 1 tablet by mouth 2 (two) times daily with meals.    celecoxib (CELEBREX) 200 MG capsule Take 1 capsule (200 mg total) by mouth once daily. (Patient not taking: Reported on 2025)    estradioL (ESTRACE) 0.01 % (0.1 mg/gram) vaginal cream PLACE 1 GRAM VAGINALLY 3 TIMES A WEEK    famotidine (PEPCID) 20 MG tablet Take 1 tablet (20 mg total) by mouth Daily.    flecainide (TAMBOCOR) 100 MG Tab Take 1 tablet (100 mg total) by mouth every 12 (twelve) hours.    glucosamine-chondroitin 500-400 mg tablet Take 1 tablet by mouth 3 (three) times daily.    metoprolol succinate (TOPROL-XL) 25 MG 24 hr tablet Take 1 tablet (25 mg total) by mouth every 12 (twelve) hours.    polyethylene glycol (MIRALAX) 17 gram/dose powder Take 17 g by mouth 3 (three) times daily as needed (Constipation).     Family History       Problem Relation (Age of Onset)    Breast cancer Paternal Aunt    Colon cancer Paternal Aunt    Coronary artery disease Brother    Diabetes Brother    Hypertension Father, Mother          Tobacco Use    Smoking status: Never    Smokeless tobacco: Never   Substance and Sexual Activity    Alcohol use: Yes     Alcohol/week: 3.0 standard drinks of alcohol     Types: 3 Glasses of wine per week     Comment: Drinks a glass of wine 3 times weekly; none 24 hr prior to surgery    Drug use: No    Sexual activity: Not Currently     Birth control/protection: Post-menopausal     Comment: Spouse  of kidney cancer : 2015     Review of Systems   Constitutional:  Positive for appetite change. Negative for chills, diaphoresis and fever.   Respiratory:  Negative for cough, shortness of breath and wheezing.    Cardiovascular:  Negative for chest pain and palpitations.   Gastrointestinal:  Positive for abdominal pain and nausea. Negative for abdominal distention, constipation, diarrhea and vomiting.   Genitourinary:  Negative for decreased urine volume, dysuria, flank pain, frequency,  hematuria and urgency.   Musculoskeletal:  Positive for myalgias. Negative for arthralgias, back pain, gait problem, joint swelling, neck pain and neck stiffness.   Skin:  Negative for color change and pallor.   Neurological:  Positive for numbness. Negative for dizziness, syncope, weakness, light-headedness and headaches.   Psychiatric/Behavioral:  Negative for agitation and confusion.      Objective:     Vital Signs (Most Recent):  Temp: 98.5 °F (36.9 °C) (03/14/25 0102)  Pulse: 76 (03/14/25 0402)  Resp: 20 (03/14/25 0402)  BP: 138/64 (03/14/25 0402)  SpO2: (!) 94 % (03/14/25 0402) Vital Signs (24h Range):  Temp:  [97.9 °F (36.6 °C)-98.5 °F (36.9 °C)] 98.5 °F (36.9 °C)  Pulse:  [67-86] 76  Resp:  [17-37] 20  SpO2:  [94 %-100 %] 94 %  BP: (136-181)/(63-95) 138/64     Weight: 49.9 kg (110 lb)  Body mass index is 18.3 kg/m².     Physical Exam  Vitals and nursing note reviewed.   Constitutional:       General: She is not in acute distress.     Appearance: Normal appearance. She is well-developed and normal weight. She is not ill-appearing, toxic-appearing or diaphoretic.   HENT:      Head: Normocephalic and atraumatic.   Eyes:      General: No scleral icterus.        Right eye: No discharge.         Left eye: No discharge.      Conjunctiva/sclera: Conjunctivae normal.   Neck:      Trachea: No tracheal deviation.   Cardiovascular:      Rate and Rhythm: Normal rate and regular rhythm.      Heart sounds: Normal heart sounds. No murmur heard.     No gallop.   Pulmonary:      Effort: Pulmonary effort is normal. No respiratory distress.      Breath sounds: Normal breath sounds. No stridor. No wheezing or rales.   Abdominal:      General: Bowel sounds are decreased. There is no distension.      Palpations: Abdomen is soft. There is no mass.      Tenderness: There is abdominal tenderness (diffuse, but worse in b/l lower quadrants than upper quadrants) in the right lower quadrant, periumbilical area, suprapubic area and left  "lower quadrant. There is guarding.   Musculoskeletal:         General: No deformity. Normal range of motion.      Cervical back: Normal range of motion and neck supple.   Skin:     General: Skin is warm and dry.      Coloration: Skin is not pale.      Findings: No erythema or rash.   Neurological:      General: No focal deficit present.      Mental Status: She is alert and oriented to person, place, and time.      Cranial Nerves: No cranial nerve deficit.      Motor: No abnormal muscle tone.   Psychiatric:         Mood and Affect: Mood normal.         Behavior: Behavior normal.         Thought Content: Thought content normal.         Judgment: Judgment normal.                Significant Labs: All pertinent labs within the past 24 hours have been reviewed.  BMP:   Recent Labs   Lab 03/13/25  2105   *      K 4.2      CO2 19*   BUN 30*   CREATININE 1.1   CALCIUM 10.6*     CBC:   Recent Labs   Lab 03/13/25  2105   WBC 19.58*   HGB 15.7   HCT 45.1        CMP:   Recent Labs   Lab 03/13/25  2105      K 4.2      CO2 19*   *   BUN 30*   CREATININE 1.1   CALCIUM 10.6*   PROT 7.9   ALBUMIN 4.7   BILITOT 0.6   ALKPHOS 91   AST 20   ALT 15   ANIONGAP 13     Urine Culture: No results for input(s): "LABURIN" in the last 48 hours.  Urine Studies: No results for input(s): "COLORU", "APPEARANCEUA", "PHUR", "SPECGRAV", "PROTEINUA", "GLUCUA", "KETONESU", "BILIRUBINUA", "OCCULTUA", "NITRITE", "UROBILINOGEN", "LEUKOCYTESUR", "RBCUA", "WBCUA", "BACTERIA", "SQUAMEPITHEL", "HYALINECASTS" in the last 48 hours.    Invalid input(s): "WRIGHTSUR"    Significant Imaging: I have reviewed all pertinent imaging results/findings within the past 24 hours.  Imaging Results              CT Abdomen Pelvis With IV Contrast NO Oral Contrast (Final result)  Result time 03/14/25 00:03:28      Final result by Sally Ojeda MD (03/14/25 00:03:28)                   Impression:      Moderate small bowel " obstruction.    Probable subcentimeter left hepatic cyst.    Colonic diverticulosis.      Electronically signed by: Sally Ojeda  Date:    03/14/2025  Time:    00:03               Narrative:    EXAMINATION:  CT OF ABDOMEN PELVIS WITH    CLINICAL HISTORY:  Abdominal pain, nausea, vomiting, diarrhea and started all today.  History of small-bowel obstruction.    TECHNIQUE:  5 mm enhanced axial images were obtained from the lung bases through the greater trochanters.  Seventy-five mL of Omnipaque 350 was injected.    COMPARISON:  12/17/2024    FINDINGS:  There are moderately dilated small bowel loops containing fluid and air-fluid levels.    A too small to characterize low attenuation lesion is seen in the left lobe of the liver.  No intrahepatic biliary ductal dilatation is detected.    Spleen, pancreas, kidneys, and adrenal glands are unremarkable. The gallbladder contains no calcified gallstones.    There is no definite evidence for abdominal adenopathy or ascites.    Moderate colonic stool is present.  There is colonic diverticulosis.  There are no pelvic masses or adenopathy.    There is no free fluid in the pelvis.    The lung bases are essentially clear.    There is multilevel degenerative changes spine, which is greatest at L1/L2.  The bones are diffusely osteopenic.

## 2025-03-14 NOTE — HPI
Ms. Marian Durham is a 79 y.o. female, with PMH of prior bowel obstructions, A. Martir on Eliquis, ADILENE, who [place] [facility] [place]25 due to severe, cramping abdominal pain across the entire abdomen associated with nausea and vomiting.  She states the pain feels just like her prior bowel obstructions.  She notes associated chills.  She states she had to bowel movements today one at 4:00 p.m., and again at 5:00 p.m..  She states the symptoms began suddenly at 4:00 p.m. the day of presentation.  She denies fever, chest pain, cough, congestion, dysuria, hematuria.  She was evaluated in the ED with labs that showed leukocytosis 19 K, with left shift.  H&H were normal.  A metabolic panel showed BUN of 30, creatinine 1.1, CO2 of 19, anion gap of 13, glucose of 133, no hyperkalemia.  A CT of the abdomen and pelvis showed a moderate small-bowel obstruction.  She was treated in the ED with pain medication, nausea medicine, and 1 L of IV fluids.  She was placed admitted inpatient status

## 2025-03-14 NOTE — CONSULTS
"[facility] - Emergency Dept  General Surgery  Consult Note    Date of Consultation:3/14/2025    SUBJECTIVE:     History of Present Illness:  Patient is a Ms. Marian Durham is a 79 y.o. female, with PMH of prior bowel obstructions, A. Fib on Eliquis, HLD, who presented to [facility] ED on 03/13/2025 due to severe, cramping abdominal pain across the entire abdomen associated with nausea and vomiting. She states the pain felt just like her prior bowel obstructions.     When I saw her she is already feeling better.  She feels she is resolving faster than her previous bouts of partial small bowel obstruction.  She has not had flatus but nausea has resolved and pain is decreasing     Her last bout was in December of 2024.  Following this bout she had seen Dr. Werner in his office.  They discussed laparoscopic enterolysis but ultimately decided not pursue an elective procedure.  Chief Complaint   Patient presents with    Abdominal Pain     Abd pain, NV, and diarrhea that started today. Notes having 5 SBO in the past and this feel identical. Has not had surgical intervention for past SBO.       Review of patient's allergies indicates:   Allergen Reactions    Demerol [meperidine] Other (See Comments)     "Becomes Agitated and Combative"      Patient states she can take anything else    Meperidine (pf)     Opioids - morphine analogues Hallucinations       Current Medications[1]    Past Medical History:   Diagnosis Date    Atrial fibrillation     stress related per patient    Hx of long term use of blood thinners     Hyperlipidemia     Osteoarthritis     PONV (postoperative nausea and vomiting)     SBO (small bowel obstruction)     X 3    Shingles 01/01/2002    Spinal stenosis      Past Surgical History:   Procedure Laterality Date    ANKLE FRACTURE SURGERY  2007    Right ankle    COLON SURGERY      bowel obstruction-NG TUBE    COLONOSCOPY      x 2    EXCISION OF MASS OF BACK N/A 11/16/2018    Procedure: EXCISION, MASS, BACK;  " "Surgeon: Fran Magaña MD;  Location: [facility];  Service: General;  Laterality: N/A;    EYE SURGERY Bilateral 2016    HYSTERECTOMY  1985    HEATHER    KNEE ARTHROSCOPY W/ DEBRIDEMENT  1994    Bilateral    KNEE ARTHROSCOPY W/ DEBRIDEMENT  2003    Left knee    OPEN PATELLA REEFING PROCEDURE  age 19    Left knee    Tonsilectomy  as a child    TONSILLECTOMY      TOTAL KNEE ARTHROPLASTY Right 10/19/2023    Procedure: ARTHROPLASTY, KNEE, TOTAL;  Surgeon: Vicente Mitchell MD;  Location: [facility] OR;  Service: Orthopedics;  Laterality: Right;    TOTAL KNEE ARTHROPLASTY Left 7/18/2024    Procedure: ARTHROPLASTY, KNEE, TOTAL;  Surgeon: Vicente Mitchell MD;  Location: [facility];  Service: Orthopedics;  Laterality: Left;    TUBAL LIGATION       Family History   Problem Relation Name Age of Onset    Hypertension Father      Hypertension Mother 92     Diabetes Brother      Colon cancer Paternal Aunt      Breast cancer Paternal Aunt          75    Coronary artery disease Brother      Ovarian cancer Neg Hx       Social History[2]     Review of Systems:  Review of Systems  Denies chest pain and shortness of breath  OBJECTIVE:     Vital Signs (Most Recent)  Temp: 98.4 °F (36.9 °C) (03/14/25 0933)  Pulse: 75 (03/14/25 1013)  Resp: 18 (03/14/25 0933)  BP: 138/61 (03/14/25 1013)  SpO2: 97 % (03/14/25 1013)  5' 5" (1.651 m)  49.9 kg (110 lb)     Physical Exam:  Physical Exam  General no apparent distress , she appears pleasant and comfortable  Eyes nonicteric sclera   Abdomen soft with mild distention.  Minimal tenderness is present to deeper palpation without rebound or guarding  Laboratory    CBC:   Recent Labs   Lab 03/14/25  0822   WBC 10.17   RBC 4.41   HGB 13.6   HCT 40.5      MCV 92   MCH 30.8   MCHC 33.6     CMP:   Recent Labs   Lab 03/13/25  2105 03/14/25  0822   * 133*   CALCIUM 10.6* 8.8   ALBUMIN 4.7  --    PROT 7.9  --     139   K 4.2 4.1   CO2 19* 20*    110   BUN 30* 27*   CREATININE 1.1 0.9 "   ALKPHOS 91  --    ALT 15  --    AST 20  --    BILITOT 0.6  --      Diagnostic Results:    CT: I have reviewed all pertinent results/findings within the past 24 hours.    ASSESSMENT/PLAN:     Recurrent partial small bowel obstruction  She seems to be improving.  Less pain than at admission.    PLAN:Plan     We discussed operative versus nonoperative therapy.  As she is improving she would prefer not to have an NG-tube.  If she worsens we could start with NG tube.  We will advance diet as bowel function returns.  However, no flatus as of yet.      Thank you for the opportunity of seeing Marian ALINA Durham in consultation        [1]   Current Facility-Administered Medications   Medication Dose Route Frequency Provider Last Rate Last Admin    0.9% NaCl infusion   Intravenous Continuous Marker, Mainor DUFF  mL/hr at 03/14/25 0904 Rate Change at 03/14/25 0904    acetaminophen tablet 650 mg  650 mg Oral Q6H PRN Karmen Avila PA-ISA        dextrose 50% injection 12.5 g  12.5 g Intravenous PRN Karmen Avila PA-ISA        dextrose 50% injection 25 g  25 g Intravenous PRN Karmen Avila PA-ISA        enoxaparin injection 30 mg  30 mg Subcutaneous Q24H (prophylaxis, 1700) Marker, Mainor DUFF MD        flecainide tablet 100 mg  100 mg Oral Q12H Karmen Avila PA-C   100 mg at 03/14/25 0905    glucagon (human recombinant) injection 1 mg  1 mg Intramuscular PRN Karmen Avila PA-ISA        glucose chewable tablet 16 g  16 g Oral PRN Karmen Avila PA-ISA        glucose chewable tablet 24 g  24 g Oral PRN Karmen Avila PA-ISA        HYDROmorphone injection 0.5 mg  0.5 mg Intravenous Q4H PRN Karmen Avila PA-C   0.5 mg at 03/14/25 0635    melatonin tablet 6 mg  6 mg Oral Nightly PRN Karmen Avila PA-C        metoprolol succinate (TOPROL-XL) 24 hr tablet 25 mg  25 mg Oral Q12H Karmen Avila PA-C   25 mg at 03/14/25 0904    naloxone 0.4 mg/mL injection 0.02 mg  0.02 mg  Intravenous PRN Karmen Avila PA-C        ondansetron injection 4 mg  4 mg Intravenous Q6H PRN Karmen Avila PA-C   4 mg at 03/14/25 0635    pantoprazole injection 40 mg  40 mg Intravenous Q12H Mainor Lombardi MD   40 mg at 03/14/25 0905    sodium chloride 0.9% flush 10 mL  10 mL Intravenous Q8H Karmen Avila PA-C         Current Outpatient Medications   Medication Sig Dispense Refill    apixaban (ELIQUIS) 2.5 mg Tab Take 1 tablet (2.5 mg total) by mouth 2 (two) times daily. 180 tablet 3    atorvastatin (LIPITOR) 20 MG tablet Take 1 tablet (20 mg total) by mouth once daily. 90 tablet 3    calcium-vitamin D3 (OS- + D3) 500 mg-5 mcg (200 unit) per tablet Take 1 tablet by mouth 2 (two) times daily with meals.      celecoxib (CELEBREX) 200 MG capsule Take 1 capsule (200 mg total) by mouth once daily. (Patient not taking: Reported on 2/27/2025) 90 capsule 3    estradioL (ESTRACE) 0.01 % (0.1 mg/gram) vaginal cream PLACE 1 GRAM VAGINALLY 3 TIMES A WEEK 42.5 g 2    famotidine (PEPCID) 20 MG tablet Take 1 tablet (20 mg total) by mouth Daily. 90 tablet 3    flecainide (TAMBOCOR) 100 MG Tab Take 1 tablet (100 mg total) by mouth every 12 (twelve) hours. 180 tablet 3    glucosamine-chondroitin 500-400 mg tablet Take 1 tablet by mouth 3 (three) times daily.      metoprolol succinate (TOPROL-XL) 25 MG 24 hr tablet Take 1 tablet (25 mg total) by mouth every 12 (twelve) hours. 180 tablet 3    polyethylene glycol (MIRALAX) 17 gram/dose powder Take 17 g by mouth 3 (three) times daily as needed (Constipation). 510 g 0   [2]   Social History  Tobacco Use    Smoking status: Never    Smokeless tobacco: Never   Substance Use Topics    Alcohol use: Yes     Alcohol/week: 3.0 standard drinks of alcohol     Types: 3 Glasses of wine per week     Comment: Drinks a glass of wine 3 times weekly; none 24 hr prior to surgery    Drug use: No

## 2025-03-14 NOTE — ASSESSMENT & PLAN NOTE
- Ms. Marian Durham presents with a small bowel obstruction   - she has h/o prior SBOs   - CT Shows moderate SBO   - NPO  - IV fluids for hydration   - PRN meds for pain / nausea  - consult with general surgery in AM

## 2025-03-14 NOTE — PROGRESS NOTES
Pharmacist Automatic Dose Adjustment Note    Marian Durham is a 79 y.o. female being treated with the medication enoxaparin    Patient Data:    Vital Signs (Most Recent):  Temp: 98.5 °F (36.9 °C) (03/14/25 0102)  Pulse: 72 (03/14/25 0731)  Resp: 18 (03/14/25 0731)  BP: (!) 112/55 (03/14/25 0732)  SpO2: 95 % (03/14/25 0728) Vital Signs (72h Range):  Temp:  [97.9 °F (36.6 °C)-98.5 °F (36.9 °C)]   Pulse:  [67-86]   Resp:  [17-37]   BP: (112-181)/(55-95)   SpO2:  [94 %-100 %]      Recent Labs   Lab 03/13/25 2105   CREATININE 1.1     Serum creatinine: 1.1 mg/dL 03/13/25 2105  Estimated creatinine clearance: 32.7 mL/min     Medication Dose Route Frequency   Previous Order Enoxaparin 40 mg SQ every 24 hours   New Order Enoxaparin 30 mg SQ every 24 hours for weight <50 kg     Renal dose adjustments performed as noted above.  We will continue monitoring and adjusting as necessary.    Pharmacist: Tamia Moore, PharmD  [phone]

## 2025-03-14 NOTE — ASSESSMENT & PLAN NOTE
-Admitted to inpatient status  -presented with abdominal distention, abdominal pain and nausea.  Noted history of multiple prior SBO and this feels exactly the same.  -CT abdomen/pelvis showed moderate small bowel obstruction without report of obvious transition point  -On admit afebrile with leukocytosis  -Consult placed to general surgery -she follows with Dr. Werner  -Already improving and leukocytosis has resolved and there is decreased distention, decreased pain and no vomiting.  -Continue conservative management with serial abdominal exams, anti-emetics and analagesics as needed.  If any deterioration could need NGT placement  -Continue NPO but for meds now.

## 2025-03-15 PROBLEM — K56.600 PARTIAL SMALL BOWEL OBSTRUCTION: Status: ACTIVE | Noted: 2025-03-14

## 2025-03-15 LAB
ANION GAP SERPL CALC-SCNC: 7 MMOL/L (ref 8–16)
BASOPHILS # BLD AUTO: 0.02 K/UL (ref 0–0.2)
BASOPHILS NFR BLD: 0.4 % (ref 0–1.9)
BUN SERPL-MCNC: 20 MG/DL (ref 8–23)
CALCIUM SERPL-MCNC: 8.2 MG/DL (ref 8.7–10.5)
CHLORIDE SERPL-SCNC: 114 MMOL/L (ref 95–110)
CO2 SERPL-SCNC: 18 MMOL/L (ref 23–29)
CREAT SERPL-MCNC: 0.8 MG/DL (ref 0.5–1.4)
DIFFERENTIAL METHOD BLD: NORMAL
EOSINOPHIL # BLD AUTO: 0.2 K/UL (ref 0–0.5)
EOSINOPHIL NFR BLD: 3.6 % (ref 0–8)
ERYTHROCYTE [DISTWIDTH] IN BLOOD BY AUTOMATED COUNT: 13.2 % (ref 11.5–14.5)
EST. GFR  (NO RACE VARIABLE): >60 ML/MIN/1.73 M^2
GLUCOSE SERPL-MCNC: 86 MG/DL (ref 70–110)
HCT VFR BLD AUTO: 38.1 % (ref 37–48.5)
HGB BLD-MCNC: 12.4 G/DL (ref 12–16)
IMM GRANULOCYTES # BLD AUTO: 0.02 K/UL (ref 0–0.04)
IMM GRANULOCYTES NFR BLD AUTO: 0.4 % (ref 0–0.5)
LYMPHOCYTES # BLD AUTO: 1.6 K/UL (ref 1–4.8)
LYMPHOCYTES NFR BLD: 28.2 % (ref 18–48)
MAGNESIUM SERPL-MCNC: 1.9 MG/DL (ref 1.6–2.6)
MCH RBC QN AUTO: 30.9 PG (ref 27–31)
MCHC RBC AUTO-ENTMCNC: 32.5 G/DL (ref 32–36)
MCV RBC AUTO: 95 FL (ref 82–98)
MONOCYTES # BLD AUTO: 0.6 K/UL (ref 0.3–1)
MONOCYTES NFR BLD: 11.3 % (ref 4–15)
NEUTROPHILS # BLD AUTO: 3.1 K/UL (ref 1.8–7.7)
NEUTROPHILS NFR BLD: 56.1 % (ref 38–73)
NRBC BLD-RTO: 0 /100 WBC
PLATELET # BLD AUTO: 172 K/UL (ref 150–450)
PMV BLD AUTO: 9.3 FL (ref 9.2–12.9)
POTASSIUM SERPL-SCNC: 3.8 MMOL/L (ref 3.5–5.1)
RBC # BLD AUTO: 4.01 M/UL (ref 4–5.4)
SODIUM SERPL-SCNC: 139 MMOL/L (ref 136–145)
WBC # BLD AUTO: 5.5 K/UL (ref 3.9–12.7)

## 2025-03-15 PROCEDURE — 99232 SBSQ HOSP IP/OBS MODERATE 35: CPT | Mod: ,,, | Performed by: SURGERY

## 2025-03-15 PROCEDURE — 25000003 PHARM REV CODE 250: Performed by: HOSPITALIST

## 2025-03-15 PROCEDURE — 83735 ASSAY OF MAGNESIUM: CPT | Performed by: PHYSICIAN ASSISTANT

## 2025-03-15 PROCEDURE — A4216 STERILE WATER/SALINE, 10 ML: HCPCS | Performed by: PHYSICIAN ASSISTANT

## 2025-03-15 PROCEDURE — 63600175 PHARM REV CODE 636 W HCPCS: Performed by: HOSPITALIST

## 2025-03-15 PROCEDURE — 85025 COMPLETE CBC W/AUTO DIFF WBC: CPT | Performed by: PHYSICIAN ASSISTANT

## 2025-03-15 PROCEDURE — 25000003 PHARM REV CODE 250: Performed by: PHYSICIAN ASSISTANT

## 2025-03-15 PROCEDURE — 36415 COLL VENOUS BLD VENIPUNCTURE: CPT | Performed by: PHYSICIAN ASSISTANT

## 2025-03-15 PROCEDURE — 94761 N-INVAS EAR/PLS OXIMETRY MLT: CPT

## 2025-03-15 PROCEDURE — 21400001 HC TELEMETRY ROOM

## 2025-03-15 PROCEDURE — 80048 BASIC METABOLIC PNL TOTAL CA: CPT | Performed by: PHYSICIAN ASSISTANT

## 2025-03-15 RX ADMIN — ENOXAPARIN SODIUM 30 MG: 40 INJECTION SUBCUTANEOUS at 05:03

## 2025-03-15 RX ADMIN — FLECAINIDE ACETATE 100 MG: 100 TABLET ORAL at 10:03

## 2025-03-15 RX ADMIN — PANTOPRAZOLE SODIUM 40 MG: 40 INJECTION, POWDER, FOR SOLUTION INTRAVENOUS at 10:03

## 2025-03-15 RX ADMIN — PANTOPRAZOLE SODIUM 40 MG: 40 INJECTION, POWDER, FOR SOLUTION INTRAVENOUS at 08:03

## 2025-03-15 RX ADMIN — FLECAINIDE ACETATE 100 MG: 100 TABLET ORAL at 08:03

## 2025-03-15 RX ADMIN — SODIUM CHLORIDE: 9 INJECTION, SOLUTION INTRAVENOUS at 05:03

## 2025-03-15 RX ADMIN — METOPROLOL SUCCINATE 25 MG: 25 TABLET, EXTENDED RELEASE ORAL at 08:03

## 2025-03-15 RX ADMIN — METOPROLOL SUCCINATE 25 MG: 25 TABLET, EXTENDED RELEASE ORAL at 10:03

## 2025-03-15 NOTE — PLAN OF CARE
Problem: Intestinal Obstruction  Goal: Optimal Bowel Function  Outcome: Progressing  Goal: Fluid and Electrolyte Balance  Outcome: Progressing  Goal: Absence of Infection Signs and Symptoms  Outcome: Progressing  Goal: Optimize Nutrition Status  Outcome: Progressing  Goal: Optimal Pain Control and Function  Outcome: Progressing     Bowel sounds are active, last BM reported 3/14/25 by patient, no flatus reported

## 2025-03-15 NOTE — PROGRESS NOTES
[facility] [address] Medicine  Progress Note    Patient Name: Marian Durham  MRN: [id number]  Patient Class: IP- Inpatient   Admission Date: 3/13/2025  Length of Stay: 1 days  Attending Physician: Mainor Lombardi MD  Primary Care Provider: Suzanna Duran MD        Subjective     Principal Problem:Partial small bowel obstruction        HPI:  Ms. Marian Durham is a 79 y.o. female, with PMH of prior bowel obstructions, A. Fib on Eliquis, ADILENE, who [place] [facility] [place]13[place] due to severe, cramping abdominal pain across the entire abdomen associated with nausea and vomiting.  She states the pain feels just like her prior bowel obstructions.  She notes associated chills.  She states she had to bowel movements today one at 4:00 p.m., and again at 5:00 p.m..  She states the symptoms began suddenly at 4:00 p.m. the day of presentation.  She denies fever, chest pain, cough, congestion, dysuria, hematuria.  She was evaluated in the ED with labs that showed leukocytosis 19 K, with left shift.  H&H were normal.  A metabolic panel showed BUN of 30, creatinine 1.1, CO2 of 19, anion gap of 13, glucose of 133, no hyperkalemia.  A CT of the abdomen and pelvis showed a moderate small-bowel obstruction.  She was treated in the ED with pain medication, nausea medicine, and 1 L of IV fluids.  She was placed admitted inpatient status    Overview/Hospital Course:  No notes on file    Interval History: No acute events overnight.  Noted she had 2 solid bowel movements yesterday.  Still with some abdominal cramping.  Abdomen is less distended.  All questions answered and patient had no further complaints.    Objective:     Vital Signs (Most Recent):  Temp: 97.5 °F (36.4 °C) (03/15/25 1118)  Pulse: (!) 57 (03/15/25 1150)  Resp: 18 (03/15/25 1118)  BP: (!) 176/73 (03/15/25 1118)  SpO2: 97 % (03/15/25 1118) Vital Signs (24h Range):  Temp:  [97.5 °F (36.4 °C)-98.2 °F (36.8 °C)] 97.5 °F (36.4 °C)  Pulse:   [57-81] 57  Resp:  [15-20] 18  SpO2:  [93 %-98 %] 97 %  BP: (125-179)/(61-78) 176/73     Weight: 49.9 kg (110 lb 0.2 oz)  Body mass index is 18.31 kg/m².    Intake/Output Summary (Last 24 hours) at 3/15/2025 1219  Last data filed at 3/15/2025 1029  Gross per 24 hour   Intake 480 ml   Output 300 ml   Net 180 ml         Physical Exam  Vitals and nursing note reviewed.   Constitutional:       General: She is not in acute distress.     Appearance: Normal appearance. She is well-developed and normal weight. She is not ill-appearing, toxic-appearing or diaphoretic.   HENT:      Head: Normocephalic and atraumatic.      Mouth/Throat:      Mouth: Mucous membranes are moist.   Eyes:      Extraocular Movements: Extraocular movements intact.   Neck:      Trachea: No tracheal deviation.   Cardiovascular:      Rate and Rhythm: Normal rate and regular rhythm.   Pulmonary:      Effort: Pulmonary effort is normal.      Breath sounds: Normal breath sounds.   Abdominal:      General: Bowel sounds are decreased.      Comments: Soft, less distended, bowel sounds very active, slight diffuse tenderness without rebound or guarding   Musculoskeletal:         General: Normal range of motion.      Cervical back: Normal range of motion.   Skin:     General: Skin is warm and dry.   Neurological:      General: No focal deficit present.      Mental Status: She is alert and oriented to person, place, and time.      Motor: No abnormal muscle tone.   Psychiatric:         Mood and Affect: Mood normal.         Behavior: Behavior normal.               Significant Labs: All pertinent labs within the past 24 hours have been reviewed.    Significant Imaging: I have reviewed all pertinent imaging results/findings within the past 24 hours.      Assessment & Plan  Partial small bowel obstruction  -Admitted to inpatient status  -presented with abdominal distention, abdominal pain and nausea.  Noted history of multiple prior SBO and this feels exactly the  same.  -CT abdomen/pelvis showed moderate small bowel obstruction without report of obvious transition point  -On admit afebrile with leukocytosis  -Consult placed to general surgery -she follows with Dr. Werner outpatient  -Clinically she is improving.  Afebrile and leukocytosis resolved.  Bowel function is returning - noted 2 solid bowel movements yesterday.  Reports abdominal cramping and less distention today.  -Will advance to clear liquid diet today.  If tolerates may be able to advance further and possible discharge home tomorrow.  If any deterioration, would place NGT to [facility].  -Continue conservative management with serial abdominal exams, anti-emetics and analagesics as needed.  If any deterioration could need NGT placement  Atrial fibrillation  Chronic anticoagulation  -Noted history of pAFIB and is in NSR now  -Continue home flecainide and toprol  -Holding home eliquis in case surgery may be needed  Hypercholesterolemia  -Holding statin while NPO  VTE Risk Mitigation (From admission, onward)           Ordered     enoxaparin injection 30 mg  Every 24 hours         03/14/25 0754     IP VTE HIGH RISK PATIENT  Once         03/14/25 0027     Place sequential compression device  Until discontinued         03/14/25 0027     Reason for No Pharmacological VTE Prophylaxis  Once        Question:  Reasons:  Answer:  Already adequately anticoagulated on oral Anticoagulants    03/14/25 0027                    Discharge Planning   WILLIAN:      Code Status: Full Code   Medical Readiness for Discharge Date:   Discharge Plan A: Home                Please place Justification for DME        Mainor Lombardi MD  Department of Hospital Medicine   [facility] - Med Surg ([address])

## 2025-03-15 NOTE — PROGRESS NOTES
[facility] ([address])  General Surgery  Progress Note    Subjective:     Interval History:  Had bowel movements overnight with some flatus.  No significant pain but is slightly bloated after drinking liquids this morning.  Overall she feels much better    Post-Op Info:  * No surgery found *          Medications:  Continuous Infusions:   0.9% NaCl   Intravenous Continuous 100 mL/hr at 03/15/25 0501 New Bag at 03/15/25 0501     Scheduled Meds:   enoxparin  30 mg Subcutaneous Q24H (prophylaxis, 1700)    flecainide  100 mg Oral Q12H    metoprolol succinate  25 mg Oral Q12H    pantoprazole  40 mg Intravenous Q12H    sodium chloride 0.9%  10 mL Intravenous Q8H     PRN Meds:  Current Facility-Administered Medications:     acetaminophen, 650 mg, Oral, Q6H PRN    dextrose 50%, 12.5 g, Intravenous, PRN    dextrose 50%, 25 g, Intravenous, PRN    glucagon (human recombinant), 1 mg, Intramuscular, PRN    glucose, 16 g, Oral, PRN    glucose, 24 g, Oral, PRN    HYDROmorphone, 0.5 mg, Intravenous, Q4H PRN    melatonin, 6 mg, Oral, Nightly PRN    naloxone, 0.02 mg, Intravenous, PRN    ondansetron, 4 mg, Intravenous, Q6H PRN     Objective:     Vital Signs (Most Recent):  Temp: 97.6 °F (36.4 °C) (03/15/25 1305)  Pulse: 66 (03/15/25 1305)  Resp: 18 (03/15/25 1305)  BP: (!) 149/65 (03/15/25 1305)  SpO2: 95 % (03/15/25 1305) Vital Signs (24h Range):  Temp:  [97.5 °F (36.4 °C)-98.2 °F (36.8 °C)] 97.6 °F (36.4 °C)  Pulse:  [57-81] 66  Resp:  [15-20] 18  SpO2:  [93 %-98 %] 95 %  BP: (125-179)/(61-78) 149/65       Intake/Output Summary (Last 24 hours) at 3/15/2025 1312  Last data filed at 3/15/2025 1029  Gross per 24 hour   Intake 480 ml   Output 300 ml   Net 180 ml       Physical Exam  General no apparent distress   Abdomen mild distention nontender  Significant Labs:  CBC:   Recent Labs   Lab 03/15/25  0329   WBC 5.50   RBC 4.01   HGB 12.4   HCT 38.1      MCV 95   MCH 30.9   MCHC 32.5     CMP:   Recent Labs   Lab  03/13/25 2105 03/14/25  0822 03/15/25  0329   *   < > 86   CALCIUM 10.6*   < > 8.2*   ALBUMIN 4.7  --   --    PROT 7.9  --   --       < > 139   K 4.2   < > 3.8   CO2 19*   < > 18*      < > 114*   BUN 30*   < > 20   CREATININE 1.1   < > 0.8   ALKPHOS 91  --   --    ALT 15  --   --    AST 20  --   --    BILITOT 0.6  --   --     < > = values in this interval not displayed.       Significant Diagnostics:  None    Assessment/Plan:     Active Diagnoses:    Diagnosis Date Noted POA    PRINCIPAL PROBLEM:  Partial small bowel obstruction [K56.600] 03/14/2025 Yes    Chronic anticoagulation [Z79.01] 02/02/2024 Not Applicable    Hypercholesterolemia [E78.00] 02/02/2024 Yes    Atrial fibrillation [I48.91] 06/30/2023 Yes      Problems Resolved During this Admission:   Partial small bowel obstruction  She seems to be resolving faster than previous episodes.  She is having flatus and bowel movements.  Slight cramping after clear liquids clear.  As this resolves we could advance to low residue diet.  Yesterday and today I have discussed the ins and outs of low residue diet.  No operative intervention is indicated nor requested at this time      Mainor Castellon MD  General Surgery  [facility] ([address])

## 2025-03-15 NOTE — PLAN OF CARE
Case Management Assessment     PCP: Suzanna Duran MD  Pharmacy: [facility]     Patient Arrived From: Home  Existing Help at Home: N/A    Barriers to Discharge: None known at this time    Discharge Plan:    A. Home   B. Home with family      Patient information was obtained from patient, past medical records, and ER records.    completed discharge assessment with pt. Pt lives alone. Demographics, PCP, and insurance verified. No home health. No dialysis. Pt completes ADLs without assistance. Pt to discharge home via family transport. Pt has no other needs to be addressed at this time.    [facility] - Med Surg ([address])  Initial Discharge Assessment       Primary Care Provider: Suzanna Duran MD    Admission Diagnosis: Vomiting [R11.10]  Small bowel obstruction [K56.609]  Constipation [K59.00]    Admission Date: 3/13/2025  Expected Discharge Date:     Transition of Care Barriers: None    Payor: [employer] MANAGED MEDICARE / Plan: [employer] HMO PPO SPECIAL NEEDS / Product Type: Medicare Advantage /     Extended Emergency Contact Information  Primary Emergency Contact: Evelin Lyons  Address: [facility] [place] [address] [facility]  Home Phone: [phone]  Mobile Phone: [phone]  Relation: Friend  Secondary Emergency Contact: Duane Durham   [facility]  Home Phone: [phone]  Relation: Son    Discharge Plan A: Home         [facility]  [address]  [facility] [address]  Phone: [phone] Fax: [phone]    [id number] [place], [place] [address] [facility]  [address] [facility] [address]  Phone: [phone] Fax: [phone]      Initial Assessment (most recent)       Adult Discharge Assessment - 03/15/25 0849          Discharge Assessment    Assessment Type Discharge Planning Assessment     Confirmed/corrected address, phone number and  insurance Yes     Confirmed Demographics Correct on Facesheet     Source of Information health record     People in Home alone     Facility Arrived From: Home     Do you expect to return to your current living situation? Yes     Do you have help at home or someone to help you manage your care at home? No     Prior to hospitilization cognitive status: Alert/Oriented     Current cognitive status: Alert/Oriented     Walking or Climbing Stairs Difficulty no     Dressing/Bathing Difficulty no     Equipment Currently Used at Home none     Readmission within 30 days? No     Patient currently being followed by outpatient case management? No     Do you currently have service(s) that help you manage your care at home? No     Do you take prescription medications? Yes     Do you have prescription coverage? Yes     Coverage HU[employer] MANAGED MEDICARE - [employer] HMO PPO SPECIAL NEEDS     Do you have any problems affording any of your prescribed medications? No     Is the patient taking medications as prescribed? yes     Who is going to help you get home at discharge? Self/Family     How do you get to doctors appointments? car, drives self;family or friend will provide     Are you on dialysis? No     Do you take coumadin? No     Discharge Plan A Home     DME Needed Upon Discharge  none     Transition of Care Barriers None

## 2025-03-15 NOTE — SUBJECTIVE & OBJECTIVE
Interval History: No acute events overnight.  Noted she had 2 solid bowel movements yesterday.  Still with some abdominal cramping.  Abdomen is less distended.  All questions answered and patient had no further complaints.    Objective:     Vital Signs (Most Recent):  Temp: 97.5 °F (36.4 °C) (03/15/25 1118)  Pulse: (!) 57 (03/15/25 1150)  Resp: 18 (03/15/25 1118)  BP: (!) 176/73 (03/15/25 1118)  SpO2: 97 % (03/15/25 1118) Vital Signs (24h Range):  Temp:  [97.5 °F (36.4 °C)-98.2 °F (36.8 °C)] 97.5 °F (36.4 °C)  Pulse:  [57-81] 57  Resp:  [15-20] 18  SpO2:  [93 %-98 %] 97 %  BP: (125-179)/(61-78) 176/73     Weight: 49.9 kg (110 lb 0.2 oz)  Body mass index is 18.31 kg/m².    Intake/Output Summary (Last 24 hours) at 3/15/2025 1219  Last data filed at 3/15/2025 1029  Gross per 24 hour   Intake 480 ml   Output 300 ml   Net 180 ml         Physical Exam  Vitals and nursing note reviewed.   Constitutional:       General: She is not in acute distress.     Appearance: Normal appearance. She is well-developed and normal weight. She is not ill-appearing, toxic-appearing or diaphoretic.   HENT:      Head: Normocephalic and atraumatic.      Mouth/Throat:      Mouth: Mucous membranes are moist.   Eyes:      Extraocular Movements: Extraocular movements intact.   Neck:      Trachea: No tracheal deviation.   Cardiovascular:      Rate and Rhythm: Normal rate and regular rhythm.   Pulmonary:      Effort: Pulmonary effort is normal.      Breath sounds: Normal breath sounds.   Abdominal:      General: Bowel sounds are decreased.      Comments: Soft, less distended, bowel sounds very active, slight diffuse tenderness without rebound or guarding   Musculoskeletal:         General: Normal range of motion.      Cervical back: Normal range of motion.   Skin:     General: Skin is warm and dry.   Neurological:      General: No focal deficit present.      Mental Status: She is alert and oriented to person, place, and time.      Motor: No abnormal  muscle tone.   Psychiatric:         Mood and Affect: Mood normal.         Behavior: Behavior normal.               Significant Labs: All pertinent labs within the past 24 hours have been reviewed.    Significant Imaging: I have reviewed all pertinent imaging results/findings within the past 24 hours.

## 2025-03-15 NOTE — ASSESSMENT & PLAN NOTE
-Admitted to inpatient status  -presented with abdominal distention, abdominal pain and nausea.  Noted history of multiple prior SBO and this feels exactly the same.  -CT abdomen/pelvis showed moderate small bowel obstruction without report of obvious transition point  -On admit afebrile with leukocytosis  -Consult placed to general surgery -she follows with Dr. Werner outpatient  -Clinically she is improving.  Afebrile and leukocytosis resolved.  Bowel function is returning - noted 2 solid bowel movements yesterday.  Reports abdominal cramping and less distention today.  -Will advance to clear liquid diet today.  If tolerates may be able to advance further and possible discharge home tomorrow.  If any deterioration, would place NGT to LI.  -Continue conservative management with serial abdominal exams, anti-emetics and analagesics as needed.  If any deterioration could need NGT placement

## 2025-03-16 LAB
ANION GAP SERPL CALC-SCNC: 9 MMOL/L (ref 8–16)
BASOPHILS # BLD AUTO: 0.02 K/UL (ref 0–0.2)
BASOPHILS NFR BLD: 0.4 % (ref 0–1.9)
BUN SERPL-MCNC: 12 MG/DL (ref 8–23)
CALCIUM SERPL-MCNC: 8.5 MG/DL (ref 8.7–10.5)
CHLORIDE SERPL-SCNC: 112 MMOL/L (ref 95–110)
CO2 SERPL-SCNC: 19 MMOL/L (ref 23–29)
CREAT SERPL-MCNC: 0.8 MG/DL (ref 0.5–1.4)
DIFFERENTIAL METHOD BLD: ABNORMAL
EOSINOPHIL # BLD AUTO: 0.2 K/UL (ref 0–0.5)
EOSINOPHIL NFR BLD: 3.3 % (ref 0–8)
ERYTHROCYTE [DISTWIDTH] IN BLOOD BY AUTOMATED COUNT: 12.9 % (ref 11.5–14.5)
EST. GFR  (NO RACE VARIABLE): >60 ML/MIN/1.73 M^2
GLUCOSE SERPL-MCNC: 68 MG/DL (ref 70–110)
HCT VFR BLD AUTO: 34.7 % (ref 37–48.5)
HGB BLD-MCNC: 11.5 G/DL (ref 12–16)
IMM GRANULOCYTES # BLD AUTO: 0.01 K/UL (ref 0–0.04)
IMM GRANULOCYTES NFR BLD AUTO: 0.2 % (ref 0–0.5)
LYMPHOCYTES # BLD AUTO: 1.4 K/UL (ref 1–4.8)
LYMPHOCYTES NFR BLD: 27.2 % (ref 18–48)
MAGNESIUM SERPL-MCNC: 1.9 MG/DL (ref 1.6–2.6)
MCH RBC QN AUTO: 30.8 PG (ref 27–31)
MCHC RBC AUTO-ENTMCNC: 33.1 G/DL (ref 32–36)
MCV RBC AUTO: 93 FL (ref 82–98)
MONOCYTES # BLD AUTO: 0.5 K/UL (ref 0.3–1)
MONOCYTES NFR BLD: 10.3 % (ref 4–15)
NEUTROPHILS # BLD AUTO: 3 K/UL (ref 1.8–7.7)
NEUTROPHILS NFR BLD: 58.6 % (ref 38–73)
NRBC BLD-RTO: 0 /100 WBC
OHS QRS DURATION: 88 MS
OHS QTC CALCULATION: 450 MS
PLATELET # BLD AUTO: 181 K/UL (ref 150–450)
PMV BLD AUTO: 9.1 FL (ref 9.2–12.9)
POTASSIUM SERPL-SCNC: 3.8 MMOL/L (ref 3.5–5.1)
RBC # BLD AUTO: 3.73 M/UL (ref 4–5.4)
SODIUM SERPL-SCNC: 140 MMOL/L (ref 136–145)
WBC # BLD AUTO: 5.15 K/UL (ref 3.9–12.7)

## 2025-03-16 PROCEDURE — 83735 ASSAY OF MAGNESIUM: CPT | Performed by: PHYSICIAN ASSISTANT

## 2025-03-16 PROCEDURE — 97161 PT EVAL LOW COMPLEX 20 MIN: CPT

## 2025-03-16 PROCEDURE — 36415 COLL VENOUS BLD VENIPUNCTURE: CPT | Performed by: PHYSICIAN ASSISTANT

## 2025-03-16 PROCEDURE — 63600175 PHARM REV CODE 636 W HCPCS: Performed by: HOSPITALIST

## 2025-03-16 PROCEDURE — A4216 STERILE WATER/SALINE, 10 ML: HCPCS | Performed by: PHYSICIAN ASSISTANT

## 2025-03-16 PROCEDURE — 25000003 PHARM REV CODE 250: Performed by: PHYSICIAN ASSISTANT

## 2025-03-16 PROCEDURE — 99232 SBSQ HOSP IP/OBS MODERATE 35: CPT | Mod: ,,, | Performed by: SURGERY

## 2025-03-16 PROCEDURE — 94761 N-INVAS EAR/PLS OXIMETRY MLT: CPT

## 2025-03-16 PROCEDURE — 85025 COMPLETE CBC W/AUTO DIFF WBC: CPT | Performed by: PHYSICIAN ASSISTANT

## 2025-03-16 PROCEDURE — 80048 BASIC METABOLIC PNL TOTAL CA: CPT | Performed by: PHYSICIAN ASSISTANT

## 2025-03-16 PROCEDURE — 21400001 HC TELEMETRY ROOM

## 2025-03-16 RX ORDER — HYDRALAZINE HYDROCHLORIDE 20 MG/ML
15 INJECTION INTRAMUSCULAR; INTRAVENOUS EVERY 4 HOURS PRN
Status: DISCONTINUED | OUTPATIENT
Start: 2025-03-16 | End: 2025-03-17 | Stop reason: HOSPADM

## 2025-03-16 RX ADMIN — METOPROLOL SUCCINATE 25 MG: 25 TABLET, EXTENDED RELEASE ORAL at 08:03

## 2025-03-16 RX ADMIN — FLECAINIDE ACETATE 100 MG: 100 TABLET ORAL at 08:03

## 2025-03-16 RX ADMIN — Medication 10 ML: at 09:03

## 2025-03-16 RX ADMIN — ENOXAPARIN SODIUM 30 MG: 40 INJECTION SUBCUTANEOUS at 05:03

## 2025-03-16 RX ADMIN — FLECAINIDE ACETATE 100 MG: 100 TABLET ORAL at 10:03

## 2025-03-16 RX ADMIN — PANTOPRAZOLE SODIUM 40 MG: 40 INJECTION, POWDER, FOR SOLUTION INTRAVENOUS at 10:03

## 2025-03-16 RX ADMIN — METOPROLOL SUCCINATE 25 MG: 25 TABLET, EXTENDED RELEASE ORAL at 10:03

## 2025-03-16 RX ADMIN — PANTOPRAZOLE SODIUM 40 MG: 40 INJECTION, POWDER, FOR SOLUTION INTRAVENOUS at 08:03

## 2025-03-16 NOTE — SUBJECTIVE & OBJECTIVE
Interval History: No acute events overnight.  Had 2 bowel movements yesterday and one this morning.  Slight increase in abdominal distention, but looks good today.  All questions answered and patient had no further complaints.    Objective:     Vital Signs (Most Recent):  Temp: 97.7 °F (36.5 °C) (03/16/25 0730)  Pulse: 68 (03/16/25 0730)  Resp: 18 (03/16/25 0730)  BP: (!) 171/70 (03/16/25 0730)  SpO2: 96 % (03/16/25 0730) Vital Signs (24h Range):  Temp:  [97.7 °F (36.5 °C)-98 °F (36.7 °C)] 97.7 °F (36.5 °C)  Pulse:  [63-84] 68  Resp:  [12-18] 18  SpO2:  [95 %-97 %] 96 %  BP: (153-171)/(66-75) 171/70     Weight: 49.9 kg (110 lb 0.2 oz)  Body mass index is 18.31 kg/m².    Intake/Output Summary (Last 24 hours) at 3/16/2025 1510  Last data filed at 3/16/2025 1301  Gross per 24 hour   Intake 1440 ml   Output 300 ml   Net 1140 ml         Physical Exam  Vitals and nursing note reviewed.   Constitutional:       General: She is not in acute distress.     Appearance: Normal appearance. She is well-developed and normal weight. She is not ill-appearing, toxic-appearing or diaphoretic.   HENT:      Head: Normocephalic and atraumatic.      Mouth/Throat:      Mouth: Mucous membranes are moist.   Eyes:      Extraocular Movements: Extraocular movements intact.   Neck:      Trachea: No tracheal deviation.   Cardiovascular:      Rate and Rhythm: Normal rate and regular rhythm.   Pulmonary:      Effort: Pulmonary effort is normal.      Breath sounds: Normal breath sounds.   Abdominal:      General: Bowel sounds are decreased.      Comments: Soft, very slight increase in distention, bowel sounds very active, non-tender today   Musculoskeletal:         General: Normal range of motion.      Cervical back: Normal range of motion.   Skin:     General: Skin is warm and dry.   Neurological:      General: No focal deficit present.      Mental Status: She is alert and oriented to person, place, and time.      Motor: No abnormal muscle tone.    Psychiatric:         Mood and Affect: Mood normal.         Behavior: Behavior normal.           Significant Labs: All pertinent labs within the past 24 hours have been reviewed.    Significant Imaging: I have reviewed all pertinent imaging results/findings within the past 24 hours.

## 2025-03-16 NOTE — PROGRESS NOTES
[facility] [address] Medicine  Progress Note    Patient Name: Marian Durham  MRN: [id number]  Patient Class: IP- Inpatient   Admission Date: 3/13/2025  Length of Stay: 2 days  Attending Physician: Mainor Lombardi MD  Primary Care Provider: Suzanna Duran MD        Subjective     Principal Problem:Partial small bowel obstruction        HPI:  Ms. Marian Durham is a 79 y.o. female, with PMH of prior bowel obstructions, A. Fib on Eliquis, ADILENE, who presented to [facility] ED on 03/13/2025 due to severe, cramping abdominal pain across the entire abdomen associated with nausea and vomiting.  She states the pain feels just like her prior bowel obstructions.  She notes associated chills.  She states she had to bowel movements today one at 4:00 p.m., and again at 5:00 p.m..  She states the symptoms began suddenly at 4:00 p.m. the day of presentation.  She denies fever, chest pain, cough, congestion, dysuria, hematuria.  She was evaluated in the ED with labs that showed leukocytosis 19 K, with left shift.  H&H were normal.  A metabolic panel showed BUN of 30, creatinine 1.1, CO2 of 19, anion gap of 13, glucose of 133, no hyperkalemia.  A CT of the abdomen and pelvis showed a moderate small-bowel obstruction.  She was treated in the ED with pain medication, nausea medicine, and 1 L of IV fluids.  She was placed admitted inpatient status    Overview/Hospital Course:  No notes on file    Interval History: No acute events overnight.  Had 2 bowel movements yesterday and one this morning.  Slight increase in abdominal distention, but looks good today.  All questions answered and patient had no further complaints.    Objective:     Vital Signs (Most Recent):  Temp: 97.7 °F (36.5 °C) (03/16/25 0730)  Pulse: 68 (03/16/25 0730)  Resp: 18 (03/16/25 0730)  BP: (!) 171/70 (03/16/25 0730)  SpO2: 96 % (03/16/25 0730) Vital Signs (24h Range):  Temp:  [97.7 °F (36.5 °C)-98 °F (36.7 °C)] 97.7 °F (36.5 °C)  Pulse:   [63-84] 68  Resp:  [12-18] 18  SpO2:  [95 %-97 %] 96 %  BP: (153-171)/(66-75) 171/70     Weight: 49.9 kg (110 lb 0.2 oz)  Body mass index is 18.31 kg/m².    Intake/Output Summary (Last 24 hours) at 3/16/2025 1510  Last data filed at 3/16/2025 1301  Gross per 24 hour   Intake 1440 ml   Output 300 ml   Net 1140 ml         Physical Exam  Vitals and nursing note reviewed.   Constitutional:       General: She is not in acute distress.     Appearance: Normal appearance. She is well-developed and normal weight. She is not ill-appearing, toxic-appearing or diaphoretic.   HENT:      Head: Normocephalic and atraumatic.      Mouth/Throat:      Mouth: Mucous membranes are moist.   Eyes:      Extraocular Movements: Extraocular movements intact.   Neck:      Trachea: No tracheal deviation.   Cardiovascular:      Rate and Rhythm: Normal rate and regular rhythm.   Pulmonary:      Effort: Pulmonary effort is normal.      Breath sounds: Normal breath sounds.   Abdominal:      General: Bowel sounds are decreased.      Comments: Soft, very slight increase in distention, bowel sounds very active, non-tender today   Musculoskeletal:         General: Normal range of motion.      Cervical back: Normal range of motion.   Skin:     General: Skin is warm and dry.   Neurological:      General: No focal deficit present.      Mental Status: She is alert and oriented to person, place, and time.      Motor: No abnormal muscle tone.   Psychiatric:         Mood and Affect: Mood normal.         Behavior: Behavior normal.           Significant Labs: All pertinent labs within the past 24 hours have been reviewed.    Significant Imaging: I have reviewed all pertinent imaging results/findings within the past 24 hours.      Assessment & Plan  Partial small bowel obstruction  -Admitted to inpatient status  -presented with abdominal distention, abdominal pain and nausea.  Noted history of multiple prior SBO and this feels exactly the same.  -CT  abdomen/pelvis showed moderate small bowel obstruction without report of obvious transition point  -On admit afebrile with leukocytosis  -Consult placed to general surgery - she follows with Dr. Werner outpatient  -Clinically she is improving.  She remains afebrile with normal WBC and bowel function appears to have returned. Noted 2 bowel movements yesterday and one this morning.  Tolerated clears and advanced to low residue diet today.  Noted some post prandial cramping which was mild.  Patient hoping to stay until Tuesday.  -Continue conservative management with serial abdominal exams, anti-emetics and analagesics as needed.  If any deterioration could need NGT placement  Atrial fibrillation  Chronic anticoagulation  -Noted history of pAFIB and is in NSR now  -Continue home flecainide and toprol  -Holding home eliquis in case surgery may be needed  Hypercholesterolemia  -Holding statin while NPO  Elevated blood pressure reading  -BP is consistently elevated, but she does not have history of hypertension  -Does not complain of signifcant pain - only some cramping after eating.  -Continue home toprol (for afib) and will stop IV fluids and monitor  -PRN hydralazine available if SBP > 180  VTE Risk Mitigation (From admission, onward)           Ordered     enoxaparin injection 30 mg  Every 24 hours         03/14/25 0754     IP VTE HIGH RISK PATIENT  Once         03/14/25 0027     Place sequential compression device  Until discontinued         03/14/25 0027     Reason for No Pharmacological VTE Prophylaxis  Once        Question:  Reasons:  Answer:  Already adequately anticoagulated on oral Anticoagulants    03/14/25 0027                    Discharge Planning   WILLIAN:      Code Status: Full Code   Medical Readiness for Discharge Date:   Discharge Plan A: Home                Please place Justification for DME        Mainor Lombardi MD  Department of Hospital Medicine   [facility] ([address])

## 2025-03-16 NOTE — ASSESSMENT & PLAN NOTE
-Admitted to inpatient status  -presented with abdominal distention, abdominal pain and nausea.  Noted history of multiple prior SBO and this feels exactly the same.  -CT abdomen/pelvis showed moderate small bowel obstruction without report of obvious transition point  -On admit afebrile with leukocytosis  -Consult placed to general surgery - she follows with Dr. Werner outpatient  -Clinically she is improving.  She remains afebrile with normal WBC and bowel function appears to have returned. Noted 2 bowel movements yesterday and one this morning.  Tolerated clears and advanced to low residue diet today.  Noted some post prandial cramping which was mild.  Patient hoping to stay until Tuesday.  -Continue conservative management with serial abdominal exams, anti-emetics and analagesics as needed.  If any deterioration could need NGT placement

## 2025-03-16 NOTE — PROGRESS NOTES
[facility] ([address])  General Surgery  Progress Note    Subjective:     Interval History:  Had bowel movement and continues with flatus.  Currently eating a low residue diet.  Still felt a little bloated after eating.  But making improvements.  No nausea or vomiting.  No actual pain    Post-Op Info:  * No surgery found *          Medications:  Continuous Infusions:   0.9% NaCl   Intravenous Continuous 50 mL/hr at 03/15/25 1223 Rate Change at 03/15/25 1223     Scheduled Meds:   enoxparin  30 mg Subcutaneous Q24H (prophylaxis, 1700)    flecainide  100 mg Oral Q12H    metoprolol succinate  25 mg Oral Q12H    pantoprazole  40 mg Intravenous Q12H    sodium chloride 0.9%  10 mL Intravenous Q8H     PRN Meds:  Current Facility-Administered Medications:     acetaminophen, 650 mg, Oral, Q6H PRN    dextrose 50%, 12.5 g, Intravenous, PRN    dextrose 50%, 25 g, Intravenous, PRN    glucagon (human recombinant), 1 mg, Intramuscular, PRN    glucose, 16 g, Oral, PRN    glucose, 24 g, Oral, PRN    HYDROmorphone, 0.5 mg, Intravenous, Q4H PRN    melatonin, 6 mg, Oral, Nightly PRN    naloxone, 0.02 mg, Intravenous, PRN    ondansetron, 4 mg, Intravenous, Q6H PRN     Objective:     Vital Signs (Most Recent):  Temp: 97.7 °F (36.5 °C) (03/16/25 0730)  Pulse: 68 (03/16/25 0730)  Resp: 18 (03/16/25 0730)  BP: (!) 171/70 (03/16/25 0730)  SpO2: 96 % (03/16/25 0730) Vital Signs (24h Range):  Temp:  [97.7 °F (36.5 °C)-98 °F (36.7 °C)] 97.7 °F (36.5 °C)  Pulse:  [63-84] 68  Resp:  [12-18] 18  SpO2:  [95 %-97 %] 96 %  BP: (153-171)/(66-75) 171/70       Intake/Output Summary (Last 24 hours) at 3/16/2025 1329  Last data filed at 3/16/2025 1301  Gross per 24 hour   Intake 1440 ml   Output 300 ml   Net 1140 ml       Physical Exam    Significant Labs:  CBC:   Recent Labs   Lab 03/16/25  0408   WBC 5.15   RBC 3.73*   HGB 11.5*   HCT 34.7*      MCV 93   MCH 30.8   MCHC 33.1     CMP:   Recent Labs   Lab 03/16/25  0408   GLU 68*    CALCIUM 8.5*      K 3.8   CO2 19*   *   BUN 12   CREATININE 0.8       Significant Diagnostics:  None    Assessment/Plan:     Active Diagnoses:    Diagnosis Date Noted POA    PRINCIPAL PROBLEM:  Partial small bowel obstruction [K56.600] 03/14/2025 Yes    Chronic anticoagulation [Z79.01] 02/02/2024 Not Applicable    Hypercholesterolemia [E78.00] 02/02/2024 Yes    Atrial fibrillation [I48.91] 06/30/2023 Yes      Problems Resolved During this Admission:   Other than mild bloating she seems almost ready for discharge.  I thought that tomorrow would be a good data discharge.  She was thinking discharge on Tuesday however.  We have had continued discussion about what foods may be better or worse for her      Mainor Castellon MD  General Surgery  [facility] ([address])

## 2025-03-16 NOTE — PLAN OF CARE
"No issues overnight. Clear liquids tolerated. Pt states she "feels bloated" but denies nausea. VSS.      Problem: Adult Inpatient Plan of Care  Goal: Plan of Care Review  Outcome: Progressing  Goal: Absence of Hospital-Acquired Illness or Injury  Outcome: Progressing  Goal: Optimal Comfort and Wellbeing  Outcome: Progressing     Problem: Intestinal Obstruction  Goal: Optimal Bowel Function  Outcome: Progressing  Goal: Fluid and Electrolyte Balance  Outcome: Progressing  Goal: Optimize Nutrition Status  Outcome: Progressing  Goal: Optimal Pain Control and Function  Outcome: Progressing     "

## 2025-03-16 NOTE — PT/OT/SLP EVAL
Physical Therapy Evaluation and Discharge Note    Patient Name:  Marian Durham   MRN:  [id number]    Recommendations:     Discharge Recommendations: No Therapy Indicated  Discharge Equipment Recommendations: none   Barriers to discharge: None    Assessment:     Marian Durham is a 79 y.o. female admitted with a medical diagnosis of Partial small bowel obstruction. .  At this time, patient is functioning at their prior level of function and does not require further acute PT services.     Recent Surgery: * No surgery found *      Plan:     During this hospitalization, patient does not require further acute PT services.  Please re-consult if situation changes.      Subjective     Chief Complaint: abdominal pain but is doing much better now   Patient/Family Comments/goals: patient agrees to participate in PT intervention   Pain/Comfort:  Pain Rating 1: 4/10  Location 1: abdomen  Pain Addressed 1: Reposition, Distraction    Patients cultural, spiritual, Sabianist conflicts given the current situation: no    Living Environment:  Patient lives alone on [address] with 17 steps to enter the home but has access to chair lift in the back of the house.   Prior to admission, patients level of function was independent with all functional mobility and ADLs.  Equipment used at home: none (owns RW, BSC, WC and bath bench post TKR).  DME owned (not currently used): rolling walker, bedside commode, and wheelchair.  Upon discharge, patient will have assistance from NA.    Objective:     Communicated with nursing prior to session.  Patient found sitting edge of bed with peripheral IV, telemetry upon PT entry to room.    General Precautions: Standard, fall    Orthopedic Precautions:N/A   Braces: N/A  Respiratory Status: Room air    Exams:  Cognition:   Patient is oriented to person, place, time and situation .  Pt follows approximately 100% of one and two step  commands.    Mood: Pleasant and cooperative.   Safety Awareness: good    Musculoskeletal:  BMI: 18  Posture:  slouched shoulders  LE ROM/Strength:   R ROM: WNl  L ROM: WNL  R Strength:   Knee extension: 4/5  Dorsiflexion: 4/5   L Strength:   Knee extension: 4/5  Dorsiflexion: 4/5   Neuromuscular:  Sensation: Intact to light touch bilateral LEs.   Tone/Reflexes: No impairments identified with functional mobility. No formal testing performed.   Balance:   Static sitting: good  Static standing: good  Dynamic standing: good  Visual-vestibular: No impairments identified with functional mobility. No formal testing performed.  Integument: Visible skin intact  Cardiopulmonary:  Vital signs:     Edema: none noted       Functional Mobility:  Bed Mobility:     Supine to Sit: independence  Sit to Supine: independence  Transfers:     Sit to Stand:  independence with no AD  Gait: Patient ambulated 500 feet in hallway with no AD and supervision. No significant gait deficits noted but mild occ sway     AM-PAC 6 CLICK MOBILITY  Total Score:21       Treatment and Education:  See above for gait and transfers     AM-PAC 6 CLICK MOBILITY  Total Score:21     Patient left sitting edge of bed with all lines intact and call button in reach.    GOALS:   Multidisciplinary Problems       Physical Therapy Goals       Not on file              Multidisciplinary Problems (Resolved)          Problem: Physical Therapy    Goal Priority Disciplines Outcome Interventions   Physical Therapy Goal   (Resolved)     PT, PT/OT Met                        DME Justifications:  No DME recommended requiring DME justifications    History:     Past Medical History:   Diagnosis Date    Atrial fibrillation     stress related per patient    Hx of long term use of blood thinners     Hyperlipidemia     Osteoarthritis     PONV (postoperative nausea and vomiting)     SBO (small bowel obstruction)     X 5    Shingles 01/01/2002    Spinal stenosis        Past Surgical History:   Procedure Laterality Date    ANKLE FRACTURE SURGERY  2007     Right ankle    COLON SURGERY      bowel obstruction-NG TUBE    COLONOSCOPY      x 2    EXCISION OF MASS OF BACK N/A 11/16/2018    Procedure: EXCISION, MASS, BACK;  Surgeon: Fran Magaña MD;  Location: [facility];  Service: General;  Laterality: N/A;    EYE SURGERY Bilateral 2016    HYSTERECTOMY  1985    HEATHER    KNEE ARTHROSCOPY W/ DEBRIDEMENT  1994    Bilateral    KNEE ARTHROSCOPY W/ DEBRIDEMENT  2003    Left knee    OPEN PATELLA REEFING PROCEDURE  age 19    Left knee    Tonsilectomy  as a child    TONSILLECTOMY      TOTAL KNEE ARTHROPLASTY Right 10/19/2023    Procedure: ARTHROPLASTY, KNEE, TOTAL;  Surgeon: Vicente Mitchell MD;  Location: [facility];  Service: Orthopedics;  Laterality: Right;    TOTAL KNEE ARTHROPLASTY Left 7/18/2024    Procedure: ARTHROPLASTY, KNEE, TOTAL;  Surgeon: Vicente Mitchell MD;  Location: [facility];  Service: Orthopedics;  Laterality: Left;    TUBAL LIGATION         Time Tracking:     PT Received On: 03/16/25  PT Start Time: 1205     PT Stop Time: 1215  PT Total Time (min): 10 min     Billable Minutes: Evaluation 10 minutes       03/16/2025

## 2025-03-16 NOTE — ASSESSMENT & PLAN NOTE
-BP is consistently elevated, but she does not have history of hypertension  -Does not complain of signifcant pain - only some cramping after eating.  -Continue home toprol (for afib) and will stop IV fluids and monitor  -PRN hydralazine available if SBP > 180

## 2025-03-17 VITALS
WEIGHT: 110 LBS | OXYGEN SATURATION: 97 % | RESPIRATION RATE: 18 BRPM | DIASTOLIC BLOOD PRESSURE: 77 MMHG | TEMPERATURE: 98 F | SYSTOLIC BLOOD PRESSURE: 170 MMHG | HEART RATE: 54 BPM | BODY MASS INDEX: 18.33 KG/M2 | HEIGHT: 65 IN

## 2025-03-17 LAB
ANION GAP SERPL CALC-SCNC: 9 MMOL/L (ref 8–16)
BASOPHILS # BLD AUTO: 0.02 K/UL (ref 0–0.2)
BASOPHILS NFR BLD: 0.5 % (ref 0–1.9)
BUN SERPL-MCNC: 11 MG/DL (ref 8–23)
CALCIUM SERPL-MCNC: 9.1 MG/DL (ref 8.7–10.5)
CHLORIDE SERPL-SCNC: 107 MMOL/L (ref 95–110)
CO2 SERPL-SCNC: 23 MMOL/L (ref 23–29)
CREAT SERPL-MCNC: 0.8 MG/DL (ref 0.5–1.4)
DIFFERENTIAL METHOD BLD: ABNORMAL
EOSINOPHIL # BLD AUTO: 0.1 K/UL (ref 0–0.5)
EOSINOPHIL NFR BLD: 2.8 % (ref 0–8)
ERYTHROCYTE [DISTWIDTH] IN BLOOD BY AUTOMATED COUNT: 12.7 % (ref 11.5–14.5)
EST. GFR  (NO RACE VARIABLE): >60 ML/MIN/1.73 M^2
GLUCOSE SERPL-MCNC: 99 MG/DL (ref 70–110)
HCT VFR BLD AUTO: 36.6 % (ref 37–48.5)
HGB BLD-MCNC: 12.7 G/DL (ref 12–16)
IMM GRANULOCYTES # BLD AUTO: 0.01 K/UL (ref 0–0.04)
IMM GRANULOCYTES NFR BLD AUTO: 0.2 % (ref 0–0.5)
LYMPHOCYTES # BLD AUTO: 1.4 K/UL (ref 1–4.8)
LYMPHOCYTES NFR BLD: 32.2 % (ref 18–48)
MAGNESIUM SERPL-MCNC: 1.9 MG/DL (ref 1.6–2.6)
MCH RBC QN AUTO: 31.3 PG (ref 27–31)
MCHC RBC AUTO-ENTMCNC: 34.7 G/DL (ref 32–36)
MCV RBC AUTO: 90 FL (ref 82–98)
MONOCYTES # BLD AUTO: 0.5 K/UL (ref 0.3–1)
MONOCYTES NFR BLD: 11.3 % (ref 4–15)
NEUTROPHILS # BLD AUTO: 2.3 K/UL (ref 1.8–7.7)
NEUTROPHILS NFR BLD: 53 % (ref 38–73)
NRBC BLD-RTO: 0 /100 WBC
PLATELET # BLD AUTO: 194 K/UL (ref 150–450)
PMV BLD AUTO: 9 FL (ref 9.2–12.9)
POTASSIUM SERPL-SCNC: 3.5 MMOL/L (ref 3.5–5.1)
RBC # BLD AUTO: 4.06 M/UL (ref 4–5.4)
SODIUM SERPL-SCNC: 139 MMOL/L (ref 136–145)
WBC # BLD AUTO: 4.25 K/UL (ref 3.9–12.7)

## 2025-03-17 PROCEDURE — 36415 COLL VENOUS BLD VENIPUNCTURE: CPT | Performed by: HOSPITALIST

## 2025-03-17 PROCEDURE — 63600175 PHARM REV CODE 636 W HCPCS: Performed by: HOSPITALIST

## 2025-03-17 PROCEDURE — 83735 ASSAY OF MAGNESIUM: CPT | Performed by: HOSPITALIST

## 2025-03-17 PROCEDURE — 97165 OT EVAL LOW COMPLEX 30 MIN: CPT

## 2025-03-17 PROCEDURE — 25000003 PHARM REV CODE 250: Performed by: PHYSICIAN ASSISTANT

## 2025-03-17 PROCEDURE — 80048 BASIC METABOLIC PNL TOTAL CA: CPT | Performed by: HOSPITALIST

## 2025-03-17 PROCEDURE — 85025 COMPLETE CBC W/AUTO DIFF WBC: CPT | Performed by: HOSPITALIST

## 2025-03-17 RX ADMIN — FLECAINIDE ACETATE 100 MG: 100 TABLET ORAL at 08:03

## 2025-03-17 NOTE — PT/OT/SLP EVAL
Occupational Therapy   Evaluation & Discharge Summary    Name: Marian Durham  MRN: [id number]  Admitting Diagnosis: Partial small bowel obstruction  Recent Surgery: * No surgery found *      Recommendations:     Discharge Recommendations: No Therapy Indicated  Discharge Equipment Recommendations:  none (pt owns RW, BSC, shower chair, and chair lift)  Barriers to discharge:   none    Assessment:     Marian Durham is a 79 y.o. female with a medical diagnosis of Partial small bowel obstruction.  She presents with no pain. Performance deficits affecting function: impaired balance.  Pt agreeable to participating in therapy upon arrival to room.  Pt demonstrates strength and ROM in (B) UE needed for ADLs that is WFL.  Pt able to perform grooming task standing at sink without assist.    PTA pt reports being independent with ADLs and functional mobility.  Pt mentioned numbness and tingling in hands impacts activity at times, but has been experiencing it for nearly 30 years.  Pt is performing at baseline and does not require OT services in the acute care setting at this time.  Pt to d/c from OT with no further therapy needs recommended upon d/c from hospital.    Rehab Prognosis: Good; patient would benefit from acute skilled OT services to address these deficits and reach maximum level of function.       Plan:     Patient to d/c from OT; no further therapy needs recommended upon d/c from hospital    Subjective     Chief Complaint: recommended changes with her diet  Patient/Family Comments/goals: return home; continue PT exercises she's been doing since having her TKA in both knees    Occupational Profile:  Living Environment: Pt lives alone in 2SH with bedroom and bathroom on 2nd floor.  Chair lift present to go upstairs.  Bathroom has tub/shower with shower chair.  There are 17 JAZMIN with wide HR; chair lift present   Previous level of function:   ADLs: Independent  Mobility: Independent  iADLs: Some challenges with  activities that involve lifting  Notes: Pt reports some challenges with her balance  Roles and Routines: Went to OP PT to work on balance (has had B TKA) for nearly 2 years, uses chair lift for groceries, , mother, grandmother, eats healthy and walks regularly  Equipment Used at Home: none  Assistance upon Discharge: Son lives 10 minutes away.  Neighbors and friends also available to check on pt and provide assist.    Pain/Comfort:  Pain Rating 1: 0/10  Pain Rating Post-Intervention 1: 0/10    Patients cultural, spiritual, Temple conflicts given the current situation: no    Objective:     Communicated with:  Patient found HOB elevated with peripheral IV, telemetry upon OT entry to room.    General Precautions: Standard, fall  Orthopedic Precautions: N/A  Braces: N/A  Respiratory Status: Room air    Occupational Performance:    Bed Mobility:    Supine <> sit: Independent  Scooting: Independent     Functional Mobility/Transfers:  Sit <> stand: Independent x 1 trial from EOB  Functional Mobility: Pt ambulated ~30 ft in room independently.  No instances of LOB noted    Activities of Daily Living:  Grooming: Independent for washing hands while standing at sink.    Cognitive/Visual Perceptual:  Cognitive/Psychosocial Skills:    -       Oriented to: Person, Place, Time, and Situation   -       Follows Commands/attention: Follows 100% of one step commands  -       Communication: clear/fluent  -       Memory: No Deficits noted  -       Safety awareness/insight to disability: intact   -       Mood/Affect/Coping skills/emotional control: Cooperative and Pleasant    Physical Exam:  Postural examination/scapula alignment: No postural abnormalities identified  Skin integrity: Visible skin intact  Edema:  None noted  Sensation: Numbness and tingling present in (B) UE  Motor Planning: WNL  Dominant hand: Right  Upper Extremity Range of Motion:    -       Right Upper Extremity: WFL  -       Left Upper Extremity: WFL  Upper  Extremity Strength:   -       Right Upper Extremity: WFL  -       Left Upper Extremity: WFL   Strength: WFL  Fine Motor Coordination: Intact  Gross motor coordination: WFL  Balance: Sitting- Independent; Standing- Independent     AMPAC 6 Click ADL:  AMPAC Total Score: 24    Treatment & Education:  *Pt educated on role of OT in acute care setting    Patient left standing next to bed; able to safely ambulate around room independently     GOALS:   Multidisciplinary Problems       Occupational Therapy Goals       Not on file              Multidisciplinary Problems (Resolved)          Problem: Occupational Therapy    Goal Priority Disciplines Outcome Interventions   Occupational Therapy Goal   (Resolved)     OT, PT/OT Met                        DME Justifications:  No DME recommended requiring DME justifications    History:     Past Medical History:   Diagnosis Date    Atrial fibrillation     stress related per patient    Hx of long term use of blood thinners     Hyperlipidemia     Osteoarthritis     PONV (postoperative nausea and vomiting)     SBO (small bowel obstruction)     X 5    Shingles 01/01/2002    Spinal stenosis          Past Surgical History:   Procedure Laterality Date    ANKLE FRACTURE SURGERY  2007    Right ankle    COLON SURGERY      bowel obstruction-NG TUBE    COLONOSCOPY      x 2    EXCISION OF MASS OF BACK N/A 11/16/2018    Procedure: EXCISION, MASS, BACK;  Surgeon: Fran Magaña MD;  Location: [facility];  Service: General;  Laterality: N/A;    EYE SURGERY Bilateral 2016    HYSTERECTOMY  1985    HEATHER    KNEE ARTHROSCOPY W/ DEBRIDEMENT  1994    Bilateral    KNEE ARTHROSCOPY W/ DEBRIDEMENT  2003    Left knee    OPEN PATELLA REEFING PROCEDURE  age 19    Left knee    Tonsilectomy  as a child    TONSILLECTOMY      TOTAL KNEE ARTHROPLASTY Right 10/19/2023    Procedure: ARTHROPLASTY, KNEE, TOTAL;  Surgeon: Vicente Mitchell MD;  Location: [facility];  Service: Orthopedics;  Laterality: Right;     TOTAL KNEE ARTHROPLASTY Left 7/18/2024    Procedure: ARTHROPLASTY, KNEE, TOTAL;  Surgeon: Vicente Mitchell MD;  Location: [facility];  Service: Orthopedics;  Laterality: Left;    TUBAL LIGATION         Time Tracking:     OT Date of Treatment: 03/17/25  OT Start Time: 0958  OT Stop Time: 1016  OT Total Time (min): 18 min    Billable Minutes:Evaluation 18    SILVER Alex  3/17/2025

## 2025-03-17 NOTE — ASSESSMENT & PLAN NOTE
-BP was consistently elevated, but she does not have history of hypertension  -Does not complain of signifcant pain - only some cramping after eating.  -BP somewhat improved at stopping IV fluids, but still mostly elevated.  Suspect significant component of anxiety while here in hospital.  -Continue home toprol (for afib)   -Recommend close follow-up with pcp for bp check and consideration of treatment initiation if needed

## 2025-03-17 NOTE — ASSESSMENT & PLAN NOTE
-Noted history of pAFIB and is in NSR now  -Continue home flecainide and toprol  -Resume home eliquis at discharge

## 2025-03-17 NOTE — ASSESSMENT & PLAN NOTE
-Admitted to inpatient status  -presented with abdominal distention, abdominal pain and nausea.  Noted history of multiple prior SBO and this feels exactly the same.  -CT abdomen/pelvis showed moderate small bowel obstruction without report of obvious transition point  -On admit afebrile with leukocytosis  -Consult placed to general surgery - she follows with Dr. Werner outpatient  -She has been treated conservatively and her symptoms have resolved.  She has no pain or nausea and only slight abdominal distention.  Bowel function has returned and she is tolerating her low residue diet.  Case discussed with Dr. Werner today.  Ordered for her to have a visit with dietician prior to discharge.  She is medically stable to discharge home.  Continue home miralax and ok to use otc stool softener.  Maintain good oral hydration.

## 2025-03-17 NOTE — PLAN OF CARE
Pt lying in bed. Assessment completed; no pain or distress reported. Plan of care reviewed with pt, no questions. Call light within reach and bed lowered.     1110  Discharge order in. Pt discharging to home. Picked up by son.

## 2025-03-17 NOTE — PLAN OF CARE
Medicare Message     Important Message from Medicare regarding Discharge Appeal Rights Explained to patient/caregiver; Other (comments)Important Message from Medicare regarding Discharge Appeal Rights. Explained to patient/caregiver; Other (comments). Has comment. Taken on 3/17/25 9017   Date IMM was signed 3/17/2025   Time IMM was signed 1120

## 2025-03-17 NOTE — PLAN OF CARE
Pt tolerating low fiber/residue diet. BM x1. Pt ambulates independently in room and and hallways. Cardiac monitoring maintained. Will continue to monitor.      Problem: Adult Inpatient Plan of Care  Goal: Plan of Care Review  Outcome: Progressing  Goal: Patient-Specific Goal (Individualized)  Outcome: Progressing  Goal: Absence of Hospital-Acquired Illness or Injury  Outcome: Progressing  Goal: Optimal Comfort and Wellbeing  Outcome: Progressing  Goal: Readiness for Transition of Care  Outcome: Progressing

## 2025-03-17 NOTE — PLAN OF CARE
Case Management Final Discharge Note    Discharge Disposition: Home    New DME ordered / company name: None    Relevant SDOH / Transition of Care Barriers:  None    Person available to provide assistance at home when needed and their contact information: Independent    Scheduled followup appointment: PCP with Dr. Suzanna Duran, on AVS    Referrals placed: None    Transportation: Family to provide transport at discharge    Patient educated on discharge services and updated on DC plan. Bedside RN notified, patient clear to discharge from Case Management Perspective.     [facility] ([address])  Discharge Final Note    Primary Care Provider: Suzanna Duran MD    Expected Discharge Date: 3/17/2025    Final Discharge Note (most recent)       Final Note - 03/17/25 1056          Final Note    Assessment Type Final Discharge Note (P)      Anticipated Discharge Disposition Home or Self Care (P)      Hospital Resources/Appts/Education Provided Provided patient/caregiver with written discharge plan information;Appointments scheduled and added to AVS (P)         Post-Acute Status    Discharge Delays None known at this time (P)                      Important Message from Medicare             Contact Info       Suzanna Duran MD   Specialty: Internal Medicine   Relationship: PCP - General    [address] [place] [address]   Phone: [phone]       Next Steps: Follow up on 3/19/2025    Instructions: Appointment time 11:00 AM

## 2025-03-17 NOTE — DISCHARGE SUMMARY
[facility] Surg [address] Medicine  Discharge Summary      Patient Name: Marian Durham  MRN: [id number]  [facility]: [id number]  Patient Class: IP- Inpatient  Admission Date: 3/13/2025  Hospital Length of Stay: 3 days  Discharge Date and Time: No discharge date for patient encounter.  Attending Physician: Rossi Lombardi MD   Discharging Provider: Rossi Lombardi MD  Primary Care Provider: Suzanna Duran MD    Primary Care Team: Networked reference to record PCT     HPI:   Ms. Marian Durham is a 79 y.o. female, with PMH of prior bowel obstructions, A. Fib on Eliquis, [facility] [place], who presented to [facility] ED on 03/13/2025 due to severe, cramping abdominal pain across the entire abdomen associated with nausea and vomiting.  She states the pain feels just like her prior bowel obstructions.  She notes associated chills.  She states she had to bowel movements today one at 4:00 p.m., and again at 5:00 p.m..  She states the symptoms began suddenly at 4:00 p.m. the day of presentation.  She denies fever, chest pain, cough, congestion, dysuria, hematuria.  She was evaluated in the ED with labs that showed leukocytosis 19 K, with left shift.  H&H were normal.  A metabolic panel showed BUN of 30, creatinine 1.1, CO2 of 19, anion gap of 13, glucose of 133, no hyperkalemia.  A CT of the abdomen and pelvis showed a moderate small-bowel obstruction.  She was treated in the ED with pain medication, nausea medicine, and 1 L of IV fluids.  She was placed admitted inpatient status    Goals of Care Treatment Preferences:  Code Status: Full Code    Living Will: Yes                 Consults:   Consults (From admission, onward)          Status Ordering Provider     Inpatient consult to Registered Dietitian/Nutritionist  Once        Provider:  (Not yet assigned)    Acknowledged ROSSI LOMBARDI     Inpatient consult to General Surgery  Once        Provider:  Rossi Werner Jr., MD    Completed DAVID ALBRECHT           Hospital Course By Problem:   Assessment & Plan  Partial small bowel obstruction  -Admitted to inpatient status  -presented with abdominal distention, abdominal pain and nausea.  Noted history of multiple prior SBO and this feels exactly the same.  -CT abdomen/pelvis showed moderate small bowel obstruction without report of obvious transition point  -On admit afebrile with leukocytosis  -Consult placed to general surgery - she follows with Dr. Werner outpatient  -She has been treated conservatively and her symptoms have resolved.  She has no pain or nausea and only slight abdominal distention.  Bowel function has returned and she is tolerating her low residue diet.  Case discussed with Dr. Werner today.  Ordered for her to have a visit with dietician prior to discharge.  She is medically stable to discharge home.  Continue home miralax and ok to use otc stool softener.  Maintain good oral hydration.  Atrial fibrillation  Chronic anticoagulation  -Noted history of pAFIB and is in NSR now  -Continue home flecainide and toprol  -Resume home eliquis at discharge  Hypercholesterolemia  -Continue home statin  Elevated blood pressure reading  -BP was consistently elevated, but she does not have history of hypertension  -Does not complain of signifcant pain - only some cramping after eating.  -BP somewhat improved at stopping IV fluids, but still mostly elevated.  Suspect significant component of anxiety while here in hospital.  -Continue home toprol (for afib)   -Recommend close follow-up with pcp for bp check and consideration of treatment initiation if needed  Final Active Diagnoses:    Diagnosis Date Noted POA    PRINCIPAL PROBLEM:  Partial small bowel obstruction [K56.600] 03/14/2025 Yes    Atrial fibrillation [I48.91] 06/30/2023 Yes    Chronic anticoagulation [Z79.01] 02/02/2024 Not Applicable    Hypercholesterolemia [E78.00] 02/02/2024 Yes    Elevated blood pressure reading [R03.0] 06/26/2023 Yes      Problems  "Resolved During this Admission:       Discharged Condition: stable    Disposition: Home or Self Care    Follow Up:    Patient Instructions:      Notify your health care provider if you experience any of the following:  increased confusion or weakness     Notify your health care provider if you experience any of the following:  persistent dizziness, light-headedness, or visual disturbances     Notify your health care provider if you experience any of the following:  worsening rash     Notify your health care provider if you experience any of the following:  severe persistent headache     Notify your health care provider if you experience any of the following:  difficulty breathing or increased cough     Notify your health care provider if you experience any of the following:  severe uncontrolled pain     Notify your health care provider if you experience any of the following:  persistent nausea and vomiting or diarrhea     Notify your health care provider if you experience any of the following:  temperature >100.4     Activity as tolerated       Significant Diagnostic Studies: Labs: BMP:   Recent Labs   Lab 03/16/25  0408 03/17/25  0754   GLU 68* 99    139   K 3.8 3.5   * 107   CO2 19* 23   BUN 12 11   CREATININE 0.8 0.8   CALCIUM 8.5* 9.1   MG 1.9 1.9   , CMP   Recent Labs   Lab 03/16/25  0408 03/17/25  0754    139   K 3.8 3.5   * 107   CO2 19* 23   GLU 68* 99   BUN 12 11   CREATININE 0.8 0.8   CALCIUM 8.5* 9.1   ANIONGAP 9 9   , CBC   Recent Labs   Lab 03/16/25  0408 03/17/25  0754   WBC 5.15 4.25   HGB 11.5* 12.7   HCT 34.7* 36.6*    194   , INR No results found for: "INR", "PROTIME", Lipid Panel   Lab Results   Component Value Date    CHOL 128 09/05/2023    HDL 47 09/05/2023    LDLCALC 65.2 09/05/2023    TRIG 79 09/05/2023    CHOLHDL 36.7 09/05/2023   , Troponin No results for input(s): "TROPONINI" in the last 168 hours., and A1C: No results for input(s): "HGBA1C" in the last 4320 " hours.    Pending Diagnostic Studies:       None           Medications:  Reconciled Home Medications:      Medication List        CONTINUE taking these medications      apixaban 2.5 mg Tab  Commonly known as: ELIQUIS  Take 1 tablet (2.5 mg total) by mouth 2 (two) times daily.     atorvastatin 20 MG tablet  Commonly known as: LIPITOR  Take 1 tablet (20 mg total) by mouth once daily.     calcium-vitamin D3 500 mg-5 mcg (200 unit) per tablet  Commonly known as: OS- + D3  Take 1 tablet by mouth 2 (two) times daily with meals.     estradioL 0.01 % (0.1 mg/gram) vaginal cream  Commonly known as: ESTRACE  PLACE 1 GRAM VAGINALLY 3 TIMES A WEEK     flecainide 100 MG Tab  Commonly known as: TAMBOCOR  Take 1 tablet (100 mg total) by mouth every 12 (twelve) hours.     glucosamine-chondroitin 500-400 mg tablet  Take 1 tablet by mouth 3 (three) times daily.     metoprolol succinate 25 MG 24 hr tablet  Commonly known as: TOPROL-XL  Take 1 tablet (25 mg total) by mouth every 12 (twelve) hours.     polyethylene glycol 17 gram/dose powder  Commonly known as: MIRALAX  Take 17 g by mouth 3 (three) times daily as needed (Constipation).            STOP taking these medications      celecoxib 200 MG capsule  Commonly known as: CeleBREX     famotidine 20 MG tablet  Commonly known as: PEPCID              Indwelling Lines/Drains at time of discharge:   Lines/Drains/Airways       None                   Time spent on the discharge of patient: 35 minutes         Mainor Lombardi MD  Department of Hospital Medicine  [facility] ([address])

## 2025-03-17 NOTE — DISCHARGE INSTRUCTIONS
Take all medications as prescribed.  Eat a low residue diet - low salt soft and bland.  Your blood pressure has been elevated while here in the hospital.  This could be because of anxiety related to your hospitalization OR actual hypertension.  We recommend you follow up with your pcp within 1 week for blood pressure check and if needed initiation of medication treatment.  Follow up with your physicians as scheduled - pcp within 1 week.  Thank you for trusting [facility] and Dr. Lombardi with your care.  We are honored that you entrusted us with your healthcare needs. Your satisfaction is very important to us and we hope you have been very pleased with your experience at [facility]. After your discharge you may receive a survey asking you to rate your hospital experience. We encourage you to take the time to complete the survey as your feedback allows us to identify areas for improvement as well as recognize our staff.   We hope that you have received the very best care possible during your hospitalization at [facility], as your satisfaction is our top priority.

## 2025-03-17 NOTE — PLAN OF CARE
Problem: Occupational Therapy  Goal: Occupational Therapy Goal  Outcome: Met     OT evaluation complete.  Pt is performing at baseline and does not require OT services in the acute care setting.  Pt to d/c from OT; no further therapy needs recommended upon d/c from hospital.    SILVER Alex  3/17/2025

## 2025-04-10 ENCOUNTER — HOSPITAL ENCOUNTER (INPATIENT)
Facility: OTHER | Age: 80
LOS: 3 days | Discharge: HOME | DRG: 390 | End: 2025-04-14
Attending: EMERGENCY MEDICINE | Admitting: INTERNAL MEDICINE
Payer: MEDICARE

## 2025-04-10 DIAGNOSIS — I48.91 ATRIAL FIBRILLATION: ICD-10-CM

## 2025-04-10 DIAGNOSIS — K56.609 SMALL BOWEL OBSTRUCTION: Primary | ICD-10-CM

## 2025-04-10 LAB
ABSOLUTE EOSINOPHIL (OHS): 0.1 K/UL
ABSOLUTE MONOCYTE (OHS): 0.59 K/UL (ref 0.3–1)
ABSOLUTE NEUTROPHIL COUNT (OHS): 7.68 K/UL (ref 1.8–7.7)
ALBUMIN SERPL BCP-MCNC: 4 G/DL (ref 3.5–5.2)
ALP SERPL-CCNC: 74 UNIT/L (ref 40–150)
ALT SERPL W/O P-5'-P-CCNC: 13 UNIT/L (ref 10–44)
ANION GAP (OHS): 9 MMOL/L (ref 8–16)
AST SERPL-CCNC: 16 UNIT/L (ref 11–45)
BACTERIA #/AREA URNS AUTO: NORMAL /HPF
BASOPHILS # BLD AUTO: 0.03 K/UL
BASOPHILS NFR BLD AUTO: 0.3 %
BILIRUB SERPL-MCNC: 0.4 MG/DL (ref 0.1–1)
BILIRUB UR QL STRIP.AUTO: NEGATIVE
BUN SERPL-MCNC: 21 MG/DL (ref 8–23)
CALCIUM SERPL-MCNC: 9.6 MG/DL (ref 8.7–10.5)
CHLORIDE SERPL-SCNC: 108 MMOL/L (ref 95–110)
CLARITY UR: CLEAR
CO2 SERPL-SCNC: 21 MMOL/L (ref 23–29)
COLOR UR AUTO: COLORLESS
CREAT SERPL-MCNC: 0.9 MG/DL (ref 0.5–1.4)
ERYTHROCYTE [DISTWIDTH] IN BLOOD BY AUTOMATED COUNT: 13 % (ref 11.5–14.5)
GFR SERPLBLD CREATININE-BSD FMLA CKD-EPI: >60 ML/MIN/1.73/M2
GLUCOSE SERPL-MCNC: 147 MG/DL (ref 70–110)
GLUCOSE UR QL STRIP: NEGATIVE
HCT VFR BLD AUTO: 42.2 % (ref 37–48.5)
HCV AB SERPL QL IA: NEGATIVE
HGB BLD-MCNC: 14.2 GM/DL (ref 12–16)
HGB UR QL STRIP: ABNORMAL
HIV 1+2 AB+HIV1 P24 AG SERPL QL IA: NEGATIVE
IMM GRANULOCYTES # BLD AUTO: 0.02 K/UL (ref 0–0.04)
IMM GRANULOCYTES NFR BLD AUTO: 0.2 % (ref 0–0.5)
KETONES UR QL STRIP: NEGATIVE
LEUKOCYTE ESTERASE UR QL STRIP: NEGATIVE
LIPASE SERPL-CCNC: 13 U/L (ref 4–60)
LYMPHOCYTES # BLD AUTO: 1.52 K/UL (ref 1–4.8)
MCH RBC QN AUTO: 30.7 PG (ref 27–31)
MCHC RBC AUTO-ENTMCNC: 33.6 G/DL (ref 32–36)
MCV RBC AUTO: 91 FL (ref 82–98)
MICROSCOPIC COMMENT: NORMAL
NITRITE UR QL STRIP: NEGATIVE
NUCLEATED RBC (/100WBC) (OHS): 0 /100 WBC
PH UR STRIP: 6 [PH]
PLATELET # BLD AUTO: 251 K/UL (ref 150–450)
PMV BLD AUTO: 9.1 FL (ref 9.2–12.9)
POTASSIUM SERPL-SCNC: 4.2 MMOL/L (ref 3.5–5.1)
PROT SERPL-MCNC: 6.8 GM/DL (ref 6–8.4)
PROT UR QL STRIP: NEGATIVE
RBC # BLD AUTO: 4.62 M/UL (ref 4–5.4)
RBC #/AREA URNS AUTO: 3 /HPF (ref 0–4)
RELATIVE EOSINOPHIL (OHS): 1 %
RELATIVE LYMPHOCYTE (OHS): 15.3 % (ref 18–48)
RELATIVE MONOCYTE (OHS): 5.9 % (ref 4–15)
RELATIVE NEUTROPHIL (OHS): 77.3 % (ref 38–73)
SODIUM SERPL-SCNC: 138 MMOL/L (ref 136–145)
SP GR UR STRIP: 1.01
SQUAMOUS #/AREA URNS AUTO: 2 /HPF
UROBILINOGEN UR STRIP-ACNC: NEGATIVE EU/DL
WBC # BLD AUTO: 9.94 K/UL (ref 3.9–12.7)
WBC #/AREA URNS AUTO: 1 /HPF (ref 0–5)

## 2025-04-10 PROCEDURE — 86803 HEPATITIS C AB TEST: CPT | Performed by: EMERGENCY MEDICINE

## 2025-04-10 PROCEDURE — 25500020 PHARM REV CODE 255: Performed by: EMERGENCY MEDICINE

## 2025-04-10 PROCEDURE — 99285 EMERGENCY DEPT VISIT HI MDM: CPT | Mod: 25

## 2025-04-10 PROCEDURE — 80053 COMPREHEN METABOLIC PANEL: CPT | Performed by: NURSE PRACTITIONER

## 2025-04-10 PROCEDURE — 81001 URINALYSIS AUTO W/SCOPE: CPT | Performed by: NURSE PRACTITIONER

## 2025-04-10 PROCEDURE — 83690 ASSAY OF LIPASE: CPT | Performed by: NURSE PRACTITIONER

## 2025-04-10 PROCEDURE — 85025 COMPLETE CBC W/AUTO DIFF WBC: CPT | Performed by: NURSE PRACTITIONER

## 2025-04-10 PROCEDURE — 87389 HIV-1 AG W/HIV-1&-2 AB AG IA: CPT | Performed by: EMERGENCY MEDICINE

## 2025-04-10 RX ADMIN — IOHEXOL 75 ML: 350 INJECTION, SOLUTION INTRAVENOUS at 11:04

## 2025-04-11 PROBLEM — K56.609 SMALL BOWEL OBSTRUCTION: Status: ACTIVE | Noted: 2025-04-11

## 2025-04-11 LAB
ABSOLUTE EOSINOPHIL (OHS): 0.04 K/UL
ABSOLUTE MONOCYTE (OHS): 0.61 K/UL (ref 0.3–1)
ABSOLUTE NEUTROPHIL COUNT (OHS): 10.33 K/UL (ref 1.8–7.7)
ANION GAP (OHS): 9 MMOL/L (ref 8–16)
BASOPHILS # BLD AUTO: 0.01 K/UL
BASOPHILS NFR BLD AUTO: 0.1 %
BUN SERPL-MCNC: 20 MG/DL (ref 8–23)
CALCIUM SERPL-MCNC: 9.7 MG/DL (ref 8.7–10.5)
CHLORIDE SERPL-SCNC: 108 MMOL/L (ref 95–110)
CO2 SERPL-SCNC: 23 MMOL/L (ref 23–29)
CREAT SERPL-MCNC: 0.9 MG/DL (ref 0.5–1.4)
ERYTHROCYTE [DISTWIDTH] IN BLOOD BY AUTOMATED COUNT: 13 % (ref 11.5–14.5)
GFR SERPLBLD CREATININE-BSD FMLA CKD-EPI: >60 ML/MIN/1.73/M2
GLUCOSE SERPL-MCNC: 109 MG/DL (ref 70–110)
HCT VFR BLD AUTO: 42.5 % (ref 37–48.5)
HGB BLD-MCNC: 14.3 GM/DL (ref 12–16)
IMM GRANULOCYTES # BLD AUTO: 0.05 K/UL (ref 0–0.04)
IMM GRANULOCYTES NFR BLD AUTO: 0.4 % (ref 0–0.5)
LYMPHOCYTES # BLD AUTO: 0.86 K/UL (ref 1–4.8)
MAGNESIUM SERPL-MCNC: 2 MG/DL (ref 1.6–2.6)
MCH RBC QN AUTO: 31 PG (ref 27–31)
MCHC RBC AUTO-ENTMCNC: 33.6 G/DL (ref 32–36)
MCV RBC AUTO: 92 FL (ref 82–98)
NUCLEATED RBC (/100WBC) (OHS): 0 /100 WBC
PLATELET # BLD AUTO: 222 K/UL (ref 150–450)
PMV BLD AUTO: 9.2 FL (ref 9.2–12.9)
POTASSIUM SERPL-SCNC: 4.3 MMOL/L (ref 3.5–5.1)
RBC # BLD AUTO: 4.62 M/UL (ref 4–5.4)
RELATIVE EOSINOPHIL (OHS): 0.3 %
RELATIVE LYMPHOCYTE (OHS): 7.2 % (ref 18–48)
RELATIVE MONOCYTE (OHS): 5.1 % (ref 4–15)
RELATIVE NEUTROPHIL (OHS): 86.9 % (ref 38–73)
SODIUM SERPL-SCNC: 140 MMOL/L (ref 136–145)
WBC # BLD AUTO: 11.9 K/UL (ref 3.9–12.7)

## 2025-04-11 PROCEDURE — 21400001 HC TELEMETRY ROOM

## 2025-04-11 PROCEDURE — 36415 COLL VENOUS BLD VENIPUNCTURE: CPT | Performed by: PHYSICIAN ASSISTANT

## 2025-04-11 PROCEDURE — 96361 HYDRATE IV INFUSION ADD-ON: CPT

## 2025-04-11 PROCEDURE — 63600175 PHARM REV CODE 636 W HCPCS: Performed by: INTERNAL MEDICINE

## 2025-04-11 PROCEDURE — 93005 ELECTROCARDIOGRAM TRACING: CPT

## 2025-04-11 PROCEDURE — 63600175 PHARM REV CODE 636 W HCPCS: Performed by: EMERGENCY MEDICINE

## 2025-04-11 PROCEDURE — 93010 ELECTROCARDIOGRAM REPORT: CPT | Mod: ,,, | Performed by: INTERNAL MEDICINE

## 2025-04-11 PROCEDURE — 85025 COMPLETE CBC W/AUTO DIFF WBC: CPT | Performed by: PHYSICIAN ASSISTANT

## 2025-04-11 PROCEDURE — 96374 THER/PROPH/DIAG INJ IV PUSH: CPT

## 2025-04-11 PROCEDURE — 80048 BASIC METABOLIC PNL TOTAL CA: CPT | Performed by: PHYSICIAN ASSISTANT

## 2025-04-11 PROCEDURE — 25000003 PHARM REV CODE 250: Performed by: PHYSICIAN ASSISTANT

## 2025-04-11 PROCEDURE — 83735 ASSAY OF MAGNESIUM: CPT | Performed by: PHYSICIAN ASSISTANT

## 2025-04-11 RX ORDER — SODIUM CHLORIDE 0.9 % (FLUSH) 0.9 %
10 SYRINGE (ML) INJECTION
Status: DISCONTINUED | OUTPATIENT
Start: 2025-04-11 | End: 2025-04-14 | Stop reason: HOSPADM

## 2025-04-11 RX ORDER — ENOXAPARIN SODIUM 100 MG/ML
1 INJECTION SUBCUTANEOUS EVERY 12 HOURS
Status: DISCONTINUED | OUTPATIENT
Start: 2025-04-11 | End: 2025-04-14 | Stop reason: HOSPADM

## 2025-04-11 RX ORDER — TALC
6 POWDER (GRAM) TOPICAL NIGHTLY PRN
Status: DISCONTINUED | OUTPATIENT
Start: 2025-04-11 | End: 2025-04-14 | Stop reason: HOSPADM

## 2025-04-11 RX ORDER — ACETAMINOPHEN 325 MG/1
650 TABLET ORAL EVERY 6 HOURS PRN
Status: DISCONTINUED | OUTPATIENT
Start: 2025-04-11 | End: 2025-04-14 | Stop reason: HOSPADM

## 2025-04-11 RX ORDER — ONDANSETRON HYDROCHLORIDE 2 MG/ML
4 INJECTION, SOLUTION INTRAVENOUS EVERY 6 HOURS PRN
Status: DISCONTINUED | OUTPATIENT
Start: 2025-04-11 | End: 2025-04-14 | Stop reason: HOSPADM

## 2025-04-11 RX ORDER — FLECAINIDE ACETATE 100 MG/1
100 TABLET ORAL EVERY 12 HOURS
Status: DISCONTINUED | OUTPATIENT
Start: 2025-04-11 | End: 2025-04-14 | Stop reason: HOSPADM

## 2025-04-11 RX ORDER — IBUPROFEN 200 MG
16 TABLET ORAL
Status: DISCONTINUED | OUTPATIENT
Start: 2025-04-11 | End: 2025-04-14 | Stop reason: HOSPADM

## 2025-04-11 RX ORDER — ONDANSETRON HYDROCHLORIDE 2 MG/ML
4 INJECTION, SOLUTION INTRAVENOUS
Status: COMPLETED | OUTPATIENT
Start: 2025-04-11 | End: 2025-04-11

## 2025-04-11 RX ORDER — IBUPROFEN 200 MG
24 TABLET ORAL
Status: DISCONTINUED | OUTPATIENT
Start: 2025-04-11 | End: 2025-04-14 | Stop reason: HOSPADM

## 2025-04-11 RX ORDER — SODIUM CHLORIDE 9 MG/ML
INJECTION, SOLUTION INTRAVENOUS CONTINUOUS
Status: DISCONTINUED | OUTPATIENT
Start: 2025-04-11 | End: 2025-04-12

## 2025-04-11 RX ORDER — NALOXONE HCL 0.4 MG/ML
0.02 VIAL (ML) INJECTION
Status: DISCONTINUED | OUTPATIENT
Start: 2025-04-11 | End: 2025-04-14 | Stop reason: HOSPADM

## 2025-04-11 RX ORDER — GLUCAGON 1 MG
1 KIT INJECTION
Status: DISCONTINUED | OUTPATIENT
Start: 2025-04-11 | End: 2025-04-14 | Stop reason: HOSPADM

## 2025-04-11 RX ORDER — ONDANSETRON 4 MG/1
4 TABLET, ORALLY DISINTEGRATING ORAL EVERY 6 HOURS PRN
Status: DISCONTINUED | OUTPATIENT
Start: 2025-04-11 | End: 2025-04-14 | Stop reason: HOSPADM

## 2025-04-11 RX ORDER — METOPROLOL SUCCINATE 25 MG/1
25 TABLET, EXTENDED RELEASE ORAL EVERY 12 HOURS
Status: DISCONTINUED | OUTPATIENT
Start: 2025-04-11 | End: 2025-04-14 | Stop reason: HOSPADM

## 2025-04-11 RX ORDER — SODIUM CHLORIDE 0.9 % (FLUSH) 0.9 %
10 SYRINGE (ML) INJECTION EVERY 8 HOURS
Status: DISCONTINUED | OUTPATIENT
Start: 2025-04-11 | End: 2025-04-11

## 2025-04-11 RX ADMIN — APIXABAN 2.5 MG: 2.5 TABLET, FILM COATED ORAL at 11:04

## 2025-04-11 RX ADMIN — METOPROLOL SUCCINATE 25 MG: 25 TABLET, EXTENDED RELEASE ORAL at 11:04

## 2025-04-11 RX ADMIN — ONDANSETRON 4 MG: 2 INJECTION INTRAMUSCULAR; INTRAVENOUS at 02:04

## 2025-04-11 RX ADMIN — FLECAINIDE ACETATE 100 MG: 100 TABLET ORAL at 11:04

## 2025-04-11 RX ADMIN — FLECAINIDE ACETATE 100 MG: 100 TABLET ORAL at 09:04

## 2025-04-11 RX ADMIN — ENOXAPARIN SODIUM 50 MG: 60 INJECTION SUBCUTANEOUS at 09:04

## 2025-04-11 RX ADMIN — SODIUM CHLORIDE: 9 INJECTION, SOLUTION INTRAVENOUS at 03:04

## 2025-04-11 RX ADMIN — METOPROLOL SUCCINATE 25 MG: 25 TABLET, EXTENDED RELEASE ORAL at 09:04

## 2025-04-11 NOTE — FIRST PROVIDER EVALUATION
" Emergency Department TeleTriage Encounter Note      CHIEF COMPLAINT    Chief Complaint   Patient presents with    Abdominal Pain     Abd cramping, softer stools than normal, increased burping onset this afternoon. Concern for SBO. Pt advised to come in by provider on call. Pmh 4 SBOs.       VITAL SIGNS   Initial Vitals [04/10/25 2023]   BP Pulse Resp Temp SpO2   (!) 164/72 69 14 98.2 °F (36.8 °C) 95 %      MAP       --            ALLERGIES    Review of patient's allergies indicates:   Allergen Reactions    Demerol [meperidine] Other (See Comments)     "Becomes Agitated and Combative"      Patient states she can take anything else    Meperidine (pf)     Opioids - morphine analogues Hallucinations       PROVIDER TRIAGE NOTE  This is a teletriage evaluation of a 79 y.o. female presenting to the ED complaining of abd cramping and soft stools today. Concern for SBO. Pmhx of SBO.    Alert, no distress.     Initial orders will be placed and care will be transferred to an alternate provider when patient is roomed for a full evaluation. Any additional orders and the final disposition will be determined by that provider.         ORDERS  Labs Reviewed   HEPATITIS C ANTIBODY   HIV 1 / 2 ANTIBODY   CBC W/ AUTO DIFFERENTIAL    Narrative:     The following orders were created for panel order CBC W/ AUTO DIFFERENTIAL.  Procedure                               Abnormality         Status                     ---------                               -----------         ------                     CBC with Differential[[id number]]                                                        Please view results for these tests on the individual orders.   COMPREHENSIVE METABOLIC PANEL   LIPASE   URINALYSIS, REFLEX TO URINE CULTURE   CBC WITH DIFFERENTIAL       ED Orders (720h ago, onward)      Start Ordered     Status Ordering Provider    04/10/25 2049 04/10/25 2048  Vital signs  Every 2 hours         Ordered CODEY SINGH    04/10/25 " 2049 04/10/25 2048  Diet NPO  Diet effective now         Ordered CODEY SINGH N.    04/10/25 2049 04/10/25 2048  Insert peripheral IV  Once         Ordered CODEY SINGH N.    04/10/25 2049 04/10/25 2048  CBC W/ AUTO DIFFERENTIAL  STAT         Ordered CODEY SINGH N.    04/10/25 2049 04/10/25 2048  Comp. Metabolic Panel  STAT         Ordered CODEY SINGH N.    04/10/25 2049 04/10/25 2048  POCT Venous Blood Gas (Creatinine Only)  Once        Comments: This test should be used for VBGs.  If using this order for other tests (K, creatinine, HCT, PT/INR, lactate etc)  ONLY do so in the case of an emergency or rapid response.Notify Physician if: see parameters below.      Ordered CODEY SINGH N.    04/10/25 2049 04/10/25 2048  Lipase  STAT         Ordered CODEY SINGH N.    04/10/25 2049 04/10/25 2048  Urinalysis, Reflex to Urine Culture Urine, Clean Catch  STAT         Ordered CODEY SINGH N.    04/10/25 2049 04/10/25 2048  X-Ray Abdomen Flat And Erect  1 time imaging         Ordered CODEY SINGH N.    04/10/25 2049 04/10/25 2048  CBC with Differential  PROCEDURE ONCE         Ordered CODEY SINGH N.    04/10/25 2028 04/10/25 2028  Hepatitis C Antibody  STAT         Ordered JOSHUA PADILLA    04/10/25 2028 04/10/25 2028  HIV 1/2 Ag/Ab (4th Gen)  STAT         Ordered JOSHUA PADILLA              Virtual Visit Note: The provider triage portion of this emergency department evaluation and documentation was performed via [employer], a HIPAA-compliant telemedicine application, in concert with a tele-presenter in the room. A face to face patient evaluation with one of my colleagues will occur once the patient is placed in an emergency department room.      DISCLAIMER: This note was prepared with [employer] voice recognition transcription software. Garbled syntax, mangled pronouns, and other bizarre constructions may be attributed  to that software system.

## 2025-04-11 NOTE — ASSESSMENT & PLAN NOTE
- MsEvonne Marian FULLER Durham presents with abdominal pain/bloating  - h/o prior SBOs & partial nephrectomy   - imaging c/w SBO  - NPO   - IV fluids for hydration  - PRN meds for pain/nausea  - General Surgery consulted

## 2025-04-11 NOTE — ED TRIAGE NOTES
79 yr F presents to the ER w c/o abd pain and diarrhea. Pt reports having a recent SBO surgery w Alphonso. AAOx4

## 2025-04-11 NOTE — ASSESSMENT & PLAN NOTE
- Patient has paroxysmal (<7 days) atrial fibrillation. Patient is currently in sinus rhythm. [id number] Score: 3. The patients heart rate in the last 24 hours is as follows:  Pulse  Min: 66  Max: 73     Antiarrhythmics  metoprolol succinate (TOPROL-XL) 24 hr tablet 25 mg, Every 12 hours, Oral  flecainide tablet 100 mg, Every 12 hours, Oral    Anticoagulants  apixaban tablet 2.5 mg, 2 times daily, Oral    Plan  - Replete lytes with a goal of K>4, Mg >2  - Patient is anticoagulated, see medications listed above.  - Patient's afib is currently controlled  - continue home meds

## 2025-04-11 NOTE — ED PROVIDER NOTES
"Encounter Date: 4/10/2025       History     Chief Complaint   Patient presents with    Abdominal Pain     Abd cramping, softer stools than normal, increased burping onset this afternoon. Concern for SBO. Pt advised to come in by provider on call. Pmh 4 SBOs.     79 y.o. female, with PMH of prior bowel obstructions, AMBER. Martir on Eliquis, HLD, who who presents with abdominal cramping and bloating.  Patient reports symptoms began today.  She states she had noticed her abdomen was distended and began having diffuse cramping pain throughout abdomen.  Denies any nausea or vomiting.  She states while she was having this episode she called her surgeon and spoke to the on-call surgeon who recommend she come to the ER to get evaluated given her history of multiple small-bowel obstructions.  Patient reports symptoms felt similar to small-bowel obstruction but then she had to large bowel movements and symptoms all resolved.  Patient states she feels much better now but given her history wants to get evaluated.    The history is provided by the patient.     Review of patient's allergies indicates:   Allergen Reactions    Demerol [meperidine] Other (See Comments)     "Becomes Agitated and Combative"      Patient states she can take anything else    Meperidine (pf)     Opioids - morphine analogues Hallucinations     Past Medical History:   Diagnosis Date    Atrial fibrillation     stress related per patient    Hx of long term use of blood thinners     Hyperlipidemia     Osteoarthritis     PONV (postoperative nausea and vomiting)     SBO (small bowel obstruction)     X 5    Shingles 01/01/2002    Spinal stenosis      Past Surgical History:   Procedure Laterality Date    ANKLE FRACTURE SURGERY  2007    Right ankle    COLON SURGERY      bowel obstruction-NG TUBE    COLONOSCOPY      x 2    EXCISION OF MASS OF BACK N/A 11/16/2018    Procedure: EXCISION, MASS, BACK;  Surgeon: Fran Magaña MD;  Location: [facility] OR;  Service: General; "  Laterality: N/A;    EYE SURGERY Bilateral 2016    HYSTERECTOMY  1985    HEATHER    KNEE ARTHROSCOPY W/ DEBRIDEMENT  1994    Bilateral    KNEE ARTHROSCOPY W/ DEBRIDEMENT  2003    Left knee    OPEN PATELLA REEFING PROCEDURE  age 19    Left knee    Tonsilectomy  as a child    TONSILLECTOMY      TOTAL KNEE ARTHROPLASTY Right 10/19/2023    Procedure: ARTHROPLASTY, KNEE, TOTAL;  Surgeon: Vicente Mitchell MD;  Location: [facility];  Service: Orthopedics;  Laterality: Right;    TOTAL KNEE ARTHROPLASTY Left 7/18/2024    Procedure: ARTHROPLASTY, KNEE, TOTAL;  Surgeon: Vicente Mitchell MD;  Location: [facility];  Service: Orthopedics;  Laterality: Left;    TUBAL LIGATION       Family History   Problem Relation Name Age of Onset    Hypertension Father      Hypertension Mother 92     Diabetes Brother      Colon cancer Paternal Aunt      Breast cancer Paternal Aunt          75    Coronary artery disease Brother      Ovarian cancer Neg Hx       Social History[1]  Review of Systems    Physical Exam     Initial Vitals [04/10/25 2023]   BP Pulse Resp Temp SpO2   (!) 164/72 69 14 98.2 °F (36.8 °C) 95 %      MAP       --         Physical Exam    Nursing note and vitals reviewed.  Constitutional: She appears well-developed. She is not diaphoretic. No distress.   HENT:   Head: Normocephalic and atraumatic.   Nose: Nose normal.   Eyes: EOM are normal. No scleral icterus.   Neck: Neck supple.   Normal range of motion.  Cardiovascular:  Normal rate, regular rhythm, normal heart sounds and intact distal pulses.     Exam reveals no gallop and no friction rub.       No murmur heard.  Pulmonary/Chest: Breath sounds normal. No stridor. No respiratory distress. She has no wheezes. She has no rhonchi. She has no rales.   Abdominal: Abdomen is soft. Bowel sounds are normal. She exhibits no distension. There is no abdominal tenderness. There is no rebound and no guarding.   Musculoskeletal:         General: Normal range of motion.      Cervical back:  Normal range of motion and neck supple.     Neurological: She is alert and oriented to person, place, and time. She has normal strength. No cranial nerve deficit or sensory deficit.   Skin: Skin is warm and dry. Capillary refill takes less than 2 seconds. No rash noted.   Psychiatric: She has a normal mood and affect.         ED Course   Procedures  Labs Reviewed   COMPREHENSIVE METABOLIC PANEL - Abnormal       Result Value    Sodium 138      Potassium 4.2      Chloride 108      CO2 21 (*)     Glucose 147 (*)     BUN 21      Creatinine 0.9      Calcium 9.6      Protein Total 6.8      Albumin 4.0      Bilirubin Total 0.4      ALP 74      AST 16      ALT 13      Anion Gap 9      eGFR >60     URINALYSIS, REFLEX TO URINE CULTURE - Abnormal    Color, UA Colorless (*)     Appearance, UA Clear      pH, UA 6.0      Spec Grav UA 1.010      Protein, UA Negative      Glucose, UA Negative      Ketones, UA Negative      Bilirubin, UA Negative      Blood, UA 1+ (*)     Nitrites, UA Negative      Urobilinogen, UA Negative      Leukocyte Esterase, UA Negative     CBC WITH DIFFERENTIAL - Abnormal    WBC 9.94      RBC 4.62      HGB 14.2      HCT 42.2      MCV 91      MCH 30.7      MCHC 33.6      RDW 13.0      Platelet Count 251      MPV 9.1 (*)     Nucleated RBC 0      Neut % 77.3 (*)     Lymph % 15.3 (*)     Mono % 5.9      Eos % 1.0      Basophil % 0.3      Imm Grans % 0.2      Neut # 7.68      Lymph # 1.52      Mono # 0.59      Eos # 0.10      Baso # 0.03      Imm Grans # 0.02     HEPATITIS C ANTIBODY - Normal    Hep C Ab Interp Negative     HIV 1 / 2 ANTIBODY - Normal    HIV 1/2 Ag/Ab Negative     LIPASE - Normal    Lipase Level 13     CBC W/ AUTO DIFFERENTIAL    Narrative:     The following orders were created for panel order CBC W/ AUTO DIFFERENTIAL.  Procedure                               Abnormality         Status                     ---------                               -----------         ------                     CBC  with Differential[[id number]]       Abnormal            Final result                 Please view results for these tests on the individual orders.   URINALYSIS MICROSCOPIC    RBC, UA 3      WBC, UA 1      Bacteria, UA Rare      Squamous Epithelial Cells, UA 2      Microscopic Comment            ECG Results              EKG 12-lead (Final result)  Result time 04/11/25 04:33:24      Final result by Hero Caldera RRT (04/11/25 04:33:24)                   Narrative:    Hero Caldera RRT     4/11/2025  4:33 AM  EKG completed and results given to nurse.                                  Imaging Results              CT Abdomen Pelvis With IV Contrast NO Oral Contrast (Final result)  Result time 04/11/25 00:13:19      Final result by Sally Ojeda MD (04/11/25 00:13:19)                   Impression:      Moderate small bowel obstruction.    Colonic diverticulosis.    Small right pleural effusion.      Electronically signed by: Sally Ojeda  Date:    04/11/2025  Time:    00:13               Narrative:    EXAMINATION:  CT OF ABDOMEN PELVIS WITH    CLINICAL HISTORY:  Bowel obstruction suspected;    TECHNIQUE:  5 mm enhanced axial images were obtained from the lung bases through the greater trochanters.  Seventy-five mL of Omnipaque 350 was injected.    COMPARISON:  12/17/2024    FINDINGS:  A too small to characterize low attenuation lesion is seen in the left lobe of the liver, which may be a subcentimeter cyst..  The liver is mildly enlarged.    Spleen, pancreas, kidneys, and adrenal glands are unremarkable. The gallbladder contains no calcified gallstones.  A splenule is present.  The stomach is moderately distended.    There are moderately dilated small bowel loops with air-fluid levels.  The transition point is questionably in the right pelvis, where there is some flocculated fecal material within small bowel (series 2 axial images 82 through 101).    There is no definite evidence for abdominal adenopathy or  ascites.    There are no pelvic masses or adenopathy.  There is colonic diverticulosis without definite evidence of acute diverticulitis.  The uterus is surgically absent.    There is no free fluid in the pelvis.    At the lung bases, there is a small right pleural effusion.                                       X-Ray Abdomen Flat And Erect (Final result)  Result time 04/10/25 22:35:01      Final result by Sally Ojeda MD (04/10/25 22:35:01)                   Impression:      Dilatation of a right presumed small bowel loop with an air-fluid level.  Marked gaseous distension of the stomach.  Question possibility of small-bowel obstruction.      Electronically signed by: Sally Ojeda  Date:    04/10/2025  Time:    22:35               Narrative:    EXAMINATION:  ABDOMEN FLAT AND ERECT    CLINICAL HISTORY:  Abdominal Pain;    TECHNIQUE:  Abdomen flat and erect radiographs were submitted.    COMPARISON:  12/21/2024    FINDINGS:  Abdomen flat and erect radiographs demonstrate dilated loop of bowel in the right abdomen, which demonstrates air-fluid level on the upright view.  Marked gaseous distension of the stomach is present.  Overall findings are concerning for small bowel obstruction.  There is no free air detected under the diaphragm.                                       Medications   melatonin tablet 6 mg (has no administration in time range)   acetaminophen tablet 650 mg (has no administration in time range)   naloxone 0.4 mg/mL injection 0.02 mg (has no administration in time range)   glucose chewable tablet 16 g (has no administration in time range)   glucose chewable tablet 24 g (has no administration in time range)   dextrose 50% injection 12.5 g (has no administration in time range)   dextrose 50% injection 25 g (has no administration in time range)   glucagon (human recombinant) injection 1 mg (has no administration in time range)   0.9% NaCl infusion ( Intravenous New Bag 4/11/25 6016)    metoprolol succinate (TOPROL-XL) 24 hr tablet 25 mg (25 mg Oral Given 4/11/25 2109)   flecainide tablet 100 mg (100 mg Oral Given 4/11/25 2109)   sodium chloride 0.9% flush 10 mL (has no administration in time range)   ondansetron injection 4 mg (has no administration in time range)   ondansetron disintegrating tablet 4 mg (has no administration in time range)   enoxaparin injection 50 mg (50 mg Subcutaneous Given 4/11/25 2110)   iohexoL (OMNIPAQUE 350) injection 100 mL (75 mLs Intravenous Given 4/10/25 2325)   ondansetron injection 4 mg (4 mg Intravenous Given 4/11/25 0216)     Medical Decision Making  Given History, Exam I believe patient needs labs and imaging to evaluate for SBO vs other acute abdomen.  ED Workup: CBC, CMP, Lipase, CT Abdomen/Pelvis  CT: Small Bowel Obstruction    History, Exam, and Workup show no overt evidence of mesenteric ischemia, bowel gangrene, abscess, peritonitis.  ED Interventions: Analgesia. Defer ABX at this time.  Consult: General Surgery  Disposition: Admit            Amount and/or Complexity of Data Reviewed  External Data Reviewed: notes.     Details: Recent discharge note for SBO  Radiology: ordered. Decision-making details documented in ED Course.    Risk  Prescription drug management.  Decision regarding hospitalization.               ED Course as of 04/11/25 2227 Fri Apr 11, 2025   0040 CT Abdomen Pelvis With IV Contrast NO Oral Contrast [BD]   0040 Impression:     Moderate small bowel obstruction.     Colonic diverticulosis.     Small right pleural effusion.        Electronically signed by:Sally Ojeda  Date:                                            04/11/2025  Time:                                           00:13   [BD]      ED Course User Index  [BD] Jose Bocanegra MD                           Clinical Impression:  Final diagnoses:  [K56.609] Small bowel obstruction (Primary)  [I48.91] Atrial fibrillation          ED Disposition Condition    Observation Stable                   [1]   Social History  Tobacco Use    Smoking status: Never    Smokeless tobacco: Never   Substance Use Topics    Alcohol use: Yes     Alcohol/week: 3.0 standard drinks of alcohol     Types: 3 Glasses of wine per week     Comment: Drinks a glass of wine 3 times weekly; none 24 hr prior to surgery    Drug use: Jose Newsome MD  04/11/25 [id number]

## 2025-04-11 NOTE — PLAN OF CARE
04/11/25 0929   Readmission   Was this a planned readmission? No   Why were you hospitalized in the last 30 days? Small bowel obstruction   Why were you readmitted? Alarmed about signs/symptoms;Related to previous admission   When you left the hospital how did you feel? good   When you left the hospital where did you go? Home Alone   Did patient/caregiver refused recommended DC plan? No   Tell me about what happened between when you left the hospital and the day you returned. felt bloated, symptoms similar to prior admit   When did you start not feeling well? 04/09/2025   Did you try to manage your symptoms your self? No   Did you call anyone? Yes   Who did you call? Other (comments)  (on call surgeon)   Did you try to see or did see a doctor or nurse before you came? No   Why? on call surgeon told pt to come to the ER   Did you have  a follow-up appointment on discharge? Yes

## 2025-04-11 NOTE — H&P
[facility]  History & Physical    Patient Name: Marian Duhram  MRN: [id number]  Patient Class: OP- Observation  Admission Date: 4/10/2025  Attending Physician: SHERIN Pearson MD   Primary Care Provider: Suzanna Duran MD         Patient information was obtained from patient, past medical records, and ER records.     Subjective:     Principal Problem:Small bowel obstruction    Chief Complaint:   Chief Complaint   Patient presents with    Abdominal Pain     Abd cramping, softer stools than normal, increased burping onset this afternoon. Concern for SBO. Pt advised to come in by provider on call. Pmh 4 SBOs.        HPI: Ms. Marian Durham is a 79 y.o. female, with PMH of SBOs, A. Fib on chronic anticoagulation, HLD, OA, who presented to [facility] ED on 4/10/25 due to abdominal pain/cramping/bloating beginning earlier on the day of presentation. She states her symptoms feels like when she had a prior bowel obstruction. She states prior to presenting to the ED, she called and spoke to an on-call Surgeon who advised she present to the ED. She denied nausea, vomiting. She was evaluated in the ED with labs showing no leukocytosis or left shift or anemia on CBC. A metabolic panel was overall normal. A UA was without UTI. A CT of the abdomen and pelvis showed a moderate small bowel obstruction. She was treated in the ED with zofran. She was placed on observation.     Past Medical History:   Diagnosis Date    Atrial fibrillation     stress related per patient    Hx of long term use of blood thinners     Hyperlipidemia     Osteoarthritis     PONV (postoperative nausea and vomiting)     SBO (small bowel obstruction)     X 5    Shingles 01/01/2002    Spinal stenosis        Past Surgical History:   Procedure Laterality Date    ANKLE FRACTURE SURGERY  2007    Right ankle    COLON SURGERY      bowel obstruction-NG TUBE    COLONOSCOPY      x 2    EXCISION OF MASS OF BACK N/A 11/16/2018     "Procedure: EXCISION, MASS, BACK;  Surgeon: Fran Magaña MD;  Location: [facility];  Service: General;  Laterality: N/A;    EYE SURGERY Bilateral 2016    HYSTERECTOMY  1985    HEATHER    KNEE ARTHROSCOPY W/ DEBRIDEMENT  1994    Bilateral    KNEE ARTHROSCOPY W/ DEBRIDEMENT  2003    Left knee    OPEN PATELLA REEFING PROCEDURE  age 19    Left knee    Tonsilectomy  as a child    TONSILLECTOMY      TOTAL KNEE ARTHROPLASTY Right 10/19/2023    Procedure: ARTHROPLASTY, KNEE, TOTAL;  Surgeon: Vicente Mitchell MD;  Location: [facility] OR;  Service: Orthopedics;  Laterality: Right;    TOTAL KNEE ARTHROPLASTY Left 7/18/2024    Procedure: ARTHROPLASTY, KNEE, TOTAL;  Surgeon: Vicente Mitchell MD;  Location: [facility];  Service: Orthopedics;  Laterality: Left;    TUBAL LIGATION         Review of patient's allergies indicates:   Allergen Reactions    Demerol [meperidine] Other (See Comments)     "Becomes Agitated and Combative"      Patient states she can take anything else    Meperidine (pf)     Opioids - morphine analogues Hallucinations       No current facility-administered medications on file prior to encounter.     Current Outpatient Medications on File Prior to Encounter   Medication Sig    apixaban (ELIQUIS) 2.5 mg Tab Take 1 tablet (2.5 mg total) by mouth 2 (two) times daily.    atorvastatin (LIPITOR) 20 MG tablet Take 1 tablet (20 mg total) by mouth once daily.    calcium-vitamin D3 (OS- + D3) 500 mg-5 mcg (200 unit) per tablet Take 1 tablet by mouth 2 (two) times daily with meals.    estradioL (ESTRACE) 0.01 % (0.1 mg/gram) vaginal cream PLACE 1 GRAM VAGINALLY 3 TIMES A WEEK    flecainide (TAMBOCOR) 100 MG Tab Take 1 tablet (100 mg total) by mouth every 12 (twelve) hours.    glucosamine-chondroitin 500-400 mg tablet Take 1 tablet by mouth 3 (three) times daily.    metoprolol succinate (TOPROL-XL) 25 MG 24 hr tablet Take 1 tablet (25 mg total) by mouth every 12 (twelve) hours.    polyethylene glycol (MIRALAX) 17 " gram/dose powder Take 17 g by mouth 3 (three) times daily as needed (Constipation).     Family History       Problem Relation (Age of Onset)    Breast cancer Paternal Aunt    Colon cancer Paternal Aunt    Coronary artery disease Brother    Diabetes Brother    Hypertension Father, Mother          Tobacco Use    Smoking status: Never    Smokeless tobacco: Never   Substance and Sexual Activity    Alcohol use: Yes     Alcohol/week: 3.0 standard drinks of alcohol     Types: 3 Glasses of wine per week     Comment: Drinks a glass of wine 3 times weekly; none 24 hr prior to surgery    Drug use: No    Sexual activity: Not Currently     Birth control/protection: Post-menopausal     Comment: Spouse  of kidney cancer : 2015     Review of Systems   Constitutional:  Negative for chills, diaphoresis and fever.   Respiratory:  Negative for cough, shortness of breath and wheezing.    Cardiovascular:  Negative for chest pain and palpitations.   Gastrointestinal:  Positive for abdominal distention and nausea. Negative for abdominal pain (abdominal discomfort), blood in stool, constipation, diarrhea and vomiting.   Genitourinary:  Negative for decreased urine volume, dysuria, flank pain, frequency, hematuria and urgency.   Musculoskeletal:  Negative for back pain, neck pain and neck stiffness.   Skin:  Negative for color change, pallor, rash and wound.   Neurological:  Negative for dizziness, syncope, weakness, light-headedness and headaches.   Psychiatric/Behavioral:  Negative for agitation and confusion.      Objective:     Vital Signs (Most Recent):  Temp: 97.9 °F (36.6 °C) (25)  Pulse: 70 (25 0320)  Resp: 16 (25)  BP: (!) 144/64 (25)  SpO2: 98 % (25) Vital Signs (24h Range):  Temp:  [97.9 °F (36.6 °C)-98.2 °F (36.8 °C)] 97.9 °F (36.6 °C)  Pulse:  [66-73] 70  Resp:  [14-16] 16  SpO2:  [95 %-99 %] 98 %  BP: (144-186)/(64-88) 144/64     Weight: 52.2 kg (115 lb)  Body mass  index is 19.14 kg/m².     Physical Exam  Vitals and nursing note reviewed.   Constitutional:       General: She is not in acute distress.     Appearance: Normal appearance. She is well-developed and normal weight. She is not ill-appearing, toxic-appearing or diaphoretic.   HENT:      Head: Normocephalic and atraumatic.   Eyes:      General: No scleral icterus.        Right eye: No discharge.         Left eye: No discharge.      Conjunctiva/sclera: Conjunctivae normal.   Neck:      Trachea: No tracheal deviation.   Cardiovascular:      Rate and Rhythm: Normal rate and regular rhythm.      Heart sounds: Normal heart sounds. No murmur heard.     No gallop.   Pulmonary:      Effort: Pulmonary effort is normal. No respiratory distress.      Breath sounds: Normal breath sounds. No stridor. No wheezing or rales.   Abdominal:      General: There is no distension.      Palpations: Abdomen is soft. There is no mass.      Tenderness: There is no abdominal tenderness. There is no guarding.      Comments: Hypoactive bowel sounds throughout.    Musculoskeletal:         General: No deformity. Normal range of motion.      Cervical back: Normal range of motion and neck supple.   Skin:     General: Skin is warm and dry.      Coloration: Skin is not pale.      Findings: No erythema or rash.   Neurological:      General: No focal deficit present.      Mental Status: She is alert and oriented to person, place, and time.      Cranial Nerves: No cranial nerve deficit.      Motor: No abnormal muscle tone.   Psychiatric:         Mood and Affect: Mood normal.         Behavior: Behavior normal.         Thought Content: Thought content normal.         Judgment: Judgment normal.                Significant Labs: All pertinent labs within the past 24 hours have been reviewed.  BMP:   Recent Labs   Lab 04/10/25  2053      K 4.2      CO2 21*   BUN 21   CREATININE 0.9   CALCIUM 9.6     CBC:   Recent Labs   Lab 04/10/25  2053   WBC 9.94  "  HGB 14.2   HCT 42.2        CMP:   Recent Labs   Lab 04/10/25  2053      K 4.2      CO2 21*   BUN 21   CREATININE 0.9   CALCIUM 9.6   ALBUMIN 4.0   BILITOT 0.4   ALKPHOS 74   AST 16   ALT 13   ANIONGAP 9     Urine Culture: No results for input(s): "LABURIN" in the last 48 hours.  Urine Studies:   Recent Labs   Lab 04/10/25  2241   COLORU Colorless*   APPEARANCEUA Clear   PHUR 6.0   SPECGRAV 1.010   PROTEINUA Negative   GLUCUA Negative   BILIRUBINUA Negative   OCCULTUA 1+*   NITRITE Negative   UROBILINOGEN Negative   LEUKOCYTESUR Negative   RBCUA 3   WBCUA 1   BACTERIA Rare       Significant Imaging: I have reviewed all pertinent imaging results/findings within the past 24 hours.  Imaging Results              CT Abdomen Pelvis With IV Contrast NO Oral Contrast (Final result)  Result time 04/11/25 00:13:19      Final result by Sally Ojeda MD (04/11/25 00:13:19)                   Impression:      Moderate small bowel obstruction.    Colonic diverticulosis.    Small right pleural effusion.      Electronically signed by: Sally Ojeda  Date:    04/11/2025  Time:    00:13               Narrative:    EXAMINATION:  CT OF ABDOMEN PELVIS WITH    CLINICAL HISTORY:  Bowel obstruction suspected;    TECHNIQUE:  5 mm enhanced axial images were obtained from the lung bases through the greater trochanters.  Seventy-five mL of Omnipaque 350 was injected.    COMPARISON:  12/17/2024    FINDINGS:  A too small to characterize low attenuation lesion is seen in the left lobe of the liver, which may be a subcentimeter cyst..  The liver is mildly enlarged.    Spleen, pancreas, kidneys, and adrenal glands are unremarkable. The gallbladder contains no calcified gallstones.  A splenule is present.  The stomach is moderately distended.    There are moderately dilated small bowel loops with air-fluid levels.  The transition point is questionably in the right pelvis, where there is some flocculated fecal material " within small bowel (series 2 axial images 82 through 101).    There is no definite evidence for abdominal adenopathy or ascites.    There are no pelvic masses or adenopathy.  There is colonic diverticulosis without definite evidence of acute diverticulitis.  The uterus is surgically absent.    There is no free fluid in the pelvis.    At the lung bases, there is a small right pleural effusion.                                       X-Ray Abdomen Flat And Erect (Final result)  Result time 04/10/25 22:35:01      Final result by Sally Ojeda MD (04/10/25 22:35:01)                   Impression:      Dilatation of a right presumed small bowel loop with an air-fluid level.  Marked gaseous distension of the stomach.  Question possibility of small-bowel obstruction.      Electronically signed by: Sally Ojeda  Date:    04/10/2025  Time:    22:35               Narrative:    EXAMINATION:  ABDOMEN FLAT AND ERECT    CLINICAL HISTORY:  Abdominal Pain;    TECHNIQUE:  Abdomen flat and erect radiographs were submitted.    COMPARISON:  12/21/2024    FINDINGS:  Abdomen flat and erect radiographs demonstrate dilated loop of bowel in the right abdomen, which demonstrates air-fluid level on the upright view.  Marked gaseous distension of the stomach is present.  Overall findings are concerning for small bowel obstruction.  There is no free air detected under the diaphragm.                                       Assessment/Plan:     Assessment & Plan  Small bowel obstruction  - Ms. Marian Durham presents with abdominal pain/bloating  - h/o prior SBOs & partial nephrectomy   - imaging c/w SBO  - NPO   - IV fluids for hydration  - PRN meds for pain/nausea  - General Surgery consulted    Atrial fibrillation  - Patient has paroxysmal (<7 days) atrial fibrillation. Patient is currently in sinus rhythm. [id number] Score: 3. The patients heart rate in the last 24 hours is as follows:  Pulse  Min: 66  Max: 73      Antiarrhythmics  metoprolol succinate (TOPROL-XL) 24 hr tablet 25 mg, Every 12 hours, Oral  flecainide tablet 100 mg, Every 12 hours, Oral    Anticoagulants  apixaban tablet 2.5 mg, 2 times daily, Oral    Plan  - Replete lytes with a goal of K>4, Mg >2  - Patient is anticoagulated, see medications listed above.  - Patient's afib is currently controlled  - continue home meds      Chronic anticoagulation  - continue Eliquis    Hypercholesterolemia  - continue statin when resuming PO intake       VTE Risk Mitigation (From admission, onward)           Ordered     apixaban tablet 2.5 mg  2 times daily         04/11/25 0300     IP VTE HIGH RISK PATIENT  Once         04/11/25 0055     Place sequential compression device  Until discontinued         04/11/25 0055     Reason for No Pharmacological VTE Prophylaxis  Once        Comments: Pending General Surgery consult   Question:  Reasons:  Answer:  Physician Provided (leave comment)    04/11/25 0055                         On 04/11/2025, patient should be placed in hospital observation services under the care of Dr. SHERIN Pearsno MD.           Karmen Avila PA-C  Department of Hospital Medicine  [facility] - Emergency Dept

## 2025-04-11 NOTE — HPI
Ms. Marian Durham is a 79 y.o. female, with PMH of SBOs, A. Fib on chronic anticoagulation, HLD, OA, who presented to [facility] ED on 4/10/25 due to abdominal pain/cramping/bloating beginning earlier on the day of presentation. She states her symptoms feels like when she had a prior bowel obstruction. She states prior to presenting to the ED, she called and spoke to an on-call Surgeon who advised she present to the ED. She denied nausea, vomiting. She was evaluated in the ED with labs showing no leukocytosis or left shift or anemia on CBC. A metabolic panel was overall normal. A UA was without UTI. A CT of the abdomen and pelvis showed a moderate small bowel obstruction. She was treated in the ED with zofran. She was placed on observation.

## 2025-04-11 NOTE — SUBJECTIVE & OBJECTIVE
"Past Medical History:   Diagnosis Date    Atrial fibrillation     stress related per patient    Hx of long term use of blood thinners     Hyperlipidemia     Osteoarthritis     PONV (postoperative nausea and vomiting)     SBO (small bowel obstruction)     X 5    Shingles 01/01/2002    Spinal stenosis        Past Surgical History:   Procedure Laterality Date    ANKLE FRACTURE SURGERY  2007    Right ankle    COLON SURGERY      bowel obstruction-NG TUBE    COLONOSCOPY      x 2    EXCISION OF MASS OF BACK N/A 11/16/2018    Procedure: EXCISION, MASS, BACK;  Surgeon: Fran Magaña MD;  Location: [facility];  Service: General;  Laterality: N/A;    EYE SURGERY Bilateral 2016    HYSTERECTOMY  1985    HEATHER    KNEE ARTHROSCOPY W/ DEBRIDEMENT  1994    Bilateral    KNEE ARTHROSCOPY W/ DEBRIDEMENT  2003    Left knee    OPEN PATELLA REEFING PROCEDURE  age 19    Left knee    Tonsilectomy  as a child    TONSILLECTOMY      TOTAL KNEE ARTHROPLASTY Right 10/19/2023    Procedure: ARTHROPLASTY, KNEE, TOTAL;  Surgeon: Vicente Mitchell MD;  Location: [facility];  Service: Orthopedics;  Laterality: Right;    TOTAL KNEE ARTHROPLASTY Left 7/18/2024    Procedure: ARTHROPLASTY, KNEE, TOTAL;  Surgeon: Vicente Mitchell MD;  Location: [facility];  Service: Orthopedics;  Laterality: Left;    TUBAL LIGATION         Review of patient's allergies indicates:   Allergen Reactions    Demerol [meperidine] Other (See Comments)     "Becomes Agitated and Combative"      Patient states she can take anything else    Meperidine (pf)     Opioids - morphine analogues Hallucinations       No current facility-administered medications on file prior to encounter.     Current Outpatient Medications on File Prior to Encounter   Medication Sig    apixaban (ELIQUIS) 2.5 mg Tab Take 1 tablet (2.5 mg total) by mouth 2 (two) times daily.    atorvastatin (LIPITOR) 20 MG tablet Take 1 tablet (20 mg total) by mouth once daily.    calcium-vitamin D3 (OS- + D3) 500 " mg-5 mcg (200 unit) per tablet Take 1 tablet by mouth 2 (two) times daily with meals.    estradioL (ESTRACE) 0.01 % (0.1 mg/gram) vaginal cream PLACE 1 GRAM VAGINALLY 3 TIMES A WEEK    flecainide (TAMBOCOR) 100 MG Tab Take 1 tablet (100 mg total) by mouth every 12 (twelve) hours.    glucosamine-chondroitin 500-400 mg tablet Take 1 tablet by mouth 3 (three) times daily.    metoprolol succinate (TOPROL-XL) 25 MG 24 hr tablet Take 1 tablet (25 mg total) by mouth every 12 (twelve) hours.    polyethylene glycol (MIRALAX) 17 gram/dose powder Take 17 g by mouth 3 (three) times daily as needed (Constipation).     Family History       Problem Relation (Age of Onset)    Breast cancer Paternal Aunt    Colon cancer Paternal Aunt    Coronary artery disease Brother    Diabetes Brother    Hypertension Father, Mother          Tobacco Use    Smoking status: Never    Smokeless tobacco: Never   Substance and Sexual Activity    Alcohol use: Yes     Alcohol/week: 3.0 standard drinks of alcohol     Types: 3 Glasses of wine per week     Comment: Drinks a glass of wine 3 times weekly; none 24 hr prior to surgery    Drug use: No    Sexual activity: Not Currently     Birth control/protection: Post-menopausal     Comment: Spouse  of kidney cancer : 2015     Review of Systems   Constitutional:  Negative for chills, diaphoresis and fever.   Respiratory:  Negative for cough, shortness of breath and wheezing.    Cardiovascular:  Negative for chest pain and palpitations.   Gastrointestinal:  Positive for abdominal distention and nausea. Negative for abdominal pain (abdominal discomfort), blood in stool, constipation, diarrhea and vomiting.   Genitourinary:  Negative for decreased urine volume, dysuria, flank pain, frequency, hematuria and urgency.   Musculoskeletal:  Negative for back pain, neck pain and neck stiffness.   Skin:  Negative for color change, pallor, rash and wound.   Neurological:  Negative for dizziness, syncope,  weakness, light-headedness and headaches.   Psychiatric/Behavioral:  Negative for agitation and confusion.      Objective:     Vital Signs (Most Recent):  Temp: 97.9 °F (36.6 °C) (04/11/25 0315)  Pulse: 70 (04/11/25 0320)  Resp: 16 (04/11/25 0315)  BP: (!) 144/64 (04/11/25 0315)  SpO2: 98 % (04/11/25 0315) Vital Signs (24h Range):  Temp:  [97.9 °F (36.6 °C)-98.2 °F (36.8 °C)] 97.9 °F (36.6 °C)  Pulse:  [66-73] 70  Resp:  [14-16] 16  SpO2:  [95 %-99 %] 98 %  BP: (144-186)/(64-88) 144/64     Weight: 52.2 kg (115 lb)  Body mass index is 19.14 kg/m².     Physical Exam  Vitals and nursing note reviewed.   Constitutional:       General: She is not in acute distress.     Appearance: Normal appearance. She is well-developed and normal weight. She is not ill-appearing, toxic-appearing or diaphoretic.   HENT:      Head: Normocephalic and atraumatic.   Eyes:      General: No scleral icterus.        Right eye: No discharge.         Left eye: No discharge.      Conjunctiva/sclera: Conjunctivae normal.   Neck:      Trachea: No tracheal deviation.   Cardiovascular:      Rate and Rhythm: Normal rate and regular rhythm.      Heart sounds: Normal heart sounds. No murmur heard.     No gallop.   Pulmonary:      Effort: Pulmonary effort is normal. No respiratory distress.      Breath sounds: Normal breath sounds. No stridor. No wheezing or rales.   Abdominal:      General: There is no distension.      Palpations: Abdomen is soft. There is no mass.      Tenderness: There is no abdominal tenderness. There is no guarding.      Comments: Hypoactive bowel sounds throughout.    Musculoskeletal:         General: No deformity. Normal range of motion.      Cervical back: Normal range of motion and neck supple.   Skin:     General: Skin is warm and dry.      Coloration: Skin is not pale.      Findings: No erythema or rash.   Neurological:      General: No focal deficit present.      Mental Status: She is alert and oriented to person, place, and  "time.      Cranial Nerves: No cranial nerve deficit.      Motor: No abnormal muscle tone.   Psychiatric:         Mood and Affect: Mood normal.         Behavior: Behavior normal.         Thought Content: Thought content normal.         Judgment: Judgment normal.                Significant Labs: All pertinent labs within the past 24 hours have been reviewed.  BMP:   Recent Labs   Lab 04/10/25  2053      K 4.2      CO2 21*   BUN 21   CREATININE 0.9   CALCIUM 9.6     CBC:   Recent Labs   Lab 04/10/25  2053   WBC 9.94   HGB 14.2   HCT 42.2        CMP:   Recent Labs   Lab 04/10/25  2053      K 4.2      CO2 21*   BUN 21   CREATININE 0.9   CALCIUM 9.6   ALBUMIN 4.0   BILITOT 0.4   ALKPHOS 74   AST 16   ALT 13   ANIONGAP 9     Urine Culture: No results for input(s): "LABURIN" in the last 48 hours.  Urine Studies:   Recent Labs   Lab 04/10/25  2241   COLORU Colorless*   APPEARANCEUA Clear   PHUR 6.0   SPECGRAV 1.010   PROTEINUA Negative   GLUCUA Negative   BILIRUBINUA Negative   OCCULTUA 1+*   NITRITE Negative   UROBILINOGEN Negative   LEUKOCYTESUR Negative   RBCUA 3   WBCUA 1   BACTERIA Rare       Significant Imaging: I have reviewed all pertinent imaging results/findings within the past 24 hours.  Imaging Results              CT Abdomen Pelvis With IV Contrast NO Oral Contrast (Final result)  Result time 04/11/25 00:13:19      Final result by Sally Ojeda MD (04/11/25 00:13:19)                   Impression:      Moderate small bowel obstruction.    Colonic diverticulosis.    Small right pleural effusion.      Electronically signed by: Sally Ojeda  Date:    04/11/2025  Time:    00:13               Narrative:    EXAMINATION:  CT OF ABDOMEN PELVIS WITH    CLINICAL HISTORY:  Bowel obstruction suspected;    TECHNIQUE:  5 mm enhanced axial images were obtained from the lung bases through the greater trochanters.  Seventy-five mL of Omnipaque 350 was " injected.    COMPARISON:  12/17/2024    FINDINGS:  A too small to characterize low attenuation lesion is seen in the left lobe of the liver, which may be a subcentimeter cyst..  The liver is mildly enlarged.    Spleen, pancreas, kidneys, and adrenal glands are unremarkable. The gallbladder contains no calcified gallstones.  A splenule is present.  The stomach is moderately distended.    There are moderately dilated small bowel loops with air-fluid levels.  The transition point is questionably in the right pelvis, where there is some flocculated fecal material within small bowel (series 2 axial images 82 through 101).    There is no definite evidence for abdominal adenopathy or ascites.    There are no pelvic masses or adenopathy.  There is colonic diverticulosis without definite evidence of acute diverticulitis.  The uterus is surgically absent.    There is no free fluid in the pelvis.    At the lung bases, there is a small right pleural effusion.                                       X-Ray Abdomen Flat And Erect (Final result)  Result time 04/10/25 22:35:01      Final result by Sally Ojeda MD (04/10/25 22:35:01)                   Impression:      Dilatation of a right presumed small bowel loop with an air-fluid level.  Marked gaseous distension of the stomach.  Question possibility of small-bowel obstruction.      Electronically signed by: Sally Ojeda  Date:    04/10/2025  Time:    22:35               Narrative:    EXAMINATION:  ABDOMEN FLAT AND ERECT    CLINICAL HISTORY:  Abdominal Pain;    TECHNIQUE:  Abdomen flat and erect radiographs were submitted.    COMPARISON:  12/21/2024    FINDINGS:  Abdomen flat and erect radiographs demonstrate dilated loop of bowel in the right abdomen, which demonstrates air-fluid level on the upright view.  Marked gaseous distension of the stomach is present.  Overall findings are concerning for small bowel obstruction.  There is no free air detected under the  diaphragm.

## 2025-04-11 NOTE — PLAN OF CARE
Case Management Assessment     PCP: Suzanna Duran    Patient Arrived From: home  Existing Help at Home: none    Barriers to Discharge: none    Discharge Plan:    A. Home   B. Home with family      Pt has wheelchair, 3-1 commode and a chair riser in the garage that she uses when needed   Pt Ao3x,, independent at baseline, PCP correct on face sheet, family to provide transportation home             [facility] ([address])  Initial Discharge Assessment       Primary Care Provider: Suzanna Duran MD    Admission Diagnosis: Atrial fibrillation [I48.91]  Small bowel obstruction [K56.609]    Admission Date: 4/10/2025  Expected Discharge Date:     Transition of Care Barriers: (P) None    Payor: [employer] MEDICARE / Plan: [employer] HMO PPO SPECIAL NEEDS / Product Type: Medicare Advantage /     Extended Emergency Contact Information  Primary Emergency Contact: Evelin Lyons  Address: [address] [place] [address] [facility]  Home Phone: [phone]  Mobile Phone: [phone]  Relation: Friend  Secondary Emergency Contact: Duane Durham   [facility]  Home Phone: [phone]  Relation: Son    Discharge Plan A: (P) Home  Discharge Plan B: (P) Home with family      [facility]  [address] [address]  Phone: [phone] Fax: [phone]    [id number] [address] AT [facility]  [address] [facility] [address]  Phone: [phone] Fax: [phone]      Initial Assessment (most recent)       Adult Discharge Assessment - 04/11/25 0921          Discharge Assessment    Assessment Type Discharge Planning Assessment (P)      Confirmed/corrected address, phone number and insurance Yes (P)      Confirmed Demographics Correct on Facesheet (P)      Source of Information patient (P)      Does patient/caregiver understand observation status Yes (P)       Communicated WILLIAN with patient/caregiver Date not available/Unable to determine (P)      Reason For Admission Small bowel obstruction (P)      People in Home alone (P)      Do you expect to return to your current living situation? Yes (P)      Do you have help at home or someone to help you manage your care at home? No (P)      Prior to hospitilization cognitive status: Alert/Oriented (P)      Current cognitive status: Alert/Oriented (P)      Walking or Climbing Stairs Difficulty no (P)      Dressing/Bathing Difficulty no (P)      Equipment Currently Used at Home none (P)      Readmission within 30 days? Yes (P)      Patient currently being followed by outpatient case management? No (P)      Do you currently have service(s) that help you manage your care at home? No (P)      Do you take prescription medications? Yes (P)      Do you have prescription coverage? Yes (P)      Coverage [employer] (P)      Do you have any problems affording any of your prescribed medications? No (P)      Is the patient taking medications as prescribed? yes (P)      Who is going to help you get home at discharge? family (P)      How do you get to doctors appointments? car, drives self (P)      Are you on dialysis? No (P)      Do you take coumadin? No (P)      Discharge Plan A Home (P)      Discharge Plan B Home with family (P)      DME Needed Upon Discharge  none (P)      Discharge Plan discussed with: Patient (P)      Transition of Care Barriers None (P)                                  04/11/25 0921   Discharge Assessment   Assessment Type Discharge Planning Assessment   Confirmed/corrected address, phone number and insurance Yes   Confirmed Demographics Correct on Facesheet   Source of Information patient   Does patient/caregiver understand observation status Yes   Communicated WILLIAN with patient/caregiver Date not available/Unable to determine   Reason For Admission Small bowel obstruction   People in Home alone   Do you expect to return to  your current living situation? Yes   Do you have help at home or someone to help you manage your care at home? No   Prior to hospitilization cognitive status: Alert/Oriented   Current cognitive status: Alert/Oriented   Walking or Climbing Stairs Difficulty no   Dressing/Bathing Difficulty no   Equipment Currently Used at Home none   Readmission within 30 days? Yes   Patient currently being followed by outpatient case management? No   Do you currently have service(s) that help you manage your care at home? No   Do you take prescription medications? Yes   Do you have prescription coverage? Yes   Coverage [employer]   Do you have any problems affording any of your prescribed medications? No   Is the patient taking medications as prescribed? yes   Who is going to help you get home at discharge? family   How do you get to doctors appointments? car, drives self   Are you on dialysis? No   Do you take coumadin? No   Discharge Plan A Home   Discharge Plan B Home with family   DME Needed Upon Discharge  none   Discharge Plan discussed with: Patient   Transition of Care Barriers None

## 2025-04-11 NOTE — CONSULTS
[facility]   General Surgery  Consult Note    Inpatient consult to General Surgery  Consult performed by: Sue Lopez MD  Consult ordered by: Karmen Avila PA-C          Subjective:     Chief Complaint/Reason for Admission: SBO    History of Present Illness:  Patient is a female 79 years of age with history of abdominal hysterectomy via lower midline incision who was well known to the surgery service.  She has had several bowel obstructions in the recent years with the last approximately one month ago.  She now presents with subacute onset of decreased gas, increasing abdominal distention, and abdominal discomfort.  She notes that this is similar to her prior bowel obstructions.  Based on these findings she presented to the ED for evaluation.  In the ED she underwent imaging which demonstrated recurrent bowel obstruction with transition point in the right lower quadrant/pelvis.  Labs this morning are preserved.  She currently does not note severe abdominal pain but does feel gurgling last BM was one day ago.    No current facility-administered medications on file prior to encounter.     Current Outpatient Medications on File Prior to Encounter   Medication Sig    apixaban (ELIQUIS) 2.5 mg Tab Take 1 tablet (2.5 mg total) by mouth 2 (two) times daily.    atorvastatin (LIPITOR) 20 MG tablet Take 1 tablet (20 mg total) by mouth once daily.    calcium-vitamin D3 (OS- + D3) 500 mg-5 mcg (200 unit) per tablet Take 1 tablet by mouth 2 (two) times daily with meals.    estradioL (ESTRACE) 0.01 % (0.1 mg/gram) vaginal cream PLACE 1 GRAM VAGINALLY 3 TIMES A WEEK    flecainide (TAMBOCOR) 100 MG Tab Take 1 tablet (100 mg total) by mouth every 12 (twelve) hours.    glucosamine-chondroitin 500-400 mg tablet Take 1 tablet by mouth 3 (three) times daily.    metoprolol succinate (TOPROL-XL) 25 MG 24 hr tablet Take 1 tablet (25 mg total) by mouth every 12 (twelve) hours.    polyethylene glycol (MIRALAX)  "17 gram/dose powder Take 17 g by mouth 3 (three) times daily as needed (Constipation).       Review of patient's allergies indicates:   Allergen Reactions    Demerol [meperidine] Other (See Comments)     "Becomes Agitated and Combative"      Patient states she can take anything else    Meperidine (pf)     Opioids - morphine analogues Hallucinations       Past Medical History:   Diagnosis Date    Atrial fibrillation     stress related per patient    Hx of long term use of blood thinners     Hyperlipidemia     Osteoarthritis     PONV (postoperative nausea and vomiting)     SBO (small bowel obstruction)     X 5    Shingles 01/01/2002    Spinal stenosis      Past Surgical History:   Procedure Laterality Date    ANKLE FRACTURE SURGERY  2007    Right ankle    COLON SURGERY      bowel obstruction-NG TUBE    COLONOSCOPY      x 2    EXCISION OF MASS OF BACK N/A 11/16/2018    Procedure: EXCISION, MASS, BACK;  Surgeon: Fran Magaña MD;  Location: [facility];  Service: General;  Laterality: N/A;    EYE SURGERY Bilateral 2016    HYSTERECTOMY  1985    HEATHER    KNEE ARTHROSCOPY W/ DEBRIDEMENT  1994    Bilateral    KNEE ARTHROSCOPY W/ DEBRIDEMENT  2003    Left knee    OPEN PATELLA REEFING PROCEDURE  age 19    Left knee    Tonsilectomy  as a child    TONSILLECTOMY      TOTAL KNEE ARTHROPLASTY Right 10/19/2023    Procedure: ARTHROPLASTY, KNEE, TOTAL;  Surgeon: Vicente Mitchell MD;  Location: [facility];  Service: Orthopedics;  Laterality: Right;    TOTAL KNEE ARTHROPLASTY Left 7/18/2024    Procedure: ARTHROPLASTY, KNEE, TOTAL;  Surgeon: Vicente Mitchell MD;  Location: [facility];  Service: Orthopedics;  Laterality: Left;    TUBAL LIGATION       Family History       Problem Relation (Age of Onset)    Breast cancer Paternal Aunt    Colon cancer Paternal Aunt    Coronary artery disease Brother    Diabetes Brother    Hypertension Father, Mother          Tobacco Use    Smoking status: Never    Smokeless tobacco: Never   Substance and " Sexual Activity    Alcohol use: Yes     Alcohol/week: 3.0 standard drinks of alcohol     Types: 3 Glasses of wine per week     Comment: Drinks a glass of wine 3 times weekly; none 24 hr prior to surgery    Drug use: No    Sexual activity: Not Currently     Birth control/protection: Post-menopausal     Comment: Spouse  of kidney cancer : 2015     Review of Systems   All other systems reviewed and are negative.    Objective:     Vital Signs (Most Recent):  Temp: 97 °F (36.1 °C) (25)  Pulse: 68 (25)  Resp: 16 (25)  BP: (!) 182/79 (25)  SpO2: 97 % (25) Vital Signs (24h Range):  Temp:  [97 °F (36.1 °C)-98.2 °F (36.8 °C)] 97 °F (36.1 °C)  Pulse:  [66-73] 68  Resp:  [14-16] 16  SpO2:  [95 %-99 %] 97 %  BP: (144-186)/(64-88) 182/79     Weight: 52.2 kg (115 lb)  Body mass index is 19.14 kg/m².      Intake/Output Summary (Last 24 hours) at 2025 1036  Last data filed at 2025 0845  Gross per 24 hour   Intake 0 ml   Output 2 ml   Net -2 ml       Physical Exam  Eyes:      Conjunctiva/sclera: Conjunctivae normal.   Cardiovascular:      Rate and Rhythm: Normal rate.   Pulmonary:      Effort: Pulmonary effort is normal.   Skin:     General: Skin is warm and dry.   Neurological:      General: No focal deficit present.      Mental Status: She is alert.   Psychiatric:         Mood and Affect: Mood normal.         Significant Labs:  CBC:   Recent Labs   Lab 25  0448   WBC 11.90   RBC 4.62   HGB 14.3   HCT 42.5      MCV 92   MCH 31.0   MCHC 33.6     CMP:   Recent Labs   Lab 04/10/25  2053 04/11/25  044   CALCIUM 9.6 9.7   ALBUMIN 4.0  --     140   K 4.2 4.3   CO2 21* 23    108   BUN 21 20   CREATININE 0.9 0.9   ALKPHOS 74  --    ALT 13  --    AST 16  --    BILITOT 0.4  --        Significant Diagnostics:  I personally and independently reviewed, visualized and interpreted the images of the below listed radiology studies (including CT)  and my findings are notable for dilated loops of small bowel with a transition point.  Reports below for reference.     Assessment/Plan:     Active Diagnoses:    Diagnosis Date Noted POA    PRINCIPAL PROBLEM:  Small bowel obstruction [K56.609] 04/11/2025 Yes    Chronic anticoagulation [Z79.01] 02/02/2024 Not Applicable    Hypercholesterolemia [E78.00] 02/02/2024 Yes    Atrial fibrillation [I48.91] 06/30/2023 Yes      Problems Resolved During this Admission:   Patient presenting with recurrent bowel obstruction.  No signs of intra-abdominal catastrophe.   -Will manage conservatively at this time.  Continue NPO with IVF resuscitation.    -Case discussed with Dr. Werner who will continue to follow with patient.    Thank you for your consult.     Sue Lopez MD  Staff Surgeon  General Surgery & Endocrine Surgery

## 2025-04-11 NOTE — PLAN OF CARE
MOON Message     Medicare Outpatient and Observation Notification regarding financial responsibility Explained to patient/caregiver; Signed/date by patient/caregiver   Date SMITH was signed 4/11/2025   Time SMITH was signed 1217

## 2025-04-12 LAB
ABSOLUTE EOSINOPHIL (OHS): 0.32 K/UL
ABSOLUTE MONOCYTE (OHS): 0.4 K/UL (ref 0.3–1)
ABSOLUTE NEUTROPHIL COUNT (OHS): 2.67 K/UL (ref 1.8–7.7)
ANION GAP (OHS): 9 MMOL/L (ref 8–16)
BASOPHILS # BLD AUTO: 0.01 K/UL
BASOPHILS NFR BLD AUTO: 0.2 %
BUN SERPL-MCNC: 12 MG/DL (ref 8–23)
CALCIUM SERPL-MCNC: 8.6 MG/DL (ref 8.7–10.5)
CHLORIDE SERPL-SCNC: 112 MMOL/L (ref 95–110)
CO2 SERPL-SCNC: 21 MMOL/L (ref 23–29)
CREAT SERPL-MCNC: 0.8 MG/DL (ref 0.5–1.4)
ERYTHROCYTE [DISTWIDTH] IN BLOOD BY AUTOMATED COUNT: 13.1 % (ref 11.5–14.5)
GFR SERPLBLD CREATININE-BSD FMLA CKD-EPI: >60 ML/MIN/1.73/M2
GLUCOSE SERPL-MCNC: 83 MG/DL (ref 70–110)
HCT VFR BLD AUTO: 37 % (ref 37–48.5)
HGB BLD-MCNC: 12.3 GM/DL (ref 12–16)
IMM GRANULOCYTES # BLD AUTO: 0.01 K/UL (ref 0–0.04)
IMM GRANULOCYTES NFR BLD AUTO: 0.2 % (ref 0–0.5)
LYMPHOCYTES # BLD AUTO: 1.1 K/UL (ref 1–4.8)
MAGNESIUM SERPL-MCNC: 1.9 MG/DL (ref 1.6–2.6)
MCH RBC QN AUTO: 30.8 PG (ref 27–31)
MCHC RBC AUTO-ENTMCNC: 33.2 G/DL (ref 32–36)
MCV RBC AUTO: 93 FL (ref 82–98)
NUCLEATED RBC (/100WBC) (OHS): 0 /100 WBC
PLATELET # BLD AUTO: 181 K/UL (ref 150–450)
PMV BLD AUTO: 9.3 FL (ref 9.2–12.9)
POTASSIUM SERPL-SCNC: 4 MMOL/L (ref 3.5–5.1)
RBC # BLD AUTO: 3.99 M/UL (ref 4–5.4)
RELATIVE EOSINOPHIL (OHS): 7.1 %
RELATIVE LYMPHOCYTE (OHS): 24.4 % (ref 18–48)
RELATIVE MONOCYTE (OHS): 8.9 % (ref 4–15)
RELATIVE NEUTROPHIL (OHS): 59.2 % (ref 38–73)
SODIUM SERPL-SCNC: 142 MMOL/L (ref 136–145)
WBC # BLD AUTO: 4.51 K/UL (ref 3.9–12.7)

## 2025-04-12 PROCEDURE — 36415 COLL VENOUS BLD VENIPUNCTURE: CPT | Performed by: PHYSICIAN ASSISTANT

## 2025-04-12 PROCEDURE — 99231 SBSQ HOSP IP/OBS SF/LOW 25: CPT | Mod: ,,, | Performed by: SPECIALIST

## 2025-04-12 PROCEDURE — 21400001 HC TELEMETRY ROOM

## 2025-04-12 PROCEDURE — 25000003 PHARM REV CODE 250: Performed by: PHYSICIAN ASSISTANT

## 2025-04-12 PROCEDURE — 63600175 PHARM REV CODE 636 W HCPCS: Performed by: INTERNAL MEDICINE

## 2025-04-12 PROCEDURE — 82310 ASSAY OF CALCIUM: CPT | Performed by: PHYSICIAN ASSISTANT

## 2025-04-12 PROCEDURE — 85025 COMPLETE CBC W/AUTO DIFF WBC: CPT | Performed by: PHYSICIAN ASSISTANT

## 2025-04-12 PROCEDURE — 83735 ASSAY OF MAGNESIUM: CPT | Performed by: PHYSICIAN ASSISTANT

## 2025-04-12 PROCEDURE — 94761 N-INVAS EAR/PLS OXIMETRY MLT: CPT

## 2025-04-12 RX ORDER — ATORVASTATIN CALCIUM 20 MG/1
20 TABLET, FILM COATED ORAL DAILY
Status: DISCONTINUED | OUTPATIENT
Start: 2025-04-13 | End: 2025-04-14 | Stop reason: HOSPADM

## 2025-04-12 RX ADMIN — FLECAINIDE ACETATE 100 MG: 100 TABLET ORAL at 08:04

## 2025-04-12 RX ADMIN — ENOXAPARIN SODIUM 50 MG: 60 INJECTION SUBCUTANEOUS at 08:04

## 2025-04-12 RX ADMIN — ACETAMINOPHEN 650 MG: 325 TABLET, FILM COATED ORAL at 08:04

## 2025-04-12 RX ADMIN — METOPROLOL SUCCINATE 25 MG: 25 TABLET, EXTENDED RELEASE ORAL at 08:04

## 2025-04-12 NOTE — PROGRESS NOTES
House day 3.  Diagnosis-SBO    Subjective   Feeling better  Ambulating in halls  Bowel movement yesterday  Tolerating clear liquids    PE  Afebrile  Vital signs stable  Chest-clear  Heart-regular rate and rhythm  Abdomen-soft, no tenderness, no distention, no guarding, no rebound  Extremities-no tenderness    Impression/plan   SBO appears to be resolved  Tolerating clear liquid  Advance to low residue diet

## 2025-04-12 NOTE — ASSESSMENT & PLAN NOTE
- Recurrent SBO in setting of prior abdominal surgery.  - Progressing gradually with +BM, flatus.  - Advance diet as per surgery recs.

## 2025-04-12 NOTE — ASSESSMENT & PLAN NOTE
- Stable; continue flecainide 100mg PO BID.  - For time being, continue enoxaparin 1mg/kg subQ BID in place of apixaban.

## 2025-04-12 NOTE — PROGRESS NOTES
[facility] [address] Medicine  Progress Note    Patient Name: Marian Durham  MRN: [id number]  Patient Class: IP- Inpatient   Admission Date: 4/10/2025  Length of Stay: 1 days  Attending Physician: SHERIN Pearson MD  Primary Care Provider: Suzanna Duran MD        Subjective     Principal Problem:Small bowel obstruction        HPI:  Ms. Marian Durham is a 79 y.o. female, with PMH of SBOs, A. Fib on chronic anticoagulation, HLD, OA, who presented to [facility] ED on 4/10/25 due to abdominal pain/cramping/bloating beginning earlier on the day of presentation. She states her symptoms feels like when she had a prior bowel obstruction. She states prior to presenting to the ED, she called and spoke to an on-call Surgeon who advised she present to the ED. She denied nausea, vomiting. She was evaluated in the ED with labs showing no leukocytosis or left shift or anemia on CBC. A metabolic panel was overall normal. A UA was without UTI. A CT of the abdomen and pelvis showed a moderate small bowel obstruction. She was treated in the ED with zofran. She was placed on observation.     Overview/Hospital Course:  No notes on file    Interval History: No acute events overnight. +flatus, no further BM to date. Tolerating advancing diet. Discussed plan of care. No other concerns at this time.    Review of Systems   Constitutional:  Negative for chills and fever.   Respiratory:  Negative for cough and shortness of breath.    Cardiovascular:  Negative for chest pain and palpitations.   Gastrointestinal:  Positive for abdominal pain and nausea. Negative for vomiting.     Objective:     Vital Signs (Most Recent):  Temp: 97.4 °F (36.3 °C) (04/12/25 2000)  Pulse: 61 (04/12/25 2000)  Resp: 18 (04/12/25 2000)  BP: (!) 159/73 (04/12/25 2000)  SpO2: 98 % (04/12/25 2000) Vital Signs (24h Range):  Temp:  [97.4 °F (36.3 °C)-98 °F (36.7 °C)] 97.4 °F (36.3 °C)  Pulse:  [55-92] 61  Resp:  [17-20] 18  SpO2:  [84 %-98  %] 98 %  BP: (141-181)/(65-79) 159/73     Weight: 52.2 kg (115 lb)  Body mass index is 19.14 kg/m².    Intake/Output Summary (Last 24 hours) at 4/12/2025 2134  Last data filed at 4/12/2025 1750  Gross per 24 hour   Intake 540 ml   Output --   Net 540 ml         Physical Exam  Vitals and nursing note reviewed.   Constitutional:       General: She is not in acute distress.     Appearance: She is well-developed.   HENT:      Head: Normocephalic and atraumatic.   Eyes:      General:         Right eye: No discharge.         Left eye: No discharge.      Conjunctiva/sclera: Conjunctivae normal.   Cardiovascular:      Rate and Rhythm: Normal rate.      Pulses: Normal pulses.   Pulmonary:      Effort: Pulmonary effort is normal. No respiratory distress.   Abdominal:      General: Bowel sounds are increased.      Palpations: Abdomen is soft.      Tenderness: There is abdominal tenderness.   Musculoskeletal:         General: Normal range of motion.      Right lower leg: No edema.      Left lower leg: No edema.   Skin:     General: Skin is warm and dry.   Neurological:      Mental Status: She is alert and oriented to person, place, and time.       Significant Labs:   CBC:  Recent Labs   Lab 04/10/25  2053 04/11/25  0448 04/12/25  0606   WBC 9.94 11.90 4.51   HGB 14.2 14.3 12.3   HCT 42.2 42.5 37.0    222 181   LYMPH 1.52  15.3* 0.86*  7.2* 1.10  24.4   MONO 5.9  0.59 5.1  0.61 8.9  0.40   EOS 1.0  0.10 0.3  0.04 7.1  0.32   BMP:  Recent Labs   Lab 04/10/25  2053 04/11/25  0448 04/12/25  0606    140 142   K 4.2 4.3 4.0    108 112*   CO2 21* 23 21*   BUN 21 20 12   CREATININE 0.9 0.9 0.8   CALCIUM 9.6 9.7 8.6*   MG  --  2.0 1.9     Significant Imaging: I have reviewed and interpreted all pertinent imaging results/findings within the past 24 hours.        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  - Progressing gradually with +BM, flatus.  -  Advance diet as per surgery recs.  Atrial fibrillation  Chronic anticoagulation  - Stable; continue flecainide 100mg PO BID.  - For time being, continue enoxaparin 1mg/kg subQ BID in place of apixaban.  Hypercholesterolemia  - Resume atorvastatin 20mg PO daily tomorrow AM.    VTE Risk Mitigation (From admission, onward)           Ordered     enoxaparin injection 50 mg  Every 12 hours         04/11/25 1508     IP VTE HIGH RISK PATIENT  Once         04/11/25 0055     Place sequential compression device  Until discontinued         04/11/25 0055     Reason for No Pharmacological VTE Prophylaxis  Once        Comments: Pending General Surgery consult   Question:  Reasons:  Answer:  Physician Provided (leave comment)    04/11/25 0055                    Discharge Planning   WILLIAN: 4/14/2025     Code Status: Full Code   Medical Readiness for Discharge Date:   Discharge Plan A: Home                        D Favian Pearson MD  Department of Hospital Medicine   [facility] ([address])

## 2025-04-13 LAB
ABSOLUTE EOSINOPHIL (OHS): 0.28 K/UL
ABSOLUTE MONOCYTE (OHS): 0.5 K/UL (ref 0.3–1)
ABSOLUTE NEUTROPHIL COUNT (OHS): 1.93 K/UL (ref 1.8–7.7)
ANION GAP (OHS): 10 MMOL/L (ref 8–16)
BASOPHILS # BLD AUTO: 0.02 K/UL
BASOPHILS NFR BLD AUTO: 0.5 %
BUN SERPL-MCNC: 12 MG/DL (ref 8–23)
CALCIUM SERPL-MCNC: 9.3 MG/DL (ref 8.7–10.5)
CHLORIDE SERPL-SCNC: 110 MMOL/L (ref 95–110)
CO2 SERPL-SCNC: 21 MMOL/L (ref 23–29)
CREAT SERPL-MCNC: 0.8 MG/DL (ref 0.5–1.4)
ERYTHROCYTE [DISTWIDTH] IN BLOOD BY AUTOMATED COUNT: 12.8 % (ref 11.5–14.5)
GFR SERPLBLD CREATININE-BSD FMLA CKD-EPI: >60 ML/MIN/1.73/M2
GLUCOSE SERPL-MCNC: 85 MG/DL (ref 70–110)
HCT VFR BLD AUTO: 37.4 % (ref 37–48.5)
HGB BLD-MCNC: 12.6 GM/DL (ref 12–16)
IMM GRANULOCYTES # BLD AUTO: 0 K/UL (ref 0–0.04)
IMM GRANULOCYTES NFR BLD AUTO: 0 % (ref 0–0.5)
LYMPHOCYTES # BLD AUTO: 1.56 K/UL (ref 1–4.8)
MAGNESIUM SERPL-MCNC: 2.1 MG/DL (ref 1.6–2.6)
MCH RBC QN AUTO: 30.8 PG (ref 27–31)
MCHC RBC AUTO-ENTMCNC: 33.7 G/DL (ref 32–36)
MCV RBC AUTO: 91 FL (ref 82–98)
NUCLEATED RBC (/100WBC) (OHS): 0 /100 WBC
PLATELET # BLD AUTO: 178 K/UL (ref 150–450)
PMV BLD AUTO: 9 FL (ref 9.2–12.9)
POTASSIUM SERPL-SCNC: 3.9 MMOL/L (ref 3.5–5.1)
RBC # BLD AUTO: 4.09 M/UL (ref 4–5.4)
RELATIVE EOSINOPHIL (OHS): 6.5 %
RELATIVE LYMPHOCYTE (OHS): 36.4 % (ref 18–48)
RELATIVE MONOCYTE (OHS): 11.7 % (ref 4–15)
RELATIVE NEUTROPHIL (OHS): 44.9 % (ref 38–73)
SODIUM SERPL-SCNC: 141 MMOL/L (ref 136–145)
WBC # BLD AUTO: 4.29 K/UL (ref 3.9–12.7)

## 2025-04-13 PROCEDURE — 85025 COMPLETE CBC W/AUTO DIFF WBC: CPT | Performed by: PHYSICIAN ASSISTANT

## 2025-04-13 PROCEDURE — 21400001 HC TELEMETRY ROOM

## 2025-04-13 PROCEDURE — 83735 ASSAY OF MAGNESIUM: CPT | Performed by: PHYSICIAN ASSISTANT

## 2025-04-13 PROCEDURE — 63600175 PHARM REV CODE 636 W HCPCS: Performed by: INTERNAL MEDICINE

## 2025-04-13 PROCEDURE — 36415 COLL VENOUS BLD VENIPUNCTURE: CPT | Performed by: PHYSICIAN ASSISTANT

## 2025-04-13 PROCEDURE — 80048 BASIC METABOLIC PNL TOTAL CA: CPT | Performed by: PHYSICIAN ASSISTANT

## 2025-04-13 PROCEDURE — 25000003 PHARM REV CODE 250: Performed by: PHYSICIAN ASSISTANT

## 2025-04-13 PROCEDURE — 94761 N-INVAS EAR/PLS OXIMETRY MLT: CPT

## 2025-04-13 PROCEDURE — 99231 SBSQ HOSP IP/OBS SF/LOW 25: CPT | Mod: ,,, | Performed by: SPECIALIST

## 2025-04-13 PROCEDURE — 25000003 PHARM REV CODE 250: Performed by: INTERNAL MEDICINE

## 2025-04-13 RX ORDER — BISACODYL 10 MG/1
10 SUPPOSITORY RECTAL ONCE
Status: COMPLETED | OUTPATIENT
Start: 2025-04-13 | End: 2025-04-13

## 2025-04-13 RX ADMIN — METOPROLOL SUCCINATE 25 MG: 25 TABLET, EXTENDED RELEASE ORAL at 08:04

## 2025-04-13 RX ADMIN — ATORVASTATIN CALCIUM 20 MG: 20 TABLET, FILM COATED ORAL at 08:04

## 2025-04-13 RX ADMIN — FLECAINIDE ACETATE 100 MG: 100 TABLET ORAL at 08:04

## 2025-04-13 RX ADMIN — ENOXAPARIN SODIUM 50 MG: 60 INJECTION SUBCUTANEOUS at 08:04

## 2025-04-13 RX ADMIN — BISACODYL 10 MG: 10 SUPPOSITORY RECTAL at 10:04

## 2025-04-13 NOTE — ASSESSMENT & PLAN NOTE
- Recurrent SBO in setting of prior abdominal surgery.  - Progressing gradually with +BM, flatus; none overnight. Trial of suppository.  - Appreciate surgery assistance. Tolerating diet.

## 2025-04-13 NOTE — PROGRESS NOTES
House day 4.  Diagnosis-small-bowel obstruction    Subjective   Ambulating at home  Bowel movement today with suppository  Tolerating solid diet    PE  Afebrile  Vital signs stable  Abdomen-soft, mild distention, no tenderness, no guarding, no rebound      Impression/plan   Surgically stable, no evidence of compelling surgical abdomen

## 2025-04-13 NOTE — SUBJECTIVE & OBJECTIVE
Interval History: No acute events overnight. +flatus, no further BM to date. Tolerating advancing diet. Discussed plan of care. No other concerns at this time.    Review of Systems   Constitutional:  Negative for chills and fever.   Respiratory:  Negative for cough and shortness of breath.    Cardiovascular:  Negative for chest pain and palpitations.   Gastrointestinal:  Positive for abdominal pain and nausea. Negative for vomiting.     Objective:     Vital Signs (Most Recent):  Temp: 97.4 °F (36.3 °C) (04/12/25 2000)  Pulse: 61 (04/12/25 2000)  Resp: 18 (04/12/25 2000)  BP: (!) 159/73 (04/12/25 2000)  SpO2: 98 % (04/12/25 2000) Vital Signs (24h Range):  Temp:  [97.4 °F (36.3 °C)-98 °F (36.7 °C)] 97.4 °F (36.3 °C)  Pulse:  [55-92] 61  Resp:  [17-20] 18  SpO2:  [84 %-98 %] 98 %  BP: (141-181)/(65-79) 159/73     Weight: 52.2 kg (115 lb)  Body mass index is 19.14 kg/m².    Intake/Output Summary (Last 24 hours) at 4/12/2025 2134  Last data filed at 4/12/2025 1750  Gross per 24 hour   Intake 540 ml   Output --   Net 540 ml         Physical Exam  Vitals and nursing note reviewed.   Constitutional:       General: She is not in acute distress.     Appearance: She is well-developed.   HENT:      Head: Normocephalic and atraumatic.   Eyes:      General:         Right eye: No discharge.         Left eye: No discharge.      Conjunctiva/sclera: Conjunctivae normal.   Cardiovascular:      Rate and Rhythm: Normal rate.      Pulses: Normal pulses.   Pulmonary:      Effort: Pulmonary effort is normal. No respiratory distress.   Abdominal:      General: Bowel sounds are increased.      Palpations: Abdomen is soft.      Tenderness: There is abdominal tenderness.   Musculoskeletal:         General: Normal range of motion.      Right lower leg: No edema.      Left lower leg: No edema.   Skin:     General: Skin is warm and dry.   Neurological:      Mental Status: She is alert and oriented to person, place, and time.       Significant  Labs:   CBC:  Recent Labs   Lab 04/10/25  2053 04/11/25  0448 04/12/25  0606   WBC 9.94 11.90 4.51   HGB 14.2 14.3 12.3   HCT 42.2 42.5 37.0    222 181   LYMPH 1.52  15.3* 0.86*  7.2* 1.10  24.4   MONO 5.9  0.59 5.1  0.61 8.9  0.40   EOS 1.0  0.10 0.3  0.04 7.1  0.32   BMP:  Recent Labs   Lab 04/10/25  2053 04/11/25  0448 04/12/25  0606    140 142   K 4.2 4.3 4.0    108 112*   CO2 21* 23 21*   BUN 21 20 12   CREATININE 0.9 0.9 0.8   CALCIUM 9.6 9.7 8.6*   MG  --  2.0 1.9     Significant Imaging: I have reviewed and interpreted all pertinent imaging results/findings within the past 24 hours.

## 2025-04-13 NOTE — PROGRESS NOTES
[facility] Surg [address] Medicine  Progress Note    Patient Name: Marian Durham  MRN: [id number]  Patient Class: IP- Inpatient   Admission Date: 4/10/2025  Length of Stay: 2 days  Attending Physician: SHERIN Pearson MD  Primary Care Provider: Suzanna Duran MD        Subjective     Principal Problem:Small bowel obstruction        HPI:  Ms. Marian Durham is a 79 y.o. female, with PMH of SBOs, A. Fib on chronic anticoagulation, HLD, OA, who presented to [facility] ED on 4/10/25 due to abdominal pain/cramping/bloating beginning earlier on the day of presentation. She states her symptoms feels like when she had a prior bowel obstruction. She states prior to presenting to the ED, she called and spoke to an on-call Surgeon who advised she present to the ED. She denied nausea, vomiting. She was evaluated in the ED with labs showing no leukocytosis or left shift or anemia on CBC. A metabolic panel was overall normal. A UA was without UTI. A CT of the abdomen and pelvis showed a moderate small bowel obstruction. She was treated in the ED with zofran. She was placed on observation.     Overview/Hospital Course:  No notes on file    Interval History: No acute events overnight. +flatus, no BM overnight. Tolerating advanced diet. Discussed plan of care. No other concerns at this time.    Review of Systems   Constitutional:  Negative for chills and fever.   Respiratory:  Negative for cough and shortness of breath.    Cardiovascular:  Negative for chest pain and palpitations.   Gastrointestinal:  Positive for abdominal pain. Negative for nausea and vomiting.     Objective:     Vital Signs (Most Recent):  Temp: 98 °F (36.7 °C) (04/13/25 1932)  Pulse: 64 (04/13/25 2053)  Resp: 18 (04/13/25 1932)  BP: (!) 141/63 (04/13/25 2053)  SpO2: 97 % (04/13/25 1932) Vital Signs (24h Range):  Temp:  [97 °F (36.1 °C)-98.5 °F (36.9 °C)] 98 °F (36.7 °C)  Pulse:  [54-64] 64  Resp:  [18] 18  SpO2:  [95 %-99 %] 97 %  BP:  (133-173)/(63-80) 141/63     Weight: 52.2 kg (115 lb)  Body mass index is 19.14 kg/m².    Intake/Output Summary (Last 24 hours) at 4/13/2025 2127  Last data filed at 4/13/2025 1750  Gross per 24 hour   Intake 360 ml   Output 600 ml   Net -240 ml         Physical Exam  Vitals and nursing note reviewed.   Constitutional:       General: She is not in acute distress.     Appearance: She is well-developed.   HENT:      Head: Normocephalic and atraumatic.   Eyes:      General:         Right eye: No discharge.         Left eye: No discharge.      Conjunctiva/sclera: Conjunctivae normal.   Cardiovascular:      Rate and Rhythm: Normal rate.      Pulses: Normal pulses.   Pulmonary:      Effort: Pulmonary effort is normal. No respiratory distress.   Abdominal:      General: Bowel sounds are increased.      Palpations: Abdomen is soft.      Tenderness: There is no abdominal tenderness.   Musculoskeletal:         General: Normal range of motion.      Right lower leg: No edema.      Left lower leg: No edema.   Skin:     General: Skin is warm and dry.   Neurological:      Mental Status: She is alert and oriented to person, place, and time.       Significant Labs:   CBC:  Recent Labs   Lab 04/11/25  0448 04/12/25  0606 04/13/25  0504   WBC 11.90 4.51 4.29   HGB 14.3 12.3 12.6   HCT 42.5 37.0 37.4    181 178   LYMPH 0.86*  7.2* 1.10  24.4 1.56  36.4   MONO 5.1  0.61 8.9  0.40 11.7  0.50   EOS 0.3  0.04 7.1  0.32 6.5  0.28   BMP:  Recent Labs   Lab 04/11/25  0448 04/12/25  0606 04/13/25  0504    142 141   K 4.3 4.0 3.9    112* 110   CO2 23 21* 21*   BUN 20 12 12   CREATININE 0.9 0.8 0.8   CALCIUM 9.7 8.6* 9.3   MG 2.0 1.9 2.1     Significant Imaging: I have reviewed and interpreted all pertinent imaging results/findings within the past 24 hours.        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  - Progressing gradually with +BM, flatus; none  overnight. Trial of suppository.  - Appreciate surgery assistance. Tolerating diet.  Atrial fibrillation  Chronic anticoagulation  - Stable; continue flecainide 100mg PO BID.  - For time being, continue enoxaparin 1mg/kg subQ BID in place of apixaban.  Hypercholesterolemia  - Resume atorvastatin 20mg PO daily.    VTE Risk Mitigation (From admission, onward)           Ordered     enoxaparin injection 50 mg  Every 12 hours         04/11/25 1508     IP VTE HIGH RISK PATIENT  Once         04/11/25 0055     Place sequential compression device  Until discontinued         04/11/25 0055     Reason for No Pharmacological VTE Prophylaxis  Once        Comments: Pending General Surgery consult   Question:  Reasons:  Answer:  Physician Provided (leave comment)    04/11/25 0055                    Discharge Planning   WILLIAN: 4/14/2025     Code Status: Full Code   Medical Readiness for Discharge Date:   Discharge Plan A: Home                        D Favian Pearson MD  Department of Hospital Medicine   [facility]  Med Surg ([address])

## 2025-04-14 VITALS
BODY MASS INDEX: 19.16 KG/M2 | SYSTOLIC BLOOD PRESSURE: 158 MMHG | TEMPERATURE: 98 F | HEART RATE: 67 BPM | OXYGEN SATURATION: 98 % | WEIGHT: 115 LBS | HEIGHT: 65 IN | RESPIRATION RATE: 14 BRPM | DIASTOLIC BLOOD PRESSURE: 71 MMHG

## 2025-04-14 LAB
OHS QRS DURATION: 102 MS
OHS QTC CALCULATION: 475 MS

## 2025-04-14 PROCEDURE — 25000003 PHARM REV CODE 250: Performed by: PHYSICIAN ASSISTANT

## 2025-04-14 PROCEDURE — 25000003 PHARM REV CODE 250: Performed by: INTERNAL MEDICINE

## 2025-04-14 PROCEDURE — 63600175 PHARM REV CODE 636 W HCPCS: Performed by: INTERNAL MEDICINE

## 2025-04-14 RX ORDER — BISACODYL 10 MG/1
10 SUPPOSITORY RECTAL DAILY PRN
Status: DISCONTINUED | OUTPATIENT
Start: 2025-04-14 | End: 2025-04-14 | Stop reason: HOSPADM

## 2025-04-14 RX ORDER — BISACODYL 10 MG/1
10 SUPPOSITORY RECTAL DAILY PRN
Qty: 16 SUPPOSITORY | Refills: 0 | Status: SHIPPED | OUTPATIENT
Start: 2025-04-14 | End: 2025-04-14

## 2025-04-14 RX ORDER — BISACODYL 10 MG/1
10 SUPPOSITORY RECTAL DAILY PRN
Qty: 16 SUPPOSITORY | Refills: 0 | Status: SHIPPED | OUTPATIENT
Start: 2025-04-14

## 2025-04-14 RX ADMIN — BISACODYL 10 MG: 10 SUPPOSITORY RECTAL at 09:04

## 2025-04-14 RX ADMIN — FLECAINIDE ACETATE 100 MG: 100 TABLET ORAL at 09:04

## 2025-04-14 RX ADMIN — ATORVASTATIN CALCIUM 20 MG: 20 TABLET, FILM COATED ORAL at 09:04

## 2025-04-14 RX ADMIN — ENOXAPARIN SODIUM 50 MG: 60 INJECTION SUBCUTANEOUS at 09:04

## 2025-04-14 RX ADMIN — METOPROLOL SUCCINATE 25 MG: 25 TABLET, EXTENDED RELEASE ORAL at 09:04

## 2025-04-14 NOTE — DISCHARGE SUMMARY
[facility] Surg [address] Medicine  Discharge Summary      Patient Name: Marian Durham  MRN: [id number]  [facility]: [id number]  Patient Class: IP- Inpatient  Admission Date: 4/10/2025  Hospital Length of Stay: 3 days  Discharge Date and Time: {IP DISCHARGE DATE:81972432}  Attending Physician: SHERIN Pearson MD   Discharging Provider: ADILENE Pearson MD  Primary Care Provider: Suzanna Duran MD    Primary Care Team: Networked reference to record PCT     HPI:   Ms. Marian Durham is a 79 y.o. female, with PMH of SBOs, A. Fib on chronic anticoagulation, HLD, OA, who presented to [facility] ED on 4/10/25 due to abdominal pain/cramping/bloating beginning earlier on the day of presentation. She states her symptoms feels like when she had a prior bowel obstruction. She states prior to presenting to the ED, she called and spoke to an on-call Surgeon who advised she present to the ED. She denied nausea, vomiting. She was evaluated in the ED with labs showing no leukocytosis or left shift or anemia on CBC. A metabolic panel was overall normal. A UA was without UTI. A CT of the abdomen and pelvis showed a moderate small bowel obstruction. She was treated in the ED with zofran. She was placed on observation.     * No surgery found *      Hospital Course:   No notes on file     Goals of Care Treatment Preferences:  Code Status: Full Code    Living Will: Yes              SDOH Screening:  The patient declined to be screened for utility difficulties, food insecurity, transport difficulties, housing insecurity, and interpersonal safety, so no concerns could be identified this admission.     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  -  Progressing gradually with +BM, flatus; none overnight. Trial of suppository.  - Appreciate surgery assistance. Tolerating diet.  Atrial fibrillation  Chronic anticoagulation  - Stable; continue flecainide 100mg PO BID.  - For time being, continue enoxaparin 1mg/kg subQ BID in place of apixaban.  Hypercholesterolemia  - Resume atorvastatin 20mg PO daily.  Final Active Diagnoses:    Diagnosis Date Noted POA    PRINCIPAL PROBLEM:  Small bowel obstruction [K56.609] 04/11/2025 Yes    Chronic anticoagulation [Z79.01] 02/02/2024 Not Applicable    Hypercholesterolemia [E78.00] 02/02/2024 Yes    Atrial fibrillation [I48.91] 06/30/2023 Yes      Problems Resolved During this Admission:       Discharged Condition: {condition:12954}    Disposition:     Follow Up:    Patient Instructions:   No discharge procedures on file.    Significant Diagnostic Studies: {diagnostics:28495}    Pending Diagnostic Studies:       None           Medications:  {DISCHARGE MEDS OHS:21055}    Indwelling Lines/Drains at time of discharge:   Lines/Drains/Airways       None                   Time spent on the discharge of patient: *** minutes         D Favian Pearson MD  Department of Hospital Medicine  [facility] ([address])     ANIONGAP 9 9 9 10     Imaging Results              CT Abdomen Pelvis With IV Contrast NO Oral Contrast (Final result)  Result time 04/11/25 00:13:19      Final result by Sally Ojeda MD (04/11/25 00:13:19)                   Impression:      Moderate small bowel obstruction.    Colonic diverticulosis.    Small right pleural effusion.      Electronically signed by: Sally Ojeda  Date:    04/11/2025  Time:    00:13               Narrative:    EXAMINATION:  CT OF ABDOMEN PELVIS WITH    CLINICAL HISTORY:  Bowel obstruction suspected;    TECHNIQUE:  5 mm enhanced axial images were obtained from the lung bases through the greater trochanters.  Seventy-five mL of Omnipaque 350 was injected.    COMPARISON:  12/17/2024    FINDINGS:  A too small to characterize low attenuation lesion is seen in the left lobe of the liver, which may be a subcentimeter cyst..  The liver is mildly enlarged.    Spleen, pancreas, kidneys, and adrenal glands are unremarkable. The gallbladder contains no calcified gallstones.  A splenule is present.  The stomach is moderately distended.    There are moderately dilated small bowel loops with air-fluid levels.  The transition point is questionably in the right pelvis, where there is some flocculated fecal material within small bowel (series 2 axial images 82 through 101).    There is no definite evidence for abdominal adenopathy or ascites.    There are no pelvic masses or adenopathy.  There is colonic diverticulosis without definite evidence of acute diverticulitis.  The uterus is surgically absent.    There is no free fluid in the pelvis.    At the lung bases, there is a small right pleural effusion.                                       X-Ray Abdomen Flat And Erect (Final result)  Result time 04/10/25 22:35:01      Final result by Sally Ojeda MD (04/10/25 22:35:01)                   Impression:      Dilatation of a right presumed small bowel loop with an air-fluid level.   Marked gaseous distension of the stomach.  Question possibility of small-bowel obstruction.      Electronically signed by: Sally Ojeda  Date:    04/10/2025  Time:    22:35               Narrative:    EXAMINATION:  ABDOMEN FLAT AND ERECT    CLINICAL HISTORY:  Abdominal Pain;    TECHNIQUE:  Abdomen flat and erect radiographs were submitted.    COMPARISON:  12/21/2024    FINDINGS:  Abdomen flat and erect radiographs demonstrate dilated loop of bowel in the right abdomen, which demonstrates air-fluid level on the upright view.  Marked gaseous distension of the stomach is present.  Overall findings are concerning for small bowel obstruction.  There is no free air detected under the diaphragm.                                      Pending Diagnostic Studies:       None           Medications:  Reconciled Home Medications:      Medication List        START taking these medications      bisacodyL 10 mg Supp  Commonly known as: DULCOLAX  Place 1 suppository (10 mg total) rectally daily as needed (constipation).            CONTINUE taking these medications      apixaban 2.5 mg Tab  Commonly known as: ELIQUIS  Take 1 tablet (2.5 mg total) by mouth 2 (two) times daily.     atorvastatin 20 MG tablet  Commonly known as: LIPITOR  Take 1 tablet (20 mg total) by mouth once daily.     calcium-vitamin D3 500 mg-5 mcg (200 unit) per tablet  Commonly known as: OS- + D3  Take 1 tablet by mouth 2 (two) times daily with meals.     estradioL 0.01 % (0.1 mg/gram) vaginal cream  Commonly known as: ESTRACE  PLACE 1 GRAM VAGINALLY 3 TIMES A WEEK     flecainide 100 MG Tab  Commonly known as: TAMBOCOR  Take 1 tablet (100 mg total) by mouth every 12 (twelve) hours.     glucosamine-chondroitin 500-400 mg tablet  Take 1 tablet by mouth 3 (three) times daily.     metoprolol succinate 25 MG 24 hr tablet  Commonly known as: TOPROL-XL  Take 1 tablet (25 mg total) by mouth every 12 (twelve) hours.     polyethylene glycol 17 gram/dose  powder  Commonly known as: MIRALAX  Take 17 g by mouth 3 (three) times daily as needed (Constipation).              Indwelling Lines/Drains at time of discharge:   Lines/Drains/Airways       None                   Time spent on the discharge of patient: 35 minutes         D Favian Pearson MD  Department of Hospital Medicine  [facility] Surg ([address])

## 2025-04-14 NOTE — PLAN OF CARE
Medicare Message     Important Message from Medicare regarding Discharge Appeal Rights -- Explained to patient/caregiver; Signed/date by patient/caregiver   Date IMM was signed -- 4/14/2025   Time IMM was signed -- 0177

## 2025-04-14 NOTE — PLAN OF CARE
Case Management Final Discharge Note    Discharge Disposition: home    New DME ordered / company name: none    Relevant SDOH / Transition of Care Barriers:  none     Person available to provide assistance at home when needed and their contact information: self/independent    Scheduled followup appointment: PCP    Referrals placed: none    Transportation: family        Patient educated on discharge services and updated on DC plan. Bedside RN notified, patient clear to discharge from Case Management Perspective.     04/14/25 1028   Final Note   Assessment Type Final Discharge Note   Anticipated Discharge Disposition Home   Hospital Resources/Appts/Education Provided Provided patient/caregiver with written discharge plan information;Appointments scheduled and added to AVS   Post-Acute Status   Discharge Delays None known at this time     [facility] - Med Surg ([address])  Discharge Final Note    Primary Care Provider: Suzanna Duran MD    Expected Discharge Date: 4/14/2025    Final Discharge Note (most recent)       Final Note - 04/14/25 1028          Final Note    Assessment Type Final Discharge Note (P)      Anticipated Discharge Disposition Home or Self Care (P)      Hospital Resources/Appts/Education Provided Provided patient/caregiver with written discharge plan information;Appointments scheduled and added to AVS (P)         Post-Acute Status    Discharge Delays None known at this time (P)                      Important Message from Medicare             Contact Info       Suzanna Duran MD   Specialty: Internal Medicine   Relationship: PCP - General    [facility] [id number]   Phone: [phone]       Next Steps: Go on 4/16/2025    Instructions: post-hospital follow-up    1:30pm

## 2025-04-14 NOTE — SUBJECTIVE & OBJECTIVE
Interval History: No acute events overnight. +flatus, no BM overnight. Tolerating advanced diet. Discussed plan of care. No other concerns at this time.    Review of Systems   Constitutional:  Negative for chills and fever.   Respiratory:  Negative for cough and shortness of breath.    Cardiovascular:  Negative for chest pain and palpitations.   Gastrointestinal:  Positive for abdominal pain. Negative for nausea and vomiting.     Objective:     Vital Signs (Most Recent):  Temp: 98 °F (36.7 °C) (04/13/25 1932)  Pulse: 64 (04/13/25 2053)  Resp: 18 (04/13/25 1932)  BP: (!) 141/63 (04/13/25 2053)  SpO2: 97 % (04/13/25 1932) Vital Signs (24h Range):  Temp:  [97 °F (36.1 °C)-98.5 °F (36.9 °C)] 98 °F (36.7 °C)  Pulse:  [54-64] 64  Resp:  [18] 18  SpO2:  [95 %-99 %] 97 %  BP: (133-173)/(63-80) 141/63     Weight: 52.2 kg (115 lb)  Body mass index is 19.14 kg/m².    Intake/Output Summary (Last 24 hours) at 4/13/2025 2127  Last data filed at 4/13/2025 1750  Gross per 24 hour   Intake 360 ml   Output 600 ml   Net -240 ml         Physical Exam  Vitals and nursing note reviewed.   Constitutional:       General: She is not in acute distress.     Appearance: She is well-developed.   HENT:      Head: Normocephalic and atraumatic.   Eyes:      General:         Right eye: No discharge.         Left eye: No discharge.      Conjunctiva/sclera: Conjunctivae normal.   Cardiovascular:      Rate and Rhythm: Normal rate.      Pulses: Normal pulses.   Pulmonary:      Effort: Pulmonary effort is normal. No respiratory distress.   Abdominal:      General: Bowel sounds are increased.      Palpations: Abdomen is soft.      Tenderness: There is no abdominal tenderness.   Musculoskeletal:         General: Normal range of motion.      Right lower leg: No edema.      Left lower leg: No edema.   Skin:     General: Skin is warm and dry.   Neurological:      Mental Status: She is alert and oriented to person, place, and time.       Significant Labs:    CBC:  Recent Labs   Lab 04/11/25  0448 04/12/25  0606 04/13/25  0504   WBC 11.90 4.51 4.29   HGB 14.3 12.3 12.6   HCT 42.5 37.0 37.4    181 178   LYMPH 0.86*  7.2* 1.10  24.4 1.56  36.4   MONO 5.1  0.61 8.9  0.40 11.7  0.50   EOS 0.3  0.04 7.1  0.32 6.5  0.28   BMP:  Recent Labs   Lab 04/11/25 0448 04/12/25  0606 04/13/25  0504    142 141   K 4.3 4.0 3.9    112* 110   CO2 23 21* 21*   BUN 20 12 12   CREATININE 0.9 0.8 0.8   CALCIUM 9.7 8.6* 9.3   MG 2.0 1.9 2.1     Significant Imaging: I have reviewed and interpreted all pertinent imaging results/findings within the past 24 hours.

## 2025-04-14 NOTE — PROGRESS NOTES
AVS completed and reviewed . Iv removed and intact. Patient is stable. Pt family coming around 1pm.

## 2025-06-06 DIAGNOSIS — Z79.899 HIGH RISK MEDICATION USE: ICD-10-CM

## 2025-06-06 DIAGNOSIS — I48.0 PAROXYSMAL ATRIAL FIBRILLATION: ICD-10-CM

## 2025-06-06 RX ORDER — FLECAINIDE ACETATE 100 MG/1
100 TABLET ORAL EVERY 12 HOURS
Qty: 180 TABLET | Refills: 3 | Status: SHIPPED | OUTPATIENT
Start: 2025-06-06

## 2025-06-06 RX ORDER — METOPROLOL SUCCINATE 25 MG/1
25 TABLET, EXTENDED RELEASE ORAL EVERY 12 HOURS
Qty: 180 TABLET | Refills: 3 | Status: SHIPPED | OUTPATIENT
Start: 2025-06-06

## 2025-06-07 DIAGNOSIS — Z79.01 CHRONIC ANTICOAGULATION: ICD-10-CM

## 2025-06-07 DIAGNOSIS — I48.0 PAROXYSMAL ATRIAL FIBRILLATION: ICD-10-CM

## 2025-06-20 DIAGNOSIS — Z79.01 CHRONIC ANTICOAGULATION: ICD-10-CM

## 2025-06-20 DIAGNOSIS — I48.0 PAROXYSMAL ATRIAL FIBRILLATION: ICD-10-CM

## 2025-06-20 NOTE — TELEPHONE ENCOUNTER
Copied from CRM #[id number]. Topic: Medications - Medication Refill  >> Jun 20, 2025 12:41 PM Megan wrote:  Type: RX Refill Request    Who Called:  patient     Have you contacted your pharmacy:  call     Refill or New Rx:refill     RX Name and Strength:apixaban (ELIQUIS) 2.5 mg Tab    How is the patient currently taking it? (ex. 1XDay):    Is this a 30 day or 90 day RX:    Preferred Pharmacy with phone number:       [id number] [address] [facility]  [address] [facility] [address]  Phone: [phone] Fax: [phone]           Local or Mail Order: local     Ordering Provider: Lamar Fowler,     Would the patient rather a call back or a response via [facility]?  Call     Best Call Back Number [phone] or [phone]     Additional Information:  pt said that she would like to get the price of the medication at the pharmacy listed above she was told in order to get the price that the prescription must be received by the pharmacy & filled

## 2025-06-22 NOTE — PLAN OF CARE
Problem: At Risk for Falls  Goal: Patient does not fall  Outcome: Monitoring/Evaluating progress  Goal: Patient takes action to control fall-related risks  Outcome: Monitoring/Evaluating progress     Problem: Tissue Perfusion, Ineffective- Peripheral  Goal: Peripheral circulation is maintained/restored  Description: Palpable pulses/audible Doppler signal may be used to evaluate adequacy  of circulation and/or patency of arterial grafts after surgeryPalpable pulses/audible Doppler signal may be used to evaluate adequacy of circulation and/or patency of arterial grafts after surgery.  Outcome: Monitoring/Evaluating progress  Goal: Tolerates progressive activity without s/s of intolerance  Outcome: Monitoring/Evaluating progress  Goal: Verbalizes understanding of altered peripheral perfusion and treatment  Description: Document on Patient Education Activity  Outcome: Monitoring/Evaluating progress     Problem: Impaired Physical Mobility  Goal: Functional status is maintained or returned to baseline during hospitalization  Outcome: Monitoring/Evaluating progress  Goal: Tolerates activity for discharge setting with no abnormal symptoms  Outcome: Monitoring/Evaluating progress     Problem: Pain  Goal: Acceptable pain level achieved/maintained at rest using appropriate pain scale for the patient  Outcome: Monitoring/Evaluating progress  Goal: Acceptable pain level achieved/maintained with activity using appropriate pain scale for the patient  Outcome: Monitoring/Evaluating progress  Goal: Acceptable pain level achieved/maintained without oversedation  Outcome: Monitoring/Evaluating progress      AAOX4. VSS. NPO. NGT @ Right nare with intermittent wall suctioning. Lower abdominal tightness noted. Skin warm to touch with no skin breakdown noted. Able to make needs known. One person assist per adls and transfers, voiding per bsc. 20G IV @ RT AC infusing LR @ 125 mL/hr, patent and secured with transparent dressing. No complications. Tolerating medications well. HOB 30-45 degrees with call bell and bedside table within reach, bed in lowest position with hourly purposeful rounding. Will continue to monitor.  Problem: Adult Inpatient Plan of Care  Goal: Plan of Care Review  Outcome: Ongoing, Progressing  Goal: Patient-Specific Goal (Individualized)  Outcome: Ongoing, Progressing  Goal: Absence of Hospital-Acquired Illness or Injury  Outcome: Ongoing, Progressing  Goal: Optimal Comfort and Wellbeing  Outcome: Ongoing, Progressing  Goal: Readiness for Transition of Care  Outcome: Ongoing, Progressing     Problem: Fluid Deficit (Intestinal Obstruction)  Goal: Fluid Balance  Outcome: Ongoing, Progressing     Problem: Infection (Intestinal Obstruction)  Goal: Absence of Infection Signs and Symptoms  Outcome: Ongoing, Progressing     Problem: Nausea and Vomiting (Intestinal Obstruction)  Goal: Nausea and Vomiting Relief  Outcome: Ongoing, Progressing     Problem: Pain (Intestinal Obstruction)  Goal: Acceptable Pain Control  Outcome: Ongoing, Progressing     Problem: Fall Injury Risk  Goal: Absence of Fall and Fall-Related Injury  Outcome: Ongoing, Progressing     Problem: Bariatric Environmental Safety  Goal: Safety Maintained with Care  Outcome: Ongoing, Progressing

## 2025-07-31 ENCOUNTER — TELEPHONE (OUTPATIENT)
Dept: OBSTETRICS AND GYNECOLOGY | Facility: CLINIC | Age: 80
End: 2025-07-31
Payer: MEDICARE

## 2025-07-31 DIAGNOSIS — Z12.31 SCREENING MAMMOGRAM, ENCOUNTER FOR: Primary | ICD-10-CM

## 2025-07-31 NOTE — TELEPHONE ENCOUNTER
Copied from CRM #[id number]. Topic: Appointments - Amb Referral  >> Jul 31, 2025 10:56 AM Tamia wrote:  Type:  Mammogram    Caller is requesting to schedule their annual mammogram appointment.  Order is not listed in EPIC.  Please enter order and contact patient to schedule.  Name of Caller:pt   Where would they like the mammogram performed?[facility]   Would the patient rather a call back or a response via [facility]? Call   Best Call Back Number:  Additional Information:

## 2025-07-31 NOTE — TELEPHONE ENCOUNTER
Called patient ordered and assisted with scheduling annual mammogram appointment. Patient said thanks.

## 2025-08-27 ENCOUNTER — OFFICE VISIT (OUTPATIENT)
Dept: CARDIOLOGY | Facility: CLINIC | Age: 80
End: 2025-08-27
Attending: INTERNAL MEDICINE
Payer: MEDICARE

## 2025-08-27 VITALS
SYSTOLIC BLOOD PRESSURE: 91 MMHG | HEART RATE: 57 BPM | DIASTOLIC BLOOD PRESSURE: 53 MMHG | WEIGHT: 117.38 LBS | BODY MASS INDEX: 19.56 KG/M2 | HEIGHT: 65 IN | OXYGEN SATURATION: 95 %

## 2025-08-27 DIAGNOSIS — I48.0 PAROXYSMAL ATRIAL FIBRILLATION: ICD-10-CM

## 2025-08-27 DIAGNOSIS — E78.00 HYPERCHOLESTEROLEMIA: ICD-10-CM

## 2025-08-27 DIAGNOSIS — Z79.899 HIGH RISK MEDICATION USE: ICD-10-CM

## 2025-08-27 DIAGNOSIS — Z79.01 CHRONIC ANTICOAGULATION: ICD-10-CM

## 2025-08-27 DIAGNOSIS — Z96.653 HISTORY OF BILATERAL KNEE REPLACEMENT: ICD-10-CM

## 2025-08-27 DIAGNOSIS — Z87.19 HISTORY OF SMALL BOWEL OBSTRUCTION: ICD-10-CM

## 2025-08-27 PROCEDURE — 3288F FALL RISK ASSESSMENT DOCD: CPT | Mod: CPTII,S$GLB,, | Performed by: INTERNAL MEDICINE

## 2025-08-27 PROCEDURE — 1159F MED LIST DOCD IN RCRD: CPT | Mod: CPTII,S$GLB,, | Performed by: INTERNAL MEDICINE

## 2025-08-27 PROCEDURE — 99214 OFFICE O/P EST MOD 30 MIN: CPT | Mod: S$GLB,,, | Performed by: INTERNAL MEDICINE

## 2025-08-27 PROCEDURE — 3078F DIAST BP <80 MM HG: CPT | Mod: CPTII,S$GLB,, | Performed by: INTERNAL MEDICINE

## 2025-08-27 PROCEDURE — 1101F PT FALLS ASSESS-DOCD LE1/YR: CPT | Mod: CPTII,S$GLB,, | Performed by: INTERNAL MEDICINE

## 2025-08-27 PROCEDURE — 1157F ADVNC CARE PLAN IN RCRD: CPT | Mod: CPTII,S$GLB,, | Performed by: INTERNAL MEDICINE

## 2025-08-27 PROCEDURE — 1125F AMNT PAIN NOTED PAIN PRSNT: CPT | Mod: CPTII,S$GLB,, | Performed by: INTERNAL MEDICINE

## 2025-08-27 PROCEDURE — 3074F SYST BP LT 130 MM HG: CPT | Mod: CPTII,S$GLB,, | Performed by: INTERNAL MEDICINE

## 2025-08-27 PROCEDURE — 99999 PR PBB SHADOW E&M-EST. PATIENT-LVL III: CPT | Mod: PBBFAC,,, | Performed by: INTERNAL MEDICINE

## 2025-08-27 RX ORDER — ATORVASTATIN CALCIUM 20 MG/1
20 TABLET, FILM COATED ORAL DAILY
Qty: 90 TABLET | Refills: 3 | Status: SHIPPED | OUTPATIENT
Start: 2025-08-27

## 2025-08-27 RX ORDER — FLECAINIDE ACETATE 100 MG/1
100 TABLET ORAL EVERY 12 HOURS
Qty: 180 TABLET | Refills: 3 | Status: SHIPPED | OUTPATIENT
Start: 2025-08-27

## 2025-08-27 RX ORDER — METOPROLOL SUCCINATE 25 MG/1
25 TABLET, EXTENDED RELEASE ORAL EVERY 12 HOURS
Qty: 180 TABLET | Refills: 3 | Status: SHIPPED | OUTPATIENT
Start: 2025-08-27

## (undated) DEVICE — NDL HYPO REG 25G X 1 1/2

## (undated) DEVICE — GLOVE BIOGEL SKINSENSE PI 8.0

## (undated) DEVICE — STAPLER SKIN ROTATING HEAD

## (undated) DEVICE — PAD CAST SPECIALIST STRL 6

## (undated) DEVICE — PULSAVAC ZIMMER

## (undated) DEVICE — SPONGE COTTON TRAY 4X4IN

## (undated) DEVICE — SUT VICRYL PLUS 4-0 P3 18IN

## (undated) DEVICE — SOL IRR SOD CHL .9% POUR

## (undated) DEVICE — STOCKINETTE TUBULAR 2PL 6 X 4

## (undated) DEVICE — KIT TOTAL HIP OPTIVAC

## (undated) DEVICE — COVER HD BACK TABLE 6FT

## (undated) DEVICE — CLOSURE SKIN STERI STRIP 1/2X4

## (undated) DEVICE — TOURNIQUET SB QC DP 34X4IN

## (undated) DEVICE — CLOSURE SKIN STERI STRIP 1/4X4

## (undated) DEVICE — SUT VICRYL PLUS 3-0 SH 18IN

## (undated) DEVICE — BANDAGE ESMARK ELASTIC ST 6X9

## (undated) DEVICE — DRESSING GAUZE OIL EMUL 3X8

## (undated) DEVICE — SUT VICRYL PLUS 3-0 18IN

## (undated) DEVICE — SOL NACL INJ 1000ML 0.9%

## (undated) DEVICE — APPLICATOR CHLORAPREP ORN 26ML

## (undated) DEVICE — NDL HYPO STD REG BVL 18GX1.5IN

## (undated) DEVICE — SUT VICRYL+ 1 CTX 18IN VIOL

## (undated) DEVICE — BANDAGE MATRIX HK LOOP 6IN 5YD

## (undated) DEVICE — GLOVE BIOGEL SKINSENSE PI 8.5

## (undated) DEVICE — BLADE SAW SAG 25.40MM X 1.27MM

## (undated) DEVICE — Device

## (undated) DEVICE — GOWN SMART IMP BREATHABLE XXLG

## (undated) DEVICE — SYR B-D DISP CONTROL 10CC100/C

## (undated) DEVICE — DRAPE SURG W/TWL 17 5/8X23

## (undated) DEVICE — HOOD T7 W/ PEEL AWAY LENS

## (undated) DEVICE — DRAPE INCISE IOBAN 2 23X17IN

## (undated) DEVICE — GLOVE BIOGEL SKINSENSE PI 7.0

## (undated) DEVICE — PAD ABDOMINAL STERILE 8X10IN

## (undated) DEVICE — TRAY CATH FOL SIL URIMTR 16FR

## (undated) DEVICE — GOWN ECLIPSE REINF LV4 XLNG XL

## (undated) DEVICE — MASK FLYTE HOOD PEEL AWAY

## (undated) DEVICE — YANKAUER OPEN TIP W/O VENT

## (undated) DEVICE — BLADE SURG STAINLESS STEEL #15

## (undated) DEVICE — DRAPE STERI U-SHAPED 47X51IN

## (undated) DEVICE — DRAPE EXTREMITY ORTHOMAX

## (undated) DEVICE — SUT VICRYL 2-0 8-18 CP-2

## (undated) DEVICE — ELECTRODE BLD EXT INSUL 1

## (undated) DEVICE — SOL 9P NACL IRR PIC IL

## (undated) DEVICE — GLOVE BIOGEL SKINSENSE PI 6.5

## (undated) DEVICE — BLANKET HYPER ADULT 24X60IN

## (undated) DEVICE — UNDERGLOVES BIOGEL PI SZ 7 LF

## (undated) DEVICE — SUT VICRYL 3-0 27 SH

## (undated) DEVICE — ELECTRODE REM PLYHSV RETURN 9

## (undated) DEVICE — TEGADERM IV

## (undated) DEVICE — UNDERGLOVE BIOGEL PI SZ 6.5 LF

## (undated) DEVICE — GAUZE SPONGE 4'X4 12 PLY

## (undated) DEVICE — SEE MEDLINE ITEM 156930